# Patient Record
Sex: FEMALE | Race: WHITE | NOT HISPANIC OR LATINO | Employment: OTHER | URBAN - METROPOLITAN AREA
[De-identification: names, ages, dates, MRNs, and addresses within clinical notes are randomized per-mention and may not be internally consistent; named-entity substitution may affect disease eponyms.]

---

## 2017-01-17 ENCOUNTER — OFFICE VISIT (OUTPATIENT)
Dept: INTERNAL MEDICINE | Facility: CLINIC | Age: 72
End: 2017-01-17

## 2017-01-17 VITALS
BODY MASS INDEX: 37.38 KG/M2 | WEIGHT: 224.38 LBS | OXYGEN SATURATION: 98 % | HEIGHT: 65 IN | HEART RATE: 113 BPM | SYSTOLIC BLOOD PRESSURE: 126 MMHG | DIASTOLIC BLOOD PRESSURE: 72 MMHG

## 2017-01-17 DIAGNOSIS — R30.0 DYSURIA: Primary | ICD-10-CM

## 2017-01-17 LAB
BILIRUB BLD-MCNC: NEGATIVE MG/DL
CLARITY, POC: CLEAR
COLOR UR: YELLOW
GLUCOSE UR STRIP-MCNC: NEGATIVE MG/DL
KETONES UR QL: ABNORMAL
LEUKOCYTE EST, POC: ABNORMAL
NITRITE UR-MCNC: NEGATIVE MG/ML
PH UR: 5 [PH] (ref 5–8)
PROT UR STRIP-MCNC: NEGATIVE MG/DL
RBC # UR STRIP: NEGATIVE /UL
SP GR UR: 1.02 (ref 1–1.03)
UROBILINOGEN UR QL: NORMAL

## 2017-01-17 PROCEDURE — 81003 URINALYSIS AUTO W/O SCOPE: CPT | Performed by: NURSE PRACTITIONER

## 2017-01-17 PROCEDURE — 99213 OFFICE O/P EST LOW 20 MIN: CPT | Performed by: NURSE PRACTITIONER

## 2017-01-17 RX ORDER — NITROFURANTOIN 25; 75 MG/1; MG/1
100 CAPSULE ORAL 2 TIMES DAILY
Qty: 14 CAPSULE | Refills: 0 | Status: SHIPPED | OUTPATIENT
Start: 2017-01-17 | End: 2017-02-21

## 2017-01-17 NOTE — MR AVS SNAPSHOT
Marylu Waltonley   1/17/2017 3:30 PM   Office Visit    Dept Phone:  [phone]   Encounter #:  [id number]    Provider:  LAURA Mehta   Department:  [facility] INTERNAL MEDICINE                Your Full Care Plan              Today's Medication Changes          These changes are accurate as of: 1/17/17  4:10 PM.  If you have any questions, ask your nurse or doctor.               New Medication(s)Ordered:     nitrofurantoin (macrocrystal-monohydrate) 100 MG capsule   Commonly known as:  MACROBID   Take 1 capsule by mouth 2 (Two) Times a Day.   Started by:  LAURA Mehta         Stop taking medication(s)listed here:     meloxicam 15 MG tablet   Commonly known as:  MOBIC   Stopped by:  LAURA Mehta           nystatin [id number] UNIT/GM cream   Commonly known as:  MYCOSTATIN   Stopped by:  LAURA Mehta                Where to Get Your Medications      These medications were sent to [facility] [place] [address] - [address] [place] [address] [phone] [address] [address]    Hours:  24-hours Phone:  [phone]     nitrofurantoin (macrocrystal-monohydrate) 100 MG capsule                  Your Updated Medication List          This list is accurate as of: 1/17/17  4:10 PM.  Always use your most recent med list.                buPROPion  MG 24 hr tablet   Commonly known as:  WELLBUTRIN XL       CYANOCOBALAMIN PO       DULoxetine 60 MG capsule   Commonly known as:  CYMBALTA       estradiol 0.1 MG/GM vaginal cream   Commonly known as:  ESTRACE   Use a pea sized amt 2-3 times a week       felodipine 10 MG 24 hr tablet   Commonly known as:  PLENDIL   Take 1 tablet by mouth daily.       irbesartan 300 MG tablet   Commonly known as:  AVAPRO   Take 1 tablet by mouth daily.       levothyroxine 50 MCG tablet   Commonly known as:  SYNTHROID, LEVOTHROID       MULTI-VITAMIN PO       nitrofurantoin  (macrocrystal-monohydrate) 100 MG capsule   Commonly known as:  MACROBID   Take 1 capsule by mouth 2 (Two) Times a Day.       OMACOR PO       PROBIOTIC PO       PROVENTIL  (90 BASE) MCG/ACT inhaler   Generic drug:  albuterol       topiramate 100 MG tablet   Commonly known as:  TOPAMAX       triamterene-hydrochlorothiazide 37.5-25 MG per capsule   Commonly known as:  DYAZIDE   Take 1 capsule by mouth every morning.       VITAMIN C PO       VITAMIN D PO       * warfarin 3 MG tablet   Commonly known as:  COUMADIN   Pt is to take 3 tabs daily or as otherwise directed       * warfarin 5 MG tablet   Commonly known as:  COUMADIN   The patient will take coumadin as per  Orders,       * Notice:  This list has 2 medication(s) that are the same as other medications prescribed for you. Read the directions carefully, and ask your doctor or other care provider to review them with you.            You Were Diagnosed With        Codes Comments    Dysuria    -  Primary ICD-10-CM: R30.0  ICD-9-CM: 788.1       Instructions     None    Patient Instructions History      Upcoming Appointments     Visit Type Date Time Department    SAME DAY 1/17/2017  3:30 PM [facility]    COAG RN APPT 1/20/2017  3:45 PM [facility] OP INFU [facility]    ANTICOAGULATION 1/20/2017  3:30 PM [facility] ONC CBC LAB [facility]    [facility] 2/13/2017  9:20 AM [facility]    OFFICE VISIT 2/21/2017  3:45 PM [facility]    [facility]AG RN APPT 3/2/2017  3:45 PM [facility] OP INFU [facility]    ANTICOAGULATION 3/2/2017  3:30 PM [facility] ONC CBC LAB [facility]    [facility] RN APPT 4/13/2017  1:30 PM [facility] OP INFU CBC [facility]    ANTICOAGULATION 4/13/2017  1:00 PM [facility] ONC CBC LAB [facility]    FOLLOW UP 2 UNIT 6/8/2017  1:00 PM [facility] ONC [facility]    ANTICOAGULATION 6/8/2017 12:30 PM [facility] ONC CBC LAB [facility]      MyChart Signup     [facility] [employer] allows you to send messages to your doctor, view your test results, renew your prescriptions, schedule appointments, and more. To sign up, go to  [email or web address] and click on the Sign Up Now link in the New User? box. Enter your [employer] Activation Code exactly as it appears below along with the last four digits of your Social Security Number and your Date of Birth () to complete the sign-up process. If you do not sign up before the expiration date, you must request a new code.    [employer] Activation Code: [email or web address]  Expires: 2017  4:09 PM    If you have questions, you can email [email or web address] or call [phone] to talk to our [employer] staff. Remember, [employer] is NOT to be used for urgent needs. For medical emergencies, dial 911.               Other Info from Your Visit           Your Appointments     2017  3:30 PM EST   Anticoagulation with LAB CHAIR 6 CBC [facility] ONCOLOGY CBC LAB ([facility])    [address] [place], [place] [id number]   [phone]            2017  3:45 PM EST   COAGULATION with COAG RN CBC [facility] INFUSION CBC ([facility])    [address] [id number] [phone]            2017  9:20 AM EST   [facility] with [facility] [address] INTERNAL MEDICINE (--)    [address] [id number] [phone]            2017  3:45 PM EST   Office Visit with Arleen Dillon MD   [facility] INTERNAL MEDICINE (--)    [address] [address]   [phone]           Arrive 15 minutes prior to appointment.            Mar 02, 2017  3:45 PM EST   COAGULATION with MEGHAN GOODMAN [facility] INFUSION CBC ([facility])    [address] [id number]   [phone]              Allergies     Amlodipine Besylate-valsartan      Cefdinir      Other      Steroids       Reason for Visit     Urinary Tract Infection Pt complains of having urine frequency & dysuria. Pt states that she saw Joanna  "Quiñonez & there is talk of a diagnosis for Vulvodynia. Pt is awaiting pending testing.       Vital Signs     Blood Pressure Pulse Height Weight Last Menstrual Period Oxygen Saturation    126/72 (BP Location: Left arm, Patient Position: Sitting, Cuff Size: Large Adult) 113 64.96\" (165 cm) 224 lb 6 oz (102 kg) (LMP Unknown) 98%    Body Mass Index Smoking Status                37.38 kg/m2 Never Smoker          Problems and Diagnoses Noted     Difficult or painful urination    -  Primary        "

## 2017-01-17 NOTE — PROGRESS NOTES
Subjective   Marylu Foreman is a 71 y.o. female. Patient is here today for   Chief Complaint   Patient presents with   • Urinary Tract Infection     Pt complains of having urine frequency & dysuria. Pt states that she saw Joanna Quiñonez & there is talk of a diagnosis for Vulvodynia. Pt is awaiting pending testing.    .    History of Present Illness   C/o urinary frequency x 2 days associated with dysuria.  She had taken B12 10,000 mcg daily and had some diarrhea which has gotten better. States that she usually gets a urinary tract infection when she has diarrhea.   She is currently seeing Dr. Joanna Quiñonez for her frequent yeast infections and pelvic pain.    The following portions of the patient's history were reviewed and updated as appropriate: allergies, current medications, past family history, past medical history, past social history, past surgical history and problem list.    Review of Systems   Constitutional: Negative.    Respiratory: Negative.    Cardiovascular: Negative.    Genitourinary: Positive for dysuria and frequency.       Objective   Vitals:    01/17/17 1533   BP: 126/72   Pulse: 113   SpO2: 98%     Physical Exam   Constitutional: Vital signs are normal. She appears well-developed and well-nourished.   Cardiovascular: Normal rate, regular rhythm and normal heart sounds.    Pulmonary/Chest: Effort normal and breath sounds normal.   Abdominal: There is no CVA tenderness.   Skin: Skin is warm and dry.   Nursing note and vitals reviewed.    Results for orders placed or performed in visit on 01/17/17   POC Urinalysis Dipstick, Automated   Result Value Ref Range    Color Yellow Yellow, Straw, Dark Yellow, Ruby    Clarity, UA Clear Clear    Glucose, UA Negative Negative, 1000 mg/dL (3+) mg/dL    Bilirubin Negative Negative    Ketones, UA Trace (A) Negative    Specific Gravity  1.025 1.005 - 1.030    Blood, UA Negative Negative    pH, Urine 5.0 5.0 - 8.0    Protein, POC Negative Negative mg/dL     Urobilinogen, UA Normal Normal    Leukocytes Small (1+) (A) Negative    Nitrite, UA Negative Negative       Assessment/Plan   Marylu was seen today for urinary tract infection.    Diagnoses and all orders for this visit:    Dysuria  -     nitrofurantoin, macrocrystal-monohydrate, (MACROBID) 100 MG capsule; Take 1 capsule by mouth 2 (Two) Times a Day.  -     POC Urinalysis Dipstick, Automated  -     Urine Culture

## 2017-01-19 LAB
BACTERIA UR CULT: ABNORMAL
BACTERIA UR CULT: ABNORMAL
OTHER ANTIBIOTIC SUSC ISLT: ABNORMAL

## 2017-01-20 ENCOUNTER — CLINICAL SUPPORT (OUTPATIENT)
Dept: ONCOLOGY | Facility: HOSPITAL | Age: 72
End: 2017-01-20

## 2017-01-20 ENCOUNTER — ANTICOAGULATION VISIT (OUTPATIENT)
Dept: LAB | Facility: HOSPITAL | Age: 72
End: 2017-01-20

## 2017-01-20 ENCOUNTER — TELEPHONE (OUTPATIENT)
Dept: INTERNAL MEDICINE | Facility: CLINIC | Age: 72
End: 2017-01-20

## 2017-01-20 DIAGNOSIS — Z79.01 WARFARIN ANTICOAGULATION: Primary | ICD-10-CM

## 2017-01-20 DIAGNOSIS — I27.82 OTHER CHRONIC PULMONARY EMBOLISM: Primary | ICD-10-CM

## 2017-01-20 LAB
INR PPP: 1.6 (ref 0.9–1.1)
PROTHROMBIN TIME: 19.2 SECONDS (ref 11–13.5)

## 2017-01-20 PROCEDURE — 85610 PROTHROMBIN TIME: CPT | Performed by: INTERNAL MEDICINE

## 2017-01-20 NOTE — TELEPHONE ENCOUNTER
----- Message from LAURA Mehta sent at 1/20/2017  9:58 AM EST -----  Please let patient know that her urine culture was positive. The antibiotic that she's on should help. How are her symptoms?

## 2017-01-20 NOTE — PROGRESS NOTES
INR 1.6 TODAY. PER PT, SHE IS HAS NOT MISSED ANY DOSES. SHE IS ON MACROBID WHICH SHE JUST STARTED YESTERDAY FOR A UTI. SHE WILL BE ON THIS FOR A WEEK. D/W DR. ROBLEDO. PER DR. ROBLEDO, HAVE PT TAKE 5MG SUN, WED, SAT AND 7.5MG ALL OTHER DAYS. HAVE HER RETURN IN 1 WEEK FOR A RECHECK. INFORMED PT AND SHE V/U. COPY OF DOSING INSTRUCTIONS GIVEN TO PT. PT SENT TO SCHEDULING TO MAKE APT.

## 2017-01-20 NOTE — TELEPHONE ENCOUNTER
LM for patient on urine culture; directed to finish antibiotic & to call back to see how she was feeling.

## 2017-01-26 ENCOUNTER — CLINICAL SUPPORT (OUTPATIENT)
Dept: ONCOLOGY | Facility: HOSPITAL | Age: 72
End: 2017-01-26

## 2017-01-26 ENCOUNTER — LAB (OUTPATIENT)
Dept: LAB | Facility: HOSPITAL | Age: 72
End: 2017-01-26

## 2017-01-26 DIAGNOSIS — Z79.01 WARFARIN ANTICOAGULATION: ICD-10-CM

## 2017-01-26 LAB
INR PPP: 2 (ref 0.9–1.1)
PROTHROMBIN TIME: 24.1 SECONDS (ref 11–13.5)

## 2017-01-26 PROCEDURE — 85610 PROTHROMBIN TIME: CPT | Performed by: INTERNAL MEDICINE

## 2017-02-16 RX ORDER — WARFARIN SODIUM 5 MG/1
TABLET ORAL
Qty: 36 TABLET | Refills: 3 | Status: SHIPPED | OUTPATIENT
Start: 2017-02-16 | End: 2017-06-12 | Stop reason: SDUPTHER

## 2017-02-17 ENCOUNTER — APPOINTMENT (OUTPATIENT)
Dept: LAB | Facility: HOSPITAL | Age: 72
End: 2017-02-17

## 2017-02-17 ENCOUNTER — APPOINTMENT (OUTPATIENT)
Dept: ONCOLOGY | Facility: HOSPITAL | Age: 72
End: 2017-02-17

## 2017-02-21 ENCOUNTER — OFFICE VISIT (OUTPATIENT)
Dept: INTERNAL MEDICINE | Facility: CLINIC | Age: 72
End: 2017-02-21

## 2017-02-21 VITALS
OXYGEN SATURATION: 99 % | HEIGHT: 65 IN | HEART RATE: 92 BPM | SYSTOLIC BLOOD PRESSURE: 124 MMHG | WEIGHT: 226.4 LBS | DIASTOLIC BLOOD PRESSURE: 70 MMHG | BODY MASS INDEX: 37.72 KG/M2

## 2017-02-21 DIAGNOSIS — I27.82 OTHER CHRONIC PULMONARY EMBOLISM: Primary | ICD-10-CM

## 2017-02-21 DIAGNOSIS — F32.A DEPRESSION, UNSPECIFIED DEPRESSION TYPE: ICD-10-CM

## 2017-02-21 DIAGNOSIS — I10 ESSENTIAL HYPERTENSION: ICD-10-CM

## 2017-02-21 PROCEDURE — 99213 OFFICE O/P EST LOW 20 MIN: CPT | Performed by: INTERNAL MEDICINE

## 2017-02-21 PROCEDURE — 90670 PCV13 VACCINE IM: CPT | Performed by: INTERNAL MEDICINE

## 2017-02-21 PROCEDURE — G0009 ADMIN PNEUMOCOCCAL VACCINE: HCPCS | Performed by: INTERNAL MEDICINE

## 2017-02-21 NOTE — PROGRESS NOTES
Subjective   Marylu Foreman is a 71 y.o. female here today to f/u on HTN and hyperlipidemia.      History of Present Illness   Pt has been doing well with thyroid meds.  Taking as perscribed without any complications  Pt has been taking BP meds as prescribed without any problems.  No HA  No episodes of orthostasis  She has been on the welbutrin and the cymbalta for depression.  She says it is not really helping  SHe sees a psychiatrist  She sees dr Mahajan at [facility] and they follow her INR    The following portions of the patient's history were reviewed and updated as appropriate: allergies, current medications, past medical history, past social history and problem list.  She still is working full-time at [employer]    Review of Systems   All other systems reviewed and are negative.      Objective   Physical Exam   Constitutional: She is oriented to person, place, and time. She appears well-developed and well-nourished.   HENT:   Head: Normocephalic and atraumatic.   Right Ear: External ear normal.   Left Ear: External ear normal.   Mouth/Throat: Oropharynx is clear and moist.   Eyes: Conjunctivae and EOM are normal. Pupils are equal, round, and reactive to light.   Neck: Normal range of motion. No tracheal deviation present. No thyromegaly present.   Cardiovascular: Normal rate, regular rhythm, normal heart sounds and intact distal pulses.    Pulmonary/Chest: Effort normal and breath sounds normal.   Abdominal: Soft. Bowel sounds are normal. She exhibits no distension. There is no tenderness.   Musculoskeletal: Normal range of motion. She exhibits no edema or deformity.   Neurological: She is alert and oriented to person, place, and time.   Skin: Skin is warm and dry.   Psychiatric: She has a normal mood and affect. Her behavior is normal. Judgment and thought content normal.   Vitals reviewed.      Vitals:    02/21/17 1540   BP: 124/70   Pulse: 92   SpO2: 99%          Current Outpatient Prescriptions:   •  buPROPion  XL (WELLBUTRIN XL) 300 MG 24 hr tablet, Take 300 mg by mouth every morning., Disp: , Rfl:   •  CYANOCOBALAMIN PO, , Disp: , Rfl:   •  DULoxetine (CYMBALTA) 60 MG capsule, Take 1 capsule by mouth Daily., Disp: , Rfl: 0  •  estradiol (ESTRACE) 0.1 MG/GM vaginal cream, Use a pea sized amt 2-3 times a week, Disp: 42.5 g, Rfl: 2  •  felodipine (PLENDIL) 10 MG 24 hr tablet, Take 1 tablet by mouth daily., Disp: 90 tablet, Rfl: 3  •  irbesartan (AVAPRO) 300 MG tablet, Take 1 tablet by mouth daily., Disp: 90 tablet, Rfl: 3  •  levothyroxine (SYNTHROID, LEVOTHROID) 50 MCG tablet, , Disp: , Rfl:   •  Multiple Vitamin (MULTI-VITAMIN PO), Take 1 tablet by mouth daily., Disp: , Rfl:   •  Probiotic Product (PROBIOTIC PO), Take  by mouth daily. Lactobacillus rhamnosus GG, Disp: , Rfl:   •  triamterene-hydrochlorothiazide (DYAZIDE) 37.5-25 MG per capsule, Take 1 capsule by mouth every morning., Disp: 90 capsule, Rfl: 3  •  warfarin (COUMADIN) 5 MG tablet, Take 5mg sun, wed, thur, 7.5 all other days or as instructed, Disp: 36 tablet, Rfl: 3  •  PROVENTIL  (90 BASE) MCG/ACT inhaler, inhale 2 puffs by mouth every 6 hours if needed for wheezing, Disp: , Rfl: 0      Assessment/Plan   Diagnoses and all orders for this visit:    Other chronic pulmonary embolism    Essential hypertension    Depression, unspecified depression type      1.  HTN- she is doing well with current meds  2. PE- She is doing well with warfarin  Sees CBC  3.  HPL- she has never been on meds.  SHe is going to work on diet and exercise and see what she is in 6 months  4. Depression- She is still seeing Psych.  I have rec some reg exercise

## 2017-03-02 ENCOUNTER — CLINICAL SUPPORT (OUTPATIENT)
Dept: ONCOLOGY | Facility: HOSPITAL | Age: 72
End: 2017-03-02

## 2017-03-02 ENCOUNTER — ANTICOAGULATION VISIT (OUTPATIENT)
Dept: LAB | Facility: HOSPITAL | Age: 72
End: 2017-03-02

## 2017-03-02 DIAGNOSIS — I27.82 OTHER CHRONIC PULMONARY EMBOLISM: Primary | ICD-10-CM

## 2017-03-02 LAB
INR PPP: 2.6 (ref 0.9–1.1)
PROTHROMBIN TIME: 31.8 SECONDS (ref 11–13.5)

## 2017-03-02 PROCEDURE — 85610 PROTHROMBIN TIME: CPT | Performed by: INTERNAL MEDICINE

## 2017-03-06 RX ORDER — IRBESARTAN 300 MG/1
TABLET ORAL
Qty: 90 TABLET | Refills: 1 | Status: SHIPPED | OUTPATIENT
Start: 2017-03-06 | End: 2017-09-02 | Stop reason: SDUPTHER

## 2017-03-06 RX ORDER — TRIAMTERENE AND HYDROCHLOROTHIAZIDE 37.5; 25 MG/1; MG/1
CAPSULE ORAL
Qty: 90 CAPSULE | Refills: 1 | Status: SHIPPED | OUTPATIENT
Start: 2017-03-06 | End: 2017-09-02 | Stop reason: SDUPTHER

## 2017-03-06 RX ORDER — FELODIPINE 10 MG/1
TABLET, EXTENDED RELEASE ORAL
Qty: 90 TABLET | Refills: 1 | Status: SHIPPED | OUTPATIENT
Start: 2017-03-06 | End: 2017-09-02 | Stop reason: SDUPTHER

## 2017-04-13 ENCOUNTER — APPOINTMENT (OUTPATIENT)
Dept: ONCOLOGY | Facility: HOSPITAL | Age: 72
End: 2017-04-13

## 2017-04-13 ENCOUNTER — APPOINTMENT (OUTPATIENT)
Dept: LAB | Facility: HOSPITAL | Age: 72
End: 2017-04-13

## 2017-04-14 ENCOUNTER — CLINICAL SUPPORT (OUTPATIENT)
Dept: ONCOLOGY | Facility: HOSPITAL | Age: 72
End: 2017-04-14

## 2017-04-14 ENCOUNTER — ANTICOAGULATION VISIT (OUTPATIENT)
Dept: LAB | Facility: HOSPITAL | Age: 72
End: 2017-04-14

## 2017-04-14 DIAGNOSIS — Z79.01 WARFARIN ANTICOAGULATION: Primary | ICD-10-CM

## 2017-04-14 LAB
INR PPP: 4.4 (ref 0.9–1.1)
PROTHROMBIN TIME: 52.8 SECONDS (ref 11–13.5)

## 2017-04-14 PROCEDURE — 85610 PROTHROMBIN TIME: CPT | Performed by: INTERNAL MEDICINE

## 2017-04-14 NOTE — PROGRESS NOTES
INR 4.4.  Pt started on new med naltrexone from phychiatrist.  It's supposed to work in conjunction with wellbutrin to help control appetite.  No interaction with coumadin and her new medication.  No bleeding complaints.  I s/w LAURA Santana.  Hold coumadin today.  5mg Monday and Wednesday and 7.5mg all other days.  Recheck INR on Friday 4/21/17.  Pt has hard time getting to appts bc of her work schedule.  We put her on infusion schedule for Coag RN review next week.  Copy of SS given to pt and she v/u.  She will call with any concerns/complaints.  I did inform her that home monitoring of INR was a possibility and she will talk with Dr. Mahajan about this at her visit in June.

## 2017-04-17 DIAGNOSIS — Z79.01 WARFARIN ANTICOAGULATION: Primary | ICD-10-CM

## 2017-04-21 ENCOUNTER — TELEPHONE (OUTPATIENT)
Dept: ONCOLOGY | Facility: CLINIC | Age: 72
End: 2017-04-21

## 2017-04-21 ENCOUNTER — INFUSION (OUTPATIENT)
Dept: ONCOLOGY | Facility: HOSPITAL | Age: 72
End: 2017-04-21

## 2017-04-21 ENCOUNTER — ANTICOAGULATION VISIT (OUTPATIENT)
Dept: LAB | Facility: HOSPITAL | Age: 72
End: 2017-04-21

## 2017-04-21 DIAGNOSIS — Z79.01 WARFARIN ANTICOAGULATION: ICD-10-CM

## 2017-04-21 LAB
INR PPP: 3.2 (ref 0.9–1.1)
PROTHROMBIN TIME: 38.1 SECONDS (ref 11–13.5)

## 2017-04-21 PROCEDURE — 85610 PROTHROMBIN TIME: CPT | Performed by: INTERNAL MEDICINE

## 2017-04-26 DIAGNOSIS — Z79.01 WARFARIN ANTICOAGULATION: Primary | ICD-10-CM

## 2017-05-04 ENCOUNTER — CLINICAL SUPPORT (OUTPATIENT)
Dept: ONCOLOGY | Facility: HOSPITAL | Age: 72
End: 2017-05-04

## 2017-05-04 ENCOUNTER — ANTICOAGULATION VISIT (OUTPATIENT)
Dept: LAB | Facility: HOSPITAL | Age: 72
End: 2017-05-04
Attending: INTERNAL MEDICINE

## 2017-05-04 DIAGNOSIS — Z79.01 WARFARIN ANTICOAGULATION: ICD-10-CM

## 2017-05-04 LAB
INR PPP: 1.9 (ref 0.9–1.1)
PROTHROMBIN TIME: 22.5 SECONDS (ref 11–13.5)

## 2017-05-04 PROCEDURE — 85610 PROTHROMBIN TIME: CPT | Performed by: INTERNAL MEDICINE

## 2017-06-09 RX ORDER — ESTRADIOL 0.1 MG/G
CREAM VAGINAL
Qty: 42.5 G | Refills: 2 | Status: SHIPPED | OUTPATIENT
Start: 2017-06-09 | End: 2017-10-03 | Stop reason: ALTCHOICE

## 2017-06-12 RX ORDER — WARFARIN SODIUM 5 MG/1
TABLET ORAL
Qty: 36 TABLET | Refills: 3 | Status: SHIPPED | OUTPATIENT
Start: 2017-06-12 | End: 2017-10-24 | Stop reason: SDUPTHER

## 2017-06-13 DIAGNOSIS — C50.912 MALIGNANT NEOPLASM OF LEFT FEMALE BREAST, UNSPECIFIED SITE OF BREAST: Primary | ICD-10-CM

## 2017-06-15 ENCOUNTER — ANTICOAGULATION VISIT (OUTPATIENT)
Dept: OTHER | Facility: HOSPITAL | Age: 72
End: 2017-06-15

## 2017-06-15 ENCOUNTER — APPOINTMENT (OUTPATIENT)
Dept: ONCOLOGY | Facility: CLINIC | Age: 72
End: 2017-06-15

## 2017-06-15 DIAGNOSIS — Z79.01 WARFARIN ANTICOAGULATION: Primary | ICD-10-CM

## 2017-06-21 ENCOUNTER — TRANSCRIBE ORDERS (OUTPATIENT)
Dept: ADMINISTRATIVE | Facility: HOSPITAL | Age: 72
End: 2017-06-21

## 2017-06-21 DIAGNOSIS — Z12.31 VISIT FOR SCREENING MAMMOGRAM: Primary | ICD-10-CM

## 2017-06-21 DIAGNOSIS — Z79.01 WARFARIN ANTICOAGULATION: Primary | ICD-10-CM

## 2017-06-23 ENCOUNTER — OFFICE VISIT (OUTPATIENT)
Dept: ONCOLOGY | Facility: CLINIC | Age: 72
End: 2017-06-23

## 2017-06-23 ENCOUNTER — ANTICOAGULATION VISIT (OUTPATIENT)
Dept: OTHER | Facility: HOSPITAL | Age: 72
End: 2017-06-23

## 2017-06-23 VITALS
TEMPERATURE: 97.7 F | OXYGEN SATURATION: 97 % | DIASTOLIC BLOOD PRESSURE: 66 MMHG | HEART RATE: 87 BPM | SYSTOLIC BLOOD PRESSURE: 101 MMHG | RESPIRATION RATE: 16 BRPM | BODY MASS INDEX: 37.39 KG/M2 | HEIGHT: 65 IN | WEIGHT: 224.4 LBS

## 2017-06-23 DIAGNOSIS — C50.912 MALIGNANT NEOPLASM OF LEFT FEMALE BREAST, UNSPECIFIED SITE OF BREAST: Primary | ICD-10-CM

## 2017-06-23 DIAGNOSIS — I95.1 ORTHOSTATIC HYPOTENSION: ICD-10-CM

## 2017-06-23 DIAGNOSIS — Z79.01 WARFARIN ANTICOAGULATION: ICD-10-CM

## 2017-06-23 LAB
BASOPHILS # BLD AUTO: 0.14 10*3/MM3 (ref 0–0.2)
BASOPHILS NFR BLD AUTO: 1.6 % (ref 0–1.5)
DEPRECATED RDW RBC AUTO: 47.3 FL (ref 37–54)
EOSINOPHIL # BLD AUTO: 0.63 10*3/MM3 (ref 0–0.7)
EOSINOPHIL NFR BLD AUTO: 7.3 % (ref 0.3–6.2)
ERYTHROCYTE [DISTWIDTH] IN BLOOD BY AUTOMATED COUNT: 14.1 % (ref 11.7–13)
HCT VFR BLD AUTO: 35.2 % (ref 35.6–45.5)
HGB BLD-MCNC: 11.5 G/DL (ref 11.9–15.5)
IMM GRANULOCYTES # BLD: 0.03 10*3/MM3 (ref 0–0.03)
IMM GRANULOCYTES NFR BLD: 0.3 % (ref 0–0.5)
INR PPP: 2.6
LYMPHOCYTES # BLD AUTO: 2.5 10*3/MM3 (ref 0.9–4.8)
LYMPHOCYTES NFR BLD AUTO: 28.8 % (ref 19.6–45.3)
MCH RBC QN AUTO: 29.7 PG (ref 26.9–32)
MCHC RBC AUTO-ENTMCNC: 32.7 G/DL (ref 32.4–36.3)
MCV RBC AUTO: 91 FL (ref 80.5–98.2)
MONOCYTES # BLD AUTO: 0.77 10*3/MM3 (ref 0.2–1.2)
MONOCYTES NFR BLD AUTO: 8.9 % (ref 5–12)
NEUTROPHILS # BLD AUTO: 4.6 10*3/MM3 (ref 1.9–8.1)
NEUTROPHILS NFR BLD AUTO: 53.1 % (ref 42.7–76)
NRBC BLD MANUAL-RTO: 0.2 /100 WBC (ref 0–0)
PLATELET # BLD AUTO: 373 10*3/MM3 (ref 140–500)
PMV BLD AUTO: 11.5 FL (ref 6–12)
PROTHROMBIN TIME: 30.7 SECONDS (ref 11–15)
RBC # BLD AUTO: 3.87 10*6/MM3 (ref 3.9–5.2)
WBC NRBC COR # BLD: 8.67 10*3/MM3 (ref 4.5–10.7)

## 2017-06-23 PROCEDURE — 36415 COLL VENOUS BLD VENIPUNCTURE: CPT

## 2017-06-23 PROCEDURE — 99214 OFFICE O/P EST MOD 30 MIN: CPT | Performed by: INTERNAL MEDICINE

## 2017-06-23 PROCEDURE — 85610 PROTHROMBIN TIME: CPT

## 2017-06-23 PROCEDURE — 85025 COMPLETE CBC W/AUTO DIFF WBC: CPT | Performed by: INTERNAL MEDICINE
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
## 2017-07-24 DIAGNOSIS — I10 ESSENTIAL HYPERTENSION: ICD-10-CM

## 2017-07-24 DIAGNOSIS — I27.82 OTHER CHRONIC PULMONARY EMBOLISM: ICD-10-CM

## 2017-07-26 LAB
ALBUMIN SERPL-MCNC: 4.3 G/DL (ref 3.5–5.2)
ALBUMIN/GLOB SERPL: 1.5 G/DL
ALP SERPL-CCNC: 106 U/L (ref 39–117)
ALT SERPL-CCNC: 12 U/L (ref 1–33)
AST SERPL-CCNC: 11 U/L (ref 1–32)
BILIRUB SERPL-MCNC: 0.5 MG/DL (ref 0.1–1.2)
BUN SERPL-MCNC: 35 MG/DL (ref 8–23)
BUN/CREAT SERPL: 20.3 (ref 7–25)
CALCIUM SERPL-MCNC: 9.1 MG/DL (ref 8.6–10.5)
CHLORIDE SERPL-SCNC: 101 MMOL/L (ref 98–107)
CHOLEST SERPL-MCNC: 214 MG/DL (ref 0–200)
CO2 SERPL-SCNC: 22.2 MMOL/L (ref 22–29)
CREAT SERPL-MCNC: 1.72 MG/DL (ref 0.57–1)
GLOBULIN SER CALC-MCNC: 2.9 GM/DL
GLUCOSE SERPL-MCNC: 117 MG/DL (ref 65–99)
HCV AB S/CO SERPL IA: <0.1 S/CO RATIO (ref 0–0.9)
HDLC SERPL-MCNC: 55 MG/DL (ref 40–60)
LDLC SERPL CALC-MCNC: 132 MG/DL (ref 0–100)
LDLC/HDLC SERPL: 2.4 {RATIO}
POTASSIUM SERPL-SCNC: 4.3 MMOL/L (ref 3.5–5.2)
PROT SERPL-MCNC: 7.2 G/DL (ref 6–8.5)
SODIUM SERPL-SCNC: 140 MMOL/L (ref 136–145)
TRIGL SERPL-MCNC: 134 MG/DL (ref 0–150)
VLDLC SERPL CALC-MCNC: 26.8 MG/DL (ref 5–40)

## 2017-07-27 ENCOUNTER — OFFICE VISIT (OUTPATIENT)
Dept: INTERNAL MEDICINE | Facility: CLINIC | Age: 72
End: 2017-07-27

## 2017-07-27 VITALS
DIASTOLIC BLOOD PRESSURE: 62 MMHG | HEIGHT: 65 IN | BODY MASS INDEX: 37.73 KG/M2 | SYSTOLIC BLOOD PRESSURE: 104 MMHG | WEIGHT: 226.44 LBS | HEART RATE: 65 BPM | OXYGEN SATURATION: 98 %

## 2017-07-27 DIAGNOSIS — N76.1 CHRONIC VAGINITIS: ICD-10-CM

## 2017-07-27 DIAGNOSIS — I10 ESSENTIAL HYPERTENSION: ICD-10-CM

## 2017-07-27 DIAGNOSIS — E78.5 HYPERLIPIDEMIA, UNSPECIFIED HYPERLIPIDEMIA TYPE: ICD-10-CM

## 2017-07-27 DIAGNOSIS — N18.9 CRI (CHRONIC RENAL INSUFFICIENCY), UNSPECIFIED STAGE: ICD-10-CM

## 2017-07-27 DIAGNOSIS — R30.0 DYSURIA: Primary | ICD-10-CM

## 2017-07-27 LAB
BILIRUB BLD-MCNC: NEGATIVE MG/DL
CLARITY, POC: ABNORMAL
COLOR UR: ABNORMAL
GLUCOSE UR STRIP-MCNC: NEGATIVE MG/DL
KETONES UR QL: NEGATIVE
LEUKOCYTE EST, POC: NEGATIVE
NITRITE UR-MCNC: NEGATIVE MG/ML
PH UR: 0.2 [PH] (ref 5–8)
PROT UR STRIP-MCNC: NEGATIVE MG/DL
RBC # UR STRIP: NEGATIVE /UL
SP GR UR: 1.02 (ref 1–1.03)
UROBILINOGEN UR QL: NORMAL

## 2017-07-27 PROCEDURE — 81003 URINALYSIS AUTO W/O SCOPE: CPT | Performed by: INTERNAL MEDICINE

## 2017-07-27 PROCEDURE — 99214 OFFICE O/P EST MOD 30 MIN: CPT | Performed by: INTERNAL MEDICINE

## 2017-07-27 RX ORDER — NALTREXONE HYDROCHLORIDE 50 MG/1
25 TABLET, FILM COATED ORAL DAILY
COMMUNITY
End: 2017-08-28

## 2017-07-27 NOTE — PROGRESS NOTES
"Subjective   Marylu Foreman is a 72 y.o. female.     Pt here to follow up on HPL.   Pt did have labs done. Pt has been taking an OTC med called \"jiaogulan\" for high cholesterol.   Pt also complains of having dysuria & urine frequency. Pt states this is an on-going issue from a very bad vaginal infection that she has had over the last year.   The pt currently see's GYN Joanna Quiñonez & they have tried her on at least two medications.     History of Present Illness   She has been getting medication from GYN for her bad vag infection.  She denies any vag d/g but has pain in that area  She has used muliple meds as treatment and it has never gone away.  She says the pain has been getting worse for the last week and is similar to last summer    The following portions of the patient's history were reviewed and updated as appropriate: allergies, current medications, past medical history, past social history and problem list.  She denies any change in her diet  She uses ivory soap and non fragrance soap    Review of Systems   All other systems reviewed and are negative.      Objective     Vitals:    07/27/17 1515   BP: 104/62   Pulse: 65   SpO2: 98%       Physical Exam   Constitutional: She is oriented to person, place, and time. She appears well-developed and well-nourished.   HENT:   Head: Normocephalic and atraumatic.   Right Ear: External ear normal.   Left Ear: External ear normal.   Mouth/Throat: Oropharynx is clear and moist.   Eyes: Conjunctivae and EOM are normal. Pupils are equal, round, and reactive to light.   Neck: Normal range of motion. No tracheal deviation present. No thyromegaly present.   Cardiovascular: Normal rate, regular rhythm, normal heart sounds and intact distal pulses.    Pulmonary/Chest: Effort normal and breath sounds normal.   Abdominal: Soft. Bowel sounds are normal. She exhibits no distension. There is no tenderness.   Genitourinary: Vagina normal and uterus normal. No vaginal discharge " found.   Musculoskeletal: Normal range of motion. She exhibits no edema or deformity.   Neurological: She is alert and oriented to person, place, and time.   Skin: Skin is warm and dry.   Psychiatric: She has a normal mood and affect. Her behavior is normal. Judgment and thought content normal.   Vitals reviewed.      Assessment/Plan   Diagnoses and all orders for this visit:    Dysuria  -     POCT urinalysis dipstick, automated    Essential hypertension    Hyperlipidemia, unspecified hyperlipidemia type    CRI (chronic renal insufficiency), unspecified stage  -     Basic Metabolic Panel; Future    Chronic vaginitis    Other orders  -     tretinoin (RETIN-A) 0.025 % cream; Apply  topically Every Night.    1.  Vaginal pain: Patient has a history of atrophic vaginitis.  She also has a history of recurrent vaginal infections.  She does not have any significant findings on exam today.  She likely needs to go back and see GYN and I did do cultures today  2.  Renal insufficiency: This is acutely worsened but she has not been drinking very much water.  She needs strict more water and discontinue her diuretic  3.  Hypertension: She is running a little bit low.  We are going discontinue the Dyazide

## 2017-07-31 ENCOUNTER — HOSPITAL ENCOUNTER (OUTPATIENT)
Dept: MAMMOGRAPHY | Facility: HOSPITAL | Age: 72
Discharge: HOME | End: 2017-07-31
Admitting: INTERNAL MEDICINE

## 2017-07-31 DIAGNOSIS — Z12.31 VISIT FOR SCREENING MAMMOGRAM: ICD-10-CM

## 2017-07-31 PROCEDURE — G0202 SCR MAMMO BI INCL CAD: HCPCS

## 2017-08-01 LAB
A VAGINAE DNA VAG QL NAA+PROBE: ABNORMAL SCORE
BVAB2 DNA VAG QL NAA+PROBE: ABNORMAL SCORE
C ALBICANS DNA VAG QL NAA+PROBE: NEGATIVE
C GLABRATA DNA VAG QL NAA+PROBE: POSITIVE
MEGA1 DNA VAG QL NAA+PROBE: ABNORMAL SCORE
T VAGINALIS RRNA SPEC QL NAA+PROBE: NEGATIVE

## 2017-08-02 ENCOUNTER — TELEPHONE (OUTPATIENT)
Dept: OBSTETRICS AND GYNECOLOGY | Facility: CLINIC | Age: 72
End: 2017-08-02

## 2017-08-02 NOTE — TELEPHONE ENCOUNTER
Pt called states that she had a nuswab done at her PCP office and they didn't know how to treat her so they have asked that we view the results and send her in something to her [facility] pharmacy. Pt informed Dr Bateman out of office today.

## 2017-08-02 NOTE — TELEPHONE ENCOUNTER
Yes but we should probably try extended dosing of the fluconazole. I will have  her take 3 tablets over the course of 7 days.

## 2017-08-02 NOTE — TELEPHONE ENCOUNTER
Pt states she has tried boric acid before and the burning was intolerable for her. She would like ot just see if maybe fluconazole would help if you could send that in for her.

## 2017-08-02 NOTE — TELEPHONE ENCOUNTER
OK, Nevermind, I got a high alert drug warning with oral fluconazole at extended dose d/t her coumadin and the increased tendency to bleed. Please advise her to do OTC monistat for 14 days.

## 2017-08-03 DIAGNOSIS — C50.912 MALIGNANT NEOPLASM OF LEFT FEMALE BREAST, UNSPECIFIED SITE OF BREAST: Primary | ICD-10-CM

## 2017-08-03 DIAGNOSIS — Z79.01 WARFARIN ANTICOAGULATION: ICD-10-CM

## 2017-08-04 ENCOUNTER — LAB (OUTPATIENT)
Dept: OTHER | Facility: HOSPITAL | Age: 72
End: 2017-08-04

## 2017-08-04 ENCOUNTER — OFFICE VISIT (OUTPATIENT)
Dept: ONCOLOGY | Facility: CLINIC | Age: 72
End: 2017-08-04

## 2017-08-04 VITALS — SYSTOLIC BLOOD PRESSURE: 127 MMHG | HEART RATE: 92 BPM | TEMPERATURE: 98.3 F | DIASTOLIC BLOOD PRESSURE: 78 MMHG

## 2017-08-04 DIAGNOSIS — Z79.01 WARFARIN ANTICOAGULATION: Primary | ICD-10-CM

## 2017-08-04 DIAGNOSIS — Z79.01 WARFARIN ANTICOAGULATION: ICD-10-CM

## 2017-08-04 DIAGNOSIS — C50.912 MALIGNANT NEOPLASM OF LEFT FEMALE BREAST, UNSPECIFIED SITE OF BREAST: ICD-10-CM

## 2017-08-04 LAB
BASOPHILS # BLD AUTO: 0.14 10*3/MM3 (ref 0–0.2)
BASOPHILS NFR BLD AUTO: 1.5 % (ref 0–1.5)
DEPRECATED RDW RBC AUTO: 48.9 FL (ref 37–54)
EOSINOPHIL # BLD AUTO: 0.35 10*3/MM3 (ref 0–0.7)
EOSINOPHIL NFR BLD AUTO: 3.7 % (ref 0.3–6.2)
ERYTHROCYTE [DISTWIDTH] IN BLOOD BY AUTOMATED COUNT: 14.7 % (ref 11.7–13)
HCT VFR BLD AUTO: 32.3 % (ref 35.6–45.5)
HGB BLD-MCNC: 10.6 G/DL (ref 11.9–15.5)
IMM GRANULOCYTES # BLD: 0.04 10*3/MM3 (ref 0–0.03)
IMM GRANULOCYTES NFR BLD: 0.4 % (ref 0–0.5)
INR PPP: 3.2
LYMPHOCYTES # BLD AUTO: 2.94 10*3/MM3 (ref 0.9–4.8)
LYMPHOCYTES NFR BLD AUTO: 31 % (ref 19.6–45.3)
MCH RBC QN AUTO: 29.7 PG (ref 26.9–32)
MCHC RBC AUTO-ENTMCNC: 32.8 G/DL (ref 32.4–36.3)
MCV RBC AUTO: 90.5 FL (ref 80.5–98.2)
MONOCYTES # BLD AUTO: 0.82 10*3/MM3 (ref 0.2–1.2)
MONOCYTES NFR BLD AUTO: 8.6 % (ref 5–12)
NEUTROPHILS # BLD AUTO: 5.2 10*3/MM3 (ref 1.9–8.1)
NEUTROPHILS NFR BLD AUTO: 54.8 % (ref 42.7–76)
NRBC BLD MANUAL-RTO: 1.1 /100 WBC (ref 0–0)
PLATELET # BLD AUTO: 336 10*3/MM3 (ref 140–500)
PMV BLD AUTO: 11.1 FL (ref 6–12)
PROTHROMBIN TIME: 38.1 SECONDS (ref 11–15)
RBC # BLD AUTO: 3.57 10*6/MM3 (ref 3.9–5.2)
WBC NRBC COR # BLD: 9.49 10*3/MM3 (ref 4.5–10.7)

## 2017-08-04 PROCEDURE — 85025 COMPLETE CBC W/AUTO DIFF WBC: CPT | Performed by: INTERNAL MEDICINE

## 2017-08-04 PROCEDURE — 99212-NC PR NO CHARGE CBC OFFICE OUTPATIENT VISIT 10 MINUTES: Performed by: NURSE PRACTITIONER

## 2017-08-04 PROCEDURE — 85610 PROTHROMBIN TIME: CPT

## 2017-08-04 PROCEDURE — 36415 COLL VENOUS BLD VENIPUNCTURE: CPT

## 2017-08-04 NOTE — PROGRESS NOTES
The patient returns today for PT/INR with nurse review.  She is slightly supratherapeutic today at 3.2, dose adjustments made per standing stone.  She denies any signs or symptoms bleeding    She is noted to be slightly tachycardic upon review of vital signs, radial pulses palpated, decreased to 92, the patient admits she consumed a large amounts of caffeine earlier today.  She denies any chest pain or palpitations.    Lastly, she is undergoing treatment for a vaginal infection, seen by GYN.  Review of their notes, they have considered fluconazole in the past, though due to the interaction with Coumadin are hesitant.  I have reviewed with the patient, if fluconazole were necessary, this is acceptable from our perspective.  We would simply need to dose reduce the Coumadin and monitor her INR more frequently.  He was instructed to call if she is initiated on new medications.    She will return in 6 weeks for nurse PT/INR with nurse review    Lab Results   Component Value Date    INR 3.20 08/04/2017    INR 2.60 06/23/2017    INR 1.90 (H) 05/04/2017    PROTIME 38.1 (H) 08/04/2017    PROTIME 30.7 (H) 06/23/2017    PROTIME 22.5 (H) 05/04/2017     Esther Hu, LAURA  08/04/2017

## 2017-08-08 DIAGNOSIS — Z86.2 HISTORY OF IRON DEFICIENCY ANEMIA: Primary | ICD-10-CM

## 2017-08-08 DIAGNOSIS — Z98.84 HISTORY OF GASTRIC BYPASS: ICD-10-CM

## 2017-08-08 NOTE — PROGRESS NOTES
Patient's Hgb noted to be dropping, 10.6 on 8/4/17.  Called pt and VM left explaining, in light of her hx of iron def and gastric bypass, we will plan to recheck labs in 1 mo, including CBC, Ferritin, Iron panel, B12, and Folate. Msg sent to scheduling.

## 2017-08-13 ENCOUNTER — DOCUMENTATION (OUTPATIENT)
Dept: OBSTETRICS AND GYNECOLOGY | Facility: CLINIC | Age: 72
End: 2017-08-13

## 2017-08-15 ENCOUNTER — RESULTS ENCOUNTER (OUTPATIENT)
Dept: INTERNAL MEDICINE | Facility: CLINIC | Age: 72
End: 2017-08-15

## 2017-08-15 DIAGNOSIS — N18.9 CRI (CHRONIC RENAL INSUFFICIENCY), UNSPECIFIED STAGE: ICD-10-CM

## 2017-08-28 ENCOUNTER — OFFICE VISIT (OUTPATIENT)
Dept: INTERNAL MEDICINE | Facility: CLINIC | Age: 72
End: 2017-08-28

## 2017-08-28 VITALS
BODY MASS INDEX: 37.36 KG/M2 | SYSTOLIC BLOOD PRESSURE: 116 MMHG | OXYGEN SATURATION: 96 % | HEART RATE: 110 BPM | WEIGHT: 224.2 LBS | DIASTOLIC BLOOD PRESSURE: 74 MMHG | HEIGHT: 65 IN

## 2017-08-28 DIAGNOSIS — Z00.00 MEDICARE ANNUAL WELLNESS VISIT, INITIAL: ICD-10-CM

## 2017-08-28 DIAGNOSIS — Z86.2 HISTORY OF IRON DEFICIENCY ANEMIA: ICD-10-CM

## 2017-08-28 DIAGNOSIS — D50.9 IRON DEFICIENCY ANEMIA, UNSPECIFIED IRON DEFICIENCY ANEMIA TYPE: ICD-10-CM

## 2017-08-28 DIAGNOSIS — Z98.84 HISTORY OF GASTRIC BYPASS: ICD-10-CM

## 2017-08-28 DIAGNOSIS — D50.8 OTHER IRON DEFICIENCY ANEMIA: Primary | ICD-10-CM

## 2017-08-28 DIAGNOSIS — J30.9 ALLERGIC RHINITIS, UNSPECIFIED ALLERGIC RHINITIS TRIGGER, UNSPECIFIED RHINITIS SEASONALITY: ICD-10-CM

## 2017-08-28 DIAGNOSIS — I10 ESSENTIAL HYPERTENSION: Primary | ICD-10-CM

## 2017-08-28 DIAGNOSIS — Z79.01 WARFARIN ANTICOAGULATION: ICD-10-CM

## 2017-08-28 LAB
BASOPHILS # BLD AUTO: 0.13 10*3/MM3 (ref 0–0.2)
BASOPHILS NFR BLD AUTO: 1.3 % (ref 0–1.5)
EOSINOPHIL # BLD AUTO: 0.45 10*3/MM3 (ref 0–0.7)
EOSINOPHIL NFR BLD AUTO: 4.6 % (ref 0.3–6.2)
ERYTHROCYTE [DISTWIDTH] IN BLOOD BY AUTOMATED COUNT: 15 % (ref 11.7–13)
FERRITIN SERPL-MCNC: 46.86 NG/ML (ref 13–150)
FOLATE SERPL-MCNC: >20 NG/ML (ref 4.78–24.2)
HCT VFR BLD AUTO: 38.7 % (ref 35.6–45.5)
HGB BLD-MCNC: 12.6 G/DL (ref 11.9–15.5)
IMM GRANULOCYTES # BLD: 0.02 10*3/MM3 (ref 0–0.03)
IMM GRANULOCYTES NFR BLD: 0.2 % (ref 0–0.5)
INR PPP: 2.4 (ref 0.9–1.1)
IRON SATN MFR SERPL: 20 % (ref 20–50)
IRON SERPL-MCNC: 94 MCG/DL (ref 37–145)
LYMPHOCYTES # BLD AUTO: 2.59 10*3/MM3 (ref 0.9–4.8)
LYMPHOCYTES NFR BLD AUTO: 26.6 % (ref 19.6–45.3)
MCH RBC QN AUTO: 30.8 PG (ref 26.9–32)
MCHC RBC AUTO-ENTMCNC: 32.6 G/DL (ref 32.4–36.3)
MCV RBC AUTO: 94.6 FL (ref 80.5–98.2)
MONOCYTES # BLD AUTO: 0.89 10*3/MM3 (ref 0.2–1.2)
MONOCYTES NFR BLD AUTO: 9.1 % (ref 5–12)
NEUTROPHILS # BLD AUTO: 5.65 10*3/MM3 (ref 1.9–8.1)
NEUTROPHILS NFR BLD AUTO: 58.2 % (ref 42.7–76)
PLATELET # BLD AUTO: 358 10*3/MM3 (ref 140–500)
RBC # BLD AUTO: 4.09 10*6/MM3 (ref 3.9–5.2)
TIBC SERPL-MCNC: 476 MCG/DL
UIBC SERPL-MCNC: 382 MCG/DL
VIT B12 SERPL-MCNC: >2000 PG/ML (ref 211–946)
WBC # BLD AUTO: 9.73 10*3/MM3 (ref 4.5–10.7)

## 2017-08-28 PROCEDURE — 36416 COLLJ CAPILLARY BLOOD SPEC: CPT | Performed by: INTERNAL MEDICINE

## 2017-08-28 PROCEDURE — 85610 PROTHROMBIN TIME: CPT | Performed by: INTERNAL MEDICINE

## 2017-08-28 PROCEDURE — 99213 OFFICE O/P EST LOW 20 MIN: CPT | Performed by: INTERNAL MEDICINE

## 2017-08-28 PROCEDURE — G0438 PPPS, INITIAL VISIT: HCPCS | Performed by: INTERNAL MEDICINE

## 2017-08-28 NOTE — PROGRESS NOTES
Subjective   Marylu Foreman is a 72 y.o. female here today to f/u on HTN.  Pt states she tried weaning off the dyazide but had feet swelling so she started it back.      History of Present Illness   SHe attempted to stop the diazide but then had swelling so resumed.  SHe has been drinking much less tea and more water  SHe has been stable on the estrace cream from vaginitis.  The oncologisy has approved this  Stable on thr warfarin but was concerned a supplement might bother kidney func  sahe avoids NSAIDs  Patient denies any blood in her stool.  No significant fatigue.  She has required iron transfusions in the past for anemia.    The following portions of the patient's history were reviewed and updated as appropriate: allergies, current medications, past medical history, past social history and problem list.  She does eat a healthy most of the time  She eats enough protein    Review of Systems   All other systems reviewed and are negative.      Objective   Physical Exam   Constitutional: She is oriented to person, place, and time. She appears well-developed and well-nourished.   HENT:   Head: Normocephalic and atraumatic.   Right Ear: External ear normal.   Left Ear: External ear normal.   Mouth/Throat: Oropharynx is clear and moist.   Eyes: Conjunctivae and EOM are normal. Pupils are equal, round, and reactive to light.   Neck: Normal range of motion. No tracheal deviation present. No thyromegaly present.   Cardiovascular: Normal rate, regular rhythm, normal heart sounds and intact distal pulses.    Pulmonary/Chest: Effort normal and breath sounds normal.   Abdominal: Soft. Bowel sounds are normal. She exhibits no distension. There is no tenderness.   Musculoskeletal: Normal range of motion. She exhibits no edema or deformity.   Neurological: She is alert and oriented to person, place, and time.   Skin: Skin is warm and dry.   Psychiatric: She has a normal mood and affect. Her behavior is normal. Judgment and  thought content normal.   Vitals reviewed.      Vitals:    08/28/17 0832   BP: 116/74   Pulse: 110   SpO2: 96%        Current Outpatient Prescriptions:   •  buPROPion XL (WELLBUTRIN XL) 300 MG 24 hr tablet, Take 300 mg by mouth every morning., Disp: , Rfl:   •  CYANOCOBALAMIN PO, , Disp: , Rfl:   •  DULoxetine (CYMBALTA) 60 MG capsule, Take 1 capsule by mouth Daily., Disp: , Rfl: 0  •  ESTRACE VAGINAL 0.1 MG/GM vaginal cream, USE A PEA SIZED AMOUNT 2 TO 3 TIMES A WEEK, Disp: 42.5 g, Rfl: 2  •  felodipine (PLENDIL) 10 MG 24 hr tablet, TAKE 1 TABLET DAILY, Disp: 90 tablet, Rfl: 1  •  irbesartan (AVAPRO) 300 MG tablet, TAKE 1 TABLET DAILY, Disp: 90 tablet, Rfl: 1  •  levothyroxine (SYNTHROID, LEVOTHROID) 50 MCG tablet, , Disp: , Rfl:   •  Multiple Vitamin (MULTI-VITAMIN PO), Take 1 tablet by mouth daily., Disp: , Rfl:   •  Probiotic Product (PROBIOTIC PO), Take  by mouth daily. Lactobacillus rhamnosus GG, Disp: , Rfl:   •  PROVENTIL  (90 BASE) MCG/ACT inhaler, inhale 2 puffs by mouth every 6 hours if needed for wheezing, Disp: , Rfl: 0  •  tretinoin (RETIN-A) 0.025 % cream, Apply  topically Every Night., Disp: 20 g, Rfl: 2  •  triamterene-hydrochlorothiazide (DYAZIDE) 37.5-25 MG per capsule, TAKE 1 CAPSULE EVERY MORNING, Disp: 90 capsule, Rfl: 1  •  warfarin (COUMADIN) 5 MG tablet, take 1 tablet by mouth ON SUNDAY, WEDNESDAY,THURSDAY AND 1 AND 1/2 TABLETS ALL OTHER DAYS AS DIRECTED, Disp: 36 tablet, Rfl: 3      Assessment/Plan   Diagnoses and all orders for this visit:    Essential hypertension    History of iron deficiency anemia  -     Cancel: Vitamin B12  -     Cancel: Folate  -     Cancel: Ferritin  -     Cancel: Iron Profile  -     Cancel: CBC & Differential    History of gastric bypass  -     Cancel: Vitamin B12  -     Cancel: Folate  -     Cancel: Ferritin  -     Cancel: Iron Profile  -     Cancel: CBC & Differential    Warfarin anticoagulation    Medicare annual wellness visit, initial    Allergic  rhinitis, unspecified allergic rhinitis trigger, unspecified rhinitis seasonality        1.  Hypertension: Patient is doing well back on the Dyazide but I'm concerned about renal function.  We will recheck this today.  Patient has been drinking a lot more water  2.  Renal insufficiency: This is been gradually increasing.  I tried to discontinue the Dyazide the patient did not tolerate this.  She may need to be referred to a nephrologist  3 allergic rhinitis: Patient is going to try Rhinocort  4.  Anemia: Patient is seeing the heme doc  We will check a FIT test and get labs today  given her gastric bypass she has required transfusions in the past

## 2017-08-28 NOTE — PATIENT INSTRUCTIONS
Fall Prevention in the Home   Falls can cause injuries. They can happen to people of all ages. There are many things you can do to make your home safe and to help prevent falls.   WHAT CAN I DO ON THE OUTSIDE OF MY HOME?  · Regularly fix the edges of walkways and driveways and fix any cracks.  · Remove anything that might make you trip as you walk through a door, such as a raised step or threshold.  · Trim any bushes or trees on the path to your home.  · Use bright outdoor lighting.  · Clear any walking paths of anything that might make someone trip, such as rocks or tools.  · Regularly check to see if handrails are loose or broken. Make sure that both sides of any steps have handrails.  · Any raised decks and porches should have guardrails on the edges.  · Have any leaves, snow, or ice cleared regularly.  · Use sand or salt on walking paths during winter.  · Clean up any spills in your garage right away. This includes oil or grease spills.  WHAT CAN I DO IN THE BATHROOM?   · Use night lights.  · Install grab bars by the toilet and in the tub and shower. Do not use towel bars as grab bars.  · Use non-skid mats or decals in the tub or shower.  · If you need to sit down in the shower, use a plastic, non-slip stool.  · Keep the floor dry. Clean up any water that spills on the floor as soon as it happens.  · Remove soap buildup in the tub or shower regularly.  · Attach bath mats securely with double-sided non-slip rug tape.  · Do not have throw rugs and other things on the floor that can make you trip.  WHAT CAN I DO IN THE BEDROOM?  · Use night lights.  · Make sure that you have a light by your bed that is easy to reach.  · Do not use any sheets or blankets that are too big for your bed. They should not hang down onto the floor.  · Have a firm chair that has side arms. You can use this for support while you get dressed.  · Do not have throw rugs and other things on the floor that can make you trip.  WHAT CAN I DO IN  THE KITCHEN?  · Clean up any spills right away.  · Avoid walking on wet floors.  · Keep items that you use a lot in easy-to-reach places.  · If you need to reach something above you, use a strong step stool that has a grab bar.  · Keep electrical cords out of the way.  · Do not use floor polish or wax that makes floors slippery. If you must use wax, use non-skid floor wax.  · Do not have throw rugs and other things on the floor that can make you trip.  WHAT CAN I DO WITH MY STAIRS?  · Do not leave any items on the stairs.  · Make sure that there are handrails on both sides of the stairs and use them. Fix handrails that are broken or loose. Make sure that handrails are as long as the stairways.  · Check any carpeting to make sure that it is firmly attached to the stairs. Fix any carpet that is loose or worn.  · Avoid having throw rugs at the top or bottom of the stairs. If you do have throw rugs, attach them to the floor with carpet tape.  · Make sure that you have a light switch at the top of the stairs and the bottom of the stairs. If you do not have them, ask someone to add them for you.  WHAT ELSE CAN I DO TO HELP PREVENT FALLS?  · Wear shoes that:    Do not have high heels.    Have rubber bottoms.    Are comfortable and fit you well.    Are closed at the toe. Do not wear sandals.  · If you use a stepladder:    Make sure that it is fully opened. Do not climb a closed stepladder.    Make sure that both sides of the stepladder are locked into place.    Ask someone to hold it for you, if possible.  · Clearly ghulam and make sure that you can see:    Any grab bars or handrails.    First and last steps.    Where the edge of each step is.  · Use tools that help you move around (mobility aids) if they are needed. These include:    Canes.    Walkers.    Scooters.    Crutches.  · Turn on the lights when you go into a dark area. Replace any light bulbs as soon as they burn out.  · Set up your furniture so you have a clear  path. Avoid moving your furniture around.  · If any of your floors are uneven, fix them.  · If there are any pets around you, be aware of where they are.  · Review your medicines with your doctor. Some medicines can make you feel dizzy. This can increase your chance of falling.  Ask your doctor what other things that you can do to help prevent falls.     This information is not intended to replace advice given to you by your health care provider. Make sure you discuss any questions you have with your health care provider.     Document Released: 10/14/2010 Document Revised: 2016 Document Reviewed: 2016  [employer] Interactive Patient Education © [employer]    Medicare Wellness  Personal Prevention Plan of Service     Date of Office Visit:  2017  Encounter Provider:  Arleen Dillon MD  Place of Service:  [facility] INTERNAL MEDICINE  Patient Name: Marylu Foreman  :  1945    As part of the Medicare Wellness portion of your visit today, we are providing you with this personalized preventive plan of services (PPPS). This plan is based upon recommendations of the [place] Preventive Services Task Force (USPSTF) and the Advisory Committee on Immunization Practices (ACIP).    This lists the preventive care services that should be considered, and provides dates of when you are due. Items listed as completed are up-to-date and do not require any further intervention.    Health Maintenance   Topic Date Due   • INFLUENZA VACCINE  2017   • PNEUMOCOCCAL VACCINES (65+ LOW/MEDIUM RISK) (2 of 2 - PPSV23) 2018   • LIPID PANEL  2018   • COLONOSCOPY  2018   • MEDICARE ANNUAL WELLNESS  2018   • MAMMOGRAM  2019   • TDAP/TD VACCINES (2 - Td) 2025   • HEPATITIS C SCREENING  Completed   • ZOSTER VACCINE  Completed       No orders of the defined types were placed in this encounter.      No Follow-up on file.

## 2017-08-28 NOTE — PROGRESS NOTES
QUICK REFERENCE INFORMATION:  The ABCs of the Annual Wellness Visit    Initial Medicare Wellness Visit    HEALTH RISK ASSESSMENT    1945    Recent Hospitalizations:  No hospitalization(s) within the last year..        Current Medical Providers:  Patient Care Team:  Arleen Dillon MD as PCP - General (Internal Medicine)  Jean Mahajan MD as Consulting Physician (Hematology and Oncology)  Joanna Quiñonez MD as Consulting Physician (Obstetrics and Gynecology)        Smoking Status:  History   Smoking Status   • Never Smoker   Smokeless Tobacco   • Never Used       Alcohol Consumption:  History   Alcohol Use No       Depression Screen:   PHQ-9 Depression Screening 8/28/2017   Little interest or pleasure in doing things 1   Feeling down, depressed, or hopeless 1   Trouble falling or staying asleep, or sleeping too much 1   Feeling tired or having little energy 3   Poor appetite or overeating 0   Feeling bad about yourself - or that you are a failure or have let yourself or your family down 0   Trouble concentrating on things, such as reading the newspaper or watching television 0   Moving or speaking so slowly that other people could have noticed. Or the opposite - being so fidgety or restless that you have been moving around a lot more than usual 0   Thoughts that you would be better off dead, or of hurting yourself in some way 0   PHQ-9 Total Score 6       Health Habits and Functional and Cognitive Screening:  Functional & Cognitive Status 8/28/2017   Do you have difficulty preparing food and eating? No   Do you have difficulty bathing yourself? No   Do you have difficulty getting dressed? No   Do you have difficulty using the toilet? No   Do you have difficulty moving around from place to place? No   In the past year have you fallen or experienced a near fall? Yes   Do you need help using the phone?  No   Are you deaf or do you have serious difficulty hearing?  Yes   Do you need help with transportation?  No   Do you need help shopping? No   Do you need help preparing meals?  No   Do you need help with housework?  No   Do you need help with laundry? No   Do you need help taking your medications? No   Do you need help managing money? No   Do you have difficulty concentrating, remembering or making decisions? No                  Does the patient have evidence of cognitive impairment? No    Asiprin use counseling: Does not need ASA (and currently is not on it)      Recent Lab Results:    Visual Acuity:  No exam data present    Age-appropriate Screening Schedule:  Refer to the list below for future screening recommendations based on patient's age, sex and/or medical conditions. Orders for these recommended tests are listed in the plan section. The patient has been provided with a written plan.    Health Maintenance   Topic Date Due   • TDAP/TD VACCINES (1 - Tdap) 01/07/2015   • COLONOSCOPY  03/03/2016   • INFLUENZA VACCINE  09/01/2017   • PNEUMOCOCCAL VACCINES (65+ LOW/MEDIUM RISK) (2 of 2 - PPSV23) 02/21/2018   • LIPID PANEL  07/25/2018   • MAMMOGRAM  07/31/2019   • ZOSTER VACCINE  Completed        Subjective   History of Present Illness    Marylu Foreman is a 72 y.o. female who presents for an Annual Wellness Visit.    The following portions of the patient's history were reviewed and updated as appropriate: allergies, current medications, past family history, past medical history, past social history, past surgical history and problem list.    Outpatient Medications Prior to Visit   Medication Sig Dispense Refill   • buPROPion XL (WELLBUTRIN XL) 300 MG 24 hr tablet Take 300 mg by mouth every morning.     • CYANOCOBALAMIN PO      • DULoxetine (CYMBALTA) 60 MG capsule Take 1 capsule by mouth Daily.  0   • ESTRACE VAGINAL 0.1 MG/GM vaginal cream USE A PEA SIZED AMOUNT 2 TO 3 TIMES A WEEK 42.5 g 2   • felodipine (PLENDIL) 10 MG 24 hr tablet TAKE 1 TABLET DAILY 90 tablet 1   • irbesartan (AVAPRO) 300 MG tablet TAKE 1 TABLET  "DAILY 90 tablet 1   • levothyroxine (SYNTHROID, LEVOTHROID) 50 MCG tablet      • Multiple Vitamin (MULTI-VITAMIN PO) Take 1 tablet by mouth daily.     • Probiotic Product (PROBIOTIC PO) Take  by mouth daily. Lactobacillus rhamnosus GG     • PROVENTIL  (90 BASE) MCG/ACT inhaler inhale 2 puffs by mouth every 6 hours if needed for wheezing  0   • tretinoin (RETIN-A) 0.025 % cream Apply  topically Every Night. 20 g 2   • triamterene-hydrochlorothiazide (DYAZIDE) 37.5-25 MG per capsule TAKE 1 CAPSULE EVERY MORNING 90 capsule 1   • warfarin (COUMADIN) 5 MG tablet take 1 tablet by mouth ON SUNDAY, WEDNESDAY,THURSDAY AND 1 AND 1/2 TABLETS ALL OTHER DAYS AS DIRECTED 36 tablet 3   • naltrexone (DEPADE) 25 MG half tablet Take 25 mg by mouth Daily.       No facility-administered medications prior to visit.        Patient Active Problem List   Diagnosis   • Anxiety   • Essential hypertension   • Chronic pulmonary embolism   • Tension headache   • Depression   • Recurrent UTI   • Malignant neoplasm of left female breast   • Warfarin anticoagulation   • Hx of total knee arthroplasty   • Orthostatic hypotension       Advance Care Planning:  has an advance directive - a copy has been provided and is in file    Identification of Risk Factors:  Risk factors include: weight , cardiovascular risk, inactivity, increased fall risk and hearing limitations.  Considering HA but they are very expensive  Review of Systems    Compared to one year ago, the patient feels her physical health is better.  Compared to one year ago, the patient feels her mental health is the same.    Objective     Physical Exam    Vitals:    08/28/17 0832   BP: 116/74   BP Location: Right arm   Patient Position: Sitting   Pulse: 110   SpO2: 96%   Weight: 224 lb 3.2 oz (102 kg)   Height: 65\" (165.1 cm)   PainSc:   3       Body mass index is 37.31 kg/(m^2).  Discussed the patient's BMI with her. The BMI is above average; BMI management plan is " completed.    Assessment/Plan   Patient Self-Management and Personalized Health Advice  The patient has been provided with information about: diet, exercise, prevention of cardiac or vascular disease and fall prevention and preventive services including:   · Exercise counseling provided, Fall Risk plan of care done, Nutrition counseling provided.    Visit Diagnoses:    ICD-10-CM ICD-9-CM   1. Essential hypertension I10 401.9   2. History of iron deficiency anemia Z86.2 V12.3   3. History of gastric bypass Z98.890 V45.86   4. Warfarin anticoagulation Z79.01 V58.61   5. Medicare annual wellness visit, initial Z00.00 V70.0       No orders of the defined types were placed in this encounter.      Outpatient Encounter Prescriptions as of 8/28/2017   Medication Sig Dispense Refill   • buPROPion XL (WELLBUTRIN XL) 300 MG 24 hr tablet Take 300 mg by mouth every morning.     • CYANOCOBALAMIN PO      • DULoxetine (CYMBALTA) 60 MG capsule Take 1 capsule by mouth Daily.  0   • ESTRACE VAGINAL 0.1 MG/GM vaginal cream USE A PEA SIZED AMOUNT 2 TO 3 TIMES A WEEK 42.5 g 2   • felodipine (PLENDIL) 10 MG 24 hr tablet TAKE 1 TABLET DAILY 90 tablet 1   • irbesartan (AVAPRO) 300 MG tablet TAKE 1 TABLET DAILY 90 tablet 1   • levothyroxine (SYNTHROID, LEVOTHROID) 50 MCG tablet      • Multiple Vitamin (MULTI-VITAMIN PO) Take 1 tablet by mouth daily.     • Probiotic Product (PROBIOTIC PO) Take  by mouth daily. Lactobacillus rhamnosus GG     • PROVENTIL  (90 BASE) MCG/ACT inhaler inhale 2 puffs by mouth every 6 hours if needed for wheezing  0   • tretinoin (RETIN-A) 0.025 % cream Apply  topically Every Night. 20 g 2   • triamterene-hydrochlorothiazide (DYAZIDE) 37.5-25 MG per capsule TAKE 1 CAPSULE EVERY MORNING 90 capsule 1   • warfarin (COUMADIN) 5 MG tablet take 1 tablet by mouth ON SUNDAY, WEDNESDAY,THURSDAY AND 1 AND 1/2 TABLETS ALL OTHER DAYS AS DIRECTED 36 tablet 3   • [DISCONTINUED] naltrexone (DEPADE) 25 MG half tablet Take 25  mg by mouth Daily.       No facility-administered encounter medications on file as of 8/28/2017.        Reviewed use of high risk medication in the elderly: yes  Reviewed for potential of harmful drug interactions in the elderly: yes    Follow Up:  No Follow-up on file.     An After Visit Summary and PPPS with all of these plans were given to the patient.   She has had limited exercise and will consider silver sneakers

## 2017-08-30 LAB
Lab: NORMAL
SPECIMEN STATUS: NORMAL

## 2017-08-31 LAB
BUN SERPL-MCNC: 31 MG/DL (ref 8–23)
BUN/CREAT SERPL: 21.8 (ref 7–25)
CALCIUM SERPL-MCNC: 10.3 MG/DL (ref 8.6–10.5)
CHLORIDE SERPL-SCNC: 101 MMOL/L (ref 98–107)
CO2 SERPL-SCNC: 20.9 MMOL/L (ref 22–29)
CREAT SERPL-MCNC: 1.42 MG/DL (ref 0.57–1)
GLUCOSE SERPL-MCNC: 103 MG/DL (ref 65–99)
POTASSIUM SERPL-SCNC: 4.8 MMOL/L (ref 3.5–5.2)
SODIUM SERPL-SCNC: 142 MMOL/L (ref 136–145)

## 2017-09-05 RX ORDER — TRIAMTERENE AND HYDROCHLOROTHIAZIDE 37.5; 25 MG/1; MG/1
CAPSULE ORAL
Qty: 90 CAPSULE | Refills: 1 | Status: SHIPPED | OUTPATIENT
Start: 2017-09-05 | End: 2018-03-04 | Stop reason: SDUPTHER

## 2017-09-05 RX ORDER — FELODIPINE 10 MG/1
TABLET, EXTENDED RELEASE ORAL
Qty: 90 TABLET | Refills: 1 | Status: SHIPPED | OUTPATIENT
Start: 2017-09-05 | End: 2017-11-02

## 2017-09-05 RX ORDER — IRBESARTAN 300 MG/1
TABLET ORAL
Qty: 90 TABLET | Refills: 1 | Status: SHIPPED | OUTPATIENT
Start: 2017-09-05 | End: 2017-10-04 | Stop reason: SDUPTHER

## 2017-09-11 ENCOUNTER — ANTICOAGULATION VISIT (OUTPATIENT)
Dept: LAB | Facility: HOSPITAL | Age: 72
End: 2017-09-11

## 2017-09-11 ENCOUNTER — CLINICAL SUPPORT (OUTPATIENT)
Dept: ONCOLOGY | Facility: HOSPITAL | Age: 72
End: 2017-09-11

## 2017-09-11 DIAGNOSIS — C50.912 MALIGNANT NEOPLASM OF LEFT FEMALE BREAST, UNSPECIFIED SITE OF BREAST: Primary | ICD-10-CM

## 2017-09-11 DIAGNOSIS — Z79.01 WARFARIN ANTICOAGULATION: ICD-10-CM

## 2017-09-11 LAB
BASOPHILS # BLD AUTO: 0.13 10*3/MM3 (ref 0–0.1)
BASOPHILS NFR BLD AUTO: 1.3 % (ref 0–1.1)
DEPRECATED RDW RBC AUTO: 48.6 FL (ref 37–49)
EOSINOPHIL # BLD AUTO: 0.43 10*3/MM3 (ref 0–0.36)
EOSINOPHIL NFR BLD AUTO: 4.3 % (ref 1–5)
ERYTHROCYTE [DISTWIDTH] IN BLOOD BY AUTOMATED COUNT: 14.7 % (ref 11.7–14.5)
HCT VFR BLD AUTO: 34.7 % (ref 34–45)
HGB BLD-MCNC: 11.5 G/DL (ref 11.5–14.9)
IMM GRANULOCYTES # BLD: 0.03 10*3/MM3 (ref 0–0.03)
IMM GRANULOCYTES NFR BLD: 0.3 % (ref 0–0.5)
INR PPP: 2.7 (ref 0.9–1.1)
LYMPHOCYTES # BLD AUTO: 2.44 10*3/MM3 (ref 1–3.5)
LYMPHOCYTES NFR BLD AUTO: 24.3 % (ref 20–49)
MCH RBC QN AUTO: 30.1 PG (ref 27–33)
MCHC RBC AUTO-ENTMCNC: 33.1 G/DL (ref 32–35)
MCV RBC AUTO: 90.8 FL (ref 83–97)
MONOCYTES # BLD AUTO: 0.75 10*3/MM3 (ref 0.25–0.8)
MONOCYTES NFR BLD AUTO: 7.5 % (ref 4–12)
NEUTROPHILS # BLD AUTO: 6.25 10*3/MM3 (ref 1.5–7)
NEUTROPHILS NFR BLD AUTO: 62.3 % (ref 39–75)
NRBC BLD MANUAL-RTO: 0.2 /100 WBC (ref 0–0)
PLATELET # BLD AUTO: 322 10*3/MM3 (ref 150–375)
PMV BLD AUTO: 10.8 FL (ref 8.9–12.1)
PROTHROMBIN TIME: 32.2 SECONDS (ref 11–13.5)
RBC # BLD AUTO: 3.82 10*6/MM3 (ref 3.9–5)
WBC NRBC COR # BLD: 10.03 10*3/MM3 (ref 4–10)

## 2017-09-11 PROCEDURE — 36415 COLL VENOUS BLD VENIPUNCTURE: CPT | Performed by: INTERNAL MEDICINE

## 2017-09-11 PROCEDURE — 85025 COMPLETE CBC W/AUTO DIFF WBC: CPT | Performed by: INTERNAL MEDICINE

## 2017-09-11 PROCEDURE — 85610 PROTHROMBIN TIME: CPT | Performed by: INTERNAL MEDICINE

## 2017-09-13 DIAGNOSIS — D64.9 ANEMIA, UNSPECIFIED TYPE: Primary | ICD-10-CM

## 2017-09-14 LAB — HEMOCCULT STL QL IA: NEGATIVE

## 2017-09-25 ENCOUNTER — OFFICE VISIT (OUTPATIENT)
Dept: INTERNAL MEDICINE | Facility: CLINIC | Age: 72
End: 2017-09-25

## 2017-09-25 ENCOUNTER — APPOINTMENT (OUTPATIENT)
Dept: GENERAL RADIOLOGY | Facility: HOSPITAL | Age: 72
End: 2017-09-25

## 2017-09-25 ENCOUNTER — HOSPITAL ENCOUNTER (EMERGENCY)
Facility: HOSPITAL | Age: 72
Discharge: HOME | End: 2017-09-25
Attending: EMERGENCY MEDICINE | Admitting: EMERGENCY MEDICINE

## 2017-09-25 ENCOUNTER — HOSPITAL ENCOUNTER (OUTPATIENT)
Dept: CARDIOLOGY | Facility: HOSPITAL | Age: 72
Discharge: HOME | End: 2017-09-25

## 2017-09-25 VITALS
HEIGHT: 66 IN | TEMPERATURE: 98.1 F | BODY MASS INDEX: 35.36 KG/M2 | HEART RATE: 117 BPM | WEIGHT: 220 LBS | SYSTOLIC BLOOD PRESSURE: 115 MMHG | OXYGEN SATURATION: 91 % | RESPIRATION RATE: 16 BRPM | DIASTOLIC BLOOD PRESSURE: 65 MMHG

## 2017-09-25 VITALS
OXYGEN SATURATION: 98 % | BODY MASS INDEX: 37.56 KG/M2 | SYSTOLIC BLOOD PRESSURE: 132 MMHG | HEART RATE: 153 BPM | DIASTOLIC BLOOD PRESSURE: 76 MMHG | WEIGHT: 225.44 LBS | HEIGHT: 65 IN

## 2017-09-25 DIAGNOSIS — I48.0 PAROXYSMAL ATRIAL FIBRILLATION (HCC): Primary | ICD-10-CM

## 2017-09-25 DIAGNOSIS — R00.2 HEART PALPITATIONS: Primary | ICD-10-CM

## 2017-09-25 LAB
ALBUMIN SERPL-MCNC: 3.7 G/DL (ref 3.5–5.2)
ALBUMIN/GLOB SERPL: 1 G/DL
ALP SERPL-CCNC: 101 U/L (ref 39–117)
ALT SERPL W P-5'-P-CCNC: 12 U/L (ref 1–33)
ANION GAP SERPL CALCULATED.3IONS-SCNC: 16.6 MMOL/L
AST SERPL-CCNC: 14 U/L (ref 1–32)
BASOPHILS # BLD AUTO: 0.13 10*3/MM3 (ref 0–0.2)
BASOPHILS NFR BLD AUTO: 1.4 % (ref 0–1.5)
BILIRUB SERPL-MCNC: 0.6 MG/DL (ref 0.1–1.2)
BUN BLD-MCNC: 25 MG/DL (ref 8–23)
BUN/CREAT SERPL: 19.5 (ref 7–25)
CALCIUM SPEC-SCNC: 9.3 MG/DL (ref 8.6–10.5)
CHLORIDE SERPL-SCNC: 103 MMOL/L (ref 98–107)
CO2 SERPL-SCNC: 17.4 MMOL/L (ref 22–29)
CREAT BLD-MCNC: 1.28 MG/DL (ref 0.57–1)
DEPRECATED RDW RBC AUTO: 51.2 FL (ref 37–54)
EOSINOPHIL # BLD AUTO: 0.35 10*3/MM3 (ref 0–0.7)
EOSINOPHIL NFR BLD AUTO: 3.7 % (ref 0.3–6.2)
ERYTHROCYTE [DISTWIDTH] IN BLOOD BY AUTOMATED COUNT: 14.9 % (ref 11.7–13)
GFR SERPL CREATININE-BSD FRML MDRD: 41 ML/MIN/1.73
GLOBULIN UR ELPH-MCNC: 3.7 GM/DL
GLUCOSE BLD-MCNC: 111 MG/DL (ref 65–99)
HCT VFR BLD AUTO: 37 % (ref 35.6–45.5)
HGB BLD-MCNC: 11.7 G/DL (ref 11.9–15.5)
IMM GRANULOCYTES # BLD: 0.02 10*3/MM3 (ref 0–0.03)
IMM GRANULOCYTES NFR BLD: 0.2 % (ref 0–0.5)
INR PPP: 2.89 (ref 0.9–1.1)
LYMPHOCYTES # BLD AUTO: 2.86 10*3/MM3 (ref 0.9–4.8)
LYMPHOCYTES NFR BLD AUTO: 30.6 % (ref 19.6–45.3)
MAGNESIUM SERPL-MCNC: 1.9 MG/DL (ref 1.6–2.4)
MCH RBC QN AUTO: 29.9 PG (ref 26.9–32)
MCHC RBC AUTO-ENTMCNC: 31.6 G/DL (ref 32.4–36.3)
MCV RBC AUTO: 94.6 FL (ref 80.5–98.2)
MONOCYTES # BLD AUTO: 0.88 10*3/MM3 (ref 0.2–1.2)
MONOCYTES NFR BLD AUTO: 9.4 % (ref 5–12)
NEUTROPHILS # BLD AUTO: 5.1 10*3/MM3 (ref 1.9–8.1)
NEUTROPHILS NFR BLD AUTO: 54.7 % (ref 42.7–76)
NRBC BLD MANUAL-RTO: 0 /100 WBC (ref 0–0)
PLATELET # BLD AUTO: 350 10*3/MM3 (ref 140–500)
PMV BLD AUTO: 11.1 FL (ref 6–12)
POTASSIUM BLD-SCNC: 3.9 MMOL/L (ref 3.5–5.2)
PROT SERPL-MCNC: 7.4 G/DL (ref 6–8.5)
PROTHROMBIN TIME: 29.4 SECONDS (ref 11.7–14.2)
RBC # BLD AUTO: 3.91 10*6/MM3 (ref 3.9–5.2)
SODIUM BLD-SCNC: 137 MMOL/L (ref 136–145)
TROPONIN T SERPL-MCNC: <0.01 NG/ML (ref 0–0.03)
WBC NRBC COR # BLD: 9.34 10*3/MM3 (ref 4.5–10.7)

## 2017-09-25 PROCEDURE — 85610 PROTHROMBIN TIME: CPT | Performed by: EMERGENCY MEDICINE

## 2017-09-25 PROCEDURE — 71020 HC CHEST PA AND LATERAL: CPT

## 2017-09-25 PROCEDURE — 93005 ELECTROCARDIOGRAM TRACING: CPT

## 2017-09-25 PROCEDURE — 84484 ASSAY OF TROPONIN QUANT: CPT | Performed by: EMERGENCY MEDICINE

## 2017-09-25 PROCEDURE — 93010 ELECTROCARDIOGRAM REPORT: CPT | Performed by: INTERNAL MEDICINE

## 2017-09-25 PROCEDURE — 83735 ASSAY OF MAGNESIUM: CPT | Performed by: EMERGENCY MEDICINE

## 2017-09-25 PROCEDURE — 85025 COMPLETE CBC W/AUTO DIFF WBC: CPT | Performed by: EMERGENCY MEDICINE

## 2017-09-25 PROCEDURE — 96374 THER/PROPH/DIAG INJ IV PUSH: CPT

## 2017-09-25 PROCEDURE — 93000 ELECTROCARDIOGRAM COMPLETE: CPT | Performed by: NURSE PRACTITIONER

## 2017-09-25 PROCEDURE — 99284 EMERGENCY DEPT VISIT MOD MDM: CPT

## 2017-09-25 PROCEDURE — 99214 OFFICE O/P EST MOD 30 MIN: CPT | Performed by: NURSE PRACTITIONER

## 2017-09-25 PROCEDURE — 80053 COMPREHEN METABOLIC PANEL: CPT | Performed by: EMERGENCY MEDICINE

## 2017-09-25 RX ORDER — SODIUM CHLORIDE 0.9 % (FLUSH) 0.9 %
10 SYRINGE (ML) INJECTION AS NEEDED
Status: DISCONTINUED | OUTPATIENT
Start: 2017-09-25 | End: 2017-09-25 | Stop reason: HOSPADM

## 2017-09-25 RX ORDER — METOPROLOL TARTRATE 5 MG/5ML
5 INJECTION INTRAVENOUS ONCE
Status: COMPLETED | OUTPATIENT
Start: 2017-09-25 | End: 2017-09-25

## 2017-09-25 RX ORDER — BUSPIRONE HYDROCHLORIDE 30 MG/1
1 TABLET ORAL 2 TIMES DAILY
Refills: 0 | COMMUNITY
Start: 2017-09-15 | End: 2017-10-03 | Stop reason: ALTCHOICE

## 2017-09-25 RX ADMIN — METOPROLOL TARTRATE 5 MG: 1 INJECTION, SOLUTION INTRAVENOUS at 19:29

## 2017-09-25 RX ADMIN — METOPROLOL TARTRATE 25 MG: 25 TABLET ORAL at 21:30

## 2017-09-25 NOTE — PROGRESS NOTES
Subjective   Marylu Foreman is a 72 y.o. female. Patient is here today for   Chief Complaint   Patient presents with   • Rapid Heart Rate     Pt complains of having rapid heart rate x1 month.    • Rash     Pt complains of having a rash on bilateral ankles x1 week.    • Anxiety     Pt currently see's a therapist for depression & states that this has gotten worse x1 month. Pt's therapist has her on Wellbutrin 150mg once daily, Cymbalta 100mg once daily & Buspar 30mg BID.      .    History of Present Illness   C/o rapid heart rate x 1 month. She has had shortness of breath for years, but not anything new in the past month. She does have a history of a heart murmur due to having rheumatic fever as a child. She is on Coumadin due to a history of bilateral pulmonary embolism.  She has also had an increase in anxiety for one month. She is currently on Wellbutrin, buspar, and cymbalta.     She is taking the following supplements: Probiotics, Jiaogulan root extract, Renagen DTX (support for healthy detox).    Patient has had a rash on her ankles for about a week, but it is improving with hydrocortisone cream.     The following portions of the patient's history were reviewed and updated as appropriate: allergies, current medications, past family history, past medical history, past social history, past surgical history and problem list.    Review of Systems   Constitutional: Negative.    Respiratory: Negative.    Cardiovascular: Positive for palpitations. Negative for chest pain.   Skin: Positive for rash.   Psychiatric/Behavioral: The patient is nervous/anxious.        Objective   Vitals:    09/25/17 1419   BP: 132/76   Pulse: (!) 153   SpO2: 98%       ECG 12 Lead  Date/Time: 9/25/2017 2:20 PM  Performed by: SHELL MAR  Authorized by: SHELL MAR   Comparison: not compared with previous ECG   Previous ECG: no previous ECG available  Rhythm: atrial fibrillation  Rate: tachycardic  BPM: 143  Clinical impression: abnormal  ECG            Physical Exam   Constitutional: Vital signs are normal. She appears well-developed and well-nourished.   Cardiovascular: An irregular rhythm present. Tachycardia present.    Murmur heard.  Pulses:       Radial pulses are 2+ on the right side, and 2+ on the left side.   Pulmonary/Chest: Effort normal and breath sounds normal.   Skin: Skin is warm, dry and intact.   Psychiatric: She has a normal mood and affect. Her speech is normal and behavior is normal. Thought content normal.   Nursing note and vitals reviewed.      Assessment/Plan   Marylu was seen today for rapid heart rate, rash and anxiety.    Diagnoses and all orders for this visit:    Heart palpitations  -     ECG 12 Lead      Called chest pain center at [facility] to give report on patient.     4:00 Patient's neighbor arrived to take her to the ER. Since the chest pain center closes at 4, patient will go to the ER.     Patient will follow up with Dr. Dillon to discuss options related to anxiety if her anxiety symptoms continue after her heart rate is under control.

## 2017-09-29 ENCOUNTER — TELEPHONE (OUTPATIENT)
Dept: SOCIAL WORK | Facility: HOSPITAL | Age: 72
End: 2017-09-29

## 2017-09-29 ENCOUNTER — TELEPHONE (OUTPATIENT)
Dept: INTERNAL MEDICINE | Facility: CLINIC | Age: 72
End: 2017-09-29

## 2017-09-29 DIAGNOSIS — R06.02 SOB (SHORTNESS OF BREATH): Primary | ICD-10-CM

## 2017-09-29 NOTE — TELEPHONE ENCOUNTER
----- Message from Arleen Dillon MD sent at 9/29/2017  7:17 AM EDT -----  Regarding: FW: FW: Test Results Question  Contact: [phone]  Go ahead and schedule echo  Dx SOB  ----- Message -----     From: Nydia Watson MA     Sent: 9/28/2017   3:16 PM       To: Arleen Dillon MD  Subject: FW: FW: Test Results Question                        ----- Message -----     From: Marylu Foreman     Sent: 9/28/2017   2:45 PM       To: [email or web address] Clinical Pool  Subject: RE: FW: Test Results Question                    Nydia,  Yes, I have had some shortness of breath for several years.  I went to a pulmonary specialist two years ago.  He sent me for a sleep study (Negative) and had breathing tests done (I passed them all.)  I would like to get an echo done.  My last one was about 3 years ago.  I am off on Mondays, and would like to get it ASAP.  I have an appointment with Dr. Dillon on Monday, October 2nd at 10:00.    Thanks so muchMarylu  ----- Message -----  From: ARASH PETIT  Sent: 9/28/2017  1:17 PM EDT  To: Marylu Foreman  Subject: RE: FW: Test Results Question    Dr Dillon wanted me to let you know that this dx is not made via CXR.  We can get an echo and that is a better test, let me know if you have any symptoms of shortness of breath. Then we can ask Dr Dillon exactly what she wants to order. Thanks Nydia PETIT    ----- Message -----     From: Marylu Foreman     Sent: 9/27/2017  10:42 AM       To: [email or web address]  Subject: Test Results Question                            Dr. Dillon,    When I was in the ER on Monday, September 25th, a chest x-ray was done.  The report said that the finding may represent pulmonary arterial hypertension.  When I looked up information on that condition, it sound like a very serious condition to have with a  very poor outlook.  I have an appointment with you on Monday, October 2nd, but right now I am really stressed out and worried about this report.  I have copied the  report below for you to read. I would like your opinion on the results listed in the report.    Thanks so much,  Marylu Foreman        Impression  No acute appearing abnormality is identified. The pulmonary  arterial contours appear prominent centrally, which may represent  changes secondary to pulmonary arterial hypertension.    This report was finalized on 9/25/2017 9:00 PM by Dr. West Wells MD.    Narrative  PA AND LATERAL CHEST X-RAY    HISTORY: Shortness of breath with weakness and tachycardia for months.  Previous left mastectomy for cancer. Pneumonia and bronchitis.    COMPARISON: PA and lateral chest x-ray is correlated with chest x-ray  06/15/2012.    FINDINGS: There is a prominent contour along the left mediastinum  suggesting an enlarged main pulmonary artery. Hilar vascular markings  are enlarged as well; these are findings that might be present with  pulmonary arterial hypertension. The mediastinal contour is unchanged in  comparison to the remote exam, however. The lungs are clear and the  costophrenic sulci are dry. Surgical clips are observed over the upper  abdomen.

## 2017-10-02 ENCOUNTER — OFFICE VISIT (OUTPATIENT)
Dept: INTERNAL MEDICINE | Facility: CLINIC | Age: 72
End: 2017-10-02

## 2017-10-02 ENCOUNTER — TELEPHONE (OUTPATIENT)
Dept: CARDIOLOGY | Facility: HOSPITAL | Age: 72
End: 2017-10-02

## 2017-10-02 ENCOUNTER — TELEPHONE (OUTPATIENT)
Dept: INTERNAL MEDICINE | Facility: CLINIC | Age: 72
End: 2017-10-02

## 2017-10-02 VITALS
HEART RATE: 87 BPM | SYSTOLIC BLOOD PRESSURE: 122 MMHG | BODY MASS INDEX: 36.37 KG/M2 | HEIGHT: 66 IN | OXYGEN SATURATION: 97 % | WEIGHT: 226.3 LBS | DIASTOLIC BLOOD PRESSURE: 70 MMHG

## 2017-10-02 DIAGNOSIS — Z23 NEED FOR INFLUENZA VACCINATION: ICD-10-CM

## 2017-10-02 DIAGNOSIS — I10 ESSENTIAL HYPERTENSION: ICD-10-CM

## 2017-10-02 DIAGNOSIS — F32.A DEPRESSION, UNSPECIFIED DEPRESSION TYPE: Primary | ICD-10-CM

## 2017-10-02 DIAGNOSIS — I48.19 PERSISTENT ATRIAL FIBRILLATION (HCC): ICD-10-CM

## 2017-10-02 DIAGNOSIS — R21 RASH: ICD-10-CM

## 2017-10-02 PROCEDURE — 90662 IIV NO PRSV INCREASED AG IM: CPT | Performed by: INTERNAL MEDICINE

## 2017-10-02 PROCEDURE — 99214 OFFICE O/P EST MOD 30 MIN: CPT | Performed by: INTERNAL MEDICINE

## 2017-10-02 PROCEDURE — G0008 ADMIN INFLUENZA VIRUS VAC: HCPCS | Performed by: INTERNAL MEDICINE

## 2017-10-02 RX ORDER — CLOBETASOL PROPIONATE 0.5 MG/G
CREAM TOPICAL 2 TIMES DAILY
Qty: 30 G | Refills: 0 | Status: SHIPPED | OUTPATIENT
Start: 2017-10-02 | End: 2017-10-27 | Stop reason: SDUPTHER

## 2017-10-02 NOTE — TELEPHONE ENCOUNTER
I CALLED PT AND SHE WANTED TO KNOW HER ECHO RESULTS, PT HAS AN APPT TODAY WITH DR. RUBIN TODAY AT 10:00, WILL DISCUSS THEN.

## 2017-10-02 NOTE — PROGRESS NOTES
Subjective   Marylu Foreman is a 72 y.o. female who is here to follow up from recent ER visit and abnormal EKG. They started her on Lopressor. Pt. Would like an Echo ordered. Pt. Also states that she has been tired and feels like her Depression is worsening.     History of Present Illness   She was seen in the ER with rapid HR.  She also has longstanding SOB but this was worse  In the ER she was dx with AF. She is scheduled to see cards tomorrow.  She was placed on metoprolol and feels some better  She is feeling a lot more depressed.  She was recently changed from prozac to wellbutrin and cymbalta.  She is seeing a psychiatrist.  She has placed a call to psych and not been returned    The following portions of the patient's history were reviewed and updated as appropriate: allergies, current medications, past medical history, past social history and problem list.  She denies any change in diet     Review of Systems   All other systems reviewed and are negative.      Objective   Physical Exam   Constitutional: She is oriented to person, place, and time. She appears well-developed and well-nourished.   HENT:   Head: Normocephalic and atraumatic.   Right Ear: External ear normal.   Left Ear: External ear normal.   Mouth/Throat: Oropharynx is clear and moist.   Eyes: Conjunctivae and EOM are normal. Pupils are equal, round, and reactive to light.   Neck: Normal range of motion. No tracheal deviation present. No thyromegaly present.   Cardiovascular: Normal heart sounds and intact distal pulses.    irreg HR   Pulmonary/Chest: Effort normal and breath sounds normal.   Musculoskeletal: Normal range of motion. She exhibits no edema or deformity.   Neurological: She is alert and oriented to person, place, and time.   Skin: Skin is warm and dry. Rash (eczematous rash bilat ankle) noted.   Psychiatric: She has a normal mood and affect. Her behavior is normal. Judgment and thought content normal.   Vitals reviewed.    Vitals:     10/02/17 0959   BP: 122/70   Pulse: 87   SpO2: 97%     Admission on 09/25/2017, Discharged on 09/25/2017   Component Date Value Ref Range Status   • Glucose 09/25/2017 111* 65 - 99 mg/dL Final   • BUN 09/25/2017 25* 8 - 23 mg/dL Final   • Creatinine 09/25/2017 1.28* 0.57 - 1.00 mg/dL Final   • Sodium 09/25/2017 137  136 - 145 mmol/L Final   • Potassium 09/25/2017 3.9  3.5 - 5.2 mmol/L Final   • Chloride 09/25/2017 103  98 - 107 mmol/L Final   • CO2 09/25/2017 17.4* 22.0 - 29.0 mmol/L Final   • Calcium 09/25/2017 9.3  8.6 - 10.5 mg/dL Final   • Total Protein 09/25/2017 7.4  6.0 - 8.5 g/dL Final   • Albumin 09/25/2017 3.70  3.50 - 5.20 g/dL Final   • ALT (SGPT) 09/25/2017 12  1 - 33 U/L Final   • AST (SGOT) 09/25/2017 14  1 - 32 U/L Final   • Alkaline Phosphatase 09/25/2017 101  39 - 117 U/L Final   • Total Bilirubin 09/25/2017 0.6  0.1 - 1.2 mg/dL Final   • eGFR Non African Amer 09/25/2017 41* >60 mL/min/1.73 Final   • Globulin 09/25/2017 3.7  gm/dL Final   • A/G Ratio 09/25/2017 1.0  g/dL Final   • BUN/Creatinine Ratio 09/25/2017 19.5  7.0 - 25.0 Final   • Anion Gap 09/25/2017 16.6  mmol/L Final   • Troponin T 09/25/2017 <0.010  0.000 - 0.030 ng/mL Final   • Magnesium 09/25/2017 1.9  1.6 - 2.4 mg/dL Final   • Protime 09/25/2017 29.4* 11.7 - 14.2 Seconds Final   • INR 09/25/2017 2.89* 0.90 - 1.10 Final   • WBC 09/25/2017 9.34  4.50 - 10.70 10*3/mm3 Final   • RBC 09/25/2017 3.91  3.90 - 5.20 10*6/mm3 Final   • Hemoglobin 09/25/2017 11.7* 11.9 - 15.5 g/dL Final   • Hematocrit 09/25/2017 37.0  35.6 - 45.5 % Final   • MCV 09/25/2017 94.6  80.5 - 98.2 fL Final   • MCH 09/25/2017 29.9  26.9 - 32.0 pg Final   • MCHC 09/25/2017 31.6* 32.4 - 36.3 g/dL Final   • RDW 09/25/2017 14.9* 11.7 - 13.0 % Final   • RDW-SD 09/25/2017 51.2  37.0 - 54.0 fl Final   • MPV 09/25/2017 11.1  6.0 - 12.0 fL Final   • Platelets 09/25/2017 350  140 - 500 10*3/mm3 Final   • Neutrophil % 09/25/2017 54.7  42.7 - 76.0 % Final   • Lymphocyte %  09/25/2017 30.6  19.6 - 45.3 % Final   • Monocyte % 09/25/2017 9.4  5.0 - 12.0 % Final   • Eosinophil % 09/25/2017 3.7  0.3 - 6.2 % Final   • Basophil % 09/25/2017 1.4  0.0 - 1.5 % Final   • Immature Grans % 09/25/2017 0.2  0.0 - 0.5 % Final   • Neutrophils, Absolute 09/25/2017 5.10  1.90 - 8.10 10*3/mm3 Final   • Lymphocytes, Absolute 09/25/2017 2.86  0.90 - 4.80 10*3/mm3 Final   • Monocytes, Absolute 09/25/2017 0.88  0.20 - 1.20 10*3/mm3 Final   • Eosinophils, Absolute 09/25/2017 0.35  0.00 - 0.70 10*3/mm3 Final   • Basophils, Absolute 09/25/2017 0.13  0.00 - 0.20 10*3/mm3 Final   • Immature Grans, Absolute 09/25/2017 0.02  0.00 - 0.03 10*3/mm3 Final   • nRBC 09/25/2017 0.0  0.0 - 0.0 /100 WBC Final     Current Outpatient Prescriptions:   •  buPROPion XL (WELLBUTRIN XL) 300 MG 24 hr tablet, Take 300 mg by mouth every morning., Disp: , Rfl:   •  busPIRone (BUSPAR) 30 MG tablet, Take 1 tablet by mouth 2 (Two) Times a Day., Disp: , Rfl: 0  •  CYANOCOBALAMIN PO, , Disp: , Rfl:   •  DULoxetine (CYMBALTA) 60 MG capsule, Take 1 capsule by mouth Daily., Disp: , Rfl: 0  •  ESTRACE VAGINAL 0.1 MG/GM vaginal cream, USE A PEA SIZED AMOUNT 2 TO 3 TIMES A WEEK, Disp: 42.5 g, Rfl: 2  •  felodipine (PLENDIL) 10 MG 24 hr tablet, TAKE 1 TABLET DAILY, Disp: 90 tablet, Rfl: 1  •  irbesartan (AVAPRO) 300 MG tablet, TAKE 1 TABLET DAILY, Disp: 90 tablet, Rfl: 1  •  levothyroxine (SYNTHROID, LEVOTHROID) 50 MCG tablet, , Disp: , Rfl:   •  metoprolol tartrate (LOPRESSOR) 25 MG tablet, Take 1 tablet by mouth 2 (Two) Times a Day., Disp: 60 tablet, Rfl: 0  •  Multiple Vitamin (MULTI-VITAMIN PO), Take 1 tablet by mouth daily., Disp: , Rfl:   •  Probiotic Product (PROBIOTIC PO), Take  by mouth daily. Lactobacillus rhamnosus GG, Disp: , Rfl:   •  tretinoin (RETIN-A) 0.025 % cream, Apply  topically Every Night., Disp: 20 g, Rfl: 2  •  triamterene-hydrochlorothiazide (DYAZIDE) 37.5-25 MG per capsule, TAKE 1 CAPSULE EVERY MORNING, Disp: 90 capsule,  Rfl: 1  •  warfarin (COUMADIN) 5 MG tablet, take 1 tablet by mouth ON SUNDAY, WEDNESDAY,THURSDAY AND 1 AND 1/2 TABLETS ALL OTHER DAYS AS DIRECTED, Disp: 36 tablet, Rfl: 3  •  clobetasol (TEMOVATE) 0.05 % cream, Apply  topically 2 (Two) Times a Day., Disp: 30 g, Rfl: 0     Assessment/Plan   Marylu was seen today for hypertension.    Diagnoses and all orders for this visit:    Depression, unspecified depression type    Essential hypertension    Persistent atrial fibrillation    Other orders  -     clobetasol (TEMOVATE) 0.05 % cream; Apply  topically 2 (Two) Times a Day.    1. AF-  Newly dx.  Better rate cont today but still irreg.  We will FU on this with cards  She needs an echo which has been ordered but cards will likely do this tomorrow at appt  2. HTN-doing well with current meds  3. Rash on legs  Pruritic-we will try clobetasol cream  4. Depression- she is feeling a little worse  She has called psych and she is awaiting their return call

## 2017-10-03 ENCOUNTER — OFFICE VISIT (OUTPATIENT)
Dept: CARDIOLOGY | Facility: CLINIC | Age: 72
End: 2017-10-03

## 2017-10-03 ENCOUNTER — LAB (OUTPATIENT)
Dept: LAB | Facility: HOSPITAL | Age: 72
End: 2017-10-03

## 2017-10-03 VITALS
HEART RATE: 106 BPM | SYSTOLIC BLOOD PRESSURE: 120 MMHG | BODY MASS INDEX: 36.48 KG/M2 | WEIGHT: 227 LBS | HEIGHT: 66 IN | DIASTOLIC BLOOD PRESSURE: 80 MMHG

## 2017-10-03 DIAGNOSIS — I48.19 PERSISTENT ATRIAL FIBRILLATION (HCC): ICD-10-CM

## 2017-10-03 DIAGNOSIS — Z79.01 WARFARIN ANTICOAGULATION: ICD-10-CM

## 2017-10-03 DIAGNOSIS — I48.0 PAROXYSMAL ATRIAL FIBRILLATION (HCC): ICD-10-CM

## 2017-10-03 DIAGNOSIS — I10 ESSENTIAL HYPERTENSION: ICD-10-CM

## 2017-10-03 DIAGNOSIS — I48.19 PERSISTENT ATRIAL FIBRILLATION (HCC): Primary | ICD-10-CM

## 2017-10-03 DIAGNOSIS — I27.82 OTHER CHRONIC PULMONARY EMBOLISM WITHOUT ACUTE COR PULMONALE (HCC): ICD-10-CM

## 2017-10-03 LAB
T4 FREE SERPL-MCNC: 1.21 NG/DL (ref 0.93–1.7)
TSH SERPL DL<=0.05 MIU/L-ACNC: 4.2 MIU/ML (ref 0.27–4.2)

## 2017-10-03 PROCEDURE — 84439 ASSAY OF FREE THYROXINE: CPT

## 2017-10-03 PROCEDURE — 36415 COLL VENOUS BLD VENIPUNCTURE: CPT

## 2017-10-03 PROCEDURE — 99214 OFFICE O/P EST MOD 30 MIN: CPT | Performed by: NURSE PRACTITIONER

## 2017-10-03 PROCEDURE — 93000 ELECTROCARDIOGRAM COMPLETE: CPT | Performed by: NURSE PRACTITIONER

## 2017-10-03 PROCEDURE — 84443 ASSAY THYROID STIM HORMONE: CPT

## 2017-10-03 RX ORDER — METOPROLOL TARTRATE 50 MG/1
50 TABLET, FILM COATED ORAL 2 TIMES DAILY
Qty: 60 TABLET | Refills: 3 | Status: SHIPPED | OUTPATIENT
Start: 2017-10-03 | End: 2017-10-23
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
## 2017-10-04 PROBLEM — I48.0 PAROXYSMAL ATRIAL FIBRILLATION: Status: ACTIVE | Noted: 2017-10-02

## 2017-10-04 RX ORDER — IRBESARTAN 300 MG/1
150 TABLET ORAL DAILY
Qty: 90 TABLET | Refills: 1
Start: 2017-10-04 | End: 2018-03-04 | Stop reason: SDUPTHER

## 2017-10-23 ENCOUNTER — APPOINTMENT (OUTPATIENT)
Dept: LAB | Facility: HOSPITAL | Age: 72
End: 2017-10-23

## 2017-10-23 ENCOUNTER — LAB (OUTPATIENT)
Dept: LAB | Facility: HOSPITAL | Age: 72
End: 2017-10-23

## 2017-10-23 ENCOUNTER — APPOINTMENT (OUTPATIENT)
Dept: ONCOLOGY | Facility: HOSPITAL | Age: 72
End: 2017-10-23

## 2017-10-23 ENCOUNTER — HOSPITAL ENCOUNTER (OUTPATIENT)
Dept: CARDIOLOGY | Facility: HOSPITAL | Age: 72
Discharge: HOME | End: 2017-10-23
Admitting: INTERNAL MEDICINE

## 2017-10-23 ENCOUNTER — OFFICE VISIT (OUTPATIENT)
Dept: CARDIOLOGY | Facility: CLINIC | Age: 72
End: 2017-10-23

## 2017-10-23 ENCOUNTER — CLINICAL SUPPORT (OUTPATIENT)
Dept: ONCOLOGY | Facility: HOSPITAL | Age: 72
End: 2017-10-23

## 2017-10-23 VITALS
BODY MASS INDEX: 36 KG/M2 | WEIGHT: 224 LBS | HEART RATE: 81 BPM | DIASTOLIC BLOOD PRESSURE: 60 MMHG | SYSTOLIC BLOOD PRESSURE: 116 MMHG | HEIGHT: 66 IN

## 2017-10-23 VITALS
DIASTOLIC BLOOD PRESSURE: 90 MMHG | WEIGHT: 227 LBS | BODY MASS INDEX: 36.48 KG/M2 | SYSTOLIC BLOOD PRESSURE: 138 MMHG | HEIGHT: 66 IN | HEART RATE: 85 BPM

## 2017-10-23 DIAGNOSIS — I48.19 PERSISTENT ATRIAL FIBRILLATION (HCC): Primary | ICD-10-CM

## 2017-10-23 DIAGNOSIS — Z79.01 WARFARIN ANTICOAGULATION: Primary | ICD-10-CM

## 2017-10-23 DIAGNOSIS — I27.82 OTHER CHRONIC PULMONARY EMBOLISM WITHOUT ACUTE COR PULMONALE (HCC): ICD-10-CM

## 2017-10-23 DIAGNOSIS — I10 ESSENTIAL HYPERTENSION: ICD-10-CM

## 2017-10-23 DIAGNOSIS — Z79.01 WARFARIN ANTICOAGULATION: ICD-10-CM

## 2017-10-23 PROBLEM — E66.01 MORBID OBESITY: Chronic | Status: ACTIVE | Noted: 2017-10-23

## 2017-10-23 LAB
ASCENDING AORTA: 3 CM
BH CV ECHO MEAS - ACS: 1.6 CM
BH CV ECHO MEAS - AO MAX PG (FULL): 10.1 MMHG
BH CV ECHO MEAS - AO MAX PG: 13.2 MMHG
BH CV ECHO MEAS - AO MEAN PG (FULL): 6.8 MMHG
BH CV ECHO MEAS - AO MEAN PG: 8.5 MMHG
BH CV ECHO MEAS - AO ROOT AREA (BSA CORRECTED): 1.4
BH CV ECHO MEAS - AO ROOT AREA: 6.9 CM^2
BH CV ECHO MEAS - AO ROOT DIAM: 3 CM
BH CV ECHO MEAS - AO V2 MAX: 181.3 CM/SEC
BH CV ECHO MEAS - AO V2 MEAN: 139.3 CM/SEC
BH CV ECHO MEAS - AO V2 VTI: 41.2 CM
BH CV ECHO MEAS - AVA(I,A): 1.2 CM^2
BH CV ECHO MEAS - AVA(I,D): 1.2 CM^2
BH CV ECHO MEAS - AVA(V,A): 1.1 CM^2
BH CV ECHO MEAS - AVA(V,D): 1.1 CM^2
BH CV ECHO MEAS - BSA(HAYCOCK): 2.2 M^2
BH CV ECHO MEAS - BSA: 2.1 M^2
BH CV ECHO MEAS - BZI_BMI: 36.6 KILOGRAMS/M^2
BH CV ECHO MEAS - BZI_METRIC_HEIGHT: 167.6 CM
BH CV ECHO MEAS - BZI_METRIC_WEIGHT: 103 KG
BH CV ECHO MEAS - CONTRAST EF (2CH): 57.3 ML/M^2
BH CV ECHO MEAS - CONTRAST EF 4CH: 55.1 ML/M^2
BH CV ECHO MEAS - EDV(MOD-SP2): 82 ML
BH CV ECHO MEAS - EDV(MOD-SP4): 78 ML
BH CV ECHO MEAS - EDV(TEICH): 115.5 ML
BH CV ECHO MEAS - EF(CUBED): 69.1 %
BH CV ECHO MEAS - EF(MOD-SP2): 57.3 %
BH CV ECHO MEAS - EF(MOD-SP4): 55.1 %
BH CV ECHO MEAS - EF(TEICH): 60.5 %
BH CV ECHO MEAS - ESV(MOD-SP2): 35 ML
BH CV ECHO MEAS - ESV(MOD-SP4): 35 ML
BH CV ECHO MEAS - ESV(TEICH): 45.7 ML
BH CV ECHO MEAS - FS: 32.4 %
BH CV ECHO MEAS - IVS/LVPW: 0.75
BH CV ECHO MEAS - IVSD: 0.68 CM
BH CV ECHO MEAS - LAT PEAK E' VEL: 11 CM/SEC
BH CV ECHO MEAS - LV DIASTOLIC VOL/BSA (35-75): 37 ML/M^2
BH CV ECHO MEAS - LV MASS(C)D: 130.8 GRAMS
BH CV ECHO MEAS - LV MASS(C)DI: 62 GRAMS/M^2
BH CV ECHO MEAS - LV MAX PG: 3.1 MMHG
BH CV ECHO MEAS - LV MEAN PG: 1.7 MMHG
BH CV ECHO MEAS - LV SYSTOLIC VOL/BSA (12-30): 16.6 ML/M^2
BH CV ECHO MEAS - LV V1 MAX: 87.8 CM/SEC
BH CV ECHO MEAS - LV V1 MEAN: 62.9 CM/SEC
BH CV ECHO MEAS - LV V1 VTI: 20.2 CM
BH CV ECHO MEAS - LVIDD: 5 CM
BH CV ECHO MEAS - LVIDS: 3.3 CM
BH CV ECHO MEAS - LVLD AP2: 7.1 CM
BH CV ECHO MEAS - LVLD AP4: 7.1 CM
BH CV ECHO MEAS - LVLS AP2: 6 CM
BH CV ECHO MEAS - LVLS AP4: 5.9 CM
BH CV ECHO MEAS - LVOT AREA (M): 2.3 CM^2
BH CV ECHO MEAS - LVOT AREA: 2.4 CM^2
BH CV ECHO MEAS - LVOT DIAM: 1.7 CM
BH CV ECHO MEAS - LVPWD: 0.9 CM
BH CV ECHO MEAS - MED PEAK E' VEL: 8 CM/SEC
BH CV ECHO MEAS - MR MAX PG: 61.9 MMHG
BH CV ECHO MEAS - MR MAX VEL: 393.3 CM/SEC
BH CV ECHO MEAS - MV DEC SLOPE: 699 CM/SEC^2
BH CV ECHO MEAS - MV DEC TIME: 0.18 SEC
BH CV ECHO MEAS - MV E MAX VEL: 130.5 CM/SEC
BH CV ECHO MEAS - MV MAX PG: 8.6 MMHG
BH CV ECHO MEAS - MV MEAN PG: 2.2 MMHG
BH CV ECHO MEAS - MV P1/2T MAX VEL: 131.4 CM/SEC
BH CV ECHO MEAS - MV P1/2T: 55.1 MSEC
BH CV ECHO MEAS - MV V2 MAX: 146.6 CM/SEC
BH CV ECHO MEAS - MV V2 MEAN: 59.2 CM/SEC
BH CV ECHO MEAS - MV V2 VTI: 31.7 CM
BH CV ECHO MEAS - MVA P1/2T LCG: 1.7 CM^2
BH CV ECHO MEAS - MVA(P1/2T): 4 CM^2
BH CV ECHO MEAS - MVA(VTI): 1.5 CM^2
BH CV ECHO MEAS - PA ACC TIME: 0.16 SEC
BH CV ECHO MEAS - PA MAX PG (FULL): 2.4 MMHG
BH CV ECHO MEAS - PA MAX PG: 3.9 MMHG
BH CV ECHO MEAS - PA PR(ACCEL): 7.7 MMHG
BH CV ECHO MEAS - PA V2 MAX: 98.5 CM/SEC
BH CV ECHO MEAS - PVA(V,A): 2.2 CM^2
BH CV ECHO MEAS - PVA(V,D): 2.2 CM^2
BH CV ECHO MEAS - QP/QS: 1.1
BH CV ECHO MEAS - RAP SYSTOLE: 8 MMHG
BH CV ECHO MEAS - RV MAX PG: 1.5 MMHG
BH CV ECHO MEAS - RV MEAN PG: 0.83 MMHG
BH CV ECHO MEAS - RV V1 MAX: 61.1 CM/SEC
BH CV ECHO MEAS - RV V1 MEAN: 43 CM/SEC
BH CV ECHO MEAS - RV V1 VTI: 14.5 CM
BH CV ECHO MEAS - RVOT AREA: 3.5 CM^2
BH CV ECHO MEAS - RVOT DIAM: 2.1 CM
BH CV ECHO MEAS - RVSP: 47 MMHG
BH CV ECHO MEAS - SI(AO): 134.1 ML/M^2
BH CV ECHO MEAS - SI(CUBED): 39.7 ML/M^2
BH CV ECHO MEAS - SI(LVOT): 22.5 ML/M^2
BH CV ECHO MEAS - SI(MOD-SP2): 22.3 ML/M^2
BH CV ECHO MEAS - SI(MOD-SP4): 20.4 ML/M^2
BH CV ECHO MEAS - SI(TEICH): 33.1 ML/M^2
BH CV ECHO MEAS - SUP REN AO DIAM: 1.8 CM
BH CV ECHO MEAS - SV(AO): 283 ML
BH CV ECHO MEAS - SV(CUBED): 83.8 ML
BH CV ECHO MEAS - SV(LVOT): 47.4 ML
BH CV ECHO MEAS - SV(MOD-SP2): 47 ML
BH CV ECHO MEAS - SV(MOD-SP4): 43 ML
BH CV ECHO MEAS - SV(RVOT): 51.4 ML
BH CV ECHO MEAS - SV(TEICH): 69.9 ML
BH CV ECHO MEAS - TAPSE (>1.6): 1.4 CM2
BH CV ECHO MEAS - TR MAX VEL: 312.2 CM/SEC
BH CV XLRA - RV BASE: 2.9 CM
BH CV XLRA - TDI S': 12 CM/SEC
E/E' RATIO: 13.5
INR PPP: 3 (ref 0.9–1.1)
LEFT ATRIUM VOLUME INDEX: 26 ML/M2
LV EF 2D ECHO EST: 55 %
PROTHROMBIN TIME: 35.6 SECONDS (ref 11–13.5)
SINUS: 3 CM
STJ: 2 CM

## 2017-10-23 PROCEDURE — 93306 TTE W/DOPPLER COMPLETE: CPT | Performed by: INTERNAL MEDICINE

## 2017-10-23 PROCEDURE — 99213 OFFICE O/P EST LOW 20 MIN: CPT | Performed by: INTERNAL MEDICINE

## 2017-10-23 PROCEDURE — 93306 TTE W/DOPPLER COMPLETE: CPT

## 2017-10-23 PROCEDURE — 85610 PROTHROMBIN TIME: CPT

## 2017-10-23 RX ORDER — METOPROLOL TARTRATE 50 MG/1
50 TABLET, FILM COATED ORAL NIGHTLY
COMMUNITY
End: 2017-10-23

## 2017-10-23 RX ORDER — FLUOXETINE HYDROCHLORIDE 40 MG/1
40 CAPSULE ORAL DAILY
COMMUNITY
End: 2017-12-04 | Stop reason: ALTCHOICE

## 2017-10-23 RX ORDER — DILTIAZEM HYDROCHLORIDE 180 MG/1
180 CAPSULE, EXTENDED RELEASE ORAL DAILY
Qty: 90 CAPSULE | Refills: 3 | Status: SHIPPED | OUTPATIENT
Start: 2017-10-23 | End: 2017-11-03

## 2017-10-23 NOTE — PROGRESS NOTES
Pt's INR 3.0 today. Pt states she most likely took 2 doses of her coumadin (5mg) on Saturday accidentally. Advised pt to take her previous dose of 7.5mg Sun, 5mg all other days and we will recheck her in 2 weeks. She v/u. Pt sent to scheduling to make apt.

## 2017-10-25 RX ORDER — WARFARIN SODIUM 5 MG/1
TABLET ORAL
Qty: 36 TABLET | Refills: 3 | Status: SHIPPED | OUTPATIENT
Start: 2017-10-25 | End: 2018-03-14 | Stop reason: SDUPTHER

## 2017-10-27 RX ORDER — CLOBETASOL PROPIONATE 0.5 MG/G
CREAM TOPICAL
Qty: 30 G | Refills: 5 | Status: SHIPPED | OUTPATIENT
Start: 2017-10-27 | End: 2018-11-27 | Stop reason: SDUPTHER

## 2017-11-01 ENCOUNTER — PATIENT MESSAGE (OUTPATIENT)
Dept: CARDIOLOGY | Facility: CLINIC | Age: 72
End: 2017-11-01

## 2017-11-03 ENCOUNTER — TELEPHONE (OUTPATIENT)
Dept: CARDIOLOGY | Facility: CLINIC | Age: 72
End: 2017-11-03

## 2017-11-03 DIAGNOSIS — I48.19 PERSISTENT ATRIAL FIBRILLATION (HCC): Primary | ICD-10-CM

## 2017-11-03 RX ORDER — DILTIAZEM HYDROCHLORIDE 120 MG/1
120 CAPSULE, EXTENDED RELEASE ORAL DAILY
Qty: 90 CAPSULE | Refills: 3 | Status: SHIPPED | OUTPATIENT
Start: 2017-11-03 | End: 2018-10-08

## 2017-11-03 NOTE — TELEPHONE ENCOUNTER
Pt is returning your phone call from yesterday. The best number to reach her is: [phone]    Thanks Kirsten

## 2017-11-03 NOTE — TELEPHONE ENCOUNTER
Regarding: RE: Prescription Question  ----- Message from Kirsten Menard MA sent at 11/3/2017 10:32 AM EDT -----       ----- Message sent from Cole Ramos III, MD to Marylu Foreman at 11/2/2017 12:27 PM -----   I tried to call you so we could discuss-sorry that I missed you. Please give me a call in the office ([phone]) so that we can discuss.    Stay well-  Cole Ramos MD [facility]    ----- Message -----     From: Marylu Foreman     Sent: 11/1/2017 12:17 PM EDT       To: Cole Ramos III, MD  Subject: Prescription Question    Dr. aRmos,  I saw you on 10-23-17 and you prescribed for me diltiazen  MG (24 hour capsule) to reduce my heart rate. I had a lot of swelling in my feet and legs and weight gain.  You have taken me off of Plendil (10 MG) last week and it did help a little and my blood pressure has remained in the normal range.  However, I still am having some swelling in my feet and legs and along with weight gain.  The Triamterence 27.5 HCTZ 25 MG I am taking seems to have little effect on these problems.  Is there a different diuretic I can take that would be helpful? I want to stay on the diltiazen because it doesn't make me tired like the first medication I took.    Thanks so much,  Marylu Foreman  [phone]

## 2017-11-06 ENCOUNTER — ANTICOAGULATION VISIT (OUTPATIENT)
Dept: LAB | Facility: HOSPITAL | Age: 72
End: 2017-11-06

## 2017-11-06 ENCOUNTER — CLINICAL SUPPORT (OUTPATIENT)
Dept: ONCOLOGY | Facility: HOSPITAL | Age: 72
End: 2017-11-06

## 2017-11-06 DIAGNOSIS — Z79.01 WARFARIN ANTICOAGULATION: ICD-10-CM

## 2017-11-06 LAB
INR PPP: 2.2 (ref 0.9–1.1)
PROTHROMBIN TIME: 25.9 SECONDS (ref 11–13.5)

## 2017-11-06 PROCEDURE — 85610 PROTHROMBIN TIME: CPT

## 2017-11-15 ENCOUNTER — TELEPHONE (OUTPATIENT)
Dept: CARDIOLOGY | Facility: CLINIC | Age: 72
End: 2017-11-15

## 2017-11-15 NOTE — TELEPHONE ENCOUNTER
Should be ok but check heart rate and BP after starting - about 3 x per week. If HR >100 or BP >140/90

## 2017-11-15 NOTE — TELEPHONE ENCOUNTER
REBECCA PT------- Dr. Sabas Monique called and is wanting to make sure if it is okay for him to start patient on Adderall. He said he would start her on 10 mg daily and see how that does. Please advise........Manda

## 2017-11-30 ENCOUNTER — TELEPHONE (OUTPATIENT)
Dept: INTERNAL MEDICINE | Facility: CLINIC | Age: 72
End: 2017-11-30

## 2017-11-30 RX ORDER — FLUCONAZOLE 150 MG/1
150 TABLET ORAL ONCE
Qty: 1 TABLET | Refills: 1 | Status: SHIPPED | OUTPATIENT
Start: 2017-11-30 | End: 2017-11-30

## 2017-11-30 NOTE — TELEPHONE ENCOUNTER
RX SENT TO THE PHARMACY      ---- Message from Arleen Dillon MD sent at 11/30/2017 11:04 AM EST -----  Regarding: RE: PATIENT CALL  Contact: [phone]  Ok 150x1 with a refill  ----- Message -----     From: Nydia Watson MA     Sent: 11/30/2017   9:48 AM       To: Arleen Dillon MD  Subject: FW: PATIENT CALL                                     ----- Message -----     From: Candie Cristina     Sent: 11/30/2017   9:30 AM       To: Nydia Watson MA  Subject: PATIENT CALL                                     Patient was seen at [facility] last week for URI and was placed on Macrobid.  She now has a yeast infection which has not improved with OTC measures.  She was asking if Dr. Dillon could send in some Diflucan.

## 2017-12-04 ENCOUNTER — CLINICAL SUPPORT (OUTPATIENT)
Dept: ONCOLOGY | Facility: HOSPITAL | Age: 72
End: 2017-12-04

## 2017-12-04 ENCOUNTER — LAB (OUTPATIENT)
Dept: LAB | Facility: HOSPITAL | Age: 72
End: 2017-12-04

## 2017-12-04 DIAGNOSIS — C50.912 MALIGNANT NEOPLASM OF LEFT FEMALE BREAST, UNSPECIFIED ESTROGEN RECEPTOR STATUS, UNSPECIFIED SITE OF BREAST (HCC): ICD-10-CM

## 2017-12-04 DIAGNOSIS — Z79.01 WARFARIN ANTICOAGULATION: Primary | Chronic | ICD-10-CM

## 2017-12-04 LAB
INR PPP: 5.5 (ref 0.9–1.1)
PROTHROMBIN TIME: 65.5 SECONDS (ref 11–13.5)

## 2017-12-04 PROCEDURE — 85610 PROTHROMBIN TIME: CPT

## 2017-12-04 NOTE — PROGRESS NOTES
Pt's INR 5.5 today. Pt states she has been on Macrobid for a UTI. Her last dose was Friday, 12/1. She has also started on Wellbutrin and Cymbalta, discontinuing her Prozac. She has been on these for about 1 week. She denies any bleeding. Reviewed with Esther Hu NP. Per Esther, have pt hold today's dose then take 5mg daily. Return in 1 week for recheck. Informed pt and she v/u. Dosing instructions given to pt and pt sent to apt desk to schedule.

## 2017-12-06 DIAGNOSIS — Z79.01 WARFARIN ANTICOAGULATION: Chronic | ICD-10-CM

## 2017-12-06 DIAGNOSIS — C50.912 MALIGNANT NEOPLASM OF LEFT FEMALE BREAST, UNSPECIFIED ESTROGEN RECEPTOR STATUS, UNSPECIFIED SITE OF BREAST (HCC): Primary | ICD-10-CM

## 2017-12-11 ENCOUNTER — INFUSION (OUTPATIENT)
Dept: ONCOLOGY | Facility: HOSPITAL | Age: 72
End: 2017-12-11

## 2017-12-11 ENCOUNTER — ANTICOAGULATION VISIT (OUTPATIENT)
Dept: LAB | Facility: HOSPITAL | Age: 72
End: 2017-12-11

## 2017-12-11 DIAGNOSIS — I48.0 PAROXYSMAL ATRIAL FIBRILLATION (HCC): ICD-10-CM

## 2017-12-11 DIAGNOSIS — I10 ESSENTIAL HYPERTENSION: Chronic | ICD-10-CM

## 2017-12-11 DIAGNOSIS — G44.209 TENSION HEADACHE: ICD-10-CM

## 2017-12-11 DIAGNOSIS — Z79.01 WARFARIN ANTICOAGULATION: Chronic | ICD-10-CM

## 2017-12-11 DIAGNOSIS — I27.82 CHRONIC SADDLE PULMONARY EMBOLISM WITH ACUTE COR PULMONALE (HCC): Chronic | ICD-10-CM

## 2017-12-11 DIAGNOSIS — Z96.653 HISTORY OF TOTAL BILATERAL KNEE REPLACEMENT: ICD-10-CM

## 2017-12-11 DIAGNOSIS — I26.92 CHRONIC SADDLE PULMONARY EMBOLISM WITHOUT ACUTE COR PULMONALE (HCC): Chronic | ICD-10-CM

## 2017-12-11 DIAGNOSIS — E03.9 PRIMARY HYPOTHYROIDISM: ICD-10-CM

## 2017-12-11 DIAGNOSIS — R53.1 WEAK: ICD-10-CM

## 2017-12-11 DIAGNOSIS — I26.02 CHRONIC SADDLE PULMONARY EMBOLISM WITH ACUTE COR PULMONALE (HCC): Chronic | ICD-10-CM

## 2017-12-11 DIAGNOSIS — E55.9 VITAMIN D DEFICIENCY DISEASE: ICD-10-CM

## 2017-12-11 DIAGNOSIS — F32.89 OTHER DEPRESSION: ICD-10-CM

## 2017-12-11 DIAGNOSIS — I27.82 CHRONIC SADDLE PULMONARY EMBOLISM WITHOUT ACUTE COR PULMONALE (HCC): Chronic | ICD-10-CM

## 2017-12-11 DIAGNOSIS — Z17.0 MALIGNANT NEOPLASM OF NIPPLE OF LEFT BREAST IN FEMALE, ESTROGEN RECEPTOR POSITIVE (HCC): ICD-10-CM

## 2017-12-11 DIAGNOSIS — C50.012 MALIGNANT NEOPLASM OF NIPPLE OF LEFT BREAST IN FEMALE, ESTROGEN RECEPTOR POSITIVE (HCC): ICD-10-CM

## 2017-12-11 DIAGNOSIS — N39.0 RECURRENT UTI: ICD-10-CM

## 2017-12-11 DIAGNOSIS — F33.1 MAJOR DEPRESSIVE DISORDER, RECURRENT EPISODE, MODERATE (HCC): Primary | ICD-10-CM

## 2017-12-11 DIAGNOSIS — I95.1 ORTHOSTATIC HYPOTENSION: ICD-10-CM

## 2017-12-11 LAB
25(OH)D3 SERPL-MCNC: 22.3 NG/ML (ref 30–100)
INR PPP: 4.1 (ref 0.9–1.1)
PROTHROMBIN TIME: 49 SECONDS (ref 11–13.5)
T3FREE SERPL-MCNC: 2.09 PG/ML (ref 2–4.4)
T4 FREE SERPL-MCNC: 1.11 NG/DL (ref 0.93–1.7)
TSH SERPL DL<=0.05 MIU/L-ACNC: 1.55 MIU/ML (ref 0.27–4.2)
VIT B12 BLD-MCNC: 1040 PG/ML (ref 211–946)

## 2017-12-11 PROCEDURE — 84443 ASSAY THYROID STIM HORMONE: CPT | Performed by: PSYCHIATRY & NEUROLOGY

## 2017-12-11 PROCEDURE — 36415 COLL VENOUS BLD VENIPUNCTURE: CPT | Performed by: NURSE PRACTITIONER

## 2017-12-11 PROCEDURE — 84439 ASSAY OF FREE THYROXINE: CPT | Performed by: PSYCHIATRY & NEUROLOGY

## 2017-12-11 PROCEDURE — 82306 VITAMIN D 25 HYDROXY: CPT | Performed by: PSYCHIATRY & NEUROLOGY

## 2017-12-11 PROCEDURE — 82607 VITAMIN B-12: CPT | Performed by: PSYCHIATRY & NEUROLOGY

## 2017-12-11 PROCEDURE — 84481 FREE ASSAY (FT-3): CPT | Performed by: PSYCHIATRY & NEUROLOGY

## 2017-12-11 PROCEDURE — 85610 PROTHROMBIN TIME: CPT | Performed by: NURSE PRACTITIONER

## 2017-12-11 NOTE — PROGRESS NOTES
PT/INR 4.1 noted and reviewed with Pt and Esther COVINGTON  Pt denies missing any doses last week except for holding her dose last Monday.  Pt denies any new meds this week.  Pt denies any bruising or bleeding  Order noted per NADYA Santana to continue taking 5mg daily and to recheck in 1 week.  results placed in SS Copy of labs and SS given to pt. Pt V/U  Message sent to sched for lab appt next week.

## 2017-12-13 DIAGNOSIS — C50.012 MALIGNANT NEOPLASM OF NIPPLE OF LEFT BREAST IN FEMALE, ESTROGEN RECEPTOR POSITIVE (HCC): Primary | ICD-10-CM

## 2017-12-13 DIAGNOSIS — Z17.0 MALIGNANT NEOPLASM OF NIPPLE OF LEFT BREAST IN FEMALE, ESTROGEN RECEPTOR POSITIVE (HCC): Primary | ICD-10-CM

## 2017-12-13 LAB
FOLATE BLD-MCNC: 591 NG/ML
FOLATE RBC-MCNC: NORMAL NG/ML
HCT VFR BLD AUTO: NORMAL %

## 2017-12-18 ENCOUNTER — ANTICOAGULATION VISIT (OUTPATIENT)
Dept: LAB | Facility: HOSPITAL | Age: 72
End: 2017-12-18

## 2017-12-18 ENCOUNTER — INFUSION (OUTPATIENT)
Dept: ONCOLOGY | Facility: HOSPITAL | Age: 72
End: 2017-12-18

## 2017-12-18 DIAGNOSIS — Z79.01 WARFARIN ANTICOAGULATION: Primary | ICD-10-CM

## 2017-12-18 DIAGNOSIS — C50.012 MALIGNANT NEOPLASM OF NIPPLE OF LEFT BREAST IN FEMALE, ESTROGEN RECEPTOR POSITIVE (HCC): ICD-10-CM

## 2017-12-18 DIAGNOSIS — Z79.01 WARFARIN ANTICOAGULATION: Chronic | ICD-10-CM

## 2017-12-18 DIAGNOSIS — I10 ESSENTIAL HYPERTENSION: Chronic | ICD-10-CM

## 2017-12-18 DIAGNOSIS — Z17.0 MALIGNANT NEOPLASM OF NIPPLE OF LEFT BREAST IN FEMALE, ESTROGEN RECEPTOR POSITIVE (HCC): ICD-10-CM

## 2017-12-18 DIAGNOSIS — I27.82 OTHER CHRONIC PULMONARY EMBOLISM WITHOUT ACUTE COR PULMONALE (HCC): Primary | Chronic | ICD-10-CM

## 2017-12-18 LAB
BASOPHILS # BLD AUTO: 0.13 10*3/MM3 (ref 0–0.1)
BASOPHILS NFR BLD AUTO: 1.5 % (ref 0–1.1)
DEPRECATED RDW RBC AUTO: 47.6 FL (ref 37–49)
EOSINOPHIL # BLD AUTO: 0.37 10*3/MM3 (ref 0–0.36)
EOSINOPHIL NFR BLD AUTO: 4.4 % (ref 1–5)
ERYTHROCYTE [DISTWIDTH] IN BLOOD BY AUTOMATED COUNT: 14.6 % (ref 11.7–14.5)
HCT VFR BLD AUTO: 37.2 % (ref 34–45)
HGB BLD-MCNC: 12.1 G/DL (ref 11.5–14.9)
IMM GRANULOCYTES # BLD: 0 10*3/MM3 (ref 0–0.03)
IMM GRANULOCYTES NFR BLD: 0 % (ref 0–0.5)
INR PPP: 3.4 (ref 0.9–1.1)
LYMPHOCYTES # BLD AUTO: 2.47 10*3/MM3 (ref 1–3.5)
LYMPHOCYTES NFR BLD AUTO: 29.2 % (ref 20–49)
MCH RBC QN AUTO: 29.2 PG (ref 27–33)
MCHC RBC AUTO-ENTMCNC: 32.5 G/DL (ref 32–35)
MCV RBC AUTO: 89.9 FL (ref 83–97)
MONOCYTES # BLD AUTO: 0.78 10*3/MM3 (ref 0.25–0.8)
MONOCYTES NFR BLD AUTO: 9.2 % (ref 4–12)
NEUTROPHILS # BLD AUTO: 4.71 10*3/MM3 (ref 1.5–7)
NEUTROPHILS NFR BLD AUTO: 55.7 % (ref 39–75)
NRBC BLD MANUAL-RTO: 0.5 /100 WBC (ref 0–0)
PLATELET # BLD AUTO: 359 10*3/MM3 (ref 150–375)
PMV BLD AUTO: 10.8 FL (ref 8.9–12.1)
PROTHROMBIN TIME: 40.5 SECONDS (ref 11–13.5)
RBC # BLD AUTO: 4.14 10*6/MM3 (ref 3.9–5)
WBC NRBC COR # BLD: 8.46 10*3/MM3 (ref 4–10)

## 2017-12-18 PROCEDURE — 85025 COMPLETE CBC W/AUTO DIFF WBC: CPT | Performed by: INTERNAL MEDICINE

## 2017-12-18 PROCEDURE — 85610 PROTHROMBIN TIME: CPT | Performed by: INTERNAL MEDICINE

## 2017-12-18 PROCEDURE — 36415 COLL VENOUS BLD VENIPUNCTURE: CPT | Performed by: INTERNAL MEDICINE

## 2017-12-18 NOTE — PROGRESS NOTES
Pt here today for CBC and PT/INR and RN review.  INR today 3.4.  Pt denies any missed doses, no changes to diet, no new medications.  Pt stated that her Cymbalta dose was increased.  Pt was taking 5mg QD.  Per standing stone she would be taking 2.5mg Sunday and 5mg all other days.    Reviewed with Callie SANCHEZ.  Okay to have pt do the standing stone recommendations and for pt to return in one week.  Reviewed with pt and pt v/u.  Printed copy given to pt.  Pt sent to scheduling.

## 2017-12-29 ENCOUNTER — APPOINTMENT (OUTPATIENT)
Dept: LAB | Facility: HOSPITAL | Age: 72
End: 2017-12-29

## 2018-01-02 ENCOUNTER — INFUSION (OUTPATIENT)
Dept: ONCOLOGY | Facility: HOSPITAL | Age: 73
End: 2018-01-02

## 2018-01-02 ENCOUNTER — LAB (OUTPATIENT)
Dept: LAB | Facility: HOSPITAL | Age: 73
End: 2018-01-02

## 2018-01-02 DIAGNOSIS — Z79.01 WARFARIN ANTICOAGULATION: Chronic | ICD-10-CM

## 2018-01-02 DIAGNOSIS — C50.912 MALIGNANT NEOPLASM OF LEFT FEMALE BREAST, UNSPECIFIED ESTROGEN RECEPTOR STATUS, UNSPECIFIED SITE OF BREAST (HCC): ICD-10-CM

## 2018-01-02 LAB
BASOPHILS # BLD AUTO: 0.12 10*3/MM3 (ref 0–0.1)
BASOPHILS NFR BLD AUTO: 1.1 % (ref 0–1.1)
DEPRECATED RDW RBC AUTO: 50.6 FL (ref 37–49)
EOSINOPHIL # BLD AUTO: 0.49 10*3/MM3 (ref 0–0.36)
EOSINOPHIL NFR BLD AUTO: 4.4 % (ref 1–5)
ERYTHROCYTE [DISTWIDTH] IN BLOOD BY AUTOMATED COUNT: 15.4 % (ref 11.7–14.5)
HCT VFR BLD AUTO: 36.3 % (ref 34–45)
HGB BLD-MCNC: 11.8 G/DL (ref 11.5–14.9)
IMM GRANULOCYTES # BLD: 0.06 10*3/MM3 (ref 0–0.03)
IMM GRANULOCYTES NFR BLD: 0.5 % (ref 0–0.5)
INR PPP: 2.7 (ref 0.9–1.1)
LYMPHOCYTES # BLD AUTO: 2.84 10*3/MM3 (ref 1–3.5)
LYMPHOCYTES NFR BLD AUTO: 25.4 % (ref 20–49)
MCH RBC QN AUTO: 29.5 PG (ref 27–33)
MCHC RBC AUTO-ENTMCNC: 32.5 G/DL (ref 32–35)
MCV RBC AUTO: 90.8 FL (ref 83–97)
MONOCYTES # BLD AUTO: 1.01 10*3/MM3 (ref 0.25–0.8)
MONOCYTES NFR BLD AUTO: 9.1 % (ref 4–12)
NEUTROPHILS # BLD AUTO: 6.64 10*3/MM3 (ref 1.5–7)
NEUTROPHILS NFR BLD AUTO: 59.5 % (ref 39–75)
NRBC BLD MANUAL-RTO: 0.5 /100 WBC (ref 0–0)
PLATELET # BLD AUTO: 401 10*3/MM3 (ref 150–375)
PMV BLD AUTO: 10.5 FL (ref 8.9–12.1)
PROTHROMBIN TIME: 32.7 SECONDS (ref 11–13.5)
RBC # BLD AUTO: 4 10*6/MM3 (ref 3.9–5)
WBC NRBC COR # BLD: 11.16 10*3/MM3 (ref 4–10)

## 2018-01-02 PROCEDURE — 85025 COMPLETE CBC W/AUTO DIFF WBC: CPT | Performed by: INTERNAL MEDICINE

## 2018-01-02 PROCEDURE — 85610 PROTHROMBIN TIME: CPT | Performed by: INTERNAL MEDICINE

## 2018-01-02 PROCEDURE — 36416 COLLJ CAPILLARY BLOOD SPEC: CPT | Performed by: INTERNAL MEDICINE

## 2018-01-08 ENCOUNTER — OFFICE VISIT (OUTPATIENT)
Dept: ONCOLOGY | Facility: CLINIC | Age: 73
End: 2018-01-08

## 2018-01-08 ENCOUNTER — ANTICOAGULATION VISIT (OUTPATIENT)
Dept: LAB | Facility: HOSPITAL | Age: 73
End: 2018-01-08

## 2018-01-08 VITALS
HEIGHT: 64 IN | BODY MASS INDEX: 38.58 KG/M2 | HEART RATE: 113 BPM | RESPIRATION RATE: 16 BRPM | OXYGEN SATURATION: 96 % | WEIGHT: 226 LBS | DIASTOLIC BLOOD PRESSURE: 76 MMHG | TEMPERATURE: 98.2 F | SYSTOLIC BLOOD PRESSURE: 128 MMHG

## 2018-01-08 DIAGNOSIS — Z79.01 WARFARIN ANTICOAGULATION: ICD-10-CM

## 2018-01-08 DIAGNOSIS — C50.012 MALIGNANT NEOPLASM OF NIPPLE OF LEFT BREAST IN FEMALE, ESTROGEN RECEPTOR POSITIVE (HCC): Primary | ICD-10-CM

## 2018-01-08 DIAGNOSIS — I27.82 OTHER CHRONIC PULMONARY EMBOLISM WITHOUT ACUTE COR PULMONALE (HCC): Chronic | ICD-10-CM

## 2018-01-08 DIAGNOSIS — F32.89 OTHER DEPRESSION: ICD-10-CM

## 2018-01-08 DIAGNOSIS — Z17.0 MALIGNANT NEOPLASM OF NIPPLE OF LEFT BREAST IN FEMALE, ESTROGEN RECEPTOR POSITIVE (HCC): Primary | ICD-10-CM

## 2018-01-08 LAB
INR PPP: 2.5 (ref 0.9–1.1)
PROTHROMBIN TIME: 29.8 SECONDS (ref 11–13.5)

## 2018-01-08 PROCEDURE — 99214 OFFICE O/P EST MOD 30 MIN: CPT | Performed by: INTERNAL MEDICINE

## 2018-01-08 PROCEDURE — 85610 PROTHROMBIN TIME: CPT

## 2018-01-08 RX ORDER — DULOXETIN HYDROCHLORIDE 60 MG/1
CAPSULE, DELAYED RELEASE ORAL
Refills: 0 | COMMUNITY
Start: 2017-12-11 | End: 2021-06-02
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
## 2018-01-15 DIAGNOSIS — I10 ESSENTIAL HYPERTENSION: Primary | ICD-10-CM

## 2018-01-15 DIAGNOSIS — E03.9 ACQUIRED HYPOTHYROIDISM: ICD-10-CM

## 2018-01-16 LAB
ALBUMIN SERPL-MCNC: 4.4 G/DL (ref 3.5–5.2)
ALBUMIN/GLOB SERPL: 1.3 G/DL
ALP SERPL-CCNC: 110 U/L (ref 39–117)
ALT SERPL-CCNC: 18 U/L (ref 1–33)
AST SERPL-CCNC: 20 U/L (ref 1–32)
BILIRUB SERPL-MCNC: 0.6 MG/DL (ref 0.1–1.2)
BUN SERPL-MCNC: 29 MG/DL (ref 8–23)
BUN/CREAT SERPL: 19.6 (ref 7–25)
CALCIUM SERPL-MCNC: 10 MG/DL (ref 8.6–10.5)
CHLORIDE SERPL-SCNC: 101 MMOL/L (ref 98–107)
CHOLEST SERPL-MCNC: 219 MG/DL (ref 0–200)
CO2 SERPL-SCNC: 24.4 MMOL/L (ref 22–29)
CREAT SERPL-MCNC: 1.48 MG/DL (ref 0.57–1)
GLOBULIN SER CALC-MCNC: 3.3 GM/DL
GLUCOSE SERPL-MCNC: 111 MG/DL (ref 65–99)
HDLC SERPL-MCNC: 67 MG/DL (ref 40–60)
LDLC SERPL CALC-MCNC: 132 MG/DL (ref 0–100)
LDLC/HDLC SERPL: 1.97 {RATIO}
POTASSIUM SERPL-SCNC: 4.9 MMOL/L (ref 3.5–5.2)
PROT SERPL-MCNC: 7.7 G/DL (ref 6–8.5)
SODIUM SERPL-SCNC: 142 MMOL/L (ref 136–145)
TRIGL SERPL-MCNC: 100 MG/DL (ref 0–150)
TSH SERPL DL<=0.005 MIU/L-ACNC: 2.38 MIU/ML (ref 0.27–4.2)
VLDLC SERPL CALC-MCNC: 20 MG/DL (ref 5–40)

## 2018-01-22 ENCOUNTER — OFFICE VISIT (OUTPATIENT)
Dept: INTERNAL MEDICINE | Facility: CLINIC | Age: 73
End: 2018-01-22

## 2018-01-22 VITALS
HEART RATE: 98 BPM | WEIGHT: 224 LBS | HEIGHT: 64 IN | OXYGEN SATURATION: 97 % | TEMPERATURE: 98.3 F | BODY MASS INDEX: 38.24 KG/M2 | DIASTOLIC BLOOD PRESSURE: 82 MMHG | SYSTOLIC BLOOD PRESSURE: 118 MMHG

## 2018-01-22 DIAGNOSIS — N18.2 CHRONIC RENAL IMPAIRMENT, STAGE 2 (MILD): Primary | ICD-10-CM

## 2018-01-22 DIAGNOSIS — E03.9 HYPOTHYROIDISM, UNSPECIFIED TYPE: ICD-10-CM

## 2018-01-22 DIAGNOSIS — I10 ESSENTIAL HYPERTENSION: Chronic | ICD-10-CM

## 2018-01-22 DIAGNOSIS — E78.5 HYPERLIPIDEMIA, UNSPECIFIED HYPERLIPIDEMIA TYPE: ICD-10-CM

## 2018-01-22 DIAGNOSIS — F32.89 OTHER DEPRESSION: ICD-10-CM

## 2018-01-22 PROCEDURE — 99213 OFFICE O/P EST LOW 20 MIN: CPT | Performed by: INTERNAL MEDICINE

## 2018-01-22 NOTE — PROGRESS NOTES
Subjective   Marylu Foreman is a 72 y.o. female. Patient is here to follow up on blood pressure, thyroid and lab results.    History of Present Illness   Pt has been taking BP meds as prescribed without any problems.  No HA  No episodes of orthostasis  She cont to have elevated creatinine  She does try to avoid NSAIDs as she did takes high doses for many years for TMJ  SHe says she is still struggling with depression  She is seeing the psychiatrist  Pt has been doing well with thyroid meds.  Taking as perscribed without any complications  Pt has been stable on current meds for atrial fibrillation.  No palp or SOB.  No CP or edema    The following portions of the patient's history were reviewed and updated as appropriate: allergies, current medications, past medical history, past social history and problem list.  She cont to teach technology      Review of Systems   All other systems reviewed and are negative.      Objective   Physical Exam   Constitutional: She is oriented to person, place, and time. She appears well-developed and well-nourished.   HENT:   Head: Normocephalic and atraumatic.   Right Ear: External ear normal.   Left Ear: External ear normal.   Mouth/Throat: Oropharynx is clear and moist.   Eyes: Conjunctivae and EOM are normal. Pupils are equal, round, and reactive to light.   Neck: Normal range of motion. No tracheal deviation present. No thyromegaly present.   Cardiovascular: Normal rate, regular rhythm, normal heart sounds and intact distal pulses.    Pulmonary/Chest: Effort normal and breath sounds normal.   Abdominal: Soft. Bowel sounds are normal. She exhibits no distension. There is no tenderness.   Musculoskeletal: Normal range of motion. She exhibits no edema or deformity.   Neurological: She is alert and oriented to person, place, and time.   Skin: Skin is warm and dry.   Psychiatric: She has a normal mood and affect. Her behavior is normal. Judgment and thought content normal.   Vitals  reviewed.      Assessment/Plan   Marylu was seen today for hypothyroidism, hypertension, depression and lab results.    Diagnoses and all orders for this visit:    Chronic renal impairment, stage 2 (mild)  -     Basic Metabolic Panel    Essential hypertension    Hyperlipidemia, unspecified hyperlipidemia type    1. CRI- she is stable with current meds  2. HTN- doing well with current meds  3. HPL-  She reallt does not want meds  I did discuss the risk and benefit to the medicine  4. Hypothyroidism-  She has been  Stable with current meds  5. AF- stable with seeing cards on warfarin actually originally for PE

## 2018-02-05 ENCOUNTER — PROCEDURE VISIT (OUTPATIENT)
Dept: OBSTETRICS AND GYNECOLOGY | Facility: CLINIC | Age: 73
End: 2018-02-05

## 2018-02-05 ENCOUNTER — OFFICE VISIT (OUTPATIENT)
Dept: OBSTETRICS AND GYNECOLOGY | Facility: CLINIC | Age: 73
End: 2018-02-05

## 2018-02-05 VITALS
HEIGHT: 66 IN | BODY MASS INDEX: 36.43 KG/M2 | DIASTOLIC BLOOD PRESSURE: 80 MMHG | WEIGHT: 226.7 LBS | SYSTOLIC BLOOD PRESSURE: 130 MMHG

## 2018-02-05 DIAGNOSIS — R10.2 PELVIC PAIN: Primary | ICD-10-CM

## 2018-02-05 DIAGNOSIS — N76.0 ACUTE VAGINITIS: ICD-10-CM

## 2018-02-05 DIAGNOSIS — R39.15 URINARY URGENCY: ICD-10-CM

## 2018-02-05 DIAGNOSIS — R10.2 PELVIC PRESSURE IN FEMALE: ICD-10-CM

## 2018-02-05 DIAGNOSIS — Z13.9 SCREENING FOR CONDITION: Primary | ICD-10-CM

## 2018-02-05 LAB
BILIRUB BLD-MCNC: NEGATIVE MG/DL
CLARITY, POC: CLEAR
COLOR UR: YELLOW
GLUCOSE UR STRIP-MCNC: NEGATIVE MG/DL
KETONES UR QL: NEGATIVE
LEUKOCYTE EST, POC: NEGATIVE
NITRITE UR-MCNC: NEGATIVE MG/ML
PH UR: 5 [PH] (ref 5–8)
PROT UR STRIP-MCNC: NEGATIVE MG/DL
RBC # UR STRIP: NEGATIVE /UL
SP GR UR: 1.01 (ref 1–1.03)
UROBILINOGEN UR QL: NORMAL

## 2018-02-05 PROCEDURE — 99213 OFFICE O/P EST LOW 20 MIN: CPT | Performed by: OBSTETRICS & GYNECOLOGY

## 2018-02-05 PROCEDURE — 81002 URINALYSIS NONAUTO W/O SCOPE: CPT | Performed by: OBSTETRICS & GYNECOLOGY

## 2018-02-05 PROCEDURE — 76830 TRANSVAGINAL US NON-OB: CPT | Performed by: OBSTETRICS & GYNECOLOGY

## 2018-02-05 RX ORDER — DEXTROAMPHETAMINE SACCHARATE, AMPHETAMINE ASPARTATE MONOHYDRATE, DEXTROAMPHETAMINE SULFATE AND AMPHETAMINE SULFATE 2.5; 2.5; 2.5; 2.5 MG/1; MG/1; MG/1; MG/1
CAPSULE, EXTENDED RELEASE ORAL
Refills: 0 | COMMUNITY
Start: 2017-11-13 | End: 2018-07-12

## 2018-02-05 RX ORDER — FLUCONAZOLE 150 MG/1
TABLET ORAL
Refills: 0 | COMMUNITY
Start: 2018-01-29 | End: 2018-02-05

## 2018-02-05 NOTE — PROGRESS NOTES
"      Marylu Foreman is a 72 y.o. patient who presents for follow up of   Chief Complaint   Patient presents with   • Follow-up     71 yo est pt here for recurrent vaginitis. She has not noticed discharge as much as pelvic pressure. She is also having urinary urgency. She could not use boric acid bc it caused burning. She uses some occasional vaginal E2 cream and has been cleared for use by her oncologist.         The following portions of the patient's history were reviewed and updated as appropriate: allergies, current medications and problem list.    Review of Systems   Genitourinary: Positive for frequency, urgency, vaginal discharge and vaginal pain (pressure). Negative for difficulty urinating and vaginal bleeding.   All other systems reviewed and are negative.      /80  Ht 167.6 cm (66\")  Wt 103 kg (226 lb 11.2 oz)  LMP  (LMP Unknown)  BMI 36.59 kg/m2    Physical Exam   Constitutional: She is oriented to person, place, and time. She appears well-developed and well-nourished.   HENT:   Head: Normocephalic and atraumatic.   Abdominal: Soft. Bowel sounds are normal. She exhibits no distension and no mass. There is no tenderness. There is no rebound and no guarding. No hernia.   Genitourinary: Uterus normal.       Pelvic exam was performed with patient supine. There is rash on the right labia. There is no tenderness, lesion or injury on the right labia. There is rash on the left labia. There is no tenderness, lesion or injury on the left labia. Cervix exhibits no motion tenderness, no discharge and no friability. Right adnexum displays no mass, no tenderness and no fullness. Left adnexum displays no mass, no tenderness and no fullness. No erythema, tenderness or bleeding in the vagina. No foreign body in the vagina. No signs of injury around the vagina. Vaginal discharge found.   Neurological: She is alert and oriented to person, place, and time.   Skin: Skin is warm and dry.   Psychiatric: She has a " normal mood and affect. Her behavior is normal. Judgment and thought content normal.   Nursing note and vitals reviewed.      A/P:  1. Vaginal discharge- check NuSwab Y/BV/M. Enc probiotics and treat according to results.  2. Vaginal pressure- check TVUS.  3. Urinary urgency- check UA and urine culture.   [facility]- RTO 11/2018 annual or prn.     Assessment/Plan   Marylu was seen today for follow-up.    Diagnoses and all orders for this visit:    Screening for condition  -     POC Urinalysis Dipstick  -     NuSwab BV & Candida - Swab, Vagina  -     Genital Mycoplasmas ROHINI, Swab - Swab, Vagina  -     Urine Culture - Urine, Urine, Clean Catch    Acute vaginitis   -     NuSwab BV & Candida - Swab, Vagina  -     Genital Mycoplasmas ROHINI, Swab - Swab, Vagina    Pelvic pressure in female    Urinary urgency                   No Follow-up on file.      Joanna Quiñonez MD    2/5/2018  12:59 PM

## 2018-02-06 ENCOUNTER — OFFICE VISIT (OUTPATIENT)
Dept: INTERNAL MEDICINE | Facility: CLINIC | Age: 73
End: 2018-02-06

## 2018-02-06 VITALS
WEIGHT: 227.13 LBS | OXYGEN SATURATION: 98 % | HEART RATE: 69 BPM | SYSTOLIC BLOOD PRESSURE: 112 MMHG | HEIGHT: 66 IN | DIASTOLIC BLOOD PRESSURE: 70 MMHG | BODY MASS INDEX: 36.5 KG/M2

## 2018-02-06 DIAGNOSIS — M54.50 ACUTE RIGHT-SIDED LOW BACK PAIN WITHOUT SCIATICA: Primary | ICD-10-CM

## 2018-02-06 PROCEDURE — 99213 OFFICE O/P EST LOW 20 MIN: CPT | Performed by: NURSE PRACTITIONER

## 2018-02-06 RX ORDER — CYCLOBENZAPRINE HCL 5 MG
5 TABLET ORAL 3 TIMES DAILY PRN
Qty: 30 TABLET | Refills: 0 | Status: SHIPPED | OUTPATIENT
Start: 2018-02-06 | End: 2018-07-12

## 2018-02-06 NOTE — PROGRESS NOTES
Subjective   Marylu Foreman is a 72 y.o. female. Patient is here today for   Chief Complaint   Patient presents with   • Back Pain     Pt complains of having mid lower back pain x1 week.    .    History of Present Illness   C/o right sided back pain x 1 week that has gotten progressively worse. She had been pulling a cart that had a case of water on it when she first noticed the pain. She did also have a pelvic ultrasound yesterday which seemed to aggravate it due to laying down for 15 minutes. She tried ice and heat and the heat seemed to make it worse. She also tried Tylenol. Denies pain down her legs.     The following portions of the patient's history were reviewed and updated as appropriate: allergies, current medications, past family history, past medical history, past social history, past surgical history and problem list.    Review of Systems   Constitutional: Negative.    Respiratory: Negative.    Cardiovascular: Negative.    Gastrointestinal: Negative.    Genitourinary: Negative.    Musculoskeletal: Positive for back pain.       Objective   Vitals:    02/06/18 0844   BP: 112/70   Pulse: 69   SpO2: 98%     Physical Exam   Constitutional: She is oriented to person, place, and time. Vital signs are normal. She appears well-developed and well-nourished.   Cardiovascular: Normal rate, regular rhythm and normal heart sounds.    Pulmonary/Chest: Effort normal and breath sounds normal.   Musculoskeletal:        Lumbar back: She exhibits decreased range of motion and tenderness. She exhibits no swelling and no edema.        Back:    Neurological: She is alert and oriented to person, place, and time. She has normal strength. No sensory deficit.   Skin: Skin is warm, dry and intact.   Psychiatric: She has a normal mood and affect. Her speech is normal and behavior is normal. Thought content normal.   Nursing note and vitals reviewed.      Assessment/Plan   Marylu was seen today for back pain.    Diagnoses and all  orders for this visit:    Acute right-sided low back pain without sciatica  -     cyclobenzaprine (FLEXERIL) 5 MG tablet; Take 1 tablet by mouth 3 (Three) Times a Day As Needed for Muscle Spasms.    Patient given a handout on stretches to try. If her symptoms do not improve over the next week, she will be referred to physical therapy. She may try a chiropractor

## 2018-02-07 LAB
A VAGINAE DNA VAG QL NAA+PROBE: NORMAL SCORE
BACTERIA UR CULT: NORMAL
BACTERIA UR CULT: NORMAL
BVAB2 DNA VAG QL NAA+PROBE: NORMAL SCORE
C ALBICANS DNA VAG QL NAA+PROBE: NEGATIVE
C GLABRATA DNA VAG QL NAA+PROBE: NEGATIVE
CONV COMMENT: NORMAL
M GENITALIUM DNA SPEC QL NAA+PROBE: NEGATIVE
M HOMINIS DNA SPEC QL NAA+PROBE: NEGATIVE
MEGA1 DNA VAG QL NAA+PROBE: NORMAL SCORE
UREAPLASMA DNA SPEC QL NAA+PROBE: NEGATIVE

## 2018-02-08 NOTE — PROGRESS NOTES
PIP= pt's vaginal swabs are all normal ! Her US also shows normal uterus and ovaries. Some of her symptoms of pressure may be due to mild pelvic prolapse.

## 2018-02-10 NOTE — PROGRESS NOTES
PIP= pelvic US is normal. (Uterus is 4.8 cm, El 0.3 cm and normal ovaries. Small fibroid 1 x 1.2 cm. No comparable data)

## 2018-02-19 ENCOUNTER — CLINICAL SUPPORT (OUTPATIENT)
Dept: ONCOLOGY | Facility: HOSPITAL | Age: 73
End: 2018-02-19

## 2018-02-19 ENCOUNTER — LAB (OUTPATIENT)
Dept: LAB | Facility: HOSPITAL | Age: 73
End: 2018-02-19

## 2018-02-19 DIAGNOSIS — E78.5 HYPERLIPIDEMIA, UNSPECIFIED HYPERLIPIDEMIA TYPE: Primary | ICD-10-CM

## 2018-02-19 DIAGNOSIS — Z79.01 WARFARIN ANTICOAGULATION: Chronic | ICD-10-CM

## 2018-02-19 LAB
INR PPP: 2.9 (ref 0.9–1.1)
PROTHROMBIN TIME: 34.8 SECONDS (ref 11–13.5)

## 2018-02-19 PROCEDURE — 85610 PROTHROMBIN TIME: CPT

## 2018-02-26 ENCOUNTER — OFFICE VISIT (OUTPATIENT)
Dept: OBSTETRICS AND GYNECOLOGY | Facility: CLINIC | Age: 73
End: 2018-02-26

## 2018-02-26 VITALS
DIASTOLIC BLOOD PRESSURE: 104 MMHG | HEIGHT: 66 IN | BODY MASS INDEX: 37.49 KG/M2 | SYSTOLIC BLOOD PRESSURE: 136 MMHG | WEIGHT: 233.3 LBS

## 2018-02-26 DIAGNOSIS — R10.2 PELVIC PRESSURE IN FEMALE: ICD-10-CM

## 2018-02-26 DIAGNOSIS — N81.2 INCOMPLETE UTEROVAGINAL PROLAPSE: ICD-10-CM

## 2018-02-26 DIAGNOSIS — Z13.9 SCREENING FOR CONDITION: Primary | ICD-10-CM

## 2018-02-26 LAB
BILIRUB BLD-MCNC: NEGATIVE MG/DL
CLARITY, POC: CLEAR
COLOR UR: YELLOW
GLUCOSE UR STRIP-MCNC: NEGATIVE MG/DL
KETONES UR QL: NEGATIVE
LEUKOCYTE EST, POC: NEGATIVE
NITRITE UR-MCNC: NEGATIVE MG/ML
PH UR: 5 [PH] (ref 5–8)
PROT UR STRIP-MCNC: NEGATIVE MG/DL
RBC # UR STRIP: NEGATIVE /UL
SP GR UR: 1 (ref 1–1.03)
UROBILINOGEN UR QL: NORMAL

## 2018-02-26 PROCEDURE — 99213 OFFICE O/P EST LOW 20 MIN: CPT | Performed by: OBSTETRICS & GYNECOLOGY

## 2018-02-26 PROCEDURE — 81002 URINALYSIS NONAUTO W/O SCOPE: CPT | Performed by: OBSTETRICS & GYNECOLOGY

## 2018-02-27 ENCOUNTER — APPOINTMENT (OUTPATIENT)
Dept: GENERAL RADIOLOGY | Facility: HOSPITAL | Age: 73
End: 2018-02-27

## 2018-02-27 PROCEDURE — 71101 X-RAY EXAM UNILAT RIBS/CHEST: CPT | Performed by: GENERAL PRACTICE

## 2018-02-28 LAB
BUN SERPL-MCNC: 24 MG/DL (ref 8–23)
BUN/CREAT SERPL: 21.1 (ref 7–25)
CALCIUM SERPL-MCNC: 8.9 MG/DL (ref 8.6–10.5)
CHLORIDE SERPL-SCNC: 95 MMOL/L (ref 98–107)
CO2 SERPL-SCNC: 24 MMOL/L (ref 22–29)
CREAT SERPL-MCNC: 1.14 MG/DL (ref 0.57–1)
GFR SERPLBLD CREATININE-BSD FMLA CKD-EPI: 47 ML/MIN/1.73
GFR SERPLBLD CREATININE-BSD FMLA CKD-EPI: 57 ML/MIN/1.73
GLUCOSE SERPL-MCNC: 94 MG/DL (ref 65–99)
POTASSIUM SERPL-SCNC: 4.7 MMOL/L (ref 3.5–5.2)
SODIUM SERPL-SCNC: 135 MMOL/L (ref 136–145)

## 2018-03-05 RX ORDER — TRIAMTERENE AND HYDROCHLOROTHIAZIDE 37.5; 25 MG/1; MG/1
CAPSULE ORAL
Qty: 90 CAPSULE | Refills: 1 | Status: SHIPPED | OUTPATIENT
Start: 2018-03-05 | End: 2018-09-01 | Stop reason: SDUPTHER

## 2018-03-05 RX ORDER — IRBESARTAN 300 MG/1
TABLET ORAL
Qty: 90 TABLET | Refills: 1 | Status: SHIPPED | OUTPATIENT
Start: 2018-03-05 | End: 2018-09-01 | Stop reason: SDUPTHER

## 2018-03-14 RX ORDER — WARFARIN SODIUM 5 MG/1
TABLET ORAL
Qty: 36 TABLET | Refills: 3 | Status: SHIPPED | OUTPATIENT
Start: 2018-03-14 | End: 2018-08-15 | Stop reason: SDUPTHER

## 2018-04-02 ENCOUNTER — CLINICAL SUPPORT (OUTPATIENT)
Dept: ONCOLOGY | Facility: HOSPITAL | Age: 73
End: 2018-04-02

## 2018-04-02 ENCOUNTER — LAB (OUTPATIENT)
Dept: LAB | Facility: HOSPITAL | Age: 73
End: 2018-04-02

## 2018-04-02 DIAGNOSIS — Z79.01 WARFARIN ANTICOAGULATION: Primary | Chronic | ICD-10-CM

## 2018-04-02 LAB
INR PPP: 2.1 (ref 0.9–1.1)
PROTHROMBIN TIME: 25.7 SECONDS (ref 11–13.5)

## 2018-04-02 PROCEDURE — 85610 PROTHROMBIN TIME: CPT

## 2018-04-02 NOTE — PROGRESS NOTES
INR 2.1. Per SS 2.5 mg on Sunday, 5 mg all other days. No missed doses, medication or diet changes. See coag assessment. Pt V/U.

## 2018-05-14 ENCOUNTER — CLINICAL SUPPORT (OUTPATIENT)
Dept: ONCOLOGY | Facility: HOSPITAL | Age: 73
End: 2018-05-14

## 2018-05-14 ENCOUNTER — LAB (OUTPATIENT)
Dept: LAB | Facility: HOSPITAL | Age: 73
End: 2018-05-14

## 2018-05-14 ENCOUNTER — TELEPHONE (OUTPATIENT)
Dept: GENERAL RADIOLOGY | Facility: HOSPITAL | Age: 73
End: 2018-05-14

## 2018-05-14 DIAGNOSIS — I10 ESSENTIAL HYPERTENSION: Primary | Chronic | ICD-10-CM

## 2018-05-14 DIAGNOSIS — Z79.01 WARFARIN ANTICOAGULATION: Chronic | ICD-10-CM

## 2018-05-14 LAB
INR PPP: 1.6 (ref 0.9–1.1)
PROTHROMBIN TIME: 19 SECONDS (ref 11–13.5)

## 2018-05-14 PROCEDURE — 85610 PROTHROMBIN TIME: CPT

## 2018-05-14 NOTE — PROGRESS NOTES
INR 1.6 today.  Pt states that she was on a new medication for two weeks and then stopped taking it yesterday.  She is unaware of what this medication is.  She has also been taking her Coumadin 2.5mg Fri and Sun and 5mg all other days.  Denies missing any doses.    Per SS, Pt should take coumadin 5mg daily and return in one week.    Pt sent to scheduling to make appt.

## 2018-05-14 NOTE — TELEPHONE ENCOUNTER
----- Message from Rosangela Deal RN sent at 5/14/2018  1:22 PM EDT -----  Regarding: pt to return in one week for repeat pt/inr and coag/lab rn   Please schedule patient to return in one week for repeat pt/inr and coag rn .    Thank you   Rosangela NICHOLAS RN

## 2018-05-16 DIAGNOSIS — Z79.01 WARFARIN ANTICOAGULATION: Primary | Chronic | ICD-10-CM

## 2018-05-21 ENCOUNTER — LAB (OUTPATIENT)
Dept: LAB | Facility: HOSPITAL | Age: 73
End: 2018-05-21

## 2018-05-21 ENCOUNTER — INFUSION (OUTPATIENT)
Dept: ONCOLOGY | Facility: HOSPITAL | Age: 73
End: 2018-05-21

## 2018-05-21 DIAGNOSIS — Z79.01 WARFARIN ANTICOAGULATION: Chronic | ICD-10-CM

## 2018-05-21 LAB
INR PPP: 1.8 (ref 0.9–1.1)
PROTHROMBIN TIME: 21.2 SECONDS (ref 11–13.5)

## 2018-05-21 PROCEDURE — 85610 PROTHROMBIN TIME: CPT

## 2018-06-04 ENCOUNTER — APPOINTMENT (OUTPATIENT)
Dept: ONCOLOGY | Facility: HOSPITAL | Age: 73
End: 2018-06-04

## 2018-06-07 ENCOUNTER — INFUSION (OUTPATIENT)
Dept: ONCOLOGY | Facility: HOSPITAL | Age: 73
End: 2018-06-07

## 2018-06-07 ENCOUNTER — LAB (OUTPATIENT)
Dept: LAB | Facility: HOSPITAL | Age: 73
End: 2018-06-07

## 2018-06-07 DIAGNOSIS — Z79.01 WARFARIN ANTICOAGULATION: Chronic | ICD-10-CM

## 2018-06-07 LAB
INR PPP: 3.2 (ref 0.9–1.1)
PROTHROMBIN TIME: 38.5 SECONDS (ref 11–13.5)

## 2018-06-07 PROCEDURE — 85610 PROTHROMBIN TIME: CPT

## 2018-07-03 ENCOUNTER — TRANSCRIBE ORDERS (OUTPATIENT)
Dept: ADMINISTRATIVE | Facility: HOSPITAL | Age: 73
End: 2018-07-03

## 2018-07-03 DIAGNOSIS — Z12.31 VISIT FOR SCREENING MAMMOGRAM: Primary | ICD-10-CM

## 2018-07-12 ENCOUNTER — OFFICE VISIT (OUTPATIENT)
Dept: INTERNAL MEDICINE | Facility: CLINIC | Age: 73
End: 2018-07-12

## 2018-07-12 VITALS
OXYGEN SATURATION: 96 % | WEIGHT: 232.19 LBS | TEMPERATURE: 98.1 F | DIASTOLIC BLOOD PRESSURE: 72 MMHG | HEART RATE: 71 BPM | SYSTOLIC BLOOD PRESSURE: 112 MMHG | HEIGHT: 66 IN | BODY MASS INDEX: 37.32 KG/M2

## 2018-07-12 DIAGNOSIS — N76.0 ACUTE VAGINITIS: ICD-10-CM

## 2018-07-12 DIAGNOSIS — R30.0 DYSURIA: Primary | ICD-10-CM

## 2018-07-12 LAB
BILIRUB BLD-MCNC: NEGATIVE MG/DL
CLARITY, POC: CLEAR
COLOR UR: YELLOW
GLUCOSE UR STRIP-MCNC: NEGATIVE MG/DL
KETONES UR QL: NEGATIVE
LEUKOCYTE EST, POC: NEGATIVE
NITRITE UR-MCNC: NEGATIVE MG/ML
PH UR: 5.5 [PH] (ref 5–8)
PROT UR STRIP-MCNC: NEGATIVE MG/DL
RBC # UR STRIP: NEGATIVE /UL
SP GR UR: 1.02 (ref 1–1.03)
UROBILINOGEN UR QL: NORMAL

## 2018-07-12 PROCEDURE — 99213 OFFICE O/P EST LOW 20 MIN: CPT | Performed by: NURSE PRACTITIONER

## 2018-07-12 PROCEDURE — 81003 URINALYSIS AUTO W/O SCOPE: CPT | Performed by: NURSE PRACTITIONER

## 2018-07-12 RX ORDER — ESTRADIOL 0.1 MG/G
CREAM VAGINAL
Qty: 42.5 G | Refills: 2 | Status: SHIPPED | OUTPATIENT
Start: 2018-07-12 | End: 2020-03-02

## 2018-07-12 RX ORDER — MODAFINIL 200 MG/1
200 TABLET ORAL EVERY MORNING
COMMUNITY
End: 2018-10-08

## 2018-07-12 RX ORDER — CIPROFLOXACIN 250 MG/1
250 TABLET, FILM COATED ORAL EVERY 12 HOURS SCHEDULED
Qty: 14 TABLET | Refills: 0 | Status: SHIPPED | OUTPATIENT
Start: 2018-07-12 | End: 2018-10-08

## 2018-07-12 RX ORDER — FLUCONAZOLE 150 MG/1
150 TABLET ORAL ONCE
Qty: 1 TABLET | Refills: 0 | Status: SHIPPED | OUTPATIENT
Start: 2018-07-12 | End: 2018-07-12

## 2018-07-12 NOTE — PROGRESS NOTES
Subjective   Marylu Foreman is a 73 y.o. female. Patient is here today for   Chief Complaint   Patient presents with   • Urinary Tract Infection     Pt complains of having lower pelvic pain & dysuria x1-2 weeks. pt went to [facility] on 7/1/2018 for UTI & was given Macrobid x5 days. Pt  states that once she completed this medication, the dysuria & pelvic pain started.     .    History of Present Illness   Patient is here to follow up on a UTI. She was diagnosed with a UTI at [facility] on 7/1/18 and prescribed macrobid for 5 days. She was doing better but now c/o sharp pelvic pain x 3-4 days and occasionally dull pain, some burning with urination, frequency of urination. Denies fever.  She does have a history of breast cancer and she states that her oncologist did say that she could use estrogen creams. Dr. Dillon had prescribed it for her a couple of years ago and she just now ran out of it. She would like a refill. States that it does help prevent UTIs.     The following portions of the patient's history were reviewed and updated as appropriate: allergies, current medications, past family history, past medical history, past social history, past surgical history and problem list.    Review of Systems   Constitutional: Negative.    Respiratory: Negative.    Cardiovascular: Negative.    Genitourinary: Positive for dysuria, frequency, pelvic pain and urgency. Negative for vaginal discharge and vaginal pain.       Objective   Vitals:    07/12/18 1017   BP: 112/72   Pulse: 71   Temp: 98.1 °F (36.7 °C)   SpO2: 96%     Results for orders placed or performed in visit on 07/12/18   POCT urinalysis dipstick, automated   Result Value Ref Range    Color Yellow Yellow, Straw, Dark Yellow, Ruby    Clarity, UA Clear Clear    Specific Gravity  1.025 1.005 - 1.030    pH, Urine 5.5 5.0 - 8.0    Leukocytes Negative Negative    Nitrite, UA Negative Negative    Protein, POC Negative Negative mg/dL     Glucose, UA Negative Negative, 1000 mg/dL (3+) mg/dL    Ketones, UA Negative Negative    Urobilinogen, UA Normal Normal    Bilirubin Negative Negative    Blood, UA Negative Negative     Reviewed urine culture dated 7/1/2018 done at [place]  Urine Culture7/1/2018  [facility]  Component Name Value Ref Range   Culture Escherichia coli >100,000 CFU/mL (A)     Specimen   Urine     Organism Antibiotic Method Susceptibility   Escherichia coli Ampicillin EFREN 4: Susceptible    Cefazolin EFREN <=2: Susceptible    Ciprofloxacin EFREN <=0.5: Susceptible    Gentamicin EFREN <=1: Susceptible    Nitrofurantoin EFREN <=32: Susceptible    Trimethoprim/Sulfamethoxazole EFREN <=0.5/9.5: Susceptible       Physical Exam   Constitutional: Vital signs are normal. She appears well-developed and well-nourished.   Cardiovascular: Normal rate, regular rhythm and normal heart sounds.    Pulmonary/Chest: Effort normal and breath sounds normal.   Abdominal: There is no CVA tenderness.   Skin: Skin is warm, dry and intact.   Psychiatric: She has a normal mood and affect. Her speech is normal and behavior is normal. Thought content normal.   Nursing note and vitals reviewed.      Assessment/Plan   Marylu was seen today for urinary tract infection.    Diagnoses and all orders for this visit:    Dysuria  -     POCT urinalysis dipstick, automated  -     Urine Culture - Urine, Urine, Clean Catch  -     estradiol (ESTRACE VAGINAL) 0.1 MG/GM vaginal cream; Use pea-sized amount in vagina twice weekly  -     ciprofloxacin (CIPRO) 250 MG tablet; Take 1 tablet by mouth Every 12 (Twelve) Hours.    Acute vaginitis  -     fluconazole (DIFLUCAN) 150 MG tablet; Take 1 tablet by mouth 1 (One) Time for 1 dose.      Patient requested a script for diflucan in case she needs it since she is on a second round of antibiotics.

## 2018-07-14 LAB
BACTERIA UR CULT: NORMAL
BACTERIA UR CULT: NORMAL

## 2018-07-22 ENCOUNTER — RESULTS ENCOUNTER (OUTPATIENT)
Dept: INTERNAL MEDICINE | Facility: CLINIC | Age: 73
End: 2018-07-22

## 2018-07-22 DIAGNOSIS — E03.9 HYPOTHYROIDISM, UNSPECIFIED TYPE: ICD-10-CM

## 2018-07-22 DIAGNOSIS — E78.5 HYPERLIPIDEMIA, UNSPECIFIED HYPERLIPIDEMIA TYPE: ICD-10-CM

## 2018-07-22 DIAGNOSIS — I10 ESSENTIAL HYPERTENSION: Chronic | ICD-10-CM

## 2018-07-23 ENCOUNTER — APPOINTMENT (OUTPATIENT)
Dept: OTHER | Facility: HOSPITAL | Age: 73
End: 2018-07-23

## 2018-07-23 ENCOUNTER — TELEPHONE (OUTPATIENT)
Dept: GENERAL RADIOLOGY | Facility: HOSPITAL | Age: 73
End: 2018-07-23

## 2018-07-23 ENCOUNTER — APPOINTMENT (OUTPATIENT)
Dept: ONCOLOGY | Facility: CLINIC | Age: 73
End: 2018-07-23

## 2018-07-23 NOTE — TELEPHONE ENCOUNTER
----- Message from Malorie Bergman RN sent at 7/23/2018  9:29 AM EDT -----  Regarding: FW: appointmnent  Contact: [phone]      ----- Message -----  From: Maryjane Walters  Sent: 7/23/2018   9:05 AM  To: [facility] Clinical [facility]  Subject: appointmnent                                     Pt [place] and says she needs to see MD ASAP.  She will be flying home tomorrow.

## 2018-07-25 ENCOUNTER — LAB (OUTPATIENT)
Dept: OTHER | Facility: HOSPITAL | Age: 73
End: 2018-07-25

## 2018-07-25 ENCOUNTER — OFFICE VISIT (OUTPATIENT)
Dept: ONCOLOGY | Facility: CLINIC | Age: 73
End: 2018-07-25

## 2018-07-25 VITALS
WEIGHT: 236.2 LBS | RESPIRATION RATE: 16 BRPM | TEMPERATURE: 98.6 F | BODY MASS INDEX: 37.96 KG/M2 | SYSTOLIC BLOOD PRESSURE: 131 MMHG | HEIGHT: 66 IN | OXYGEN SATURATION: 98 % | DIASTOLIC BLOOD PRESSURE: 78 MMHG | HEART RATE: 111 BPM

## 2018-07-25 DIAGNOSIS — C50.012 MALIGNANT NEOPLASM OF NIPPLE OF LEFT BREAST IN FEMALE, ESTROGEN RECEPTOR POSITIVE (HCC): ICD-10-CM

## 2018-07-25 DIAGNOSIS — E61.1 IRON DEFICIENCY: ICD-10-CM

## 2018-07-25 DIAGNOSIS — I48.0 PAROXYSMAL ATRIAL FIBRILLATION (HCC): ICD-10-CM

## 2018-07-25 DIAGNOSIS — Z79.01 WARFARIN ANTICOAGULATION: ICD-10-CM

## 2018-07-25 DIAGNOSIS — Z17.0 MALIGNANT NEOPLASM OF NIPPLE OF LEFT BREAST IN FEMALE, ESTROGEN RECEPTOR POSITIVE (HCC): ICD-10-CM

## 2018-07-25 DIAGNOSIS — C50.012 MALIGNANT NEOPLASM OF NIPPLE OF LEFT BREAST IN FEMALE, ESTROGEN RECEPTOR POSITIVE (HCC): Primary | ICD-10-CM

## 2018-07-25 DIAGNOSIS — Z17.0 MALIGNANT NEOPLASM OF NIPPLE OF LEFT BREAST IN FEMALE, ESTROGEN RECEPTOR POSITIVE (HCC): Primary | ICD-10-CM

## 2018-07-25 DIAGNOSIS — E61.1 IRON DEFICIENCY: Primary | ICD-10-CM

## 2018-07-25 LAB
BASOPHILS # BLD AUTO: 0.07 10*3/MM3 (ref 0–0.2)
BASOPHILS NFR BLD AUTO: 0.7 % (ref 0–1.5)
DEPRECATED RDW RBC AUTO: 50.1 FL (ref 37–54)
EOSINOPHIL # BLD AUTO: 0.15 10*3/MM3 (ref 0–0.7)
EOSINOPHIL NFR BLD AUTO: 1.5 % (ref 0.3–6.2)
ERYTHROCYTE [DISTWIDTH] IN BLOOD BY AUTOMATED COUNT: 15.3 % (ref 11.7–13)
FERRITIN SERPL-MCNC: 64.3 NG/ML (ref 13–150)
HCT VFR BLD AUTO: 28.2 % (ref 35.6–45.5)
HGB BLD-MCNC: 9.3 G/DL (ref 11.9–15.5)
IMM GRANULOCYTES # BLD: 0.02 10*3/MM3 (ref 0–0.03)
IMM GRANULOCYTES NFR BLD: 0.2 % (ref 0–0.5)
INR PPP: 1
IRON 24H UR-MRATE: 39 MCG/DL (ref 37–145)
IRON SATN MFR SERPL: 9 % (ref 20–50)
LYMPHOCYTES # BLD AUTO: 2 10*3/MM3 (ref 0.9–4.8)
LYMPHOCYTES NFR BLD AUTO: 19.8 % (ref 19.6–45.3)
MCH RBC QN AUTO: 30 PG (ref 26.9–32)
MCHC RBC AUTO-ENTMCNC: 33 G/DL (ref 32.4–36.3)
MCV RBC AUTO: 91 FL (ref 80.5–98.2)
MONOCYTES # BLD AUTO: 1.09 10*3/MM3 (ref 0.2–1.2)
MONOCYTES NFR BLD AUTO: 10.8 % (ref 5–12)
NEUTROPHILS # BLD AUTO: 6.77 10*3/MM3 (ref 1.9–8.1)
NEUTROPHILS NFR BLD AUTO: 67 % (ref 42.7–76)
NRBC BLD MANUAL-RTO: 0 /100 WBC (ref 0–0)
PLATELET # BLD AUTO: 324 10*3/MM3 (ref 140–500)
PMV BLD AUTO: 10.4 FL (ref 6–12)
PROTHROMBIN TIME: 12.6 SECONDS (ref 11–15)
RBC # BLD AUTO: 3.1 10*6/MM3 (ref 3.9–5.2)
TIBC SERPL-MCNC: 451 MCG/DL (ref 298–536)
TRANSFERRIN SERPL-MCNC: 303 MG/DL (ref 200–360)
WBC NRBC COR # BLD: 10.1 10*3/MM3 (ref 4.5–10.7)

## 2018-07-25 PROCEDURE — 36415 COLL VENOUS BLD VENIPUNCTURE: CPT

## 2018-07-25 PROCEDURE — 99214 OFFICE O/P EST MOD 30 MIN: CPT | Performed by: INTERNAL MEDICINE

## 2018-07-25 PROCEDURE — 84466 ASSAY OF TRANSFERRIN: CPT | Performed by: INTERNAL MEDICINE

## 2018-07-25 PROCEDURE — 85610 PROTHROMBIN TIME: CPT

## 2018-07-25 PROCEDURE — 83540 ASSAY OF IRON: CPT | Performed by: INTERNAL MEDICINE

## 2018-07-25 PROCEDURE — 85025 COMPLETE CBC W/AUTO DIFF WBC: CPT | Performed by: INTERNAL MEDICINE

## 2018-07-25 PROCEDURE — 82728 ASSAY OF FERRITIN: CPT | Performed by: INTERNAL MEDICINE

## 2018-07-25 RX ORDER — DILTIAZEM HYDROCHLORIDE 180 MG/1
180 CAPSULE, COATED, EXTENDED RELEASE ORAL DAILY
COMMUNITY
End: 2018-12-20 | Stop reason: SDUPTHER

## 2018-07-25 RX ORDER — BUPROPION HYDROCHLORIDE 300 MG/1
300 TABLET ORAL
COMMUNITY
Start: 2013-10-23 | End: 2018-11-27 | Stop reason: SDUPTHER

## 2018-07-25 RX ORDER — CALCIUM CARB/VITAMIN D3/VIT K1 500-500-40
2000 TABLET,CHEWABLE ORAL
COMMUNITY
End: 2018-10-08

## 2018-07-25 RX ORDER — OMEGA-3/DHA/EPA/FISH OIL 60 MG-90MG
CAPSULE ORAL
COMMUNITY
End: 2018-10-08
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
## 2018-07-31 ENCOUNTER — HOSPITAL ENCOUNTER (OUTPATIENT)
Dept: MAMMOGRAPHY | Facility: HOSPITAL | Age: 73
Discharge: HOME | End: 2018-07-31
Admitting: INTERNAL MEDICINE

## 2018-07-31 DIAGNOSIS — Z12.31 VISIT FOR SCREENING MAMMOGRAM: ICD-10-CM

## 2018-07-31 PROCEDURE — 77063 BREAST TOMOSYNTHESIS BI: CPT

## 2018-07-31 PROCEDURE — 77067 SCR MAMMO BI INCL CAD: CPT

## 2018-08-08 ENCOUNTER — APPOINTMENT (OUTPATIENT)
Dept: ONCOLOGY | Facility: CLINIC | Age: 73
End: 2018-08-08

## 2018-08-08 ENCOUNTER — LAB (OUTPATIENT)
Dept: OTHER | Facility: HOSPITAL | Age: 73
End: 2018-08-08

## 2018-08-08 ENCOUNTER — OFFICE VISIT (OUTPATIENT)
Dept: ONCOLOGY | Facility: CLINIC | Age: 73
End: 2018-08-08

## 2018-08-08 VITALS
BODY MASS INDEX: 37.14 KG/M2 | DIASTOLIC BLOOD PRESSURE: 80 MMHG | TEMPERATURE: 97.9 F | HEART RATE: 91 BPM | HEIGHT: 66 IN | SYSTOLIC BLOOD PRESSURE: 110 MMHG | OXYGEN SATURATION: 100 % | WEIGHT: 231.1 LBS | RESPIRATION RATE: 12 BRPM

## 2018-08-08 DIAGNOSIS — C50.012 MALIGNANT NEOPLASM OF NIPPLE OF LEFT BREAST IN FEMALE, ESTROGEN RECEPTOR POSITIVE (HCC): Primary | ICD-10-CM

## 2018-08-08 DIAGNOSIS — I27.82 OTHER CHRONIC PULMONARY EMBOLISM WITHOUT ACUTE COR PULMONALE (HCC): Primary | Chronic | ICD-10-CM

## 2018-08-08 DIAGNOSIS — Z79.01 WARFARIN ANTICOAGULATION: Chronic | ICD-10-CM

## 2018-08-08 DIAGNOSIS — Z17.0 MALIGNANT NEOPLASM OF NIPPLE OF LEFT BREAST IN FEMALE, ESTROGEN RECEPTOR POSITIVE (HCC): Primary | ICD-10-CM

## 2018-08-08 DIAGNOSIS — E61.1 IRON DEFICIENCY: ICD-10-CM

## 2018-08-08 LAB
BASOPHILS # BLD AUTO: 0.09 10*3/MM3 (ref 0–0.2)
BASOPHILS NFR BLD AUTO: 1 % (ref 0–1.5)
DEPRECATED RDW RBC AUTO: 47.4 FL (ref 37–54)
EOSINOPHIL # BLD AUTO: 0.23 10*3/MM3 (ref 0–0.7)
EOSINOPHIL NFR BLD AUTO: 2.5 % (ref 0.3–6.2)
ERYTHROCYTE [DISTWIDTH] IN BLOOD BY AUTOMATED COUNT: 14.6 % (ref 11.7–13)
HCT VFR BLD AUTO: 32.9 % (ref 35.6–45.5)
HGB BLD-MCNC: 10.5 G/DL (ref 11.9–15.5)
IMM GRANULOCYTES # BLD: 0.02 10*3/MM3 (ref 0–0.03)
IMM GRANULOCYTES NFR BLD: 0.2 % (ref 0–0.5)
INR PPP: 1.3
LYMPHOCYTES # BLD AUTO: 2.64 10*3/MM3 (ref 0.9–4.8)
LYMPHOCYTES NFR BLD AUTO: 28.1 % (ref 19.6–45.3)
MCH RBC QN AUTO: 28.5 PG (ref 26.9–32)
MCHC RBC AUTO-ENTMCNC: 31.9 G/DL (ref 32.4–36.3)
MCV RBC AUTO: 89.2 FL (ref 80.5–98.2)
MONOCYTES # BLD AUTO: 0.72 10*3/MM3 (ref 0.2–1.2)
MONOCYTES NFR BLD AUTO: 7.7 % (ref 5–12)
NEUTROPHILS # BLD AUTO: 5.68 10*3/MM3 (ref 1.9–8.1)
NEUTROPHILS NFR BLD AUTO: 60.5 % (ref 42.7–76)
NRBC BLD MANUAL-RTO: 0 /100 WBC (ref 0–0)
PLATELET # BLD AUTO: 523 10*3/MM3 (ref 140–500)
PMV BLD AUTO: 11.1 FL (ref 6–12)
PROTHROMBIN TIME: 16 SECONDS (ref 11–15)
RBC # BLD AUTO: 3.69 10*6/MM3 (ref 3.9–5.2)
WBC NRBC COR # BLD: 9.38 10*3/MM3 (ref 4.5–10.7)

## 2018-08-08 PROCEDURE — 36415 COLL VENOUS BLD VENIPUNCTURE: CPT

## 2018-08-08 PROCEDURE — 85025 COMPLETE CBC W/AUTO DIFF WBC: CPT | Performed by: INTERNAL MEDICINE

## 2018-08-08 PROCEDURE — 85610 PROTHROMBIN TIME: CPT

## 2018-08-08 PROCEDURE — 99213 OFFICE O/P EST LOW 20 MIN: CPT | Performed by: INTERNAL MEDICINE
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
## 2018-08-10 PROBLEM — D50.9 IRON DEFICIENCY ANEMIA: Status: ACTIVE | Noted: 2018-08-10

## 2018-08-10 PROBLEM — K91.2 POSTSURGICAL NONABSORPTION: Status: ACTIVE | Noted: 2018-08-10

## 2018-08-10 RX ORDER — DIPHENHYDRAMINE HCL 25 MG
25 CAPSULE ORAL ONCE
Status: CANCELLED | OUTPATIENT
Start: 2018-08-13

## 2018-08-10 RX ORDER — SODIUM CHLORIDE 9 MG/ML
250 INJECTION, SOLUTION INTRAVENOUS ONCE
Status: CANCELLED | OUTPATIENT
Start: 2018-08-13

## 2018-08-10 RX ORDER — SODIUM CHLORIDE 9 MG/ML
250 INJECTION, SOLUTION INTRAVENOUS ONCE
Status: CANCELLED | OUTPATIENT
Start: 2018-08-20

## 2018-08-10 RX ORDER — DIPHENHYDRAMINE HCL 25 MG
25 CAPSULE ORAL ONCE
Status: CANCELLED | OUTPATIENT
Start: 2018-08-20

## 2018-08-10 RX ORDER — FAMOTIDINE 10 MG/ML
20 INJECTION, SOLUTION INTRAVENOUS ONCE
Status: CANCELLED | OUTPATIENT
Start: 2018-08-13

## 2018-08-13 ENCOUNTER — INFUSION (OUTPATIENT)
Dept: ONCOLOGY | Facility: HOSPITAL | Age: 73
End: 2018-08-13

## 2018-08-13 VITALS
BODY MASS INDEX: 37.3 KG/M2 | TEMPERATURE: 98.2 F | WEIGHT: 231 LBS | HEART RATE: 103 BPM | DIASTOLIC BLOOD PRESSURE: 64 MMHG | SYSTOLIC BLOOD PRESSURE: 114 MMHG | OXYGEN SATURATION: 97 %

## 2018-08-13 DIAGNOSIS — D50.8 OTHER IRON DEFICIENCY ANEMIA: Primary | ICD-10-CM

## 2018-08-13 DIAGNOSIS — K91.2 POSTSURGICAL NONABSORPTION: ICD-10-CM

## 2018-08-13 PROCEDURE — 96374 THER/PROPH/DIAG INJ IV PUSH: CPT

## 2018-08-13 PROCEDURE — 25010000002 FERUMOXYTOL 510 MG/17ML SOLUTION 510 MG VIAL: Performed by: INTERNAL MEDICINE

## 2018-08-13 PROCEDURE — 96375 TX/PRO/DX INJ NEW DRUG ADDON: CPT

## 2018-08-13 PROCEDURE — 63710000001 DIPHENHYDRAMINE PER 50 MG: Performed by: INTERNAL MEDICINE

## 2018-08-13 RX ORDER — SODIUM CHLORIDE 9 MG/ML
250 INJECTION, SOLUTION INTRAVENOUS ONCE
Status: COMPLETED | OUTPATIENT
Start: 2018-08-13 | End: 2018-08-13

## 2018-08-13 RX ORDER — DIPHENHYDRAMINE HCL 25 MG
25 CAPSULE ORAL ONCE
Status: COMPLETED | OUTPATIENT
Start: 2018-08-13 | End: 2018-08-13

## 2018-08-13 RX ADMIN — FAMOTIDINE 20 MG: 10 INJECTION INTRAVENOUS at 10:40

## 2018-08-13 RX ADMIN — DIPHENHYDRAMINE HYDROCHLORIDE 25 MG: 25 CAPSULE ORAL at 10:40

## 2018-08-13 RX ADMIN — SODIUM CHLORIDE 250 ML: 900 INJECTION, SOLUTION INTRAVENOUS at 10:40

## 2018-08-13 RX ADMIN — FERUMOXYTOL 510 MG: 510 INJECTION INTRAVENOUS at 11:13

## 2018-08-16 RX ORDER — WARFARIN SODIUM 5 MG/1
TABLET ORAL
Qty: 36 TABLET | Refills: 3 | Status: SHIPPED | OUTPATIENT
Start: 2018-08-16 | End: 2019-01-07 | Stop reason: SDUPTHER

## 2018-08-20 ENCOUNTER — INFUSION (OUTPATIENT)
Dept: ONCOLOGY | Facility: HOSPITAL | Age: 73
End: 2018-08-20

## 2018-08-20 ENCOUNTER — LAB (OUTPATIENT)
Dept: OTHER | Facility: HOSPITAL | Age: 73
End: 2018-08-20

## 2018-08-20 ENCOUNTER — OFFICE VISIT (OUTPATIENT)
Dept: ONCOLOGY | Facility: CLINIC | Age: 73
End: 2018-08-20

## 2018-08-20 VITALS
BODY MASS INDEX: 36.96 KG/M2 | HEART RATE: 85 BPM | WEIGHT: 228.9 LBS | DIASTOLIC BLOOD PRESSURE: 67 MMHG | OXYGEN SATURATION: 96 % | TEMPERATURE: 98 F | SYSTOLIC BLOOD PRESSURE: 109 MMHG

## 2018-08-20 DIAGNOSIS — Z79.01 WARFARIN ANTICOAGULATION: Primary | Chronic | ICD-10-CM

## 2018-08-20 DIAGNOSIS — K91.2 POSTSURGICAL NONABSORPTION: ICD-10-CM

## 2018-08-20 DIAGNOSIS — I27.82 OTHER CHRONIC PULMONARY EMBOLISM WITHOUT ACUTE COR PULMONALE (HCC): Primary | ICD-10-CM

## 2018-08-20 DIAGNOSIS — D50.8 OTHER IRON DEFICIENCY ANEMIA: Primary | ICD-10-CM

## 2018-08-20 DIAGNOSIS — Z79.01 WARFARIN ANTICOAGULATION: Chronic | ICD-10-CM

## 2018-08-20 LAB
BASOPHILS # BLD AUTO: 0.09 10*3/MM3 (ref 0–0.2)
BASOPHILS NFR BLD AUTO: 0.8 % (ref 0–1.5)
DEPRECATED RDW RBC AUTO: 48 FL (ref 37–54)
EOSINOPHIL # BLD AUTO: 0.24 10*3/MM3 (ref 0–0.7)
EOSINOPHIL NFR BLD AUTO: 2.2 % (ref 0.3–6.2)
ERYTHROCYTE [DISTWIDTH] IN BLOOD BY AUTOMATED COUNT: 15.7 % (ref 11.7–13)
HCT VFR BLD AUTO: 34.7 % (ref 35.6–45.5)
HGB BLD-MCNC: 11.1 G/DL (ref 11.9–15.5)
IMM GRANULOCYTES # BLD: 0.04 10*3/MM3 (ref 0–0.03)
IMM GRANULOCYTES NFR BLD: 0.4 % (ref 0–0.5)
INR PPP: 2.6
LYMPHOCYTES # BLD AUTO: 2.29 10*3/MM3 (ref 0.9–4.8)
LYMPHOCYTES NFR BLD AUTO: 21 % (ref 19.6–45.3)
MCH RBC QN AUTO: 28.3 PG (ref 26.9–32)
MCHC RBC AUTO-ENTMCNC: 32 G/DL (ref 32.4–36.3)
MCV RBC AUTO: 88.5 FL (ref 80.5–98.2)
MONOCYTES # BLD AUTO: 1.06 10*3/MM3 (ref 0.2–1.2)
MONOCYTES NFR BLD AUTO: 9.7 % (ref 5–12)
NEUTROPHILS # BLD AUTO: 7.21 10*3/MM3 (ref 1.9–8.1)
NEUTROPHILS NFR BLD AUTO: 65.9 % (ref 42.7–76)
NRBC BLD MANUAL-RTO: 0 /100 WBC (ref 0–0)
PLATELET # BLD AUTO: 366 10*3/MM3 (ref 140–500)
PMV BLD AUTO: 10.8 FL (ref 6–12)
PROTHROMBIN TIME: 31.5 SECONDS (ref 11–15)
RBC # BLD AUTO: 3.92 10*6/MM3 (ref 3.9–5.2)
WBC NRBC COR # BLD: 10.93 10*3/MM3 (ref 4.5–10.7)

## 2018-08-20 PROCEDURE — 85610 PROTHROMBIN TIME: CPT

## 2018-08-20 PROCEDURE — 25010000002 FERUMOXYTOL 510 MG/17ML SOLUTION 510 MG VIAL: Performed by: INTERNAL MEDICINE

## 2018-08-20 PROCEDURE — 36415 COLL VENOUS BLD VENIPUNCTURE: CPT

## 2018-08-20 PROCEDURE — 96375 TX/PRO/DX INJ NEW DRUG ADDON: CPT | Performed by: INTERNAL MEDICINE

## 2018-08-20 PROCEDURE — 99212-NC PR NO CHARGE CBC OFFICE OUTPATIENT VISIT 10 MINUTES: Performed by: NURSE PRACTITIONER

## 2018-08-20 PROCEDURE — 85025 COMPLETE CBC W/AUTO DIFF WBC: CPT | Performed by: INTERNAL MEDICINE

## 2018-08-20 PROCEDURE — 96374 THER/PROPH/DIAG INJ IV PUSH: CPT | Performed by: INTERNAL MEDICINE

## 2018-08-20 RX ORDER — SODIUM CHLORIDE 9 MG/ML
250 INJECTION, SOLUTION INTRAVENOUS ONCE
Status: COMPLETED | OUTPATIENT
Start: 2018-08-20 | End: 2018-08-20

## 2018-08-20 RX ORDER — DIPHENHYDRAMINE HCL 25 MG
25 CAPSULE ORAL ONCE
Status: DISCONTINUED | OUTPATIENT
Start: 2018-08-20 | End: 2018-08-20 | Stop reason: HOSPADM

## 2018-08-20 RX ADMIN — SODIUM CHLORIDE 250 ML: 900 INJECTION, SOLUTION INTRAVENOUS at 11:00

## 2018-08-20 RX ADMIN — FAMOTIDINE 20 MG: 10 INJECTION INTRAVENOUS at 11:08

## 2018-08-20 RX ADMIN — FERUMOXYTOL 510 MG: 510 INJECTION INTRAVENOUS at 11:41

## 2018-08-20 NOTE — PROGRESS NOTES
Patient here today for INR review. She was recently evaluated by Dr. Mahajan with discovery of iron depletion and has received one dose of IV Feraheme and is due for her second today. 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 (she is a ).    Her INR today is therapeutic at 2.6. She is currently on 5 mg of coumadin daily. She denies any excess bleeding or bruising. No chest pain or associated shortness of breath.     She will see Dr. Mahajan in follow up in 3 weeks with repeat INR checks every 6 weeks. I have asked her to call with any concerns prior to this next office visit. She has v/u.    Lab Results   Component Value Date    INR 2.60 08/20/2018    INR 1.30 08/08/2018    INR 1.00 07/25/2018    PROTIME 31.5 (H) 08/20/2018    PROTIME 16.0 (H) 08/08/2018    PROTIME 12.6 07/25/2018     LAURA Serrato

## 2018-09-04 RX ORDER — TRIAMTERENE AND HYDROCHLOROTHIAZIDE 37.5; 25 MG/1; MG/1
CAPSULE ORAL
Qty: 90 CAPSULE | Refills: 1 | Status: SHIPPED | OUTPATIENT
Start: 2018-09-04 | End: 2019-03-03 | Stop reason: SDUPTHER

## 2018-09-04 RX ORDER — IRBESARTAN 300 MG/1
TABLET ORAL
Qty: 90 TABLET | Refills: 1 | Status: SHIPPED | OUTPATIENT
Start: 2018-09-04 | End: 2019-07-04 | Stop reason: SDUPTHER

## 2018-09-10 ENCOUNTER — LAB (OUTPATIENT)
Dept: OTHER | Facility: HOSPITAL | Age: 73
End: 2018-09-10

## 2018-09-10 ENCOUNTER — OFFICE VISIT (OUTPATIENT)
Dept: ONCOLOGY | Facility: CLINIC | Age: 73
End: 2018-09-10

## 2018-09-10 VITALS
SYSTOLIC BLOOD PRESSURE: 124 MMHG | RESPIRATION RATE: 16 BRPM | OXYGEN SATURATION: 94 % | HEART RATE: 105 BPM | HEIGHT: 66 IN | BODY MASS INDEX: 37.61 KG/M2 | DIASTOLIC BLOOD PRESSURE: 69 MMHG | WEIGHT: 234 LBS | TEMPERATURE: 97.9 F

## 2018-09-10 DIAGNOSIS — Z17.0 MALIGNANT NEOPLASM OF NIPPLE OF LEFT BREAST IN FEMALE, ESTROGEN RECEPTOR POSITIVE (HCC): Primary | ICD-10-CM

## 2018-09-10 DIAGNOSIS — Z79.01 WARFARIN ANTICOAGULATION: Chronic | ICD-10-CM

## 2018-09-10 DIAGNOSIS — C50.012 MALIGNANT NEOPLASM OF NIPPLE OF LEFT BREAST IN FEMALE, ESTROGEN RECEPTOR POSITIVE (HCC): Primary | ICD-10-CM

## 2018-09-10 DIAGNOSIS — E61.1 IRON DEFICIENCY: ICD-10-CM

## 2018-09-10 LAB
BASOPHILS # BLD AUTO: 0.13 10*3/MM3 (ref 0–0.2)
BASOPHILS NFR BLD AUTO: 1.7 % (ref 0–1.5)
DEPRECATED RDW RBC AUTO: 59.2 FL (ref 37–54)
EOSINOPHIL # BLD AUTO: 0.16 10*3/MM3 (ref 0–0.7)
EOSINOPHIL NFR BLD AUTO: 2.1 % (ref 0.3–6.2)
ERYTHROCYTE [DISTWIDTH] IN BLOOD BY AUTOMATED COUNT: 17.1 % (ref 11.7–13)
FERRITIN SERPL-MCNC: 216 NG/ML (ref 13–150)
HCT VFR BLD AUTO: 38.4 % (ref 35.6–45.5)
HGB BLD-MCNC: 12 G/DL (ref 11.9–15.5)
IMM GRANULOCYTES # BLD: 0.01 10*3/MM3 (ref 0–0.03)
IMM GRANULOCYTES NFR BLD: 0.1 % (ref 0–0.5)
INR PPP: 3.5
IRON 24H UR-MRATE: 96 MCG/DL (ref 37–145)
IRON SATN MFR SERPL: 25 % (ref 20–50)
LYMPHOCYTES # BLD AUTO: 1.87 10*3/MM3 (ref 0.9–4.8)
LYMPHOCYTES NFR BLD AUTO: 24.6 % (ref 19.6–45.3)
MCH RBC QN AUTO: 29.3 PG (ref 26.9–32)
MCHC RBC AUTO-ENTMCNC: 31.3 G/DL (ref 32.4–36.3)
MCV RBC AUTO: 93.7 FL (ref 80.5–98.2)
MONOCYTES # BLD AUTO: 0.52 10*3/MM3 (ref 0.2–1.2)
MONOCYTES NFR BLD AUTO: 6.9 % (ref 5–12)
NEUTROPHILS # BLD AUTO: 4.9 10*3/MM3 (ref 1.9–8.1)
NEUTROPHILS NFR BLD AUTO: 64.6 % (ref 42.7–76)
NRBC BLD MANUAL-RTO: 0 /100 WBC (ref 0–0)
PLATELET # BLD AUTO: 398 10*3/MM3 (ref 140–500)
PMV BLD AUTO: 10.4 FL (ref 6–12)
PROTHROMBIN TIME: 42.3 SECONDS (ref 11–15)
RBC # BLD AUTO: 4.1 10*6/MM3 (ref 3.9–5.2)
TIBC SERPL-MCNC: 389 MCG/DL (ref 298–536)
TRANSFERRIN SERPL-MCNC: 261 MG/DL (ref 200–360)
WBC NRBC COR # BLD: 7.59 10*3/MM3 (ref 4.5–10.7)

## 2018-09-10 PROCEDURE — 84466 ASSAY OF TRANSFERRIN: CPT | Performed by: INTERNAL MEDICINE

## 2018-09-10 PROCEDURE — 36415 COLL VENOUS BLD VENIPUNCTURE: CPT

## 2018-09-10 PROCEDURE — 99213 OFFICE O/P EST LOW 20 MIN: CPT | Performed by: INTERNAL MEDICINE

## 2018-09-10 PROCEDURE — 85025 COMPLETE CBC W/AUTO DIFF WBC: CPT | Performed by: INTERNAL MEDICINE

## 2018-09-10 PROCEDURE — 82728 ASSAY OF FERRITIN: CPT | Performed by: INTERNAL MEDICINE

## 2018-09-10 PROCEDURE — 83540 ASSAY OF IRON: CPT | Performed by: INTERNAL MEDICINE

## 2018-09-10 PROCEDURE — 85610 PROTHROMBIN TIME: CPT
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
## 2018-10-02 LAB
ALBUMIN SERPL-MCNC: 4.4 G/DL (ref 3.5–5.2)
ALBUMIN/GLOB SERPL: 1.6 G/DL
ALP SERPL-CCNC: 98 U/L (ref 39–117)
ALT SERPL-CCNC: 20 U/L (ref 1–33)
AST SERPL-CCNC: 21 U/L (ref 1–32)
BASOPHILS # BLD AUTO: 0.06 10*3/MM3 (ref 0–0.2)
BASOPHILS NFR BLD AUTO: 0.7 % (ref 0–1.5)
BILIRUB SERPL-MCNC: 0.4 MG/DL (ref 0.1–1.2)
BUN SERPL-MCNC: 34 MG/DL (ref 8–23)
BUN/CREAT SERPL: 26 (ref 7–25)
CALCIUM SERPL-MCNC: 9.5 MG/DL (ref 8.6–10.5)
CHLORIDE SERPL-SCNC: 98 MMOL/L (ref 98–107)
CHOLEST SERPL-MCNC: 214 MG/DL (ref 0–200)
CO2 SERPL-SCNC: 21.7 MMOL/L (ref 22–29)
CREAT SERPL-MCNC: 1.31 MG/DL (ref 0.57–1)
EOSINOPHIL # BLD AUTO: 0.15 10*3/MM3 (ref 0–0.7)
EOSINOPHIL NFR BLD AUTO: 1.8 % (ref 0.3–6.2)
ERYTHROCYTE [DISTWIDTH] IN BLOOD BY AUTOMATED COUNT: 17 % (ref 11.7–13)
GLOBULIN SER CALC-MCNC: 2.8 GM/DL
GLUCOSE SERPL-MCNC: 121 MG/DL (ref 65–99)
HCT VFR BLD AUTO: 39 % (ref 35.6–45.5)
HDLC SERPL-MCNC: 67 MG/DL (ref 40–60)
HGB BLD-MCNC: 12.2 G/DL (ref 11.9–15.5)
IMM GRANULOCYTES # BLD: 0.01 10*3/MM3 (ref 0–0.03)
IMM GRANULOCYTES NFR BLD: 0.1 % (ref 0–0.5)
LDLC SERPL CALC-MCNC: 129 MG/DL (ref 0–100)
LDLC/HDLC SERPL: 1.92 {RATIO}
LYMPHOCYTES # BLD AUTO: 2.8 10*3/MM3 (ref 0.9–4.8)
LYMPHOCYTES NFR BLD AUTO: 33.1 % (ref 19.6–45.3)
MCH RBC QN AUTO: 29.1 PG (ref 26.9–32)
MCHC RBC AUTO-ENTMCNC: 31.3 G/DL (ref 32.4–36.3)
MCV RBC AUTO: 93.1 FL (ref 80.5–98.2)
MONOCYTES # BLD AUTO: 0.57 10*3/MM3 (ref 0.2–1.2)
MONOCYTES NFR BLD AUTO: 6.7 % (ref 5–12)
NEUTROPHILS # BLD AUTO: 4.87 10*3/MM3 (ref 1.9–8.1)
NEUTROPHILS NFR BLD AUTO: 57.7 % (ref 42.7–76)
PLATELET # BLD AUTO: 387 10*3/MM3 (ref 140–500)
POTASSIUM SERPL-SCNC: 4.8 MMOL/L (ref 3.5–5.2)
PROT SERPL-MCNC: 7.2 G/DL (ref 6–8.5)
RBC # BLD AUTO: 4.19 10*6/MM3 (ref 3.9–5.2)
SODIUM SERPL-SCNC: 137 MMOL/L (ref 136–145)
TRIGL SERPL-MCNC: 91 MG/DL (ref 0–150)
TSH SERPL DL<=0.005 MIU/L-ACNC: 2.28 MIU/ML (ref 0.27–4.2)
VLDLC SERPL CALC-MCNC: 18.2 MG/DL (ref 5–40)
WBC # BLD AUTO: 8.45 10*3/MM3 (ref 4.5–10.7)

## 2018-10-08 ENCOUNTER — TELEPHONE (OUTPATIENT)
Dept: INTERNAL MEDICINE | Facility: CLINIC | Age: 73
End: 2018-10-08

## 2018-10-08 ENCOUNTER — OFFICE VISIT (OUTPATIENT)
Dept: ONCOLOGY | Facility: CLINIC | Age: 73
End: 2018-10-08

## 2018-10-08 ENCOUNTER — OFFICE VISIT (OUTPATIENT)
Dept: INTERNAL MEDICINE | Facility: CLINIC | Age: 73
End: 2018-10-08

## 2018-10-08 ENCOUNTER — LAB (OUTPATIENT)
Dept: OTHER | Facility: HOSPITAL | Age: 73
End: 2018-10-08

## 2018-10-08 VITALS
BODY MASS INDEX: 36.77 KG/M2 | HEART RATE: 76 BPM | HEIGHT: 66 IN | TEMPERATURE: 98.1 F | SYSTOLIC BLOOD PRESSURE: 118 MMHG | OXYGEN SATURATION: 98 % | WEIGHT: 228.8 LBS | DIASTOLIC BLOOD PRESSURE: 68 MMHG

## 2018-10-08 DIAGNOSIS — Z79.01 WARFARIN ANTICOAGULATION: Primary | Chronic | ICD-10-CM

## 2018-10-08 DIAGNOSIS — R05.9 COUGH: ICD-10-CM

## 2018-10-08 DIAGNOSIS — K92.2 ACUTE GI BLEEDING: ICD-10-CM

## 2018-10-08 DIAGNOSIS — Z79.01 WARFARIN ANTICOAGULATION: Chronic | ICD-10-CM

## 2018-10-08 DIAGNOSIS — M54.41 ACUTE RIGHT-SIDED LOW BACK PAIN WITH RIGHT-SIDED SCIATICA: ICD-10-CM

## 2018-10-08 DIAGNOSIS — D50.8 OTHER IRON DEFICIENCY ANEMIA: ICD-10-CM

## 2018-10-08 PROBLEM — N17.9 AKI (ACUTE KIDNEY INJURY) (HCC): Status: ACTIVE | Noted: 2018-07-20

## 2018-10-08 PROBLEM — Z86.79 H/O: HYPERTENSION: Status: ACTIVE | Noted: 2018-07-20

## 2018-10-08 PROBLEM — R79.1 SUPRATHERAPEUTIC INR: Status: ACTIVE | Noted: 2018-07-20

## 2018-10-08 PROBLEM — D62 ACUTE BLOOD LOSS ANEMIA: Status: ACTIVE | Noted: 2018-07-20

## 2018-10-08 PROBLEM — R79.1 SUPRATHERAPEUTIC INR: Status: RESOLVED | Noted: 2018-07-20 | Resolved: 2018-10-08

## 2018-10-08 PROBLEM — N17.9 AKI (ACUTE KIDNEY INJURY): Status: RESOLVED | Noted: 2018-07-20 | Resolved: 2018-10-08

## 2018-10-08 PROBLEM — D62 ACUTE BLOOD LOSS ANEMIA: Status: RESOLVED | Noted: 2018-07-20 | Resolved: 2018-10-08

## 2018-10-08 PROBLEM — R58 INTERNAL BLEEDING: Status: ACTIVE | Noted: 2018-07-20

## 2018-10-08 LAB
BILIRUB BLD-MCNC: NEGATIVE MG/DL
CLARITY, POC: NORMAL
COLOR UR: NORMAL
GLUCOSE UR STRIP-MCNC: NEGATIVE MG/DL
INR PPP: 1.2
KETONES UR QL: NEGATIVE
LEUKOCYTE EST, POC: NEGATIVE
NITRITE UR-MCNC: NEGATIVE MG/ML
PH UR: 6 [PH] (ref 5–8)
PROT UR STRIP-MCNC: NEGATIVE MG/DL
PROTHROMBIN TIME: 14.8 SECONDS (ref 11–15)
RBC # UR STRIP: NEGATIVE /UL
SP GR UR: 1.01 (ref 1–1.03)
UROBILINOGEN UR QL: NORMAL

## 2018-10-08 PROCEDURE — 90662 IIV NO PRSV INCREASED AG IM: CPT | Performed by: INTERNAL MEDICINE

## 2018-10-08 PROCEDURE — 99214 OFFICE O/P EST MOD 30 MIN: CPT | Performed by: INTERNAL MEDICINE

## 2018-10-08 PROCEDURE — 99212-NC PR NO CHARGE CBC OFFICE OUTPATIENT VISIT 10 MINUTES: Performed by: NURSE PRACTITIONER

## 2018-10-08 PROCEDURE — 81003 URINALYSIS AUTO W/O SCOPE: CPT | Performed by: INTERNAL MEDICINE

## 2018-10-08 PROCEDURE — G0008 ADMIN INFLUENZA VIRUS VAC: HCPCS | Performed by: INTERNAL MEDICINE

## 2018-10-08 PROCEDURE — 85610 PROTHROMBIN TIME: CPT

## 2018-10-08 RX ORDER — OMEPRAZOLE 40 MG/1
40 CAPSULE, DELAYED RELEASE ORAL DAILY
Qty: 30 CAPSULE | Refills: 1 | Status: SHIPPED | OUTPATIENT
Start: 2018-10-08 | End: 2018-10-08

## 2018-10-08 RX ORDER — OMEPRAZOLE 40 MG/1
40 CAPSULE, DELAYED RELEASE ORAL DAILY
COMMUNITY
End: 2019-01-23 | Stop reason: SDUPTHER

## 2018-10-08 RX ORDER — DILTIAZEM HYDROCHLORIDE 180 MG/1
CAPSULE, EXTENDED RELEASE ORAL
Refills: 0 | COMMUNITY
Start: 2018-09-14 | End: 2018-11-27 | Stop reason: SDUPTHER

## 2018-10-08 NOTE — PROGRESS NOTES
Patient here for INR review. She has been on coumadin 2.5 mg 2 days per week and 5 mg all other days.  Patient reports she recently stopped taking several supplements.    She is asking today about starting on Chitosan, Tumeric, and glucosamine.  She really would like to start the Chitosan to see if it would help improve her kidney function. We discussed that there is an interaction with the Chitosan and glucosamine with her coumadin.  Both can increase her INR. I reviewed with Dr. Mahajan, he would prefer she not take them because of the interaction with coumadin.    Lab Results   Component Value Date    INR 1.20 10/08/2018    INR 3.50 09/10/2018    INR 2.60 08/20/2018    PROTIME 14.8 10/08/2018    PROTIME 42.3 (H) 09/10/2018    PROTIME 31.5 (H) 08/20/2018     Patient to increase her coumadin to 5 mg daily. Return in one to two weeks for repeat INR check.    LAURA Narayanan

## 2018-10-08 NOTE — TELEPHONE ENCOUNTER
Called [facility] strips to cancel omeprazole and Rx sent to [facility].     ----- Message from Candie Kaminski sent at 10/8/2018 11:05 AM EDT -----  Pt needs her omeprazole (PRILOSEC) 40 MG capsule sent to [facility] instead     [facility]: [phone] (Phone)

## 2018-10-08 NOTE — PROGRESS NOTES
Subjective   Marylu Foreman is a 73 y.o. female here to follow up with labs.  Pt complains of LBP radiates down to rt leg, dry cough X 5 days and dry cough 5 days.    History of Present Illness   Pt has an episode of hemorrhaging from her colon.  She was admitted to the hospital for 4 days in [place].  She has been following with Dr Mahajan in [facility] and she is getting iron infusion  She has been trying to stay better hydrated she has been taking occas aleve and has now stopped it and she is taking tylenol  She has been ahving pain in her pback since she has been moving boxes at work  Sx now for over a month  Radicular sx down RLE      The following portions of the patient's history were reviewed and updated as appropriate: allergies, current medications, past medical history, past social history and problem list.  She cont to work at [employer]    Review of Systems   All other systems reviewed and are negative.      Objective   Physical Exam   Constitutional: She is oriented to person, place, and time. She appears well-developed and well-nourished.   HENT:   Head: Normocephalic and atraumatic.   Right Ear: External ear normal.   Left Ear: External ear normal.   Mouth/Throat: Oropharynx is clear and moist.   Eyes: Pupils are equal, round, and reactive to light. Conjunctivae and EOM are normal.   Neck: Normal range of motion. No tracheal deviation present. No thyromegaly present.   Cardiovascular: Normal rate, regular rhythm, normal heart sounds and intact distal pulses.    Pulmonary/Chest: Effort normal and breath sounds normal.   Abdominal: Soft. Bowel sounds are normal. She exhibits no distension. There is no tenderness.   Musculoskeletal: Normal range of motion. She exhibits no edema or deformity.   Neurological: She is alert and oriented to person, place, and time.   Skin: Skin is warm and dry.   Psychiatric: She has a normal mood and affect. Her behavior is normal. Judgment and thought content normal.   Vitals  reviewed.      Vitals:    10/08/18 1015   BP: 118/68   Pulse: 76   Temp: 98.1 °F (36.7 °C)   SpO2: 98%     Current Outpatient Prescriptions:   •  buPROPion XL (WELLBUTRIN XL) 300 MG 24 hr tablet, Take 300 mg by mouth., Disp: , Rfl:   •  clobetasol (TEMOVATE) 0.05 % cream, apply to affected area twice a day, Disp: 30 g, Rfl: 5  •  diltiaZEM CD (DILTIAZEM CD) 180 MG 24 hr capsule, Take 180 mg by mouth Daily., Disp: , Rfl:   •  DULoxetine (CYMBALTA) 60 MG capsule, take 2 capsules by mouth every morning, Disp: , Rfl: 0  •  estradiol (ESTRACE VAGINAL) 0.1 MG/GM vaginal cream, Use pea-sized amount in vagina twice weekly, Disp: 42.5 g, Rfl: 2  •  irbesartan (AVAPRO) 300 MG tablet, TAKE 1 TABLET DAILY, Disp: 90 tablet, Rfl: 1  •  levothyroxine (SYNTHROID, LEVOTHROID) 50 MCG tablet, , Disp: , Rfl:   •  Multiple Vitamin (MULTI-VITAMIN PO), Take 1 tablet by mouth daily., Disp: , Rfl:   •  Probiotic Product (PROBIOTIC PO), Take  by mouth daily. Lactobacillus rhamnosus GG, Disp: , Rfl:   •  triamterene-hydrochlorothiazide (DYAZIDE) 37.5-25 MG per capsule, TAKE 1 CAPSULE EVERY MORNING, Disp: 90 capsule, Rfl: 1  •  warfarin (COUMADIN) 5 MG tablet, TAKE 1 AND 1/2 TABLET BY MOUTH ON SUNDAY, THEN TAKE 1 TABLET BY MOUTH DAILY MON-SAT., Disp: 36 tablet, Rfl: 3  •  DILT- MG 24 hr capsule, , Disp: , Rfl: 0           Assessment/Plan   Diagnoses and all orders for this visit:    Warfarin anticoagulation    Other iron deficiency anemia    Acute right-sided low back pain with right-sided sciatica    Other orders  -     Flu Vaccine High Dose PF 65YR+ ([id number])    1. Warfarin therapy-  She is stable  Seeing cbc  2.  LBP- with RLE radicular sx-  She has now had sx for over a month  She needs an MRI  3. Anemia- seeing cbc-  She has gotten iron transfusion  4. GIB-  From an artery in the colon  S/p embolization  Stable now  5. Cough-  She is going to try omeprazole and see if that helps  If not she will let me know  6. CRI- stable   Recheck 2 months

## 2018-10-15 ENCOUNTER — OFFICE VISIT (OUTPATIENT)
Dept: ONCOLOGY | Facility: CLINIC | Age: 73
End: 2018-10-15

## 2018-10-15 ENCOUNTER — LAB (OUTPATIENT)
Dept: OTHER | Facility: HOSPITAL | Age: 73
End: 2018-10-15

## 2018-10-15 DIAGNOSIS — C50.012 MALIGNANT NEOPLASM OF NIPPLE OF LEFT BREAST IN FEMALE, ESTROGEN RECEPTOR POSITIVE (HCC): ICD-10-CM

## 2018-10-15 DIAGNOSIS — Z79.01 WARFARIN ANTICOAGULATION: Chronic | ICD-10-CM

## 2018-10-15 DIAGNOSIS — Z51.81 ANTICOAGULATION MANAGEMENT ENCOUNTER: Primary | ICD-10-CM

## 2018-10-15 DIAGNOSIS — Z79.01 ANTICOAGULATION MANAGEMENT ENCOUNTER: Primary | ICD-10-CM

## 2018-10-15 DIAGNOSIS — Z17.0 MALIGNANT NEOPLASM OF NIPPLE OF LEFT BREAST IN FEMALE, ESTROGEN RECEPTOR POSITIVE (HCC): ICD-10-CM

## 2018-10-15 LAB
INR PPP: 2
PROTHROMBIN TIME: 23.6 SECONDS (ref 11–15)

## 2018-10-15 PROCEDURE — 85610 PROTHROMBIN TIME: CPT

## 2018-10-15 PROCEDURE — 99212-NC PR NO CHARGE CBC OFFICE OUTPATIENT VISIT 10 MINUTES: Performed by: NURSE PRACTITIONER

## 2018-10-15 NOTE — PROGRESS NOTES
Patient is here for lab and INR review.  Last week, she was subtherapeutic at 1.20.  This week, she has had the low end of therapeutic at 2.0.  She denies any excessive bleeding or bruising, chest pain, or associated shortness of breath.  No, new lower extremity edema reported.  She has taken herself off of several on natural supplements and feels that her INR change has been reflected in this.  We will increase her just slightly per standing stone recommendations.  We will  have her return in one week for repeat INR review.  I've asked her to call with any concerns prior to this next office visit.  She has verbalized understanding.      Lab Results   Component Value Date    INR 2.00 10/15/2018    INR 1.20 10/08/2018    INR 3.50 09/10/2018    PROTIME 23.6 (H) 10/15/2018    PROTIME 14.8 10/08/2018    PROTIME 42.3 (H) 09/10/2018     Deedee SANCHEZ

## 2018-10-20 ENCOUNTER — APPOINTMENT (OUTPATIENT)
Dept: MRI IMAGING | Facility: HOSPITAL | Age: 73
End: 2018-10-20

## 2018-10-23 ENCOUNTER — TELEPHONE (OUTPATIENT)
Dept: GENERAL RADIOLOGY | Facility: HOSPITAL | Age: 73
End: 2018-10-23

## 2018-10-23 ENCOUNTER — DOCUMENTATION (OUTPATIENT)
Dept: ONCOLOGY | Facility: HOSPITAL | Age: 73
End: 2018-10-23

## 2018-10-23 NOTE — TELEPHONE ENCOUNTER
----- Message from Marilynn Weeks RN sent at 10/23/2018  3:03 PM EDT -----  Regarding: APPT NEEDED  PT NEEDS INR AND COAG RN APPT ON 11/19 AM PLEASE!!!!

## 2018-10-23 NOTE — PROGRESS NOTES
REC'D SIGNED APPROVAL PER DR. ROBLEDO FOR PT TO HOLD COUMADIN AND START LOVENOX FOR HER UPCOMING SCOPES. FAXED SIGNED FORM TO [facility] ALSO MESSAGED DR. ROBLEDO TO SEE IF HE WAS GOING TO SEND IN RX FOR LOVENOX. MADE PT AWARE OF NEW ORDERS AND SHE V/U. PT STATES THAT SHE HAS BRIDGED BEFORE. WILL MAIL THE INSTRUCTIONS TO HER AS WELL. APPT DESK MESSAGED TO SET UP INR APPT ON 11/19. SEE SCANNED IN DOCUMENTS FOR BRIDGING INSTRUCTIONS.

## 2018-10-26 ENCOUNTER — TELEPHONE (OUTPATIENT)
Dept: ONCOLOGY | Facility: HOSPITAL | Age: 73
End: 2018-10-26

## 2018-10-26 NOTE — TELEPHONE ENCOUNTER
PER DR. ROBLEDO OK TO SEND IN RX FOR LOVENOX 150MG SYRINGES. DC'D 120MG SYRINGES. CALLED [facility] TO CANCEL 120MG SYRINGE RX THAT WAS SENT IN EARLIER. NEW RX SENT TO PT'S LOCAL PHARMACY FOR LOVENOX 150MG SYRINGES 1 Q DAY AS DIRECTED. CALLED PT BACK AND L/M MAKING HER AWARE OF THE CHANGES. ALSO NOTED THAT DOSING WAS Q DAY NOT Q 12 H. CONFIRMED RECEIPT TO [facility].

## 2018-10-26 NOTE — TELEPHONE ENCOUNTER
DR. ROBLEDO SENT RX FOR LOVENOX TO PT'S MAIL ORDER PHARMACY INSTEAD OF HER LOCAL ONE. L/M FOR PT TO LET US KNOW IF THAT WON'T WORK. SHE DOESN'T START LOVENOX INJ'S UNTIL 11/9 SO PROB ENOUGH TIME FOR HER TO REC THOSE INJ'S THRU THE MAIL.

## 2018-10-26 NOTE — TELEPHONE ENCOUNTER
----- Message from Jean Mahajan MD sent at 10/26/2018 10:02 AM EDT -----  Regarding: RE: LOVENOX RX  Yes 150 mg daily is ideal.      ----- Message -----  From: Marilynn Weeks RN  Sent: 10/26/2018   9:48 AM  To: Jean Mahajan MD, LAURA Serrato  Subject: RE: LOVENOX RX                                   IS IT OK IF I SEND IN 150MG SYRINGES INSTEAD OF 'S? ALSO IS IT Q DAY DOSING OR Q 12 H DOSING? THANKS!    ----- Message -----  From: Jean Mahajan MD  Sent: 10/26/2018   7:58 AM  To: Marilynn Weeks RN  Subject: RE: LOVENOX RX                                   Lovenox 1.5 mg/kg SQ daily until therapeutic again on warfarin.  Sent in(i think)    kvng  ----- Message -----  From: Marilynn Weeks RN  Sent: 10/23/2018   2:50 PM  To: Jean Mahajan MD  Subject: LOVENOX RX                                       ARE YOU GOING TO SEND IN RX FOR LOVENOX FOR THIS PT? I GOT YOUR SIGNED APPROVAL ON THE FORM AND SAW THAT YOU WANTED HER TO DO LOVENOX BRIDGING. THANKS!

## 2018-11-05 ENCOUNTER — OFFICE VISIT (OUTPATIENT)
Dept: ONCOLOGY | Facility: CLINIC | Age: 73
End: 2018-11-05

## 2018-11-05 ENCOUNTER — APPOINTMENT (OUTPATIENT)
Dept: OTHER | Facility: HOSPITAL | Age: 73
End: 2018-11-05

## 2018-11-05 ENCOUNTER — LAB (OUTPATIENT)
Dept: LAB | Facility: HOSPITAL | Age: 73
End: 2018-11-05

## 2018-11-05 DIAGNOSIS — Z79.01 WARFARIN ANTICOAGULATION: Primary | Chronic | ICD-10-CM

## 2018-11-05 DIAGNOSIS — Z51.81 ANTICOAGULATION MANAGEMENT ENCOUNTER: ICD-10-CM

## 2018-11-05 DIAGNOSIS — Z79.01 ANTICOAGULATION MANAGEMENT ENCOUNTER: ICD-10-CM

## 2018-11-05 PROCEDURE — 99212-NC PR NO CHARGE CBC OFFICE OUTPATIENT VISIT 10 MINUTES: Performed by: NURSE PRACTITIONER

## 2018-11-05 PROCEDURE — 85610 PROTHROMBIN TIME: CPT | Performed by: INTERNAL MEDICINE

## 2018-11-05 NOTE — PROGRESS NOTES
Patient here for INR review.  .  The patient is to have a colonoscopy next week.  She has her bridging instructions.  She had several questions about her instructions.  She states that she is hesitant to undergo her colonoscopy due to concerns over the bowel prep.  I encouraged her to call the gastroenterologist office to discuss this with them.      INR today 2.2    Patient will continue her current Coumadin dosing.  She has bridging instructions to follow for her upcoming colonoscopy.  She will return for follow-up visit in 2 weeks with repeat CBC with RN review.     LAURA Narayanan

## 2018-11-06 ENCOUNTER — TELEPHONE (OUTPATIENT)
Dept: ONCOLOGY | Facility: HOSPITAL | Age: 73
End: 2018-11-06

## 2018-11-06 LAB
INR PPP: 2.2 (ref 0.8–1.2)
PROTHROMBIN TIME: 25.2 SECONDS (ref 12.8–15.2)

## 2018-11-06 NOTE — TELEPHONE ENCOUNTER
L/M FOR PT TO CLARIFY AGAIN THAT LOVENOX DOSING WAS Q DAY NOT BID. ASKED PT TO RTN CALL SO THAT WE KNOW SHE UNDERSTANDS. RX DOSING STATED Q DAY AND BRIDGING INSTRUCTIONS HAD Q 12 H DOSING. PT IS TO DO LOVENOX Q DAY!

## 2018-11-12 ENCOUNTER — OFFICE (OUTPATIENT)
Dept: URBAN - METROPOLITAN AREA PATHOLOGY 4 | Facility: PATHOLOGY | Age: 73
End: 2018-11-12

## 2018-11-12 ENCOUNTER — AMBULATORY SURGICAL CENTER (OUTPATIENT)
Dept: URBAN - METROPOLITAN AREA SURGERY 20 | Facility: SURGERY | Age: 73
End: 2018-11-12

## 2018-11-12 DIAGNOSIS — K57.30 DIVERTICULOSIS OF LARGE INTESTINE WITHOUT PERFORATION OR ABS: ICD-10-CM

## 2018-11-12 DIAGNOSIS — K21.0 GASTRO-ESOPHAGEAL REFLUX DISEASE WITH ESOPHAGITIS: ICD-10-CM

## 2018-11-12 DIAGNOSIS — K29.70 GASTRITIS, UNSPECIFIED, WITHOUT BLEEDING: ICD-10-CM

## 2018-11-12 DIAGNOSIS — K44.9 DIAPHRAGMATIC HERNIA WITHOUT OBSTRUCTION OR GANGRENE: ICD-10-CM

## 2018-11-12 DIAGNOSIS — K62.5 HEMORRHAGE OF ANUS AND RECTUM: ICD-10-CM

## 2018-11-12 PROBLEM — K20.9 ESOPHAGITIS, UNSPECIFIED: Status: ACTIVE | Noted: 2018-11-12

## 2018-11-12 PROBLEM — K21.9 LARYNGEAL PHARYNGEAL REFLUX: Status: ACTIVE | Noted: 2018-11-12

## 2018-11-12 PROBLEM — Z48.815 ENCNTR FOR SURGICAL AFTCR FOLLOWING SURGERY ON THE DGSTV SYS: Status: ACTIVE | Noted: 2018-11-12

## 2018-11-12 LAB
GI HISTOLOGY: A. SELECT: (no result)
GI HISTOLOGY: B. SELECT: (no result)
GI HISTOLOGY: PDF REPORT: (no result)

## 2018-11-12 PROCEDURE — 45378 DIAGNOSTIC COLONOSCOPY: CPT | Performed by: INTERNAL MEDICINE

## 2018-11-12 PROCEDURE — 43239 EGD BIOPSY SINGLE/MULTIPLE: CPT | Performed by: INTERNAL MEDICINE

## 2018-11-12 PROCEDURE — 88305 TISSUE EXAM BY PATHOLOGIST: CPT | Performed by: INTERNAL MEDICINE

## 2018-11-19 ENCOUNTER — INFUSION (OUTPATIENT)
Dept: ONCOLOGY | Facility: HOSPITAL | Age: 73
End: 2018-11-19

## 2018-11-19 ENCOUNTER — LAB (OUTPATIENT)
Dept: LAB | Facility: HOSPITAL | Age: 73
End: 2018-11-19

## 2018-11-19 DIAGNOSIS — Z79.01 WARFARIN ANTICOAGULATION: Chronic | ICD-10-CM

## 2018-11-19 LAB
INR PPP: 2 (ref 0.9–1.1)
PROTHROMBIN TIME: 23.4 SECONDS (ref 11–13.5)

## 2018-11-19 PROCEDURE — 85610 PROTHROMBIN TIME: CPT

## 2018-11-19 NOTE — PROGRESS NOTES
No new medications or skipped doses. INR 2. Per bridging instructions for colonoscopy last week to stop lovonex when INR 2 or above. New instructions per standing stone. 7.5 mg of coumadin twice a week and 5 mg all the other days. Verbalized understanding. Written instructions given.

## 2018-11-27 ENCOUNTER — OFFICE VISIT (OUTPATIENT)
Dept: INTERNAL MEDICINE | Facility: CLINIC | Age: 73
End: 2018-11-27

## 2018-11-27 VITALS
DIASTOLIC BLOOD PRESSURE: 66 MMHG | BODY MASS INDEX: 36.79 KG/M2 | TEMPERATURE: 98.5 F | SYSTOLIC BLOOD PRESSURE: 118 MMHG | HEART RATE: 72 BPM | HEIGHT: 66 IN | WEIGHT: 228.9 LBS | OXYGEN SATURATION: 98 %

## 2018-11-27 DIAGNOSIS — J32.1 FRONTAL SINUSITIS, UNSPECIFIED CHRONICITY: ICD-10-CM

## 2018-11-27 DIAGNOSIS — J06.9 ACUTE URI: Primary | ICD-10-CM

## 2018-11-27 PROCEDURE — 99213 OFFICE O/P EST LOW 20 MIN: CPT | Performed by: INTERNAL MEDICINE

## 2018-11-27 RX ORDER — CLOBETASOL PROPIONATE 0.5 MG/G
CREAM TOPICAL 2 TIMES DAILY
Qty: 30 G | Refills: 1 | Status: SHIPPED | OUTPATIENT
Start: 2018-11-27 | End: 2020-03-02

## 2018-11-27 RX ORDER — AZITHROMYCIN 250 MG/1
TABLET, FILM COATED ORAL
Qty: 6 TABLET | Refills: 0 | Status: SHIPPED | OUTPATIENT
Start: 2018-11-27 | End: 2018-12-16

## 2018-11-27 NOTE — PROGRESS NOTES
Subjective   Marylu Foreman is a 73 y.o. female C/O DRY COUGH, NOSE BLEED, CHILLS, EAR PAIN, SINUS PRESSURE, SCRATCHY THROAT X Saturday.    History of Present Illness   pT HAS HAD A SORE THROAT WITH CONGESTION FOR SEVERAL DAYS  shE has jaw and tooth pain as well.  She has been taking a OTC allergy pill.    She denies any fevers or chills  Sinus pressure  The following portions of the patient's history were reviewed and updated as appropriate: allergies, current medications, past medical history, past social history and problem list.  No ill contacts  Review of Systems   Constitutional: Positive for fatigue.   HENT: Positive for postnasal drip, rhinorrhea, sinus pressure and sore throat.    All other systems reviewed and are negative.      Objective   Physical Exam   Constitutional: She is oriented to person, place, and time. She appears well-developed and well-nourished.   HENT:   Head: Normocephalic and atraumatic.   Right Ear: External ear normal.   Left Ear: External ear normal.   Mouth/Throat: Oropharynx is clear and moist.   Eyes: Conjunctivae and EOM are normal. Pupils are equal, round, and reactive to light.   Neck: Normal range of motion. No tracheal deviation present. No thyromegaly present.   Cardiovascular: Normal rate, regular rhythm, normal heart sounds and intact distal pulses.   Pulmonary/Chest: Effort normal and breath sounds normal.   Abdominal: Soft. Bowel sounds are normal. She exhibits no distension. There is no tenderness.   Musculoskeletal: Normal range of motion. She exhibits no edema or deformity.   Neurological: She is alert and oriented to person, place, and time.   Skin: Skin is warm and dry.   Psychiatric: She has a normal mood and affect. Her behavior is normal. Judgment and thought content normal.   Vitals reviewed.      Vitals:    11/27/18 1135   BP: 118/66   Pulse: 72   Temp: 98.5 °F (36.9 °C)   SpO2: 98%     Current Outpatient Medications:   •  Cholecalciferol (VITAMIN D PO), Take   by mouth., Disp: , Rfl:   •  clobetasol (TEMOVATE) 0.05 % cream, apply to affected area twice a day, Disp: 30 g, Rfl: 5  •  Cyanocobalamin (VITAMIN B 12 PO), Take  by mouth., Disp: , Rfl:   •  diltiaZEM CD (DILTIAZEM CD) 180 MG 24 hr capsule, Take 180 mg by mouth Daily., Disp: , Rfl:   •  DULoxetine (CYMBALTA) 60 MG capsule, take 2 capsules by mouth every morning, Disp: , Rfl: 0  •  estradiol (ESTRACE VAGINAL) 0.1 MG/GM vaginal cream, Use pea-sized amount in vagina twice weekly, Disp: 42.5 g, Rfl: 2  •  irbesartan (AVAPRO) 300 MG tablet, TAKE 1 TABLET DAILY, Disp: 90 tablet, Rfl: 1  •  levothyroxine (SYNTHROID, LEVOTHROID) 50 MCG tablet, , Disp: , Rfl:   •  Modafinil (PROVIGIL PO), Take  by mouth., Disp: , Rfl:   •  Multiple Vitamin (MULTI-VITAMIN PO), Take 1 tablet by mouth daily., Disp: , Rfl:   •  omeprazole (priLOSEC) 40 MG capsule, Take 40 mg by mouth Daily., Disp: , Rfl:   •  Probiotic Product (PROBIOTIC PO), Take  by mouth daily. Lactobacillus rhamnosus GG, Disp: , Rfl:   •  triamterene-hydrochlorothiazide (DYAZIDE) 37.5-25 MG per capsule, TAKE 1 CAPSULE EVERY MORNING, Disp: 90 capsule, Rfl: 1  •  warfarin (COUMADIN) 5 MG tablet, TAKE 1 AND 1/2 TABLET BY MOUTH ON SUNDAY, THEN TAKE 1 TABLET BY MOUTH DAILY MON-SAT., Disp: 36 tablet, Rfl: 3  •  enoxaparin (LOVENOX) 150 MG/ML injection, Inject 1 mL under the skin into the appropriate area as directed Daily., Disp: 20 mL, Rfl: 0         Assessment/Plan   Diagnoses and all orders for this visit:    Acute URI    Frontal sinusitis, unspecified chronicity    1. URI with sinusitis-  We will treat with a zpack and otc meds for this and fluids

## 2018-11-30 DIAGNOSIS — K92.2 ACUTE GI BLEEDING: ICD-10-CM

## 2018-12-03 ENCOUNTER — TELEPHONE (OUTPATIENT)
Dept: INTERNAL MEDICINE | Facility: CLINIC | Age: 73
End: 2018-12-03

## 2018-12-03 NOTE — TELEPHONE ENCOUNTER
PT IS NOT HAVING SOB OR FEVER. SHE WAS ADVISED TO TAKE DELSYM OTC FOR HER COUGH. SHE WILL CALL BACK IF THIS DOES NOT HELP WITH THE COUGH    ----- Message from Arleen Dillon MD sent at 12/3/2018  2:03 PM EST -----  Likely the same virus that has been going around  We can check a cxr if she is SOB or fever  ----- Message -----  From: Nydia Watson MA  Sent: 12/3/2018   1:53 PM  To: Arleen Dillon MD        ----- Message -----  From: Tesha Lopez  Sent: 12/3/2018   1:36 PM  To: Nydia Watson MA    Pt is here for labs. She recently say Dr Dillon and was given a ZPack. Not any bettter. Now it is in her chest. What to do?

## 2018-12-15 ENCOUNTER — APPOINTMENT (OUTPATIENT)
Dept: MRI IMAGING | Facility: HOSPITAL | Age: 73
End: 2018-12-15

## 2018-12-16 PROBLEM — I95.1 ORTHOSTATIC HYPOTENSION: Status: RESOLVED | Noted: 2017-06-23 | Resolved: 2018-12-16

## 2018-12-19 ENCOUNTER — APPOINTMENT (OUTPATIENT)
Dept: OTHER | Facility: HOSPITAL | Age: 73
End: 2018-12-19

## 2018-12-19 DIAGNOSIS — Z51.81 ANTICOAGULATION MANAGEMENT ENCOUNTER: Primary | ICD-10-CM

## 2018-12-19 DIAGNOSIS — Z79.01 ANTICOAGULATION MANAGEMENT ENCOUNTER: Primary | ICD-10-CM

## 2018-12-19 LAB
INR PPP: 2.7
PROTHROMBIN TIME: 32 SECONDS (ref 11–15)

## 2018-12-19 PROCEDURE — 85610 PROTHROMBIN TIME: CPT

## 2018-12-20 RX ORDER — DILTIAZEM HYDROCHLORIDE 180 MG/1
180 CAPSULE, COATED, EXTENDED RELEASE ORAL DAILY
Qty: 30 CAPSULE | Refills: 0 | Status: SHIPPED | OUTPATIENT
Start: 2018-12-20 | End: 2019-01-21 | Stop reason: SDUPTHER

## 2018-12-31 ENCOUNTER — OFFICE VISIT (OUTPATIENT)
Dept: ONCOLOGY | Facility: CLINIC | Age: 73
End: 2018-12-31

## 2018-12-31 ENCOUNTER — LAB (OUTPATIENT)
Dept: OTHER | Facility: HOSPITAL | Age: 73
End: 2018-12-31

## 2018-12-31 VITALS
TEMPERATURE: 98.6 F | OXYGEN SATURATION: 98 % | RESPIRATION RATE: 16 BRPM | SYSTOLIC BLOOD PRESSURE: 146 MMHG | HEIGHT: 66 IN | HEART RATE: 123 BPM | BODY MASS INDEX: 36.59 KG/M2 | DIASTOLIC BLOOD PRESSURE: 96 MMHG | WEIGHT: 227.7 LBS

## 2018-12-31 DIAGNOSIS — Z79.01 WARFARIN ANTICOAGULATION: Chronic | ICD-10-CM

## 2018-12-31 DIAGNOSIS — I27.82 OTHER CHRONIC PULMONARY EMBOLISM WITHOUT ACUTE COR PULMONALE (HCC): Primary | ICD-10-CM

## 2018-12-31 DIAGNOSIS — C50.012 MALIGNANT NEOPLASM OF NIPPLE OF LEFT BREAST IN FEMALE, ESTROGEN RECEPTOR POSITIVE (HCC): Primary | ICD-10-CM

## 2018-12-31 DIAGNOSIS — Z17.0 MALIGNANT NEOPLASM OF NIPPLE OF LEFT BREAST IN FEMALE, ESTROGEN RECEPTOR POSITIVE (HCC): Primary | ICD-10-CM

## 2018-12-31 LAB
BASOPHILS # BLD AUTO: 0.06 10*3/MM3 (ref 0–0.2)
BASOPHILS NFR BLD AUTO: 1 % (ref 0–1.5)
DEPRECATED RDW RBC AUTO: 52.8 FL (ref 37–54)
EOSINOPHIL # BLD AUTO: 0.28 10*3/MM3 (ref 0–0.7)
EOSINOPHIL NFR BLD AUTO: 4.6 % (ref 0.3–6.2)
ERYTHROCYTE [DISTWIDTH] IN BLOOD BY AUTOMATED COUNT: 15.2 % (ref 11.7–13)
HCT VFR BLD AUTO: 39.2 % (ref 35.6–45.5)
HGB BLD-MCNC: 12.8 G/DL (ref 11.9–15.5)
IMM GRANULOCYTES # BLD AUTO: 0.02 10*3/MM3 (ref 0–0.03)
IMM GRANULOCYTES NFR BLD AUTO: 0.3 % (ref 0–0.5)
INR PPP: 3.4
LYMPHOCYTES # BLD AUTO: 2.4 10*3/MM3 (ref 0.9–4.8)
LYMPHOCYTES NFR BLD AUTO: 39.1 % (ref 19.6–45.3)
MCH RBC QN AUTO: 30.8 PG (ref 26.9–32)
MCHC RBC AUTO-ENTMCNC: 32.7 G/DL (ref 32.4–36.3)
MCV RBC AUTO: 94.2 FL (ref 80.5–98.2)
MONOCYTES # BLD AUTO: 0.53 10*3/MM3 (ref 0.2–1.2)
MONOCYTES NFR BLD AUTO: 8.6 % (ref 5–12)
NEUTROPHILS # BLD AUTO: 2.85 10*3/MM3 (ref 1.9–8.1)
NEUTROPHILS NFR BLD AUTO: 46.4 % (ref 42.7–76)
NRBC BLD AUTO-RTO: 0 /100 WBC (ref 0–0)
PLATELET # BLD AUTO: 363 10*3/MM3 (ref 140–500)
PMV BLD AUTO: 10.3 FL (ref 6–12)
PROTHROMBIN TIME: 40.3 SECONDS (ref 11–15)
RBC # BLD AUTO: 4.16 10*6/MM3 (ref 3.9–5.2)
WBC NRBC COR # BLD: 6.14 10*3/MM3 (ref 4.5–10.7)

## 2018-12-31 PROCEDURE — 85610 PROTHROMBIN TIME: CPT

## 2018-12-31 PROCEDURE — 85025 COMPLETE CBC W/AUTO DIFF WBC: CPT | Performed by: INTERNAL MEDICINE

## 2018-12-31 PROCEDURE — 99213 OFFICE O/P EST LOW 20 MIN: CPT | Performed by: INTERNAL MEDICINE

## 2018-12-31 PROCEDURE — 36415 COLL VENOUS BLD VENIPUNCTURE: CPT

## 2018-12-31 RX ORDER — BENZONATATE 100 MG/1
100 CAPSULE ORAL 3 TIMES DAILY PRN
COMMUNITY
End: 2019-01-21

## 2018-12-31 RX ORDER — AZITHROMYCIN 1 G
1 PACKET (EA) ORAL ONCE
COMMUNITY
End: 2019-01-21

## 2019-01-08 RX ORDER — WARFARIN SODIUM 5 MG/1
TABLET ORAL
Qty: 45 TABLET | Refills: 0 | Status: SHIPPED | OUTPATIENT
Start: 2019-01-08 | End: 2019-01-14 | Stop reason: SDUPTHER
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
## 2019-01-11 ENCOUNTER — TELEPHONE (OUTPATIENT)
Dept: INTERNAL MEDICINE | Facility: CLINIC | Age: 74
End: 2019-01-11

## 2019-01-11 DIAGNOSIS — Z00.00 HEALTHCARE MAINTENANCE: Primary | ICD-10-CM

## 2019-01-11 NOTE — TELEPHONE ENCOUNTER
LABS ORDERED.    ----- Message from Arleen Dillon MD sent at 1/11/2019  1:08 PM EST -----  cmp and cbc  ----- Message -----  From: Nydia Watson MA  Sent: 1/11/2019  12:16 PM  To: Arleen Dillon MD    PT SCHEDULED FOR LABS PLEASE Advise

## 2019-01-13 RX ORDER — WARFARIN SODIUM 5 MG/1
TABLET ORAL
Qty: 45 TABLET | Refills: 0 | Status: CANCELLED | OUTPATIENT
Start: 2019-01-13

## 2019-01-14 ENCOUNTER — TELEPHONE (OUTPATIENT)
Dept: ONCOLOGY | Facility: HOSPITAL | Age: 74
End: 2019-01-14

## 2019-01-14 RX ORDER — WARFARIN SODIUM 5 MG/1
TABLET ORAL
Qty: 45 TABLET | Refills: 0 | Status: SHIPPED | OUTPATIENT
Start: 2019-01-14 | End: 2019-02-25 | Stop reason: SDUPTHER
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
## 2019-01-15 LAB
ALBUMIN SERPL-MCNC: 4.1 G/DL (ref 3.5–5.2)
ALBUMIN/GLOB SERPL: 1.5 G/DL
ALP SERPL-CCNC: 104 U/L (ref 39–117)
ALT SERPL-CCNC: 15 U/L (ref 1–33)
AST SERPL-CCNC: 17 U/L (ref 1–32)
BILIRUB SERPL-MCNC: 0.4 MG/DL (ref 0.1–1.2)
BUN SERPL-MCNC: 32 MG/DL (ref 8–23)
BUN/CREAT SERPL: 24.4 (ref 7–25)
CALCIUM SERPL-MCNC: 9 MG/DL (ref 8.6–10.5)
CHLORIDE SERPL-SCNC: 101 MMOL/L (ref 98–107)
CO2 SERPL-SCNC: 23 MMOL/L (ref 22–29)
CREAT SERPL-MCNC: 1.31 MG/DL (ref 0.57–1)
ERYTHROCYTE [DISTWIDTH] IN BLOOD BY AUTOMATED COUNT: 14.9 % (ref 11.7–13)
GLOBULIN SER CALC-MCNC: 2.7 GM/DL
GLUCOSE SERPL-MCNC: 111 MG/DL (ref 65–99)
HCT VFR BLD AUTO: 38.8 % (ref 35.6–45.5)
HGB BLD-MCNC: 11.9 G/DL (ref 11.9–15.5)
MCH RBC QN AUTO: 30.7 PG (ref 26.9–32)
MCHC RBC AUTO-ENTMCNC: 30.7 G/DL (ref 32.4–36.3)
MCV RBC AUTO: 100 FL (ref 80.5–98.2)
PLATELET # BLD AUTO: 444 10*3/MM3 (ref 140–500)
POTASSIUM SERPL-SCNC: 4.2 MMOL/L (ref 3.5–5.2)
PROT SERPL-MCNC: 6.8 G/DL (ref 6–8.5)
RBC # BLD AUTO: 3.88 10*6/MM3 (ref 3.9–5.2)
SODIUM SERPL-SCNC: 140 MMOL/L (ref 136–145)
WBC # BLD AUTO: 11.29 10*3/MM3 (ref 4.5–10.7)

## 2019-01-21 ENCOUNTER — OFFICE VISIT (OUTPATIENT)
Dept: CARDIOLOGY | Facility: CLINIC | Age: 74
End: 2019-01-21

## 2019-01-21 ENCOUNTER — OFFICE VISIT (OUTPATIENT)
Dept: INTERNAL MEDICINE | Facility: CLINIC | Age: 74
End: 2019-01-21

## 2019-01-21 VITALS
BODY MASS INDEX: 37.73 KG/M2 | HEART RATE: 90 BPM | SYSTOLIC BLOOD PRESSURE: 130 MMHG | WEIGHT: 234.8 LBS | HEIGHT: 66 IN | DIASTOLIC BLOOD PRESSURE: 70 MMHG

## 2019-01-21 VITALS
BODY MASS INDEX: 37.67 KG/M2 | WEIGHT: 234.4 LBS | DIASTOLIC BLOOD PRESSURE: 64 MMHG | OXYGEN SATURATION: 95 % | HEIGHT: 66 IN | TEMPERATURE: 98 F | SYSTOLIC BLOOD PRESSURE: 126 MMHG | HEART RATE: 85 BPM

## 2019-01-21 DIAGNOSIS — I48.0 PAROXYSMAL ATRIAL FIBRILLATION (HCC): Primary | ICD-10-CM

## 2019-01-21 DIAGNOSIS — F32.89 OTHER DEPRESSION: ICD-10-CM

## 2019-01-21 DIAGNOSIS — I48.19 PERSISTENT ATRIAL FIBRILLATION (HCC): Primary | ICD-10-CM

## 2019-01-21 DIAGNOSIS — E78.5 HYPERLIPIDEMIA, UNSPECIFIED HYPERLIPIDEMIA TYPE: ICD-10-CM

## 2019-01-21 DIAGNOSIS — I10 ESSENTIAL HYPERTENSION: Chronic | ICD-10-CM

## 2019-01-21 DIAGNOSIS — I27.20 PULMONARY HYPERTENSION (HCC): ICD-10-CM

## 2019-01-21 DIAGNOSIS — Z79.01 WARFARIN ANTICOAGULATION: Chronic | ICD-10-CM

## 2019-01-21 DIAGNOSIS — E66.01 MORBID OBESITY (HCC): Chronic | ICD-10-CM

## 2019-01-21 DIAGNOSIS — I27.82 OTHER CHRONIC PULMONARY EMBOLISM WITHOUT ACUTE COR PULMONALE (HCC): Chronic | ICD-10-CM

## 2019-01-21 PROBLEM — E61.1 IRON DEFICIENCY: Status: RESOLVED | Noted: 2018-07-25 | Resolved: 2019-01-21

## 2019-01-21 PROBLEM — K92.2 ACUTE GI BLEEDING: Status: RESOLVED | Noted: 2018-07-20 | Resolved: 2019-01-21

## 2019-01-21 PROBLEM — Z86.79 H/O: HYPERTENSION: Status: RESOLVED | Noted: 2018-07-20 | Resolved: 2019-01-21

## 2019-01-21 PROCEDURE — 99214 OFFICE O/P EST MOD 30 MIN: CPT | Performed by: INTERNAL MEDICINE

## 2019-01-21 PROCEDURE — 99214 OFFICE O/P EST MOD 30 MIN: CPT | Performed by: NURSE PRACTITIONER

## 2019-01-21 PROCEDURE — 93000 ELECTROCARDIOGRAM COMPLETE: CPT | Performed by: NURSE PRACTITIONER

## 2019-01-21 PROCEDURE — G0439 PPPS, SUBSEQ VISIT: HCPCS | Performed by: INTERNAL MEDICINE

## 2019-01-21 RX ORDER — DILTIAZEM HYDROCHLORIDE 180 MG/1
180 CAPSULE, COATED, EXTENDED RELEASE ORAL DAILY
Qty: 90 CAPSULE | Refills: 3 | Status: SHIPPED | OUTPATIENT
Start: 2019-01-21 | End: 2019-12-30

## 2019-01-21 RX ORDER — BUSPIRONE HYDROCHLORIDE 5 MG/1
2 TABLET ORAL 2 TIMES DAILY
Refills: 0 | COMMUNITY
Start: 2019-01-07 | End: 2020-03-16 | Stop reason: SDUPTHER

## 2019-01-21 NOTE — PROGRESS NOTES
Subjective   Marylu Foreman is a 73 y.o. female here today to f/u on HTN, hyperlipidemia, and hypothyroidism.  Pt c/o depression and constant sleeping.        History of Present Illness   Pt has been taking BP meds as prescribed without any problems.  No HA  No episodes of orthostasis  Pt has been doing well with thyroid meds.  Taking as perscribed without any complications  She is struggling with depression  Nothing seems to be helping    Pt has been compliant with meds for GERD.  No sx as long as pt takes medicine as prescribed.  No epigastric pain or reflux sx  She does have int afib and sees cards    The following portions of the patient's history were reviewed and updated as appropriate: allergies, current medications, past medical history, past social history and problem list.  She is eating more  No exercise    Review of Systems   All other systems reviewed and are negative.      Objective   Physical Exam   Constitutional: She is oriented to person, place, and time. She appears well-developed and well-nourished.   HENT:   Head: Normocephalic and atraumatic.   Right Ear: External ear normal.   Left Ear: External ear normal.   Mouth/Throat: Oropharynx is clear and moist.   Eyes: Conjunctivae and EOM are normal. Pupils are equal, round, and reactive to light.   Neck: Normal range of motion. No tracheal deviation present. No thyromegaly present.   Cardiovascular: Normal rate, regular rhythm, normal heart sounds and intact distal pulses.   Pulmonary/Chest: Effort normal and breath sounds normal.   Abdominal: Soft. Bowel sounds are normal. She exhibits no distension. There is no tenderness.   Musculoskeletal: Normal range of motion. She exhibits no edema or deformity.   Neurological: She is alert and oriented to person, place, and time.   Skin: Skin is warm and dry.   Psychiatric: She has a normal mood and affect. Her behavior is normal. Judgment and thought content normal.   Vitals reviewed.      Vitals:     01/21/19 1022   BP: 126/64   Pulse: 85   Temp: 98 °F (36.7 °C)   SpO2: 95%        Telephone on 01/11/2019   Component Date Value Ref Range Status   • Glucose 01/14/2019 111* 65 - 99 mg/dL Final   • BUN 01/14/2019 32* 8 - 23 mg/dL Final   • Creatinine 01/14/2019 1.31* 0.57 - 1.00 mg/dL Final   • eGFR Non African Am 01/14/2019 40* >60 mL/min/1.73 Final    Comment: The MDRD GFR formula is only valid for adults with stable  renal function between ages 18 and 70.     • eGFR  Am 01/14/2019 49* >60 mL/min/1.73 Final   • BUN/Creatinine Ratio 01/14/2019 24.4  7.0 - 25.0 Final   • Sodium 01/14/2019 140  136 - 145 mmol/L Final   • Potassium 01/14/2019 4.2  3.5 - 5.2 mmol/L Final   • Chloride 01/14/2019 101  98 - 107 mmol/L Final   • Total CO2 01/14/2019 23.0  22.0 - 29.0 mmol/L Final   • Calcium 01/14/2019 9.0  8.6 - 10.5 mg/dL Final   • Total Protein 01/14/2019 6.8  6.0 - 8.5 g/dL Final   • Albumin 01/14/2019 4.10  3.50 - 5.20 g/dL Final   • Globulin 01/14/2019 2.7  gm/dL Final   • A/G Ratio 01/14/2019 1.5  g/dL Final   • Total Bilirubin 01/14/2019 0.4  0.1 - 1.2 mg/dL Final   • Alkaline Phosphatase 01/14/2019 104  39 - 117 U/L Final   • AST (SGOT) 01/14/2019 17  1 - 32 U/L Final   • ALT (SGPT) 01/14/2019 15  1 - 33 U/L Final   • WBC 01/14/2019 11.29* 4.50 - 10.70 10*3/mm3 Final   • RBC 01/14/2019 3.88* 3.90 - 5.20 10*6/mm3 Final   • Hemoglobin 01/14/2019 11.9  11.9 - 15.5 g/dL Final   • Hematocrit 01/14/2019 38.8  35.6 - 45.5 % Final   • MCV 01/14/2019 100.0* 80.5 - 98.2 fL Final   • MCH 01/14/2019 30.7  26.9 - 32.0 pg Final   • MCHC 01/14/2019 30.7* 32.4 - 36.3 g/dL Final   • RDW 01/14/2019 14.9* 11.7 - 13.0 % Final   • Platelets 01/14/2019 444  140 - 500 10*3/mm3 Final       Current Outpatient Medications:   •  busPIRone (BUSPAR) 5 MG tablet, Take 2 tablets by mouth 2 (Two) Times a Day., Disp: , Rfl: 0  •  Cholecalciferol (VITAMIN D PO), Take  by mouth., Disp: , Rfl:   •  clobetasol (TEMOVATE) 0.05 % cream, Apply   topically to the appropriate area as directed 2 (Two) Times a Day., Disp: 30 g, Rfl: 1  •  diltiaZEM CD (DILTIAZEM CD) 180 MG 24 hr capsule, Take 1 capsule by mouth Daily., Disp: 30 capsule, Rfl: 0  •  DULoxetine (CYMBALTA) 60 MG capsule, take 2 capsules by mouth every morning, Disp: , Rfl: 0  •  estradiol (ESTRACE VAGINAL) 0.1 MG/GM vaginal cream, Use pea-sized amount in vagina twice weekly, Disp: 42.5 g, Rfl: 2  •  irbesartan (AVAPRO) 300 MG tablet, TAKE 1 TABLET DAILY, Disp: 90 tablet, Rfl: 1  •  levothyroxine (SYNTHROID, LEVOTHROID) 50 MCG tablet, , Disp: , Rfl:   •  Multiple Vitamin (MULTI-VITAMIN PO), Take 1 tablet by mouth daily., Disp: , Rfl:   •  omeprazole (priLOSEC) 40 MG capsule, Take 40 mg by mouth Daily., Disp: , Rfl:   •  Probiotic Product (PROBIOTIC PO), Take  by mouth daily. Lactobacillus rhamnosus GG, Disp: , Rfl:   •  triamterene-hydrochlorothiazide (DYAZIDE) 37.5-25 MG per capsule, TAKE 1 CAPSULE EVERY MORNING, Disp: 90 capsule, Rfl: 1  •  warfarin (COUMADIN) 5 MG tablet, Take 5mg daily or as directed by MD, Disp: 45 tablet, Rfl: 0  •  Cyanocobalamin (VITAMIN B 12 PO), Take  by mouth., Disp: , Rfl:   •  Modafinil (PROVIGIL PO), Take  by mouth., Disp: , Rfl:       Assessment/Plan   Diagnoses and all orders for this visit:    Paroxysmal atrial fibrillation (CMS/HCC)    Hyperlipidemia, unspecified hyperlipidemia type    Essential hypertension    Other depression    1. AF0  She is stable  Sees cards today  2.  HTN- ok with current meds  3. HPL- not on meds  Cont to wokr mon diet  4. Depression she really needs to exercise reg and fu with psych

## 2019-01-21 NOTE — PATIENT INSTRUCTIONS
Fall Prevention in the Home  Falls can cause injuries. They can happen to people of all ages. There are many things you can do to make your home safe and to help prevent falls.  What can I do on the outside of my home?  · Regularly fix the edges of walkways and driveways and fix any cracks.  · Remove anything that might make you trip as you walk through a door, such as a raised step or threshold.  · Trim any bushes or trees on the path to your home.  · Use bright outdoor lighting.  · Clear any walking paths of anything that might make someone trip, such as rocks or tools.  · Regularly check to see if handrails are loose or broken. Make sure that both sides of any steps have handrails.  · Any raised decks and porches should have guardrails on the edges.  · Have any leaves, snow, or ice cleared regularly.  · Use sand or salt on walking paths during winter.  · Clean up any spills in your garage right away. This includes oil or grease spills.  What can I do in the bathroom?  · Use night lights.  · Install grab bars by the toilet and in the tub and shower. Do not use towel bars as grab bars.  · Use non-skid mats or decals in the tub or shower.  · If you need to sit down in the shower, use a plastic, non-slip stool.  · Keep the floor dry. Clean up any water that spills on the floor as soon as it happens.  · Remove soap buildup in the tub or shower regularly.  · Attach bath mats securely with double-sided non-slip rug tape.  · Do not have throw rugs and other things on the floor that can make you trip.  What can I do in the bedroom?  · Use night lights.  · Make sure that you have a light by your bed that is easy to reach.  · Do not use any sheets or blankets that are too big for your bed. They should not hang down onto the floor.  · Have a firm chair that has side arms. You can use this for support while you get dressed.  · Do not have throw rugs and other things on the floor that can make you trip.  What can I do in the  kitchen?  · Clean up any spills right away.  · Avoid walking on wet floors.  · Keep items that you use a lot in easy-to-reach places.  · If you need to reach something above you, use a strong step stool that has a grab bar.  · Keep electrical cords out of the way.  · Do not use floor polish or wax that makes floors slippery. If you must use wax, use non-skid floor wax.  · Do not have throw rugs and other things on the floor that can make you trip.  What can I do with my stairs?  · Do not leave any items on the stairs.  · Make sure that there are handrails on both sides of the stairs and use them. Fix handrails that are broken or loose. Make sure that handrails are as long as the stairways.  · Check any carpeting to make sure that it is firmly attached to the stairs. Fix any carpet that is loose or worn.  · Avoid having throw rugs at the top or bottom of the stairs. If you do have throw rugs, attach them to the floor with carpet tape.  · Make sure that you have a light switch at the top of the stairs and the bottom of the stairs. If you do not have them, ask someone to add them for you.  What else can I do to help prevent falls?  · Wear shoes that:  ? Do not have high heels.  ? Have rubber bottoms.  ? Are comfortable and fit you well.  ? Are closed at the toe. Do not wear sandals.  · If you use a stepladder:  ? Make sure that it is fully opened. Do not climb a closed stepladder.  ? Make sure that both sides of the stepladder are locked into place.  ? Ask someone to hold it for you, if possible.  · Clearly ghulam and make sure that you can see:  ? Any grab bars or handrails.  ? First and last steps.  ? Where the edge of each step is.  · Use tools that help you move around (mobility aids) if they are needed. These include:  ? Canes.  ? Walkers.  ? Scooters.  ? Crutches.  · Turn on the lights when you go into a dark area. Replace any light bulbs as soon as they burn out.  · Set up your furniture so you have a clear path.  Avoid moving your furniture around.  · If any of your floors are uneven, fix them.  · If there are any pets around you, be aware of where they are.  · Review your medicines with your doctor. Some medicines can make you feel dizzy. This can increase your chance of falling.  Ask your doctor what other things that you can do to help prevent falls.  This information is not intended to replace advice given to you by your health care provider. Make sure you discuss any questions you have with your health care provider.  Document Released: 10/14/2010 Document Revised: 05/25/2017 Document Reviewed: 01/22/2016  [employer] Interactive Patient Education © 2018 [employer]  Exercising to Lose Weight  Exercising can help you to lose weight. In order to lose weight through exercise, you need to do vigorous-intensity exercise. You can tell that you are exercising with vigorous intensity if you are breathing very hard and fast and cannot hold a conversation while exercising.  Moderate-intensity exercise helps to maintain your current weight. You can tell that you are exercising at a moderate level if you have a higher heart rate and faster breathing, but you are still able to hold a conversation.  How often should I exercise?  Choose an activity that you enjoy and set realistic goals. Your health care provider can help you to make an activity plan that works for you. Exercise regularly as directed by your health care provider. This may include:  · Doing resistance training twice each week, such as:  ? Push-ups.  ? Sit-ups.  ? Lifting weights.  ? Using resistance bands.  · Doing a given intensity of exercise for a given amount of time. Choose from these options:  ? 150 minutes of moderate-intensity exercise every week.  ? 75 minutes of vigorous-intensity exercise every week.  ? A mix of moderate-intensity and vigorous-intensity exercise every week.    Children, pregnant women, people who are out of shape, people who are overweight,  and older adults may need to consult a health care provider for individual recommendations. If you have any sort of medical condition, be sure to consult your health care provider before starting a new exercise program.  What are some activities that can help me to lose weight?  · Walking at a rate of at least 4.5 miles an hour.  · Jogging or running at a rate of 5 miles per hour.  · Biking at a rate of at least 10 miles per hour.  · Lap swimming.  · Roller-skating or in-line skating.  · Cross-country skiing.  · Vigorous competitive sports, such as football, basketball, and soccer.  · Jumping rope.  · Aerobic dancing.  How can I be more active in my day-to-day activities?  · Use the stairs instead of the elevator.  · Take a walk during your lunch break.  · If you drive, park your car farther away from work or school.  · If you take public transportation, get off one stop early and walk the rest of the way.  · Make all of your phone calls while standing up and walking around.  · Get up, stretch, and walk around every 30 minutes throughout the day.  What guidelines should I follow while exercising?  · Do not exercise so much that you hurt yourself, feel dizzy, or get very short of breath.  · Consult your health care provider prior to starting a new exercise program.  · Wear comfortable clothes and shoes with good support.  · Drink plenty of water while you exercise to prevent dehydration or heat stroke. Body water is lost during exercise and must be replaced.  · Work out until you breathe faster and your heart beats faster.  This information is not intended to replace advice given to you by your health care provider. Make sure you discuss any questions you have with your health care provider.  Document Released: 01/20/2012 Document Revised: 05/25/2017 Document Reviewed: 05/21/2015  [employer] Interactive Patient Education © 2018 [employer]    Medicare Wellness  Personal Prevention Plan of Service     Date of Office  Visit:  2019  Encounter Provider:  Arleen Dillon MD  Place of Service:  [facility] INTERNAL MEDICINE  Patient Name: Marylu Foreman  :  1945    As part of the Medicare Wellness portion of your visit today, we are providing you with this personalized preventive plan of services (PPPS). This plan is based upon recommendations of the [place] Preventive Services Task Force (USPSTF) and the Advisory Committee on Immunization Practices (ACIP).    This lists the preventive care services that should be considered, and provides dates of when you are due. Items listed as completed are up-to-date and do not require any further intervention.    Health Maintenance   Topic Date Due   • ZOSTER VACCINE (2 of 3) 2012   • DXA SCAN  2018   • COLONOSCOPY  2018   • MEDICARE ANNUAL WELLNESS  2018   • LIPID PANEL  10/01/2019   • MAMMOGRAM  2020   • TDAP/TD VACCINES (2 - Td) 2025   • HEPATITIS C SCREENING  Completed   • INFLUENZA VACCINE  Completed   • PNEUMOCOCCAL VACCINES (65+ LOW/MEDIUM RISK)  Completed       No orders of the defined types were placed in this encounter.      No Follow-up on file.
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
## 2019-01-21 NOTE — PROGRESS NOTES
QUICK REFERENCE INFORMATION:  The ABCs of the Annual Wellness Visit    Subsequent Medicare Wellness Visit    HEALTH RISK ASSESSMENT    1945    Recent Hospitalizations:  No hospitalization(s) within the last year..        Current Medical Providers:  Patient Care Team:  Arleen Dillon MD as PCP - General (Internal Medicine)  Jean Mahajan MD as Consulting Physician (Hematology and Oncology)  Joanna Quiñonez MD as Consulting Physician (Obstetrics and Gynecology)        Smoking Status:  Social History     Tobacco Use   Smoking Status Never Smoker   Smokeless Tobacco Never Used       Alcohol Consumption:  Social History     Substance and Sexual Activity   Alcohol Use No       Depression Screen:   PHQ-2/PHQ-9 Depression Screening 8/28/2017   Little interest or pleasure in doing things 1   Feeling down, depressed, or hopeless 1   Trouble falling or staying asleep, or sleeping too much 1   Feeling tired or having little energy 3   Poor appetite or overeating 0   Feeling bad about yourself - or that you are a failure or have let yourself or your family down 0   Trouble concentrating on things, such as reading the newspaper or watching television 0   Moving or speaking so slowly that other people could have noticed. Or the opposite - being so fidgety or restless that you have been moving around a lot more than usual 0   Thoughts that you would be better off dead, or of hurting yourself in some way 0   Total Score 6       Health Habits and Functional and Cognitive Screening:  Functional & Cognitive Status 8/28/2017   Do you have difficulty preparing food and eating? No   Do you have difficulty bathing yourself, getting dressed or grooming yourself? No   Do you have difficulty using the toilet? No   Do you have difficulty moving around from place to place? No   In the past year have you fallen or experienced a near fall? Yes   Do you need help using the phone?  No   Are you deaf or do you have serious  difficulty hearing?  Yes   Do you need help with transportation? No   Do you need help shopping? No   Do you need help preparing meals?  No   Do you need help with housework?  No   Do you need help with laundry? No   Do you need help taking your medications? No   Do you need help managing money? No   Do you have difficulty concentrating, remembering or making decisions? No           Does the patient have evidence of cognitive impairment? No    Aspirin use counseling: Does not need ASA (and currently is not on it)      Recent Lab Results:  CMP:  Lab Results   Component Value Date     (H) 01/14/2019    BUN 32 (H) 01/14/2019    CREATININE 1.31 (H) 01/14/2019    EGFRIFNONA 40 (L) 01/14/2019    EGFRIFAFRI 49 (L) 01/14/2019    BCR 24.4 01/14/2019     01/14/2019    K 4.2 01/14/2019    CO2 23.0 01/14/2019    CALCIUM 9.0 01/14/2019    PROTENTOTREF 6.8 01/14/2019    ALBUMIN 4.10 01/14/2019    LABGLOBREF 2.7 01/14/2019    LABIL2 1.5 01/14/2019    BILITOT 0.4 01/14/2019    ALKPHOS 104 01/14/2019    AST 17 01/14/2019    ALT 15 01/14/2019     Lipid Panel:  Lab Results   Component Value Date    TRIG 91 10/01/2018    HDL 67 (H) 10/01/2018    VLDL 18.2 10/01/2018    LDLHDL 1.92 10/01/2018     HbA1c:       Visual Acuity:  No exam data present    Age-appropriate Screening Schedule:  Refer to the list below for future screening recommendations based on patient's age, sex and/or medical conditions. Orders for these recommended tests are listed in the plan section. The patient has been provided with a written plan.    Health Maintenance   Topic Date Due   • ZOSTER VACCINE (2 of 3) 11/17/2012   • DXA SCAN  07/12/2018   • COLONOSCOPY  08/06/2018   • LIPID PANEL  10/01/2019   • MAMMOGRAM  07/31/2020   • TDAP/TD VACCINES (2 - Td) 07/09/2025   • INFLUENZA VACCINE  Completed   • PNEUMOCOCCAL VACCINES (65+ LOW/MEDIUM RISK)  Completed        Subjective   History of Present Illness    Marylu Foreman is a 73 y.o. female who presents  for an Subsequent Wellness Visit.    The following portions of the patient's history were reviewed and updated as appropriate: allergies, current medications, past family history, past medical history, past social history, past surgical history and problem list.    Outpatient Medications Prior to Visit   Medication Sig Dispense Refill   • busPIRone (BUSPAR) 5 MG tablet Take 2 tablets by mouth 2 (Two) Times a Day.  0   • Cholecalciferol (VITAMIN D PO) Take  by mouth.     • clobetasol (TEMOVATE) 0.05 % cream Apply  topically to the appropriate area as directed 2 (Two) Times a Day. 30 g 1   • diltiaZEM CD (DILTIAZEM CD) 180 MG 24 hr capsule Take 1 capsule by mouth Daily. 30 capsule 0   • DULoxetine (CYMBALTA) 60 MG capsule take 2 capsules by mouth every morning  0   • estradiol (ESTRACE VAGINAL) 0.1 MG/GM vaginal cream Use pea-sized amount in vagina twice weekly 42.5 g 2   • irbesartan (AVAPRO) 300 MG tablet TAKE 1 TABLET DAILY 90 tablet 1   • levothyroxine (SYNTHROID, LEVOTHROID) 50 MCG tablet      • Multiple Vitamin (MULTI-VITAMIN PO) Take 1 tablet by mouth daily.     • omeprazole (priLOSEC) 40 MG capsule Take 40 mg by mouth Daily.     • Probiotic Product (PROBIOTIC PO) Take  by mouth daily. Lactobacillus rhamnosus GG     • triamterene-hydrochlorothiazide (DYAZIDE) 37.5-25 MG per capsule TAKE 1 CAPSULE EVERY MORNING 90 capsule 1   • warfarin (COUMADIN) 5 MG tablet Take 5mg daily or as directed by MD 45 tablet 0   • Cyanocobalamin (VITAMIN B 12 PO) Take  by mouth.     • Modafinil (PROVIGIL PO) Take  by mouth.     • Aromatic Inhalants (INHALER DECONGESTANT IN) Inhale.     • azithromycin (ZITHROMAX) 1 g powder Take 1 packet by mouth 1 (One) Time.     • benzonatate (TESSALON) 100 MG capsule Take 100 mg by mouth 3 (Three) Times a Day As Needed for Cough.       No facility-administered medications prior to visit.        Patient Active Problem List   Diagnosis   • Anxiety   • Essential hypertension   • Chronic pulmonary  "embolism (CMS/HCC)   • Tension headache   • Depression   • Recurrent UTI   • Malignant neoplasm of left female breast (CMS/HCC)   • Warfarin anticoagulation   • Hx of total knee arthroplasty   • Paroxysmal atrial fibrillation (CMS/HCC)   • Morbid obesity (CMS/HCC)   • Hyperlipidemia   • Chronic renal impairment, stage 2 (mild)   • Hypothyroidism   • Pelvic pressure in female   • Incomplete uterovaginal prolapse   • Iron deficiency   • Iron deficiency anemia   • Postsurgical nonabsorption   • Acute GI bleeding   • H/O: hypertension   • Internal bleeding   • Anticoagulation management encounter       Advance Care Planning:  has an advance directive - a copy HAS NOT been provided. Have asked the patient to send this to us to add to record.    Identification of Risk Factors:  Risk factors include: weight , cardiovascular risk, inactivity and increased fall risk.    Review of Systems    Compared to one year ago, the patient feels her physical health is the same.  Compared to one year ago, the patient feels her mental health is worse.seeing psych    Objective     Physical Exam    Vitals:    01/21/19 1022   BP: 126/64   BP Location: Right arm   Patient Position: Sitting   Pulse: 85   Temp: 98 °F (36.7 °C)   TempSrc: Oral   SpO2: 95%   Weight: 106 kg (234 lb 6.4 oz)   Height: 167.6 cm (65.98\")       Patient's Body mass index is 37.86 kg/m². BMI is above normal parameters. Recommendations include: exercise counseling and nutrition counseling.      Assessment/Plan   Patient Self-Management and Personalized Health Advice  The patient has been provided with information about: diet, exercise, the relationship between weight and GERD and fall prevention and preventive services including:   · Exercise counseling provided, Nutrition counseling provided.    Visit Diagnoses:    ICD-10-CM ICD-9-CM   1. Paroxysmal atrial fibrillation (CMS/HCC) I48.0 427.31   2. Hyperlipidemia, unspecified hyperlipidemia type E78.5 272.4   3. Essential " hypertension I10 401.9   4. Other depression F32.89 311       No orders of the defined types were placed in this encounter.      Outpatient Encounter Medications as of 1/21/2019   Medication Sig Dispense Refill   • busPIRone (BUSPAR) 5 MG tablet Take 2 tablets by mouth 2 (Two) Times a Day.  0   • Cholecalciferol (VITAMIN D PO) Take  by mouth.     • clobetasol (TEMOVATE) 0.05 % cream Apply  topically to the appropriate area as directed 2 (Two) Times a Day. 30 g 1   • diltiaZEM CD (DILTIAZEM CD) 180 MG 24 hr capsule Take 1 capsule by mouth Daily. 30 capsule 0   • DULoxetine (CYMBALTA) 60 MG capsule take 2 capsules by mouth every morning  0   • estradiol (ESTRACE VAGINAL) 0.1 MG/GM vaginal cream Use pea-sized amount in vagina twice weekly 42.5 g 2   • irbesartan (AVAPRO) 300 MG tablet TAKE 1 TABLET DAILY 90 tablet 1   • levothyroxine (SYNTHROID, LEVOTHROID) 50 MCG tablet      • Multiple Vitamin (MULTI-VITAMIN PO) Take 1 tablet by mouth daily.     • omeprazole (priLOSEC) 40 MG capsule Take 40 mg by mouth Daily.     • Probiotic Product (PROBIOTIC PO) Take  by mouth daily. Lactobacillus rhamnosus GG     • triamterene-hydrochlorothiazide (DYAZIDE) 37.5-25 MG per capsule TAKE 1 CAPSULE EVERY MORNING 90 capsule 1   • warfarin (COUMADIN) 5 MG tablet Take 5mg daily or as directed by MD 45 tablet 0   • Cyanocobalamin (VITAMIN B 12 PO) Take  by mouth.     • Modafinil (PROVIGIL PO) Take  by mouth.     • [DISCONTINUED] Aromatic Inhalants (INHALER DECONGESTANT IN) Inhale.     • [DISCONTINUED] azithromycin (ZITHROMAX) 1 g powder Take 1 packet by mouth 1 (One) Time.     • [DISCONTINUED] benzonatate (TESSALON) 100 MG capsule Take 100 mg by mouth 3 (Three) Times a Day As Needed for Cough.       No facility-administered encounter medications on file as of 1/21/2019.        Reviewed use of high risk medication in the elderly: yes  Reviewed for potential of harmful drug interactions in the elderly: yes    Follow Up:  No Follow-up on file.      An After Visit Summary and PPPS with all of these plans were given to the patient.    She is struggling with depression  I have rec silver sneakers  She needs to exercise reg  I have rec she limit carbs and sugars

## 2019-01-23 RX ORDER — OMEPRAZOLE 40 MG/1
40 CAPSULE, DELAYED RELEASE ORAL DAILY
Qty: 90 CAPSULE | Refills: 1 | Status: SHIPPED | OUTPATIENT
Start: 2019-01-23 | End: 2020-03-02

## 2019-01-28 ENCOUNTER — LAB (OUTPATIENT)
Dept: OTHER | Facility: HOSPITAL | Age: 74
End: 2019-01-28

## 2019-01-28 ENCOUNTER — OFFICE VISIT (OUTPATIENT)
Dept: ONCOLOGY | Facility: CLINIC | Age: 74
End: 2019-01-28

## 2019-01-28 DIAGNOSIS — Z79.01 ANTICOAGULATION MANAGEMENT ENCOUNTER: Primary | ICD-10-CM

## 2019-01-28 DIAGNOSIS — Z51.81 ANTICOAGULATION MANAGEMENT ENCOUNTER: Primary | ICD-10-CM

## 2019-01-28 DIAGNOSIS — Z79.01 WARFARIN ANTICOAGULATION: Chronic | ICD-10-CM

## 2019-01-28 LAB
INR PPP: 1.5
PROTHROMBIN TIME: 17.5 SECONDS (ref 11–15)

## 2019-01-28 PROCEDURE — 85610 PROTHROMBIN TIME: CPT

## 2019-01-28 PROCEDURE — 99212-NC PR NO CHARGE CBC OFFICE OUTPATIENT VISIT 10 MINUTES: Performed by: NURSE PRACTITIONER

## 2019-01-28 NOTE — PROGRESS NOTES
Patient here for PT/ INR with review.  She takes coumadin 5 mg daily  She does not think she missed any doses. She did have an increase of her Buspar about two weeks ago.    Lab Results   Component Value Date    INR 1.50 01/28/2019    INR 3.40 12/31/2018    INR 2.70 12/19/2018    PROTIME 17.5 (H) 01/28/2019    PROTIME 40.3 (H) 12/31/2018    PROTIME 32.0 (H) 12/19/2018     Instructed her to increase her Coumadin to 7.5 mg on Monday and Thursday, and 5 mg all other days.  She will return in one week for repeat PT/INR with RN review.    LAURA Narayanan

## 2019-02-04 ENCOUNTER — LAB (OUTPATIENT)
Dept: OTHER | Facility: HOSPITAL | Age: 74
End: 2019-02-04

## 2019-02-04 ENCOUNTER — OFFICE VISIT (OUTPATIENT)
Dept: ONCOLOGY | Facility: CLINIC | Age: 74
End: 2019-02-04

## 2019-02-04 DIAGNOSIS — Z51.81 ANTICOAGULATION MANAGEMENT ENCOUNTER: ICD-10-CM

## 2019-02-04 DIAGNOSIS — I27.82 OTHER CHRONIC PULMONARY EMBOLISM WITHOUT ACUTE COR PULMONALE (HCC): Primary | Chronic | ICD-10-CM

## 2019-02-04 DIAGNOSIS — Z79.01 WARFARIN ANTICOAGULATION: Chronic | ICD-10-CM

## 2019-02-04 DIAGNOSIS — Z79.01 ANTICOAGULATION MANAGEMENT ENCOUNTER: ICD-10-CM

## 2019-02-04 LAB
INR PPP: 2.6
PROTHROMBIN TIME: 30.6 SECONDS (ref 11–15)

## 2019-02-04 PROCEDURE — 99212-NC PR NO CHARGE CBC OFFICE OUTPATIENT VISIT 10 MINUTES: Performed by: NURSE PRACTITIONER

## 2019-02-04 PROCEDURE — 85610 PROTHROMBIN TIME: CPT

## 2019-02-04 NOTE — PROGRESS NOTES
Patient here for INR review. Doing well with no new concerns.            Results from last 7 days   Lab Units 02/04/19  1350   INR  2.60     Coumadin dosing will remain the same. She will return in 3 weeks as already scheduled.

## 2019-02-25 ENCOUNTER — LAB (OUTPATIENT)
Dept: OTHER | Facility: HOSPITAL | Age: 74
End: 2019-02-25

## 2019-02-25 ENCOUNTER — OFFICE VISIT (OUTPATIENT)
Dept: ONCOLOGY | Facility: CLINIC | Age: 74
End: 2019-02-25

## 2019-02-25 DIAGNOSIS — Z51.81 ANTICOAGULATION MANAGEMENT ENCOUNTER: ICD-10-CM

## 2019-02-25 DIAGNOSIS — I27.82 OTHER CHRONIC PULMONARY EMBOLISM WITHOUT ACUTE COR PULMONALE (HCC): Primary | Chronic | ICD-10-CM

## 2019-02-25 DIAGNOSIS — Z79.01 WARFARIN ANTICOAGULATION: Chronic | ICD-10-CM

## 2019-02-25 DIAGNOSIS — Z79.01 ANTICOAGULATION MANAGEMENT ENCOUNTER: ICD-10-CM

## 2019-02-25 LAB
INR PPP: 2.2
PROTHROMBIN TIME: 26.7 SECONDS (ref 11–15)

## 2019-02-25 PROCEDURE — 99212-NC PR NO CHARGE CBC OFFICE OUTPATIENT VISIT 10 MINUTES: Performed by: NURSE PRACTITIONER

## 2019-02-25 PROCEDURE — 85610 PROTHROMBIN TIME: CPT

## 2019-02-25 RX ORDER — WARFARIN SODIUM 5 MG/1
TABLET ORAL
Qty: 45 TABLET | Refills: 3 | Status: SHIPPED | OUTPATIENT
Start: 2019-02-25 | End: 2019-08-12 | Stop reason: SDUPTHER

## 2019-02-25 NOTE — PROGRESS NOTES
Patient here for INR review. Doing well with no new concerns.            Results from last 7 days   Lab Units 02/25/19  1050   INR  2.20     Coumadin dosing will be adjusted to 7.5 mg Sun, Tue, Thur, 5 mg all other days. She will return in 4 weeks as already scheduled.

## 2019-03-04 RX ORDER — TRIAMTERENE AND HYDROCHLOROTHIAZIDE 37.5; 25 MG/1; MG/1
CAPSULE ORAL
Qty: 90 CAPSULE | Refills: 1 | Status: SHIPPED | OUTPATIENT
Start: 2019-03-04 | End: 2020-03-17 | Stop reason: SDUPTHER

## 2019-03-25 ENCOUNTER — CLINICAL SUPPORT (OUTPATIENT)
Dept: ONCOLOGY | Facility: HOSPITAL | Age: 74
End: 2019-03-25

## 2019-03-25 ENCOUNTER — LAB (OUTPATIENT)
Dept: OTHER | Facility: HOSPITAL | Age: 74
End: 2019-03-25

## 2019-03-25 DIAGNOSIS — Z51.81 ANTICOAGULATION MANAGEMENT ENCOUNTER: Primary | ICD-10-CM

## 2019-03-25 DIAGNOSIS — Z17.0 MALIGNANT NEOPLASM OF NIPPLE OF LEFT BREAST IN FEMALE, ESTROGEN RECEPTOR POSITIVE (HCC): ICD-10-CM

## 2019-03-25 DIAGNOSIS — C50.012 MALIGNANT NEOPLASM OF NIPPLE OF LEFT BREAST IN FEMALE, ESTROGEN RECEPTOR POSITIVE (HCC): ICD-10-CM

## 2019-03-25 DIAGNOSIS — Z79.01 ANTICOAGULATION MANAGEMENT ENCOUNTER: Primary | ICD-10-CM

## 2019-03-25 LAB
BASOPHILS # BLD AUTO: 0.12 10*3/MM3 (ref 0–0.2)
BASOPHILS NFR BLD AUTO: 1.3 % (ref 0–1.5)
DEPRECATED RDW RBC AUTO: 50.4 FL (ref 37–54)
EOSINOPHIL # BLD AUTO: 0.2 10*3/MM3 (ref 0–0.4)
EOSINOPHIL NFR BLD AUTO: 2.2 % (ref 0.3–6.2)
ERYTHROCYTE [DISTWIDTH] IN BLOOD BY AUTOMATED COUNT: 14.2 % (ref 12.3–15.4)
FERRITIN SERPL-MCNC: 54.7 NG/ML (ref 13–150)
HCT VFR BLD AUTO: 39.7 % (ref 34–46.6)
HGB BLD-MCNC: 12.5 G/DL (ref 12–15.9)
IMM GRANULOCYTES # BLD AUTO: 0.02 10*3/MM3 (ref 0–0.05)
IMM GRANULOCYTES NFR BLD AUTO: 0.2 % (ref 0–0.5)
INR PPP: 2.9
IRON 24H UR-MRATE: 88 MCG/DL (ref 37–145)
IRON SATN MFR SERPL: 21 % (ref 20–50)
LYMPHOCYTES # BLD AUTO: 2.07 10*3/MM3 (ref 0.7–3.1)
LYMPHOCYTES NFR BLD AUTO: 22.5 % (ref 19.6–45.3)
MCH RBC QN AUTO: 30.4 PG (ref 26.6–33)
MCHC RBC AUTO-ENTMCNC: 31.5 G/DL (ref 31.5–35.7)
MCV RBC AUTO: 96.6 FL (ref 79–97)
MONOCYTES # BLD AUTO: 0.65 10*3/MM3 (ref 0.1–0.9)
MONOCYTES NFR BLD AUTO: 7.1 % (ref 5–12)
NEUTROPHILS # BLD AUTO: 6.14 10*3/MM3 (ref 1.4–7)
NEUTROPHILS NFR BLD AUTO: 66.7 % (ref 42.7–76)
NRBC BLD AUTO-RTO: 0 /100 WBC (ref 0–0)
PLATELET # BLD AUTO: 382 10*3/MM3 (ref 140–450)
PMV BLD AUTO: 10.8 FL (ref 6–12)
PROTHROMBIN TIME: 34.6 SECONDS (ref 11–15)
RBC # BLD AUTO: 4.11 10*6/MM3 (ref 3.77–5.28)
TIBC SERPL-MCNC: 413 MCG/DL (ref 298–536)
TRANSFERRIN SERPL-MCNC: 277 MG/DL (ref 200–360)
WBC NRBC COR # BLD: 9.2 10*3/MM3 (ref 3.4–10.8)

## 2019-03-25 PROCEDURE — 85610 PROTHROMBIN TIME: CPT

## 2019-03-25 PROCEDURE — 36415 COLL VENOUS BLD VENIPUNCTURE: CPT

## 2019-03-25 PROCEDURE — 84466 ASSAY OF TRANSFERRIN: CPT | Performed by: INTERNAL MEDICINE

## 2019-03-25 PROCEDURE — 83540 ASSAY OF IRON: CPT | Performed by: INTERNAL MEDICINE

## 2019-03-25 PROCEDURE — 82728 ASSAY OF FERRITIN: CPT | Performed by: INTERNAL MEDICINE

## 2019-03-25 PROCEDURE — 85025 COMPLETE CBC W/AUTO DIFF WBC: CPT | Performed by: INTERNAL MEDICINE

## 2019-03-25 NOTE — PROGRESS NOTES
Pt here for cbc and INR review. CBC is WNL and pt denies any c/o.  Copy of labs given to patient.  INR  2.9.  Patient confirms taking coumadin 7.5mg Sun/Tues/Thurs and 5mg all other days.  She denies missing any doses, no change in medications, no change in diet.   Per SS, patient will now take coumadin 7.5mg Sun/Thurs and 5mg all other days. Pt will return in April to see Dr. Mahajan.  She knows to call with any issues, questions, or concerns.      Lab Results   Component Value Date    WBC 9.20 03/25/2019    HGB 12.5 03/25/2019    HCT 39.7 03/25/2019    MCV 96.6 03/25/2019     03/25/2019     Lab Results   Component Value Date    INR 2.90 03/25/2019    INR 2.20 02/25/2019    INR 2.60 02/04/2019    PROTIME 34.6 (H) 03/25/2019    PROTIME 26.7 (H) 02/25/2019    PROTIME 30.6 (H) 02/04/2019

## 2019-04-22 ENCOUNTER — LAB (OUTPATIENT)
Dept: OTHER | Facility: HOSPITAL | Age: 74
End: 2019-04-22

## 2019-04-22 ENCOUNTER — OFFICE VISIT (OUTPATIENT)
Dept: ONCOLOGY | Facility: CLINIC | Age: 74
End: 2019-04-22

## 2019-04-22 VITALS
WEIGHT: 228.8 LBS | DIASTOLIC BLOOD PRESSURE: 90 MMHG | SYSTOLIC BLOOD PRESSURE: 152 MMHG | HEART RATE: 95 BPM | TEMPERATURE: 98.1 F | RESPIRATION RATE: 16 BRPM | OXYGEN SATURATION: 100 % | HEIGHT: 66 IN | BODY MASS INDEX: 36.77 KG/M2

## 2019-04-22 DIAGNOSIS — C50.012 MALIGNANT NEOPLASM OF NIPPLE OF LEFT BREAST IN FEMALE, ESTROGEN RECEPTOR POSITIVE (HCC): Primary | ICD-10-CM

## 2019-04-22 DIAGNOSIS — Z79.01 WARFARIN ANTICOAGULATION: Primary | ICD-10-CM

## 2019-04-22 DIAGNOSIS — R58 INTERNAL BLEEDING: ICD-10-CM

## 2019-04-22 DIAGNOSIS — Z51.81 ANTICOAGULATION MANAGEMENT ENCOUNTER: ICD-10-CM

## 2019-04-22 DIAGNOSIS — Z79.01 ANTICOAGULATION MANAGEMENT ENCOUNTER: ICD-10-CM

## 2019-04-22 DIAGNOSIS — Z17.0 MALIGNANT NEOPLASM OF NIPPLE OF LEFT BREAST IN FEMALE, ESTROGEN RECEPTOR POSITIVE (HCC): Primary | ICD-10-CM

## 2019-04-22 DIAGNOSIS — E61.1 IRON DEFICIENCY: ICD-10-CM

## 2019-04-22 LAB
BASOPHILS # BLD AUTO: 0.09 10*3/MM3 (ref 0–0.2)
BASOPHILS NFR BLD AUTO: 1.1 % (ref 0–1.5)
DEPRECATED RDW RBC AUTO: 48.2 FL (ref 37–54)
EOSINOPHIL # BLD AUTO: 0.28 10*3/MM3 (ref 0–0.4)
EOSINOPHIL NFR BLD AUTO: 3.3 % (ref 0.3–6.2)
ERYTHROCYTE [DISTWIDTH] IN BLOOD BY AUTOMATED COUNT: 14 % (ref 12.3–15.4)
HCT VFR BLD AUTO: 39.7 % (ref 34–46.6)
HGB BLD-MCNC: 12.9 G/DL (ref 12–15.9)
IMM GRANULOCYTES # BLD AUTO: 0.01 10*3/MM3 (ref 0–0.05)
IMM GRANULOCYTES NFR BLD AUTO: 0.1 % (ref 0–0.5)
INR PPP: 2.4
LYMPHOCYTES # BLD AUTO: 2.35 10*3/MM3 (ref 0.7–3.1)
LYMPHOCYTES NFR BLD AUTO: 27.6 % (ref 19.6–45.3)
MCH RBC QN AUTO: 30.5 PG (ref 26.6–33)
MCHC RBC AUTO-ENTMCNC: 32.5 G/DL (ref 31.5–35.7)
MCV RBC AUTO: 93.9 FL (ref 79–97)
MONOCYTES # BLD AUTO: 0.78 10*3/MM3 (ref 0.1–0.9)
MONOCYTES NFR BLD AUTO: 9.2 % (ref 5–12)
NEUTROPHILS # BLD AUTO: 4.99 10*3/MM3 (ref 1.7–7)
NEUTROPHILS NFR BLD AUTO: 58.7 % (ref 42.7–76)
NRBC BLD AUTO-RTO: 0 /100 WBC (ref 0–0.2)
PLATELET # BLD AUTO: 388 10*3/MM3 (ref 140–450)
PMV BLD AUTO: 10.8 FL (ref 6–12)
PROTHROMBIN TIME: 28.3 SECONDS (ref 11–15)
RBC # BLD AUTO: 4.23 10*6/MM3 (ref 3.77–5.28)
WBC NRBC COR # BLD: 8.5 10*3/MM3 (ref 3.4–10.8)

## 2019-04-22 PROCEDURE — 85610 PROTHROMBIN TIME: CPT

## 2019-04-22 PROCEDURE — 36415 COLL VENOUS BLD VENIPUNCTURE: CPT

## 2019-04-22 PROCEDURE — 99213 OFFICE O/P EST LOW 20 MIN: CPT | Performed by: INTERNAL MEDICINE

## 2019-04-22 PROCEDURE — 85025 COMPLETE CBC W/AUTO DIFF WBC: CPT | Performed by: INTERNAL MEDICINE

## 2019-04-22 RX ORDER — BUSPIRONE HYDROCHLORIDE 10 MG/1
20 TABLET ORAL 2 TIMES DAILY
Refills: 0 | COMMUNITY
Start: 2019-02-08 | End: 2020-11-03
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
## 2019-06-03 ENCOUNTER — CLINICAL SUPPORT (OUTPATIENT)
Dept: ONCOLOGY | Facility: HOSPITAL | Age: 74
End: 2019-06-03

## 2019-06-03 ENCOUNTER — APPOINTMENT (OUTPATIENT)
Dept: OTHER | Facility: HOSPITAL | Age: 74
End: 2019-06-03

## 2019-06-03 DIAGNOSIS — Z79.01 WARFARIN ANTICOAGULATION: Primary | ICD-10-CM

## 2019-06-03 LAB
INR PPP: 1.8
PROTHROMBIN TIME: 21.8 SECONDS (ref 11–15)

## 2019-06-03 PROCEDURE — 85610 PROTHROMBIN TIME: CPT

## 2019-06-03 NOTE — PROGRESS NOTES
Patient arrived and the lab processed her PT/INR. The patient left to go to work and requested that the RN contact her at a specific number after 3:00 pm. ([phone]) The patient was instructed to take her medication as per [facility]. The patient had no complaints of n/v/d or bleeding or excessive bruising. No complaints of shortness of air.  The patient will be going on a vacation and is moving her next appointment from 7/15/2019 to an earlier appointment on 7/1/2019 to make sure her Coumadin levels are therapeutic pre-trip. Patient will contact physician should there be any concerns that arise before her next appointment.

## 2019-07-01 ENCOUNTER — CLINICAL SUPPORT (OUTPATIENT)
Dept: ONCOLOGY | Facility: HOSPITAL | Age: 74
End: 2019-07-01

## 2019-07-01 ENCOUNTER — APPOINTMENT (OUTPATIENT)
Dept: OTHER | Facility: HOSPITAL | Age: 74
End: 2019-07-01

## 2019-07-01 VITALS
TEMPERATURE: 97.7 F | HEART RATE: 89 BPM | BODY MASS INDEX: 36.53 KG/M2 | DIASTOLIC BLOOD PRESSURE: 73 MMHG | OXYGEN SATURATION: 96 % | SYSTOLIC BLOOD PRESSURE: 126 MMHG | WEIGHT: 226.2 LBS | RESPIRATION RATE: 18 BRPM

## 2019-07-01 DIAGNOSIS — Z79.01 ANTICOAGULATION MANAGEMENT ENCOUNTER: ICD-10-CM

## 2019-07-01 DIAGNOSIS — Z79.01 WARFARIN ANTICOAGULATION: Primary | ICD-10-CM

## 2019-07-01 DIAGNOSIS — Z51.81 ANTICOAGULATION MANAGEMENT ENCOUNTER: ICD-10-CM

## 2019-07-01 LAB
INR PPP: 3.3
PROTHROMBIN TIME: 39 SECONDS (ref 11–15)

## 2019-07-01 PROCEDURE — 85610 PROTHROMBIN TIME: CPT

## 2019-07-01 NOTE — PROGRESS NOTES
Pt here for INR review, denies any signs of active bleeding.  [facility] instructions give to pt with adjusted dose. Pt to take 7.5 mg on sun and thur and 5 mg the rest of the days. Pt instructed to call for any concerns or new symptoms and f/u appt reviewed. Pt vu    .  Lab Results   Component Value Date    INR 3.30 07/01/2019    INR 1.80 06/03/2019    INR 2.40 04/22/2019    PROTIME 39.0 (H) 07/01/2019    PROTIME 21.8 (H) 06/03/2019    PROTIME 28.3 (H) 04/22/2019

## 2019-07-05 RX ORDER — IRBESARTAN 300 MG/1
300 TABLET ORAL DAILY
Qty: 90 TABLET | Refills: 1 | Status: SHIPPED | OUTPATIENT
Start: 2019-07-05 | End: 2020-01-03

## 2019-08-12 RX ORDER — WARFARIN SODIUM 5 MG/1
TABLET ORAL
Qty: 45 TABLET | Refills: 1 | Status: SHIPPED | OUTPATIENT
Start: 2019-08-12 | End: 2019-11-04 | Stop reason: SDUPTHER

## 2019-08-26 ENCOUNTER — CLINICAL SUPPORT (OUTPATIENT)
Dept: ONCOLOGY | Facility: HOSPITAL | Age: 74
End: 2019-08-26

## 2019-08-26 ENCOUNTER — APPOINTMENT (OUTPATIENT)
Dept: OTHER | Facility: HOSPITAL | Age: 74
End: 2019-08-26

## 2019-08-26 DIAGNOSIS — Z51.81 ANTICOAGULATION MANAGEMENT ENCOUNTER: ICD-10-CM

## 2019-08-26 DIAGNOSIS — Z79.01 ANTICOAGULATION MANAGEMENT ENCOUNTER: ICD-10-CM

## 2019-08-26 DIAGNOSIS — Z79.01 WARFARIN ANTICOAGULATION: Primary | ICD-10-CM

## 2019-08-26 LAB
INR PPP: 3.7
PROTHROMBIN TIME: 44.9 SECONDS (ref 11–15)

## 2019-08-26 PROCEDURE — 85610 PROTHROMBIN TIME: CPT

## 2019-08-26 NOTE — PROGRESS NOTES
Pt here for INR review did not want to stay for results and asked to call her on her cell at [phone]. 5068 labs and dosing reviewed with ALBER SANCHEZ and states ok to use recommended dosing per [facility]. Spoke with pt on phone, denies any excessive bleeding or bruising, denies changes in diet or starting new medications. Instructed pt on new dosing to take 5 mg daily per [facility]. Pt vu and repeated back instructions.  f/u appt reviewed. Pt is instructed to call the office with any concerns or new symptoms prior to next visit. Pt vu    Lab Results   Component Value Date    INR 3.70 08/26/2019    INR 3.30 07/01/2019    INR 1.80 06/03/2019    PROTIME 44.9 (H) 08/26/2019    PROTIME 39.0 (H) 07/01/2019    PROTIME 21.8 (H) 06/03/2019

## 2019-09-03 RX ORDER — TRIAMTERENE AND HYDROCHLOROTHIAZIDE 37.5; 25 MG/1; MG/1
CAPSULE ORAL
Qty: 90 CAPSULE | Refills: 4 | OUTPATIENT
Start: 2019-09-03

## 2019-09-13 ENCOUNTER — TRANSCRIBE ORDERS (OUTPATIENT)
Dept: ADMINISTRATIVE | Facility: HOSPITAL | Age: 74
End: 2019-09-13

## 2019-09-13 DIAGNOSIS — Z12.31 VISIT FOR SCREENING MAMMOGRAM: Primary | ICD-10-CM

## 2019-09-30 ENCOUNTER — HOSPITAL ENCOUNTER (OUTPATIENT)
Dept: MAMMOGRAPHY | Facility: HOSPITAL | Age: 74
Discharge: HOME | End: 2019-09-30
Admitting: INTERNAL MEDICINE

## 2019-09-30 DIAGNOSIS — Z12.31 VISIT FOR SCREENING MAMMOGRAM: ICD-10-CM

## 2019-09-30 PROCEDURE — 77067 SCR MAMMO BI INCL CAD: CPT

## 2019-09-30 PROCEDURE — 77063 BREAST TOMOSYNTHESIS BI: CPT

## 2019-10-07 ENCOUNTER — LAB (OUTPATIENT)
Dept: OTHER | Facility: HOSPITAL | Age: 74
End: 2019-10-07

## 2019-10-07 ENCOUNTER — OFFICE VISIT (OUTPATIENT)
Dept: ONCOLOGY | Facility: CLINIC | Age: 74
End: 2019-10-07

## 2019-10-07 VITALS
SYSTOLIC BLOOD PRESSURE: 123 MMHG | OXYGEN SATURATION: 97 % | WEIGHT: 229.9 LBS | RESPIRATION RATE: 16 BRPM | TEMPERATURE: 98 F | DIASTOLIC BLOOD PRESSURE: 83 MMHG | BODY MASS INDEX: 36.95 KG/M2 | HEART RATE: 89 BPM | HEIGHT: 66 IN

## 2019-10-07 DIAGNOSIS — E61.1 IRON DEFICIENCY: ICD-10-CM

## 2019-10-07 DIAGNOSIS — Z51.81 ANTICOAGULATION MANAGEMENT ENCOUNTER: ICD-10-CM

## 2019-10-07 DIAGNOSIS — Z79.01 ANTICOAGULATION MANAGEMENT ENCOUNTER: ICD-10-CM

## 2019-10-07 DIAGNOSIS — C50.012 MALIGNANT NEOPLASM OF NIPPLE OF LEFT BREAST IN FEMALE, ESTROGEN RECEPTOR POSITIVE (HCC): Primary | ICD-10-CM

## 2019-10-07 DIAGNOSIS — Z17.0 MALIGNANT NEOPLASM OF NIPPLE OF LEFT BREAST IN FEMALE, ESTROGEN RECEPTOR POSITIVE (HCC): ICD-10-CM

## 2019-10-07 DIAGNOSIS — Z79.01 WARFARIN ANTICOAGULATION: Primary | ICD-10-CM

## 2019-10-07 DIAGNOSIS — C50.012 MALIGNANT NEOPLASM OF NIPPLE OF LEFT BREAST IN FEMALE, ESTROGEN RECEPTOR POSITIVE (HCC): ICD-10-CM

## 2019-10-07 DIAGNOSIS — Z17.0 MALIGNANT NEOPLASM OF NIPPLE OF LEFT BREAST IN FEMALE, ESTROGEN RECEPTOR POSITIVE (HCC): Primary | ICD-10-CM

## 2019-10-07 LAB
BASOPHILS # BLD AUTO: 0.13 10*3/MM3 (ref 0–0.2)
BASOPHILS NFR BLD AUTO: 1.1 % (ref 0–1.5)
DEPRECATED RDW RBC AUTO: 48.8 FL (ref 37–54)
EOSINOPHIL # BLD AUTO: 0.41 10*3/MM3 (ref 0–0.4)
EOSINOPHIL NFR BLD AUTO: 3.6 % (ref 0.3–6.2)
ERYTHROCYTE [DISTWIDTH] IN BLOOD BY AUTOMATED COUNT: 14.7 % (ref 12.3–15.4)
FERRITIN SERPL-MCNC: 41.5 NG/ML (ref 13–150)
HCT VFR BLD AUTO: 39 % (ref 34–46.6)
HGB BLD-MCNC: 12.8 G/DL (ref 12–15.9)
IMM GRANULOCYTES # BLD AUTO: 0.05 10*3/MM3 (ref 0–0.05)
IMM GRANULOCYTES NFR BLD AUTO: 0.4 % (ref 0–0.5)
INR PPP: 3.3
IRON 24H UR-MRATE: 95 MCG/DL (ref 37–145)
IRON SATN MFR SERPL: 19 % (ref 20–50)
LYMPHOCYTES # BLD AUTO: 2.59 10*3/MM3 (ref 0.7–3.1)
LYMPHOCYTES NFR BLD AUTO: 22.7 % (ref 19.6–45.3)
MCH RBC QN AUTO: 29.8 PG (ref 26.6–33)
MCHC RBC AUTO-ENTMCNC: 32.8 G/DL (ref 31.5–35.7)
MCV RBC AUTO: 90.9 FL (ref 79–97)
MONOCYTES # BLD AUTO: 0.98 10*3/MM3 (ref 0.1–0.9)
MONOCYTES NFR BLD AUTO: 8.6 % (ref 5–12)
NEUTROPHILS # BLD AUTO: 7.23 10*3/MM3 (ref 1.7–7)
NEUTROPHILS NFR BLD AUTO: 63.6 % (ref 42.7–76)
NRBC BLD AUTO-RTO: 0.4 /100 WBC (ref 0–0.2)
PLATELET # BLD AUTO: 346 10*3/MM3 (ref 140–450)
PMV BLD AUTO: 11.2 FL (ref 6–12)
PROTHROMBIN TIME: 39.2 SECONDS (ref 11–15)
RBC # BLD AUTO: 4.29 10*6/MM3 (ref 3.77–5.28)
TIBC SERPL-MCNC: 487 MCG/DL (ref 298–536)
TRANSFERRIN SERPL-MCNC: 327 MG/DL (ref 200–360)
WBC NRBC COR # BLD: 11.39 10*3/MM3 (ref 3.4–10.8)

## 2019-10-07 PROCEDURE — 36415 COLL VENOUS BLD VENIPUNCTURE: CPT

## 2019-10-07 PROCEDURE — 99214 OFFICE O/P EST MOD 30 MIN: CPT | Performed by: INTERNAL MEDICINE

## 2019-10-07 PROCEDURE — 85025 COMPLETE CBC W/AUTO DIFF WBC: CPT | Performed by: INTERNAL MEDICINE

## 2019-10-07 PROCEDURE — G0463 HOSPITAL OUTPT CLINIC VISIT: HCPCS | Performed by: INTERNAL MEDICINE

## 2019-10-07 PROCEDURE — 83540 ASSAY OF IRON: CPT | Performed by: INTERNAL MEDICINE

## 2019-10-07 PROCEDURE — 85610 PROTHROMBIN TIME: CPT

## 2019-10-07 PROCEDURE — 84466 ASSAY OF TRANSFERRIN: CPT | Performed by: INTERNAL MEDICINE

## 2019-10-07 PROCEDURE — 82728 ASSAY OF FERRITIN: CPT | Performed by: INTERNAL MEDICINE
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
## 2019-11-04 ENCOUNTER — LAB (OUTPATIENT)
Dept: OTHER | Facility: HOSPITAL | Age: 74
End: 2019-11-04

## 2019-11-04 ENCOUNTER — CLINICAL SUPPORT (OUTPATIENT)
Dept: ONCOLOGY | Facility: HOSPITAL | Age: 74
End: 2019-11-04

## 2019-11-04 VITALS
TEMPERATURE: 98 F | RESPIRATION RATE: 16 BRPM | HEART RATE: 88 BPM | WEIGHT: 232.6 LBS | SYSTOLIC BLOOD PRESSURE: 137 MMHG | OXYGEN SATURATION: 98 % | BODY MASS INDEX: 37.56 KG/M2 | DIASTOLIC BLOOD PRESSURE: 44 MMHG

## 2019-11-04 DIAGNOSIS — E61.1 IRON DEFICIENCY: ICD-10-CM

## 2019-11-04 DIAGNOSIS — Z51.81 ANTICOAGULATION MANAGEMENT ENCOUNTER: ICD-10-CM

## 2019-11-04 DIAGNOSIS — Z17.0 MALIGNANT NEOPLASM OF NIPPLE OF LEFT BREAST IN FEMALE, ESTROGEN RECEPTOR POSITIVE (HCC): ICD-10-CM

## 2019-11-04 DIAGNOSIS — C50.012 MALIGNANT NEOPLASM OF NIPPLE OF LEFT BREAST IN FEMALE, ESTROGEN RECEPTOR POSITIVE (HCC): ICD-10-CM

## 2019-11-04 DIAGNOSIS — Z79.01 ANTICOAGULATION MANAGEMENT ENCOUNTER: ICD-10-CM

## 2019-11-04 LAB
INR PPP: 1.8
PROTHROMBIN TIME: 21.7 SECONDS (ref 11–15)

## 2019-11-04 PROCEDURE — 85610 PROTHROMBIN TIME: CPT

## 2019-11-04 RX ORDER — WARFARIN SODIUM 5 MG/1
TABLET ORAL
Qty: 45 TABLET | Refills: 0 | Status: SHIPPED | OUTPATIENT
Start: 2019-11-04 | End: 2019-12-23

## 2019-11-04 NOTE — NURSING NOTE
INR/PT with nurse review. Was taking 2.5 mgm every Sunday and 5 mgm all other days. Taking subtherapeutic with INR 1.8 Per ACELIS dose increased to 7.5 mgm on Sunday and 5 mgm all other days. Knows when to call; next appointment is scheduled. Left area stable and with no questions.

## 2019-12-02 ENCOUNTER — CLINICAL SUPPORT (OUTPATIENT)
Dept: ONCOLOGY | Facility: HOSPITAL | Age: 74
End: 2019-12-02

## 2019-12-02 ENCOUNTER — LAB (OUTPATIENT)
Dept: OTHER | Facility: HOSPITAL | Age: 74
End: 2019-12-02

## 2019-12-02 DIAGNOSIS — Z17.0 MALIGNANT NEOPLASM OF NIPPLE OF LEFT BREAST IN FEMALE, ESTROGEN RECEPTOR POSITIVE (HCC): ICD-10-CM

## 2019-12-02 DIAGNOSIS — E61.1 IRON DEFICIENCY: ICD-10-CM

## 2019-12-02 DIAGNOSIS — C50.012 MALIGNANT NEOPLASM OF NIPPLE OF LEFT BREAST IN FEMALE, ESTROGEN RECEPTOR POSITIVE (HCC): ICD-10-CM

## 2019-12-02 DIAGNOSIS — Z51.81 ANTICOAGULATION MANAGEMENT ENCOUNTER: ICD-10-CM

## 2019-12-02 DIAGNOSIS — Z79.01 ANTICOAGULATION MANAGEMENT ENCOUNTER: ICD-10-CM

## 2019-12-02 LAB
INR PPP: 2.6
PROTHROMBIN TIME: 31.1 SECONDS (ref 11–15)

## 2019-12-02 PROCEDURE — 85610 PROTHROMBIN TIME: CPT

## 2019-12-02 NOTE — NURSING NOTE
Pt is here for lab with RN review.  INR therapeutic at 2.6.  Pt did not want to stay for results and left a call back number [phone] to call for dosing schedule.   1220 spoke with pt on phone Prior dose confirmed. Pt has no complaints, denies any active bleeding or excessive bruising, changes in diet or new medicaion.  Per standing stone, pt is to take 7.5 mg of Coumadin on sun  and 5.0 mg all other days. pt vu and F/u appt reviewed. pt instructed to call the office with any concerns or new symptoms prior to next visit. Pt vu and discharged ambulatory    Lab Results   Component Value Date    INR 2.60 12/02/2019    INR 1.80 11/04/2019    INR 3.30 10/07/2019    PROTIME 31.1 (H) 12/02/2019    PROTIME 21.7 (H) 11/04/2019    PROTIME 39.2 (H) 10/07/2019

## 2019-12-23 RX ORDER — WARFARIN SODIUM 5 MG/1
TABLET ORAL
Qty: 45 TABLET | Refills: 0 | Status: SHIPPED | OUTPATIENT
Start: 2019-12-23 | End: 2020-02-03 | Stop reason: SDUPTHER

## 2019-12-30 ENCOUNTER — CLINICAL SUPPORT (OUTPATIENT)
Dept: ONCOLOGY | Facility: HOSPITAL | Age: 74
End: 2019-12-30

## 2019-12-30 ENCOUNTER — LAB (OUTPATIENT)
Dept: OTHER | Facility: HOSPITAL | Age: 74
End: 2019-12-30

## 2019-12-30 VITALS
HEART RATE: 91 BPM | TEMPERATURE: 97.6 F | OXYGEN SATURATION: 97 % | SYSTOLIC BLOOD PRESSURE: 136 MMHG | DIASTOLIC BLOOD PRESSURE: 83 MMHG | BODY MASS INDEX: 37.85 KG/M2 | WEIGHT: 234.4 LBS

## 2019-12-30 DIAGNOSIS — C50.012 MALIGNANT NEOPLASM OF NIPPLE OF LEFT BREAST IN FEMALE, ESTROGEN RECEPTOR POSITIVE (HCC): ICD-10-CM

## 2019-12-30 DIAGNOSIS — Z79.01 ANTICOAGULATION MANAGEMENT ENCOUNTER: ICD-10-CM

## 2019-12-30 DIAGNOSIS — Z51.81 ANTICOAGULATION MANAGEMENT ENCOUNTER: ICD-10-CM

## 2019-12-30 DIAGNOSIS — E61.1 IRON DEFICIENCY: ICD-10-CM

## 2019-12-30 DIAGNOSIS — Z17.0 MALIGNANT NEOPLASM OF NIPPLE OF LEFT BREAST IN FEMALE, ESTROGEN RECEPTOR POSITIVE (HCC): ICD-10-CM

## 2019-12-30 LAB
BASOPHILS # BLD AUTO: 0.11 10*3/MM3 (ref 0–0.2)
BASOPHILS NFR BLD AUTO: 1.2 % (ref 0–1.5)
DEPRECATED RDW RBC AUTO: 47 FL (ref 37–54)
EOSINOPHIL # BLD AUTO: 0.29 10*3/MM3 (ref 0–0.4)
EOSINOPHIL NFR BLD AUTO: 3.2 % (ref 0.3–6.2)
ERYTHROCYTE [DISTWIDTH] IN BLOOD BY AUTOMATED COUNT: 14.1 % (ref 12.3–15.4)
HCT VFR BLD AUTO: 38.8 % (ref 34–46.6)
HGB BLD-MCNC: 12.8 G/DL (ref 12–15.9)
IMM GRANULOCYTES # BLD AUTO: 0.03 10*3/MM3 (ref 0–0.05)
IMM GRANULOCYTES NFR BLD AUTO: 0.3 % (ref 0–0.5)
INR PPP: 4
LYMPHOCYTES # BLD AUTO: 3.19 10*3/MM3 (ref 0.7–3.1)
LYMPHOCYTES NFR BLD AUTO: 35 % (ref 19.6–45.3)
MCH RBC QN AUTO: 30.2 PG (ref 26.6–33)
MCHC RBC AUTO-ENTMCNC: 33 G/DL (ref 31.5–35.7)
MCV RBC AUTO: 91.5 FL (ref 79–97)
MONOCYTES # BLD AUTO: 0.82 10*3/MM3 (ref 0.1–0.9)
MONOCYTES NFR BLD AUTO: 9 % (ref 5–12)
NEUTROPHILS # BLD AUTO: 4.68 10*3/MM3 (ref 1.7–7)
NEUTROPHILS NFR BLD AUTO: 51.3 % (ref 42.7–76)
NRBC BLD AUTO-RTO: 0 /100 WBC (ref 0–0.2)
PLATELET # BLD AUTO: 384 10*3/MM3 (ref 140–450)
PMV BLD AUTO: 11.1 FL (ref 6–12)
PROTHROMBIN TIME: 48 SECONDS (ref 11–15)
RBC # BLD AUTO: 4.24 10*6/MM3 (ref 3.77–5.28)
WBC NRBC COR # BLD: 9.12 10*3/MM3 (ref 3.4–10.8)

## 2019-12-30 PROCEDURE — 85610 PROTHROMBIN TIME: CPT

## 2019-12-30 PROCEDURE — 85025 COMPLETE CBC W/AUTO DIFF WBC: CPT | Performed by: INTERNAL MEDICINE

## 2019-12-30 PROCEDURE — 36415 COLL VENOUS BLD VENIPUNCTURE: CPT

## 2019-12-30 RX ORDER — DILTIAZEM HYDROCHLORIDE 180 MG/1
CAPSULE, COATED, EXTENDED RELEASE ORAL
Qty: 90 CAPSULE | Refills: 0 | Status: SHIPPED | OUTPATIENT
Start: 2019-12-30 | End: 2020-03-18 | Stop reason: SDUPTHER

## 2019-12-30 NOTE — NURSING NOTE
Patient left before her CBC returned due to another commitment. Contacted patient and LM VM per her request to [phone]. No lab concerns. If patient has any concerns she may feel free to contact the physician.

## 2019-12-30 NOTE — NURSING NOTE
Patient to return on 12/30/2019 for another pt/inr  And cbc. She is changing her appointment to 9:00 am due to conflict with dental appointment.

## 2020-01-03 ENCOUNTER — APPOINTMENT (OUTPATIENT)
Dept: OTHER | Facility: HOSPITAL | Age: 75
End: 2020-01-03

## 2020-01-03 RX ORDER — IRBESARTAN 300 MG/1
TABLET ORAL
Qty: 90 TABLET | Refills: 0 | Status: SHIPPED | OUTPATIENT
Start: 2020-01-03 | End: 2020-03-17 | Stop reason: SDUPTHER

## 2020-01-09 ENCOUNTER — CLINICAL SUPPORT (OUTPATIENT)
Dept: ONCOLOGY | Facility: HOSPITAL | Age: 75
End: 2020-01-09

## 2020-01-09 ENCOUNTER — LAB (OUTPATIENT)
Dept: OTHER | Facility: HOSPITAL | Age: 75
End: 2020-01-09

## 2020-01-09 VITALS
WEIGHT: 230.2 LBS | BODY MASS INDEX: 37.17 KG/M2 | OXYGEN SATURATION: 100 % | DIASTOLIC BLOOD PRESSURE: 80 MMHG | SYSTOLIC BLOOD PRESSURE: 125 MMHG | TEMPERATURE: 97.8 F | HEART RATE: 100 BPM

## 2020-01-09 DIAGNOSIS — Z79.01 ANTICOAGULATION MANAGEMENT ENCOUNTER: ICD-10-CM

## 2020-01-09 DIAGNOSIS — E61.1 IRON DEFICIENCY: ICD-10-CM

## 2020-01-09 DIAGNOSIS — C50.012 MALIGNANT NEOPLASM OF NIPPLE OF LEFT BREAST IN FEMALE, ESTROGEN RECEPTOR POSITIVE (HCC): ICD-10-CM

## 2020-01-09 DIAGNOSIS — Z17.0 MALIGNANT NEOPLASM OF NIPPLE OF LEFT BREAST IN FEMALE, ESTROGEN RECEPTOR POSITIVE (HCC): ICD-10-CM

## 2020-01-09 DIAGNOSIS — Z51.81 ANTICOAGULATION MANAGEMENT ENCOUNTER: ICD-10-CM

## 2020-01-09 LAB
INR PPP: 2.12
PROTHROMBIN TIME: 25.7 SECONDS (ref 11–15)

## 2020-01-09 PROCEDURE — 85610 PROTHROMBIN TIME: CPT

## 2020-02-03 ENCOUNTER — OFFICE VISIT (OUTPATIENT)
Dept: ONCOLOGY | Facility: CLINIC | Age: 75
End: 2020-02-03

## 2020-02-03 ENCOUNTER — LAB (OUTPATIENT)
Dept: OTHER | Facility: HOSPITAL | Age: 75
End: 2020-02-03

## 2020-02-03 ENCOUNTER — APPOINTMENT (OUTPATIENT)
Dept: ONCOLOGY | Facility: HOSPITAL | Age: 75
End: 2020-02-03

## 2020-02-03 DIAGNOSIS — Z79.01 WARFARIN ANTICOAGULATION: Primary | Chronic | ICD-10-CM

## 2020-02-03 DIAGNOSIS — E61.1 IRON DEFICIENCY: ICD-10-CM

## 2020-02-03 DIAGNOSIS — Z17.0 MALIGNANT NEOPLASM OF NIPPLE OF LEFT BREAST IN FEMALE, ESTROGEN RECEPTOR POSITIVE (HCC): ICD-10-CM

## 2020-02-03 DIAGNOSIS — Z51.81 ANTICOAGULATION MANAGEMENT ENCOUNTER: ICD-10-CM

## 2020-02-03 DIAGNOSIS — Z79.01 ANTICOAGULATION MANAGEMENT ENCOUNTER: ICD-10-CM

## 2020-02-03 DIAGNOSIS — C50.012 MALIGNANT NEOPLASM OF NIPPLE OF LEFT BREAST IN FEMALE, ESTROGEN RECEPTOR POSITIVE (HCC): ICD-10-CM

## 2020-02-03 LAB
INR PPP: 1.3
PROTHROMBIN TIME: 15 SECONDS (ref 11–15)

## 2020-02-03 PROCEDURE — 99212-NC PR NO CHARGE CBC OFFICE OUTPATIENT VISIT 10 MINUTES: Performed by: NURSE PRACTITIONER

## 2020-02-03 PROCEDURE — 85610 PROTHROMBIN TIME: CPT

## 2020-02-03 RX ORDER — WARFARIN SODIUM 5 MG/1
TABLET ORAL
Qty: 45 TABLET | Refills: 3 | Status: SHIPPED | OUTPATIENT
Start: 2020-02-03 | End: 2020-05-18

## 2020-02-03 NOTE — PROGRESS NOTES
Patient here for monthly PT/INR check.            Results from last 7 days   Lab Units 02/03/20  0955   INR  1.30     We reviewed her INR is quite low at 1.3.  She denies any missed doses.  Per standing stone, total weekly dose of Coumadin is going from 5 mg daily or 35 mg up to 7.5 mg on Sunday, Tuesday, Thursday and 5 mg all other days for a total of 42.5 mg.  This is a little bit higher than she typically takes.  For this reason I will ask her to come back in 1 week for repeat PT/INR with RN review.  She is in agreement.    She is requesting a refill of Coumadin this has been sent to her pharmacy.

## 2020-02-10 ENCOUNTER — APPOINTMENT (OUTPATIENT)
Dept: OTHER | Facility: HOSPITAL | Age: 75
End: 2020-02-10

## 2020-02-10 ENCOUNTER — APPOINTMENT (OUTPATIENT)
Dept: ONCOLOGY | Facility: HOSPITAL | Age: 75
End: 2020-02-10

## 2020-02-11 ENCOUNTER — CLINICAL SUPPORT (OUTPATIENT)
Dept: ONCOLOGY | Facility: HOSPITAL | Age: 75
End: 2020-02-11

## 2020-02-11 ENCOUNTER — LAB (OUTPATIENT)
Dept: OTHER | Facility: HOSPITAL | Age: 75
End: 2020-02-11

## 2020-02-11 VITALS
WEIGHT: 231 LBS | DIASTOLIC BLOOD PRESSURE: 86 MMHG | BODY MASS INDEX: 37.3 KG/M2 | OXYGEN SATURATION: 95 % | TEMPERATURE: 98.1 F | SYSTOLIC BLOOD PRESSURE: 143 MMHG | HEART RATE: 108 BPM

## 2020-02-11 DIAGNOSIS — Z79.01 WARFARIN ANTICOAGULATION: Chronic | ICD-10-CM

## 2020-02-11 LAB
INR PPP: 2.1
PROTHROMBIN TIME: 25.8 SECONDS (ref 11–15)

## 2020-02-11 PROCEDURE — 85610 PROTHROMBIN TIME: CPT

## 2020-02-11 NOTE — NURSING NOTE
Lab Results   Component Value Date    INR 2.10 02/11/2020    INR 1.30 02/03/2020    INR 2.12 01/09/2020    PROTIME 25.8 (H) 02/11/2020    PROTIME 15.0 02/03/2020    PROTIME 25.7 (H) 01/09/2020   Dosing of coumadin per standing stone dose increased from 42.5 to 45 mg

## 2020-03-02 ENCOUNTER — DOCUMENTATION (OUTPATIENT)
Dept: ONCOLOGY | Facility: CLINIC | Age: 75
End: 2020-03-02

## 2020-03-02 ENCOUNTER — LAB (OUTPATIENT)
Dept: OTHER | Facility: HOSPITAL | Age: 75
End: 2020-03-02

## 2020-03-02 ENCOUNTER — OFFICE VISIT (OUTPATIENT)
Dept: ONCOLOGY | Facility: CLINIC | Age: 75
End: 2020-03-02

## 2020-03-02 VITALS
RESPIRATION RATE: 18 BRPM | WEIGHT: 227.8 LBS | TEMPERATURE: 97.5 F | HEART RATE: 102 BPM | DIASTOLIC BLOOD PRESSURE: 76 MMHG | HEIGHT: 66 IN | OXYGEN SATURATION: 97 % | BODY MASS INDEX: 36.61 KG/M2 | SYSTOLIC BLOOD PRESSURE: 147 MMHG

## 2020-03-02 DIAGNOSIS — C50.012 MALIGNANT NEOPLASM OF NIPPLE OF LEFT BREAST IN FEMALE, ESTROGEN RECEPTOR POSITIVE (HCC): Primary | ICD-10-CM

## 2020-03-02 DIAGNOSIS — Z17.0 MALIGNANT NEOPLASM OF NIPPLE OF LEFT BREAST IN FEMALE, ESTROGEN RECEPTOR POSITIVE (HCC): Primary | ICD-10-CM

## 2020-03-02 DIAGNOSIS — Z51.81 ANTICOAGULATION MANAGEMENT ENCOUNTER: ICD-10-CM

## 2020-03-02 DIAGNOSIS — E61.1 IRON DEFICIENCY: ICD-10-CM

## 2020-03-02 DIAGNOSIS — Z17.0 MALIGNANT NEOPLASM OF NIPPLE OF LEFT BREAST IN FEMALE, ESTROGEN RECEPTOR POSITIVE (HCC): ICD-10-CM

## 2020-03-02 DIAGNOSIS — C50.012 MALIGNANT NEOPLASM OF NIPPLE OF LEFT BREAST IN FEMALE, ESTROGEN RECEPTOR POSITIVE (HCC): ICD-10-CM

## 2020-03-02 DIAGNOSIS — I48.0 PAROXYSMAL ATRIAL FIBRILLATION (HCC): ICD-10-CM

## 2020-03-02 DIAGNOSIS — Z79.01 ANTICOAGULATION MANAGEMENT ENCOUNTER: ICD-10-CM

## 2020-03-02 LAB
BASOPHILS # BLD AUTO: 0.12 10*3/MM3 (ref 0–0.2)
BASOPHILS NFR BLD AUTO: 1.4 % (ref 0–1.5)
DEPRECATED RDW RBC AUTO: 49.4 FL (ref 37–54)
EOSINOPHIL # BLD AUTO: 0.35 10*3/MM3 (ref 0–0.4)
EOSINOPHIL NFR BLD AUTO: 4.1 % (ref 0.3–6.2)
ERYTHROCYTE [DISTWIDTH] IN BLOOD BY AUTOMATED COUNT: 14.3 % (ref 12.3–15.4)
FERRITIN SERPL-MCNC: 32.5 NG/ML (ref 13–150)
HCT VFR BLD AUTO: 41.4 % (ref 34–46.6)
HGB BLD-MCNC: 13.1 G/DL (ref 12–15.9)
IMM GRANULOCYTES # BLD AUTO: 0.02 10*3/MM3 (ref 0–0.05)
IMM GRANULOCYTES NFR BLD AUTO: 0.2 % (ref 0–0.5)
INR PPP: 3.7
IRON 24H UR-MRATE: 59 MCG/DL (ref 37–145)
IRON SATN MFR SERPL: 13 % (ref 20–50)
LYMPHOCYTES # BLD AUTO: 2.3 10*3/MM3 (ref 0.7–3.1)
LYMPHOCYTES NFR BLD AUTO: 27.2 % (ref 19.6–45.3)
MCH RBC QN AUTO: 30.1 PG (ref 26.6–33)
MCHC RBC AUTO-ENTMCNC: 31.6 G/DL (ref 31.5–35.7)
MCV RBC AUTO: 95.2 FL (ref 79–97)
MONOCYTES # BLD AUTO: 0.77 10*3/MM3 (ref 0.1–0.9)
MONOCYTES NFR BLD AUTO: 9.1 % (ref 5–12)
NEUTROPHILS # BLD AUTO: 4.89 10*3/MM3 (ref 1.7–7)
NEUTROPHILS NFR BLD AUTO: 58 % (ref 42.7–76)
NRBC BLD AUTO-RTO: 0 /100 WBC (ref 0–0.2)
PLATELET # BLD AUTO: 366 10*3/MM3 (ref 140–450)
PMV BLD AUTO: 11 FL (ref 6–12)
PROTHROMBIN TIME: 44.5 SECONDS (ref 11–15)
RBC # BLD AUTO: 4.35 10*6/MM3 (ref 3.77–5.28)
TIBC SERPL-MCNC: 463 MCG/DL (ref 298–536)
TRANSFERRIN SERPL-MCNC: 311 MG/DL (ref 200–360)
WBC NRBC COR # BLD: 8.45 10*3/MM3 (ref 3.4–10.8)

## 2020-03-02 PROCEDURE — 85025 COMPLETE CBC W/AUTO DIFF WBC: CPT | Performed by: INTERNAL MEDICINE

## 2020-03-02 PROCEDURE — 84466 ASSAY OF TRANSFERRIN: CPT | Performed by: INTERNAL MEDICINE

## 2020-03-02 PROCEDURE — 85610 PROTHROMBIN TIME: CPT

## 2020-03-02 PROCEDURE — 83540 ASSAY OF IRON: CPT | Performed by: INTERNAL MEDICINE

## 2020-03-02 PROCEDURE — 36415 COLL VENOUS BLD VENIPUNCTURE: CPT

## 2020-03-02 PROCEDURE — 99215 OFFICE O/P EST HI 40 MIN: CPT | Performed by: INTERNAL MEDICINE

## 2020-03-02 PROCEDURE — 82728 ASSAY OF FERRITIN: CPT | Performed by: INTERNAL MEDICINE

## 2020-03-02 NOTE — PROGRESS NOTES
"Clinical Case Management/[facility]:    OSW was requested by Dr. White to meet with the pt who is struggling today with mental health concerns.  The pt scored 7/10 on the Distress Questionnaire (administered by this OSW) and a 10 on the PHQ-9 administered by the MA.  A psychosocial assessment was completed.    The pt is a retired  73 yo breast cancer survivor who lives independently in [place].  The pt has treated breast cancer ([id number]). She denies that her cancer history is connected to her current concerns.  The pt has regular appointments with CBC to monitor her INR. Pt had gastric bypass surgery in 2003.  The pt has pain in both knees; she had knee replacements in both knees.    The pt presented as tearful and was trembling intermittently throughout the consultation.  The pt was open and easily engaged and was able to provide a sequential history. The pt denied SI and HI.    The pt reports that she is struggling with depressive symptoms of \"no motivation\" and stated \"I want to sleep all the time\".  The pt has a reported 20-year history of depression and has been followed by three (3) psychiatric providers during this time period.  The pt is current psychiatrist is Sabas Monique MD; she has a follow-up appointment on 3/26 and is on a cancellation list.    The pt is recently retired from a [employer] and previously from [employer].   The pt cited worsening of her depressive symptoms following assisted and stated she misses the work and contact with work friends.    Additional stressors:  --18 year old Himalyan cat at the end of her life  --grief issues related to loved ones (twin sister, in particular, passed away in 1991) are frequently occupying her thoughts  --the pt has adopted 10 ferral cats who she feeds twice daily and spends $300 monthly to feed  --pt has very limited emotional support; pt reports \"I have some friends\"  --pt has very limited family support; her son/family reside in " [place]    PLAN:    The pt agreed with OSW's recommendation that she engage in ongoing therapeutic support. The pt stated Dr. Monique has used EMDR with her in the past and this is a good intervention for her.  OSW was able to  an appointment with Dionna Glez LCSW, on Thursday, March 5th at 9:00 a.m., who is able to use EMDR with the pt, according to the  in her office.  The pt was provided with the date, time and location of the appointment and the contact information for this OSW.    OSW remains available as needs arise.    Rula Charlton LCSW  Oncology Social Worker
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
## 2020-03-03 ENCOUNTER — TELEPHONE (OUTPATIENT)
Dept: ONCOLOGY | Facility: CLINIC | Age: 75
End: 2020-03-03

## 2020-03-03 ENCOUNTER — TELEPHONE (OUTPATIENT)
Dept: ONCOLOGY | Facility: HOSPITAL | Age: 75
End: 2020-03-03

## 2020-03-03 NOTE — TELEPHONE ENCOUNTER
----- Message from Marilynn Weeks RN sent at 3/3/2020  8:10 AM EST -----  Regarding: APPT NEEDED FOR INJECTAFER   PER DR. VALLE...  PT NEEDS INJECTAFER X 2 DOSES  1ST DOSE NEXT WK  2ND DOSE AT HER APPT 3/16 W/ NP  THANKS!

## 2020-03-03 NOTE — PROGRESS NOTES
Supportive plan entered for Injectafer 750 mg IV x 2 doses with Compazine 10 mg PO to be given prior to each dose per protocol per in-basket message Dr. White

## 2020-03-03 NOTE — TELEPHONE ENCOUNTER
PT CALLING BACK ABOUT PREVIOUS MISSED CALL FROM TRIAGE. EXPLAINED TO HER THAT IV IRON WAS NEEDED AND THAT THE APPT DESK WOULD CALL HER TO SET UP. PT V/U.

## 2020-03-03 NOTE — TELEPHONE ENCOUNTER
Pt is returning a call to Renée pt was not sure what the call was in regards to     Pt contact # [phone]

## 2020-03-03 NOTE — TELEPHONE ENCOUNTER
ATTEMPTED TO CALL PT AS REQUESTED PER DR. VALLE. L/M STATING THAT IV INJECTAFER WAS NEEDED AGAIN. APPT DESK MESSAGED TO SET UP 1ST DOSE FOR NEXT WK AND 2ND DOSE 3/16 WHEN PT SEES NP. ONC RN'S HAD ALREADY BEEN NOTIFIED TO PLACE PLAN.

## 2020-03-03 NOTE — TELEPHONE ENCOUNTER
----- Message from West White Jr., MD sent at 3/2/2020 10:44 PM EST -----  Patient's labs from today showed further decline in ferritin and iron saturation indicating worsening iron deficiency.  Patient will require additional IV iron in the form of Injectafer 750 mg IV x2 doses 1 week apart.  Anticipate first dose next week and second dose in 2 weeks when patient returns for nurse practitioner visit.  Please notify patient and schedule.  Etelvina, patient will need plan placed for Injectafer.  Thanks!

## 2020-03-04 ENCOUNTER — DOCUMENTATION (OUTPATIENT)
Dept: ONCOLOGY | Facility: CLINIC | Age: 75
End: 2020-03-04

## 2020-03-04 NOTE — PROGRESS NOTES
CSW completed an application for assistance with Eliquis through the [facility] patient assistance foundation; CSW contacted the patient to let her know she will need to sign her portion of the application.  Patient requested to sign the form at EP since her next appointment is there on Monday.  CSW emailed the form to Rula Grace and asked for her assistance in giving the patient the form to sign at the [facility] location.  Rula stated she will ask Etelvina Puckett to give patient the form to sign and send back to the social work team at [facility].  Application will be faxed to [facility] upon receipt of patient's portion.

## 2020-03-09 ENCOUNTER — INFUSION (OUTPATIENT)
Dept: ONCOLOGY | Facility: HOSPITAL | Age: 75
End: 2020-03-09

## 2020-03-09 VITALS
SYSTOLIC BLOOD PRESSURE: 145 MMHG | DIASTOLIC BLOOD PRESSURE: 87 MMHG | HEART RATE: 106 BPM | WEIGHT: 228.6 LBS | TEMPERATURE: 97.5 F | BODY MASS INDEX: 36.91 KG/M2 | OXYGEN SATURATION: 98 %

## 2020-03-09 DIAGNOSIS — D50.8 OTHER IRON DEFICIENCY ANEMIA: Primary | ICD-10-CM

## 2020-03-09 DIAGNOSIS — K91.2 POSTSURGICAL NONABSORPTION: ICD-10-CM

## 2020-03-09 PROCEDURE — 25010000002 FERRIC CARBOXYMALTOSE 750 MG/15ML SOLUTION 15 ML VIAL: Performed by: INTERNAL MEDICINE

## 2020-03-09 PROCEDURE — 63710000001 PROCHLORPERAZINE MALEATE PER 5 MG: Performed by: INTERNAL MEDICINE

## 2020-03-09 PROCEDURE — 96374 THER/PROPH/DIAG INJ IV PUSH: CPT

## 2020-03-09 RX ORDER — SODIUM CHLORIDE 9 MG/ML
250 INJECTION, SOLUTION INTRAVENOUS ONCE
Status: COMPLETED | OUTPATIENT
Start: 2020-03-09 | End: 2020-03-09

## 2020-03-09 RX ORDER — PROCHLORPERAZINE MALEATE 5 MG/1
10 TABLET ORAL ONCE
Status: COMPLETED | OUTPATIENT
Start: 2020-03-09 | End: 2020-03-09

## 2020-03-09 RX ADMIN — PROCHLORPERAZINE MALEATE 10 MG: 5 TABLET, FILM COATED ORAL at 14:33

## 2020-03-09 RX ADMIN — FERRIC CARBOXYMALTOSE INJECTION 750 MG: 50 INJECTION, SOLUTION INTRAVENOUS at 14:49

## 2020-03-09 RX ADMIN — SODIUM CHLORIDE 250 ML: 900 INJECTION, SOLUTION INTRAVENOUS at 14:49

## 2020-03-10 ENCOUNTER — DOCUMENTATION (OUTPATIENT)
Dept: ONCOLOGY | Facility: CLINIC | Age: 75
End: 2020-03-10

## 2020-03-10 NOTE — PROGRESS NOTES
Clinical Case Management/[facility]:    OSW met with the pt yesterday for follow-up to further assess her needs and compatibility/comfort with the therapist to whom she was referred.  The pt had an iron infusion yesterday.     The pt reported that she met with Dr. Monique (psychiatrist) yesterday morning. He recommend that she restart BuSpar twice daily; the pt reports success with this medication addressing her anxiety.  The pt voiced agreement with this plan.    The pt met with Dionna Glez LCSW, on Thursday, March 5th at 9:00 a.m. The pt voiced comfort and connection to this therapist and was easily able to travel to and locate her office.   The pt has an appointment for next week. 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  The pt reported that the abrupt nature of her care home triggered her adjustment challenges and she is feeling better and more hopeful about the future since receiving support.  The pt's affect was brighter and although she still was trembling, she clearly appeared calmer, less anxious and more rested.    The pt signed and dated the application for assistance with Eliquis through the [employer] patient assistance foundation.  OSW faxed the form to Shelley Ying LCSW per maxi; fax confirmation was received.     OSW remains available as needs arise.     Rula Charlton LCSW  Oncology Social Worker

## 2020-03-10 NOTE — PROGRESS NOTES
The signed patient portion of the [employer] application for Eliquis assistance was received from Rula Grace at [place]. It was faxed to the social workers at the [facility] office to complete and process it.    The application was faxed to -S today.

## 2020-03-16 ENCOUNTER — OFFICE VISIT (OUTPATIENT)
Dept: ONCOLOGY | Facility: CLINIC | Age: 75
End: 2020-03-16

## 2020-03-16 ENCOUNTER — INFUSION (OUTPATIENT)
Dept: ONCOLOGY | Facility: HOSPITAL | Age: 75
End: 2020-03-16

## 2020-03-16 ENCOUNTER — LAB (OUTPATIENT)
Dept: OTHER | Facility: HOSPITAL | Age: 75
End: 2020-03-16

## 2020-03-16 VITALS
BODY MASS INDEX: 36.79 KG/M2 | RESPIRATION RATE: 18 BRPM | DIASTOLIC BLOOD PRESSURE: 69 MMHG | HEIGHT: 66 IN | HEART RATE: 111 BPM | OXYGEN SATURATION: 95 % | TEMPERATURE: 97.9 F | SYSTOLIC BLOOD PRESSURE: 133 MMHG | WEIGHT: 228.9 LBS

## 2020-03-16 VITALS — SYSTOLIC BLOOD PRESSURE: 113 MMHG | DIASTOLIC BLOOD PRESSURE: 69 MMHG | HEART RATE: 99 BPM

## 2020-03-16 DIAGNOSIS — Z79.01 WARFARIN ANTICOAGULATION: Chronic | ICD-10-CM

## 2020-03-16 DIAGNOSIS — Z79.01 ANTICOAGULATION MANAGEMENT ENCOUNTER: ICD-10-CM

## 2020-03-16 DIAGNOSIS — D50.8 OTHER IRON DEFICIENCY ANEMIA: Primary | ICD-10-CM

## 2020-03-16 DIAGNOSIS — E61.1 IRON DEFICIENCY: ICD-10-CM

## 2020-03-16 DIAGNOSIS — Z51.81 ANTICOAGULATION MANAGEMENT ENCOUNTER: ICD-10-CM

## 2020-03-16 DIAGNOSIS — C50.012 MALIGNANT NEOPLASM OF NIPPLE OF LEFT BREAST IN FEMALE, ESTROGEN RECEPTOR POSITIVE (HCC): ICD-10-CM

## 2020-03-16 DIAGNOSIS — I48.0 PAROXYSMAL ATRIAL FIBRILLATION (HCC): ICD-10-CM

## 2020-03-16 DIAGNOSIS — Z17.0 MALIGNANT NEOPLASM OF NIPPLE OF LEFT BREAST IN FEMALE, ESTROGEN RECEPTOR POSITIVE (HCC): ICD-10-CM

## 2020-03-16 DIAGNOSIS — K91.2 POSTSURGICAL NONABSORPTION: ICD-10-CM

## 2020-03-16 LAB
BASOPHILS # BLD AUTO: 0.11 10*3/MM3 (ref 0–0.2)
BASOPHILS NFR BLD AUTO: 1.1 % (ref 0–1.5)
DEPRECATED RDW RBC AUTO: 48.3 FL (ref 37–54)
EOSINOPHIL # BLD AUTO: 0.24 10*3/MM3 (ref 0–0.4)
EOSINOPHIL NFR BLD AUTO: 2.4 % (ref 0.3–6.2)
ERYTHROCYTE [DISTWIDTH] IN BLOOD BY AUTOMATED COUNT: 14.4 % (ref 12.3–15.4)
HCT VFR BLD AUTO: 39.4 % (ref 34–46.6)
HGB BLD-MCNC: 13.1 G/DL (ref 12–15.9)
IMM GRANULOCYTES # BLD AUTO: 0.04 10*3/MM3 (ref 0–0.05)
IMM GRANULOCYTES NFR BLD AUTO: 0.4 % (ref 0–0.5)
INR PPP: 3.3
LYMPHOCYTES # BLD AUTO: 3.11 10*3/MM3 (ref 0.7–3.1)
LYMPHOCYTES NFR BLD AUTO: 30.5 % (ref 19.6–45.3)
MCH RBC QN AUTO: 30.4 PG (ref 26.6–33)
MCHC RBC AUTO-ENTMCNC: 33.2 G/DL (ref 31.5–35.7)
MCV RBC AUTO: 91.4 FL (ref 79–97)
MONOCYTES # BLD AUTO: 0.89 10*3/MM3 (ref 0.1–0.9)
MONOCYTES NFR BLD AUTO: 8.7 % (ref 5–12)
NEUTROPHILS # BLD AUTO: 5.8 10*3/MM3 (ref 1.7–7)
NEUTROPHILS NFR BLD AUTO: 56.9 % (ref 42.7–76)
NRBC BLD AUTO-RTO: 0 /100 WBC (ref 0–0.2)
PLATELET # BLD AUTO: 370 10*3/MM3 (ref 140–450)
PMV BLD AUTO: 11.4 FL (ref 6–12)
PROTHROMBIN TIME: 40.2 SECONDS (ref 11–15)
RBC # BLD AUTO: 4.31 10*6/MM3 (ref 3.77–5.28)
WBC NRBC COR # BLD: 10.19 10*3/MM3 (ref 3.4–10.8)

## 2020-03-16 PROCEDURE — 99213 OFFICE O/P EST LOW 20 MIN: CPT | Performed by: NURSE PRACTITIONER

## 2020-03-16 PROCEDURE — 85025 COMPLETE CBC W/AUTO DIFF WBC: CPT | Performed by: INTERNAL MEDICINE

## 2020-03-16 PROCEDURE — 63710000001 PROCHLORPERAZINE MALEATE PER 5 MG: Performed by: NURSE PRACTITIONER

## 2020-03-16 PROCEDURE — 25010000002 FERRIC CARBOXYMALTOSE 750 MG/15ML SOLUTION 15 ML VIAL: Performed by: NURSE PRACTITIONER

## 2020-03-16 PROCEDURE — 85610 PROTHROMBIN TIME: CPT

## 2020-03-16 PROCEDURE — 96374 THER/PROPH/DIAG INJ IV PUSH: CPT

## 2020-03-16 PROCEDURE — 36415 COLL VENOUS BLD VENIPUNCTURE: CPT

## 2020-03-16 RX ORDER — PROCHLORPERAZINE MALEATE 10 MG
10 TABLET ORAL ONCE
Status: CANCELLED
Start: 2020-03-16

## 2020-03-16 RX ORDER — SODIUM CHLORIDE 9 MG/ML
250 INJECTION, SOLUTION INTRAVENOUS ONCE
Status: CANCELLED | OUTPATIENT
Start: 2020-03-16

## 2020-03-16 RX ORDER — PROCHLORPERAZINE MALEATE 5 MG/1
10 TABLET ORAL ONCE
Status: COMPLETED | OUTPATIENT
Start: 2020-03-16 | End: 2020-03-16

## 2020-03-16 RX ORDER — SODIUM CHLORIDE 9 MG/ML
250 INJECTION, SOLUTION INTRAVENOUS ONCE
Status: COMPLETED | OUTPATIENT
Start: 2020-03-16 | End: 2020-03-16

## 2020-03-16 RX ADMIN — SODIUM CHLORIDE 250 ML: 900 INJECTION, SOLUTION INTRAVENOUS at 14:45

## 2020-03-16 RX ADMIN — FERRIC CARBOXYMALTOSE INJECTION 750 MG: 50 INJECTION, SOLUTION INTRAVENOUS at 14:48

## 2020-03-16 RX ADMIN — PROCHLORPERAZINE MALEATE 10 MG: 5 TABLET ORAL at 14:45
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
## 2020-03-18 RX ORDER — TRIAMTERENE AND HYDROCHLOROTHIAZIDE 37.5; 25 MG/1; MG/1
1 CAPSULE ORAL EVERY MORNING
Qty: 90 CAPSULE | Refills: 1 | Status: SHIPPED | OUTPATIENT
Start: 2020-03-18 | End: 2020-09-14

## 2020-03-18 RX ORDER — DILTIAZEM HYDROCHLORIDE 180 MG/1
180 CAPSULE, COATED, EXTENDED RELEASE ORAL DAILY
Qty: 90 CAPSULE | Refills: 1 | Status: SHIPPED | OUTPATIENT
Start: 2020-03-18 | End: 2020-09-14

## 2020-03-18 RX ORDER — IRBESARTAN 300 MG/1
300 TABLET ORAL DAILY
Qty: 90 TABLET | Refills: 1 | Status: SHIPPED | OUTPATIENT
Start: 2020-03-18 | End: 2020-09-14

## 2020-03-18 RX ORDER — DILTIAZEM HYDROCHLORIDE 180 MG/1
180 CAPSULE, COATED, EXTENDED RELEASE ORAL DAILY
Qty: 90 CAPSULE | Refills: 1 | Status: SHIPPED | OUTPATIENT
Start: 2020-03-18 | End: 2020-03-18 | Stop reason: SDUPTHER

## 2020-03-19 ENCOUNTER — TELEPHONE (OUTPATIENT)
Dept: CARDIOLOGY | Facility: CLINIC | Age: 75
End: 2020-03-19

## 2020-03-19 NOTE — TELEPHONE ENCOUNTER
I spoke to the pt regarding her appointment. She was not having any symptoms or concern at this time for . She agree to postpone her appointment for 3-6 month. She is aware that we will place her on a recall list and call her to schedule her an appointment in the future.    Thanks Kirsten

## 2020-03-30 ENCOUNTER — LAB (OUTPATIENT)
Dept: OTHER | Facility: HOSPITAL | Age: 75
End: 2020-03-30

## 2020-03-30 ENCOUNTER — OFFICE VISIT (OUTPATIENT)
Dept: ONCOLOGY | Facility: CLINIC | Age: 75
End: 2020-03-30

## 2020-03-30 VITALS — BODY MASS INDEX: 36.77 KG/M2 | WEIGHT: 227.7 LBS

## 2020-03-30 DIAGNOSIS — E61.1 IRON DEFICIENCY: ICD-10-CM

## 2020-03-30 DIAGNOSIS — Z79.01 ANTICOAGULATION MANAGEMENT ENCOUNTER: ICD-10-CM

## 2020-03-30 DIAGNOSIS — I48.0 PAROXYSMAL ATRIAL FIBRILLATION (HCC): ICD-10-CM

## 2020-03-30 DIAGNOSIS — Z79.01 WARFARIN ANTICOAGULATION: Primary | Chronic | ICD-10-CM

## 2020-03-30 DIAGNOSIS — Z51.81 ANTICOAGULATION MANAGEMENT ENCOUNTER: ICD-10-CM

## 2020-03-30 DIAGNOSIS — Z17.0 MALIGNANT NEOPLASM OF NIPPLE OF LEFT BREAST IN FEMALE, ESTROGEN RECEPTOR POSITIVE (HCC): ICD-10-CM

## 2020-03-30 DIAGNOSIS — C50.012 MALIGNANT NEOPLASM OF NIPPLE OF LEFT BREAST IN FEMALE, ESTROGEN RECEPTOR POSITIVE (HCC): ICD-10-CM

## 2020-03-30 LAB
INR PPP: 2
PROTHROMBIN TIME: 23.8 SECONDS (ref 11–15)

## 2020-03-30 PROCEDURE — 99212-NC PR NO CHARGE CBC OFFICE OUTPATIENT VISIT 10 MINUTES: Performed by: NURSE PRACTITIONER

## 2020-03-30 PROCEDURE — 85610 PROTHROMBIN TIME: CPT

## 2020-03-30 NOTE — PROGRESS NOTES
Patient is here for a lab and INR review.  Her INR is at the low end of therapeutic at 2.0.  She denies any significant shortness of breath, chest pain, or new lower extremity swelling.  She had been taking 7.5 mg of Coumadin x1 day and 5 mg all other days.  We will increase her to 2 tablets of 7.5 mg weekly and 5 mg all other days.  This will result in 40 mg weekly.  We will have her come back in 2 weeks for INR recheck as the patient has been fairly inconsistent with her INR levels.  We are in the process of obtaining approval for Eliquis but have not heard anything as of yet.    I have asked the patient to call our office with any new issues or concerns prior to her next visit.  She has verbalized understanding.    Lab Results   Component Value Date    INR 2.00 03/30/2020    INR 3.30 03/16/2020    INR 3.70 03/02/2020    PROTIME 23.8 (H) 03/30/2020    PROTIME 40.2 (H) 03/16/2020    PROTIME 44.5 (H) 03/02/2020     LAURA Serrato

## 2020-04-13 ENCOUNTER — LAB (OUTPATIENT)
Dept: OTHER | Facility: HOSPITAL | Age: 75
End: 2020-04-13

## 2020-04-13 DIAGNOSIS — I48.0 PAROXYSMAL ATRIAL FIBRILLATION (HCC): ICD-10-CM

## 2020-04-13 DIAGNOSIS — Z17.0 MALIGNANT NEOPLASM OF NIPPLE OF LEFT BREAST IN FEMALE, ESTROGEN RECEPTOR POSITIVE (HCC): ICD-10-CM

## 2020-04-13 DIAGNOSIS — C50.012 MALIGNANT NEOPLASM OF NIPPLE OF LEFT BREAST IN FEMALE, ESTROGEN RECEPTOR POSITIVE (HCC): ICD-10-CM

## 2020-04-13 DIAGNOSIS — Z79.01 ANTICOAGULATION MANAGEMENT ENCOUNTER: ICD-10-CM

## 2020-04-13 DIAGNOSIS — Z51.81 ANTICOAGULATION MANAGEMENT ENCOUNTER: ICD-10-CM

## 2020-04-13 DIAGNOSIS — E61.1 IRON DEFICIENCY: ICD-10-CM

## 2020-04-13 LAB
INR PPP: 2
PROTHROMBIN TIME: 23.7 SECONDS (ref 11–15)

## 2020-04-13 PROCEDURE — G0463 HOSPITAL OUTPT CLINIC VISIT: HCPCS

## 2020-04-13 PROCEDURE — 85610 PROTHROMBIN TIME: CPT

## 2020-04-27 ENCOUNTER — LAB (OUTPATIENT)
Dept: OTHER | Facility: HOSPITAL | Age: 75
End: 2020-04-27

## 2020-04-27 ENCOUNTER — CLINICAL SUPPORT (OUTPATIENT)
Dept: ONCOLOGY | Facility: HOSPITAL | Age: 75
End: 2020-04-27

## 2020-04-27 VITALS
OXYGEN SATURATION: 98 % | TEMPERATURE: 97.8 F | HEART RATE: 107 BPM | BODY MASS INDEX: 37.14 KG/M2 | WEIGHT: 230 LBS | DIASTOLIC BLOOD PRESSURE: 89 MMHG | SYSTOLIC BLOOD PRESSURE: 131 MMHG

## 2020-04-27 DIAGNOSIS — Z79.01 WARFARIN ANTICOAGULATION: Primary | ICD-10-CM

## 2020-04-27 DIAGNOSIS — Z79.01 WARFARIN ANTICOAGULATION: ICD-10-CM

## 2020-04-27 LAB
INR PPP: 3.4
PROTHROMBIN TIME: 40.7 SECONDS (ref 11–15)

## 2020-04-27 PROCEDURE — 36416 COLLJ CAPILLARY BLOOD SPEC: CPT

## 2020-04-27 PROCEDURE — 85610 PROTHROMBIN TIME: CPT

## 2020-05-04 ENCOUNTER — CLINICAL SUPPORT (OUTPATIENT)
Dept: ONCOLOGY | Facility: HOSPITAL | Age: 75
End: 2020-05-04

## 2020-05-04 ENCOUNTER — LAB (OUTPATIENT)
Dept: OTHER | Facility: HOSPITAL | Age: 75
End: 2020-05-04

## 2020-05-04 DIAGNOSIS — Z79.01 ANTICOAGULATION MANAGEMENT ENCOUNTER: ICD-10-CM

## 2020-05-04 DIAGNOSIS — Z51.81 ANTICOAGULATION MANAGEMENT ENCOUNTER: Primary | ICD-10-CM

## 2020-05-04 DIAGNOSIS — Z51.81 ANTICOAGULATION MANAGEMENT ENCOUNTER: ICD-10-CM

## 2020-05-04 DIAGNOSIS — Z79.01 ANTICOAGULATION MANAGEMENT ENCOUNTER: Primary | ICD-10-CM

## 2020-05-04 LAB
INR PPP: 2.7
PROTHROMBIN TIME: 32.3 SECONDS (ref 11–15)

## 2020-05-04 PROCEDURE — 85610 PROTHROMBIN TIME: CPT

## 2020-05-04 PROCEDURE — 36416 COLLJ CAPILLARY BLOOD SPEC: CPT

## 2020-05-18 ENCOUNTER — LAB (OUTPATIENT)
Dept: OTHER | Facility: HOSPITAL | Age: 75
End: 2020-05-18

## 2020-05-18 ENCOUNTER — OFFICE VISIT (OUTPATIENT)
Dept: ONCOLOGY | Facility: CLINIC | Age: 75
End: 2020-05-18

## 2020-05-18 VITALS
BODY MASS INDEX: 36.98 KG/M2 | HEART RATE: 83 BPM | WEIGHT: 230.1 LBS | DIASTOLIC BLOOD PRESSURE: 77 MMHG | RESPIRATION RATE: 18 BRPM | HEIGHT: 66 IN | SYSTOLIC BLOOD PRESSURE: 115 MMHG | OXYGEN SATURATION: 98 % | TEMPERATURE: 98.2 F

## 2020-05-18 DIAGNOSIS — C50.012 MALIGNANT NEOPLASM OF NIPPLE OF LEFT BREAST IN FEMALE, ESTROGEN RECEPTOR POSITIVE (HCC): ICD-10-CM

## 2020-05-18 DIAGNOSIS — Z51.81 ANTICOAGULATION MANAGEMENT ENCOUNTER: ICD-10-CM

## 2020-05-18 DIAGNOSIS — D50.8 OTHER IRON DEFICIENCY ANEMIA: Primary | ICD-10-CM

## 2020-05-18 DIAGNOSIS — Z17.0 MALIGNANT NEOPLASM OF NIPPLE OF LEFT BREAST IN FEMALE, ESTROGEN RECEPTOR POSITIVE (HCC): ICD-10-CM

## 2020-05-18 DIAGNOSIS — Z12.31 ENCOUNTER FOR SCREENING MAMMOGRAM FOR MALIGNANT NEOPLASM OF BREAST: ICD-10-CM

## 2020-05-18 DIAGNOSIS — Z79.01 ANTICOAGULATION MANAGEMENT ENCOUNTER: ICD-10-CM

## 2020-05-18 DIAGNOSIS — E61.1 IRON DEFICIENCY: ICD-10-CM

## 2020-05-18 DIAGNOSIS — I48.0 PAROXYSMAL ATRIAL FIBRILLATION (HCC): ICD-10-CM

## 2020-05-18 LAB
ALBUMIN SERPL-MCNC: 4.4 G/DL (ref 3.5–5.2)
ALBUMIN/GLOB SERPL: 1.3 G/DL
ALP SERPL-CCNC: 144 U/L (ref 39–117)
ALT SERPL W P-5'-P-CCNC: 23 U/L (ref 1–33)
ANION GAP SERPL CALCULATED.3IONS-SCNC: 10.8 MMOL/L (ref 5–15)
AST SERPL-CCNC: 22 U/L (ref 1–32)
BASOPHILS # BLD AUTO: 0.13 10*3/MM3 (ref 0–0.2)
BASOPHILS NFR BLD AUTO: 1.4 % (ref 0–1.5)
BILIRUB SERPL-MCNC: 0.5 MG/DL (ref 0.1–1.2)
BUN BLD-MCNC: 41 MG/DL (ref 8–23)
BUN/CREAT SERPL: 27.9 (ref 7–25)
CALCIUM SPEC-SCNC: 9.2 MG/DL (ref 8.6–10.5)
CHLORIDE SERPL-SCNC: 103 MMOL/L (ref 98–107)
CO2 SERPL-SCNC: 24.2 MMOL/L (ref 22–29)
CREAT BLD-MCNC: 1.47 MG/DL (ref 0.57–1)
DEPRECATED RDW RBC AUTO: 50.6 FL (ref 37–54)
EOSINOPHIL # BLD AUTO: 0.26 10*3/MM3 (ref 0–0.4)
EOSINOPHIL NFR BLD AUTO: 2.9 % (ref 0.3–6.2)
ERYTHROCYTE [DISTWIDTH] IN BLOOD BY AUTOMATED COUNT: 14.4 % (ref 12.3–15.4)
FERRITIN SERPL-MCNC: 502 NG/ML (ref 13–150)
GFR SERPL CREATININE-BSD FRML MDRD: 35 ML/MIN/1.73
GLOBULIN UR ELPH-MCNC: 3.3 GM/DL
GLUCOSE BLD-MCNC: 159 MG/DL (ref 65–99)
HCT VFR BLD AUTO: 42.2 % (ref 34–46.6)
HGB BLD-MCNC: 13.8 G/DL (ref 12–15.9)
IMM GRANULOCYTES # BLD AUTO: 0.02 10*3/MM3 (ref 0–0.05)
IMM GRANULOCYTES NFR BLD AUTO: 0.2 % (ref 0–0.5)
INR PPP: 2.2
IRON 24H UR-MRATE: 113 MCG/DL (ref 37–145)
IRON SATN MFR SERPL: 29 % (ref 20–50)
LYMPHOCYTES # BLD AUTO: 2.98 10*3/MM3 (ref 0.7–3.1)
LYMPHOCYTES NFR BLD AUTO: 32.7 % (ref 19.6–45.3)
MCH RBC QN AUTO: 31.3 PG (ref 26.6–33)
MCHC RBC AUTO-ENTMCNC: 32.7 G/DL (ref 31.5–35.7)
MCV RBC AUTO: 95.7 FL (ref 79–97)
MONOCYTES # BLD AUTO: 0.81 10*3/MM3 (ref 0.1–0.9)
MONOCYTES NFR BLD AUTO: 8.9 % (ref 5–12)
NEUTROPHILS # BLD AUTO: 4.9 10*3/MM3 (ref 1.7–7)
NEUTROPHILS NFR BLD AUTO: 53.9 % (ref 42.7–76)
NRBC BLD AUTO-RTO: 0 /100 WBC (ref 0–0.2)
PLATELET # BLD AUTO: 359 10*3/MM3 (ref 140–450)
PMV BLD AUTO: 11.2 FL (ref 6–12)
POTASSIUM BLD-SCNC: 4.7 MMOL/L (ref 3.5–5.2)
PROT SERPL-MCNC: 7.7 G/DL (ref 6–8.5)
PROTHROMBIN TIME: 26 SECONDS (ref 11–15)
RBC # BLD AUTO: 4.41 10*6/MM3 (ref 3.77–5.28)
SODIUM BLD-SCNC: 138 MMOL/L (ref 136–145)
TIBC SERPL-MCNC: 393 MCG/DL (ref 298–536)
TRANSFERRIN SERPL-MCNC: 264 MG/DL (ref 200–360)
WBC NRBC COR # BLD: 9.1 10*3/MM3 (ref 3.4–10.8)

## 2020-05-18 PROCEDURE — 85025 COMPLETE CBC W/AUTO DIFF WBC: CPT | Performed by: INTERNAL MEDICINE

## 2020-05-18 PROCEDURE — 36415 COLL VENOUS BLD VENIPUNCTURE: CPT

## 2020-05-18 PROCEDURE — 99214 OFFICE O/P EST MOD 30 MIN: CPT | Performed by: INTERNAL MEDICINE

## 2020-05-18 PROCEDURE — 83540 ASSAY OF IRON: CPT | Performed by: INTERNAL MEDICINE

## 2020-05-18 PROCEDURE — 85610 PROTHROMBIN TIME: CPT

## 2020-05-18 PROCEDURE — 82728 ASSAY OF FERRITIN: CPT | Performed by: INTERNAL MEDICINE

## 2020-05-18 PROCEDURE — 80053 COMPREHEN METABOLIC PANEL: CPT | Performed by: INTERNAL MEDICINE

## 2020-05-18 PROCEDURE — 84466 ASSAY OF TRANSFERRIN: CPT | Performed by: INTERNAL MEDICINE
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
## 2020-05-19 ENCOUNTER — TRANSCRIBE ORDERS (OUTPATIENT)
Dept: ADMINISTRATIVE | Facility: HOSPITAL | Age: 75
End: 2020-05-19

## 2020-05-19 DIAGNOSIS — Z12.31 SCREENING MAMMOGRAM, ENCOUNTER FOR: Primary | ICD-10-CM

## 2020-05-22 ENCOUNTER — TELEPHONE (OUTPATIENT)
Dept: INTERNAL MEDICINE | Facility: CLINIC | Age: 75
End: 2020-05-22

## 2020-05-22 DIAGNOSIS — I10 ESSENTIAL HYPERTENSION: ICD-10-CM

## 2020-05-22 DIAGNOSIS — E78.5 HYPERLIPIDEMIA, UNSPECIFIED HYPERLIPIDEMIA TYPE: Primary | ICD-10-CM

## 2020-05-22 NOTE — TELEPHONE ENCOUNTER
LABS ORDERED    ----- Message from Arleen Dillon MD sent at 5/22/2020 12:50 PM EDT -----  Bmp and flp    ----- Message -----  From: Nydia Watson MA  Sent: 5/22/2020  11:50 AM EDT  To: Arleen Dillon MD    PT SCHEDULED FOR LABS PLEASE Advise

## 2020-05-27 ENCOUNTER — OFFICE VISIT (OUTPATIENT)
Dept: INTERNAL MEDICINE | Facility: CLINIC | Age: 75
End: 2020-05-27

## 2020-05-27 VITALS
SYSTOLIC BLOOD PRESSURE: 116 MMHG | HEART RATE: 93 BPM | BODY MASS INDEX: 36.83 KG/M2 | HEIGHT: 66 IN | DIASTOLIC BLOOD PRESSURE: 74 MMHG | OXYGEN SATURATION: 98 % | WEIGHT: 229.2 LBS

## 2020-05-27 DIAGNOSIS — K21.9 GASTROESOPHAGEAL REFLUX DISEASE, ESOPHAGITIS PRESENCE NOT SPECIFIED: ICD-10-CM

## 2020-05-27 DIAGNOSIS — I27.20 PULMONARY HYPERTENSION (HCC): Primary | ICD-10-CM

## 2020-05-27 DIAGNOSIS — I27.82 OTHER CHRONIC PULMONARY EMBOLISM WITHOUT ACUTE COR PULMONALE (HCC): Chronic | ICD-10-CM

## 2020-05-27 DIAGNOSIS — N18.9 CHRONIC RENAL IMPAIRMENT, UNSPECIFIED CKD STAGE: ICD-10-CM

## 2020-05-27 DIAGNOSIS — E03.9 HYPOTHYROIDISM, UNSPECIFIED TYPE: ICD-10-CM

## 2020-05-27 DIAGNOSIS — I10 ESSENTIAL HYPERTENSION: Chronic | ICD-10-CM

## 2020-05-27 PROBLEM — R10.2 PELVIC PRESSURE IN FEMALE: Status: RESOLVED | Noted: 2018-02-26 | Resolved: 2020-05-27

## 2020-05-27 LAB
BUN SERPL-MCNC: 35 MG/DL (ref 8–23)
BUN/CREAT SERPL: 21.3 (ref 7–25)
CALCIUM SERPL-MCNC: 9.1 MG/DL (ref 8.6–10.5)
CHLORIDE SERPL-SCNC: 95 MMOL/L (ref 98–107)
CHOLEST SERPL-MCNC: 211 MG/DL (ref 0–200)
CO2 SERPL-SCNC: 22.2 MMOL/L (ref 22–29)
CREAT SERPL-MCNC: 1.64 MG/DL (ref 0.57–1)
GLUCOSE SERPL-MCNC: 124 MG/DL (ref 65–99)
HDLC SERPL-MCNC: 59 MG/DL (ref 40–60)
LDLC SERPL CALC-MCNC: 132 MG/DL (ref 0–100)
LDLC/HDLC SERPL: 2.24 {RATIO}
POTASSIUM SERPL-SCNC: 4.2 MMOL/L (ref 3.5–5.2)
SODIUM SERPL-SCNC: 132 MMOL/L (ref 136–145)
TRIGL SERPL-MCNC: 99 MG/DL (ref 0–150)
VLDLC SERPL CALC-MCNC: 19.8 MG/DL

## 2020-05-27 PROCEDURE — 99214 OFFICE O/P EST MOD 30 MIN: CPT | Performed by: INTERNAL MEDICINE

## 2020-05-27 RX ORDER — FAMOTIDINE 20 MG/1
20 TABLET, FILM COATED ORAL 2 TIMES DAILY
Qty: 60 TABLET | Refills: 2 | Status: SHIPPED | OUTPATIENT
Start: 2020-05-27 | End: 2020-05-28 | Stop reason: SDUPTHER

## 2020-05-27 RX ORDER — BIOTIN 1 MG
TABLET ORAL
COMMUNITY
End: 2020-06-15

## 2020-05-27 RX ORDER — BUPROPION HYDROCHLORIDE 150 MG/1
150 TABLET ORAL DAILY
COMMUNITY
End: 2021-06-02

## 2020-05-27 NOTE — PROGRESS NOTES
Subjective   Marylu Foreman is a 74 y.o. female here today for SOB x couple years, tired, depressed, ears stopped up and heart burn.  Pt does see cardiology and psychiatrist.    History of Present Illness   She has been having increasing SOB and read her record and it says she has pulm htn.  She would like that further evaluated  Depressions getting worse  Seeing psych  Next week  SHe has been having increased trouble with acid reflux  She has been taking pepcid  She had taken aleve reg for tmj for years  None recently  Only tylenol  Pt has been taking BP meds as prescribed without any problems.  No HA  No episodes of orthostasis  She has not taking thyroid hormone for 6 weeks    She is not sure things are any different      The following portions of the patient's history were reviewed and updated as appropriate: allergies, current medications, past medical history, past social history and problem list.  She has been eating little meat    Review of Systems   Constitutional: Positive for fatigue and fever.   Respiratory: Positive for shortness of breath. Negative for chest tightness.    Musculoskeletal: Negative.    All other systems reviewed and are negative.      Objective   Physical Exam   Constitutional: She is oriented to person, place, and time. She appears well-developed and well-nourished.   HENT:   Head: Normocephalic and atraumatic.   Right Ear: External ear normal.   Left Ear: External ear normal.   Mouth/Throat: Oropharynx is clear and moist.   Eyes: Pupils are equal, round, and reactive to light. Conjunctivae and EOM are normal.   Neck: Normal range of motion. No tracheal deviation present. No thyromegaly present.   Cardiovascular: Normal rate, regular rhythm, normal heart sounds and intact distal pulses.   Pulmonary/Chest: Effort normal and breath sounds normal.   Abdominal: Soft. Bowel sounds are normal. She exhibits no distension. There is no tenderness.   Musculoskeletal: Normal range of motion. She  exhibits no edema or deformity.   Neurological: She is alert and oriented to person, place, and time.   Skin: Skin is warm and dry.   Psychiatric: She has a normal mood and affect. Her behavior is normal. Judgment and thought content normal.   Vitals reviewed.      Vitals:    05/27/20 0838   BP: 116/74   Pulse: 93   SpO2: 98%     Body mass index is 37.02 kg/m².    Telephone on 05/22/2020   Component Date Value Ref Range Status   • Glucose 05/26/2020 124* 65 - 99 mg/dL Final   • BUN 05/26/2020 35* 8 - 23 mg/dL Final   • Creatinine 05/26/2020 1.64* 0.57 - 1.00 mg/dL Final   • eGFR Non African Am 05/26/2020 31* >60 mL/min/1.73 Final    Comment: The MDRD GFR formula is only valid for adults with stable  renal function between ages 18 and 70.     • eGFR  Am 05/26/2020 37* >60 mL/min/1.73 Final   • BUN/Creatinine Ratio 05/26/2020 21.3  7.0 - 25.0 Final   • Sodium 05/26/2020 132* 136 - 145 mmol/L Final   • Potassium 05/26/2020 4.2  3.5 - 5.2 mmol/L Final   • Chloride 05/26/2020 95* 98 - 107 mmol/L Final   • Total CO2 05/26/2020 22.2  22.0 - 29.0 mmol/L Final   • Calcium 05/26/2020 9.1  8.6 - 10.5 mg/dL Final   • Total Cholesterol 05/26/2020 211* 0 - 200 mg/dL Final   • Triglycerides 05/26/2020 99  0 - 150 mg/dL Final   • HDL Cholesterol 05/26/2020 59  40 - 60 mg/dL Final   • VLDL Cholesterol 05/26/2020 19.8  mg/dL Final   • LDL Cholesterol  05/26/2020 132* 0 - 100 mg/dL Final   • LDL/HDL Ratio 05/26/2020 2.24   Final         Current Outpatient Medications:   •  apixaban (ELIQUIS) 5 MG tablet tablet, Take 1 tablet by mouth Every 12 (Twelve) Hours., Disp: 60 tablet, Rfl: 6  •  buPROPion XL (WELLBUTRIN XL) 300 MG 24 hr tablet, Take 300 mg by mouth Daily., Disp: , Rfl:   •  busPIRone (BUSPAR) 10 MG tablet, Take 10 mg by mouth 3 (Three) Times a Day., Disp: , Rfl: 0  •  Cholecalciferol (VITAMIN D PO), Take  by mouth., Disp: , Rfl:   •  Chromium Picolinate 400 MCG tablet, Take  by mouth., Disp: , Rfl:   •  CITRUS BERGAMOT  PO, Take  by mouth., Disp: , Rfl:   •  Cyanocobalamin (VITAMIN B 12 PO), Take  by mouth., Disp: , Rfl:   •  dilTIAZem CD (CARDIZEM CD) 180 MG 24 hr capsule, Take 1 capsule by mouth Daily., Disp: 90 capsule, Rfl: 1  •  DULoxetine (CYMBALTA) 60 MG capsule, take 2 capsules by mouth every morning, Disp: , Rfl: 0  •  irbesartan (AVAPRO) 300 MG tablet, Take 1 tablet by mouth Daily., Disp: 90 tablet, Rfl: 1  •  levothyroxine (SYNTHROID, LEVOTHROID) 50 MCG tablet, , Disp: , Rfl:   •  Magnesium Chloride (MAGNESIUM DR PO), Take  by mouth., Disp: , Rfl:   •  Multiple Vitamin (MULTI-VITAMIN PO), Take 1 tablet by mouth daily., Disp: , Rfl:   •  Probiotic Product (PROBIOTIC PO), Take  by mouth daily. Lactobacillus rhamnosus GG, Disp: , Rfl:   •  triamterene-hydrochlorothiazide (DYAZIDE) 37.5-25 MG per capsule, Take 1 capsule by mouth Every Morning., Disp: 90 capsule, Rfl: 1  •  famotidine (Pepcid) 20 MG tablet, Take 1 tablet by mouth 2 (Two) Times a Day., Disp: 60 tablet, Rfl: 2       Assessment/Plan   Diagnoses and all orders for this visit:    Pulmonary hypertension (CMS/HCC)    Other chronic pulmonary embolism without acute cor pulmonale (CMS/HCC)    Hypothyroidism, unspecified type    Essential hypertension    Gastroesophageal reflux disease, esophagitis presence not specified    Other orders  -     famotidine (Pepcid) 20 MG tablet; Take 1 tablet by mouth 2 (Two) Times a Day.      1.  ?pulm htn-  .  She is red this in some of her reports that she has at home.  I do see it listed in her past medical history but no evidence of it in any notes that I could find.  She is going to discuss this with cardiology as she is continuing to have more shortness of breath  2.  Depression getting worse   She has stopped the prozac as she does not think it is working  She is seeing pulm next week  3.  PE-  On eloquis  Just started this  4.  Hypothyroidims-  She has been off meds for 6 weeks  She had been on thyroid hormone for 15 years  5.   GERD-  Try pepcid bid for 1 month  6.  CRI-   Elevated avoid nsaids and try to d/c the dyazide

## 2020-05-28 ENCOUNTER — TELEPHONE (OUTPATIENT)
Dept: INTERNAL MEDICINE | Facility: CLINIC | Age: 75
End: 2020-05-28

## 2020-05-28 RX ORDER — FAMOTIDINE 20 MG/1
20 TABLET, FILM COATED ORAL 2 TIMES DAILY
Qty: 180 TABLET | Refills: 1 | Status: SHIPPED | OUTPATIENT
Start: 2020-05-28 | End: 2020-11-13

## 2020-05-28 NOTE — TELEPHONE ENCOUNTER
RX SENT TO PHARMACY    ----- Message from Marylu Foreman sent at 5/28/2020 12:22 AM EDT -----  Regarding: Prescription Question  Contact: [phone]  Dr. Dillon,    Will you please send a prescription for Pepcid 20 mg to [facility].  I went to pick it up at [facility] and it was going to cost me $80. I can get it from [facility] for $10.    Thanks so much,  Marylu Foreman

## 2020-06-15 ENCOUNTER — OFFICE VISIT (OUTPATIENT)
Dept: CARDIOLOGY | Facility: CLINIC | Age: 75
End: 2020-06-15

## 2020-06-15 VITALS
BODY MASS INDEX: 36.64 KG/M2 | HEIGHT: 66 IN | WEIGHT: 228 LBS | HEART RATE: 90 BPM | DIASTOLIC BLOOD PRESSURE: 76 MMHG | SYSTOLIC BLOOD PRESSURE: 118 MMHG

## 2020-06-15 DIAGNOSIS — I27.20 PULMONARY HYPERTENSION (HCC): ICD-10-CM

## 2020-06-15 DIAGNOSIS — I48.19 PERSISTENT ATRIAL FIBRILLATION (HCC): Primary | ICD-10-CM

## 2020-06-15 DIAGNOSIS — E66.01 MORBID OBESITY (HCC): ICD-10-CM

## 2020-06-15 DIAGNOSIS — I10 ESSENTIAL HYPERTENSION: ICD-10-CM

## 2020-06-15 DIAGNOSIS — Z79.01 WARFARIN ANTICOAGULATION: ICD-10-CM

## 2020-06-15 DIAGNOSIS — I27.82 OTHER CHRONIC PULMONARY EMBOLISM WITHOUT ACUTE COR PULMONALE (HCC): ICD-10-CM

## 2020-06-15 PROCEDURE — 93000 ELECTROCARDIOGRAM COMPLETE: CPT | Performed by: INTERNAL MEDICINE

## 2020-06-15 PROCEDURE — 99214 OFFICE O/P EST MOD 30 MIN: CPT | Performed by: INTERNAL MEDICINE

## 2020-06-15 RX ORDER — VITAMIN B COMPLEX
1 CAPSULE ORAL DAILY
COMMUNITY
End: 2021-06-09
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
## 2020-06-16 ENCOUNTER — OFFICE VISIT (OUTPATIENT)
Dept: ONCOLOGY | Facility: CLINIC | Age: 75
End: 2020-06-16

## 2020-06-16 ENCOUNTER — LAB (OUTPATIENT)
Dept: OTHER | Facility: HOSPITAL | Age: 75
End: 2020-06-16

## 2020-06-16 VITALS
TEMPERATURE: 96.6 F | OXYGEN SATURATION: 97 % | BODY MASS INDEX: 36.9 KG/M2 | HEIGHT: 66 IN | WEIGHT: 229.6 LBS | DIASTOLIC BLOOD PRESSURE: 77 MMHG | RESPIRATION RATE: 16 BRPM | HEART RATE: 97 BPM | SYSTOLIC BLOOD PRESSURE: 114 MMHG

## 2020-06-16 DIAGNOSIS — D50.8 OTHER IRON DEFICIENCY ANEMIA: ICD-10-CM

## 2020-06-16 DIAGNOSIS — C50.012 MALIGNANT NEOPLASM OF NIPPLE OF LEFT BREAST IN FEMALE, ESTROGEN RECEPTOR POSITIVE (HCC): Primary | ICD-10-CM

## 2020-06-16 DIAGNOSIS — Z17.0 MALIGNANT NEOPLASM OF NIPPLE OF LEFT BREAST IN FEMALE, ESTROGEN RECEPTOR POSITIVE (HCC): Primary | ICD-10-CM

## 2020-06-16 DIAGNOSIS — I27.82 OTHER CHRONIC PULMONARY EMBOLISM WITHOUT ACUTE COR PULMONALE (HCC): Chronic | ICD-10-CM

## 2020-06-16 LAB
ALBUMIN SERPL-MCNC: 3.8 G/DL (ref 3.5–5.2)
ALBUMIN/GLOB SERPL: 1.2 G/DL
ALP SERPL-CCNC: 101 U/L (ref 39–117)
ALT SERPL W P-5'-P-CCNC: 17 U/L (ref 1–33)
ANION GAP SERPL CALCULATED.3IONS-SCNC: 15.8 MMOL/L (ref 5–15)
AST SERPL-CCNC: 19 U/L (ref 1–32)
BASOPHILS # BLD AUTO: 0.13 10*3/MM3 (ref 0–0.2)
BASOPHILS NFR BLD AUTO: 1.5 % (ref 0–1.5)
BILIRUB SERPL-MCNC: 0.5 MG/DL (ref 0.1–1.2)
BUN BLD-MCNC: 45 MG/DL (ref 8–23)
BUN/CREAT SERPL: 28.5 (ref 7–25)
CALCIUM SPEC-SCNC: 9.4 MG/DL (ref 8.6–10.5)
CHLORIDE SERPL-SCNC: 101 MMOL/L (ref 98–107)
CO2 SERPL-SCNC: 16.2 MMOL/L (ref 22–29)
CREAT BLD-MCNC: 1.58 MG/DL (ref 0.57–1)
DEPRECATED RDW RBC AUTO: 49.8 FL (ref 37–54)
EOSINOPHIL # BLD AUTO: 0.24 10*3/MM3 (ref 0–0.4)
EOSINOPHIL NFR BLD AUTO: 2.8 % (ref 0.3–6.2)
ERYTHROCYTE [DISTWIDTH] IN BLOOD BY AUTOMATED COUNT: 13.7 % (ref 12.3–15.4)
GFR SERPL CREATININE-BSD FRML MDRD: 32 ML/MIN/1.73
GLOBULIN UR ELPH-MCNC: 3.2 GM/DL
GLUCOSE BLD-MCNC: 149 MG/DL (ref 65–99)
HCT VFR BLD AUTO: 42.6 % (ref 34–46.6)
HGB BLD-MCNC: 13.9 G/DL (ref 12–15.9)
IMM GRANULOCYTES # BLD AUTO: 0.02 10*3/MM3 (ref 0–0.05)
IMM GRANULOCYTES NFR BLD AUTO: 0.2 % (ref 0–0.5)
LYMPHOCYTES # BLD AUTO: 2.64 10*3/MM3 (ref 0.7–3.1)
LYMPHOCYTES NFR BLD AUTO: 30.8 % (ref 19.6–45.3)
MCH RBC QN AUTO: 31.7 PG (ref 26.6–33)
MCHC RBC AUTO-ENTMCNC: 32.6 G/DL (ref 31.5–35.7)
MCV RBC AUTO: 97.3 FL (ref 79–97)
MONOCYTES # BLD AUTO: 0.82 10*3/MM3 (ref 0.1–0.9)
MONOCYTES NFR BLD AUTO: 9.6 % (ref 5–12)
NEUTROPHILS # BLD AUTO: 4.73 10*3/MM3 (ref 1.7–7)
NEUTROPHILS NFR BLD AUTO: 55.1 % (ref 42.7–76)
NRBC BLD AUTO-RTO: 0 /100 WBC (ref 0–0.2)
PLATELET # BLD AUTO: 357 10*3/MM3 (ref 140–450)
PMV BLD AUTO: 11.5 FL (ref 6–12)
POTASSIUM BLD-SCNC: 3.6 MMOL/L (ref 3.5–5.2)
PROT SERPL-MCNC: 7 G/DL (ref 6–8.5)
RBC # BLD AUTO: 4.38 10*6/MM3 (ref 3.77–5.28)
SODIUM BLD-SCNC: 133 MMOL/L (ref 136–145)
WBC NRBC COR # BLD: 8.58 10*3/MM3 (ref 3.4–10.8)

## 2020-06-16 PROCEDURE — 80053 COMPREHEN METABOLIC PANEL: CPT | Performed by: INTERNAL MEDICINE

## 2020-06-16 PROCEDURE — 36415 COLL VENOUS BLD VENIPUNCTURE: CPT

## 2020-06-16 PROCEDURE — 85025 COMPLETE CBC W/AUTO DIFF WBC: CPT | Performed by: INTERNAL MEDICINE

## 2020-06-16 PROCEDURE — 99214 OFFICE O/P EST MOD 30 MIN: CPT | Performed by: NURSE PRACTITIONER
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
## 2020-06-22 ENCOUNTER — OFFICE VISIT (OUTPATIENT)
Dept: INTERNAL MEDICINE | Facility: CLINIC | Age: 75
End: 2020-06-22

## 2020-06-22 VITALS
HEIGHT: 66 IN | TEMPERATURE: 98.4 F | OXYGEN SATURATION: 98 % | WEIGHT: 228 LBS | BODY MASS INDEX: 36.64 KG/M2 | SYSTOLIC BLOOD PRESSURE: 112 MMHG | DIASTOLIC BLOOD PRESSURE: 68 MMHG | HEART RATE: 82 BPM

## 2020-06-22 DIAGNOSIS — Z00.00 MEDICARE ANNUAL WELLNESS VISIT, SUBSEQUENT: Primary | ICD-10-CM

## 2020-06-22 DIAGNOSIS — G47.33 OSA (OBSTRUCTIVE SLEEP APNEA): ICD-10-CM

## 2020-06-22 DIAGNOSIS — I10 ESSENTIAL HYPERTENSION: Chronic | ICD-10-CM

## 2020-06-22 DIAGNOSIS — E03.9 HYPOTHYROIDISM, UNSPECIFIED TYPE: ICD-10-CM

## 2020-06-22 DIAGNOSIS — N18.9 CHRONIC RENAL IMPAIRMENT, UNSPECIFIED CKD STAGE: ICD-10-CM

## 2020-06-22 PROCEDURE — G0439 PPPS, SUBSEQ VISIT: HCPCS | Performed by: INTERNAL MEDICINE

## 2020-06-22 PROCEDURE — 99214 OFFICE O/P EST MOD 30 MIN: CPT | Performed by: INTERNAL MEDICINE

## 2020-06-22 NOTE — PROGRESS NOTES
Subjective   Marylu Foreman is a 75 y.o. female.     History of Present Illness   She is seeing cards and they are working on AF and thinks that is why she is so tired  She was trying qod diuretic and she was noticing swelling with this so she resumes daily  Pt has been taking BP meds as prescribed without any problems.  No HA  No episodes of orthostasis  She has been having bilat ear pain and some hearing loss  She doies fall asleep very easily and she is just tired all the time. She does not snore that she knows of but she sleeps alone      The following portions of the patient's history were reviewed and updated as appropriate: allergies, current medications, past medical history, past social history and problem list.  She does try to limit sodum    Review of Systems   Constitutional: Negative for fever.   HENT: Positive for ear pain. Negative for rhinorrhea and sore throat.    Respiratory: Positive for shortness of breath. Negative for wheezing.    Cardiovascular: Negative for chest pain and leg swelling.   Gastrointestinal: Negative for abdominal pain and vomiting.   Musculoskeletal: Negative for neck pain.   Skin: Negative for rash.   Neurological: Positive for headaches.       Objective   Physical Exam   Constitutional: She is oriented to person, place, and time. She appears well-developed and well-nourished.   HENT:   Head: Normocephalic and atraumatic.   Right Ear: External ear normal.   Left Ear: External ear normal.   Mouth/Throat: Oropharynx is clear and moist.   Eyes: Pupils are equal, round, and reactive to light. Conjunctivae and EOM are normal.   Neck: Normal range of motion. No tracheal deviation present. No thyromegaly present.   Cardiovascular: Normal rate, regular rhythm, normal heart sounds and intact distal pulses.   Pulmonary/Chest: Effort normal and breath sounds normal.   Abdominal: Soft. Bowel sounds are normal. She exhibits no distension. There is no tenderness.   Musculoskeletal: Normal  range of motion. She exhibits no edema or deformity.   Neurological: She is alert and oriented to person, place, and time.   Skin: Skin is warm and dry.   Psychiatric: She has a normal mood and affect. Her behavior is normal. Judgment and thought content normal.   Vitals reviewed.      Vitals:    06/22/20 0856   BP: 112/68   Pulse: 82   Temp: 98.4 °F (36.9 °C)   SpO2: 98%     Body mass index is 36.82 kg/m².         Assessment/Plan   Diagnoses and all orders for this visit:    Essential hypertension    Hypothyroidism, unspecified type    Chronic renal impairment, unspecified CKD stage  -     Ambulatory Referral to Nephrology    GURDEEP (obstructive sleep apnea)  -     Ambulatory Referral to Sleep Medicine      1.  HTN-  She is ok on BP check  2. CRI- slightly worse  Refer renal  She may need some adjustment in BP meds  I have already advised trying to limit diuretic but she says she has hand swelling without  Cont to avoid nsaids and increase fluids  3.  GURDEEP- never been on CPAP but the fatigue is worse  Her last sleep study was 5 years again  She needs a new manuel of this  4.  Hypothyroidism- back on meds for this  5. Depression- seeing psych this week  They are making adjustments    6. GERD- off ppi due to cri on pepcid and ok with that

## 2020-06-22 NOTE — PROGRESS NOTES
The ABCs of the Annual Wellness Visit  Subsequent Medicare Wellness Visit    No chief complaint on file.      Subjective   History of Present Illness:  Marylu Foreman is a 75 y.o. female who presents for a Subsequent Medicare Wellness Visit.    HEALTH RISK ASSESSMENT    Recent Hospitalizations:  No hospitalization(s) within the last year.    Current Medical Providers:  Patient Care Team:  Arleen Dillon MD as PCP - General (Internal Medicine)  Jean Mahajan MD as Consulting Physician (Hematology and Oncology)  Joanna Quiñonez MD as Consulting Physician (Obstetrics and Gynecology)  West White Jr., MD as Consulting Physician (Hematology and Oncology)  Librado Monique MD (Psychiatry)    Smoking Status:  Social History     Tobacco Use   Smoking Status Never Smoker   Smokeless Tobacco Never Used       Alcohol Consumption:  Social History     Substance and Sexual Activity   Alcohol Use No    Comment: Caffeine use       Depression Screen:   PHQ-2/PHQ-9 Depression Screening 6/22/2020   Little interest or pleasure in doing things 3   Feeling down, depressed, or hopeless 3   Trouble falling or staying asleep, or sleeping too much 3   Feeling tired or having little energy 3   Poor appetite or overeating 1   Feeling bad about yourself - or that you are a failure or have let yourself or your family down 0   Trouble concentrating on things, such as reading the newspaper or watching television 0   Moving or speaking so slowly that other people could have noticed. Or the opposite - being so fidgety or restless that you have been moving around a lot more than usual 0   Thoughts that you would be better off dead, or of hurting yourself in some way 0   Total Score 13   If you checked off any problems, how difficult have these problems made it for you to do your work, take care of things at home, or get along with other people? Not difficult at all       Fall Risk Screen:  CONNIE Fall Risk Assessment was  completed, and patient is at MODERATE risk for falls. Assessment completed on:6/22/2020    Health Habits and Functional and Cognitive Screening:  Functional & Cognitive Status 6/22/2020   Do you have difficulty preparing food and eating? No   Do you have difficulty bathing yourself, getting dressed or grooming yourself? No   Do you have difficulty using the toilet? No   Do you have difficulty moving around from place to place? No   Do you have trouble with steps or getting out of a bed or a chair? No   Current Diet Unhealthy Diet   Dental Exam Up to date   Eye Exam Up to date   Exercise (times per week) 0 times per week   Current Exercise Activities Include None   Do you need help using the phone?  No   Are you deaf or do you have serious difficulty hearing?  Yes   Do you need help with transportation? No   Do you need help shopping? No   Do you need help preparing meals?  No   Do you need help with housework?  No   Do you need help with laundry? No   Do you need help taking your medications? No   Do you need help managing money? No   Do you ever drive or ride in a car without wearing a seat belt? No   Have you felt unusual stress, anger or loneliness in the last month? Yes   Who do you live with? Alone   If you need help, do you have trouble finding someone available to you? No   Have you been bothered in the last four weeks by sexual problems? No   Do you have difficulty concentrating, remembering or making decisions? No         Does the patient have evidence of cognitive impairment? No    Asprin use counseling:Does not need ASA (and currently is not on it)    Age-appropriate Screening Schedule:  Refer to the list below for future screening recommendations based on patient's age, sex and/or medical conditions. Orders for these recommended tests are listed in the plan section. The patient has been provided with a written plan.    Health Maintenance   Topic Date Due   • ZOSTER VACCINE (2 of 3) 11/17/2012   • DXA  SCAN  07/12/2018   • INFLUENZA VACCINE  08/01/2020   • LIPID PANEL  05/26/2021   • MAMMOGRAM  09/30/2021   • TDAP/TD VACCINES (2 - Td) 07/09/2025   • COLONOSCOPY  11/12/2028          The following portions of the patient's history were reviewed and updated as appropriate: allergies, current medications, past medical history, past social history and problem list.    Outpatient Medications Prior to Visit   Medication Sig Dispense Refill   • apixaban (ELIQUIS) 5 MG tablet tablet Take 1 tablet by mouth Every 12 (Twelve) Hours. 60 tablet 6   • B Complex Vitamins (VITAMIN B COMPLEX) capsule capsule Take 1 capsule by mouth Daily.     • buPROPion XL (WELLBUTRIN XL) 150 MG 24 hr tablet Take 150 mg by mouth Daily.     • busPIRone (BUSPAR) 10 MG tablet Take 10 mg by mouth 3 (Three) Times a Day.  0   • Cholecalciferol (VITAMIN D PO) Take 1 tablet by mouth Daily.     • CITRUS BERGAMOT PO Take 1 tablet by mouth Daily.     • Coenzyme Q10 (CO Q 10 PO) Take 200 mg by mouth Daily.     • Cyanocobalamin (VITAMIN B 12 PO) Take 1 tablet by mouth Daily.     • dilTIAZem CD (CARDIZEM CD) 180 MG 24 hr capsule Take 1 capsule by mouth Daily. 90 capsule 1   • DULoxetine (CYMBALTA) 60 MG capsule take 2 capsules by mouth every morning  0   • famotidine (Pepcid) 20 MG tablet Take 1 tablet by mouth 2 (Two) Times a Day. 180 tablet 1   • irbesartan (AVAPRO) 300 MG tablet Take 1 tablet by mouth Daily. 90 tablet 1   • levothyroxine (SYNTHROID, LEVOTHROID) 50 MCG tablet      • Multiple Vitamin (MULTI-VITAMIN PO) Take 1 tablet by mouth daily.     • Probiotic Product (PROBIOTIC PO) Take  by mouth daily. Lactobacillus rhamnosus GG     • triamterene-hydrochlorothiazide (DYAZIDE) 37.5-25 MG per capsule Take 1 capsule by mouth Every Morning. (Patient taking differently: Take 1 capsule by mouth Every Other Day.) 90 capsule 1     No facility-administered medications prior to visit.        Patient Active Problem List   Diagnosis   • Anxiety   • Essential  "hypertension   • Chronic pulmonary embolism (CMS/HCC)   • Tension headache   • Depression   • Recurrent UTI   • Malignant neoplasm of left female breast (CMS/HCC)   • Warfarin anticoagulation   • Morbid obesity (CMS/HCC)   • Hyperlipidemia   • Chronic renal impairment, stage 2 (mild)   • Hypothyroidism   • Incomplete uterovaginal prolapse   • Iron deficiency anemia   • Postsurgical nonabsorption   • Internal bleeding   • Anticoagulation management encounter   • Pulmonary hypertension (CMS/HCC)       Advanced Care Planning:  ACP discussion was held with the patient during this visit. Patient has an advance directive (not in EMR), copy requested.    Review of Systems   Constitutional: Negative for fever.   HENT: Positive for ear pain. Negative for rhinorrhea and sore throat.    Respiratory: Positive for shortness of breath. Negative for wheezing.    Cardiovascular: Negative for chest pain and leg swelling.   Gastrointestinal: Negative for abdominal pain and vomiting.   Musculoskeletal: Negative for neck pain.   Skin: Negative for rash.   Neurological: Positive for headaches.       Compared to one year ago, the patient feels her physical health is worse.due to long standing fatigue  Compared to one year ago, the patient feels her mental health is worse.without the wekbutrin    Reviewed chart for potential of high risk medication in the elderly: yes  Reviewed chart for potential of harmful drug interactions in the elderly:yes    Objective         Vitals:    06/22/20 0856   BP: 112/68   Pulse: 82   Temp: 98.4 °F (36.9 °C)   SpO2: 98%   Weight: 103 kg (228 lb)   Height: 167.6 cm (65.98\")   PainSc:   4   PainLoc: Ankle       Body mass index is 36.82 kg/m².  Discussed the patient's BMI with her. The BMI is above average; BMI management plan is completed.    Physical Exam    Lab Results   Component Value Date     (H) 05/26/2020    CHLPL 211 (H) 05/26/2020    TRIG 99 05/26/2020    HDL 59 05/26/2020     (H) " 05/26/2020    VLDL 19.8 05/26/2020        Assessment/Plan   Medicare Risks and Personalized Health Plan  CMS Preventative Services Quick Reference  Fall Risk    The above risks/problems have been discussed with the patient.  Pertinent information has been shared with the patient in the After Visit Summary.  Follow up plans and orders are seen below in the Assessment/Plan Section.    Diagnoses and all orders for this visit:    1. Essential hypertension (Primary)    2. Hypothyroidism, unspecified type    3. Chronic renal impairment, unspecified CKD stage  -     Ambulatory Referral to Nephrology    4. GURDEEP (obstructive sleep apnea)  -     Ambulatory Referral to Sleep Medicine      Follow Up:  No follow-ups on file.     An After Visit Summary and PPPS were given to the patient.     She is very depressed  She is seeing psych and that does help    I rec shingles vaccine

## 2020-06-24 ENCOUNTER — HOSPITAL ENCOUNTER (OUTPATIENT)
Dept: CARDIOLOGY | Facility: HOSPITAL | Age: 75
Discharge: HOME | End: 2020-06-24
Admitting: INTERNAL MEDICINE

## 2020-06-24 VITALS
DIASTOLIC BLOOD PRESSURE: 106 MMHG | HEART RATE: 110 BPM | SYSTOLIC BLOOD PRESSURE: 157 MMHG | WEIGHT: 228 LBS | BODY MASS INDEX: 36.64 KG/M2 | HEIGHT: 66 IN

## 2020-06-24 DIAGNOSIS — E66.01 MORBID OBESITY (HCC): ICD-10-CM

## 2020-06-24 DIAGNOSIS — I10 ESSENTIAL HYPERTENSION: ICD-10-CM

## 2020-06-24 DIAGNOSIS — I27.82 OTHER CHRONIC PULMONARY EMBOLISM WITHOUT ACUTE COR PULMONALE (HCC): ICD-10-CM

## 2020-06-24 DIAGNOSIS — I48.19 PERSISTENT ATRIAL FIBRILLATION (HCC): ICD-10-CM

## 2020-06-24 DIAGNOSIS — I27.20 PULMONARY HYPERTENSION (HCC): ICD-10-CM

## 2020-06-24 DIAGNOSIS — Z79.01 WARFARIN ANTICOAGULATION: ICD-10-CM

## 2020-06-24 LAB
ASCENDING AORTA: 2.8 CM
BH CV ECHO MEAS - ACS: 1.8 CM
BH CV ECHO MEAS - AO MAX PG (FULL): 7.6 MMHG
BH CV ECHO MEAS - AO MAX PG: 11.8 MMHG
BH CV ECHO MEAS - AO MEAN PG (FULL): 5 MMHG
BH CV ECHO MEAS - AO MEAN PG: 7 MMHG
BH CV ECHO MEAS - AO ROOT AREA (BSA CORRECTED): 1.3
BH CV ECHO MEAS - AO ROOT AREA: 6.2 CM^2
BH CV ECHO MEAS - AO ROOT DIAM: 2.8 CM
BH CV ECHO MEAS - AO V2 MAX: 172 CM/SEC
BH CV ECHO MEAS - AO V2 MEAN: 124 CM/SEC
BH CV ECHO MEAS - AO V2 VTI: 32.8 CM
BH CV ECHO MEAS - AVA(I,A): 1.8 CM^2
BH CV ECHO MEAS - AVA(I,D): 1.8 CM^2
BH CV ECHO MEAS - AVA(V,A): 1.9 CM^2
BH CV ECHO MEAS - AVA(V,D): 1.9 CM^2
BH CV ECHO MEAS - BSA(HAYCOCK): 2.2 M^2
BH CV ECHO MEAS - BSA: 2.1 M^2
BH CV ECHO MEAS - BZI_BMI: 36.8 KILOGRAMS/M^2
BH CV ECHO MEAS - BZI_METRIC_HEIGHT: 167.6 CM
BH CV ECHO MEAS - BZI_METRIC_WEIGHT: 103.4 KG
BH CV ECHO MEAS - EDV(MOD-SP2): 48 ML
BH CV ECHO MEAS - EDV(MOD-SP4): 41 ML
BH CV ECHO MEAS - EDV(TEICH): 50.9 ML
BH CV ECHO MEAS - EF(CUBED): 48.8 %
BH CV ECHO MEAS - EF(MOD-BP): 61 %
BH CV ECHO MEAS - EF(MOD-SP2): 62.5 %
BH CV ECHO MEAS - EF(MOD-SP4): 56.1 %
BH CV ECHO MEAS - EF(TEICH): 41.9 %
BH CV ECHO MEAS - ESV(MOD-SP2): 18 ML
BH CV ECHO MEAS - ESV(MOD-SP4): 18 ML
BH CV ECHO MEAS - ESV(TEICH): 29.6 ML
BH CV ECHO MEAS - FS: 20 %
BH CV ECHO MEAS - IVS/LVPW: 1.1
BH CV ECHO MEAS - IVSD: 0.9 CM
BH CV ECHO MEAS - LAT PEAK E' VEL: 11.5 CM/SEC
BH CV ECHO MEAS - LV DIASTOLIC VOL/BSA (35-75): 19.4 ML/M^2
BH CV ECHO MEAS - LV MASS(C)D: 81.9 GRAMS
BH CV ECHO MEAS - LV MASS(C)DI: 38.7 GRAMS/M^2
BH CV ECHO MEAS - LV MAX PG: 4.2 MMHG
BH CV ECHO MEAS - LV MEAN PG: 2 MMHG
BH CV ECHO MEAS - LV SYSTOLIC VOL/BSA (12-30): 8.5 ML/M^2
BH CV ECHO MEAS - LV V1 MAX: 103 CM/SEC
BH CV ECHO MEAS - LV V1 MEAN: 73.2 CM/SEC
BH CV ECHO MEAS - LV V1 VTI: 18.8 CM
BH CV ECHO MEAS - LVIDD: 3.5 CM
BH CV ECHO MEAS - LVIDS: 2.8 CM
BH CV ECHO MEAS - LVLD AP2: 6.3 CM
BH CV ECHO MEAS - LVLD AP4: 5.9 CM
BH CV ECHO MEAS - LVLS AP2: 4.9 CM
BH CV ECHO MEAS - LVLS AP4: 4.6 CM
BH CV ECHO MEAS - LVOT AREA (M): 3.1 CM^2
BH CV ECHO MEAS - LVOT AREA: 3.1 CM^2
BH CV ECHO MEAS - LVOT DIAM: 2 CM
BH CV ECHO MEAS - LVPWD: 0.8 CM
BH CV ECHO MEAS - MED PEAK E' VEL: 9.3 CM/SEC
BH CV ECHO MEAS - MR MAX PG: 54.2 MMHG
BH CV ECHO MEAS - MR MAX VEL: 368 CM/SEC
BH CV ECHO MEAS - MV DEC SLOPE: 1374 CM/SEC^2
BH CV ECHO MEAS - MV DEC TIME: 0.17 SEC
BH CV ECHO MEAS - MV E MAX VEL: 105 CM/SEC
BH CV ECHO MEAS - MV MAX PG: 5.9 MMHG
BH CV ECHO MEAS - MV MEAN PG: 3 MMHG
BH CV ECHO MEAS - MV P1/2T MAX VEL: 121.5 CM/SEC
BH CV ECHO MEAS - MV P1/2T: 25.9 MSEC
BH CV ECHO MEAS - MV V2 MAX: 121 CM/SEC
BH CV ECHO MEAS - MV V2 MEAN: 76.8 CM/SEC
BH CV ECHO MEAS - MV V2 VTI: 29.3 CM
BH CV ECHO MEAS - MVA P1/2T LCG: 1.8 CM^2
BH CV ECHO MEAS - MVA(P1/2T): 8.5 CM^2
BH CV ECHO MEAS - MVA(VTI): 2 CM^2
BH CV ECHO MEAS - PA ACC TIME: 0.11 SEC
BH CV ECHO MEAS - PA MAX PG (FULL): 3.6 MMHG
BH CV ECHO MEAS - PA MAX PG: 5.6 MMHG
BH CV ECHO MEAS - PA PR(ACCEL): 30 MMHG
BH CV ECHO MEAS - PA V2 MAX: 118 CM/SEC
BH CV ECHO MEAS - PULM DIAS VEL: 64.7 CM/SEC
BH CV ECHO MEAS - PULM S/D: 0.56
BH CV ECHO MEAS - PULM SYS VEL: 36 CM/SEC
BH CV ECHO MEAS - PVA(V,A): 1.7 CM^2
BH CV ECHO MEAS - PVA(V,D): 1.7 CM^2
BH CV ECHO MEAS - QP/QS: 0.64
BH CV ECHO MEAS - RAP SYSTOLE: 3 MMHG
BH CV ECHO MEAS - RV MAX PG: 1.9 MMHG
BH CV ECHO MEAS - RV MEAN PG: 1 MMHG
BH CV ECHO MEAS - RV V1 MAX: 69.8 CM/SEC
BH CV ECHO MEAS - RV V1 MEAN: 48.8 CM/SEC
BH CV ECHO MEAS - RV V1 VTI: 13.3 CM
BH CV ECHO MEAS - RVOT AREA: 2.8 CM^2
BH CV ECHO MEAS - RVOT DIAM: 1.9 CM
BH CV ECHO MEAS - RVSP: 31.5 MMHG
BH CV ECHO MEAS - SI(AO): 95.5 ML/M^2
BH CV ECHO MEAS - SI(CUBED): 9.9 ML/M^2
BH CV ECHO MEAS - SI(LVOT): 27.9 ML/M^2
BH CV ECHO MEAS - SI(MOD-SP2): 14.2 ML/M^2
BH CV ECHO MEAS - SI(MOD-SP4): 10.9 ML/M^2
BH CV ECHO MEAS - SI(TEICH): 10.1 ML/M^2
BH CV ECHO MEAS - SUP REN AO DIAM: 1.9 CM
BH CV ECHO MEAS - SV(AO): 202 ML
BH CV ECHO MEAS - SV(CUBED): 20.9 ML
BH CV ECHO MEAS - SV(LVOT): 59.1 ML
BH CV ECHO MEAS - SV(MOD-SP2): 30 ML
BH CV ECHO MEAS - SV(MOD-SP4): 23 ML
BH CV ECHO MEAS - SV(RVOT): 37.7 ML
BH CV ECHO MEAS - SV(TEICH): 21.3 ML
BH CV ECHO MEAS - TAPSE (>1.6): 1.7 CM2
BH CV ECHO MEAS - TR MAX VEL: 267 CM/SEC
BH CV ECHO MEASUREMENTS AVERAGE E/E' RATIO: 10.1
BH CV XLRA - RV BASE: 2.9 CM
BH CV XLRA - RV LENGTH: 5.3 CM
BH CV XLRA - RV MID: 2.4 CM
BH CV XLRA - TDI S': 11 CM/SEC
LEFT ATRIUM VOLUME INDEX: 22 ML/M2
LV EF 2D ECHO EST: 61 %
SINUS: 2.8 CM
STJ: 2.5 CM

## 2020-06-24 PROCEDURE — 93306 TTE W/DOPPLER COMPLETE: CPT | Performed by: INTERNAL MEDICINE

## 2020-06-24 PROCEDURE — 93306 TTE W/DOPPLER COMPLETE: CPT

## 2020-06-26 ENCOUNTER — TELEPHONE (OUTPATIENT)
Dept: CARDIOLOGY | Facility: CLINIC | Age: 75
End: 2020-06-26

## 2020-06-26 NOTE — TELEPHONE ENCOUNTER
----- Message from Cole Ramos III, MD sent at 6/26/2020  4:21 PM EDT -----  holter confirms increased heart rate. Have her increase her diltiazem- take two tabs daily (same time) and let's set up a telehealth visit for next week Weds-Fri

## 2020-06-26 NOTE — TELEPHONE ENCOUNTER
LM on pt VM to increased her diltiazem- take two tabs daily (same time). Call our office back and set up a telehealth visit for next week Weds-Fri.    Thanks Kirsten

## 2020-06-29 NOTE — TELEPHONE ENCOUNTER
Can you call and scheduled pt for tele health with Dr Ramos. she can be reach at [phone]      S/w pt. Pt states that she received Kirsten's messages last friday.     Thanks  Zohra MARTINO

## 2020-07-02 ENCOUNTER — OFFICE VISIT (OUTPATIENT)
Dept: CARDIOLOGY | Facility: CLINIC | Age: 75
End: 2020-07-02

## 2020-07-02 DIAGNOSIS — I48.19 ATRIAL FIBRILLATION, PERSISTENT (HCC): Primary | Chronic | ICD-10-CM

## 2020-07-02 DIAGNOSIS — I27.82 OTHER CHRONIC PULMONARY EMBOLISM WITHOUT ACUTE COR PULMONALE (HCC): Chronic | ICD-10-CM

## 2020-07-02 DIAGNOSIS — Z79.01 WARFARIN ANTICOAGULATION: Chronic | ICD-10-CM

## 2020-07-02 DIAGNOSIS — E66.01 MORBID OBESITY (HCC): Chronic | ICD-10-CM

## 2020-07-02 DIAGNOSIS — I10 ESSENTIAL HYPERTENSION: Chronic | ICD-10-CM

## 2020-07-02 PROCEDURE — 99441 PR PHYS/QHP TELEPHONE EVALUATION 5-10 MIN: CPT | Performed by: INTERNAL MEDICINE
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
## 2020-07-21 ENCOUNTER — OFFICE VISIT (OUTPATIENT)
Dept: SLEEP MEDICINE | Facility: HOSPITAL | Age: 75
End: 2020-07-21

## 2020-07-21 VITALS
HEIGHT: 66 IN | DIASTOLIC BLOOD PRESSURE: 62 MMHG | BODY MASS INDEX: 37.12 KG/M2 | WEIGHT: 231 LBS | OXYGEN SATURATION: 97 % | SYSTOLIC BLOOD PRESSURE: 126 MMHG | HEART RATE: 117 BPM

## 2020-07-21 DIAGNOSIS — G47.33 OSA (OBSTRUCTIVE SLEEP APNEA): Primary | ICD-10-CM

## 2020-07-21 DIAGNOSIS — G47.10 HYPERSOMNIA: ICD-10-CM

## 2020-07-21 DIAGNOSIS — E66.01 MORBID (SEVERE) OBESITY DUE TO EXCESS CALORIES (HCC): ICD-10-CM

## 2020-07-21 DIAGNOSIS — R53.82 CHRONIC FATIGUE: ICD-10-CM

## 2020-07-21 PROCEDURE — G0463 HOSPITAL OUTPT CLINIC VISIT: HCPCS

## 2020-07-21 NOTE — PROGRESS NOTES
Sleep Medicine initial Consultation    Marylu Foreman  : 1945  75 y.o. female   Date of Service: 2020  Referring provider: Arleen Dillon MD    History Of Present Illness:   Ms. Marylu Foreman  is a 75 y.o. right handed Caucasianfemale is seen in the sleep clinic for Snoring, Excessive Daytime Sleepiness, Chronic Fatigue and Disturbed Sleep.     The patient has a H/O hypertension, hyperlipidemia and atrial fibrillation, the patient has history of carcinoma of the breast on the left side and had mastectomy, arthritis, bilateral pulmonary emboli in the past and is on anticoagulation with Eliquis, chronic anxiety and depression and pulmonary hypertension.    The patient c/o excessive daytime sleepiness, , chronic fatigue and There is no H/O sleep paralysis, hypnagogic hallucinations or cataplexy.      The patient complains of snoring loud in all sleeping positions and has gained 15 lbs of weight the the last 5 years.    The patient complains of bruxism during sleep and awakened with gastric discomfort or burning sensation in the chest or sour taste.     The patient complains of Does not feel rested even after a long sleep and Still feels sleepy even when increasing sleep time.    There is no h/O sleepwalking or bedwetting or nightmares or sleep eating or acting out dreams.    The patient is Retired now.    Sleep schedule: Bed time: 12 midnight and wake up time: 7 am: sleep Latency : 15 minutes and Total Sleep Time: 6 hours, 30 minutes.  She reads in the bed before going to bed.    Naps: Yes intermittently all day to a total of 4 hours.    Caffeine intake: 1 cup of coffee in the morning and a 3 cups of iced tea during the day      Hart Sleepiness Scale: .    Review of Systems   HENT: Positive for congestion and ear pain.    Respiratory: Positive for shortness of breath.    Cardiovascular: Positive for palpitations (Irregular heartbeats).   Psychiatric/Behavioral: Positive for dysphoric mood. The  "patient is nervous/anxious.      Patient examination:  Vitals:    07/21/20 0906   BP: 126/62   Pulse: 117   SpO2: 97%   Weight: 105 kg (231 lb)   Height: 167.6 cm (66\")    Body mass index is 37.28 kg/m².  Neck Circumference: 14.5 inches   HEENT: Normal and Mallampati Class III: Soft palate, base of uvula visible   Neck: Supple no carotid bruits.  Lungs: Clear to auscultation.  Cardiac exam: Normal and regular rate and rhythm. No murmurs.  Extremities: No edema clubbing or cyanosis.    ASSESSMENT AND PLAN:The patient has symptoms of obstructive sleep apnea syndrome with hypersomnia. We will proceed with polysomnography and treat with CPAP therapy if needed. Sleep hygiene techniques discussed.  Exercise diet and weight loss discussed.  The patient prefers a home sleep study.    Encounter Diagnoses   Name Primary?   • GURDEEP (obstructive sleep apnea) Yes   • Morbid (severe) obesity due to excess calories (CMS/HCC)    • Hypersomnia    • Chronic fatigue      Orders Placed This Encounter   Procedures   • Home Sleep Study     Return in about 3 months (around 10/21/2020).    Yovany Recinos MD  7/21/2020  09:51  "

## 2020-08-10 ENCOUNTER — HOSPITAL ENCOUNTER (OUTPATIENT)
Dept: SLEEP MEDICINE | Facility: HOSPITAL | Age: 75
Discharge: HOME | End: 2020-08-10

## 2020-08-10 DIAGNOSIS — E66.01 MORBID (SEVERE) OBESITY DUE TO EXCESS CALORIES (HCC): ICD-10-CM

## 2020-08-10 DIAGNOSIS — G47.10 HYPERSOMNIA: ICD-10-CM

## 2020-08-10 DIAGNOSIS — G47.33 OSA (OBSTRUCTIVE SLEEP APNEA): ICD-10-CM

## 2020-08-10 DIAGNOSIS — R53.82 CHRONIC FATIGUE: ICD-10-CM

## 2020-08-27 ENCOUNTER — HOSPITAL ENCOUNTER (OUTPATIENT)
Dept: SLEEP MEDICINE | Facility: HOSPITAL | Age: 75
Discharge: HOME | End: 2020-08-27
Admitting: PSYCHIATRY & NEUROLOGY

## 2020-08-27 PROCEDURE — 95806 SLEEP STUDY UNATT&RESP EFFT: CPT

## 2020-09-08 ENCOUNTER — TELEPHONE (OUTPATIENT)
Dept: SLEEP MEDICINE | Facility: HOSPITAL | Age: 75
End: 2020-09-08

## 2020-09-08 NOTE — TELEPHONE ENCOUNTER
Spoke with patient about sleep study results , faxing orders to [facility]. Follow up scheduled 10/20/20

## 2020-09-14 RX ORDER — IRBESARTAN 300 MG/1
TABLET ORAL
Qty: 90 TABLET | Refills: 3 | Status: SHIPPED | OUTPATIENT
Start: 2020-09-14 | End: 2021-09-09

## 2020-09-14 RX ORDER — DILTIAZEM HYDROCHLORIDE 180 MG/1
CAPSULE, COATED, EXTENDED RELEASE ORAL
Qty: 90 CAPSULE | Refills: 3 | Status: SHIPPED | OUTPATIENT
Start: 2020-09-14 | End: 2021-06-02

## 2020-09-14 RX ORDER — TRIAMTERENE AND HYDROCHLOROTHIAZIDE 37.5; 25 MG/1; MG/1
CAPSULE ORAL
Qty: 90 CAPSULE | Refills: 3 | Status: SHIPPED | OUTPATIENT
Start: 2020-09-14 | End: 2021-09-09

## 2020-10-01 ENCOUNTER — HOSPITAL ENCOUNTER (OUTPATIENT)
Dept: MAMMOGRAPHY | Facility: HOSPITAL | Age: 75
Discharge: HOME | End: 2020-10-01
Admitting: INTERNAL MEDICINE

## 2020-10-01 DIAGNOSIS — Z12.31 SCREENING MAMMOGRAM, ENCOUNTER FOR: ICD-10-CM

## 2020-10-01 PROCEDURE — 77063 BREAST TOMOSYNTHESIS BI: CPT

## 2020-10-01 PROCEDURE — 77067 SCR MAMMO BI INCL CAD: CPT

## 2020-10-06 ENCOUNTER — TELEPHONE (OUTPATIENT)
Dept: ONCOLOGY | Facility: CLINIC | Age: 75
End: 2020-10-06

## 2020-10-06 NOTE — TELEPHONE ENCOUNTER
Patient called please reschedule appt on 10/12 to 10/26 around the same time  Best call back number [phone]

## 2020-10-12 ENCOUNTER — APPOINTMENT (OUTPATIENT)
Dept: OTHER | Facility: HOSPITAL | Age: 75
End: 2020-10-12
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
## 2020-10-20 ENCOUNTER — OFFICE VISIT (OUTPATIENT)
Dept: SLEEP MEDICINE | Facility: HOSPITAL | Age: 75
End: 2020-10-20

## 2020-10-20 VITALS
HEART RATE: 101 BPM | SYSTOLIC BLOOD PRESSURE: 133 MMHG | BODY MASS INDEX: 36.16 KG/M2 | DIASTOLIC BLOOD PRESSURE: 83 MMHG | WEIGHT: 225 LBS | HEIGHT: 66 IN | OXYGEN SATURATION: 98 %

## 2020-10-20 DIAGNOSIS — E66.01 MORBID (SEVERE) OBESITY DUE TO EXCESS CALORIES (HCC): ICD-10-CM

## 2020-10-20 DIAGNOSIS — R53.82 CHRONIC FATIGUE: ICD-10-CM

## 2020-10-20 DIAGNOSIS — G47.33 OSA (OBSTRUCTIVE SLEEP APNEA): Primary | ICD-10-CM

## 2020-10-20 DIAGNOSIS — G47.10 HYPERSOMNIA: ICD-10-CM

## 2020-10-20 PROCEDURE — G0463 HOSPITAL OUTPT CLINIC VISIT: HCPCS

## 2020-10-20 RX ORDER — MODAFINIL 200 MG/1
200 TABLET ORAL DAILY
Qty: 30 TABLET | Refills: 5 | Status: SHIPPED | OUTPATIENT
Start: 2020-10-20 | End: 2021-04-20 | Stop reason: SDUPTHER

## 2020-10-20 NOTE — PROGRESS NOTES
Sleep Medicine Follow-up visit Note    Name: Marylu Foreman  Age: 75 y.o.  Sex: female  :  1945  MRN: [id number]  Date of Service: 10/20/2020    History of Present Illness:   Ms. Marylu Foreman  is a 75 y.o. right handed Caucasianfemale is seen in the sleep clinic for Snoring, Excessive Daytime Sleepiness, Chronic Fatigue and Disturbed Sleep.      The patient has a H/O hypertension, hyperlipidemia and atrial fibrillation, the patient has history of carcinoma of the breast on the left side and had mastectomy, arthritis, bilateral pulmonary emboli in the past and is on anticoagulation with Eliquis, chronic anxiety and depression and pulmonary hypertension. The patient reports that she is under the care of a psychiatrist for depression.    The patient has Mild obstructive sleep apnea. Patients Polysomnography has revealed GURDEEP with an AHI of 7.9 per hour of sleep. minimum SPO2  was 86%. [place] Sleepiness Scale score prior to CPAP therapy was .    The patient is on auto CPAP therapy at a mean pressure of 8.0 cms of water. Residual AHI 2.4/ sleep hour. Compliance data to indicate 97% usage for more than 4 hours with an average usage of 5 hours and 31 minutes.  [place] sleepiness scale score with CPAP therapy is  with CPAP therapy.     Mask Type:   DME Company:    Sleep schedule: Bed time: 12 midnight and wake up time: 7 am: sleep Latency : 15 minutes and Total Sleep Time: 6 hours, 30 minutes.  She reads in the bed before going to bed.     Naps: Yes intermittently all day to a total of 4 hours.     Caffeine intake: 1 cup of coffee in the morning and a 3 cups of iced tea during the day       [place] Sleepiness Scale:  before CPAP therapy and with CPAP therapy her sleepiness have gotten worse with .  No improvement in hypersomnia.    Review of Systems - Negative except continued fatigue and sleepiness, chronic anxiety, depression and heartburn and shortness of breath.      PMH, Surgical Hx,  "current Medications, Allergies, Family Hx, Social Hx and problem list were reviewed and updated in the chart.    Objective:  Vitals:    10/20/20 1128   BP: 133/83   Pulse: 101   SpO2: 98%   Weight: 102 kg (225 lb)   Height: 167.6 cm (66\")    Body mass index is 36.32 kg/m².       GEN: No significant distress, the patient is well developed, well nourished.  HEENT: pupils equal, round and reactive to light, extraocular movements intact, conjunctiva not injected bilaterally, nasopharyngeal mucosa moist, no lesions appreciated, Mallampati score II (hard and soft palate, upper portion of tonsils anduvula visible)  NECK: Supple, no jugular venous distention, no thyromegaly, no bruits appreciated  LUNGS: Clear to auscultation bilaterally.    CV: irregular rate and rhythm,  EXT: no cyanosis, clubbing, or edema   NEURO: alert and oriented x 3, motor functions grossly intact, CN II-XII grossly intact  PSYCH:Normal mood and affect  Home sleep study/polysomnography:    Assessment and PLAN:   1. GURDEEP (obstructive sleep apnea)    2. Morbid (severe) obesity due to excess calories (CMS/HCC)    3. Hypersomnia    4. Chronic fatigue      The patient has mild obstructive sleep apnea syndrome with severe hypersomnia and is on CPAP. The patient is compliant and is benefiting from it, however she continues to have excessive daytime sleepiness and fatigue and will add Provigil 200 mg once a day.      I will continue present CPAP therapy and will see the patient for follow-up in one year.    I have discussed the findings of my evaluation with the patient at length, instructed the patient on basic sleep hygiene, stressing the importance of regular sleep schedule without naps and with 8 hours of sleep per night, avoiding alcohol and sedative medications, limiting caffeine consumption, and implementing a healthy diet and exercise program and not to drive if patient is sleepy.       Yovany Recinos MD  10/20/2020  12:01 " EDT

## 2020-10-26 ENCOUNTER — LAB (OUTPATIENT)
Dept: OTHER | Facility: HOSPITAL | Age: 75
End: 2020-10-26

## 2020-10-26 ENCOUNTER — OFFICE VISIT (OUTPATIENT)
Dept: ONCOLOGY | Facility: CLINIC | Age: 75
End: 2020-10-26

## 2020-10-26 VITALS
HEART RATE: 88 BPM | RESPIRATION RATE: 16 BRPM | WEIGHT: 220.6 LBS | HEIGHT: 65 IN | SYSTOLIC BLOOD PRESSURE: 108 MMHG | TEMPERATURE: 97.1 F | DIASTOLIC BLOOD PRESSURE: 72 MMHG | BODY MASS INDEX: 36.75 KG/M2 | OXYGEN SATURATION: 96 %

## 2020-10-26 DIAGNOSIS — D50.8 OTHER IRON DEFICIENCY ANEMIA: Primary | ICD-10-CM

## 2020-10-26 DIAGNOSIS — D50.8 OTHER IRON DEFICIENCY ANEMIA: ICD-10-CM

## 2020-10-26 LAB
ALBUMIN SERPL-MCNC: 4.4 G/DL (ref 3.5–5.2)
ALBUMIN/GLOB SERPL: 1.5 G/DL
ALP SERPL-CCNC: 102 U/L (ref 39–117)
ALT SERPL W P-5'-P-CCNC: 17 U/L (ref 1–33)
ANION GAP SERPL CALCULATED.3IONS-SCNC: 13.2 MMOL/L (ref 5–15)
AST SERPL-CCNC: 21 U/L (ref 1–32)
BASOPHILS # BLD AUTO: 0.15 10*3/MM3 (ref 0–0.2)
BASOPHILS NFR BLD AUTO: 1.7 % (ref 0–1.5)
BILIRUB SERPL-MCNC: 0.6 MG/DL (ref 0–1.2)
BUN SERPL-MCNC: 32 MG/DL (ref 8–23)
BUN/CREAT SERPL: 20.8 (ref 7–25)
CALCIUM SPEC-SCNC: 9.5 MG/DL (ref 8.6–10.5)
CHLORIDE SERPL-SCNC: 101 MMOL/L (ref 98–107)
CO2 SERPL-SCNC: 22.8 MMOL/L (ref 22–29)
CREAT SERPL-MCNC: 1.54 MG/DL (ref 0.57–1)
DEPRECATED RDW RBC AUTO: 47.5 FL (ref 37–54)
EOSINOPHIL # BLD AUTO: 0.31 10*3/MM3 (ref 0–0.4)
EOSINOPHIL NFR BLD AUTO: 3.5 % (ref 0.3–6.2)
ERYTHROCYTE [DISTWIDTH] IN BLOOD BY AUTOMATED COUNT: 13.7 % (ref 12.3–15.4)
FERRITIN SERPL-MCNC: 363.9 NG/ML (ref 13–150)
GFR SERPL CREATININE-BSD FRML MDRD: 33 ML/MIN/1.73
GLOBULIN UR ELPH-MCNC: 3 GM/DL
GLUCOSE SERPL-MCNC: 169 MG/DL (ref 65–99)
HCT VFR BLD AUTO: 42.4 % (ref 34–46.6)
HGB BLD-MCNC: 14 G/DL (ref 12–15.9)
IMM GRANULOCYTES # BLD AUTO: 0.03 10*3/MM3 (ref 0–0.05)
IMM GRANULOCYTES NFR BLD AUTO: 0.3 % (ref 0–0.5)
IRON 24H UR-MRATE: 118 MCG/DL (ref 37–145)
IRON SATN MFR SERPL: 31 % (ref 20–50)
LYMPHOCYTES # BLD AUTO: 2.78 10*3/MM3 (ref 0.7–3.1)
LYMPHOCYTES NFR BLD AUTO: 31 % (ref 19.6–45.3)
MCH RBC QN AUTO: 31.5 PG (ref 26.6–33)
MCHC RBC AUTO-ENTMCNC: 33 G/DL (ref 31.5–35.7)
MCV RBC AUTO: 95.3 FL (ref 79–97)
MONOCYTES # BLD AUTO: 0.7 10*3/MM3 (ref 0.1–0.9)
MONOCYTES NFR BLD AUTO: 7.8 % (ref 5–12)
NEUTROPHILS NFR BLD AUTO: 5.01 10*3/MM3 (ref 1.7–7)
NEUTROPHILS NFR BLD AUTO: 55.7 % (ref 42.7–76)
NRBC BLD AUTO-RTO: 0 /100 WBC (ref 0–0.2)
PLATELET # BLD AUTO: 354 10*3/MM3 (ref 140–450)
PMV BLD AUTO: 11.5 FL (ref 6–12)
POTASSIUM SERPL-SCNC: 4.2 MMOL/L (ref 3.5–5.2)
PROT SERPL-MCNC: 7.4 G/DL (ref 6–8.5)
RBC # BLD AUTO: 4.45 10*6/MM3 (ref 3.77–5.28)
SODIUM SERPL-SCNC: 137 MMOL/L (ref 136–145)
TIBC SERPL-MCNC: 381 MCG/DL (ref 298–536)
TRANSFERRIN SERPL-MCNC: 256 MG/DL (ref 200–360)
WBC # BLD AUTO: 8.98 10*3/MM3 (ref 3.4–10.8)

## 2020-10-26 PROCEDURE — 82728 ASSAY OF FERRITIN: CPT | Performed by: INTERNAL MEDICINE

## 2020-10-26 PROCEDURE — 85025 COMPLETE CBC W/AUTO DIFF WBC: CPT | Performed by: INTERNAL MEDICINE

## 2020-10-26 PROCEDURE — 84466 ASSAY OF TRANSFERRIN: CPT | Performed by: INTERNAL MEDICINE

## 2020-10-26 PROCEDURE — 36415 COLL VENOUS BLD VENIPUNCTURE: CPT

## 2020-10-26 PROCEDURE — 99214 OFFICE O/P EST MOD 30 MIN: CPT | Performed by: INTERNAL MEDICINE

## 2020-10-26 PROCEDURE — 83540 ASSAY OF IRON: CPT | Performed by: INTERNAL MEDICINE

## 2020-10-26 PROCEDURE — 80053 COMPREHEN METABOLIC PANEL: CPT | Performed by: INTERNAL MEDICINE

## 2020-10-28 ENCOUNTER — TELEPHONE (OUTPATIENT)
Dept: INTERNAL MEDICINE | Facility: CLINIC | Age: 75
End: 2020-10-28

## 2020-10-28 DIAGNOSIS — N18.9 CHRONIC RENAL IMPAIRMENT, UNSPECIFIED CKD STAGE: ICD-10-CM

## 2020-10-28 DIAGNOSIS — E78.5 HYPERLIPIDEMIA, UNSPECIFIED HYPERLIPIDEMIA TYPE: ICD-10-CM

## 2020-10-28 DIAGNOSIS — E03.9 HYPOTHYROIDISM, UNSPECIFIED TYPE: ICD-10-CM

## 2020-10-28 DIAGNOSIS — I10 ESSENTIAL HYPERTENSION: Primary | ICD-10-CM

## 2020-10-28 NOTE — TELEPHONE ENCOUNTER
LABS ORDERED    ----- Message from Arleen Dillon MD sent at 10/28/2020  2:03 PM EDT -----  Cmp flp tsh    ----- Message -----  From: Nydia Watson MA  Sent: 10/28/2020   2:02 PM EDT  To: Arleen Dillon MD    PT SCHEDULED FOR LABS PLEASE Advise

## 2020-10-31 LAB
ALBUMIN SERPL-MCNC: 4.7 G/DL (ref 3.5–5.2)
ALBUMIN/GLOB SERPL: 2 G/DL
ALP SERPL-CCNC: 111 U/L (ref 39–117)
ALT SERPL-CCNC: 17 U/L (ref 1–33)
AST SERPL-CCNC: 22 U/L (ref 1–32)
BILIRUB SERPL-MCNC: 0.7 MG/DL (ref 0–1.2)
BUN SERPL-MCNC: 39 MG/DL (ref 8–23)
BUN/CREAT SERPL: 22.2 (ref 7–25)
CALCIUM SERPL-MCNC: 9.9 MG/DL (ref 8.6–10.5)
CHLORIDE SERPL-SCNC: 98 MMOL/L (ref 98–107)
CHOLEST SERPL-MCNC: 197 MG/DL (ref 0–200)
CO2 SERPL-SCNC: 25.3 MMOL/L (ref 22–29)
CREAT SERPL-MCNC: 1.76 MG/DL (ref 0.57–1)
GLOBULIN SER CALC-MCNC: 2.4 GM/DL
GLUCOSE SERPL-MCNC: 118 MG/DL (ref 65–99)
HDLC SERPL-MCNC: 64 MG/DL (ref 40–60)
LDLC SERPL CALC-MCNC: 114 MG/DL (ref 0–100)
LDLC/HDLC SERPL: 1.75 {RATIO}
POTASSIUM SERPL-SCNC: 4.4 MMOL/L (ref 3.5–5.2)
PROT SERPL-MCNC: 7.1 G/DL (ref 6–8.5)
SODIUM SERPL-SCNC: 137 MMOL/L (ref 136–145)
TRIGL SERPL-MCNC: 105 MG/DL (ref 0–150)
TSH SERPL DL<=0.005 MIU/L-ACNC: 1.1 UIU/ML (ref 0.27–4.2)
VLDLC SERPL CALC-MCNC: 19 MG/DL (ref 5–40)

## 2020-11-03 ENCOUNTER — OFFICE VISIT (OUTPATIENT)
Dept: INTERNAL MEDICINE | Facility: CLINIC | Age: 75
End: 2020-11-03

## 2020-11-03 VITALS
HEART RATE: 79 BPM | HEIGHT: 65 IN | WEIGHT: 221 LBS | OXYGEN SATURATION: 97 % | SYSTOLIC BLOOD PRESSURE: 118 MMHG | DIASTOLIC BLOOD PRESSURE: 78 MMHG | BODY MASS INDEX: 36.82 KG/M2

## 2020-11-03 DIAGNOSIS — E78.5 HYPERLIPIDEMIA, UNSPECIFIED HYPERLIPIDEMIA TYPE: ICD-10-CM

## 2020-11-03 DIAGNOSIS — I10 ESSENTIAL HYPERTENSION: Primary | Chronic | ICD-10-CM

## 2020-11-03 DIAGNOSIS — R73.09 ELEVATED GLUCOSE: ICD-10-CM

## 2020-11-03 DIAGNOSIS — E03.9 HYPOTHYROIDISM, UNSPECIFIED TYPE: ICD-10-CM

## 2020-11-03 DIAGNOSIS — I48.19 ATRIAL FIBRILLATION, PERSISTENT (HCC): Chronic | ICD-10-CM

## 2020-11-03 DIAGNOSIS — M89.9 DISORDER OF BONE, UNSPECIFIED: ICD-10-CM

## 2020-11-03 PROCEDURE — 99214 OFFICE O/P EST MOD 30 MIN: CPT | Performed by: INTERNAL MEDICINE

## 2020-11-03 RX ORDER — BUSPIRONE HYDROCHLORIDE 15 MG/1
1 TABLET ORAL 2 TIMES DAILY
COMMUNITY
Start: 2020-10-05 | End: 2021-06-02

## 2020-11-03 NOTE — PROGRESS NOTES
Subjective   Marylu Foreman is a 75 y.o. female here today to f/u on hyperlipidemia, CRI, depression, HTN and GERD.        History of Present Illness   She has been seeing kidney doctor  She does not drink as much water right now  She has been doing better with provigel  She has been feeling better with her depression she thinks the provigel is helping with this  She does see psych for ths  She last saw him yesterday  She does have some occas SOB   She has seen cards and [facility]  She has just started to increase PA since starting the provigel  Pt has been compliant with meds for GERD.  No sx as long as pt takes medicine as prescribed.  No epigastric pain or reflux sx    The following portions of the patient's history were reviewed and updated as appropriate: allergies, current medications, past medical history, past social history and problem list.SHe does feel her diet is better      Review of Systems   All other systems reviewed and are negative.      Objective   Physical Exam  Vitals signs reviewed.   Constitutional:       Appearance: She is well-developed.   HENT:      Head: Normocephalic and atraumatic.      Right Ear: External ear normal.      Left Ear: External ear normal.   Eyes:      Conjunctiva/sclera: Conjunctivae normal.      Pupils: Pupils are equal, round, and reactive to light.   Neck:      Musculoskeletal: Normal range of motion.      Thyroid: No thyromegaly.      Trachea: No tracheal deviation.   Cardiovascular:      Rate and Rhythm: Normal rate and regular rhythm.      Heart sounds: Normal heart sounds.   Pulmonary:      Effort: Pulmonary effort is normal.      Breath sounds: Normal breath sounds.   Abdominal:      General: Bowel sounds are normal. There is no distension.      Palpations: Abdomen is soft.      Tenderness: There is no abdominal tenderness.   Musculoskeletal: Normal range of motion.         General: No deformity.   Skin:     General: Skin is warm and dry.   Neurological:      Mental  Status: She is alert and oriented to person, place, and time.   Psychiatric:         Behavior: Behavior normal.         Thought Content: Thought content normal.         Judgment: Judgment normal.         Vitals:    11/03/20 0826   BP: 118/78   Pulse: 79   SpO2: 97%     Body mass index is 37.13 kg/m².       Telephone on 10/28/2020   Component Date Value Ref Range Status   • Glucose 10/30/2020 118* 65 - 99 mg/dL Final   • BUN 10/30/2020 39* 8 - 23 mg/dL Final   • Creatinine 10/30/2020 1.76* 0.57 - 1.00 mg/dL Final   • eGFR Non African Am 10/30/2020 28* >60 mL/min/1.73 Final    Comment: The MDRD GFR formula is only valid for adults with stable  renal function between ages 18 and 70.     • eGFR  Am 10/30/2020 34* >60 mL/min/1.73 Final   • BUN/Creatinine Ratio 10/30/2020 22.2  7.0 - 25.0 Final   • Sodium 10/30/2020 137  136 - 145 mmol/L Final   • Potassium 10/30/2020 4.4  3.5 - 5.2 mmol/L Final   • Chloride 10/30/2020 98  98 - 107 mmol/L Final   • Total CO2 10/30/2020 25.3  22.0 - 29.0 mmol/L Final   • Calcium 10/30/2020 9.9  8.6 - 10.5 mg/dL Final   • Total Protein 10/30/2020 7.1  6.0 - 8.5 g/dL Final   • Albumin 10/30/2020 4.70  3.50 - 5.20 g/dL Final   • Globulin 10/30/2020 2.4  gm/dL Final   • A/G Ratio 10/30/2020 2.0  g/dL Final   • Total Bilirubin 10/30/2020 0.7  0.0 - 1.2 mg/dL Final   • Alkaline Phosphatase 10/30/2020 111  39 - 117 U/L Final   • AST (SGOT) 10/30/2020 22  1 - 32 U/L Final   • ALT (SGPT) 10/30/2020 17  1 - 33 U/L Final   • Total Cholesterol 10/30/2020 197  0 - 200 mg/dL Final   • Triglycerides 10/30/2020 105  0 - 150 mg/dL Final   • HDL Cholesterol 10/30/2020 64* 40 - 60 mg/dL Final   • VLDL Cholesterol Mt 10/30/2020 19  5 - 40 mg/dL Final   • LDL Chol Calc ([facility]) 10/30/2020 114* 0 - 100 mg/dL Final   • LDL/HDL RATIO 10/30/2020 1.75   Final   • TSH 10/30/2020 1.100  0.270 - 4.200 uIU/mL Final       Current Outpatient Medications:   •  apixaban (ELIQUIS) 5 MG tablet tablet, Take 1 tablet by  mouth Every 12 (Twelve) Hours., Disp: 60 tablet, Rfl: 6  •  B Complex Vitamins (VITAMIN B COMPLEX) capsule capsule, Take 1 capsule by mouth Daily., Disp: , Rfl:   •  buPROPion XL (WELLBUTRIN XL) 150 MG 24 hr tablet, Take 150 mg by mouth Daily., Disp: , Rfl:   •  Cholecalciferol (VITAMIN D PO), Take 1 tablet by mouth Daily., Disp: , Rfl:   •  CITRUS BERGAMOT PO, Take 1 tablet by mouth Daily., Disp: , Rfl:   •  Coenzyme Q10 (CO Q 10 PO), Take 200 mg by mouth Daily., Disp: , Rfl:   •  Cyanocobalamin (VITAMIN B 12 PO), Take 1 tablet by mouth Daily., Disp: , Rfl:   •  dilTIAZem CD (CARDIZEM CD) 180 MG 24 hr capsule, TAKE 1 CAPSULE DAILY, Disp: 90 capsule, Rfl: 3  •  DULoxetine (CYMBALTA) 60 MG capsule, take 2 capsules by mouth every morning, Disp: , Rfl: 0  •  famotidine (Pepcid) 20 MG tablet, Take 1 tablet by mouth 2 (Two) Times a Day., Disp: 180 tablet, Rfl: 1  •  irbesartan (AVAPRO) 300 MG tablet, TAKE 1 TABLET DAILY, Disp: 90 tablet, Rfl: 3  •  levothyroxine (SYNTHROID, LEVOTHROID) 50 MCG tablet, , Disp: , Rfl:   •  modafinil (PROVIGIL) 200 MG tablet, Take 1 tablet by mouth Daily., Disp: 30 tablet, Rfl: 5  •  Multiple Vitamin (MULTI-VITAMIN PO), Take 1 tablet by mouth daily., Disp: , Rfl:   •  Probiotic Product (PROBIOTIC PO), Take  by mouth daily. Lactobacillus rhamnosus GG, Disp: , Rfl:   •  triamterene-hydrochlorothiazide (DYAZIDE) 37.5-25 MG per capsule, TAKE 1 CAPSULE EVERY MORNING, Disp: 90 capsule, Rfl: 3  •  busPIRone (BUSPAR) 15 MG tablet, Take 1 tablet by mouth 2 (two) times a day., Disp: , Rfl:       Assessment/Plan   Diagnoses and all orders for this visit:    1. Essential hypertension (Primary)    2. Hypothyroidism, unspecified type    3. Hyperlipidemia, unspecified hyperlipidemia type    4. Atrial fibrillation, persistent (CMS/HCC)      1.  HTN- ok with current meds  2.  HPL- ok with no meds  3. AF_ stable  Sees cards  4. SOB-  Probably deconditioning  Recent cardiac eval ok  iof cont see pulm  Gradual  increase  5. GERD- ok with pepcid

## 2020-11-03 NOTE — PATIENT INSTRUCTIONS
Sit-to-Stand Exercise    The sit-to-stand exercise (also known as the chair stand or chair rise exercise) strengthens your lower body and helps you maintain or improve your mobility and independence. The goal is to do the sit-to-stand exercise without using your hands. This will be easier as you become stronger. You should always talk with your health care provider before starting any exercise program, especially if you have had recent surgery.  Do the exercise exactly as told by your health care provider and adjust it as directed. It is normal to feel mild stretching, pulling, tightness, or discomfort as you do this exercise, but you should stop right away if you feel sudden pain or your pain gets worse. Do not begin doing this exercise until told by your health care provider.  What the sit-to-stand exercise does  The sit-to-stand exercise helps to strengthen the muscles in your thighs and the muscles in the center of your body that give you stability (core muscles). This exercise is especially helpful if:  · You have had knee or hip surgery.  · You have trouble getting up from a chair, out of a car, or off the toilet.  How to do the sit-to-stand exercise  1. Sit toward the front edge of a sturdy chair without armrests. Your knees should be bent and your feet should be flat on the floor and shoulder-width apart.  2. Place your hands lightly on each side of the seat. Keep your back and neck as straight as possible, with your chest slightly forward.  3. Breathe in slowly. Lean forward and slightly shift your weight to the front of your feet.  4. Breathe out as you slowly stand up. Use your hands as little as possible.  5. Stand and pause for a full breath in and out.  6. Breathe in as you sit down slowly. Tighten your core and abdominal muscles to control your lowering as much as possible.  7. Breathe out slowly.  8. Do this exercise 10-15 times. If needed, do it fewer times until you build up strength.  9. Rest for  1 minute, then do another set of 10-15 repetitions.  To change the difficulty of the sit-to-stand exercise  · If the exercise is too difficult, use a chair with sturdy armrests, and push off the armrests to help you come to the standing position. You can also use the armrests to help slowly lower yourself back to sitting. As this gets easier, try to use your arms less. You can also place a firm cushion or pillow on the chair to make the surface higher.  · If this exercise is too easy, do not use your arms to help raise or lower yourself. You can also wear a weighted vest, use hand weights, increase your repetitions, or try a lower chair.  General tips  · You may feel tired when starting an exercise routine. This is normal.  · You may have muscle soreness that lasts a few days. This is normal. As you get stronger, you may not feel muscle soreness.  · Use smooth, steady movements.  · Do not  hold your breath during strength exercises. This can cause unsafe changes in your blood pressure.  · Breathe in slowly through your nose, and breathe out slowly through your mouth.  Summary  · Strengthening your lower body is an important step to help you move safely and independently.  · The sit-to-stand exercise helps strengthen the muscles in your thighs and core.  · You should always talk with your health care provider before starting any exercise program, especially if you have had recent surgery.  This information is not intended to replace advice given to you by your health care provider. Make sure you discuss any questions you have with your health care provider.  Document Released: 02/08/2018 Document Revised: 10/16/2019 Document Reviewed: 02/08/2018  Elsevier Patient Education © 2020 [employer]  Exercises To Do While Sitting    Exercises that you do while sitting (chair exercises) can give you many of the same benefits as full exercise. Benefits include strengthening your heart, burning calories, and keeping muscles  "and joints healthy. Exercise can also improve your mood and help with depression and anxiety.  You may benefit from chair exercises if you are unable to do standing exercises because of:  · Diabetic foot pain.  · Obesity.  · Illness.  · Arthritis.  · Recovery from surgery or injury.  · Breathing problems.  · Balance problems.  · Another type of disability.  Before starting chair exercises, check with your health care provider or a physical therapist to find out how much exercise you can tolerate and which exercises are safe for you. If your health care provider approves:  · Start out slowly and build up over time. Aim to work up to about 10-20 minutes for each exercise session.  · Make exercise part of your daily routine.  · Drink water when you exercise. Do not wait until you are thirsty. Drink every 10-15 minutes.  · Stop exercising right away if you have pain, nausea, shortness of breath, or dizziness.  · If you are exercising in a wheelchair, make sure to lock the wheels.  · Ask your health care provider whether you can do shayna chi or yoga. Many positions in these mind-body exercises can be modified to do while seated.  Warm-up  Before starting other exercises:  1. Sit up as straight as you can. Have your knees bent at 90 degrees, which is the shape of the capital letter \"L.\" Keep your feet flat on the floor.  2. Sit at the front edge of your chair, if you can.  3. Pull in (tighten) the muscles in your abdomen and stretch your spine and neck as straight as you can. Hold this position for a few minutes.  4. Breathe in and out evenly. Try to concentrate on your breathing, and relax your mind.  Stretching  Exercise A: Arm stretch  1. Hold your arms out straight in front of your body.  2. Bend your hands at the wrist with your fingers pointing up, as if signaling someone to stop. Notice the slight tension in your forearms as you hold the position.  3. Keeping your arms out and your hands bent, rotate your hands " "outward as far as you can and hold this stretch. Aim to have your thumbs pointing up and your pinkie fingers pointing down.  Slowly repeat arm stretches for one minute as tolerated.  Exercise B: Leg stretch  1. If you can move your legs, try to \"draw\" letters on the floor with the toes of your foot. Write your name with one foot.  2. Write your name with the toes of your other foot.  Slowly repeat the movements for one minute as tolerated.  Exercise C: Reach for the stephanie  1. Reach your hands as far over your head as you can to stretch your spine.  2. Move your hands and arms as if you are climbing a rope.  Slowly repeat the movements for one minute as tolerated.  Range of motion exercises  Exercise A: Shoulder roll  1. Let your arms hang loosely at your sides.  2. Lift just your shoulders up toward your ears, then let them relax back down.  3. When your shoulders feel loose, rotate your shoulders in backward and forward circles.  Do shoulder rolls slowly for one minute as tolerated.  Exercise B: March in place  1. As if you are marching, pump your arms and lift your legs up and down. Lift your knees as high as you can.  ? If you are unable to lift your knees, just pump your arms and move your ankles and feet up and down.  March in place for one minute as tolerated.  Exercise C: Seated jumping jacks  1. Let your arms hang down straight.  2. Keeping your arms straight, lift them up over your head. Aim to point your fingers to the ceiling.  3. While you lift your arms, straighten your legs and slide your heels along the floor to your sides, as wide as you can.  4. As you bring your arms back down to your sides, slide your legs back together.  ? If you are unable to use your legs, just move your arms.  Slowly repeat seated jumping jacks for one minute as tolerated.  Strengthening exercises  Exercise A: Shoulder squeeze  1. Hold your arms straight out from your body to your sides, with your elbows bent and your fists " pointed at the ceiling.  2. Keeping your arms in the bent position, move them forward so your elbows and forearms meet in front of your face.  3. Open your arms back out as wide as you can with your elbows still bent, until you feel your shoulder blades squeezing together. Hold for 5 seconds.  Slowly repeat the movements forward and backward for one minute as tolerated.  Contact a health care provider if you:  · Had to stop exercising due to any of the following:  ? Pain.  ? Nausea.  ? Shortness of breath.  ? Dizziness.  ? Fatigue.  · Have significant pain or soreness after exercising.  Get help right away if you have:  · Chest pain.  · Difficulty breathing.  These symptoms may represent a serious problem that is an emergency. Do not wait to see if the symptoms will go away. Get medical help right away. Call your local emergency services (911 in the [place] Do not drive yourself to the hospital.  This information is not intended to replace advice given to you by your health care provider. Make sure you discuss any questions you have with your health care provider.  Document Released: 10/31/2018 Document Revised: 04/09/2020 Document Reviewed: 10/31/2018  Elsevier Patient Education © 2020 [employer] Inc.

## 2020-11-13 RX ORDER — FAMOTIDINE 20 MG/1
TABLET, FILM COATED ORAL
Qty: 180 TABLET | Refills: 3 | Status: SHIPPED | OUTPATIENT
Start: 2020-11-13 | End: 2021-11-10

## 2020-11-23 ENCOUNTER — TELEPHONE (OUTPATIENT)
Dept: CARDIOLOGY | Facility: CLINIC | Age: 75
End: 2020-11-23

## 2020-11-23 RX ORDER — DILTIAZEM HYDROCHLORIDE 180 MG/1
180 CAPSULE, COATED, EXTENDED RELEASE ORAL 2 TIMES DAILY
Qty: 180 CAPSULE | Refills: 3 | Status: SHIPPED | OUTPATIENT
Start: 2020-11-23 | End: 2020-11-23 | Stop reason: SDUPTHER

## 2020-11-23 RX ORDER — DILTIAZEM HYDROCHLORIDE 180 MG/1
180 CAPSULE, COATED, EXTENDED RELEASE ORAL 2 TIMES DAILY
COMMUNITY
End: 2020-11-23 | Stop reason: SDUPTHER

## 2020-11-23 RX ORDER — DILTIAZEM HYDROCHLORIDE 180 MG/1
180 CAPSULE, COATED, EXTENDED RELEASE ORAL 2 TIMES DAILY
Qty: 20 CAPSULE | Refills: 0 | Status: SHIPPED | OUTPATIENT
Start: 2020-11-23 | End: 2021-07-08 | Stop reason: SDUPTHER

## 2020-11-23 NOTE — TELEPHONE ENCOUNTER
Pt is needing a new RX for the [facility]. Her RX was increased on 6/26/20.  She is also wants a RX sent to [facility] for a 10 day supply. I will send you a separate RX to sign.

## 2020-12-14 RX ORDER — APIXABAN 5 MG/1
TABLET, FILM COATED ORAL
Qty: 180 TABLET | Refills: 1 | Status: SHIPPED | OUTPATIENT
Start: 2020-12-14 | End: 2021-05-25

## 2021-01-25 ENCOUNTER — APPOINTMENT (OUTPATIENT)
Dept: VACCINE CLINIC | Facility: HOSPITAL | Age: 76
End: 2021-01-25

## 2021-02-21 ENCOUNTER — TELEMEDICINE (OUTPATIENT)
Dept: FAMILY MEDICINE CLINIC | Facility: TELEHEALTH | Age: 76
End: 2021-02-21

## 2021-02-21 VITALS
HEART RATE: 86 BPM | HEIGHT: 66 IN | OXYGEN SATURATION: 98 % | WEIGHT: 210 LBS | BODY MASS INDEX: 33.75 KG/M2 | TEMPERATURE: 99 F

## 2021-02-21 DIAGNOSIS — N39.0 URINARY TRACT INFECTION WITHOUT HEMATURIA, SITE UNSPECIFIED: Primary | ICD-10-CM

## 2021-02-21 PROCEDURE — 99213 OFFICE O/P EST LOW 20 MIN: CPT | Performed by: NURSE PRACTITIONER

## 2021-02-21 RX ORDER — NITROFURANTOIN 25; 75 MG/1; MG/1
100 CAPSULE ORAL 2 TIMES DAILY
Qty: 14 CAPSULE | Refills: 0 | Status: SHIPPED | OUTPATIENT
Start: 2021-02-21 | End: 2021-02-28
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
## 2021-02-21 NOTE — PATIENT INSTRUCTIONS
Urinary Tract Infection, Adult    A urinary tract infection (UTI) is an infection of any part of the urinary tract. The urinary tract includes the kidneys, ureters, bladder, and urethra. These organs make, store, and get rid of urine in the body.  Your health care provider may use other names to describe the infection. An upper UTI affects the ureters and kidneys (pyelonephritis). A lower UTI affects the bladder (cystitis) and urethra (urethritis).  What are the causes?  Most urinary tract infections are caused by bacteria in your genital area, around the entrance to your urinary tract (urethra). These bacteria grow and cause inflammation of your urinary tract.  What increases the risk?  You are more likely to develop this condition if:  · You have a urinary catheter that stays in place (indwelling).  · You are not able to control when you urinate or have a bowel movement (you have incontinence).  · You are female and you:  ? Use a spermicide or diaphragm for birth control.  ? Have low estrogen levels.  ? Are pregnant.  · You have certain genes that increase your risk (genetics).  · You are sexually active.  · You take antibiotic medicines.  · You have a condition that causes your flow of urine to slow down, such as:  ? An enlarged prostate, if you are male.  ? Blockage in your urethra (stricture).  ? A kidney stone.  ? A nerve condition that affects your bladder control (neurogenic bladder).  ? Not getting enough to drink, or not urinating often.  · You have certain medical conditions, such as:  ? Diabetes.  ? A weak disease-fighting system (immunesystem).  ? Sickle cell disease.  ? Gout.  ? Spinal cord injury.  What are the signs or symptoms?  Symptoms of this condition include:  · Needing to urinate right away (urgently).  · Frequent urination or passing small amounts of urine frequently.  · Pain or burning with urination.  · Blood in the urine.  · Urine that smells bad or unusual.  · Trouble urinating.  · Cloudy  urine.  · Vaginal discharge, if you are female.  · Pain in the abdomen or the lower back.  You may also have:  · Vomiting or a decreased appetite.  · Confusion.  · Irritability or tiredness.  · A fever.  · Diarrhea.  The first symptom in older adults may be confusion. In some cases, they may not have any symptoms until the infection has worsened.  How is this diagnosed?  This condition is diagnosed based on your medical history and a physical exam. You may also have other tests, including:  · Urine tests.  · Blood tests.  · Tests for sexually transmitted infections (STIs).  If you have had more than one UTI, a cystoscopy or imaging studies may be done to determine the cause of the infections.  How is this treated?  Treatment for this condition includes:  · Antibiotic medicine.  · Over-the-counter medicines to treat discomfort.  · Drinking enough water to stay hydrated.  If you have frequent infections or have other conditions such as a kidney stone, you may need to see a health care provider who specializes in the urinary tract (urologist).  In rare cases, urinary tract infections can cause sepsis. Sepsis is a life-threatening condition that occurs when the body responds to an infection. Sepsis is treated in the hospital with IV antibiotics, fluids, and other medicines.  Follow these instructions at home:    Medicines  · Take over-the-counter and prescription medicines only as told by your health care provider.  · If you were prescribed an antibiotic medicine, take it as told by your health care provider. Do not stop using the antibiotic even if you start to feel better.  General instructions  · Make sure you:  ? Empty your bladder often and completely. Do not hold urine for long periods of time.  ? Empty your bladder after sex.  ? Wipe from front to back after a bowel movement if you are female. Use each tissue one time when you wipe.  · Drink enough fluid to keep your urine pale yellow.  · Keep all follow-up  visits as told by your health care provider. This is important.  Contact a health care provider if:  · Your symptoms do not get better after 1-2 days.  · Your symptoms go away and then return.  Get help right away if you have:  · Severe pain in your back or your lower abdomen.  · A fever.  · Nausea or vomiting.  Summary  · A urinary tract infection (UTI) is an infection of any part of the urinary tract, which includes the kidneys, ureters, bladder, and urethra.  · Most urinary tract infections are caused by bacteria in your genital area, around the entrance to your urinary tract (urethra).  · Treatment for this condition often includes antibiotic medicines.  · If you were prescribed an antibiotic medicine, take it as told by your health care provider. Do not stop using the antibiotic even if you start to feel better.  · Keep all follow-up visits as told by your health care provider. This is important.  This information is not intended to replace advice given to you by your health care provider. Make sure you discuss any questions you have with your health care provider.  Document Revised: 12/05/2019 Document Reviewed: 06/27/2019  [employer] Patient Education © 2020 [employer] Inc.

## 2021-03-02 DIAGNOSIS — Z23 IMMUNIZATION DUE: ICD-10-CM

## 2021-04-07 ENCOUNTER — TELEPHONE (OUTPATIENT)
Dept: ONCOLOGY | Facility: CLINIC | Age: 76
End: 2021-04-07

## 2021-04-07 NOTE — TELEPHONE ENCOUNTER
Caller: EDNA    Relationship to patient:     Best call back number:     Chief complaint: PT CALLING TO R/S SHE HAS NOT HEARD BACK FROM THE OFFICE ABOUT GETTING R/S    Type of visit: F/U AND LAB    Requested date: SOMETIME IN MAY     If rescheduling, when is the original appointment: 4/12    Additional notes:

## 2021-04-12 ENCOUNTER — APPOINTMENT (OUTPATIENT)
Dept: OTHER | Facility: HOSPITAL | Age: 76
End: 2021-04-12

## 2021-04-20 DIAGNOSIS — G47.33 OSA (OBSTRUCTIVE SLEEP APNEA): ICD-10-CM

## 2021-04-20 DIAGNOSIS — G47.10 HYPERSOMNIA: ICD-10-CM

## 2021-04-20 DIAGNOSIS — R53.82 CHRONIC FATIGUE: ICD-10-CM

## 2021-04-20 RX ORDER — MODAFINIL 200 MG/1
200 TABLET ORAL DAILY
Qty: 90 TABLET | Refills: 1 | Status: SHIPPED | OUTPATIENT
Start: 2021-04-20 | End: 2021-06-02

## 2021-04-21 ENCOUNTER — TELEPHONE (OUTPATIENT)
Dept: ONCOLOGY | Facility: CLINIC | Age: 76
End: 2021-04-21

## 2021-04-21 NOTE — TELEPHONE ENCOUNTER
Caller: PATIENT    Relationship to patient: SELF    Best call back number: [phone]    Chief complaint: PATIENT NEEDS TO CHANGE HER APPT ON 5-24-21, TO [facility], SHE IS SUBSTITUTING AND WILL NOT BE ABLE TO COME IN, PLEASE ADVISE?    Type of visit: LAB AND FOLLOWUP    Requested date: JUNE    If rescheduling, when is the original appointment: 5-24-21    Additional notesNA

## 2021-05-25 RX ORDER — APIXABAN 5 MG/1
TABLET, FILM COATED ORAL
Qty: 180 TABLET | Refills: 3 | Status: SHIPPED | OUTPATIENT
Start: 2021-05-25 | End: 2021-11-18 | Stop reason: HOSPADM

## 2021-06-01 ENCOUNTER — TELEPHONE (OUTPATIENT)
Dept: INTERNAL MEDICINE | Facility: CLINIC | Age: 76
End: 2021-06-01

## 2021-06-01 NOTE — TELEPHONE ENCOUNTER
Caller: Marylu Foreman    Relationship: Self    Best call back number:     What is the best time to reach you: ANYTIME    Who are you requesting to speak with (clinical staff, provider,  specific staff member):     Do you know the name of the person who called:     What was the call regarding: PATIENT WILL BE GOING TO [facility] TOMORROW AT 3PM TO HAVE LABS AND WANTS TO KNOW IF THERE IS ANYTHING SPECIFIC THAT DR RUBIN WANTS HER TESTED FOR.     Do you require a callback: YES

## 2021-06-02 ENCOUNTER — LAB (OUTPATIENT)
Dept: LAB | Facility: HOSPITAL | Age: 76
End: 2021-06-02

## 2021-06-02 ENCOUNTER — OFFICE VISIT (OUTPATIENT)
Dept: ONCOLOGY | Facility: CLINIC | Age: 76
End: 2021-06-02

## 2021-06-02 VITALS
OXYGEN SATURATION: 97 % | DIASTOLIC BLOOD PRESSURE: 89 MMHG | HEIGHT: 66 IN | TEMPERATURE: 97.3 F | WEIGHT: 219.9 LBS | HEART RATE: 112 BPM | RESPIRATION RATE: 16 BRPM | SYSTOLIC BLOOD PRESSURE: 128 MMHG | BODY MASS INDEX: 35.34 KG/M2

## 2021-06-02 DIAGNOSIS — D50.8 OTHER IRON DEFICIENCY ANEMIA: ICD-10-CM

## 2021-06-02 DIAGNOSIS — C50.812 MALIGNANT NEOPLASM OF OVERLAPPING SITES OF LEFT BREAST IN FEMALE, ESTROGEN RECEPTOR POSITIVE (HCC): Primary | ICD-10-CM

## 2021-06-02 DIAGNOSIS — Z12.31 ENCOUNTER FOR SCREENING MAMMOGRAM FOR MALIGNANT NEOPLASM OF BREAST: ICD-10-CM

## 2021-06-02 DIAGNOSIS — Z17.0 MALIGNANT NEOPLASM OF OVERLAPPING SITES OF LEFT BREAST IN FEMALE, ESTROGEN RECEPTOR POSITIVE (HCC): Primary | ICD-10-CM

## 2021-06-02 LAB
ALBUMIN SERPL-MCNC: 4.3 G/DL (ref 3.5–5.2)
ALBUMIN/GLOB SERPL: 1.4 G/DL (ref 1.1–2.4)
ALP SERPL-CCNC: 145 U/L (ref 38–116)
ALT SERPL W P-5'-P-CCNC: 22 U/L (ref 0–33)
ANION GAP SERPL CALCULATED.3IONS-SCNC: 13.3 MMOL/L (ref 5–15)
AST SERPL-CCNC: 27 U/L (ref 0–32)
BASOPHILS # BLD AUTO: 0.11 10*3/MM3 (ref 0–0.2)
BASOPHILS NFR BLD AUTO: 1.2 % (ref 0–1.5)
BILIRUB SERPL-MCNC: 0.5 MG/DL (ref 0.2–1.2)
BUN SERPL-MCNC: 41 MG/DL (ref 6–20)
BUN/CREAT SERPL: 26.8 (ref 7.3–30)
CALCIUM SPEC-SCNC: 9.5 MG/DL (ref 8.5–10.2)
CHLORIDE SERPL-SCNC: 99 MMOL/L (ref 98–107)
CO2 SERPL-SCNC: 26.7 MMOL/L (ref 22–29)
CREAT SERPL-MCNC: 1.53 MG/DL (ref 0.6–1.1)
DEPRECATED RDW RBC AUTO: 48.1 FL (ref 37–54)
EOSINOPHIL # BLD AUTO: 0.25 10*3/MM3 (ref 0–0.4)
EOSINOPHIL NFR BLD AUTO: 2.8 % (ref 0.3–6.2)
ERYTHROCYTE [DISTWIDTH] IN BLOOD BY AUTOMATED COUNT: 13.6 % (ref 12.3–15.4)
FERRITIN SERPL-MCNC: 205.5 NG/ML (ref 13–150)
GFR SERPL CREATININE-BSD FRML MDRD: 33 ML/MIN/1.73
GLOBULIN UR ELPH-MCNC: 3.1 GM/DL (ref 1.8–3.5)
GLUCOSE SERPL-MCNC: 122 MG/DL (ref 74–124)
HCT VFR BLD AUTO: 38.5 % (ref 34–46.6)
HGB BLD-MCNC: 12.4 G/DL (ref 12–15.9)
IMM GRANULOCYTES # BLD AUTO: 0.02 10*3/MM3 (ref 0–0.05)
IMM GRANULOCYTES NFR BLD AUTO: 0.2 % (ref 0–0.5)
IRON 24H UR-MRATE: 91 MCG/DL (ref 37–145)
IRON SATN MFR SERPL: 25 % (ref 14–48)
LYMPHOCYTES # BLD AUTO: 3.04 10*3/MM3 (ref 0.7–3.1)
LYMPHOCYTES NFR BLD AUTO: 34.2 % (ref 19.6–45.3)
MCH RBC QN AUTO: 31.1 PG (ref 26.6–33)
MCHC RBC AUTO-ENTMCNC: 32.2 G/DL (ref 31.5–35.7)
MCV RBC AUTO: 96.5 FL (ref 79–97)
MONOCYTES # BLD AUTO: 0.75 10*3/MM3 (ref 0.1–0.9)
MONOCYTES NFR BLD AUTO: 8.4 % (ref 5–12)
NEUTROPHILS NFR BLD AUTO: 4.72 10*3/MM3 (ref 1.7–7)
NEUTROPHILS NFR BLD AUTO: 53.2 % (ref 42.7–76)
NRBC BLD AUTO-RTO: 0 /100 WBC (ref 0–0.2)
PLATELET # BLD AUTO: 345 10*3/MM3 (ref 140–450)
PMV BLD AUTO: 11.1 FL (ref 6–12)
POTASSIUM SERPL-SCNC: 4.3 MMOL/L (ref 3.5–4.7)
PROT SERPL-MCNC: 7.4 G/DL (ref 6.3–8)
RBC # BLD AUTO: 3.99 10*6/MM3 (ref 3.77–5.28)
SODIUM SERPL-SCNC: 139 MMOL/L (ref 134–145)
TIBC SERPL-MCNC: 367 MCG/DL (ref 249–505)
TRANSFERRIN SERPL-MCNC: 262 MG/DL (ref 200–360)
WBC # BLD AUTO: 8.89 10*3/MM3 (ref 3.4–10.8)

## 2021-06-02 PROCEDURE — 80053 COMPREHEN METABOLIC PANEL: CPT

## 2021-06-02 PROCEDURE — 85025 COMPLETE CBC W/AUTO DIFF WBC: CPT

## 2021-06-02 PROCEDURE — 82728 ASSAY OF FERRITIN: CPT

## 2021-06-02 PROCEDURE — 99214 OFFICE O/P EST MOD 30 MIN: CPT | Performed by: INTERNAL MEDICINE

## 2021-06-02 PROCEDURE — 84466 ASSAY OF TRANSFERRIN: CPT

## 2021-06-02 PROCEDURE — 36415 COLL VENOUS BLD VENIPUNCTURE: CPT

## 2021-06-02 PROCEDURE — 83540 ASSAY OF IRON: CPT

## 2021-06-02 RX ORDER — VORTIOXETINE 10 MG/1
TABLET, FILM COATED ORAL
COMMUNITY
Start: 2021-05-26 | End: 2021-06-09

## 2021-06-02 RX ORDER — VITAMIN E 268 MG
400 CAPSULE ORAL DAILY
COMMUNITY

## 2021-06-02 NOTE — PROGRESS NOTES
"Chief Complaint  Stage I ([id number]) left breast cancer in 2007, pulmonary emboli in 2009, atrial fibrillation, history of GI bleed, history of iron deficiency status post prior gastric bypass in 2001    Subjective        History of Present Illness  Patient returns today for 6-month interval follow-up visit with laboratory studies to review.  In the interval she reports she has had tremendous difficulty with depression.  She has tried multiple antidepressants and recently was transitioned from Wellbutrin to Trintellix.  On the Trintellix she has developed nausea and weight gain and it has not been effective in controlling her depression.  She continues with mild chronic fatigue.  She is on CPAP and cannot tell any difference in her fatigue.  She notes that she underwent a genetic test provided by her insurance company that noted her to be factor V Leiden negative and prothrombin gene mutation negative (which were already known from previous hypercoagulable evaluation).  She was also tested and found to be high and B12 greater than 2000 on 5/7/2021 with folate greater than 20.  She stopped her B12 supplement which she had been taking due to her history of gastric bypass.  She does have underlying CKD3 and is quite worried about this.  She notes that it is related to previous use of nonsteroidal medications and she tries to stay away from these.  She tries to maintain adequate hydration.      Objective   Vital Signs:   /89   Pulse 112   Temp 97.3 °F (36.3 °C) (Skin)   Resp 16   Ht 167.6 cm (65.98\")   Wt 99.7 kg (219 lb 14.4 oz)   SpO2 97%   BMI 35.51 kg/m²     Physical Exam  Constitutional:       Appearance: She is well-developed.   Eyes:      Conjunctiva/sclera: Conjunctivae normal.   Neck:      Thyroid: No thyromegaly.   Cardiovascular:      Rate and Rhythm: Normal rate. Rhythm irregular.      Heart sounds: Murmur heard.   No friction rub. No gallop.    Pulmonary:      Effort: No respiratory distress.     "  Breath sounds: Normal breath sounds.      Comments: Breast exam was not performed today  Abdominal:      General: Bowel sounds are normal. There is no distension.      Palpations: Abdomen is soft.      Tenderness: There is no abdominal tenderness.   Lymphadenopathy:      Head:      Right side of head: No submandibular adenopathy.      Cervical: No cervical adenopathy.      Upper Body:      Right upper body: No supraclavicular adenopathy.      Left upper body: No supraclavicular adenopathy.   Skin:     General: Skin is warm and dry.      Findings: No rash.   Neurological:      Mental Status: She is alert and oriented to person, place, and time.      Cranial Nerves: No cranial nerve deficit.      Motor: No abnormal muscle tone.      Deep Tendon Reflexes: Reflexes normal.   Psychiatric:         Behavior: Behavior normal.        Result Review : Reviewed CBC, CMP, iron panel, ferritin today.  Reviewed previous labs 5/7/2021 with B12 level and folate level.  Reviewed outside genetic studies.       Assessment and Plan     1. Stage I ([id number]) left breast cancer:  · Biopsy 5/8/2007 with in situ and invasive ductal carcinoma, grade 2,/OK positive, HER-2/jorge negative  · Left mastectomy 6/18/0742 foci carcinoma, 4 mm, grade 1 in situ and invasive ductal carcinoma and 2 mm grade 2 in situ and invasive ductal carcinoma, negative margins, negative sentinel lymph nodes x3 with 5 additional negative lymph nodes.  · Arimidex initiated 7/2/2007  · Arimidex interrupted patient developed bilateral pulmonary emboli in May 2009, resume shortly thereafter.  Completed 5 years treatment in 2012.  · Annual right-sided mammogram from 10/1/2020 was negative, BI-RADS 1.  Breast exam was last performed 10/26/2020 and was negative.  Patient has no evidence of recurrent disease.  We will go ahead and schedule annual mammogram in October 2021 and plan for repeat breast exam when patient returns in 6 months.  2. Pulmonary emboli 5/2/2009:  · Family  history of DVT in the patient's mother occurring at age 70 postoperatively  · Patient developed bilateral lower lobe pulmonary emboli 5/2/2009 following right VATS on 4/30/2009  · Felt to be a provoked thrombosis related to surgery  · Hypercoagulable evaluation with negative factor V Leiden, negative prothrombin gene mutation, normal homocystine, protein C antigen 92, free to protein S 75, anticardiolipin antibody panel negative, lupus anticoagulant negative, Antithrombin %  · Continuing on chronic anticoagulation primarily for atrial fibrillation at this point as prior thrombosis was provoked.  · Transition from Coumadin to Eliquis 5 mg twice daily in May 2020  3. Iron deficiency anemia:  · Prior gastric bypass in 2001  · Intolerant of oral iron.  · Patient has required IV iron on multiple occasions: Feraheme 8/13 and 8/20/2018; Injectafer 3/9 and 3/16/2020  · Labs today indicate patient remains iron replete with hemoglobin 12.4, iron 91, ferritin 205, iron saturation 25%, TIBC 367.  We will recheck iron studies at return visit in 6 months.  Note that patient had B12 level greater than 2000 on labs from 5/7/2021 and is now off of a B12 supplement.  Given her history of gastric bypass we will recheck B12 level in 6 months as well.  If labs remain stable we will consider transitioning to 1 year interval follow-up at that point.  7. GI bleed in July 2018:  · Acute lower GI bleed with supratherapeutic INR Coumadin.  · Angiogram performed with coil embolization of artery to sigmoid colon  8. Atrial fibrillation:  · Requires ongoing anticoagulation for this indication  · As above, transition from Coumadin to Eliquis 5 mg twice daily in May 2020  · Patient continues to tolerate Eliquis well with no bleeding complications.  9. Status post right VATS 4/30/2020 for pulmonary nodule with finding of necrotizing granulomatous inflammation  10. Severe depression/anxiety:  · Patient has had chronic issues with this, is  followed by psychiatry, Dr. Librado Monique.  She also underwent previous counseling which she found helpful.  · The patient has had ongoing difficulty with control of her depression.  She has been on multiple antidepressants, most recently was taken off Wellbutrin and started Trintellix.  Patient however feels that the medication is causing nausea and weight gain and will be discussing with her psychiatrist soon.     Plan:  1. Continue Eliquis 5 mg twice daily  2. Schedule annual right mammogram in October 2021  3. Patient is currently off of B12 supplement, had prior gastric bypass.  4. MD visit in 6 months with CBC, CMP, stat iron panel and ferritin and B12 level.  If labs remain stable at that time we may consider transition to annual follow-up.

## 2021-06-09 ENCOUNTER — OFFICE VISIT (OUTPATIENT)
Dept: INTERNAL MEDICINE | Facility: CLINIC | Age: 76
End: 2021-06-09

## 2021-06-09 ENCOUNTER — APPOINTMENT (OUTPATIENT)
Dept: LAB | Facility: HOSPITAL | Age: 76
End: 2021-06-09

## 2021-06-09 VITALS
HEIGHT: 66 IN | OXYGEN SATURATION: 96 % | HEART RATE: 92 BPM | BODY MASS INDEX: 34.23 KG/M2 | SYSTOLIC BLOOD PRESSURE: 118 MMHG | WEIGHT: 213 LBS | TEMPERATURE: 98.2 F | DIASTOLIC BLOOD PRESSURE: 68 MMHG

## 2021-06-09 DIAGNOSIS — F41.9 ANXIETY: ICD-10-CM

## 2021-06-09 DIAGNOSIS — I10 ESSENTIAL HYPERTENSION: Chronic | ICD-10-CM

## 2021-06-09 DIAGNOSIS — N18.9 CHRONIC RENAL IMPAIRMENT, UNSPECIFIED CKD STAGE: ICD-10-CM

## 2021-06-09 DIAGNOSIS — F32.89 OTHER DEPRESSION: Primary | ICD-10-CM

## 2021-06-09 DIAGNOSIS — I48.19 ATRIAL FIBRILLATION, PERSISTENT (HCC): Chronic | ICD-10-CM

## 2021-06-09 PROCEDURE — 99214 OFFICE O/P EST MOD 30 MIN: CPT | Performed by: INTERNAL MEDICINE

## 2021-06-09 RX ORDER — FLUOXETINE HYDROCHLORIDE 20 MG/1
20 CAPSULE ORAL DAILY
Qty: 30 CAPSULE | Refills: 1 | Status: SHIPPED | OUTPATIENT
Start: 2021-06-09 | End: 2021-07-08

## 2021-06-09 RX ORDER — HYDROXYZINE HYDROCHLORIDE 10 MG/1
10 TABLET, FILM COATED ORAL 3 TIMES DAILY PRN
Qty: 60 TABLET | Refills: 2 | Status: SHIPPED | OUTPATIENT
Start: 2021-06-09 | End: 2021-07-21

## 2021-06-09 NOTE — PROGRESS NOTES
The ABCs of the Annual Wellness Visit  Subsequent Medicare Wellness Visit    Chief Complaint   Patient presents with   • Annual Exam     Pt here for wellness, having very bad depression and looking for psych recommendations.        Subjective   History of Present Illness:  Marylu Foreman is a 76 y.o. female who presents for a Subsequent Medicare Wellness Visit.    HEALTH RISK ASSESSMENT    Recent Hospitalizations:  No hospitalization(s) within the last year.    Current Medical Providers:  Patient Care Team:  Arleen Dillon MD as PCP - General (Internal Medicine)  Joanna Quiñonez MD as Consulting Physician (Obstetrics and Gynecology)  West White Jr., MD as Consulting Physician (Hematology and Oncology)  Librado Monique MD (Psychiatry)  Arleen Dillon MD as Referring Physician (Internal Medicine)    Smoking Status:  Social History     Tobacco Use   Smoking Status Never Smoker   Smokeless Tobacco Never Used       Alcohol Consumption:  Social History     Substance and Sexual Activity   Alcohol Use No    Comment: Caffeine use       Depression Screen:   PHQ-2/PHQ-9 Depression Screening 6/9/2021   Little interest or pleasure in doing things 3   Feeling down, depressed, or hopeless 3   Trouble falling or staying asleep, or sleeping too much 3   Feeling tired or having little energy 3   Poor appetite or overeating 3   Feeling bad about yourself - or that you are a failure or have let yourself or your family down 3   Trouble concentrating on things, such as reading the newspaper or watching television 3   Moving or speaking so slowly that other people could have noticed. Or the opposite - being so fidgety or restless that you have been moving around a lot more than usual 2   Thoughts that you would be better off dead, or of hurting yourself in some way 3   Total Score 26   If you checked off any problems, how difficult have these problems made it for you to do your work, take care of things at home, or  get along with other people? Extremely dIfficult       Fall Risk Screen:  CONNIE Fall Risk Assessment has not been completed.    Health Habits and Functional and Cognitive Screening:  Functional & Cognitive Status 6/9/2021   Do you have difficulty preparing food and eating? No   Do you have difficulty bathing yourself, getting dressed or grooming yourself? No   Do you have difficulty using the toilet? No   Do you have difficulty moving around from place to place? No   Do you have trouble with steps or getting out of a bed or a chair? Yes   Current Diet Unhealthy Diet        Current Diet Comment Pt does not eat well because of depression   Dental Exam Up to date   Eye Exam Not up to date   Exercise (times per week) 0 times per week   Current Exercise Activities Include None   Do you need help using the phone?  No   Are you deaf or do you have serious difficulty hearing?  Yes   Do you need help with transportation? Yes   Do you need help shopping? No   Do you need help preparing meals?  No   Do you need help with housework?  No   Do you need help with laundry? No   Do you need help taking your medications? No   Do you need help managing money? Yes   Do you ever drive or ride in a car without wearing a seat belt? No   Have you felt unusual stress, anger or loneliness in the last month? Yes   Who do you live with? Alone   If you need help, do you have trouble finding someone available to you? No   Have you been bothered in the last four weeks by sexual problems? No   Do you have difficulty concentrating, remembering or making decisions? Yes         Does the patient have evidence of cognitive impairment? No    Asprin use counseling:Does not need ASA (and currently is not on it)    Age-appropriate Screening Schedule:  Refer to the list below for future screening recommendations based on patient's age, sex and/or medical conditions. Orders for these recommended tests are listed in the plan section. The patient has been  provided with a written plan.    Health Maintenance   Topic Date Due   • DXA SCAN  11/26/2016   • ZOSTER VACCINE (3 of 3) 12/25/2020   • INFLUENZA VACCINE  08/01/2021   • MAMMOGRAM  10/01/2021   • LIPID PANEL  06/02/2022   • COLONOSCOPY  11/12/2028   • TDAP/TD VACCINES (3 - Td or Tdap) 02/22/2031          The following portions of the patient's history were reviewed and updated as appropriate: allergies, current medications, past medical history, past social history and problem list.    Outpatient Medications Prior to Visit   Medication Sig Dispense Refill   • Cholecalciferol (VITAMIN D PO) Take 1 tablet by mouth Daily.     • Coenzyme Q10 (CO Q 10 PO) Take 200 mg by mouth Daily.     • dilTIAZem CD (CARDIZEM CD) 180 MG 24 hr capsule Take 1 capsule by mouth 2 (two) times a day. 20 capsule 0   • Eliquis 5 MG tablet tablet TAKE 1 TABLET EVERY 12 HOURS 180 tablet 3   • famotidine (PEPCID) 20 MG tablet TAKE 1 TABLET TWICE A  tablet 3   • irbesartan (AVAPRO) 300 MG tablet TAKE 1 TABLET DAILY 90 tablet 3   • levothyroxine (SYNTHROID, LEVOTHROID) 50 MCG tablet      • Multiple Vitamin (MULTI-VITAMIN PO) Take 1 tablet by mouth daily.     • Probiotic Product (PROBIOTIC PO) Take  by mouth daily. Lactobacillus rhamnosus GG     • SODIUM BICARBONATE, ANTACID, PO Take  by mouth.     • triamterene-hydrochlorothiazide (DYAZIDE) 37.5-25 MG per capsule TAKE 1 CAPSULE EVERY MORNING 90 capsule 3   • vitamin E 400 UNIT capsule Take 400 Units by mouth Daily.     • CITRUS BERGAMOT PO Take 1 tablet by mouth Daily.     • B Complex Vitamins (VITAMIN B COMPLEX) capsule capsule Take 1 capsule by mouth Daily.     • Cyanocobalamin (VITAMIN B 12 PO) Take 1 tablet by mouth Daily.     • Vortioxetine HBr (Trintellix) 10 MG tablet        No facility-administered medications prior to visit.       Patient Active Problem List   Diagnosis   • Anxiety   • Essential hypertension   • Chronic pulmonary embolism (CMS/HCC)   • Tension headache   •  "Depression   • Recurrent UTI   • Malignant neoplasm of overlapping sites of left breast in female, estrogen receptor positive (CMS/HCC)   • Warfarin anticoagulation   • Morbid (severe) obesity due to excess calories (CMS/HCC)   • Hyperlipidemia   • Chronic renal impairment, stage 2 (mild)   • Hypothyroidism   • Incomplete uterovaginal prolapse   • Iron deficiency anemia   • Postsurgical nonabsorption   • Internal bleeding   • Anticoagulation management encounter   • Pulmonary hypertension (CMS/HCC)   • Atrial fibrillation, persistent (CMS/HCC)   • GURDEEP (obstructive sleep apnea)   • Hypersomnia   • Chronic fatigue   • Iatrogenic hyperthyroidism       Advanced Care Planning:  ACP discussion was held with the patient during this visit. Patient has an advance directive in EMR which is still valid.     Review of Systems    Compared to one year ago, the patient feels her physical health is worse.due to depression  Compared to one year ago, the patient feels her mental health is worse.    Reviewed chart for potential of high risk medication in the elderly: yes  Reviewed chart for potential of harmful drug interactions in the elderly:yes    Objective         Vitals:    06/09/21 1350   BP: 118/68   Pulse: 92   Temp: 98.2 °F (36.8 °C)   SpO2: 96%   Weight: 96.6 kg (213 lb)   Height: 167.6 cm (65.98\")       Body mass index is 34.4 kg/m².  Discussed the patient's BMI with her. The BMI is above average; BMI management plan is completed.    Physical Exam    Lab Results   Component Value Date    CHLPL 211 (H) 06/02/2021    TRIG 101 06/02/2021    HDL 65 06/02/2021     (H) 06/02/2021    VLDL 18 06/02/2021    HGBA1C 6.2 (H) 06/02/2021        Assessment/Plan   Medicare Risks and Personalized Health Plan  CMS Preventative Services Quick Reference  Immunizations Discussed/Encouraged (specific immunizations; Shingrix )    The above risks/problems have been discussed with the patient.  Pertinent information has been shared with the " patient in the After Visit Summary.  Follow up plans and orders are seen below in the Assessment/Plan Section.    Diagnoses and all orders for this visit:    1. Other depression (Primary)    2. Anxiety      Follow Up:  No follow-ups on file.     An After Visit Summary and PPPS were given to the patient.       Depression is worse   Discussed with patient activities she can do to wokr on staying busy

## 2021-06-09 NOTE — PROGRESS NOTES
Subjective   Marylu Foreman is a 76 y.o. female.   Worsening depression    History of Present Illness   She is seeing psychiatry for depression   She had henry on prozac in the past nd it worked but over time it quit working  She had been doing well with Wellbutrin but it caused a tremor  So she stopped this and started trintellix which she had a lot of SE to so now she is on nothing  She would like to try prozac again  She also has worsenng tmj from the stress and tension  Pt has been taking BP meds as prescribed without any problems.  No HA  No episodes of orthostasis    The following portions of the patient's history were reviewed and updated as appropriate: allergies, current medications, past medical history, past social history and problem list.  She is struggling with retiring    Review of Systems    Objective   Physical Exam  Vitals reviewed.   Constitutional:       Appearance: She is well-developed.   HENT:      Head: Normocephalic and atraumatic.      Right Ear: External ear normal.      Left Ear: External ear normal.   Eyes:      Conjunctiva/sclera: Conjunctivae normal.      Pupils: Pupils are equal, round, and reactive to light.   Neck:      Thyroid: No thyromegaly.      Trachea: No tracheal deviation.   Cardiovascular:      Rate and Rhythm: Normal rate and regular rhythm.      Heart sounds: Normal heart sounds.   Pulmonary:      Effort: Pulmonary effort is normal.      Breath sounds: Normal breath sounds.   Abdominal:      General: Bowel sounds are normal. There is no distension.      Palpations: Abdomen is soft.      Tenderness: There is no abdominal tenderness.   Musculoskeletal:         General: No deformity. Normal range of motion.      Cervical back: Normal range of motion.   Skin:     General: Skin is warm and dry.   Neurological:      Mental Status: She is alert and oriented to person, place, and time.   Psychiatric:         Behavior: Behavior normal.         Thought Content: Thought content  normal.         Judgment: Judgment normal.         Vitals:    06/09/21 1350   BP: 118/68   Pulse: 92   Temp: 98.2 °F (36.8 °C)   SpO2: 96%     Body mass index is 34.4 kg/m².         Assessment/Plan   Diagnoses and all orders for this visit:    1. Other depression (Primary)    2. Anxiety    3. Essential hypertension    4. Atrial fibrillation, persistent (CMS/HCC)    5. Chronic renal impairment, unspecified CKD stage    Other orders  -     FLUoxetine (PROzac) 20 MG capsule; Take 1 capsule by mouth Daily.  Dispense: 30 capsule; Refill: 1  -     hydrOXYzine (ATARAX) 10 MG tablet; Take 1 tablet by mouth 3 (Three) Times a Day As Needed for Anxiety.  Dispense: 60 tablet; Refill: 2    1. Depression/anxiety-  We will try prozac and prn hydroxyzine  I am rec a new psychiatrist  2.  TMJ- getting worse  She has seen multiple specialists for this and roxy really has helped  3.  CRI-  Stable off all nsaids  4.  Afib-  Ok with current meds  5.  TMJ  Try topical nsaid

## 2021-07-08 ENCOUNTER — OFFICE VISIT (OUTPATIENT)
Dept: CARDIOLOGY | Facility: CLINIC | Age: 76
End: 2021-07-08

## 2021-07-08 VITALS
SYSTOLIC BLOOD PRESSURE: 118 MMHG | DIASTOLIC BLOOD PRESSURE: 72 MMHG | HEART RATE: 91 BPM | WEIGHT: 214.6 LBS | HEIGHT: 66 IN | BODY MASS INDEX: 34.49 KG/M2

## 2021-07-08 DIAGNOSIS — R01.1 HEART MURMUR: ICD-10-CM

## 2021-07-08 DIAGNOSIS — F33.2 SEVERE EPISODE OF RECURRENT MAJOR DEPRESSIVE DISORDER, WITHOUT PSYCHOTIC FEATURES (HCC): ICD-10-CM

## 2021-07-08 DIAGNOSIS — G47.33 OSA (OBSTRUCTIVE SLEEP APNEA): ICD-10-CM

## 2021-07-08 DIAGNOSIS — I27.20 PULMONARY HYPERTENSION (HCC): ICD-10-CM

## 2021-07-08 DIAGNOSIS — I27.82 OTHER CHRONIC PULMONARY EMBOLISM WITHOUT ACUTE COR PULMONALE (HCC): Chronic | ICD-10-CM

## 2021-07-08 DIAGNOSIS — N18.32 STAGE 3B CHRONIC KIDNEY DISEASE (HCC): ICD-10-CM

## 2021-07-08 DIAGNOSIS — I48.19 ATRIAL FIBRILLATION, PERSISTENT (HCC): Primary | Chronic | ICD-10-CM

## 2021-07-08 DIAGNOSIS — E66.09 CLASS 1 OBESITY DUE TO EXCESS CALORIES WITHOUT SERIOUS COMORBIDITY WITH BODY MASS INDEX (BMI) OF 34.0 TO 34.9 IN ADULT: ICD-10-CM

## 2021-07-08 DIAGNOSIS — I10 ESSENTIAL HYPERTENSION: Chronic | ICD-10-CM

## 2021-07-08 DIAGNOSIS — I70.0 AORTIC CALCIFICATION (HCC): ICD-10-CM

## 2021-07-08 DIAGNOSIS — N39.0 RECURRENT UTI: ICD-10-CM

## 2021-07-08 PROBLEM — R58 INTERNAL BLEEDING: Status: RESOLVED | Noted: 2018-07-20 | Resolved: 2021-07-08

## 2021-07-08 PROBLEM — E66.811 CLASS 1 OBESITY DUE TO EXCESS CALORIES WITHOUT SERIOUS COMORBIDITY WITH BODY MASS INDEX (BMI) OF 34.0 TO 34.9 IN ADULT: Status: ACTIVE | Noted: 2017-10-23

## 2021-07-08 PROBLEM — E66.01 MORBID (SEVERE) OBESITY DUE TO EXCESS CALORIES (HCC): Status: RESOLVED | Noted: 2017-10-23 | Resolved: 2021-07-08

## 2021-07-08 PROBLEM — N18.2 CHRONIC RENAL IMPAIRMENT, STAGE 2 (MILD): Status: RESOLVED | Noted: 2018-01-22 | Resolved: 2021-07-08

## 2021-07-08 PROBLEM — Z79.01 ANTICOAGULATION MANAGEMENT ENCOUNTER: Status: RESOLVED | Noted: 2018-10-15 | Resolved: 2021-07-08

## 2021-07-08 PROBLEM — Z51.81 ANTICOAGULATION MANAGEMENT ENCOUNTER: Status: RESOLVED | Noted: 2018-10-15 | Resolved: 2021-07-08

## 2021-07-08 PROCEDURE — 99214 OFFICE O/P EST MOD 30 MIN: CPT | Performed by: NURSE PRACTITIONER

## 2021-07-08 PROCEDURE — 93000 ELECTROCARDIOGRAM COMPLETE: CPT | Performed by: NURSE PRACTITIONER

## 2021-07-08 RX ORDER — DILTIAZEM HYDROCHLORIDE 180 MG/1
180 CAPSULE, COATED, EXTENDED RELEASE ORAL 2 TIMES DAILY
Qty: 180 CAPSULE | Refills: 3 | Status: ON HOLD | OUTPATIENT
Start: 2021-07-08 | End: 2021-11-16
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
## 2021-07-21 ENCOUNTER — OFFICE VISIT (OUTPATIENT)
Dept: INTERNAL MEDICINE | Facility: CLINIC | Age: 76
End: 2021-07-21

## 2021-07-21 VITALS
TEMPERATURE: 97.5 F | HEIGHT: 66 IN | SYSTOLIC BLOOD PRESSURE: 116 MMHG | WEIGHT: 210.3 LBS | BODY MASS INDEX: 33.8 KG/M2 | OXYGEN SATURATION: 98 % | HEART RATE: 83 BPM | DIASTOLIC BLOOD PRESSURE: 68 MMHG

## 2021-07-21 DIAGNOSIS — R35.0 URINARY FREQUENCY: ICD-10-CM

## 2021-07-21 DIAGNOSIS — F33.2 SEVERE EPISODE OF RECURRENT MAJOR DEPRESSIVE DISORDER, WITHOUT PSYCHOTIC FEATURES (HCC): ICD-10-CM

## 2021-07-21 DIAGNOSIS — N76.0 ACUTE VAGINITIS: Primary | ICD-10-CM

## 2021-07-21 LAB
BILIRUB BLD-MCNC: NEGATIVE MG/DL
CLARITY, POC: CLEAR
COLOR UR: YELLOW
GLUCOSE UR STRIP-MCNC: NEGATIVE MG/DL
KETONES UR QL: NEGATIVE
LEUKOCYTE EST, POC: NEGATIVE
NITRITE UR-MCNC: NEGATIVE MG/ML
PH UR: 5 [PH] (ref 5–8)
PROT UR STRIP-MCNC: NEGATIVE MG/DL
RBC # UR STRIP: NEGATIVE /UL
SP GR UR: 1.02 (ref 1–1.03)
UROBILINOGEN UR QL: NORMAL

## 2021-07-21 PROCEDURE — 81003 URINALYSIS AUTO W/O SCOPE: CPT | Performed by: INTERNAL MEDICINE

## 2021-07-21 PROCEDURE — 99213 OFFICE O/P EST LOW 20 MIN: CPT | Performed by: INTERNAL MEDICINE

## 2021-07-21 RX ORDER — DULOXETIN HYDROCHLORIDE 20 MG/1
20 CAPSULE, DELAYED RELEASE ORAL 2 TIMES DAILY
COMMUNITY
End: 2022-02-21

## 2021-07-21 RX ORDER — BUPROPION HYDROCHLORIDE 150 MG/1
150 TABLET ORAL DAILY
COMMUNITY
End: 2021-11-15

## 2021-07-21 RX ORDER — MODAFINIL 200 MG/1
200 TABLET ORAL DAILY
COMMUNITY
End: 2021-12-20

## 2021-07-21 RX ORDER — FLUCONAZOLE 150 MG/1
150 TABLET ORAL ONCE
Qty: 1 TABLET | Refills: 0 | Status: SHIPPED | OUTPATIENT
Start: 2021-07-21 | End: 2021-07-21

## 2021-07-21 NOTE — PROGRESS NOTES
Subjective   Marylu Foreman is a 76 y.o. female here today to f/u on depression.  Pt starts TMS tomorrow.  Pt c/o subpubic pressure, dysuria and urinary urgency.    History of Present Illness   She has had some burning on urination and some suprapubic pressure  She was seen at the gyn before for this issues and was treated with a special cream from the compound pharmacy  She has had yeast infections in the past as well  She is seeing psych for depression    She is feeling worse but is hopeful that the new therapy of TMS    The following portions of the patient's history were reviewed and updated as appropriate: allergies, current medications, past medical history, past social history and problem list.  She is trying to eat healthy  Working on reg exercise      Review of Systems    Objective   Physical Exam  Vitals reviewed.   Constitutional:       Appearance: She is well-developed.   HENT:      Head: Normocephalic and atraumatic.      Right Ear: External ear normal.      Left Ear: External ear normal.   Eyes:      Conjunctiva/sclera: Conjunctivae normal.      Pupils: Pupils are equal, round, and reactive to light.   Neck:      Thyroid: No thyromegaly.      Trachea: No tracheal deviation.   Cardiovascular:      Rate and Rhythm: Normal rate and regular rhythm.      Heart sounds: Normal heart sounds.   Pulmonary:      Effort: Pulmonary effort is normal.      Breath sounds: Normal breath sounds.   Abdominal:      General: Bowel sounds are normal. There is no distension.      Palpations: Abdomen is soft.      Tenderness: There is no abdominal tenderness.   Musculoskeletal:         General: No deformity. Normal range of motion.      Cervical back: Normal range of motion.   Skin:     General: Skin is warm and dry.   Neurological:      Mental Status: She is alert and oriented to person, place, and time.   Psychiatric:         Behavior: Behavior normal.         Thought Content: Thought content normal.         Judgment:  Judgment normal.         Vitals:    07/21/21 1124   BP: 116/68   Pulse: 83   Temp: 97.5 °F (36.4 °C)   SpO2: 98%     Body mass index is 33.94 kg/m².       Current Outpatient Medications:   •  buPROPion XL (WELLBUTRIN XL) 150 MG 24 hr tablet, Take 150 mg by mouth Daily., Disp: , Rfl:   •  Cholecalciferol (VITAMIN D PO), Take 1 tablet by mouth Daily., Disp: , Rfl:   •  CITRUS BERGAMOT PO, Take 1 tablet by mouth Daily., Disp: , Rfl:   •  Coenzyme Q10 (CO Q 10 PO), Take 200 mg by mouth Daily., Disp: , Rfl:   •  dilTIAZem CD (CARDIZEM CD) 180 MG 24 hr capsule, Take 1 capsule by mouth 2 (two) times a day., Disp: 180 capsule, Rfl: 3  •  DULoxetine (CYMBALTA) 20 MG capsule, Take 20 mg by mouth 2 (Two) Times a Day., Disp: , Rfl:   •  Eliquis 5 MG tablet tablet, TAKE 1 TABLET EVERY 12 HOURS, Disp: 180 tablet, Rfl: 3  •  famotidine (PEPCID) 20 MG tablet, TAKE 1 TABLET TWICE A DAY, Disp: 180 tablet, Rfl: 3  •  irbesartan (AVAPRO) 300 MG tablet, TAKE 1 TABLET DAILY, Disp: 90 tablet, Rfl: 3  •  levothyroxine (SYNTHROID, LEVOTHROID) 50 MCG tablet, , Disp: , Rfl:   •  modafinil (Provigil) 200 MG tablet, Take 200 mg by mouth Daily., Disp: , Rfl:   •  Multiple Vitamin (MULTI-VITAMIN PO), Take 1 tablet by mouth daily., Disp: , Rfl:   •  Probiotic Product (PROBIOTIC PO), Take  by mouth daily. Lactobacillus rhamnosus GG, Disp: , Rfl:   •  SODIUM BICARBONATE, ANTACID, PO, Take  by mouth., Disp: , Rfl:   •  triamterene-hydrochlorothiazide (DYAZIDE) 37.5-25 MG per capsule, TAKE 1 CAPSULE EVERY MORNING, Disp: 90 capsule, Rfl: 3  •  vitamin E 400 UNIT capsule, Take 400 Units by mouth Daily., Disp: , Rfl:   •  fluconazole (Diflucan) 150 MG tablet, Take 1 tablet by mouth 1 (One) Time for 1 dose., Disp: 1 tablet, Rfl: 0      Assessment/Plan   Diagnoses and all orders for this visit:    1. Acute vaginitis (Primary)  -     fluconazole (Diflucan) 150 MG tablet; Take 1 tablet by mouth 1 (One) Time for 1 dose.  Dispense: 1 tablet; Refill: 0    2.  Severe episode of recurrent major depressive disorder, without psychotic features (CMS/HCC)      1.  Vaginitis-  May need to see gyn as the last time she had this cx were neg and she needed to see gyn  We will try diflucan first bt then refer to gyn  2.  Depression-  She si seeing psych and doing a new therapy  TMS

## 2021-09-09 RX ORDER — IRBESARTAN 300 MG/1
TABLET ORAL
Qty: 90 TABLET | Refills: 3 | Status: SHIPPED | OUTPATIENT
Start: 2021-09-09 | End: 2021-11-18 | Stop reason: HOSPADM

## 2021-09-09 RX ORDER — TRIAMTERENE AND HYDROCHLOROTHIAZIDE 37.5; 25 MG/1; MG/1
CAPSULE ORAL
Qty: 90 CAPSULE | Refills: 3 | Status: SHIPPED | OUTPATIENT
Start: 2021-09-09 | End: 2021-11-18 | Stop reason: HOSPADM

## 2021-09-10 ENCOUNTER — APPOINTMENT (OUTPATIENT)
Dept: VACCINE CLINIC | Facility: HOSPITAL | Age: 76
End: 2021-09-10

## 2021-10-01 ENCOUNTER — HOSPITAL ENCOUNTER (OUTPATIENT)
Dept: MAMMOGRAPHY | Facility: HOSPITAL | Age: 76
Discharge: HOME | End: 2021-10-01
Admitting: INTERNAL MEDICINE

## 2021-10-01 DIAGNOSIS — D50.8 OTHER IRON DEFICIENCY ANEMIA: ICD-10-CM

## 2021-10-01 DIAGNOSIS — Z12.31 ENCOUNTER FOR SCREENING MAMMOGRAM FOR MALIGNANT NEOPLASM OF BREAST: ICD-10-CM

## 2021-10-01 DIAGNOSIS — C50.812 MALIGNANT NEOPLASM OF OVERLAPPING SITES OF LEFT BREAST IN FEMALE, ESTROGEN RECEPTOR POSITIVE (HCC): ICD-10-CM

## 2021-10-01 DIAGNOSIS — Z17.0 MALIGNANT NEOPLASM OF OVERLAPPING SITES OF LEFT BREAST IN FEMALE, ESTROGEN RECEPTOR POSITIVE (HCC): ICD-10-CM

## 2021-10-01 PROCEDURE — 77067 SCR MAMMO BI INCL CAD: CPT

## 2021-10-01 PROCEDURE — 77063 BREAST TOMOSYNTHESIS BI: CPT

## 2021-10-19 ENCOUNTER — OFFICE VISIT (OUTPATIENT)
Dept: SLEEP MEDICINE | Facility: HOSPITAL | Age: 76
End: 2021-10-19

## 2021-10-19 VITALS
WEIGHT: 207 LBS | HEIGHT: 66 IN | DIASTOLIC BLOOD PRESSURE: 73 MMHG | BODY MASS INDEX: 33.27 KG/M2 | OXYGEN SATURATION: 99 % | HEART RATE: 113 BPM | SYSTOLIC BLOOD PRESSURE: 153 MMHG

## 2021-10-19 DIAGNOSIS — E66.01 MORBID (SEVERE) OBESITY DUE TO EXCESS CALORIES (HCC): ICD-10-CM

## 2021-10-19 DIAGNOSIS — G47.33 OSA (OBSTRUCTIVE SLEEP APNEA): Primary | ICD-10-CM

## 2021-10-19 PROCEDURE — G0463 HOSPITAL OUTPT CLINIC VISIT: HCPCS

## 2021-10-19 NOTE — PROGRESS NOTES
Sleep Medicine Follow-up visit Note    Name: Marylu Foreman  Age: 76 y.o.  Sex: female  :  1945  MRN: [id number]  Date of Service: 10/19/2021    History of Present Illness:   Ms. Marylu Foreman  is a 76 y.o. right handed Caucasianfemale is seen in the sleep clinic for Snoring, Excessive Daytime Sleepiness, Chronic Fatigue and Disturbed Sleep.      The patient has a H/O hypertension, hyperlipidemia and atrial fibrillation, the patient has history of carcinoma of the breast on the left side and had mastectomy, arthritis, bilateral pulmonary emboli in the past and is on anticoagulation with Eliquis, chronic anxiety and depression and pulmonary hypertension. The patient reports that she is under the care of a psychiatrist for depression.    The patient has Mild obstructive sleep apnea. Patients Polysomnography has revealed GURDEEP with an AHI of 7.9 per hour of sleep. minimum SPO2  was 86%. [place] Sleepiness Scale score prior to CPAP therapy was 12/24.    The patient is on auto CPAP therapy at a mean pressure of 8.0 cms of water. Residual AHI 2.4/ sleep hour.     The patient's mask does not fit well and it is broken and she is not able to use her CPAP machine and then she has only for 11 out of 30 days and previously she was fully compliant and benefiting from it.    Compliance data to indicate 97% usage for more than 4 hours with an average usage of 5 hours and 31 minutes.  [place] sleepiness scale score with CPAP therapy is 19/24 with CPAP therapy.     Mask Type: She is on AirFit P 30i CPAP mask and she reported that it does not fit her machine.  [employer]: [employer]    Sleep schedule: Bed time: 12 midnight and wake up time: 7 am: sleep Latency : 15 minutes and Total Sleep Time: 6 hours, 30 minutes.  She reads in the bed before going to bed.     Naps: Yes intermittently all day to a total of 4 hours.     Caffeine intake: 1 cup of coffee in the morning and a 3 cups of iced tea during the day   "     [place] Sleepiness Scale: 12/24 before CPAP therapy and with CPAP therapy her sleepiness have gotten worse with 12/24.  No improvement in hypersomnia.    Review of Systems - Negative except continued fatigue and sleepiness, chronic anxiety, depression and heartburn and shortness of breath.      PMH, Surgical Hx, current Medications, Allergies, Family Hx, Social Hx and problem list were reviewed and updated in the chart.    Objective:  Vitals:    10/19/21 1143   BP: 153/73   Pulse: 113   SpO2: 99%   Weight: 93.9 kg (207 lb)   Height: 167.6 cm (66\")    Body mass index is 33.41 kg/m².       GEN: No significant distress, the patient is well developed, well nourished.  She has essential tremors with kinetic intention postural tremors.      Assessment and PLAN:   1. GURDEEP (obstructive sleep apnea)      The patient has mild obstructive sleep apnea syndrome with severe hypersomnia and is on CPAP.  Her CPAP machine is functioning but her mask does not fit well and does not fit her machine and we will request for new mask fit.  She continues to have excessive daytime sleepiness and fatigue and she is taking Provigil 200 mg once a day and is getting the prescription from her psychiatrist.      I will continue present CPAP therapy and will see the patient for follow-up in one year.    I have discussed the findings of my evaluation with the patient at length, instructed the patient on basic sleep hygiene, stressing the importance of regular sleep schedule without naps and with 8 hours of sleep per night, avoiding alcohol and sedative medications, limiting caffeine consumption, and implementing a healthy diet and exercise program and not to drive if patient is sleepy.       Yovany Recinos MD  10/19/2021  12:03 EDT                  "

## 2021-11-10 RX ORDER — FAMOTIDINE 20 MG/1
TABLET, FILM COATED ORAL
Qty: 180 TABLET | Refills: 3 | Status: SHIPPED | OUTPATIENT
Start: 2021-11-10 | End: 2021-12-20

## 2021-11-12 NOTE — PROGRESS NOTES
"Chief Complaint  Stage I ([id number]) left breast cancer in 2007, pulmonary emboli in 2009, atrial fibrillation, history of GI bleed, history of iron deficiency status post prior gastric bypass in 2001    Subjective        History of Present Illness  Patient returns today for 6-month interval follow-up visit with laboratory studies and mammogram to review.  The patient reports that she underwent treatment for her depression and headaches with transcranial magnetic stimulation (TMS) in June and July.  She had a tremendous response with improvement in her chronic depressive symptoms and resolution of her headaches.  Unfortunately, her symptoms gradually have returned and they are trying to obtain insurance approval for maintenance therapy.  She has not experienced any other significant difficulties until just recently when she began to develop symptoms of urinary tract infection with dysuria, urinary incontinence, chills, nausea.  She was seen in urgent care and had urine culture on 11/6/2021 that grew E. coli, ESBL.  She received treatment with nitrofurantoin x7 days as well as Pyridium.  She reports that the incontinence improved and dysuria did improve however after she stopped the antibiotic her dysuria has returned.  She does note some mild nausea recently with decrease in her degree of hydration.  She does continue on Dyazide and irbesartan for blood pressure as well as diltiazem.  She has not taken any other new medications.      Objective   Vital Signs:   /82 Comment: BP taken on right wrist  Pulse 88   Temp 97.1 °F (36.2 °C)   Resp 18   Ht 167 cm (65.75\")   Wt 94.8 kg (209 lb)   SpO2 100%   BMI 33.99 kg/m²     Physical Exam  Constitutional:       Appearance: She is well-developed.   Eyes:      Conjunctiva/sclera: Conjunctivae normal.   Neck:      Thyroid: No thyromegaly.   Cardiovascular:      Rate and Rhythm: Normal rate. Rhythm irregular.      Heart sounds: Murmur heard.   No friction rub. No " gallop.    Pulmonary:      Effort: No respiratory distress.      Breath sounds: Normal breath sounds.      Comments: Status post left mastectomy, chest wall without masses or abnormalities palpated.  Right breast without masses or abnormalities palpated.  Chest:   Breasts:      Right: No supraclavicular adenopathy.      Left: No supraclavicular adenopathy.       Abdominal:      General: Bowel sounds are normal. There is no distension.      Palpations: Abdomen is soft.      Tenderness: There is no abdominal tenderness.   Lymphadenopathy:      Head:      Right side of head: No submandibular adenopathy.      Cervical: No cervical adenopathy.      Upper Body:      Right upper body: No supraclavicular adenopathy.      Left upper body: No supraclavicular adenopathy.   Skin:     General: Skin is warm and dry.      Findings: No rash.   Neurological:      Mental Status: She is alert and oriented to person, place, and time.      Cranial Nerves: No cranial nerve deficit.      Motor: No abnormal muscle tone.      Deep Tendon Reflexes: Reflexes normal.   Psychiatric:         Behavior: Behavior normal.        Result Review : Reviewed CBC, CMP, iron panel, ferritin, B12 level from today.  Reviewed records from urgent care visit including urinalysis and urine culture.  Reviewed mammogram 10/1/2021.       Assessment and Plan     1. Stage I ([id number]) left breast cancer:  · Biopsy 5/8/2007 with in situ and invasive ductal carcinoma, grade 2,/MI positive, HER-2/jorge negative  · Left mastectomy 6/18/0742 foci carcinoma, 4 mm, grade 1 in situ and invasive ductal carcinoma and 2 mm grade 2 in situ and invasive ductal carcinoma, negative margins, negative sentinel lymph nodes x3 with 5 additional negative lymph nodes.  · Arimidex initiated 7/2/2007  · Arimidex interrupted patient developed bilateral pulmonary emboli in May 2009, resume shortly thereafter.  Completed 5 years treatment in 2012.  · Patient returns today in follow-up with no  clinical evidence of recurrent disease.  Her exam today was negative.  We reviewed her mammogram report on the right side from 10/1/2021 which was negative, BI-RADS 1.  2. Pulmonary emboli 5/2/2009:  · Family history of DVT in the patient's mother occurring at age 70 postoperatively  · Patient developed bilateral lower lobe pulmonary emboli 5/2/2009 following right VATS on 4/30/2009  · Felt to be a provoked thrombosis related to surgery  · Hypercoagulable evaluation with negative factor V Leiden, negative prothrombin gene mutation, normal homocystine, protein C antigen 92, free to protein S 75, anticardiolipin antibody panel negative, lupus anticoagulant negative, Antithrombin %  · Continuing on chronic anticoagulation primarily for atrial fibrillation at this point as prior thrombosis was provoked.  · Transitioned from Coumadin to Eliquis 5 mg twice daily in May 2020  3. Iron deficiency anemia:  · Prior gastric bypass in 2001  · Intolerant of oral iron.  · Patient has required IV iron on multiple occasions: Feraheme 8/13 and 8/20/2018; Injectafer 3/9 and 3/16/2020  · Labs today with decline in hemoglobin to 10.8 however this is in the setting of UTI and IJEOMA/CKD3.  Her iron panel was normal with iron 114, iron saturation 34%, TIBC 337 with ferritin elevated at 292.  B12 remains normal at 883.  We continue to follow these values given her history of prior gastric bypass and iron deficiency requiring intermittent IV iron (intolerant of oral iron).  We will recheck labs at her return visit in 6 months.  I will ask her to return in 2 months for repeat CBC in order to check her hemoglobin.  Hopefully this will have improved after her current acute issue.  7. GI bleed in July 2018:  · Acute lower GI bleed with supratherapeutic INR Coumadin.  · Angiogram performed with coil embolization of artery to sigmoid colon  8. Atrial fibrillation:  · Requires ongoing anticoagulation for this indication  · As above,  transitioned from Coumadin to Eliquis 5 mg twice daily in May 2020  · Patient continues to tolerate Eliquis well with no bleeding complications.  9. Status post right VATS 4/30/2020 for pulmonary nodule with finding of necrotizing granulomatous inflammation  10. Severe depression/anxiety:  · Patient has had chronic issues with this, is followed by psychiatry, Dr. Librado Monique.  She also underwent previous counseling which she found helpful.  · The patient had ongoing difficulty with control of her depression.  She has been on multiple antidepressants  · In June/July 2021 patient underwent transcranial magnetic stimulation (TMS) with significant improvement in depressive symptoms and resolution of headaches.  Symptoms subsequently gradually recurred, patient reports there has been difficulty with insurance regarding maintenance treatments.  11. IJEOMA/CKD3  · Patient with CKD3 with baseline creatinine in the 1.3-1.8 range  · Today, patient has creatinine of 3.19 and BUN of 59.  This is compared to most recent value 6/2/2021 with BUN 41, creatinine 1.53.  · Patient has undergone recent treatment for UTI with nitrofurantoin and Pyridium.  No other recent new medications.    · Patient does continue on chronic Dyazide and Avapro  · Etiology of her IJEOMA is unclear.  She likely needs IV hydration, nephrology consultation, abdominal imaging to rule out .  I recommended that we consider hospital admission and did contact [facility] today.  Dr. Glover was agreeable to direct admission.  12. ESBL E. coli UTI  · Urine culture positive on 11/6/2021 in urgent care, patient treated with 7 days nitrofurantoin (was sensitive on culture result)  · Patient notes return of dysuria since completion of antibiotics, unclear whether she requires further antibiotic therapy.  As above, patient is being admitted and defer to [facility] regarding need for additional antibiotic treatment for her UTI.      Plan:  1. Dr. Glover of [facility] has graciously agreed  to admit the patient to the hospital today in regards to IJEOMA/CKD3 and ESBL E. coli UTI.  2. In regards to breast cancer, patient has no clinical evidence of recurrent disease and we will continue on a course of observation/surveillance.  3. Patient continues on chronic anticoagulation with Eliquis 5 mg twice daily due to atrial fibrillation (PE was in 2009 and was provoked, does not require chronic anticoagulation for this indication).  4. CBC and RN review in 2 months  5. MD visit in 6 months with CBC, CMP, stat iron panel and ferritin, B12 level.     I did spend 40 minutes in total time caring for the patient today, time spent in review of records, face-to-face consultation, coordination of care, placement of orders, completion of documentation.

## 2021-11-15 ENCOUNTER — LAB (OUTPATIENT)
Dept: OTHER | Facility: HOSPITAL | Age: 76
End: 2021-11-15

## 2021-11-15 ENCOUNTER — OFFICE VISIT (OUTPATIENT)
Dept: ONCOLOGY | Facility: CLINIC | Age: 76
End: 2021-11-15

## 2021-11-15 ENCOUNTER — HOSPITAL ENCOUNTER (INPATIENT)
Facility: HOSPITAL | Age: 76
LOS: 3 days | Discharge: HOME | End: 2021-11-18
Attending: INTERNAL MEDICINE | Admitting: STUDENT IN AN ORGANIZED HEALTH CARE EDUCATION/TRAINING PROGRAM

## 2021-11-15 VITALS
WEIGHT: 209 LBS | DIASTOLIC BLOOD PRESSURE: 82 MMHG | BODY MASS INDEX: 33.59 KG/M2 | HEART RATE: 88 BPM | RESPIRATION RATE: 18 BRPM | TEMPERATURE: 97.1 F | HEIGHT: 66 IN | SYSTOLIC BLOOD PRESSURE: 135 MMHG | OXYGEN SATURATION: 100 %

## 2021-11-15 DIAGNOSIS — D50.8 OTHER IRON DEFICIENCY ANEMIA: ICD-10-CM

## 2021-11-15 DIAGNOSIS — C50.812 MALIGNANT NEOPLASM OF OVERLAPPING SITES OF LEFT BREAST IN FEMALE, ESTROGEN RECEPTOR POSITIVE (HCC): Primary | ICD-10-CM

## 2021-11-15 DIAGNOSIS — Z17.0 MALIGNANT NEOPLASM OF OVERLAPPING SITES OF LEFT BREAST IN FEMALE, ESTROGEN RECEPTOR POSITIVE (HCC): Primary | ICD-10-CM

## 2021-11-15 DIAGNOSIS — Z17.0 MALIGNANT NEOPLASM OF OVERLAPPING SITES OF LEFT BREAST IN FEMALE, ESTROGEN RECEPTOR POSITIVE (HCC): ICD-10-CM

## 2021-11-15 DIAGNOSIS — C50.812 MALIGNANT NEOPLASM OF OVERLAPPING SITES OF LEFT BREAST IN FEMALE, ESTROGEN RECEPTOR POSITIVE (HCC): ICD-10-CM

## 2021-11-15 PROBLEM — N17.9 ACUTE KIDNEY INJURY (HCC): Status: ACTIVE | Noted: 2021-11-15

## 2021-11-15 LAB
ALBUMIN SERPL-MCNC: 4 G/DL (ref 3.5–5.2)
ALBUMIN/GLOB SERPL: 1.3 G/DL
ALP SERPL-CCNC: 126 U/L (ref 39–117)
ALT SERPL W P-5'-P-CCNC: 11 U/L (ref 1–33)
ANION GAP SERPL CALCULATED.3IONS-SCNC: 16.5 MMOL/L (ref 5–15)
AST SERPL-CCNC: 15 U/L (ref 1–32)
BASOPHILS # BLD AUTO: 0.13 10*3/MM3 (ref 0–0.2)
BASOPHILS NFR BLD AUTO: 1.4 % (ref 0–1.5)
BILIRUB SERPL-MCNC: 0.4 MG/DL (ref 0–1.2)
BUN SERPL-MCNC: 59 MG/DL (ref 8–23)
BUN/CREAT SERPL: 18.5 (ref 7–25)
CALCIUM SPEC-SCNC: 9.1 MG/DL (ref 8.6–10.5)
CHLORIDE SERPL-SCNC: 107 MMOL/L (ref 98–107)
CO2 SERPL-SCNC: 19.5 MMOL/L (ref 22–29)
CREAT SERPL-MCNC: 3.19 MG/DL (ref 0.57–1)
DEPRECATED RDW RBC AUTO: 51.6 FL (ref 37–54)
EOSINOPHIL # BLD AUTO: 0.56 10*3/MM3 (ref 0–0.4)
EOSINOPHIL NFR BLD AUTO: 6 % (ref 0.3–6.2)
ERYTHROCYTE [DISTWIDTH] IN BLOOD BY AUTOMATED COUNT: 15 % (ref 12.3–15.4)
FERRITIN SERPL-MCNC: 292 NG/ML (ref 13–150)
GFR SERPL CREATININE-BSD FRML MDRD: 14 ML/MIN/1.73
GFR SERPL CREATININE-BSD FRML MDRD: ABNORMAL ML/MIN/{1.73_M2}
GLOBULIN UR ELPH-MCNC: 3.2 GM/DL
GLUCOSE SERPL-MCNC: 113 MG/DL (ref 65–99)
HCT VFR BLD AUTO: 33.3 % (ref 34–46.6)
HGB BLD-MCNC: 10.8 G/DL (ref 12–15.9)
IMM GRANULOCYTES # BLD AUTO: 0.02 10*3/MM3 (ref 0–0.05)
IMM GRANULOCYTES NFR BLD AUTO: 0.2 % (ref 0–0.5)
IRON 24H UR-MRATE: 114 MCG/DL (ref 37–145)
IRON SATN MFR SERPL: 34 % (ref 20–50)
LYMPHOCYTES # BLD AUTO: 2.48 10*3/MM3 (ref 0.7–3.1)
LYMPHOCYTES NFR BLD AUTO: 26.6 % (ref 19.6–45.3)
MCH RBC QN AUTO: 31 PG (ref 26.6–33)
MCHC RBC AUTO-ENTMCNC: 32.4 G/DL (ref 31.5–35.7)
MCV RBC AUTO: 95.7 FL (ref 79–97)
MONOCYTES # BLD AUTO: 0.8 10*3/MM3 (ref 0.1–0.9)
MONOCYTES NFR BLD AUTO: 8.6 % (ref 5–12)
NEUTROPHILS NFR BLD AUTO: 5.35 10*3/MM3 (ref 1.7–7)
NEUTROPHILS NFR BLD AUTO: 57.2 % (ref 42.7–76)
NRBC BLD AUTO-RTO: 0 /100 WBC (ref 0–0.2)
PLATELET # BLD AUTO: 338 10*3/MM3 (ref 140–450)
PMV BLD AUTO: 11.4 FL (ref 6–12)
POTASSIUM SERPL-SCNC: 4.2 MMOL/L (ref 3.5–5.2)
PROT SERPL-MCNC: 7.2 G/DL (ref 6–8.5)
RBC # BLD AUTO: 3.48 10*6/MM3 (ref 3.77–5.28)
SARS-COV-2 RNA PNL SPEC NAA+PROBE: NOT DETECTED
SODIUM SERPL-SCNC: 143 MMOL/L (ref 136–145)
TIBC SERPL-MCNC: 337 MCG/DL (ref 298–536)
TRANSFERRIN SERPL-MCNC: 226 MG/DL (ref 200–360)
VIT B12 BLD-MCNC: 883 PG/ML (ref 211–946)
WBC # BLD AUTO: 9.34 10*3/MM3 (ref 3.4–10.8)

## 2021-11-15 PROCEDURE — 85025 COMPLETE CBC W/AUTO DIFF WBC: CPT | Performed by: INTERNAL MEDICINE

## 2021-11-15 PROCEDURE — 99215 OFFICE O/P EST HI 40 MIN: CPT | Performed by: INTERNAL MEDICINE

## 2021-11-15 PROCEDURE — 83540 ASSAY OF IRON: CPT | Performed by: INTERNAL MEDICINE

## 2021-11-15 PROCEDURE — 82728 ASSAY OF FERRITIN: CPT | Performed by: INTERNAL MEDICINE

## 2021-11-15 PROCEDURE — 82607 VITAMIN B-12: CPT | Performed by: INTERNAL MEDICINE

## 2021-11-15 PROCEDURE — 36415 COLL VENOUS BLD VENIPUNCTURE: CPT

## 2021-11-15 PROCEDURE — 87635 SARS-COV-2 COVID-19 AMP PRB: CPT | Performed by: INTERNAL MEDICINE

## 2021-11-15 PROCEDURE — 80053 COMPREHEN METABOLIC PANEL: CPT | Performed by: INTERNAL MEDICINE

## 2021-11-15 PROCEDURE — 84466 ASSAY OF TRANSFERRIN: CPT | Performed by: INTERNAL MEDICINE

## 2021-11-15 RX ORDER — LEVOTHYROXINE SODIUM 0.05 MG/1
50 TABLET ORAL
Status: DISCONTINUED | OUTPATIENT
Start: 2021-11-16 | End: 2021-11-18 | Stop reason: HOSPADM

## 2021-11-15 RX ORDER — DIPHENOXYLATE HYDROCHLORIDE AND ATROPINE SULFATE 2.5; .025 MG/1; MG/1
1 TABLET ORAL DAILY
Status: DISCONTINUED | OUTPATIENT
Start: 2021-11-15 | End: 2021-11-16

## 2021-11-15 RX ORDER — ACETAMINOPHEN 160 MG/5ML
650 SOLUTION ORAL EVERY 4 HOURS PRN
Status: DISCONTINUED | OUTPATIENT
Start: 2021-11-15 | End: 2021-11-18 | Stop reason: HOSPADM

## 2021-11-15 RX ORDER — DULOXETIN HYDROCHLORIDE 20 MG/1
20 CAPSULE, DELAYED RELEASE ORAL 2 TIMES DAILY
Status: DISCONTINUED | OUTPATIENT
Start: 2021-11-15 | End: 2021-11-18 | Stop reason: HOSPADM

## 2021-11-15 RX ORDER — ONDANSETRON 2 MG/ML
4 INJECTION INTRAMUSCULAR; INTRAVENOUS EVERY 6 HOURS PRN
Status: DISCONTINUED | OUTPATIENT
Start: 2021-11-15 | End: 2021-11-18 | Stop reason: HOSPADM

## 2021-11-15 RX ORDER — ACETAMINOPHEN 650 MG/1
650 SUPPOSITORY RECTAL EVERY 4 HOURS PRN
Status: DISCONTINUED | OUTPATIENT
Start: 2021-11-15 | End: 2021-11-18 | Stop reason: HOSPADM

## 2021-11-15 RX ORDER — SODIUM CHLORIDE 0.9 % (FLUSH) 0.9 %
10 SYRINGE (ML) INJECTION AS NEEDED
Status: DISCONTINUED | OUTPATIENT
Start: 2021-11-15 | End: 2021-11-18 | Stop reason: HOSPADM

## 2021-11-15 RX ORDER — ERTAPENEM 1 G/1
500 INJECTION, POWDER, LYOPHILIZED, FOR SOLUTION INTRAMUSCULAR; INTRAVENOUS EVERY 24 HOURS
Status: DISCONTINUED | OUTPATIENT
Start: 2021-11-15 | End: 2021-11-15 | Stop reason: CLARIF

## 2021-11-15 RX ORDER — PHENAZOPYRIDINE HYDROCHLORIDE 100 MG/1
TABLET, FILM COATED ORAL
COMMUNITY
Start: 2021-11-06 | End: 2021-11-18 | Stop reason: HOSPADM

## 2021-11-15 RX ORDER — SODIUM BICARBONATE 650 MG/1
650 TABLET ORAL 2 TIMES DAILY
Status: DISCONTINUED | OUTPATIENT
Start: 2021-11-15 | End: 2021-11-16

## 2021-11-15 RX ORDER — DULOXETIN HYDROCHLORIDE 60 MG/1
60 CAPSULE, DELAYED RELEASE ORAL EVERY MORNING
COMMUNITY
Start: 2021-08-10 | End: 2021-11-15

## 2021-11-15 RX ORDER — MELATONIN
2000 DAILY
Status: DISCONTINUED | OUTPATIENT
Start: 2021-11-15 | End: 2021-11-18 | Stop reason: HOSPADM

## 2021-11-15 RX ORDER — L.ACID,PARA/B.BIFIDUM/S.THERM 8B CELL
1 CAPSULE ORAL DAILY
Status: DISCONTINUED | OUTPATIENT
Start: 2021-11-15 | End: 2021-11-18 | Stop reason: HOSPADM

## 2021-11-15 RX ORDER — SODIUM CHLORIDE 0.9 % (FLUSH) 0.9 %
10 SYRINGE (ML) INJECTION EVERY 12 HOURS SCHEDULED
Status: DISCONTINUED | OUTPATIENT
Start: 2021-11-15 | End: 2021-11-18 | Stop reason: HOSPADM

## 2021-11-15 RX ORDER — ACETAMINOPHEN 325 MG/1
650 TABLET ORAL EVERY 4 HOURS PRN
Status: DISCONTINUED | OUTPATIENT
Start: 2021-11-15 | End: 2021-11-18 | Stop reason: HOSPADM

## 2021-11-15 RX ORDER — FAMOTIDINE 20 MG/1
20 TABLET, FILM COATED ORAL 2 TIMES DAILY
Status: DISCONTINUED | OUTPATIENT
Start: 2021-11-15 | End: 2021-11-18 | Stop reason: HOSPADM

## 2021-11-15 RX ORDER — NITROFURANTOIN 25; 75 MG/1; MG/1
100 CAPSULE ORAL 2 TIMES DAILY
COMMUNITY
Start: 2021-11-06 | End: 2021-11-18 | Stop reason: HOSPADM

## 2021-11-15 RX ORDER — MODAFINIL 100 MG/1
200 TABLET ORAL DAILY
Status: DISCONTINUED | OUTPATIENT
Start: 2021-11-15 | End: 2021-11-18 | Stop reason: HOSPADM

## 2021-11-15 RX ORDER — SODIUM CHLORIDE 9 MG/ML
75 INJECTION, SOLUTION INTRAVENOUS CONTINUOUS
Status: DISCONTINUED | OUTPATIENT
Start: 2021-11-15 | End: 2021-11-18

## 2021-11-15 RX ORDER — DILTIAZEM HYDROCHLORIDE 180 MG/1
180 CAPSULE, COATED, EXTENDED RELEASE ORAL
Status: DISCONTINUED | OUTPATIENT
Start: 2021-11-15 | End: 2021-11-18 | Stop reason: HOSPADM

## 2021-11-15 RX ADMIN — DULOXETINE HYDROCHLORIDE 20 MG: 20 CAPSULE, DELAYED RELEASE ORAL at 21:06

## 2021-11-15 RX ADMIN — FAMOTIDINE 20 MG: 20 TABLET, FILM COATED ORAL at 21:09

## 2021-11-15 RX ADMIN — SODIUM CHLORIDE, PRESERVATIVE FREE 10 ML: 5 INJECTION INTRAVENOUS at 21:10

## 2021-11-15 RX ADMIN — APIXABAN 2.5 MG: 2.5 TABLET, FILM COATED ORAL at 21:07

## 2021-11-15 RX ADMIN — SODIUM CHLORIDE 75 ML/HR: 9 INJECTION, SOLUTION INTRAVENOUS at 21:05

## 2021-11-15 RX ADMIN — SODIUM BICARBONATE 650 MG: 650 TABLET ORAL at 21:07

## 2021-11-15 NOTE — PLAN OF CARE
Goal Outcome Evaluation:  Plan of Care Reviewed With: patient           Outcome Summary: Pt admitted with UTI, has been taking Macrobid.  Pt states she is most likely dehydrated.  No c/o pain, VSS, will continue to monitor.
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
## 2021-11-16 PROBLEM — N18.30 CKD (CHRONIC KIDNEY DISEASE) STAGE 3, GFR 30-59 ML/MIN: Chronic | Status: ACTIVE | Noted: 2021-11-16

## 2021-11-16 PROBLEM — D64.9 ANEMIA: Status: ACTIVE | Noted: 2021-11-16

## 2021-11-16 PROBLEM — E87.6 HYPOKALEMIA: Status: ACTIVE | Noted: 2021-11-16

## 2021-11-16 LAB
ANION GAP SERPL CALCULATED.3IONS-SCNC: 12.6 MMOL/L (ref 5–15)
BASOPHILS # BLD AUTO: 0.12 10*3/MM3 (ref 0–0.2)
BASOPHILS NFR BLD AUTO: 1.5 % (ref 0–1.5)
BUN SERPL-MCNC: 49 MG/DL (ref 8–23)
BUN/CREAT SERPL: 18.2 (ref 7–25)
CALCIUM SPEC-SCNC: 8.1 MG/DL (ref 8.6–10.5)
CHLORIDE SERPL-SCNC: 111 MMOL/L (ref 98–107)
CO2 SERPL-SCNC: 17.4 MMOL/L (ref 22–29)
CREAT SERPL-MCNC: 2.69 MG/DL (ref 0.57–1)
DEPRECATED RDW RBC AUTO: 43.5 FL (ref 37–54)
EOSINOPHIL # BLD AUTO: 0.49 10*3/MM3 (ref 0–0.4)
EOSINOPHIL NFR BLD AUTO: 6.1 % (ref 0.3–6.2)
ERYTHROCYTE [DISTWIDTH] IN BLOOD BY AUTOMATED COUNT: 12.9 % (ref 12.3–15.4)
GFR SERPL CREATININE-BSD FRML MDRD: 17 ML/MIN/1.73
GLUCOSE SERPL-MCNC: 148 MG/DL (ref 65–99)
HCT VFR BLD AUTO: 27.4 % (ref 34–46.6)
HGB BLD-MCNC: 9.3 G/DL (ref 12–15.9)
IMM GRANULOCYTES # BLD AUTO: 0.01 10*3/MM3 (ref 0–0.05)
IMM GRANULOCYTES NFR BLD AUTO: 0.1 % (ref 0–0.5)
LYMPHOCYTES # BLD AUTO: 1.74 10*3/MM3 (ref 0.7–3.1)
LYMPHOCYTES NFR BLD AUTO: 21.6 % (ref 19.6–45.3)
MCH RBC QN AUTO: 31.5 PG (ref 26.6–33)
MCHC RBC AUTO-ENTMCNC: 33.9 G/DL (ref 31.5–35.7)
MCV RBC AUTO: 92.9 FL (ref 79–97)
MONOCYTES # BLD AUTO: 0.84 10*3/MM3 (ref 0.1–0.9)
MONOCYTES NFR BLD AUTO: 10.4 % (ref 5–12)
NEUTROPHILS NFR BLD AUTO: 4.87 10*3/MM3 (ref 1.7–7)
NEUTROPHILS NFR BLD AUTO: 60.3 % (ref 42.7–76)
NRBC BLD AUTO-RTO: 0 /100 WBC (ref 0–0.2)
PLATELET # BLD AUTO: 307 10*3/MM3 (ref 140–450)
PMV BLD AUTO: 11.3 FL (ref 6–12)
POTASSIUM SERPL-SCNC: 3.3 MMOL/L (ref 3.5–5.2)
RBC # BLD AUTO: 2.95 10*6/MM3 (ref 3.77–5.28)
SODIUM SERPL-SCNC: 141 MMOL/L (ref 136–145)
WBC # BLD AUTO: 8.07 10*3/MM3 (ref 3.4–10.8)

## 2021-11-16 PROCEDURE — 80048 BASIC METABOLIC PNL TOTAL CA: CPT | Performed by: INTERNAL MEDICINE

## 2021-11-16 PROCEDURE — 25010000002 ERTAPENEM PER 500 MG: Performed by: INTERNAL MEDICINE

## 2021-11-16 PROCEDURE — 99223 1ST HOSP IP/OBS HIGH 75: CPT | Performed by: STUDENT IN AN ORGANIZED HEALTH CARE EDUCATION/TRAINING PROGRAM

## 2021-11-16 PROCEDURE — 99223 1ST HOSP IP/OBS HIGH 75: CPT | Performed by: INTERNAL MEDICINE

## 2021-11-16 PROCEDURE — 85025 COMPLETE CBC W/AUTO DIFF WBC: CPT | Performed by: INTERNAL MEDICINE

## 2021-11-16 RX ORDER — LEVOFLOXACIN 750 MG/1
750 TABLET ORAL EVERY OTHER DAY
Status: COMPLETED | OUTPATIENT
Start: 2021-11-16 | End: 2021-11-18

## 2021-11-16 RX ORDER — DIPHENOXYLATE HYDROCHLORIDE AND ATROPINE SULFATE 2.5; .025 MG/1; MG/1
1 TABLET ORAL NIGHTLY
Status: DISCONTINUED | OUTPATIENT
Start: 2021-11-17 | End: 2021-11-18 | Stop reason: HOSPADM

## 2021-11-16 RX ORDER — DILTIAZEM HYDROCHLORIDE 180 MG/1
180 CAPSULE, COATED, EXTENDED RELEASE ORAL DAILY
COMMUNITY
End: 2022-09-20

## 2021-11-16 RX ORDER — POTASSIUM CHLORIDE 750 MG/1
40 TABLET, FILM COATED, EXTENDED RELEASE ORAL ONCE
Status: COMPLETED | OUTPATIENT
Start: 2021-11-16 | End: 2021-11-16

## 2021-11-16 RX ADMIN — ACETAMINOPHEN 650 MG: 325 TABLET, FILM COATED ORAL at 08:10

## 2021-11-16 RX ADMIN — POTASSIUM CHLORIDE 40 MEQ: 750 TABLET, EXTENDED RELEASE ORAL at 13:24

## 2021-11-16 RX ADMIN — DILTIAZEM HYDROCHLORIDE 180 MG: 180 CAPSULE, COATED, EXTENDED RELEASE ORAL at 08:09

## 2021-11-16 RX ADMIN — MODAFINIL 100 MG: 100 TABLET ORAL at 08:17

## 2021-11-16 RX ADMIN — SODIUM BICARBONATE 650 MG: 650 TABLET ORAL at 08:08

## 2021-11-16 RX ADMIN — Medication 1 TABLET: at 08:08

## 2021-11-16 RX ADMIN — Medication 2000 UNITS: at 08:08

## 2021-11-16 RX ADMIN — DULOXETINE HYDROCHLORIDE 20 MG: 20 CAPSULE, DELAYED RELEASE ORAL at 20:38

## 2021-11-16 RX ADMIN — ERTAPENEM 500 MG: 1 INJECTION, POWDER, LYOPHILIZED, FOR SOLUTION INTRAMUSCULAR; INTRAVENOUS at 02:46

## 2021-11-16 RX ADMIN — LEVOFLOXACIN 750 MG: 750 TABLET, FILM COATED ORAL at 15:30

## 2021-11-16 RX ADMIN — ACETAMINOPHEN 650 MG: 325 TABLET, FILM COATED ORAL at 18:43

## 2021-11-16 RX ADMIN — APIXABAN 2.5 MG: 2.5 TABLET, FILM COATED ORAL at 08:08

## 2021-11-16 RX ADMIN — FAMOTIDINE 20 MG: 20 TABLET, FILM COATED ORAL at 20:40

## 2021-11-16 RX ADMIN — DULOXETINE HYDROCHLORIDE 20 MG: 20 CAPSULE, DELAYED RELEASE ORAL at 08:09

## 2021-11-16 RX ADMIN — Medication 1 CAPSULE: at 08:08

## 2021-11-16 RX ADMIN — LEVOTHYROXINE SODIUM 50 MCG: 0.05 TABLET ORAL at 06:02

## 2021-11-16 RX ADMIN — APIXABAN 2.5 MG: 2.5 TABLET, FILM COATED ORAL at 20:38

## 2021-11-16 RX ADMIN — SODIUM CHLORIDE, PRESERVATIVE FREE 10 ML: 5 INJECTION INTRAVENOUS at 20:38

## 2021-11-16 RX ADMIN — SODIUM CHLORIDE, PRESERVATIVE FREE 10 ML: 5 INJECTION INTRAVENOUS at 08:18

## 2021-11-16 RX ADMIN — FAMOTIDINE 20 MG: 20 TABLET, FILM COATED ORAL at 08:09

## 2021-11-16 NOTE — PROGRESS NOTES
Discharge Planning Assessment  [facility]     Patient Name: Marylu Foreman  MRN: [id number]  Today's Date: 11/16/2021    Admit Date: 11/15/2021     Discharge Needs Assessment     Row Name 11/16/21 1121       Living Environment    Lives With alone    Current Living Arrangements home/apartment/condo    Primary Care Provided by self    Provides Primary Care For no one    Quality of Family Relationships supportive    Able to Return to Prior Arrangements yes       Resource/Environmental Concerns    Resource/Environmental Concerns none       Transition Planning    Patient/Family Anticipates Transition to home       Discharge Needs Assessment    Equipment Currently Used at Home none    Concerns to be Addressed denies needs/concerns at this time; no discharge needs identified               Discharge Plan     Row Name 11/16/21 1122       Plan    Plan Home    Plan Comments Spoke with pt at bedside to screen for DCP/needs. Pt did confirm that she does reside at [facility] alone and plans to return home at [place].  Pt reports that she is totally independent and does not anticipate any needs at [place].  Pt did report that she has uesd [facility]  and SNF   in the past post TKA.  Pt stated that she plans to drive herself home at [place] since her car is parked out in the hospital parking lot.  Pt does plan to use [facility] pharmacy meds to bed at [place].              Continued Care and Services - Admitted Since 11/15/2021    Coordination has not been started for this encounter.          Demographic Summary     Row Name 11/16/21 1121       General Information    Admission Type inpatient    Arrived From home    Referral Source admission list    Reason for Consult discharge planning    Preferred Language English               Functional Status     Row Name 11/16/21 1121       Functional Status    Usual Activity Tolerance moderate    Current Activity Tolerance moderate       Functional Status, IADL    Medications independent    Meal  Preparation independent    Housekeeping independent    Laundry independent    Shopping independent               Psychosocial    No documentation.                Abuse/Neglect    No documentation.                Legal    No documentation.                Substance Abuse    No documentation.                Patient Forms    No documentation.                   Agustina Hill MSW
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
## 2021-11-16 NOTE — PROGRESS NOTES
Name: Marylu Foreman ADMIT: 11/15/2021   : 1945  PCP: Arleen Dillon MD    MRN: [id number] LOS: 1 days   AGE/SEX: 76 y.o. female  ROOM: [facility]     Subjective   Subjective   Patient seen this morning.  No acute events overnight.  Laying in bed, not in distress.  Denies urinary symptoms, denies dysuria, bloody urination, nausea vomiting.    Review of Systems      As above  Objective   Objective   Vital Signs  Temp:  [97.1 °F (36.2 °C)-98.4 °F (36.9 °C)] 98.4 °F (36.9 °C)  Heart Rate:  [81-92] 92  Resp:  [16] 16  BP: ()/(52-70) 109/59  SpO2:  [94 %-100 %] 96 %  on   ;   Device (Oxygen Therapy): room air  Body mass index is 33.57 kg/m².  Physical Exam     General: Alert and oriented x3, no acute distress  CV: Irregular rate and rhythm, + systolic murmur,  No rubs or gallops  Lungs: Clear to auscultation bilaterally, no crackles or wheezes  Abdomen: Soft, nontender, nondistended  Extremities: No lower extremity edema, nontender          Results Review     I reviewed the patient's new clinical results.  Results from last 7 days   Lab Units 21  0602 11/15/21  1306   WBC 10*3/mm3 8.07 9.34   HEMOGLOBIN g/dL 9.3* 10.8*   PLATELETS 10*3/mm3 307 338     Results from last 7 days   Lab Units 21  0602 11/15/21  1306   SODIUM mmol/L 141 143   POTASSIUM mmol/L 3.3* 4.2   CHLORIDE mmol/L 111* 107   CO2 mmol/L 17.4* 19.5*   BUN mg/dL 49* 59*   CREATININE mg/dL 2.69* 3.19*   GLUCOSE mg/dL 148* 113*   Estimated Creatinine Clearance: 21.2 mL/min (A) (by C-G formula based on SCr of 2.69 mg/dL (H)).  Results from last 7 days   Lab Units 11/15/21  1306   ALBUMIN g/dL 4.00   BILIRUBIN mg/dL 0.4   ALK PHOS U/L 126*   AST (SGOT) U/L 15   ALT (SGPT) U/L 11     Results from last 7 days   Lab Units 21  0602 11/15/21  1306   CALCIUM mg/dL 8.1* 9.1   ALBUMIN g/dL  --  4.00       COVID19   Date Value Ref Range Status   11/15/2021 Not Detected Not Detected - Ref. Range Final     No results found for: HGBA1C,  POCGLU    Mammo Screening Modified With Tomosynthesis Right With CAD  Narrative: Examination: Unilateral right digital mammography with R2 computer  detection and digital Tomosynthesis     HISTORY: 76 year-old asymptomatic female status post prior left  mastectomy for breast cancer     FINDINGS: Unilateral right digital CC and MLO mammographic and  tomosynthesis images were obtained. Comparison is made to prior  mammography dated 10/1/2020 and 9/30/2019. Scattered fibroglandular  densities are seen throughout the right breast in a pattern that is not  appreciably changed. I see no new or dominant masses or suspicious  calcifications. There is no evidence for architectural distortion or  deformity.     Impression: There are no findings suspicious for malignancy in the right  breast. Routine follow-up mammography is recommended.     BI-RADS Category 1: Negative.     This report was finalized on 10/1/2021 3:50 PM by Dr. Palomo Rhodes M.D.       Scheduled Medications  apixaban, 2.5 mg, Oral, BID  cholecalciferol, 2,000 Units, Oral, Daily  dilTIAZem CD, 180 mg, Oral, Q24H  DULoxetine, 20 mg, Oral, BID  famotidine, 20 mg, Oral, BID  lactobacillus acidophilus, 1 capsule, Oral, Daily  levoFLOXacin, 750 mg, Oral, Every Other Day  levothyroxine, 50 mcg, Oral, Q AM  modafinil, 200 mg, Oral, Daily  [START ON 11/17/2021] multivitamin, 1 tablet, Oral, Nightly  sodium chloride, 10 mL, Intravenous, Q12H    Infusions  sodium chloride, 75 mL/hr, Last Rate: 75 mL/hr (11/15/21 2105)    Diet  Diet Regular; Cardiac, Consistent Carbohydrate, Renal       Assessment/Plan     Active Hospital Problems    Diagnosis  POA   • **Acute kidney injury (HCC) [N17.9]  Yes   • Hypokalemia [E87.6]  Unknown   • Anemia [D64.9]  Unknown   • CKD (chronic kidney disease) stage 3, GFR 30-59 ml/min (HCC) [N18.30]  Unknown   • Atrial fibrillation, persistent (HCC) [I48.19]  Yes   • Hypothyroidism [E03.9]  Yes   • Urinary tract infection due to  extended-spectrum beta lactamase (ESBL) producing Escherichia coli [N39.0, B96.29, Z16.12]  Unknown   • Essential hypertension [I10]  Yes   • Chronic pulmonary embolism (HCC) [I27.82]  Yes      Resolved Hospital Problems   No resolved problems to display.      MS Marylu Foreman is a 76 y.o. female the patient was admitted  11/15 after she went to the hematology oncology office where she was found to have elevated creatinine, and was sent to the ED.      IJEOMA on CKD stage III  -Creatinine was 3.19 yesterday on admission,   -Secondary to poor oral intake and volume depletion  -Creatinine improving 2.69 today  -Holding ACE inhibitor  -Continue IV fluids  -Renal following      ESBL E. Coli  -ID was consulted,  -Plan to continue therapy with levofloxacin 250 mg every other day to complete 5-day course.  -Need to adjust to daily if creatinine improves includes greater than 50.  -Her blood cell count is normal, she is afebrile.    Atrial fibrillation  -Continue home diltiazem and apixaban      Normocytic anemia  Hemoglobin was 10.8 on admission, prior to that in June/21 was 12.4  -History of advanced anemia per chart review  -Hemoglobin this morning 9.3, dilutional, trended down secondary to IV fluids  -No signs of bleeding   will order anemia work-up  -    Hypokalemia  Replete             DVT prophylaxis.  A Home pixaban  Full code.  Discussed with patient and nursing staff  Anticipate discharge home in 1-2 days.      Shazia Padilla MD  [facility]  11/16/21  13:43 EST
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
## 2021-11-16 NOTE — PLAN OF CARE
Goal Outcome Evaluation:  Plan of Care Reviewed With: patient      Outcome Summary: Pt taking PO Levaquin for IJEOMA, NS at 75 mL/hr.  She is up ad benson, ambulated in room and to toilet today.  C/o headache this morning, treated effectively with Tylenol.  States urgency feels like it has lessened.  Pharmacy reviewed meds with pt today.  VSS, no c/o pain, will continue to monitor.

## 2021-11-16 NOTE — PLAN OF CARE
Goal Outcome Evaluation:           Progress: improving  Outcome Summary: Pt AOx4. Received Iv abx. NS infusing at 75. No c/o pain. Will continue to monitor.
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
## 2021-11-17 PROBLEM — R51.9 HEADACHE: Status: ACTIVE | Noted: 2021-11-17

## 2021-11-17 PROBLEM — E87.20 METABOLIC ACIDOSIS: Status: ACTIVE | Noted: 2021-11-17

## 2021-11-17 PROBLEM — E66.9 OBESITY (BMI 30-39.9): Status: ACTIVE | Noted: 2021-11-17

## 2021-11-17 LAB
ALBUMIN SERPL-MCNC: 3.5 G/DL (ref 3.5–5.2)
ALBUMIN/GLOB SERPL: 1.5 G/DL
ALP SERPL-CCNC: 102 U/L (ref 39–117)
ALT SERPL W P-5'-P-CCNC: 9 U/L (ref 1–33)
ANION GAP SERPL CALCULATED.3IONS-SCNC: 9.5 MMOL/L (ref 5–15)
AST SERPL-CCNC: 14 U/L (ref 1–32)
BASOPHILS # BLD AUTO: 0.08 10*3/MM3 (ref 0–0.2)
BASOPHILS NFR BLD AUTO: 1.3 % (ref 0–1.5)
BILIRUB SERPL-MCNC: 0.3 MG/DL (ref 0–1.2)
BUN SERPL-MCNC: 40 MG/DL (ref 8–23)
BUN/CREAT SERPL: 15.6 (ref 7–25)
CALCIUM SPEC-SCNC: 8.4 MG/DL (ref 8.6–10.5)
CHLORIDE SERPL-SCNC: 114 MMOL/L (ref 98–107)
CO2 SERPL-SCNC: 19.5 MMOL/L (ref 22–29)
CREAT SERPL-MCNC: 2.57 MG/DL (ref 0.57–1)
DEPRECATED RDW RBC AUTO: 46.6 FL (ref 37–54)
EOSINOPHIL # BLD AUTO: 0.36 10*3/MM3 (ref 0–0.4)
EOSINOPHIL NFR BLD AUTO: 5.8 % (ref 0.3–6.2)
ERYTHROCYTE [DISTWIDTH] IN BLOOD BY AUTOMATED COUNT: 13.1 % (ref 12.3–15.4)
GFR SERPL CREATININE-BSD FRML MDRD: 18 ML/MIN/1.73
GLOBULIN UR ELPH-MCNC: 2.4 GM/DL
GLUCOSE SERPL-MCNC: 118 MG/DL (ref 65–99)
HCT VFR BLD AUTO: 32.1 % (ref 34–46.6)
HGB BLD-MCNC: 10.1 G/DL (ref 12–15.9)
IMM GRANULOCYTES # BLD AUTO: 0.01 10*3/MM3 (ref 0–0.05)
IMM GRANULOCYTES NFR BLD AUTO: 0.2 % (ref 0–0.5)
LYMPHOCYTES # BLD AUTO: 1.58 10*3/MM3 (ref 0.7–3.1)
LYMPHOCYTES NFR BLD AUTO: 25.3 % (ref 19.6–45.3)
MAGNESIUM SERPL-MCNC: 1.5 MG/DL (ref 1.6–2.4)
MCH RBC QN AUTO: 30.5 PG (ref 26.6–33)
MCHC RBC AUTO-ENTMCNC: 31.5 G/DL (ref 31.5–35.7)
MCV RBC AUTO: 97 FL (ref 79–97)
MONOCYTES # BLD AUTO: 0.7 10*3/MM3 (ref 0.1–0.9)
MONOCYTES NFR BLD AUTO: 11.2 % (ref 5–12)
NEUTROPHILS NFR BLD AUTO: 3.52 10*3/MM3 (ref 1.7–7)
NEUTROPHILS NFR BLD AUTO: 56.2 % (ref 42.7–76)
NRBC BLD AUTO-RTO: 0 /100 WBC (ref 0–0.2)
PHOSPHATE SERPL-MCNC: 4.9 MG/DL (ref 2.5–4.5)
PLATELET # BLD AUTO: 318 10*3/MM3 (ref 140–450)
PMV BLD AUTO: 12 FL (ref 6–12)
POTASSIUM SERPL-SCNC: 3.7 MMOL/L (ref 3.5–5.2)
PROT SERPL-MCNC: 5.9 G/DL (ref 6–8.5)
RBC # BLD AUTO: 3.31 10*6/MM3 (ref 3.77–5.28)
SODIUM SERPL-SCNC: 143 MMOL/L (ref 136–145)
URATE SERPL-MCNC: 7.1 MG/DL (ref 2.4–5.7)
WBC # BLD AUTO: 6.25 10*3/MM3 (ref 3.4–10.8)

## 2021-11-17 PROCEDURE — 84550 ASSAY OF BLOOD/URIC ACID: CPT | Performed by: INTERNAL MEDICINE

## 2021-11-17 PROCEDURE — 80053 COMPREHEN METABOLIC PANEL: CPT | Performed by: INTERNAL MEDICINE

## 2021-11-17 PROCEDURE — 85025 COMPLETE CBC W/AUTO DIFF WBC: CPT | Performed by: INTERNAL MEDICINE

## 2021-11-17 PROCEDURE — 83735 ASSAY OF MAGNESIUM: CPT | Performed by: INTERNAL MEDICINE

## 2021-11-17 PROCEDURE — 25010000002 MAGNESIUM SULFATE 2 GM/50ML SOLUTION: Performed by: STUDENT IN AN ORGANIZED HEALTH CARE EDUCATION/TRAINING PROGRAM

## 2021-11-17 PROCEDURE — 84100 ASSAY OF PHOSPHORUS: CPT | Performed by: INTERNAL MEDICINE

## 2021-11-17 RX ORDER — MAGNESIUM SULFATE HEPTAHYDRATE 40 MG/ML
2 INJECTION, SOLUTION INTRAVENOUS AS NEEDED
Status: DISCONTINUED | OUTPATIENT
Start: 2021-11-17 | End: 2021-11-17

## 2021-11-17 RX ORDER — FLUCONAZOLE 150 MG/1
150 TABLET ORAL ONCE
Status: COMPLETED | OUTPATIENT
Start: 2021-11-17 | End: 2021-11-17

## 2021-11-17 RX ORDER — MAGNESIUM SULFATE HEPTAHYDRATE 40 MG/ML
4 INJECTION, SOLUTION INTRAVENOUS AS NEEDED
Status: DISCONTINUED | OUTPATIENT
Start: 2021-11-17 | End: 2021-11-17

## 2021-11-17 RX ORDER — POTASSIUM CHLORIDE 750 MG/1
40 TABLET, FILM COATED, EXTENDED RELEASE ORAL AS NEEDED
Status: DISCONTINUED | OUTPATIENT
Start: 2021-11-17 | End: 2021-11-18 | Stop reason: HOSPADM

## 2021-11-17 RX ORDER — AMOXICILLIN 250 MG
2 CAPSULE ORAL DAILY
Status: DISCONTINUED | OUTPATIENT
Start: 2021-11-17 | End: 2021-11-18 | Stop reason: HOSPADM

## 2021-11-17 RX ORDER — BISACODYL 10 MG
10 SUPPOSITORY, RECTAL RECTAL DAILY PRN
Status: DISCONTINUED | OUTPATIENT
Start: 2021-11-17 | End: 2021-11-18 | Stop reason: HOSPADM

## 2021-11-17 RX ORDER — BUTALBITAL, ACETAMINOPHEN AND CAFFEINE 50; 325; 40 MG/1; MG/1; MG/1
1 TABLET ORAL EVERY 8 HOURS PRN
Status: DISCONTINUED | OUTPATIENT
Start: 2021-11-17 | End: 2021-11-18 | Stop reason: HOSPADM

## 2021-11-17 RX ORDER — POTASSIUM CHLORIDE 1.5 G/1.77G
40 POWDER, FOR SOLUTION ORAL AS NEEDED
Status: DISCONTINUED | OUTPATIENT
Start: 2021-11-17 | End: 2021-11-18 | Stop reason: HOSPADM

## 2021-11-17 RX ORDER — POLYETHYLENE GLYCOL 3350 17 G/17G
17 POWDER, FOR SOLUTION ORAL DAILY PRN
Status: DISCONTINUED | OUTPATIENT
Start: 2021-11-17 | End: 2021-11-18 | Stop reason: HOSPADM

## 2021-11-17 RX ADMIN — BUTALBITAL, ACETAMINOPHEN, AND CAFFEINE 1 TABLET: 50; 325; 40 TABLET ORAL at 18:43

## 2021-11-17 RX ADMIN — DULOXETINE HYDROCHLORIDE 20 MG: 20 CAPSULE, DELAYED RELEASE ORAL at 08:07

## 2021-11-17 RX ADMIN — SODIUM CHLORIDE, PRESERVATIVE FREE 10 ML: 5 INJECTION INTRAVENOUS at 08:12

## 2021-11-17 RX ADMIN — Medication 1 TABLET: at 20:36

## 2021-11-17 RX ADMIN — DULOXETINE HYDROCHLORIDE 20 MG: 20 CAPSULE, DELAYED RELEASE ORAL at 20:36

## 2021-11-17 RX ADMIN — MAGNESIUM SULFATE HEPTAHYDRATE 2 G: 40 INJECTION, SOLUTION INTRAVENOUS at 13:21

## 2021-11-17 RX ADMIN — APIXABAN 2.5 MG: 2.5 TABLET, FILM COATED ORAL at 08:07

## 2021-11-17 RX ADMIN — APIXABAN 2.5 MG: 2.5 TABLET, FILM COATED ORAL at 20:36

## 2021-11-17 RX ADMIN — FAMOTIDINE 20 MG: 20 TABLET, FILM COATED ORAL at 08:07

## 2021-11-17 RX ADMIN — FLUCONAZOLE 150 MG: 150 TABLET ORAL at 18:43

## 2021-11-17 RX ADMIN — MAGNESIUM SULFATE HEPTAHYDRATE 2 G: 40 INJECTION, SOLUTION INTRAVENOUS at 10:55

## 2021-11-17 RX ADMIN — LEVOTHYROXINE SODIUM 50 MCG: 0.05 TABLET ORAL at 08:07

## 2021-11-17 RX ADMIN — Medication 2000 UNITS: at 08:07

## 2021-11-17 RX ADMIN — MODAFINIL 200 MG: 100 TABLET ORAL at 10:52

## 2021-11-17 RX ADMIN — SODIUM CHLORIDE 75 ML/HR: 9 INJECTION, SOLUTION INTRAVENOUS at 19:28

## 2021-11-17 RX ADMIN — DILTIAZEM HYDROCHLORIDE 180 MG: 180 CAPSULE, COATED, EXTENDED RELEASE ORAL at 08:07

## 2021-11-17 RX ADMIN — Medication 1 CAPSULE: at 08:07

## 2021-11-17 RX ADMIN — FAMOTIDINE 20 MG: 20 TABLET, FILM COATED ORAL at 20:36

## 2021-11-17 NOTE — PLAN OF CARE
Goal Outcome Evaluation:  Plan of Care Reviewed With: patient        Progress: improving  Outcome Summary: Pt with headache for majority of the day. New med Fioricet given. NS @ 75 mL/hr. A&O, room air, up ad benson. VSS, will continue to monitor.

## 2021-11-17 NOTE — PROGRESS NOTES
Name: Marylu Foreman ADMIT: 11/15/2021   : 1945  PCP: Arleen Dillon MD    MRN: [id number] LOS: 2 days   AGE/SEX: 76 y.o. female  ROOM: [facility]/     Subjective   Subjective   Tearful this morning as she is having a headache.  says she was previously having some ?cranial electromagnetic therapy which was the only thing that helps.  Offered her Tylenol, cold compress, other nonpharmacologic interventions.  She says nothing helps and refuses all my offers.    Review of Systems  Denies chest pain, cough, fevers.  Positive for dysuria     Objective   Objective   Vital Signs  Temp:  [97.4 °F (36.3 °C)-97.6 °F (36.4 °C)] 97.4 °F (36.3 °C)  Heart Rate:  [87-91] 87  Resp:  [16] 16  BP: (113-126)/(59-70) 113/59  SpO2:  [95 %-97 %] 97 %  on   ;   Device (Oxygen Therapy): room air  Body mass index is 33.57 kg/m².  Physical Exam  Constitutional:       General: She is not in acute distress.     Appearance: She is not diaphoretic.   HENT:      Mouth/Throat:      Mouth: Mucous membranes are moist.   Eyes:      General: No scleral icterus.  Cardiovascular:      Rate and Rhythm: Normal rate and regular rhythm.   Pulmonary:      Effort: Pulmonary effort is normal. No respiratory distress.      Breath sounds: No wheezing or rhonchi.   Abdominal:      Palpations: Abdomen is soft.      Tenderness: There is no abdominal tenderness. There is no guarding.   Musculoskeletal:      Right lower leg: No edema.      Left lower leg: No edema.   Skin:     General: Skin is warm and dry.   Neurological:      Mental Status: She is alert.         Results Review     I reviewed the patient's new clinical results.  Results from last 7 days   Lab Units 21  0737 21  0602 11/15/21  1306   WBC 10*3/mm3 6.25 8.07 9.34   HEMOGLOBIN g/dL 10.1* 9.3* 10.8*   PLATELETS 10*3/mm3 318 307 338     Results from last 7 days   Lab Units 21  0737 21  0602 11/15/21  1306   SODIUM mmol/L 143 141 143   POTASSIUM mmol/L 3.7 3.3* 4.2   CHLORIDE  mmol/L 114* 111* 107   CO2 mmol/L 19.5* 17.4* 19.5*   BUN mg/dL 40* 49* 59*   CREATININE mg/dL 2.57* 2.69* 3.19*   GLUCOSE mg/dL 118* 148* 113*   Estimated Creatinine Clearance: 22.2 mL/min (A) (by C-G formula based on SCr of 2.57 mg/dL (H)).  Results from last 7 days   Lab Units 11/17/21  0737 11/15/21  1306   ALBUMIN g/dL 3.50 4.00   BILIRUBIN mg/dL 0.3 0.4   ALK PHOS U/L 102 126*   AST (SGOT) U/L 14 15   ALT (SGPT) U/L 9 11     Results from last 7 days   Lab Units 11/17/21  0737 11/16/21  0602 11/15/21  1306   CALCIUM mg/dL 8.4* 8.1* 9.1   ALBUMIN g/dL 3.50  --  4.00   MAGNESIUM mg/dL 1.5*  --   --    PHOSPHORUS mg/dL 4.9*  --   --        COVID19   Date Value Ref Range Status   11/15/2021 Not Detected Not Detected - Ref. Range Final     No results found for: HGBA1C, POCGLU    Mammo Screening Modified With Tomosynthesis Right With CAD  Narrative: Examination: Unilateral right digital mammography with R2 computer  detection and digital Tomosynthesis     HISTORY: 76 year-old asymptomatic female status post prior left  mastectomy for breast cancer     FINDINGS: Unilateral right digital CC and MLO mammographic and  tomosynthesis images were obtained. Comparison is made to prior  mammography dated 10/1/2020 and 9/30/2019. Scattered fibroglandular  densities are seen throughout the right breast in a pattern that is not  appreciably changed. I see no new or dominant masses or suspicious  calcifications. There is no evidence for architectural distortion or  deformity.     Impression: There are no findings suspicious for malignancy in the right  breast. Routine follow-up mammography is recommended.     BI-RADS Category 1: Negative.     This report was finalized on 10/1/2021 3:50 PM by Dr. Palomo Rhodes M.D.       I reviewed the patient's daily medications.  Scheduled Medications  apixaban, 2.5 mg, Oral, BID  cholecalciferol, 2,000 Units, Oral, Daily  dilTIAZem CD, 180 mg, Oral, Q24H  DULoxetine, 20 mg, Oral,  BID  famotidine, 20 mg, Oral, BID  lactobacillus acidophilus, 1 capsule, Oral, Daily  levoFLOXacin, 750 mg, Oral, Every Other Day  levothyroxine, 50 mcg, Oral, Q AM  modafinil, 200 mg, Oral, Daily  multivitamin, 1 tablet, Oral, Nightly  senna-docusate sodium, 2 tablet, Oral, Daily  sodium chloride, 10 mL, Intravenous, Q12H    Infusions  sodium chloride, 75 mL/hr, Last Rate: 75 mL/hr (11/15/21 2105)    Diet  Diet Regular; Consistent Carbohydrate       Assessment/Plan     Active Hospital Problems    Diagnosis  POA   • **Acute kidney injury (HCC) [N17.9]  Yes   • Obesity (BMI 30-39.9) [E66.9]  Yes   • Hypokalemia [E87.6]  Yes   • Anemia [D64.9]  Yes   • CKD (chronic kidney disease) stage 3, GFR 30-59 ml/min (HCC) [N18.30]  Yes   • Atrial fibrillation, persistent (HCC) [I48.19]  Yes   • Hypothyroidism [E03.9]  Yes   • Urinary tract infection due to extended-spectrum beta lactamase (ESBL) producing Escherichia coli [N39.0, B96.29, Z16.12]  Yes   • Essential hypertension [I10]  Yes   • Chronic pulmonary embolism (HCC) [I27.82]  Yes      Resolved Hospital Problems   No resolved problems to display.        Marylu Foreman is a 76 y.o. female who was sent in from her oncology clinic when labs showed abnormal creatinine.  She was admitted for IJEOMA due to UTI.     IJEOMA on CKD stage III  -Creatinine was 3.19 on admission, downtrending today.  Likely due to to poor oral intake and volume depletion  -Holding ACE inhibitor  -Continue IV fluids  -Renal following     ESBL E. Coli UTI  -Outside culture.  ID following  -Plan to continue therapy with levofloxacin 750 mg every other day to complete 5-day course.     Atrial fibrillation  -Continue home diltiazem and apixaban     Normocytic anemia  Hemoglobin was 10.8 on admission and stable, prior to that in June/21 was 12.4  -No signs of bleeding, outpatient follow-up    Headache  -Fioricet as needed      Eliquis (home med) for DVT prophylaxis.  Full code.  Discussed with  patient.  Anticipate discharge home in 1-2 days. Possible tomorrow      Juju Mendiola DO  [facility]  11/17/21  15:56 EST    Patient was placed in face mask on first look.  I wore protective equipment throughout this patient encounter including a face mask, gloves and protective eyewear.  Hand hygiene was performed before donning protective equipment and after removal when leaving the room.

## 2021-11-17 NOTE — PROGRESS NOTES
[facility] Progress Note      Patient Name: Marylu Foreman  : 1945  MRN: [id number]  Primary Care Physician:  Arleen Dillon MD  Date of admission: 11/15/2021    Subjective     Interval History:   Follow up IJEOMA on CKD3.  Very tangential.  Throat sore. Burning with urination. Not eating. No appetite after macrobid. Offered supplement, but she says she has only drunk weight loss supplements.     Review of Systems:   As noted above    Objective     Vitals:   Temp:  [97.4 °F (36.3 °C)-98.1 °F (36.7 °C)] 97.4 °F (36.3 °C)  Heart Rate:  [84-91] 87  Resp:  [16-18] 16  BP: (106-126)/(59-70) 113/59    Intake/Output Summary (Last 24 hours) at 2021 1515  Last data filed at 2021 0700  Gross per 24 hour   Intake 120 ml   Output 1000 ml   Net -880 ml       Physical Exam:    General Appearance: alert, oriented x 3, no acute distress . Very tangential   Skin: warm and dry  HEENT: oral mucosa normal, nonicteric sclera  Neck: supple, no JVD  Lungs: Clear to auscultation  Heart: RRR, early systolic m 2/6.   Abdomen: soft, nontender, nondistended, + bs  Extremities: no edema  Neuro: normal speech and mental status     Scheduled Meds:     apixaban, 2.5 mg, Oral, BID  cholecalciferol, 2,000 Units, Oral, Daily  dilTIAZem CD, 180 mg, Oral, Q24H  DULoxetine, 20 mg, Oral, BID  famotidine, 20 mg, Oral, BID  lactobacillus acidophilus, 1 capsule, Oral, Daily  levoFLOXacin, 750 mg, Oral, Every Other Day  levothyroxine, 50 mcg, Oral, Q AM  modafinil, 200 mg, Oral, Daily  multivitamin, 1 tablet, Oral, Nightly  senna-docusate sodium, 2 tablet, Oral, Daily  sodium chloride, 10 mL, Intravenous, Q12H      IV Meds:   sodium chloride, 75 mL/hr, Last Rate: 75 mL/hr (11/15/21 2105)        Results Reviewed:   I have personally reviewed the results from the time of this admission to 2021 15:15 EST     Results from last 7 days   Lab Units 21  0737 21  0602 11/15/21  1306   SODIUM mmol/L 143 141  143   POTASSIUM mmol/L 3.7 3.3* 4.2   CHLORIDE mmol/L 114* 111* 107   CO2 mmol/L 19.5* 17.4* 19.5*   BUN mg/dL 40* 49* 59*   CREATININE mg/dL 2.57* 2.69* 3.19*   CALCIUM mg/dL 8.4* 8.1* 9.1   BILIRUBIN mg/dL 0.3  --  0.4   ALK PHOS U/L 102  --  126*   ALT (SGPT) U/L 9  --  11   AST (SGOT) U/L 14  --  15   GLUCOSE mg/dL 118* 148* 113*       Estimated Creatinine Clearance: 22.2 mL/min (A) (by C-G formula based on SCr of 2.57 mg/dL (H)).    Results from last 7 days   Lab Units 11/17/21  0737   MAGNESIUM mg/dL 1.5*   PHOSPHORUS mg/dL 4.9*       Results from last 7 days   Lab Units 11/17/21  0737   URIC ACID mg/dL 7.1*       Results from last 7 days   Lab Units 11/17/21  0737 11/16/21  0602 11/15/21  1306   WBC 10*3/mm3 6.25 8.07 9.34   HEMOGLOBIN g/dL 10.1* 9.3* 10.8*   PLATELETS 10*3/mm3 318 307 338             Assessment / Plan     ASSESSMENT:  1. Jes on CKD 3, baseline 1.6.  Likely due to volume depletion, ARB, Slowly improving.   2. Breast cancer history with no clinical evidence of current disease.   3. Recent UTI treated with macrobid. Changed to levaquin for 5 days.   4. Afib. Rate controlled.   5. Hypothyroid on replacement.    PLAN:  1. Continue IVF.   2. Replace Mg,  Protocol this am.  Will replace intermittently going forward.   3 Encourage po fluids.   Nahomy Dwyer MD  11/17/21  15:15 [facility]    [facility]  [phone]

## 2021-11-18 ENCOUNTER — READMISSION MANAGEMENT (OUTPATIENT)
Dept: CALL CENTER | Facility: HOSPITAL | Age: 76
End: 2021-11-18

## 2021-11-18 VITALS
OXYGEN SATURATION: 96 % | RESPIRATION RATE: 16 BRPM | TEMPERATURE: 97.1 F | SYSTOLIC BLOOD PRESSURE: 118 MMHG | DIASTOLIC BLOOD PRESSURE: 64 MMHG | WEIGHT: 210.1 LBS | BODY MASS INDEX: 33.77 KG/M2 | HEIGHT: 66 IN | HEART RATE: 85 BPM

## 2021-11-18 LAB
ALBUMIN SERPL-MCNC: 3.3 G/DL (ref 3.5–5.2)
ANION GAP SERPL CALCULATED.3IONS-SCNC: 11.2 MMOL/L (ref 5–15)
BUN SERPL-MCNC: 30 MG/DL (ref 8–23)
BUN/CREAT SERPL: 12.4 (ref 7–25)
CALCIUM SPEC-SCNC: 8.8 MG/DL (ref 8.6–10.5)
CHLORIDE SERPL-SCNC: 114 MMOL/L (ref 98–107)
CO2 SERPL-SCNC: 19.8 MMOL/L (ref 22–29)
CREAT SERPL-MCNC: 2.42 MG/DL (ref 0.57–1)
GFR SERPL CREATININE-BSD FRML MDRD: 20 ML/MIN/1.73
GLUCOSE SERPL-MCNC: 111 MG/DL (ref 65–99)
PHOSPHATE SERPL-MCNC: 4.4 MG/DL (ref 2.5–4.5)
POTASSIUM SERPL-SCNC: 3.7 MMOL/L (ref 3.5–5.2)
SODIUM SERPL-SCNC: 145 MMOL/L (ref 136–145)

## 2021-11-18 PROCEDURE — 80069 RENAL FUNCTION PANEL: CPT | Performed by: INTERNAL MEDICINE

## 2021-11-18 RX ORDER — MAGNESIUM SULFATE HEPTAHYDRATE 40 MG/ML
2 INJECTION, SOLUTION INTRAVENOUS AS NEEDED
Status: DISCONTINUED | OUTPATIENT
Start: 2021-11-18 | End: 2021-11-18 | Stop reason: HOSPADM

## 2021-11-18 RX ORDER — MAGNESIUM SULFATE HEPTAHYDRATE 40 MG/ML
4 INJECTION, SOLUTION INTRAVENOUS AS NEEDED
Status: DISCONTINUED | OUTPATIENT
Start: 2021-11-18 | End: 2021-11-18 | Stop reason: HOSPADM

## 2021-11-18 RX ADMIN — FAMOTIDINE 20 MG: 20 TABLET, FILM COATED ORAL at 08:29

## 2021-11-18 RX ADMIN — SODIUM CHLORIDE 75 ML/HR: 9 INJECTION, SOLUTION INTRAVENOUS at 06:20

## 2021-11-18 RX ADMIN — SODIUM CHLORIDE, PRESERVATIVE FREE 10 ML: 5 INJECTION INTRAVENOUS at 08:30

## 2021-11-18 RX ADMIN — LEVOTHYROXINE SODIUM 50 MCG: 0.05 TABLET ORAL at 06:20

## 2021-11-18 RX ADMIN — APIXABAN 2.5 MG: 2.5 TABLET, FILM COATED ORAL at 08:30

## 2021-11-18 RX ADMIN — Medication 1 CAPSULE: at 08:30

## 2021-11-18 RX ADMIN — Medication 2000 UNITS: at 08:29

## 2021-11-18 RX ADMIN — MODAFINIL 200 MG: 100 TABLET ORAL at 08:29

## 2021-11-18 RX ADMIN — DULOXETINE HYDROCHLORIDE 20 MG: 20 CAPSULE, DELAYED RELEASE ORAL at 08:30

## 2021-11-18 RX ADMIN — DILTIAZEM HYDROCHLORIDE 180 MG: 180 CAPSULE, COATED, EXTENDED RELEASE ORAL at 08:29

## 2021-11-18 RX ADMIN — LEVOFLOXACIN 750 MG: 750 TABLET, FILM COATED ORAL at 08:30

## 2021-11-18 RX ADMIN — BUTALBITAL, ACETAMINOPHEN, AND CAFFEINE 1 TABLET: 50; 325; 40 TABLET ORAL at 08:29

## 2021-11-18 NOTE — CASE MANAGEMENT/SOCIAL WORK
Case Management Discharge Note      Final Note: Home---no needs         Selected Continued Care - Discharged on 11/18/2021 Admission date: 11/15/2021 - Discharge disposition: Home or Self Care    Destination    No services have been selected for the patient.              Durable Medical Equipment    No services have been selected for the patient.              Dialysis/Infusion    No services have been selected for the patient.              Home Medical Care    No services have been selected for the patient.              Therapy    No services have been selected for the patient.              Community Resources    No services have been selected for the patient.              Community & DME    No services have been selected for the patient.                       Final Discharge Disposition Code: 01 - home or self-care

## 2021-11-18 NOTE — DISCHARGE SUMMARY
Patient Name: Marylu Foreman  : 1945  MRN: [id number]    Date of Admission: 11/15/2021  Date of Discharge:  2021  Primary Care Physician: Arleen Dillon MD      Chief Complaint:   No chief complaint on file.      Discharge Diagnoses     Active Hospital Problems    Diagnosis  POA   • **Acute kidney injury (HCC) [N17.9]  Yes   • Obesity (BMI 30-39.9) [E66.9]  Yes   • Metabolic acidosis [E87.2]  Yes   • Headache [R51.9]  No   • Hypokalemia [E87.6]  Yes   • Anemia [D64.9]  Yes   • CKD (chronic kidney disease) stage 3, GFR 30-59 ml/min (HCC) [N18.30]  Yes   • Atrial fibrillation, persistent (HCC) [I48.19]  Yes   • Hypothyroidism [E03.9]  Yes   • Urinary tract infection due to extended-spectrum beta lactamase (ESBL) producing Escherichia coli [N39.0, B96.29, Z16.12]  Yes   • Essential hypertension [I10]  Yes   • Chronic pulmonary embolism (HCC) [I27.82]  Yes      Resolved Hospital Problems   No resolved problems to display.        Hospital Course     Ms. Foreman is a 76 y.o. female who was sent in from her oncology clinic when labs showed abnormal creatinine.  She was admitted for IJEOMA and ESBL E. coli UTI.  Prior to admission, she complained of urinary frequency and urgency.    IJEOMA on CKD 3: Creatinine was 3.1 on admission; baseline around 1.6.  This is likely due to poor oral intake and volume depletion in the setting of UTI.  Nephrology was consulted.  Creatinine improved to 2.4 on day of discharge with fluids and antibiotics.  Home irbesartan and triamterene-HCTZ were stopped.  She has follow-up with nephrology in 12 days to recheck kidney function.     ESBL E. coli UTI: Culture was obtained at outside clinic.  Infectious disease was consulted.  They recommended Levaquin every other day due to decreased renal function.  She completed a 5-day course of antibiotics.    Day of Discharge     Subjective:  Resting comfortably in bed.  Understands plan for follow-up with nephrology.  No new complaints,  comfortable going home today.    Review of Systems   Constitutional: Negative for chills and fever.   Respiratory: Negative for cough and shortness of breath.    Cardiovascular: Negative for chest pain, palpitations and leg swelling.   Gastrointestinal: Negative for abdominal pain, blood in stool, nausea and vomiting.   Genitourinary: Negative for dysuria and hematuria.       Physical Exam:  Temp:  [97 °F (36.1 °C)-97.1 °F (36.2 °C)] 97.1 °F (36.2 °C)  Heart Rate:  [71-85] 85  Resp:  [16-18] 16  BP: (118-130)/(64-72) 118/64  Body mass index is 33.91 kg/m².  Physical Exam  Constitutional:       General: She is not in acute distress.     Appearance: She is not diaphoretic.   HENT:      Mouth/Throat:      Mouth: Mucous membranes are moist.   Eyes:      General: No scleral icterus.  Cardiovascular:      Rate and Rhythm: Normal rate and regular rhythm.   Pulmonary:      Effort: Pulmonary effort is normal. No respiratory distress.      Breath sounds: No wheezing or rhonchi.   Abdominal:      Palpations: Abdomen is soft.      Tenderness: There is no abdominal tenderness. There is no guarding.   Musculoskeletal:      Right lower leg: No edema.      Left lower leg: No edema.   Skin:     General: Skin is warm and dry.   Neurological:      Mental Status: She is alert.         Consultants     Consult Orders (all) (From admission, onward)     Start     Ordered    11/15/21 1802  Inpatient Hematology & Oncology Consult  Once        Specialty:  Hematology and Oncology  Provider:  West White Jr., MD    11/15/21 1802    11/15/21 1801  Inpatient Infectious Diseases Consult  Once        Specialty:  Infectious Diseases  Provider:  Haley Menard MD    11/15/21 1800    11/15/21 1800  Inpatient Nephrology Consult  Once        Specialty:  Nephrology  Provider:  Renée Cisneros MD    11/15/21 1759              Procedures     Imaging Results (All)     None          Pertinent Labs     Results from last 7 days   Lab Units  11/17/21 0737 11/16/21  0602 11/15/21  1306   WBC 10*3/mm3 6.25 8.07 9.34   HEMOGLOBIN g/dL 10.1* 9.3* 10.8*   PLATELETS 10*3/mm3 318 307 338     Results from last 7 days   Lab Units 11/18/21  0751 11/17/21  0737 11/16/21  0602 11/15/21  1306   SODIUM mmol/L 145 143 141 143   POTASSIUM mmol/L 3.7 3.7 3.3* 4.2   CHLORIDE mmol/L 114* 114* 111* 107   CO2 mmol/L 19.8* 19.5* 17.4* 19.5*   BUN mg/dL 30* 40* 49* 59*   CREATININE mg/dL 2.42* 2.57* 2.69* 3.19*   GLUCOSE mg/dL 111* 118* 148* 113*   Estimated Creatinine Clearance: 23.7 mL/min (A) (by C-G formula based on SCr of 2.42 mg/dL (H)).  Results from last 7 days   Lab Units 11/18/21  0751 11/17/21  0737 11/15/21  1306   ALBUMIN g/dL 3.30* 3.50 4.00   BILIRUBIN mg/dL  --  0.3 0.4   ALK PHOS U/L  --  102 126*   AST (SGOT) U/L  --  14 15   ALT (SGPT) U/L  --  9 11     Results from last 7 days   Lab Units 11/18/21  0751 11/17/21  0737 11/16/21  0602 11/15/21  1306   CALCIUM mg/dL 8.8 8.4* 8.1* 9.1   ALBUMIN g/dL 3.30* 3.50  --  4.00   MAGNESIUM mg/dL  --  1.5*  --   --    PHOSPHORUS mg/dL 4.4 4.9*  --   --          Results from last 7 days   Lab Units 11/17/21 0737   URIC ACID mg/dL 7.1*         Invalid input(s): LDLCALC        Test Results Pending at Discharge       Discharge Details        Discharge Medications      Changes to Medications      Instructions Start Date   Eliquis 2.5 MG tablet tablet  Generic drug: apixaban  What changed:   medication strength  how much to take  when to take this   2.5 mg, Oral, 2 Times Daily         Continue These Medications      Instructions Start Date   CO Q 10 PO   200 mg, Oral, Daily      dilTIAZem  MG 24 hr capsule  Commonly known as: CARDIZEM CD   180 mg, Oral, Daily      DULoxetine 20 MG capsule  Commonly known as: CYMBALTA   20 mg, Oral, 2 Times Daily      famotidine 20 MG tablet  Commonly known as: PEPCID   TAKE 1 TABLET TWICE A DAY      levothyroxine 50 MCG tablet  Commonly known as: SYNTHROID, LEVOTHROID   No dose,  route, or frequency recorded.      multivitamin tablet tablet  Commonly known as: THERAGRAN   1 tablet, Oral, Daily      PROBIOTIC PO   Oral, Daily, Lactobacillus rhamnosus GG        Provigil 200 MG tablet  Generic drug: modafinil   200 mg, Oral, Daily      SODIUM BICARBONATE (ANTACID) PO   Oral      VITAMIN D PO   1 tablet, Oral, Daily      vitamin E 400 UNIT capsule   400 Units, Oral, Daily         Stop These Medications    irbesartan 300 MG tablet  Commonly known as: AVAPRO     nitrofurantoin (macrocrystal-monohydrate) 100 MG capsule  Commonly known as: MACROBID     phenazopyridine 100 MG tablet  Commonly known as: PYRIDIUM     triamterene-hydrochlorothiazide 37.5-25 MG per capsule  Commonly known as: DYAZIDE            Allergies   Allergen Reactions   • Amlodipine Besylate-Valsartan Unknown - Low Severity   • Cefdinir Unknown - Low Severity   • Corticosteroids Unknown - Low Severity     Severe depression   • Lisinopril Unknown - Low Severity   • Nsaids Unknown - Low Severity   • Other Unknown - Low Severity     Steroids sever depression         Discharge Disposition:  Home or Self Care    Discharge Diet:  No active diet order      Discharge Activity:   As tolerated    CODE STATUS:    Code Status and Medical Interventions:   Ordered at: 11/15/21 1720     Code Status (Patient has no pulse and is not breathing):    CPR (Attempt to Resuscitate)     Medical Interventions (Patient has pulse or is breathing):    Full Support       Future Appointments   Date Time Provider Department Center   11/24/2021  8:15 AM Arleen Dillon MD [facility]   11/24/2021  9:10 AM LAB CHAIR [facility]   11/24/2021  9:40 AM Lamar Lubin APRN [facility]   12/20/2021  8:30 AM LAB CHAIR [facility] [place]   12/20/2021  9:00 AM West White Jr., MD [facility] CBC [facility] [place]   1/17/2022  9[facility]   1/21/2022 10:15 AM Arleen Dillon MD [facility]    7/15/2022  9:00 AM Cole Ramos III, MD [facility]   10/18/2022 11:00 AM [facility] Recinos [facility]      Follow-up Information     Arleen Dillon MD. Schedule an appointment as soon as possible for a visit in 1 week(s).    Specialty: Internal Medicine  Contact information:  [address]  [address]  [facility] [facility] [id number] [phone]             Renée Cisneros MD. Go in 12 day(s).    Specialty: Nephrology  Why: Has follow up with Dr. Cisneros 's APRN Anita Goff Tues Nov 30 at 1pm.   Contact information:  [address]  [address]  [place] [id number] [phone]                         Time Spent on Discharge:  Greater than 30 minutes      Juju Mendiola DO  [facility]  11/18/21  11:44 EST

## 2021-11-18 NOTE — PLAN OF CARE
Goal Outcome Evaluation:  Plan of Care Reviewed With: patient        Progress: no change  Outcome Summary: Fioricet really helped per pt, IVF, AD LUCILLE, A&O, no c/o pain

## 2021-11-19 ENCOUNTER — TRANSITIONAL CARE MANAGEMENT TELEPHONE ENCOUNTER (OUTPATIENT)
Dept: CALL CENTER | Facility: HOSPITAL | Age: 76
End: 2021-11-19

## 2021-11-19 NOTE — OUTREACH NOTE
Call Center TCM Note      Responses   [facility] patient discharged from? [facility]   Does the patient have one of the following disease processes/diagnoses(primary or secondary)? Other   TCM attempt successful? Yes   Call start time 1456   Call end time 1457   Discharge diagnosis IJEOMA on CKD stage III,   ESBL E. Coli UTI,   Atrial fibrillation   Meds reviewed with patient/caregiver? Yes   Is the patient having any side effects they believe may be caused by any medication additions or changes? No   Does the patient have all medications ordered at discharge? Yes   Is the patient taking all medications as directed (includes completed medication regime)? Yes   Does the patient have a primary care provider?  Yes   Does the patient have an appointment with their PCP within 7 days of discharge? Yes   Comments regarding PCP hospital f/u with PCP on 11/24   Has the patient kept scheduled appointments due by today? N/A   Has home health visited the patient within 72 hours of discharge? N/A   Psychosocial issues? No   Did the patient receive a copy of their discharge instructions? Yes   Nursing interventions Reviewed instructions with patient   What is the patient's perception of their health status since discharge? Improving   Is the patient/caregiver able to teach back the hierarchy of who to call/visit for symptoms/problems? PCP, Specialist, Home health nurse, Urgent Care, ED, 911 Yes   TCM call completed? Yes   Wrap up additional comments Says she is doing well, no questions or concerns at this time, confirmed f/u appt with PCP for 11/24.          Triny Guidry RN    11/19/2021, 14:57 EST

## 2021-11-19 NOTE — PROGRESS NOTES
Enter Query Response Below      Query Response:       Unable to determine        If applicable, please update the problem list.        Patient: Marylu Foreman        : 1945  Account: [id number]           Admit Date:          Options to Respond to Query:    1. Access the Encounter     a. From the To-Do Side bar, click Respond With Note.     b. Click New Note     c. Answer query within the yellow box.                d. Update the Problem List if applicable.     Dr. Padilla,    Patient is noted to have a history of CHF.  Echo 2020 showed EF 61%.  Treatment includes Cardizem and monitoring labs and vital signs.      Can you clarify the patient's diagnosis as:    Chronic diastolic CHF  Chronic CHF (type unknown)  Other (please specify): ______________  Unable to determine      By submitting this query, we are merely seeking further clarification of documentation to accurately reflect all conditions that you are monitoring, evaluating, treating or that extend the hospitalization or utilize additional resources of care. Please utilize your independent clinical judgment when addressing the question(s) above.     This query and your response, once completed, will be entered into the legal medical record.    Sincerely,  Renée Schafer  Clinical Documentation Integrity Program   [email or web address]

## 2021-11-22 ENCOUNTER — TELEPHONE (OUTPATIENT)
Dept: INTERNAL MEDICINE | Facility: CLINIC | Age: 76
End: 2021-11-22

## 2021-11-22 NOTE — TELEPHONE ENCOUNTER
Caller: Marylu Foreman    Relationship: Self    Best call back number: [phone]    What is the best time to reach you: ANY     Who are you requesting to speak with (clinical staff, provider,  specific staff member):CLINICAL    Do you know the name of the person who called: PATIENT    What was the call regarding: LEFT SIDE HURTING AND PATIENT STATED THAT SHE HAS BEEN DIAGNOSED WITH KIDNEY DISEASE.  PATIENT STATED SHE HAS PUT HEAT ON IT AND MADE IT FEEL BETTER BUT IS CONCERNED AND REQUESTING DR RUBIN TO RETURN HER CALL.    THIS IS THE ONLY SYMPTOM SHE IS EXPERIENCING.    Do you require a callback: YES

## 2021-11-23 ENCOUNTER — APPOINTMENT (OUTPATIENT)
Dept: ONCOLOGY | Facility: HOSPITAL | Age: 76
End: 2021-11-23

## 2021-11-23 ENCOUNTER — APPOINTMENT (OUTPATIENT)
Dept: OTHER | Facility: HOSPITAL | Age: 76
End: 2021-11-23

## 2021-11-24 ENCOUNTER — OFFICE VISIT (OUTPATIENT)
Dept: INTERNAL MEDICINE | Facility: CLINIC | Age: 76
End: 2021-11-24

## 2021-11-24 ENCOUNTER — LAB (OUTPATIENT)
Dept: OTHER | Facility: HOSPITAL | Age: 76
End: 2021-11-24

## 2021-11-24 ENCOUNTER — TELEPHONE (OUTPATIENT)
Dept: ONCOLOGY | Facility: CLINIC | Age: 76
End: 2021-11-24

## 2021-11-24 ENCOUNTER — OFFICE VISIT (OUTPATIENT)
Dept: ONCOLOGY | Facility: CLINIC | Age: 76
End: 2021-11-24

## 2021-11-24 VITALS
OXYGEN SATURATION: 99 % | BODY MASS INDEX: 33.4 KG/M2 | HEART RATE: 90 BPM | HEIGHT: 66 IN | TEMPERATURE: 97.5 F | WEIGHT: 207.8 LBS

## 2021-11-24 VITALS
BODY MASS INDEX: 33.37 KG/M2 | TEMPERATURE: 97.7 F | RESPIRATION RATE: 18 BRPM | HEART RATE: 96 BPM | SYSTOLIC BLOOD PRESSURE: 160 MMHG | OXYGEN SATURATION: 97 % | DIASTOLIC BLOOD PRESSURE: 90 MMHG | HEIGHT: 66 IN | WEIGHT: 207.6 LBS

## 2021-11-24 DIAGNOSIS — N17.9 AKI (ACUTE KIDNEY INJURY) (HCC): ICD-10-CM

## 2021-11-24 DIAGNOSIS — C50.812 MALIGNANT NEOPLASM OF OVERLAPPING SITES OF LEFT BREAST IN FEMALE, ESTROGEN RECEPTOR POSITIVE (HCC): Primary | ICD-10-CM

## 2021-11-24 DIAGNOSIS — K59.00 CONSTIPATION, UNSPECIFIED CONSTIPATION TYPE: ICD-10-CM

## 2021-11-24 DIAGNOSIS — Z17.0 MALIGNANT NEOPLASM OF OVERLAPPING SITES OF LEFT BREAST IN FEMALE, ESTROGEN RECEPTOR POSITIVE (HCC): Primary | ICD-10-CM

## 2021-11-24 DIAGNOSIS — D50.8 OTHER IRON DEFICIENCY ANEMIA: Primary | ICD-10-CM

## 2021-11-24 DIAGNOSIS — N30.00 ACUTE CYSTITIS WITHOUT HEMATURIA: ICD-10-CM

## 2021-11-24 DIAGNOSIS — I10 ESSENTIAL HYPERTENSION: Primary | Chronic | ICD-10-CM

## 2021-11-24 LAB
ANION GAP SERPL CALCULATED.3IONS-SCNC: 15.2 MMOL/L (ref 5–15)
BASOPHILS # BLD AUTO: 0.13 10*3/MM3 (ref 0–0.2)
BASOPHILS NFR BLD AUTO: 1.2 % (ref 0–1.5)
BILIRUB BLD-MCNC: ABNORMAL MG/DL
BUN SERPL-MCNC: 41 MG/DL (ref 8–23)
BUN/CREAT SERPL: 16.9 (ref 7–25)
CALCIUM SPEC-SCNC: 9.1 MG/DL (ref 8.6–10.5)
CHLORIDE SERPL-SCNC: 105 MMOL/L (ref 98–107)
CLARITY, POC: CLEAR
CO2 SERPL-SCNC: 22.8 MMOL/L (ref 22–29)
COLOR UR: YELLOW
CREAT SERPL-MCNC: 2.43 MG/DL (ref 0.57–1)
DEPRECATED RDW RBC AUTO: 47.8 FL (ref 37–54)
EOSINOPHIL # BLD AUTO: 0.3 10*3/MM3 (ref 0–0.4)
EOSINOPHIL NFR BLD AUTO: 2.8 % (ref 0.3–6.2)
ERYTHROCYTE [DISTWIDTH] IN BLOOD BY AUTOMATED COUNT: 14 % (ref 12.3–15.4)
EXPIRATION DATE: ABNORMAL
GFR SERPL CREATININE-BSD FRML MDRD: 19 ML/MIN/1.73
GLUCOSE SERPL-MCNC: 115 MG/DL (ref 65–99)
GLUCOSE UR STRIP-MCNC: NEGATIVE MG/DL
HCT VFR BLD AUTO: 32.3 % (ref 34–46.6)
HGB BLD-MCNC: 10.7 G/DL (ref 12–15.9)
IMM GRANULOCYTES # BLD AUTO: 0.03 10*3/MM3 (ref 0–0.05)
IMM GRANULOCYTES NFR BLD AUTO: 0.3 % (ref 0–0.5)
KETONES UR QL: NEGATIVE
LEUKOCYTE EST, POC: ABNORMAL
LYMPHOCYTES # BLD AUTO: 2.75 10*3/MM3 (ref 0.7–3.1)
LYMPHOCYTES NFR BLD AUTO: 25.5 % (ref 19.6–45.3)
Lab: ABNORMAL
MCH RBC QN AUTO: 30.7 PG (ref 26.6–33)
MCHC RBC AUTO-ENTMCNC: 33.1 G/DL (ref 31.5–35.7)
MCV RBC AUTO: 92.6 FL (ref 79–97)
MONOCYTES # BLD AUTO: 0.97 10*3/MM3 (ref 0.1–0.9)
MONOCYTES NFR BLD AUTO: 9 % (ref 5–12)
NEUTROPHILS NFR BLD AUTO: 6.62 10*3/MM3 (ref 1.7–7)
NEUTROPHILS NFR BLD AUTO: 61.2 % (ref 42.7–76)
NITRITE UR-MCNC: NEGATIVE MG/ML
NRBC BLD AUTO-RTO: 0 /100 WBC (ref 0–0.2)
PH UR: 5 [PH] (ref 5–8)
PLATELET # BLD AUTO: 340 10*3/MM3 (ref 140–450)
PMV BLD AUTO: 11.6 FL (ref 6–12)
POTASSIUM SERPL-SCNC: 3.5 MMOL/L (ref 3.5–5.2)
PROT UR STRIP-MCNC: NEGATIVE MG/DL
RBC # BLD AUTO: 3.49 10*6/MM3 (ref 3.77–5.28)
RBC # UR STRIP: NEGATIVE /UL
SODIUM SERPL-SCNC: 143 MMOL/L (ref 136–145)
SP GR UR: 1.02 (ref 1–1.03)
UROBILINOGEN UR QL: NORMAL
WBC NRBC COR # BLD: 10.8 10*3/MM3 (ref 3.4–10.8)

## 2021-11-24 PROCEDURE — 99495 TRANSJ CARE MGMT MOD F2F 14D: CPT | Performed by: INTERNAL MEDICINE

## 2021-11-24 PROCEDURE — 85025 COMPLETE CBC W/AUTO DIFF WBC: CPT | Performed by: INTERNAL MEDICINE

## 2021-11-24 PROCEDURE — 1111F DSCHRG MED/CURRENT MED MERGE: CPT | Performed by: INTERNAL MEDICINE

## 2021-11-24 PROCEDURE — 36415 COLL VENOUS BLD VENIPUNCTURE: CPT

## 2021-11-24 PROCEDURE — 80048 BASIC METABOLIC PNL TOTAL CA: CPT | Performed by: NURSE PRACTITIONER

## 2021-11-24 PROCEDURE — 81003 URINALYSIS AUTO W/O SCOPE: CPT | Performed by: INTERNAL MEDICINE

## 2021-11-24 PROCEDURE — 99215 OFFICE O/P EST HI 40 MIN: CPT | Performed by: NURSE PRACTITIONER

## 2021-11-24 RX ORDER — DOCUSATE SODIUM 250 MG
250 CAPSULE ORAL DAILY
Qty: 14 CAPSULE | Refills: 0 | Status: SHIPPED | OUTPATIENT
Start: 2021-11-24 | End: 2022-02-14

## 2021-11-24 RX ORDER — CIPROFLOXACIN 250 MG/1
250 TABLET, FILM COATED ORAL DAILY
Qty: 7 TABLET | Refills: 0 | Status: SHIPPED | OUTPATIENT
Start: 2021-11-24 | End: 2021-12-20

## 2021-11-24 NOTE — PROGRESS NOTES
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  Patient was seen in the office by Dr. White on 11/15/2021 reporting having been previously treated for urinary tract infection but continue with symptoms.  Creatinine was elevated to 3.1 and the patient was admitted.  Infectious disease was consulted and she was given Levaquin every other day due to decreased renal function.  She completed 5-day course of antibiotics.  Patient was volume depleted and given IV fluids.  At discharge her creatinine had improved to 2.4.  Her irbesartan and triamterene-HCTZ were discontinued.  Patient does have outpatient follow-up with nephrology.    Patient was noted to be more anemic than typical for her at the visit on 11/15/2021.  B12 and iron were sufficient.  She comes in today for recheck of her hemoglobin to make sure things are improving with resolution of her acute infectious symptoms..  She is having some urinary symptoms once again with some low back pain as well.  She did see Dr. Dillon earlier this morning with repeat urine studies.  She is scheduled to see nephrology on Monday.  She is requesting a creatinine be repeated today which we will do.  She is had no fevers since being home.  Blood pressure is elevated today however her triamterene/HCTZ and irbesartan are on hold currently.    Objective   Vital Signs:   /90   Pulse 96   Temp 97.7 °F (36.5 °C) (Temporal)   Resp 18   Ht 167 cm (65.75\")   Wt 94.2 kg (207 lb 9.6 oz)   SpO2 97%   BMI 33.76 kg/m²     Physical Exam  Constitutional:       Appearance: She is well-developed.   Eyes:      Conjunctiva/sclera: " Conjunctivae normal.   Cardiovascular:      Rate and Rhythm: Normal rate. Rhythm irregular.      Heart sounds: Murmur heard.   No friction rub. No gallop.    Pulmonary:      Effort: No respiratory distress.      Breath sounds: Normal breath sounds.      Comments: Status post left mastectomy.  Abdominal:      General: Bowel sounds are normal. There is no distension.      Palpations: Abdomen is soft.      Tenderness: There is no abdominal tenderness.   Skin:     General: Skin is warm and dry.      Findings: No rash.   Neurological:      Mental Status: She is alert and oriented to person, place, and time.   Psychiatric:         Behavior: Behavior normal.        Result Review : Reviewed CBC and BMP from today.  Urinalysis from Dr. Dillon's office.  Records from hospitalization [facility] 11/15/2021 to 11/17/2021.  Note from Dr. Dillon from today.       Assessment and Plan     1. Stage I ([id number]) left breast cancer:  · Biopsy 5/8/2007 with in situ and invasive ductal carcinoma, grade 2,/WA positive, HER-2/jorge negative  · Left mastectomy 6/18/0742 foci carcinoma, 4 mm, grade 1 in situ and invasive ductal carcinoma and 2 mm grade 2 in situ and invasive ductal carcinoma, negative margins, negative sentinel lymph nodes x3 with 5 additional negative lymph nodes.  · Arimidex initiated 7/2/2007  · Arimidex interrupted patient developed bilateral pulmonary emboli in May 2009, resume shortly thereafter.  Completed 5 years treatment in 2012.  · 11/15/2021 seen by Dr. White in follow-up with no clinical evidence of recurrent disease.  Her exam today was negative.  We reviewed her mammogram report on the right side from 10/1/2021 which was negative, BI-RADS 1.  2. Pulmonary emboli 5/2/2009:  · Family history of DVT in the patient's mother occurring at age 70 postoperatively  · Patient developed bilateral lower lobe pulmonary emboli 5/2/2009 following right VATS on 4/30/2009  · Felt to be a provoked thrombosis related  to surgery  · Hypercoagulable evaluation with negative factor V Leiden, negative prothrombin gene mutation, normal homocystine, protein C antigen 92, free to protein S 75, anticardiolipin antibody panel negative, lupus anticoagulant negative, Antithrombin %  · Continuing on chronic anticoagulation primarily for atrial fibrillation at this point as prior thrombosis was provoked.  · Transitioned from Coumadin to Eliquis 5 mg twice daily in May 2020  3. Iron deficiency anemia:  · Prior gastric bypass in 2001  · Intolerant of oral iron.  · Patient has required IV iron on multiple occasions: Feraheme 8/13 and 8/20/2018; Injectafer 3/9 and 3/16/2020  · 11/15/2021 decline in hemoglobin to 10.8 however this is in the setting of UTI and IJEOMA/CKD3.  Her iron panel was normal with iron 114, iron saturation 34%, TIBC 337 with ferritin elevated at 292.  B12 remains normal at 883.    · 11/24/2021.  Hemoglobin did decline during hospitalization to 9.3 on 11/16/2021.  At discharge the following day this was up to 10.1.  Patient returns today with hemoglobin at 10.7.  We will recheck in 2 weeks with CBC and RN review.  7. GI bleed in July 2018:  · Acute lower GI bleed with supratherapeutic INR Coumadin.  · Angiogram performed with coil embolization of artery to sigmoid colon  8. Atrial fibrillation:  · Requires ongoing anticoagulation for this indication  · As above, transitioned from Coumadin to Eliquis 5 mg twice daily in May 2020  · Patient continues to tolerate Eliquis well with no bleeding complications.  9. Status post right VATS 4/30/2020 for pulmonary nodule with finding of necrotizing granulomatous inflammation  10. Severe depression/anxiety:  · Patient has had chronic issues with this, is followed by psychiatry, Dr. Librado Monique.  She also underwent previous counseling which she found helpful.  · The patient had ongoing difficulty with control of her depression.  She has been on multiple antidepressants  · In  June/July 2021 patient underwent transcranial magnetic stimulation (TMS) with significant improvement in depressive symptoms and resolution of headaches.  Symptoms subsequently gradually recurred, patient reports there has been difficulty with insurance regarding maintenance treatments.  11. IJEOMA/CKD3  · Patient with CKD3 with baseline creatinine in the 1.3-1.8 range  · Today, patient has creatinine of 3.19 and BUN of 59.  This is compared to most recent value 6/2/2021 with BUN 41, creatinine 1.53.  · Patient has undergone recent treatment for UTI with nitrofurantoin and Pyridium.  No other recent new medications.    · Patient does continue on chronic Dyazide and Avapro  · Etiology of her IJEOMA is unclear.  She likely needs IV hydration, nephrology consultation, abdominal imaging to rule out .  Patient was thereafter admitted to [facility].  · Nephrology was consulted and her irbesartan and triamterene-HCTZ were stopped.  Creatinine improved to 2.4 on day of discharge with IV fluids and antibiotics.  Her blood pressure is noted to be elevated today, she is encouraged to monitor this at home.  Patient does have outpatient follow-up with nephrology on Monday.  Creatinine resulted when she left the office today at 2.43 with BUN 41.  Labs to be faxed to nephrology.  Patient asked to increase fluids. Case discussed with Dr. Tsai today, no need for IV fluids, will evaluate when patient in the office on Monday with Dr. Cisneros.  12. ESBL E. coli UTI  · Urine culture positive on 11/6/2021 in urgent care, patient treated with 7 days nitrofurantoin (was sensitive on culture result)  · Patient notes return of dysuria since completion of antibiotics, unclear whether she requires further antibiotic therapy.  As above, patient is being admitted and defer to A regarding need for additional antibiotic treatment for her UTI.  · ID consulted during recent admission and patient placed on Levaquin every other day with  the 5-day course completed.  Patient symptoms improved.  · Patient seen by Dr. Dillon this morning with mildly recurrent symptoms of urinary frequency and some low back discomfort.  Urinalysis with trace leukocytes and small bilirubin.  Urine culture pending.      Plan:  1. CBC and nurse review in 2 weeks to monitor hemoglobin.  2. Creatinine stable from discharge at 2.53 with BUN elevated at 41.  3. Patient has an appointment with nephrology on Monday.  4. Urine culture is pending per Dr. Dillon.  5. Return in 3 weeks for review by Dr. White.  6. In regards to breast cancer, patient has no clinical evidence of recurrent disease and we will continue on a course of observation/surveillance.  7. Patient continues on chronic anticoagulation with Eliquis 5 mg twice daily due to atrial fibrillation (PE was in 2009 and was provoked, does not require chronic anticoagulation for this indication).  8. CBC and RN review in 2 months  9. MD visit in 6 months with CBC, CMP, stat iron panel and ferritin, B12 level.     Case discussed Dr. White today. Case discussed with clinic nurse. Case discussed with DR VALENZUELA. Records reviewed from recent hospitalization and primary care provider visit from today. I spent 55 minutes caring for Marylu on this date of service. This time includes time spent by me in the following activities: preparing for the visit, reviewing tests, obtaining and/or reviewing a separately obtained history, performing a medically appropriate examination and/or evaluation, counseling and educating the patient/family/caregiver, ordering medications, tests, or procedures, referring and communicating with other health care professionals, documenting information in the medical record and independently interpreting results and communicating that information with the patient/family/caregiver. \

## 2021-11-24 NOTE — TELEPHONE ENCOUNTER
Phoned patient to let her know Maral SANCHEZ spoke with Dr. Tsai from nephrology regarding patient's elevated renal labs. Advised patient to increase her oral fluid intake per Dr. Tsai, and that her labs will be addressed at her office visit with him on Monday. Patient v/u.

## 2021-11-24 NOTE — PROGRESS NOTES
Subjective   Marylu Foreman is a 76 y.o. female here today to f/u from the hospital for UTI and CKD.  Pt was admitted on 11/15/21 discharged on 11/18/2021.  Pt c/o lower back pain.  TCM note 1119      History of Present Illness   She was admitted for acute rf s/p UTI and treatment with cipro  She is having some left flank pain  No dysuria  No freq no odor  She does have constipation  She got TMS therapy for depression and felt much better   Now she needs maintainace and insurance does not cover  She has been constipated.  No blood in her stool.  She has been drinking plenty of fluids but has not tried any stool softeners    The following portions of the patient's history were reviewed and updated as appropriate: allergies, current medications, past medical history, past social history and problem list.  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       Objective   Physical Exam  Vitals reviewed.   Constitutional:       Appearance: She is well-developed.   HENT:      Head: Normocephalic and atraumatic.      Right Ear: External ear normal.      Left Ear: External ear normal.   Eyes:      Conjunctiva/sclera: Conjunctivae normal.      Pupils: Pupils are equal, round, and reactive to light.   Neck:      Thyroid: No thyromegaly.      Trachea: No tracheal deviation.   Cardiovascular:      Rate and Rhythm: Normal rate and regular rhythm.      Heart sounds: Normal heart sounds.   Pulmonary:      Effort: Pulmonary effort is normal.      Breath sounds: Normal breath sounds.   Abdominal:      General: Bowel sounds are normal. There is no distension.      Palpations: Abdomen is soft.      Tenderness: There is no abdominal tenderness.   Musculoskeletal:         General: No deformity. Normal range of motion.      Cervical back: Normal range of motion.   Skin:     General: Skin is warm and dry.   Neurological:      Mental Status:  She is alert and oriented to person, place, and time.   Psychiatric:         Behavior: Behavior normal.         Thought Content: Thought content normal.         Judgment: Judgment normal.         Vitals:    11/24/21 0820   Pulse: 90   Temp: 97.5 °F (36.4 °C)   SpO2: 99%     Body mass index is 33.54 kg/m².       Current Outpatient Medications:   •  apixaban (ELIQUIS) 2.5 MG tablet tablet, Take 1 tablet by mouth 2 (Two) Times a Day. Indications: Atrial Fibrillation, Disp: 60 tablet, Rfl: 0  •  Cholecalciferol (VITAMIN D PO), Take 1 tablet by mouth Daily., Disp: , Rfl:   •  Coenzyme Q10 (CO Q 10 PO), Take 200 mg by mouth Daily., Disp: , Rfl:   •  dilTIAZem CD (CARDIZEM CD) 180 MG 24 hr capsule, Take 180 mg by mouth Daily., Disp: , Rfl:   •  DULoxetine (CYMBALTA) 20 MG capsule, Take 20 mg by mouth 2 (Two) Times a Day., Disp: , Rfl:   •  levothyroxine (SYNTHROID, LEVOTHROID) 50 MCG tablet, , Disp: , Rfl:   •  modafinil (Provigil) 200 MG tablet, Take 200 mg by mouth Daily., Disp: , Rfl:   •  Multiple Vitamin (MULTI-VITAMIN PO), Take 1 tablet by mouth daily., Disp: , Rfl:   •  Probiotic Product (PROBIOTIC PO), Take  by mouth daily. Lactobacillus rhamnosus GG, Disp: , Rfl:   •  vitamin E 400 UNIT capsule, Take 400 Units by mouth Daily., Disp: , Rfl:   •  ciprofloxacin (Cipro) 250 MG tablet, Take 1 tablet by mouth Daily., Disp: 7 tablet, Rfl: 0  •  docusate sodium (COLACE) 250 MG capsule, Take 1 capsule by mouth Daily., Disp: 14 capsule, Rfl: 0  •  famotidine (PEPCID) 20 MG tablet, TAKE 1 TABLET TWICE A DAY, Disp: 180 tablet, Rfl: 3  •  SODIUM BICARBONATE, ANTACID, PO, Take  by mouth., Disp: , Rfl:       Assessment/Plan   Diagnoses and all orders for this visit:    1. Essential hypertension (Primary)    2. Acute cystitis without hematuria    3. Constipation, unspecified constipation type    Other orders  -     ciprofloxacin (Cipro) 250 MG tablet; Take 1 tablet by mouth Daily.  Dispense: 7 tablet; Refill: 0  -     docusate  sodium (COLACE) 250 MG capsule; Take 1 capsule by mouth Daily.  Dispense: 14 capsule; Refill: 0    1.  Acute on chronic rf-  She is better seeing nephro.  This was related to urinary tract infection.  She is having a little bit of flank pain but no dysuria or hematuria.  I have given her Cipro to hold onto as we are going into a long weekend given her renal function she is to take 1 a day but she is going to hold off on taking this as long as her symptoms do not worsen.  We will get a culture  2.  HTN-  Ok with current meds  3.  COnstipation-  Treat with dos and try miralax

## 2021-11-24 NOTE — TELEPHONE ENCOUNTER
----- Message from LAURA Harden sent at 11/24/2021  3:50 PM EST -----  Regarding: please call pt  Please let patient know I spoke with DR VALENZUELA from nephrology who saw her in the hospital and he just encourages her to increase her oral fluid intake and they will monitor when they see her on Monday in the office.    Thank you

## 2021-11-27 LAB
BACTERIA UR CULT: ABNORMAL
BACTERIA UR CULT: ABNORMAL
OTHER ANTIBIOTIC SUSC ISLT: ABNORMAL

## 2021-11-29 ENCOUNTER — READMISSION MANAGEMENT (OUTPATIENT)
Dept: CALL CENTER | Facility: HOSPITAL | Age: 76
End: 2021-11-29

## 2021-11-30 NOTE — OUTREACH NOTE
Medical Week 2 Survey      Responses   [facility] patient discharged from? [place]   Does the patient have one of the following disease processes/diagnoses(primary or secondary)? Other   Week 2 attempt successful? No   Unsuccessful attempts Attempt 1          Nahomy Kirkpatrick LPN

## 2021-12-02 ENCOUNTER — READMISSION MANAGEMENT (OUTPATIENT)
Dept: CALL CENTER | Facility: HOSPITAL | Age: 76
End: 2021-12-02

## 2021-12-02 ENCOUNTER — TELEPHONE (OUTPATIENT)
Dept: INTERNAL MEDICINE | Facility: CLINIC | Age: 76
End: 2021-12-02

## 2021-12-02 NOTE — OUTREACH NOTE
Medical Week 2 Survey      Responses   [facility] patient discharged from? [place]   Does the patient have one of the following disease processes/diagnoses(primary or secondary)? Other   Week 2 attempt successful? Yes   Call start time 1142   Call end time 1145   Meds reviewed with patient/caregiver? Yes   Is the patient taking all medications as directed (includes completed medication regime)? Yes   Has the patient kept scheduled appointments due by today? Yes   Comments will be seeing provider today this she has another UTI   Did the patient receive a copy of their discharge instructions? Yes   What is the patient's perception of their health status since discharge? Same  [thinks may  UTI]   Week 2 Call Completed? Yes   Wrap up additional comments has an appointment with provider, to get a urinalysis, drinking fluids. renal functions have improved          Shelley Espinoza RN

## 2021-12-02 NOTE — TELEPHONE ENCOUNTER
----- Message from Nydia Watson MA sent at 12/1/2021  2:33 PM EST -----  Regarding: FW: Urine Culture    ----- Message -----  From: Arleen Dillon MD  Sent: 12/1/2021  12:56 PM EST  To: Nydia Watson MA  Subject: FW: Urine Culture                                  ----- Message -----  From: Nydia Watson MA  Sent: 12/1/2021  12:52 PM EST  To: Arleen Dillon MD  Subject: FW: Urine Culture                                  ----- Message -----  From: Arleen Dillon MD  Sent: 12/1/2021  12:49 PM EST  To: Nydia Watson MA  Subject: FW: Urine Culture                                A new medication?  Was there a rec for an abx in the d/c ?    ----- Message -----  From: Nydia Watson MA  Sent: 12/1/2021   9:28 AM EST  To: Arleen Dillon MD  Subject: FW: Urine Culture                                The medication that was started was levaquin 750 QOD.   ----- Message -----  From: Marylu Foreman  Sent: 12/1/2021   9:07 AM EST  To: [address]  Subject: Urine Culture                                    I saw Dr. Cisneros yesterday and she is going to refer me to an infection disease doctor for my kidney disease. (Stage 3B)  I woke up this morning with a little burning  when I urinated (this is new).  Dr. Cisneros said I was seen by an infection disease doctor in the hospital and it was recommended I be sent home with a new medication.  However, that was not included in the discharge orders and I received no new medication.  I am worried the  kidney problem is going to get bad again by time I can see a new doctor.  Can I go back on Macabid until I get the new appointment or go on the medication listed I was to receive while in the hospital?   Dr. Cisneros did have blood tests done yesterday.   I am scared I'm going to end up back in the hospital again.  Thank you so much.  Marylu Foreman

## 2021-12-02 NOTE — TELEPHONE ENCOUNTER
Patient is having minimal symptoms at present.  I discussed this case with infectious disease.  The noted that the previous infection is completely cleared and coag negative staph is usually a skin contaminant.  I am going to encourage patient push fluids and have her come back in to try to get a very clean specimen and recheck    ----- Message from Nydia Watson MA sent at 12/1/2021  2:33 PM EST -----  Regarding: FW: Urine Culture    ----- Message -----  From: Arleen Dillon MD  Sent: 12/1/2021  12:56 PM EST  To: Nydia Watson MA  Subject: FW: Urine Culture                                  ----- Message -----  From: Nydia Watson MA  Sent: 12/1/2021  12:52 PM EST  To: Arleen Dillon MD  Subject: FW: Urine Culture                                  ----- Message -----  From: Arleen Dillon MD  Sent: 12/1/2021  12:49 PM EST  To: Nydia Watson MA  Subject: FW: Urine Culture                                A new medication?  Was there a rec for an abx in the d/c ?    ----- Message -----  From: Nydia Watson MA  Sent: 12/1/2021   9:28 AM EST  To: Arleen Dillon MD  Subject: FW: Urine Culture                                The medication that was started was levaquin 750 QOD.   ----- Message -----  From: Maryul Foreman  Sent: 12/1/2021   9:07 AM EST  To: [address]  Subject: Urine Culture                                    I saw Dr. Cisneros yesterday and she is going to refer me to an infection disease doctor for my kidney disease. (Stage 3B)  I woke up this morning with a little burning  when I urinated (this is new).  Dr. Cisneros said I was seen by an infection disease doctor in the hospital and it was recommended I be sent home with a new medication.  However, that was not included in the discharge orders and I received no new medication.  I am worried the  kidney problem is going to get bad again by time I can see a new doctor.  Can I go back on [facility] until I get the new appointment or go on  the medication listed I was to receive while in the hospital?   Dr. Cisneros did have blood tests done yesterday.   I am scared I'm going to end up back in the hospital again.  Thank you so much.  Marylu Foreman

## 2021-12-08 ENCOUNTER — LAB (OUTPATIENT)
Dept: OTHER | Facility: HOSPITAL | Age: 76
End: 2021-12-08

## 2021-12-08 ENCOUNTER — CLINICAL SUPPORT (OUTPATIENT)
Dept: ONCOLOGY | Facility: HOSPITAL | Age: 76
End: 2021-12-08

## 2021-12-08 VITALS
HEIGHT: 65 IN | SYSTOLIC BLOOD PRESSURE: 157 MMHG | BODY MASS INDEX: 34.99 KG/M2 | TEMPERATURE: 96.9 F | HEART RATE: 85 BPM | RESPIRATION RATE: 18 BRPM | OXYGEN SATURATION: 97 % | DIASTOLIC BLOOD PRESSURE: 84 MMHG | WEIGHT: 210 LBS

## 2021-12-08 DIAGNOSIS — D50.8 OTHER IRON DEFICIENCY ANEMIA: ICD-10-CM

## 2021-12-08 DIAGNOSIS — C50.812 MALIGNANT NEOPLASM OF OVERLAPPING SITES OF LEFT BREAST IN FEMALE, ESTROGEN RECEPTOR POSITIVE (HCC): ICD-10-CM

## 2021-12-08 DIAGNOSIS — Z17.0 MALIGNANT NEOPLASM OF OVERLAPPING SITES OF LEFT BREAST IN FEMALE, ESTROGEN RECEPTOR POSITIVE (HCC): ICD-10-CM

## 2021-12-08 LAB
BASOPHILS # BLD AUTO: 0.08 10*3/MM3 (ref 0–0.2)
BASOPHILS NFR BLD AUTO: 0.7 % (ref 0–1.5)
DEPRECATED RDW RBC AUTO: 47.9 FL (ref 37–54)
EOSINOPHIL # BLD AUTO: 0.43 10*3/MM3 (ref 0–0.4)
EOSINOPHIL NFR BLD AUTO: 3.9 % (ref 0.3–6.2)
ERYTHROCYTE [DISTWIDTH] IN BLOOD BY AUTOMATED COUNT: 14.2 % (ref 12.3–15.4)
HCT VFR BLD AUTO: 29.1 % (ref 34–46.6)
HGB BLD-MCNC: 9.5 G/DL (ref 12–15.9)
IMM GRANULOCYTES # BLD AUTO: 0.03 10*3/MM3 (ref 0–0.05)
IMM GRANULOCYTES NFR BLD AUTO: 0.3 % (ref 0–0.5)
LYMPHOCYTES # BLD AUTO: 1.95 10*3/MM3 (ref 0.7–3.1)
LYMPHOCYTES NFR BLD AUTO: 17.5 % (ref 19.6–45.3)
MCH RBC QN AUTO: 30.6 PG (ref 26.6–33)
MCHC RBC AUTO-ENTMCNC: 32.6 G/DL (ref 31.5–35.7)
MCV RBC AUTO: 93.9 FL (ref 79–97)
MONOCYTES # BLD AUTO: 1.09 10*3/MM3 (ref 0.1–0.9)
MONOCYTES NFR BLD AUTO: 9.8 % (ref 5–12)
NEUTROPHILS NFR BLD AUTO: 67.8 % (ref 42.7–76)
NEUTROPHILS NFR BLD AUTO: 7.58 10*3/MM3 (ref 1.7–7)
NRBC BLD AUTO-RTO: 0 /100 WBC (ref 0–0.2)
PLATELET # BLD AUTO: 357 10*3/MM3 (ref 140–450)
PMV BLD AUTO: 12.4 FL (ref 6–12)
RBC # BLD AUTO: 3.1 10*6/MM3 (ref 3.77–5.28)
WBC NRBC COR # BLD: 11.16 10*3/MM3 (ref 3.4–10.8)

## 2021-12-08 PROCEDURE — 85025 COMPLETE CBC W/AUTO DIFF WBC: CPT | Performed by: INTERNAL MEDICINE

## 2021-12-08 PROCEDURE — G0463 HOSPITAL OUTPT CLINIC VISIT: HCPCS

## 2021-12-08 PROCEDURE — 36415 COLL VENOUS BLD VENIPUNCTURE: CPT

## 2021-12-08 NOTE — NURSING NOTE
Pt is here for lab with RN review. Pt has no complaints. CBC reviewed with LAURA Lala specifically Hgb of 9.5. Per Lamar, no new orders at this time.  Have pt monitor for SOA, increased fatigue, bleeding, etc and call should she have concerns. Relayed this to pt who vu. Pt stated she had not been monitoring blood pressure at home as she could not find her BP cuff. Pt had seen nephrology on 11/29 with no other instruction regarding her BP per pt. Pt is going to reach out to 's office as they prescribe her blood pressure medication. Her irbesartan and triamteren-HCTZ had been discontinued during her hospital stay. She remains on cardizem. Copy of labs given to pt and f/u appt reviewed. Pt is instructed to call the office with any concerns or new symptoms prior to next visit. Pt vu and discharged ambulatory.    Lab Results   Component Value Date    WBC 11.16 (H) 12/08/2021    HGB 9.5 (L) 12/08/2021    HCT 29.1 (L) 12/08/2021    MCV 93.9 12/08/2021     12/08/2021

## 2021-12-13 ENCOUNTER — READMISSION MANAGEMENT (OUTPATIENT)
Dept: CALL CENTER | Facility: HOSPITAL | Age: 76
End: 2021-12-13

## 2021-12-13 NOTE — OUTREACH NOTE
Medical Week 3 Survey      Responses   [facility] patient discharged from? [place]   Does the patient have one of the following disease processes/diagnoses(primary or secondary)? Other   Week 3 attempt successful? Yes   Call start time 1010   Rescheduled Rescheduled-pt requested  [She is taking the cat to the vet. ]   Call end time 1012   Discharge diagnosis IJEOMA on CKD stage III,   ESBL E. Coli UTI,   Atrial fibrillation          Joanna Nova RN

## 2021-12-17 ENCOUNTER — READMISSION MANAGEMENT (OUTPATIENT)
Dept: CALL CENTER | Facility: HOSPITAL | Age: 76
End: 2021-12-17

## 2021-12-17 NOTE — PROGRESS NOTES
"Chief Complaint  Stage I ([id number]) left breast cancer in 2007, pulmonary emboli in 2009, atrial fibrillation, history of GI bleed, history of iron deficiency status post prior gastric bypass in 2001    Subjective        History of Present Illness  Patient returns today for a short-term follow-up visit.  When she was seen for her routine 6-month interval follow-up visit she was found to have IJEOMA/CKD3 with creatinine up to 3.19, BUN 59 in the setting of ESBL E. coli UTI.  She was hospitalized from 11/15-11/18/2021.  Triamterene/hydrochlorothiazide and irbesartan were discontinued.  Eliquis dose was decreased to 2.5 mg twice daily.  She was treated for the urinary tract infection with Levaquin.    Following her hospital discharge she was seen back in follow-up on 11/24/2021.  She was still having some urinary symptoms.  She had repeat urine culture 11/24/2021 that grew greater than 100,000 colonies of staph, follow-up culture on 12/2/2021 that was negative (mixed culture result).  She did see nephrology in follow-up as well as her primary care physician.    Today, the patient reports that her dysuria and urinary frequency had resolved.  She has had a mild increase in dyspnea on exertion and notes that her lower extremity edema over the past week or 2 has increased since she has been off of triamterene/hydrochlorothiazide.  She notes that she feels cold much of the time.      Objective   Vital Signs:   /81   Pulse 91   Temp 97.5 °F (36.4 °C) (Temporal)   Resp 18   Ht 165.1 cm (65\")   Wt 96.3 kg (212 lb 4.8 oz)   SpO2 96%   BMI 35.33 kg/m²     Physical Exam  Constitutional:       Appearance: She is well-developed.   Eyes:      Conjunctiva/sclera: Conjunctivae normal.   Neck:      Thyroid: No thyromegaly.   Cardiovascular:      Rate and Rhythm: Normal rate. Rhythm irregular.      Heart sounds: Murmur heard.   No friction rub. No gallop.    Pulmonary:      Effort: No respiratory distress.      Breath sounds: " Normal breath sounds.      Comments: Breast exam was not performed today  Chest:   Breasts:      Right: No supraclavicular adenopathy.      Left: No supraclavicular adenopathy.       Abdominal:      General: Bowel sounds are normal. There is no distension.      Palpations: Abdomen is soft.      Tenderness: There is no abdominal tenderness.   Lymphadenopathy:      Head:      Right side of head: No submandibular adenopathy.      Cervical: No cervical adenopathy.      Upper Body:      Right upper body: No supraclavicular adenopathy.      Left upper body: No supraclavicular adenopathy.   Skin:     General: Skin is warm and dry.      Findings: No rash.   Neurological:      Mental Status: She is alert and oriented to person, place, and time.      Cranial Nerves: No cranial nerve deficit.      Motor: No abnormal muscle tone.      Deep Tendon Reflexes: Reflexes normal.   Psychiatric:         Behavior: Behavior normal.        Result Review : Reviewed CBC, CMP, iron panel, ferritin, B12 level from today.  Reviewed urinalysis and culture results from 11/24 and 12/2/2021.  Reviewed hospital records including progress notes, discharge summary, laboratory studies.       Assessment and Plan     1. Stage I ([id number]) left breast cancer:  · Biopsy 5/8/2007 with in situ and invasive ductal carcinoma, grade 2,/PA positive, HER-2/jorge negative  · Left mastectomy 6/18/0742 foci carcinoma, 4 mm, grade 1 in situ and invasive ductal carcinoma and 2 mm grade 2 in situ and invasive ductal carcinoma, negative margins, negative sentinel lymph nodes x3 with 5 additional negative lymph nodes.  · Arimidex initiated 7/2/2007  · Arimidex interrupted patient developed bilateral pulmonary emboli in May 2009, resume shortly thereafter.  Completed 5 years treatment in 2012.  · Patient has no clinical evidence of recurrent disease.  Exam on 11/15/2021 was negative.  Right-sided mammogram 10/1/2021 was negative, BI-RADS 1.  2. Pulmonary emboli  5/2/2009:  · Family history of DVT in the patient's mother occurring at age 70 postoperatively  · Patient developed bilateral lower lobe pulmonary emboli 5/2/2009 following right VATS on 4/30/2009  · Felt to be a provoked thrombosis related to surgery  · Hypercoagulable evaluation with negative factor V Leiden, negative prothrombin gene mutation, normal homocystine, protein C antigen 92, free to protein S 75, anticardiolipin antibody panel negative, lupus anticoagulant negative, Antithrombin %  · Continuing on chronic anticoagulation primarily for atrial fibrillation at this point as prior thrombosis was provoked.  · Transitioned from Coumadin to Eliquis 5 mg twice daily in May 2020  · At time of hospitalization for IJEOMA/CKD 11/15-11/18/2021, Eliquis dose was decreased to 2.5 mg twice daily.  · Creatinine improved at 1.7, consider in the future increasing Eliquis back to full dose.  3. Iron deficiency anemia:  · Prior gastric bypass in 2001  · Intolerant of oral iron.  · Patient has required IV iron on multiple occasions: Feraheme 8/13 and 8/20/2018; Injectafer 3/9 and 3/16/2020  · Labs on 11/15/2021 showed decline in hemoglobin to 10.8 however this was in the setting of UTI and IJEOMA/CKD3.  Her iron panel was normal with iron 114, iron saturation 34%, TIBC 337 with ferritin elevated at 292.  B12 was normal at 883.  We continue to follow these values given her history of prior gastric bypass and iron deficiency requiring intermittent IV iron (intolerant of oral iron).   · Patient returns today with hemoglobin that has further declined to 9.5.  Repeat labs with iron 47, ferritin 262, iron saturation 14%, TIBC 328, B12 level 1162.  I suspect that her anemia is likely related to CKD.  In light of her recent renal dysfunction and anemia however I did suggest that we check additional labs today with serum protein electrophoresis, immunoelectrophoresis, quantitative immunoglobulins, free serum light chains.  We will  also check for hemolysis with haptoglobin and LDH.  We will check an erythropoietin level.  We will also check CRP.  We did discuss possible use of Procrit if it appears her anemia is purely related to CKD3.  It should be noted however that she has a history of thrombosis.  I suggested that we recheck a hemoglobin in 3 weeks and again when she returns in 6 weeks.  7. GI bleed in July 2018:  · Acute lower GI bleed with supratherapeutic INR Coumadin.  · Angiogram performed with coil embolization of artery to sigmoid colon  8. Atrial fibrillation:  · Requires ongoing anticoagulation for this indication  · As above, transitioned from Coumadin to Eliquis 5 mg twice daily in May 2020  · As above, during hospitalization 11/15-11/18/2021 for IJEOMA/CKD3, Eliquis dose was decreased to 2.5 mg twice daily  · If patient's renal function continues to improve consider transition back to full dose Eliquis.  9. Status post right VATS 4/30/2020 for pulmonary nodule with finding of necrotizing granulomatous inflammation  10. Severe depression/anxiety:  · Patient has had chronic issues with this, is followed by psychiatry, Dr. Librado Monique.  She also underwent previous counseling which she found helpful.  · The patient had ongoing difficulty with control of her depression.  She has been on multiple antidepressants  · In June/July 2021 patient underwent transcranial magnetic stimulation (TMS) with significant improvement in depressive symptoms and resolution of headaches.  Symptoms subsequently gradually recurred, patient reports there has been difficulty with insurance regarding maintenance treatments.  11. IJEOMA/CKD3  · Patient with CKD3 with baseline creatinine in the 1.3-1.8 range  · At time of routine follow-up visit 11/15/2021, creatinine was 3.19 and BUN of 59.  This was compared to prior value 6/2/2021 with BUN 41, creatinine 1.53.  · Patient was hospitalized 11/15-11/18/2021  · Patient is continuing follow-up with Dr. Cisneros  in nephrology  · Creatinine on 11/24/2021 remain elevated at 2.43 however today has declined to 1.7, near her prior baseline.  · Patient does have increasing lower extremity edema while off triamterene/hydrochlorothiazide and she will discussed this with nephrology.  · As noted above we are checking additional labs today with serum protein electrophoresis, immunoelectrophoresis, quantitative immunoglobulins, free serum light chains.  12. ESBL E. coli UTI  · Urine culture positive on 11/6/2021 in urgent care, patient treated with 7 days nitrofurantoin (was sensitive on culture result)  · Patient with recurrent symptoms, was treated during recent hospitalization 11/15-11/18/2021 with Levaquin  · Most recent culture 12/2/2021 was negative, mixed nahid.  Patient without any symptoms of dysuria or urinary frequency.  13. Hypothyroidism   · Patient continues on levothyroxine 50 mcg daily  · Patient reports recent cold sensitivity  · We will check TSH and free T4 today      Plan:  1. Additional labs today with haptoglobin, LDH, erythropoietin level, CRP, serum protein electrophoresis, immunoelectrophoresis, quantitative immunoglobulins, free serum light chains, TSH, free T4  2. Patient is continuing on Eliquis at reduced dose 2.5 mg twice daily due to recent IJEOMA/CKD3  3. Patient will be following up with nephrology on a routine basis  4. In 3 weeks CBC and RN review  5. MD visit in 6 weeks with CBC, CMP     I did spend 40 minutes in total time caring for the patient today, time spent in review of records, face-to-face consultation, coordination of care, placement of orders, completion of documentation.

## 2021-12-17 NOTE — OUTREACH NOTE
Medical Week 3 Survey      Responses   [facility] patient discharged from? [place]   Does the patient have one of the following disease processes/diagnoses(primary or secondary)? Other   Week 3 attempt successful? No   Unsuccessful attempts Attempt 1   Rescheduled Revoked   Revoke Decline to participate   Revoked No further contact(revokes)-requires comment   Graduated/Revoked comments UTI after call rescheduled.          Martha Chaparro RN

## 2021-12-20 ENCOUNTER — LAB (OUTPATIENT)
Dept: OTHER | Facility: HOSPITAL | Age: 76
End: 2021-12-20

## 2021-12-20 ENCOUNTER — OFFICE VISIT (OUTPATIENT)
Dept: ONCOLOGY | Facility: CLINIC | Age: 76
End: 2021-12-20

## 2021-12-20 VITALS
DIASTOLIC BLOOD PRESSURE: 81 MMHG | HEART RATE: 91 BPM | TEMPERATURE: 97.5 F | HEIGHT: 65 IN | OXYGEN SATURATION: 96 % | RESPIRATION RATE: 18 BRPM | BODY MASS INDEX: 35.37 KG/M2 | WEIGHT: 212.3 LBS | SYSTOLIC BLOOD PRESSURE: 163 MMHG

## 2021-12-20 DIAGNOSIS — E03.9 ACQUIRED HYPOTHYROIDISM: Primary | ICD-10-CM

## 2021-12-20 DIAGNOSIS — D50.8 OTHER IRON DEFICIENCY ANEMIA: ICD-10-CM

## 2021-12-20 DIAGNOSIS — Z17.0 MALIGNANT NEOPLASM OF OVERLAPPING SITES OF LEFT BREAST IN FEMALE, ESTROGEN RECEPTOR POSITIVE (HCC): ICD-10-CM

## 2021-12-20 DIAGNOSIS — C50.812 MALIGNANT NEOPLASM OF OVERLAPPING SITES OF LEFT BREAST IN FEMALE, ESTROGEN RECEPTOR POSITIVE (HCC): ICD-10-CM

## 2021-12-20 DIAGNOSIS — D64.9 NORMOCYTIC ANEMIA: ICD-10-CM

## 2021-12-20 LAB
ALBUMIN SERPL-MCNC: 3.9 G/DL (ref 3.5–5.2)
ALBUMIN/GLOB SERPL: 1.3 G/DL
ALP SERPL-CCNC: 132 U/L (ref 39–117)
ALT SERPL W P-5'-P-CCNC: 12 U/L (ref 1–33)
ANION GAP SERPL CALCULATED.3IONS-SCNC: 13 MMOL/L (ref 5–15)
AST SERPL-CCNC: 18 U/L (ref 1–32)
BASOPHILS # BLD AUTO: 0.1 10*3/MM3 (ref 0–0.2)
BASOPHILS NFR BLD AUTO: 1.1 % (ref 0–1.5)
BILIRUB SERPL-MCNC: 0.3 MG/DL (ref 0–1.2)
BUN SERPL-MCNC: 23 MG/DL (ref 8–23)
BUN/CREAT SERPL: 13.5 (ref 7–25)
CALCIUM SPEC-SCNC: 8.6 MG/DL (ref 8.6–10.5)
CHLORIDE SERPL-SCNC: 106 MMOL/L (ref 98–107)
CO2 SERPL-SCNC: 23 MMOL/L (ref 22–29)
CREAT SERPL-MCNC: 1.7 MG/DL (ref 0.57–1)
CRP SERPL-MCNC: 0.62 MG/DL (ref 0–0.5)
DEPRECATED RDW RBC AUTO: 51 FL (ref 37–54)
EOSINOPHIL # BLD AUTO: 0.3 10*3/MM3 (ref 0–0.4)
EOSINOPHIL NFR BLD AUTO: 3.3 % (ref 0.3–6.2)
ERYTHROCYTE [DISTWIDTH] IN BLOOD BY AUTOMATED COUNT: 14.2 % (ref 12.3–15.4)
FERRITIN SERPL-MCNC: 262.2 NG/ML (ref 13–150)
GFR SERPL CREATININE-BSD FRML MDRD: 29 ML/MIN/1.73
GLOBULIN UR ELPH-MCNC: 3.1 GM/DL
GLUCOSE SERPL-MCNC: 113 MG/DL (ref 65–99)
HAPTOGLOB SERPL-MCNC: 186 MG/DL (ref 30–200)
HCT VFR BLD AUTO: 30.6 % (ref 34–46.6)
HGB BLD-MCNC: 9.5 G/DL (ref 12–15.9)
IMM GRANULOCYTES # BLD AUTO: 0.03 10*3/MM3 (ref 0–0.05)
IMM GRANULOCYTES NFR BLD AUTO: 0.3 % (ref 0–0.5)
IRON 24H UR-MRATE: 47 MCG/DL (ref 37–145)
IRON SATN MFR SERPL: 14 % (ref 20–50)
LDH SERPL-CCNC: 171 U/L (ref 135–214)
LYMPHOCYTES # BLD AUTO: 2.37 10*3/MM3 (ref 0.7–3.1)
LYMPHOCYTES NFR BLD AUTO: 26.4 % (ref 19.6–45.3)
MCH RBC QN AUTO: 30.5 PG (ref 26.6–33)
MCHC RBC AUTO-ENTMCNC: 31 G/DL (ref 31.5–35.7)
MCV RBC AUTO: 98.4 FL (ref 79–97)
MONOCYTES # BLD AUTO: 0.92 10*3/MM3 (ref 0.1–0.9)
MONOCYTES NFR BLD AUTO: 10.2 % (ref 5–12)
NEUTROPHILS NFR BLD AUTO: 5.27 10*3/MM3 (ref 1.7–7)
NEUTROPHILS NFR BLD AUTO: 58.7 % (ref 42.7–76)
NRBC BLD AUTO-RTO: 0 /100 WBC (ref 0–0.2)
PLATELET # BLD AUTO: 379 10*3/MM3 (ref 140–450)
PMV BLD AUTO: 11.1 FL (ref 6–12)
POTASSIUM SERPL-SCNC: 3.7 MMOL/L (ref 3.5–5.2)
PROT SERPL-MCNC: 7 G/DL (ref 6–8.5)
RBC # BLD AUTO: 3.11 10*6/MM3 (ref 3.77–5.28)
SODIUM SERPL-SCNC: 142 MMOL/L (ref 136–145)
T4 FREE SERPL-MCNC: 1.07 NG/DL (ref 0.93–1.7)
TIBC SERPL-MCNC: 328 MCG/DL (ref 298–536)
TRANSFERRIN SERPL-MCNC: 220 MG/DL (ref 200–360)
TSH SERPL DL<=0.05 MIU/L-ACNC: 2.03 UIU/ML (ref 0.27–4.2)
VIT B12 BLD-MCNC: 1162 PG/ML (ref 211–946)
WBC NRBC COR # BLD: 8.99 10*3/MM3 (ref 3.4–10.8)

## 2021-12-20 PROCEDURE — 86334 IMMUNOFIX E-PHORESIS SERUM: CPT | Performed by: INTERNAL MEDICINE

## 2021-12-20 PROCEDURE — 80053 COMPREHEN METABOLIC PANEL: CPT | Performed by: INTERNAL MEDICINE

## 2021-12-20 PROCEDURE — 84466 ASSAY OF TRANSFERRIN: CPT | Performed by: INTERNAL MEDICINE

## 2021-12-20 PROCEDURE — 82607 VITAMIN B-12: CPT | Performed by: INTERNAL MEDICINE

## 2021-12-20 PROCEDURE — 83010 ASSAY OF HAPTOGLOBIN QUANT: CPT | Performed by: INTERNAL MEDICINE

## 2021-12-20 PROCEDURE — 83883 ASSAY NEPHELOMETRY NOT SPEC: CPT | Performed by: INTERNAL MEDICINE

## 2021-12-20 PROCEDURE — 86140 C-REACTIVE PROTEIN: CPT | Performed by: INTERNAL MEDICINE

## 2021-12-20 PROCEDURE — 84439 ASSAY OF FREE THYROXINE: CPT | Performed by: INTERNAL MEDICINE

## 2021-12-20 PROCEDURE — 84165 PROTEIN E-PHORESIS SERUM: CPT | Performed by: INTERNAL MEDICINE

## 2021-12-20 PROCEDURE — 82668 ASSAY OF ERYTHROPOIETIN: CPT | Performed by: INTERNAL MEDICINE

## 2021-12-20 PROCEDURE — 99215 OFFICE O/P EST HI 40 MIN: CPT | Performed by: INTERNAL MEDICINE

## 2021-12-20 PROCEDURE — 85025 COMPLETE CBC W/AUTO DIFF WBC: CPT | Performed by: INTERNAL MEDICINE

## 2021-12-20 PROCEDURE — 84443 ASSAY THYROID STIM HORMONE: CPT | Performed by: INTERNAL MEDICINE

## 2021-12-20 PROCEDURE — 83615 LACTATE (LD) (LDH) ENZYME: CPT | Performed by: INTERNAL MEDICINE

## 2021-12-20 PROCEDURE — 82728 ASSAY OF FERRITIN: CPT | Performed by: INTERNAL MEDICINE

## 2021-12-20 PROCEDURE — 36415 COLL VENOUS BLD VENIPUNCTURE: CPT

## 2021-12-20 PROCEDURE — 82784 ASSAY IGA/IGD/IGG/IGM EACH: CPT | Performed by: INTERNAL MEDICINE

## 2021-12-20 PROCEDURE — 83540 ASSAY OF IRON: CPT | Performed by: INTERNAL MEDICINE

## 2021-12-21 LAB
ALBUMIN SERPL ELPH-MCNC: 3.3 G/DL (ref 2.9–4.4)
ALBUMIN/GLOB SERPL: 1.1 {RATIO} (ref 0.7–1.7)
ALPHA1 GLOB SERPL ELPH-MCNC: 0.3 G/DL (ref 0–0.4)
ALPHA2 GLOB SERPL ELPH-MCNC: 0.9 G/DL (ref 0.4–1)
B-GLOBULIN SERPL ELPH-MCNC: 1.1 G/DL (ref 0.7–1.3)
EPO SERPL-ACNC: 20.1 MIU/ML (ref 2.6–18.5)
GAMMA GLOB SERPL ELPH-MCNC: 1 G/DL (ref 0.4–1.8)
GLOBULIN SER-MCNC: 3.3 G/DL (ref 2.2–3.9)
IGA SERPL-MCNC: 189 MG/DL (ref 64–422)
IGG SERPL-MCNC: 938 MG/DL (ref 586–1602)
IGM SERPL-MCNC: 85 MG/DL (ref 26–217)
INTERPRETATION SERPL IEP-IMP: ABNORMAL
KAPPA LC FREE SER-MCNC: 73.6 MG/L (ref 3.3–19.4)
KAPPA LC FREE/LAMBDA FREE SER: 2.04 {RATIO} (ref 0.26–1.65)
LABORATORY COMMENT REPORT: ABNORMAL
LAMBDA LC FREE SERPL-MCNC: 36 MG/L (ref 5.7–26.3)
M PROTEIN SERPL ELPH-MCNC: ABNORMAL G/DL
PROT SERPL-MCNC: 6.6 G/DL (ref 6–8.5)

## 2022-01-10 ENCOUNTER — CLINICAL SUPPORT (OUTPATIENT)
Dept: ONCOLOGY | Facility: HOSPITAL | Age: 77
End: 2022-01-10

## 2022-01-10 ENCOUNTER — LAB (OUTPATIENT)
Dept: OTHER | Facility: HOSPITAL | Age: 77
End: 2022-01-10

## 2022-01-10 VITALS — BODY MASS INDEX: 34.16 KG/M2 | TEMPERATURE: 98.1 F | RESPIRATION RATE: 18 BRPM | HEIGHT: 65 IN | WEIGHT: 205 LBS

## 2022-01-10 DIAGNOSIS — D64.9 NORMOCYTIC ANEMIA: ICD-10-CM

## 2022-01-10 LAB
BASOPHILS # BLD AUTO: 0.14 10*3/MM3 (ref 0–0.2)
BASOPHILS NFR BLD AUTO: 1.3 % (ref 0–1.5)
DEPRECATED RDW RBC AUTO: 45.6 FL (ref 37–54)
EOSINOPHIL # BLD AUTO: 0.44 10*3/MM3 (ref 0–0.4)
EOSINOPHIL NFR BLD AUTO: 4.2 % (ref 0.3–6.2)
ERYTHROCYTE [DISTWIDTH] IN BLOOD BY AUTOMATED COUNT: 13.3 % (ref 12.3–15.4)
HCT VFR BLD AUTO: 33.5 % (ref 34–46.6)
HGB BLD-MCNC: 10.8 G/DL (ref 12–15.9)
IMM GRANULOCYTES # BLD AUTO: 0.04 10*3/MM3 (ref 0–0.05)
IMM GRANULOCYTES NFR BLD AUTO: 0.4 % (ref 0–0.5)
LYMPHOCYTES # BLD AUTO: 2.8 10*3/MM3 (ref 0.7–3.1)
LYMPHOCYTES NFR BLD AUTO: 26.8 % (ref 19.6–45.3)
MCH RBC QN AUTO: 30.1 PG (ref 26.6–33)
MCHC RBC AUTO-ENTMCNC: 32.2 G/DL (ref 31.5–35.7)
MCV RBC AUTO: 93.3 FL (ref 79–97)
MONOCYTES # BLD AUTO: 0.98 10*3/MM3 (ref 0.1–0.9)
MONOCYTES NFR BLD AUTO: 9.4 % (ref 5–12)
NEUTROPHILS NFR BLD AUTO: 57.9 % (ref 42.7–76)
NEUTROPHILS NFR BLD AUTO: 6.05 10*3/MM3 (ref 1.7–7)
NRBC BLD AUTO-RTO: 0 /100 WBC (ref 0–0.2)
PLATELET # BLD AUTO: 327 10*3/MM3 (ref 140–450)
PMV BLD AUTO: 11.9 FL (ref 6–12)
RBC # BLD AUTO: 3.59 10*6/MM3 (ref 3.77–5.28)
WBC NRBC COR # BLD: 10.45 10*3/MM3 (ref 3.4–10.8)

## 2022-01-10 PROCEDURE — 85025 COMPLETE CBC W/AUTO DIFF WBC: CPT | Performed by: INTERNAL MEDICINE

## 2022-01-10 PROCEDURE — 36415 COLL VENOUS BLD VENIPUNCTURE: CPT

## 2022-01-10 PROCEDURE — G0463 HOSPITAL OUTPT CLINIC VISIT: HCPCS

## 2022-01-10 NOTE — NURSING NOTE
CBC reviewed with the pt. Results are stable with hgb of 10.8.The pt was instructed to follow-up as scheduled with Dr. White later this month. The pt v/u.    The pt had no new complaints except for persistent headaches and depression for which she is seeing her psychiatrist. The pt explained that she is  attempting to receive additional stimulation treatments for the headaches; she is currently having trouble getting insurance authorization for it but is hopeful that it can be obtained.

## 2022-01-14 DIAGNOSIS — R73.09 ELEVATED GLUCOSE: ICD-10-CM

## 2022-01-14 DIAGNOSIS — E78.5 HYPERLIPIDEMIA, UNSPECIFIED HYPERLIPIDEMIA TYPE: Primary | ICD-10-CM

## 2022-01-14 DIAGNOSIS — I10 ESSENTIAL HYPERTENSION: ICD-10-CM

## 2022-01-18 ENCOUNTER — TELEPHONE (OUTPATIENT)
Dept: INTERNAL MEDICINE | Facility: CLINIC | Age: 77
End: 2022-01-18

## 2022-01-18 NOTE — TELEPHONE ENCOUNTER
Caller: Marylu Foreman    Relationship: Self    Best call back number: [phone]    What was the call regarding: PATIENT IS GETTING LABS DONE WITH DR VALLE ON 1/31, PLEASE ADVISE IF PATIENT CAN USE THESE LAB RESULTS FOR DR RUBIN INSTEAD OF GETTING LABS AGAIN.     Do you require a callback: YES

## 2022-01-26 ENCOUNTER — OFFICE VISIT (OUTPATIENT)
Dept: INFECTIOUS DISEASES | Facility: CLINIC | Age: 77
End: 2022-01-26

## 2022-01-26 VITALS
HEIGHT: 64 IN | BODY MASS INDEX: 34.59 KG/M2 | DIASTOLIC BLOOD PRESSURE: 110 MMHG | SYSTOLIC BLOOD PRESSURE: 172 MMHG | TEMPERATURE: 98.1 F | WEIGHT: 202.6 LBS | HEART RATE: 130 BPM

## 2022-01-26 DIAGNOSIS — Z16.12 URINARY TRACT INFECTION DUE TO EXTENDED-SPECTRUM BETA LACTAMASE (ESBL) PRODUCING ESCHERICHIA COLI: ICD-10-CM

## 2022-01-26 DIAGNOSIS — B96.29 URINARY TRACT INFECTION DUE TO EXTENDED-SPECTRUM BETA LACTAMASE (ESBL) PRODUCING ESCHERICHIA COLI: ICD-10-CM

## 2022-01-26 DIAGNOSIS — N39.0 RECURRENT UTI: Primary | ICD-10-CM

## 2022-01-26 DIAGNOSIS — N39.0 URINARY TRACT INFECTION DUE TO EXTENDED-SPECTRUM BETA LACTAMASE (ESBL) PRODUCING ESCHERICHIA COLI: ICD-10-CM

## 2022-01-26 PROCEDURE — 99214 OFFICE O/P EST MOD 30 MIN: CPT | Performed by: STUDENT IN AN ORGANIZED HEALTH CARE EDUCATION/TRAINING PROGRAM

## 2022-01-26 NOTE — PROGRESS NOTES
"Chief Complaint  Follow-up    Subjective          Marylu Foreman presents to [facility] INFECTIOUS DISEASES  History of Present Illness    76-year-old female with history of breast cancer, rheumatic fever, CKD 3 and recurrent UTIs who was seen in the hospital on 11/16 in the setting of urinary tract infection and now presents for follow-up.    At that time patient was diagnosed with ESBL E. coli UTI and transition to levofloxacin on discharge.  She now reports she is doing much better and has had no urinary symptoms at that time.  She recently started on over-the-counter supplement called [employer] that she gets at [employer].  States that since taking this she is also had improvement in her recurrent urinary infections.  She denies any fevers or chills recently.  Denies any nausea, vomiting, diarrhea.  Denies any dysuria or frequency.  She states she has not been on any recent antibiotics or been diagnosed with an infection recently.  Denies any abdominal pain or back pain.    Objective   Vital Signs:   BP (!) 172/110   Pulse (!) 130   Temp 98.1 °F (36.7 °C)   Ht 163.8 cm (64.49\")   Wt 91.9 kg (202 lb 9.6 oz)   BMI 34.25 kg/m²     Physical Exam  Constitutional:       General: She is not in acute distress.     Appearance: Normal appearance. She is normal weight. She is not ill-appearing.   HENT:      Head: Normocephalic and atraumatic.      Nose: Nose normal. No rhinorrhea.      Mouth/Throat:      Mouth: Mucous membranes are moist.      Pharynx: No oropharyngeal exudate.   Eyes:      General: No scleral icterus.     Extraocular Movements: Extraocular movements intact.      Pupils: Pupils are equal, round, and reactive to light.   Cardiovascular:      Rate and Rhythm: Normal rate and regular rhythm.      Pulses: Normal pulses.      Heart sounds: Normal heart sounds. No murmur heard.      Pulmonary:      Effort: Pulmonary effort is normal.      Breath sounds: Normal breath sounds. No wheezing, rhonchi " or rales.   Abdominal:      General: Abdomen is flat. Bowel sounds are normal.      Palpations: Abdomen is soft.      Tenderness: There is no abdominal tenderness. There is no guarding or rebound.   Musculoskeletal:         General: No swelling or tenderness. Normal range of motion.      Cervical back: Normal range of motion and neck supple. No tenderness.      Right lower leg: No edema.      Left lower leg: No edema.   Skin:     General: Skin is warm and dry.      Findings: No rash.   Neurological:      General: No focal deficit present.      Mental Status: She is alert and oriented to person, place, and time.   Psychiatric:         Mood and Affect: Mood normal.         Behavior: Behavior normal.        Result Review :   The following data was reviewed by: Ágnel Drew DO on 01/26/2022:  Common labs    Common Labsle 12/8/21 12/20/21 12/20/21 12/20/21 1/10/22     1543 1543 1646    Glucose   113 (A)     BUN   23     Creatinine   1.70 (A)     eGFR Non African Am   29 (A)     Sodium   142     Potassium   3.7     Chloride   106     Calcium   8.6     Total Protein    6.6    Albumin   3.90 3.3    Total Bilirubin   0.3     Alkaline Phosphatase   132 (A)     AST (SGOT)   18     ALT (SGPT)   12     WBC 11.16 (A) 8.99   10.45   Hemoglobin 9.5 (A) 9.5 (A)   10.8 (A)   Hematocrit 29.1 (A) 30.6 (A)   33.5 (A)   Platelets 357 379   327   (A) Abnormal value            Data reviewed: Consultant notes Nephrology referral note and Recent hospitalization notes From November admission       11/24 urine culture with coag negative staph  12/2 urine culture with mixed urogenital nahid     Assessment and Plan    Diagnoses and all orders for this visit:    1. Recurrent UTI (Primary)    2. Urinary tract infection due to extended-spectrum beta lactamase (ESBL) producing Escherichia coli    Patient has no residual symptoms related to urinary tract infection recently treated that grew ESBL E. coli.  Completed her course and is feeling  well.  She denies any recurrent symptoms at this time we will not plan to obtain a urine culture.  She was counseled on the appropriate symptoms to associated with urinary tract infection and that unnecessary antibiotics will likely lead to further resistance.  She was educated on distinguishing asymptomatic bacteriuria versus true UTI.  Going forward she will need to have close scrutiny of symptoms to help delineate asymptomatic bacteriuria and limit her antibiotic exposure.  At this time would not recommend prophylactic antibiotics.    I spent 37 minutes caring for Marylu on this date of service. This time includes time spent by me in the following activities:preparing for the visit, reviewing tests, obtaining and/or reviewing a separately obtained history, performing a medically appropriate examination and/or evaluation , counseling and educating the patient/family/caregiver, ordering medications, tests, or procedures, documenting information in the medical record, independently interpreting results and communicating that information with the patient/family/caregiver and care coordination  Follow Up   No follow-ups on file.  Patient was given instructions and counseling regarding her condition or for health maintenance advice. Please see specific information pulled into the AVS if appropriate.

## 2022-01-31 ENCOUNTER — APPOINTMENT (OUTPATIENT)
Dept: OTHER | Facility: HOSPITAL | Age: 77
End: 2022-01-31

## 2022-02-11 NOTE — PROGRESS NOTES
"Chief Complaint  Stage I ([id number]) left breast cancer in 2007, pulmonary emboli in 2009, atrial fibrillation, history of GI bleed, history of iron deficiency status post prior gastric bypass in 2001    Subjective        History of Present Illness  Patient reports that she has been doing much better recently overall.  She has had return of her previous headaches which were associated with her severe depression and anxiety.  All of the symptoms improved with transcranial magnetic stimulation (TMS) performed in June/July 2021.  The headaches have now recurred and she is awaiting insurance approval to undergo another course of treatment.  She has no other complaints today.  She has not noted any blood in her stool, does continue on Eliquis 2.5 mg twice daily for atrial fibrillation, dose reduced due to her renal dysfunction.      Objective   Vital Signs:   /87   Pulse 89   Temp 97.3 °F (36.3 °C) (Temporal)   Resp 18   Ht 163.8 cm (64.49\")   Wt 92.4 kg (203 lb 9.6 oz)   SpO2 95%   BMI 34.42 kg/m²     Physical Exam  Constitutional:       Appearance: She is well-developed.   Eyes:      Conjunctiva/sclera: Conjunctivae normal.   Neck:      Thyroid: No thyromegaly.   Cardiovascular:      Rate and Rhythm: Normal rate. Rhythm irregular.      Heart sounds: Murmur heard.   No friction rub. No gallop.    Pulmonary:      Effort: No respiratory distress.      Breath sounds: Wheezing present.      Comments: Breast exam was not performed today.  A few scattered wheezes bilaterally.  Chest:   Breasts:      Right: No supraclavicular adenopathy.      Left: No supraclavicular adenopathy.       Abdominal:      General: Bowel sounds are normal. There is no distension.      Palpations: Abdomen is soft.      Tenderness: There is no abdominal tenderness.   Lymphadenopathy:      Head:      Right side of head: No submandibular adenopathy.      Cervical: No cervical adenopathy.      Upper Body:      Right upper body: No " supraclavicular adenopathy.      Left upper body: No supraclavicular adenopathy.   Skin:     General: Skin is warm and dry.      Findings: No rash.   Neurological:      Mental Status: She is alert and oriented to person, place, and time.      Cranial Nerves: No cranial nerve deficit.      Motor: No abnormal muscle tone.      Deep Tendon Reflexes: Reflexes normal.   Psychiatric:         Behavior: Behavior normal.            Result Review : Reviewed CBC, CMP from today.  Reviewed additional labs from 12/20/2021 including serum protein electrophoresis, immunoelectrophoresis, quantitative immunoglobulins, free serum light chains, haptoglobin, LDH, CRP, erythropoietin level.  Reviewed records from [facility].       Assessment and Plan     1. Stage I ([id number]) left breast cancer:  · Biopsy 5/8/2007 with in situ and invasive ductal carcinoma, grade 2,/HI positive, HER-2/jorge negative  · Left mastectomy 6/18/0742 foci carcinoma, 4 mm, grade 1 in situ and invasive ductal carcinoma and 2 mm grade 2 in situ and invasive ductal carcinoma, negative margins, negative sentinel lymph nodes x3 with 5 additional negative lymph nodes.  · Arimidex initiated 7/2/2007  · Arimidex interrupted patient developed bilateral pulmonary emboli in May 2009, resume shortly thereafter.  Completed 5 years treatment in 2012.  · Patient continues with no clinical evidence of recurrent disease.  Exam on 11/15/2021 was negative, was not performed today.  Right-sided mammogram 10/1/2021 was negative, BI-RADS 1.  2. Pulmonary emboli 5/2/2009:  · Family history of DVT in the patient's mother occurring at age 70 postoperatively  · Patient developed bilateral lower lobe pulmonary emboli 5/2/2009 following right VATS on 4/30/2009  · Felt to be a provoked thrombosis related to surgery  · Hypercoagulable evaluation with negative factor V Leiden, negative prothrombin gene mutation, normal homocystine, protein C antigen 92, free to protein S 75, anticardiolipin antibody  panel negative, lupus anticoagulant negative, Antithrombin %  · Continuing on chronic anticoagulation primarily for atrial fibrillation at this point as prior thrombosis was provoked.  · Transitioned from Coumadin to Eliquis 5 mg twice daily in May 2020  · At time of hospitalization for IJEOMA/CKD 11/15-11/18/2021, Eliquis dose was decreased to 2.5 mg twice daily.  · Creatinine improved at 1.47, if this improved renal function persists, consider in the future increasing Eliquis back to full dose.  3. Iron deficiency anemia:  · Prior gastric bypass in 2001  · Intolerant of oral iron.  · Patient has required IV iron on multiple occasions: Feraheme 8/13 and 8/20/2018; Injectafer 3/9 and 3/16/2020  · Labs on 11/15/2021 showed decline in hemoglobin to 10.8 however this was in the setting of UTI and IJEOMA/CKD3.  Her iron panel was normal with iron 114, iron saturation 34%, TIBC 337 with ferritin elevated at 292.  B12 was normal at 883.  We continue to follow these values given her history of prior gastric bypass and iron deficiency requiring intermittent IV iron (intolerant of oral iron).   · On 12/20/2021, hemoglobin further declined to 9.5.  Repeat labs with iron 47, ferritin 262, iron saturation 14%, TIBC 328, B12 level 1162.  Additional labs performed with negative serum protein electrophoresis and immunoelectrophoresis with normal quantitative immunoglobulins.  Free light chains elevated with kappa 73, lambda 36 with ratio 2.04, consistent with patient's degree of renal dysfunction.  Haptoglobin normal 186, LDH normal 171, CRP borderline elevated 0.62, erythropoietin level 20.1.  Anemia felt to be related to CKD3.  Patient felt to be a potential candidate for Procrit if hemoglobin remains below 10.  · Patient returns today in follow-up with improvement in hemoglobin up to 11.2.  This corresponds with gradual improvement in her renal function with creatinine today down to 1.47.  We will recheck CBC with RN review in  2 months and I will see her back in 4 months at which time we will recheck iron panel and ferritin as well.  She has had no clinical evidence of GI blood loss recently.  7. GI bleed in July 2018:  · Acute lower GI bleed with supratherapeutic INR Coumadin.  · Angiogram performed with coil embolization of artery to sigmoid colon  8. Atrial fibrillation:  · Requires ongoing anticoagulation for this indication  · As above, transitioned from Coumadin to Eliquis 5 mg twice daily in May 2020  · As above, during hospitalization 11/15-11/18/2021 for IJEOMA/CKD3, Eliquis dose was decreased to 2.5 mg twice daily  · If patient is maintaining improved renal function would consider increasing back to full dose Eliquis.  9. Status post right VATS 4/30/2020 for pulmonary nodule with finding of necrotizing granulomatous inflammation  10. Severe depression/anxiety:  · Patient has had chronic issues with this, is followed by psychiatry, Dr. Librado Monique.  She also underwent previous counseling which she found helpful.  · The patient had ongoing difficulty with control of her depression.  She has been on multiple antidepressants  · In June/July 2021 patient underwent transcranial magnetic stimulation (TMS) with significant improvement in depressive symptoms and resolution of headaches.  Symptoms subsequently gradually recurred, patient reports there has been difficulty with insurance regarding maintenance treatments.  · Patient is currently experiencing ongoing headaches as before, is awaiting insurance approval for repeat course of TMS.  11. IJEOMA/CKD3  · Patient with CKD3 with baseline creatinine in the 1.3-1.8 range  · At time of routine follow-up visit 11/15/2021, creatinine was 3.19 and BUN of 59.  This was compared to prior value 6/2/2021 with BUN 41, creatinine 1.53.  · Patient was hospitalized 11/15-11/18/2021  · Patient is continuing follow-up with Dr. Cisneros in nephrology  · Creatinine on 11/24/2021 remained elevated at  2.43 however on 12/20/2021 declined to 1.7, near her prior baseline.  · Additional labs on 12/20/2021 with negative serum protein electrophoresis and immunoelectrophoresis with normal quantitative immunoglobulins.  Free light chains elevated with kappa 73, lambda 36 with ratio 2.04, consistent with patient's degree of renal dysfunction.  · Creatinine today has continued to improve down to 1.47.  12. ESBL E. coli UTI  · Urine culture positive on 11/6/2021 in urgent care, patient treated with 7 days nitrofurantoin (was sensitive on culture result)  · Patient with recurrent symptoms, was treated during recent hospitalization 11/15-11/18/2021 with Levaquin  · Most recent culture 12/2/2021 was negative, mixed nahid.  Patient without any symptoms of dysuria or urinary frequency.  Patient did follow-up with infectious disease recently.  13. Hypothyroidism   · Patient continues on levothyroxine 50 mcg daily  · Patient developed cold sensitivity, labs on 12/20/2021 with TSH 2.03 and free T4 1.07.      Plan:  1. No need to consider Procrit with hemoglobin currently up to 11.2  2. Patient will be following up with nephrology on a routine basis  3. In 2 months CBC and RN review  4. MD visit in 4 months with CBC, CMP, stat iron panel/ferritin.

## 2022-02-14 ENCOUNTER — LAB (OUTPATIENT)
Dept: OTHER | Facility: HOSPITAL | Age: 77
End: 2022-02-14

## 2022-02-14 ENCOUNTER — OFFICE VISIT (OUTPATIENT)
Dept: ONCOLOGY | Facility: CLINIC | Age: 77
End: 2022-02-14

## 2022-02-14 VITALS
WEIGHT: 203.6 LBS | OXYGEN SATURATION: 95 % | TEMPERATURE: 97.3 F | BODY MASS INDEX: 34.76 KG/M2 | HEIGHT: 64 IN | RESPIRATION RATE: 18 BRPM | DIASTOLIC BLOOD PRESSURE: 87 MMHG | HEART RATE: 89 BPM | SYSTOLIC BLOOD PRESSURE: 172 MMHG

## 2022-02-14 DIAGNOSIS — Z17.0 MALIGNANT NEOPLASM OF OVERLAPPING SITES OF LEFT BREAST IN FEMALE, ESTROGEN RECEPTOR POSITIVE: Primary | ICD-10-CM

## 2022-02-14 DIAGNOSIS — D63.1 ANEMIA DUE TO STAGE 3 CHRONIC KIDNEY DISEASE, UNSPECIFIED WHETHER STAGE 3A OR 3B CKD: ICD-10-CM

## 2022-02-14 DIAGNOSIS — D64.9 NORMOCYTIC ANEMIA: ICD-10-CM

## 2022-02-14 DIAGNOSIS — C50.812 MALIGNANT NEOPLASM OF OVERLAPPING SITES OF LEFT BREAST IN FEMALE, ESTROGEN RECEPTOR POSITIVE: Primary | ICD-10-CM

## 2022-02-14 DIAGNOSIS — N18.30 ANEMIA DUE TO STAGE 3 CHRONIC KIDNEY DISEASE, UNSPECIFIED WHETHER STAGE 3A OR 3B CKD: ICD-10-CM

## 2022-02-14 LAB
ALBUMIN SERPL-MCNC: 4.3 G/DL (ref 3.5–5.2)
ALBUMIN/GLOB SERPL: 1.3 G/DL
ALP SERPL-CCNC: 144 U/L (ref 39–117)
ALT SERPL W P-5'-P-CCNC: 16 U/L (ref 1–33)
ANION GAP SERPL CALCULATED.3IONS-SCNC: 15.4 MMOL/L (ref 5–15)
AST SERPL-CCNC: 21 U/L (ref 1–32)
BASOPHILS # BLD AUTO: 0.13 10*3/MM3 (ref 0–0.2)
BASOPHILS NFR BLD AUTO: 1.4 % (ref 0–1.5)
BILIRUB SERPL-MCNC: 0.5 MG/DL (ref 0–1.2)
BUN SERPL-MCNC: 27 MG/DL (ref 8–23)
BUN/CREAT SERPL: 18.4 (ref 7–25)
CALCIUM SPEC-SCNC: 9.9 MG/DL (ref 8.6–10.5)
CHLORIDE SERPL-SCNC: 101 MMOL/L (ref 98–107)
CO2 SERPL-SCNC: 21.6 MMOL/L (ref 22–29)
CREAT SERPL-MCNC: 1.47 MG/DL (ref 0.57–1)
DEPRECATED RDW RBC AUTO: 47.9 FL (ref 37–54)
EOSINOPHIL # BLD AUTO: 0.34 10*3/MM3 (ref 0–0.4)
EOSINOPHIL NFR BLD AUTO: 3.6 % (ref 0.3–6.2)
ERYTHROCYTE [DISTWIDTH] IN BLOOD BY AUTOMATED COUNT: 13.5 % (ref 12.3–15.4)
GFR SERPL CREATININE-BSD FRML MDRD: 35 ML/MIN/1.73
GLOBULIN UR ELPH-MCNC: 3.2 GM/DL
GLUCOSE SERPL-MCNC: 125 MG/DL (ref 65–99)
HCT VFR BLD AUTO: 36.3 % (ref 34–46.6)
HGB BLD-MCNC: 11.2 G/DL (ref 12–15.9)
IMM GRANULOCYTES # BLD AUTO: 0.02 10*3/MM3 (ref 0–0.05)
IMM GRANULOCYTES NFR BLD AUTO: 0.2 % (ref 0–0.5)
LYMPHOCYTES # BLD AUTO: 2.81 10*3/MM3 (ref 0.7–3.1)
LYMPHOCYTES NFR BLD AUTO: 30 % (ref 19.6–45.3)
MCH RBC QN AUTO: 29.7 PG (ref 26.6–33)
MCHC RBC AUTO-ENTMCNC: 30.9 G/DL (ref 31.5–35.7)
MCV RBC AUTO: 96.3 FL (ref 79–97)
MONOCYTES # BLD AUTO: 0.74 10*3/MM3 (ref 0.1–0.9)
MONOCYTES NFR BLD AUTO: 7.9 % (ref 5–12)
NEUTROPHILS NFR BLD AUTO: 5.32 10*3/MM3 (ref 1.7–7)
NEUTROPHILS NFR BLD AUTO: 56.9 % (ref 42.7–76)
NRBC BLD AUTO-RTO: 0 /100 WBC (ref 0–0.2)
PLATELET # BLD AUTO: 372 10*3/MM3 (ref 140–450)
PMV BLD AUTO: 11.8 FL (ref 6–12)
POTASSIUM SERPL-SCNC: 3.9 MMOL/L (ref 3.5–5.2)
PROT SERPL-MCNC: 7.5 G/DL (ref 6–8.5)
RBC # BLD AUTO: 3.77 10*6/MM3 (ref 3.77–5.28)
SODIUM SERPL-SCNC: 138 MMOL/L (ref 136–145)
WBC NRBC COR # BLD: 9.36 10*3/MM3 (ref 3.4–10.8)

## 2022-02-14 PROCEDURE — 99214 OFFICE O/P EST MOD 30 MIN: CPT | Performed by: INTERNAL MEDICINE

## 2022-02-14 PROCEDURE — 85025 COMPLETE CBC W/AUTO DIFF WBC: CPT | Performed by: INTERNAL MEDICINE

## 2022-02-14 PROCEDURE — 36415 COLL VENOUS BLD VENIPUNCTURE: CPT

## 2022-02-14 PROCEDURE — 80053 COMPREHEN METABOLIC PANEL: CPT | Performed by: INTERNAL MEDICINE

## 2022-02-16 ENCOUNTER — TELEPHONE (OUTPATIENT)
Dept: ONCOLOGY | Facility: CLINIC | Age: 77
End: 2022-02-16

## 2022-02-16 NOTE — TELEPHONE ENCOUNTER
----- Message from Candie Devries Rep sent at 2/15/2022  7:40 AM EST -----  Rosenda,    If checking EP orders from yesterday, this pt said they would call today to schedule as she had to get home right away after ian't yesterday.    Thanks - Prudence

## 2022-02-21 ENCOUNTER — OFFICE VISIT (OUTPATIENT)
Dept: INTERNAL MEDICINE | Facility: CLINIC | Age: 77
End: 2022-02-21

## 2022-02-21 VITALS
DIASTOLIC BLOOD PRESSURE: 88 MMHG | HEIGHT: 64 IN | WEIGHT: 198.8 LBS | BODY MASS INDEX: 33.94 KG/M2 | SYSTOLIC BLOOD PRESSURE: 152 MMHG | HEART RATE: 108 BPM | OXYGEN SATURATION: 97 % | TEMPERATURE: 98 F

## 2022-02-21 DIAGNOSIS — E03.9 ACQUIRED HYPOTHYROIDISM: ICD-10-CM

## 2022-02-21 DIAGNOSIS — N18.32 STAGE 3B CHRONIC KIDNEY DISEASE: ICD-10-CM

## 2022-02-21 DIAGNOSIS — I48.19 ATRIAL FIBRILLATION, PERSISTENT: Chronic | ICD-10-CM

## 2022-02-21 DIAGNOSIS — I10 ESSENTIAL HYPERTENSION: Primary | Chronic | ICD-10-CM

## 2022-02-21 DIAGNOSIS — R73.09 ELEVATED GLUCOSE: ICD-10-CM

## 2022-02-21 PROBLEM — N17.9 ACUTE KIDNEY INJURY: Status: RESOLVED | Noted: 2021-11-15 | Resolved: 2022-02-21

## 2022-02-21 PROCEDURE — 99214 OFFICE O/P EST MOD 30 MIN: CPT | Performed by: INTERNAL MEDICINE

## 2022-02-21 RX ORDER — DULOXETIN HYDROCHLORIDE 60 MG/1
60 CAPSULE, DELAYED RELEASE ORAL 2 TIMES DAILY
COMMUNITY

## 2022-02-21 RX ORDER — IRBESARTAN 150 MG/1
150 TABLET ORAL NIGHTLY
Qty: 30 TABLET | Refills: 2 | Status: SHIPPED | OUTPATIENT
Start: 2022-02-21 | End: 2022-03-30 | Stop reason: SDUPTHER

## 2022-02-21 NOTE — PROGRESS NOTES
Subjective   Marylu Foreman is a 76 y.o. female here today to f/u on HTN, hyperlipidemia and hypothyroidism.      History of Present Illness   She had her avapro and diazide stopped in November    She has not been on BP meds since then  Renal function is now better but BP is elevated    Pt has been doing well with thyroid meds.  Taking as perscribed without any complications  Pt has been stable on current meds for atrial fibrillation.  No palp or SOB.  No CP or edema    The following portions of the patient's history were reviewed and updated as appropriate: allergies, current medications, past medical history, past social history and problem list.    Review of Systems    Objective   Physical Exam  Vitals reviewed.   Constitutional:       Appearance: She is well-developed.   HENT:      Head: Normocephalic and atraumatic.      Right Ear: External ear normal.      Left Ear: External ear normal.   Eyes:      Conjunctiva/sclera: Conjunctivae normal.      Pupils: Pupils are equal, round, and reactive to light.   Neck:      Thyroid: No thyromegaly.      Trachea: No tracheal deviation.   Cardiovascular:      Rate and Rhythm: Normal rate and regular rhythm.      Heart sounds: Normal heart sounds.   Pulmonary:      Effort: Pulmonary effort is normal.      Breath sounds: Normal breath sounds.   Abdominal:      General: Bowel sounds are normal. There is no distension.      Palpations: Abdomen is soft.      Tenderness: There is no abdominal tenderness.   Musculoskeletal:         General: No deformity. Normal range of motion.      Cervical back: Normal range of motion.   Skin:     General: Skin is warm and dry.   Neurological:      Mental Status: She is alert and oriented to person, place, and time.   Psychiatric:         Behavior: Behavior normal.         Thought Content: Thought content normal.         Judgment: Judgment normal.         Vitals:    02/21/22 1136   BP: 152/88   Pulse: 108   Temp: 98 °F (36.7 °C)   SpO2: 97%      Body mass index is 33.61 kg/m².       Current Outpatient Medications   Medication Instructions   • Cholecalciferol (VITAMIN D PO) 1 tablet, Oral, Daily   • Coenzyme Q10 (CO Q 10 PO) 200 mg, Oral, Daily   • dilTIAZem CD (CARDIZEM CD) 180 mg, Oral, Daily   • DULoxetine (CYMBALTA) 60 mg, Oral, 2 Times Daily   • Eliquis 2.5 mg, Oral, 2 Times Daily   • irbesartan (AVAPRO) 150 mg, Oral, Nightly   • levothyroxine (SYNTHROID, LEVOTHROID) 50 MCG tablet No dose, route, or frequency recorded.   • Multiple Vitamin (MULTI-VITAMIN PO) 1 tablet, Oral, Daily   • Probiotic Product (PROBIOTIC PO) Oral, Daily, Lactobacillus rhamnosus GG     • vitamin E 400 Units, Oral, Daily         Assessment/Plan   Diagnoses and all orders for this visit:    1. Essential hypertension (Primary)    2. Atrial fibrillation, persistent (HCC)    3. Acquired hypothyroidism  -     Comprehensive Metabolic Panel  -     LP+LDL / HDL Ratio ([facility])  -     Hemoglobin A1c    4. Stage 3b chronic kidney disease (HCC)  -     Comprehensive Metabolic Panel  -     LP+LDL / HDL Ratio ([facility])  -     Hemoglobin A1c    5. Elevated glucose  -     Hemoglobin A1c    Other orders  -     irbesartan (Avapro) 150 MG tablet; Take 1 tablet by mouth Every Night.  Dispense: 30 tablet; Refill: 2      1.  HTN- add back avapro at lower dose  Limit salt intake  2.  CRI-  Fu with renal next week  3. Afib-  Cont diltiazem bid HR is still high

## 2022-03-16 LAB
ALBUMIN SERPL-MCNC: 4.5 G/DL (ref 3.7–4.7)
ALBUMIN/GLOB SERPL: 1.6 {RATIO} (ref 1.2–2.2)
ALP SERPL-CCNC: 148 IU/L (ref 44–121)
ALT SERPL-CCNC: 16 IU/L (ref 0–32)
AST SERPL-CCNC: 21 IU/L (ref 0–40)
BILIRUB SERPL-MCNC: 0.5 MG/DL (ref 0–1.2)
BUN SERPL-MCNC: 26 MG/DL (ref 8–27)
BUN/CREAT SERPL: 17 (ref 12–28)
CALCIUM SERPL-MCNC: 10 MG/DL (ref 8.7–10.3)
CHLORIDE SERPL-SCNC: 104 MMOL/L (ref 96–106)
CHOLEST SERPL-MCNC: 217 MG/DL (ref 100–199)
CO2 SERPL-SCNC: 21 MMOL/L (ref 20–29)
CREAT SERPL-MCNC: 1.55 MG/DL (ref 0.57–1)
EGFRCR SERPLBLD CKD-EPI 2021: 35 ML/MIN/1.73
GLOBULIN SER CALC-MCNC: 2.9 G/DL (ref 1.5–4.5)
GLUCOSE SERPL-MCNC: 109 MG/DL (ref 65–99)
HBA1C MFR BLD: 6.5 % (ref 4.8–5.6)
HDLC SERPL-MCNC: 66 MG/DL
LDLC SERPL CALC-MCNC: 134 MG/DL (ref 0–99)
LDLC/HDLC SERPL: 2 RATIO (ref 0–3.2)
POTASSIUM SERPL-SCNC: 4.6 MMOL/L (ref 3.5–5.2)
PROT SERPL-MCNC: 7.4 G/DL (ref 6–8.5)
SODIUM SERPL-SCNC: 142 MMOL/L (ref 134–144)
TRIGL SERPL-MCNC: 96 MG/DL (ref 0–149)
VLDLC SERPL CALC-MCNC: 17 MG/DL (ref 5–40)

## 2022-03-30 ENCOUNTER — OFFICE VISIT (OUTPATIENT)
Dept: INTERNAL MEDICINE | Facility: CLINIC | Age: 77
End: 2022-03-30

## 2022-03-30 VITALS
BODY MASS INDEX: 33.38 KG/M2 | OXYGEN SATURATION: 98 % | DIASTOLIC BLOOD PRESSURE: 86 MMHG | SYSTOLIC BLOOD PRESSURE: 148 MMHG | HEIGHT: 64 IN | TEMPERATURE: 97.5 F | WEIGHT: 195.5 LBS | HEART RATE: 110 BPM

## 2022-03-30 DIAGNOSIS — M54.42 ACUTE LEFT-SIDED LOW BACK PAIN WITH LEFT-SIDED SCIATICA: Primary | ICD-10-CM

## 2022-03-30 DIAGNOSIS — I10 ESSENTIAL HYPERTENSION: Chronic | ICD-10-CM

## 2022-03-30 PROCEDURE — 99214 OFFICE O/P EST MOD 30 MIN: CPT | Performed by: INTERNAL MEDICINE

## 2022-03-30 RX ORDER — BUPROPION HYDROCHLORIDE 150 MG/1
1 TABLET ORAL DAILY
COMMUNITY
End: 2022-07-13

## 2022-03-30 RX ORDER — IRBESARTAN 300 MG/1
300 TABLET ORAL NIGHTLY
Qty: 90 TABLET | Refills: 1 | Status: SHIPPED | OUTPATIENT
Start: 2022-03-30 | End: 2022-07-13 | Stop reason: SDUPTHER

## 2022-03-30 RX ORDER — CYCLOBENZAPRINE HCL 10 MG
10 TABLET ORAL 3 TIMES DAILY PRN
Qty: 20 TABLET | Refills: 0 | Status: SHIPPED | OUTPATIENT
Start: 2022-03-30 | End: 2022-04-11

## 2022-03-30 RX ORDER — TRAMADOL HYDROCHLORIDE 50 MG/1
50 TABLET ORAL EVERY 6 HOURS PRN
Qty: 30 TABLET | Refills: 0 | Status: SHIPPED | OUTPATIENT
Start: 2022-03-30 | End: 2022-04-23

## 2022-03-30 NOTE — PROGRESS NOTES
Subjective   Marylu Foreman is a 76 y.o. female here to follow up on HTN.  Pt c/o LBP that radiate down to left leg X 1 Friday.    History of Present Illness   She started having lbp radiating down the left leg    Started after she fell asleep in the lazyboy chair  She has never had pain like this before  She has been alternating ice and heat  She cannot have steroids or nsaids  Due to allergies    The following portions of the patient's history were reviewed and updated as appropriate: allergies, current medications, past medical history, past social history and problem list.  She has been losing weight and watching diet    Review of Systems    Objective   Physical Exam  Vitals reviewed.   Constitutional:       Appearance: She is well-developed.   HENT:      Head: Normocephalic and atraumatic.      Right Ear: External ear normal.      Left Ear: External ear normal.   Eyes:      Conjunctiva/sclera: Conjunctivae normal.      Pupils: Pupils are equal, round, and reactive to light.   Neck:      Thyroid: No thyromegaly.      Trachea: No tracheal deviation.   Cardiovascular:      Rate and Rhythm: Normal rate and regular rhythm.      Heart sounds: Normal heart sounds.   Pulmonary:      Effort: Pulmonary effort is normal.      Breath sounds: Normal breath sounds.   Abdominal:      General: Bowel sounds are normal. There is no distension.      Palpations: Abdomen is soft.      Tenderness: There is no abdominal tenderness.   Musculoskeletal:         General: No deformity. Normal range of motion.      Cervical back: Normal range of motion.   Skin:     General: Skin is warm and dry.   Neurological:      Mental Status: She is alert and oriented to person, place, and time.   Psychiatric:         Behavior: Behavior normal.         Thought Content: Thought content normal.         Judgment: Judgment normal.         Vitals:    03/30/22 1118   BP: 148/86   Pulse: 110   Temp: 97.5 °F (36.4 °C)   SpO2: 98%     Current Outpatient  Medications:   •  apixaban (ELIQUIS) 2.5 MG tablet tablet, Take 1 tablet by mouth 2 (Two) Times a Day. Indications: Atrial Fibrillation, Disp: 60 tablet, Rfl: 0  •  buPROPion XL (WELLBUTRIN XL) 150 MG 24 hr tablet, Take 1 tablet by mouth Daily., Disp: , Rfl:   •  Cholecalciferol (VITAMIN D PO), Take 1 tablet by mouth Daily., Disp: , Rfl:   •  Coenzyme Q10 (CO Q 10 PO), Take 200 mg by mouth Daily., Disp: , Rfl:   •  dilTIAZem CD (CARDIZEM CD) 180 MG 24 hr capsule, Take 180 mg by mouth Daily., Disp: , Rfl:   •  DULoxetine (CYMBALTA) 60 MG capsule, Take 60 mg by mouth 2 (Two) Times a Day., Disp: , Rfl:   •  irbesartan (Avapro) 300 MG tablet, Take 1 tablet by mouth Every Night., Disp: 90 tablet, Rfl: 1  •  levothyroxine (SYNTHROID, LEVOTHROID) 50 MCG tablet, , Disp: , Rfl:   •  Multiple Vitamin (MULTI-VITAMIN PO), Take 1 tablet by mouth daily., Disp: , Rfl:   •  Probiotic Product (PROBIOTIC PO), Take  by mouth Daily. Lactobacillus rhamnosus GG, Disp: , Rfl:   •  vitamin E 400 UNIT capsule, Take 400 Units by mouth Daily., Disp: , Rfl:   •  cyclobenzaprine (FLEXERIL) 10 MG tablet, Take 1 tablet by mouth 3 (Three) Times a Day As Needed for Muscle Spasms., Disp: 20 tablet, Rfl: 0  •  traMADol (ULTRAM) 50 MG tablet, Take 1 tablet by mouth Every 6 (Six) Hours As Needed for Moderate Pain ., Disp: 30 tablet, Rfl: 0    Body mass index is 33.05 kg/m².         Assessment/Plan   Diagnoses and all orders for this visit:    1. Acute left-sided low back pain with left-sided sciatica (Primary)  -     traMADol (ULTRAM) 50 MG tablet; Take 1 tablet by mouth Every 6 (Six) Hours As Needed for Moderate Pain .  Dispense: 30 tablet; Refill: 0    2. Essential hypertension  -     CBC & Differential; Future  -     Comprehensive Metabolic Panel; Future    Other orders  -     irbesartan (Avapro) 300 MG tablet; Take 1 tablet by mouth Every Night.  Dispense: 90 tablet; Refill: 1  -     cyclobenzaprine (FLEXERIL) 10 MG tablet; Take 1 tablet by mouth 3  (Three) Times a Day As Needed for Muscle Spasms.  Dispense: 20 tablet; Refill: 0    1.  HTN- increase the [id number]  Cont to wokr on weight and salt intake  2.  LBP- radiaiitng to the lle-  Cannot have nsaids or steroids use flexeril--at night-- with tramadol as needed  May need further testing if not getting better'

## 2022-04-11 RX ORDER — CYCLOBENZAPRINE HCL 10 MG
TABLET ORAL
Qty: 20 TABLET | Refills: 0 | Status: SHIPPED | OUTPATIENT
Start: 2022-04-11 | End: 2022-06-01

## 2022-04-11 NOTE — TELEPHONE ENCOUNTER
PATIENT CALLED ON cyclobenzaprine (FLEXERIL) 10 MG tablet  PATIENT IS OUT OF THE MEDICATION.  PHARMACY NEEDS APPROVAL TO REFILL MEDICATION, PRIOR AUTHORIZATION.  PLEASE ADVISE    PHARMACY: [id number] - [place], [place] - [facility] AT [facility] [address] &  - [phone] [facility] [phone]     PATIENT CALL BACK #: [phone]

## 2022-04-19 ENCOUNTER — APPOINTMENT (OUTPATIENT)
Dept: OTHER | Facility: HOSPITAL | Age: 77
End: 2022-04-19

## 2022-04-19 ENCOUNTER — APPOINTMENT (OUTPATIENT)
Dept: ONCOLOGY | Facility: HOSPITAL | Age: 77
End: 2022-04-19

## 2022-04-23 ENCOUNTER — HOSPITAL ENCOUNTER (EMERGENCY)
Facility: HOSPITAL | Age: 77
Discharge: HOME | End: 2022-04-23
Attending: EMERGENCY MEDICINE | Admitting: EMERGENCY MEDICINE

## 2022-04-23 ENCOUNTER — APPOINTMENT (OUTPATIENT)
Dept: CARDIOLOGY | Facility: HOSPITAL | Age: 77
End: 2022-04-23

## 2022-04-23 VITALS
BODY MASS INDEX: 31.34 KG/M2 | SYSTOLIC BLOOD PRESSURE: 125 MMHG | TEMPERATURE: 98.9 F | RESPIRATION RATE: 20 BRPM | WEIGHT: 195 LBS | DIASTOLIC BLOOD PRESSURE: 68 MMHG | HEART RATE: 99 BPM | OXYGEN SATURATION: 97 % | HEIGHT: 66 IN

## 2022-04-23 DIAGNOSIS — M54.16 LUMBAR RADICULOPATHY: Primary | ICD-10-CM

## 2022-04-23 LAB
BH CV LOWER VASCULAR LEFT COMMON FEMORAL AUGMENT: NORMAL
BH CV LOWER VASCULAR LEFT COMMON FEMORAL COMPETENT: NORMAL
BH CV LOWER VASCULAR LEFT COMMON FEMORAL COMPRESS: NORMAL
BH CV LOWER VASCULAR LEFT COMMON FEMORAL PHASIC: NORMAL
BH CV LOWER VASCULAR LEFT COMMON FEMORAL SPONT: NORMAL
BH CV LOWER VASCULAR LEFT DISTAL FEMORAL COMPRESS: NORMAL
BH CV LOWER VASCULAR LEFT GASTRONEMIUS COMPRESS: NORMAL
BH CV LOWER VASCULAR LEFT GREATER SAPH AK COMPRESS: NORMAL
BH CV LOWER VASCULAR LEFT GREATER SAPH BK COMPRESS: NORMAL
BH CV LOWER VASCULAR LEFT LESSER SAPH COMPRESS: NORMAL
BH CV LOWER VASCULAR LEFT MID FEMORAL AUGMENT: NORMAL
BH CV LOWER VASCULAR LEFT MID FEMORAL COMPETENT: NORMAL
BH CV LOWER VASCULAR LEFT MID FEMORAL COMPRESS: NORMAL
BH CV LOWER VASCULAR LEFT MID FEMORAL PHASIC: NORMAL
BH CV LOWER VASCULAR LEFT MID FEMORAL SPONT: NORMAL
BH CV LOWER VASCULAR LEFT PERONEAL COMPRESS: NORMAL
BH CV LOWER VASCULAR LEFT POPLITEAL AUGMENT: NORMAL
BH CV LOWER VASCULAR LEFT POPLITEAL COMPETENT: NORMAL
BH CV LOWER VASCULAR LEFT POPLITEAL COMPRESS: NORMAL
BH CV LOWER VASCULAR LEFT POPLITEAL PHASIC: NORMAL
BH CV LOWER VASCULAR LEFT POPLITEAL SPONT: NORMAL
BH CV LOWER VASCULAR LEFT POSTERIOR TIBIAL COMPRESS: NORMAL
BH CV LOWER VASCULAR LEFT PROFUNDA FEMORAL COMPRESS: NORMAL
BH CV LOWER VASCULAR LEFT PROXIMAL FEMORAL COMPRESS: NORMAL
BH CV LOWER VASCULAR LEFT SAPHENOFEMORAL JUNCTION COMPRESS: NORMAL
BH CV LOWER VASCULAR RIGHT COMMON FEMORAL AUGMENT: NORMAL
BH CV LOWER VASCULAR RIGHT COMMON FEMORAL COMPETENT: NORMAL
BH CV LOWER VASCULAR RIGHT COMMON FEMORAL COMPRESS: NORMAL
BH CV LOWER VASCULAR RIGHT COMMON FEMORAL PHASIC: NORMAL
BH CV LOWER VASCULAR RIGHT COMMON FEMORAL SPONT: NORMAL
MAXIMAL PREDICTED HEART RATE: 146 BPM
STRESS TARGET HR: 124 BPM
WHOLE BLOOD HOLD SPECIMEN: NORMAL

## 2022-04-23 PROCEDURE — 99283 EMERGENCY DEPT VISIT LOW MDM: CPT

## 2022-04-23 PROCEDURE — 93971 EXTREMITY STUDY: CPT

## 2022-04-23 RX ORDER — TRAMADOL HYDROCHLORIDE 50 MG/1
50 TABLET ORAL EVERY 8 HOURS PRN
Qty: 9 TABLET | Refills: 0 | Status: SHIPPED | OUTPATIENT
Start: 2022-04-23 | End: 2022-05-10 | Stop reason: SDUPTHER

## 2022-04-23 RX ORDER — OMEPRAZOLE 20 MG/1
20 CAPSULE, DELAYED RELEASE ORAL DAILY
Qty: 7 CAPSULE | Refills: 0 | Status: SHIPPED | OUTPATIENT
Start: 2022-04-23 | End: 2022-05-26

## 2022-04-23 RX ORDER — METHYLPREDNISOLONE 4 MG/1
TABLET ORAL
Qty: 1 EACH | Refills: 0 | Status: SHIPPED | OUTPATIENT
Start: 2022-04-23 | End: 2022-05-10
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
## 2022-04-23 NOTE — DISCHARGE INSTRUCTIONS
Although you are being discharged from the ED today, I encourage you to return for worsening symptoms. Things can, and do, change such that treatment at home with medication may not be adequate. Specifically I recommend returning for chest pain or discomfort, difficulty breathing, persistent vomiting or difficulty holding down liquids or medications, fever > 102.0 F, difficulty controlling your bowel or bladder, or any other worsening or alarming symptoms.     Rest. Drink plenty of fluids.  Follow up with Dr. Dillon and discuss having a lumbar (lower back MRI) ordered.  Follow up with primary care provider for further management and to have blood pressure rechecked.  Take your medications as prescribed.

## 2022-04-23 NOTE — ED PROVIDER NOTES
MD ATTESTATION NOTE    The RAJAT and I have discussed this patient's history, physical exam, and treatment plan.  I have reviewed the documentation and personally had a face to face interaction with the patient. I affirm the documentation and agree with the treatment and plan.  The attached note describes my personal findings.      I provided a substantive portion of the care of the patient.  I personally performed the physical exam in its entirety, and below are my findings.  For this patient encounter, the patient wore surgical mask, I wore full protective PPE including N95 and eye protection.      Brief HPI: Patient complains of left leg pain that started in her back and radiates down the left leg all the way to her foot.  She denies bowel or bladder incontinence.  She denies fever or chills.  She is on Eliquis due to history of atrial fibrillation.    PHYSICAL EXAM  ED Triage Vitals   Temp Heart Rate Resp BP SpO2   04/23/22 1057 04/23/22 1057 04/23/22 1057 04/23/22 1128 04/23/22 1057   98.9 °F (37.2 °C) (!) 141 18 136/83 93 %      Temp src Heart Rate Source Patient Position BP Location FiO2 (%)   04/23/22 1057 04/23/22 1057 04/23/22 1303 04/23/22 1303 --   Tympanic Monitor Lying Right arm          GENERAL: Awake and alert, no acute distress  HENT: nares patent  EYES: no scleral icterus  CV: regular rhythm, normal rate  RESPIRATORY: normal effort  ABDOMEN: soft, nondistended  MUSCULOSKELETAL: no deformity, positive straight leg raise on the left at 30 degrees  NEURO: 5/5 strength with hip flexion, knee flex/ext, plantarflexion, and dorsiflexion of the feet BLE. Normal sensation to light touch and pin prick throughout BLE.  Diminished DTR at the knee BLE however patient has had prior total knee arthroplasty bilaterally.  2+ DP/PT pulses BLE.    PSYCH:  calm, cooperative  SKIN: warm, dry    Vital signs and nursing notes reviewed.        Plan: Home with trial of oral steroid Dosepak.  Patient reports that she  previously experienced depression after taking a Dosepak but that this pain is more depressing to her then that would have been.  I advised her to take omeprazole daily while taking the steroids to reduce risk of gastritis or peptic ulcer disease.  Return precautions were discussed.     Irvin Nathan MD  04/23/22 9133

## 2022-04-23 NOTE — ED NOTES
Pt to ED with c/p pain to L anterior and posterior hip, pain started after sleep 2 weeks ago, states was given flexeril and was doing better, saw chiropractor and is getting worse again, denies falls or injuries, still able to walk at home, denies numbness/tingling. Pt is on eliquis. Out of flexeril now, states tylenol not helping pain     Pt wearing face mask during their stay in ER. This RN wore capr and gloves while providing patient care.

## 2022-04-23 NOTE — ED NOTES
Pt arrives via [facility] from [facility] with c/o back pain, L leg pain and swelling after seeing chiropractor earlier this week. [facility] concerned she might have a blood clot, hx of PE several hears ago. Pt a&ox4, abc's intact, NAD noted, ambulatory to triage.    Patient wearing mask during triage. RN wearing appropriate PPE during triage. Hand hygiene performed.

## 2022-04-25 ENCOUNTER — TELEPHONE (OUTPATIENT)
Dept: ONCOLOGY | Facility: CLINIC | Age: 77
End: 2022-04-25

## 2022-04-25 NOTE — TELEPHONE ENCOUNTER
Caller: Marylu Foreman    Relationship: Self    Best call back number: [phone]    What is the best time to reach you: ANYTIME    Who are you requesting to speak with (clinical staff, provider,  specific staff member): SCHEDULING    What was the call regarding: PT CALLED TO R/S HER 4/26 LAB AND RN REVIEW. SHE STATED SHE WAS IN THE ER AND IS ON PAIN MEDS FOR A PINCHED NERVE. WOULD LIKE TO R/S FOR ONE DAY NEXT WEEK WHEN SHE IS OFF PAIN MEDS AND OK TO DRIVE.     PLEASE CALL TO ADVISE.     Do you require a callback: YES

## 2022-04-26 ENCOUNTER — APPOINTMENT (OUTPATIENT)
Dept: OTHER | Facility: HOSPITAL | Age: 77
End: 2022-04-26

## 2022-04-26 ENCOUNTER — APPOINTMENT (OUTPATIENT)
Dept: ONCOLOGY | Facility: HOSPITAL | Age: 77
End: 2022-04-26

## 2022-04-27 ENCOUNTER — TELEPHONE (OUTPATIENT)
Dept: ONCOLOGY | Facility: CLINIC | Age: 77
End: 2022-04-27

## 2022-04-27 NOTE — TELEPHONE ENCOUNTER
Caller: Marylu Foreman    Relationship to patient: Self    Best call back number: [phone]    Chief complaint: PATIENT NEEDS TO RESCHEDULE. HUB UNABLE TO DUE TO PATIENT IN ACTIVE TREATMENT    Type of visit: LAB AND RN REVIEW    Requested date: 5-2-22 ANY TIME OR 5-5-22    If rescheduling, when is the original appointment: 5-3-22

## 2022-05-03 ENCOUNTER — APPOINTMENT (OUTPATIENT)
Dept: ONCOLOGY | Facility: HOSPITAL | Age: 77
End: 2022-05-03

## 2022-05-03 ENCOUNTER — APPOINTMENT (OUTPATIENT)
Dept: OTHER | Facility: HOSPITAL | Age: 77
End: 2022-05-03

## 2022-05-04 ENCOUNTER — CLINICAL SUPPORT (OUTPATIENT)
Dept: ONCOLOGY | Facility: HOSPITAL | Age: 77
End: 2022-05-04

## 2022-05-04 ENCOUNTER — LAB (OUTPATIENT)
Dept: OTHER | Facility: HOSPITAL | Age: 77
End: 2022-05-04

## 2022-05-04 VITALS
HEART RATE: 91 BPM | RESPIRATION RATE: 18 BRPM | BODY MASS INDEX: 32.99 KG/M2 | SYSTOLIC BLOOD PRESSURE: 116 MMHG | OXYGEN SATURATION: 100 % | HEIGHT: 65 IN | WEIGHT: 198 LBS | TEMPERATURE: 97.2 F | DIASTOLIC BLOOD PRESSURE: 82 MMHG

## 2022-05-04 DIAGNOSIS — I10 ESSENTIAL HYPERTENSION: ICD-10-CM

## 2022-05-04 DIAGNOSIS — C50.812 MALIGNANT NEOPLASM OF OVERLAPPING SITES OF LEFT BREAST IN FEMALE, ESTROGEN RECEPTOR POSITIVE: ICD-10-CM

## 2022-05-04 DIAGNOSIS — N18.30 ANEMIA DUE TO STAGE 3 CHRONIC KIDNEY DISEASE, UNSPECIFIED WHETHER STAGE 3A OR 3B CKD: ICD-10-CM

## 2022-05-04 DIAGNOSIS — Z17.0 MALIGNANT NEOPLASM OF OVERLAPPING SITES OF LEFT BREAST IN FEMALE, ESTROGEN RECEPTOR POSITIVE: ICD-10-CM

## 2022-05-04 DIAGNOSIS — D63.1 ANEMIA DUE TO STAGE 3 CHRONIC KIDNEY DISEASE, UNSPECIFIED WHETHER STAGE 3A OR 3B CKD: ICD-10-CM

## 2022-05-04 LAB
BASOPHILS # BLD AUTO: 0.11 10*3/MM3 (ref 0–0.2)
BASOPHILS NFR BLD AUTO: 1 % (ref 0–1.5)
DEPRECATED RDW RBC AUTO: 56.3 FL (ref 37–54)
EOSINOPHIL # BLD AUTO: 0.32 10*3/MM3 (ref 0–0.4)
EOSINOPHIL NFR BLD AUTO: 2.9 % (ref 0.3–6.2)
ERYTHROCYTE [DISTWIDTH] IN BLOOD BY AUTOMATED COUNT: 16 % (ref 12.3–15.4)
HCT VFR BLD AUTO: 37.7 % (ref 34–46.6)
HGB BLD-MCNC: 12 G/DL (ref 12–15.9)
IMM GRANULOCYTES # BLD AUTO: 0.04 10*3/MM3 (ref 0–0.05)
IMM GRANULOCYTES NFR BLD AUTO: 0.4 % (ref 0–0.5)
LYMPHOCYTES # BLD AUTO: 2.6 10*3/MM3 (ref 0.7–3.1)
LYMPHOCYTES NFR BLD AUTO: 23.3 % (ref 19.6–45.3)
MCH RBC QN AUTO: 30.3 PG (ref 26.6–33)
MCHC RBC AUTO-ENTMCNC: 31.8 G/DL (ref 31.5–35.7)
MCV RBC AUTO: 95.2 FL (ref 79–97)
MONOCYTES # BLD AUTO: 1.19 10*3/MM3 (ref 0.1–0.9)
MONOCYTES NFR BLD AUTO: 10.6 % (ref 5–12)
NEUTROPHILS NFR BLD AUTO: 6.92 10*3/MM3 (ref 1.7–7)
NEUTROPHILS NFR BLD AUTO: 61.8 % (ref 42.7–76)
NRBC BLD AUTO-RTO: 0 /100 WBC (ref 0–0.2)
PLATELET # BLD AUTO: 370 10*3/MM3 (ref 140–450)
PMV BLD AUTO: 11.3 FL (ref 6–12)
RBC # BLD AUTO: 3.96 10*6/MM3 (ref 3.77–5.28)
WBC NRBC COR # BLD: 11.18 10*3/MM3 (ref 3.4–10.8)

## 2022-05-04 PROCEDURE — G0463 HOSPITAL OUTPT CLINIC VISIT: HCPCS

## 2022-05-04 PROCEDURE — 85025 COMPLETE CBC W/AUTO DIFF WBC: CPT | Performed by: INTERNAL MEDICINE

## 2022-05-04 PROCEDURE — 36415 COLL VENOUS BLD VENIPUNCTURE: CPT

## 2022-05-04 NOTE — NURSING NOTE
Lab Results   Component Value Date    WBC 11.18 (H) 05/04/2022    HGB 12.0 05/04/2022    HCT 37.7 05/04/2022    MCV 95.2 05/04/2022     05/04/2022     Patient here for CBC with RN review.  Patient states that she went to ER the other night due to a back problem and was prescribed steroids.  Patient states that symptoms improving.  Patient having no other new complaints.  CBC stable for patient at this time.  Reviewed CBC with patient.  Advised patient to keep appts as scheduled and to contact office sooner with any questions/concerns.  Patient v/u and was discharged in stable condition.

## 2022-05-09 ENCOUNTER — TELEPHONE (OUTPATIENT)
Dept: INTERNAL MEDICINE | Facility: CLINIC | Age: 77
End: 2022-05-09

## 2022-05-09 NOTE — TELEPHONE ENCOUNTER
Caller: Marylu Foreman    Relationship: Self    Best call back number: [phone]     What medication are you requesting:     PATIENT WAS GIVEN A STEROID, PAIN PILLS AND A MUSCLE RELAXER' AT THE EMERGENCY ROOM TWO WEEKS AGO.  THEY SEEMED TO HELP DURING THE TIME FRAME IN WHICH SHE WAS TAKING THE MEDICATION.  HOWEVER OVER THE WEEKEND SHE HAD A REOCCURRENCE OF THE MAJOR BACK PAIN.  THEY SAID SHE WOULD MOST LIKELY NEED A MRI OF HER BACK FOR A PINCHED NERVE.    What are your current symptoms:     BURNING AND PAIN IN THE LEFT HIP JOINT GOING DOWN TO THE LEFT LEG    How long have you been experiencing symptoms: 2+ WEEKS    Have you had these symptoms before:    [x] Yes  [] No    Have you been treated for these symptoms before:   [x] Yes  [] No    If a prescription is needed, what is your preferred pharmacy and phone number: [id number] [place], [place] - [address]  AT [facility] [address] & [facility] [phone] [facility] [phone] FX     Additional notes:    PLEASE CALL PATIENT AND ADVISE.  SHE STATES THAT IT HURTS TO SIT AND EVEN WORSE TO LAY DOWN TO SLEEP.

## 2022-05-10 ENCOUNTER — HOSPITAL ENCOUNTER (OUTPATIENT)
Dept: GENERAL RADIOLOGY | Facility: HOSPITAL | Age: 77
Discharge: HOME | End: 2022-05-10
Admitting: INTERNAL MEDICINE

## 2022-05-10 ENCOUNTER — OFFICE VISIT (OUTPATIENT)
Dept: INTERNAL MEDICINE | Facility: CLINIC | Age: 77
End: 2022-05-10

## 2022-05-10 VITALS
DIASTOLIC BLOOD PRESSURE: 74 MMHG | BODY MASS INDEX: 33.42 KG/M2 | HEIGHT: 65 IN | HEART RATE: 76 BPM | TEMPERATURE: 98 F | WEIGHT: 200.6 LBS | OXYGEN SATURATION: 97 % | SYSTOLIC BLOOD PRESSURE: 160 MMHG

## 2022-05-10 DIAGNOSIS — I10 ESSENTIAL HYPERTENSION: Primary | Chronic | ICD-10-CM

## 2022-05-10 DIAGNOSIS — E78.5 HYPERLIPIDEMIA, UNSPECIFIED HYPERLIPIDEMIA TYPE: ICD-10-CM

## 2022-05-10 DIAGNOSIS — M54.41 ACUTE LEFT-SIDED LOW BACK PAIN WITH RIGHT-SIDED SCIATICA: ICD-10-CM

## 2022-05-10 DIAGNOSIS — M54.16 LUMBAR RADICULOPATHY: ICD-10-CM

## 2022-05-10 PROCEDURE — 72100 X-RAY EXAM L-S SPINE 2/3 VWS: CPT

## 2022-05-10 PROCEDURE — 99214 OFFICE O/P EST MOD 30 MIN: CPT | Performed by: INTERNAL MEDICINE

## 2022-05-10 RX ORDER — METHYLPREDNISOLONE 4 MG/1
TABLET ORAL
Qty: 1 EACH | Refills: 0 | Status: SHIPPED | OUTPATIENT
Start: 2022-05-10 | End: 2022-05-26

## 2022-05-10 RX ORDER — IRBESARTAN 150 MG/1
150 TABLET ORAL NIGHTLY
Qty: 30 TABLET | Refills: 2 | OUTPATIENT
Start: 2022-05-10

## 2022-05-10 RX ORDER — TRAMADOL HYDROCHLORIDE 50 MG/1
50 TABLET ORAL EVERY 8 HOURS PRN
Qty: 30 TABLET | Refills: 0 | Status: SHIPPED | OUTPATIENT
Start: 2022-05-10 | End: 2022-07-13

## 2022-05-10 NOTE — PROGRESS NOTES
Subjective   Marylu Foreman is a 76 y.o. female here today for left hip pain that radiates down the left leg.      History of Present Illness   Left leg pain and weakness.  She says sx started 6 weeks ago  Started after a day of standing all day  She says its a burning pain getting worse daily with more weakness  She is concerned she might fall as balance and strength is off  Sometimes she cannot sleep at night due to the pain.  She really said the steroids helped a lot but as soon as she was often pain got worse.  Tramadol did help with the pain.  The muscle relaxants did not help at all  The following portions of the patient's history were reviewed and updated as appropriate: allergies, current medications, past medical history, past surgical history and problem list.  No tob no etoh    Review of Systems no bladder or bowel incontinence    Objective   Physical Exam  Vitals reviewed.   Constitutional:       Appearance: She is well-developed.   HENT:      Head: Normocephalic and atraumatic.      Right Ear: External ear normal.      Left Ear: External ear normal.   Eyes:      Conjunctiva/sclera: Conjunctivae normal.      Pupils: Pupils are equal, round, and reactive to light.   Neck:      Thyroid: No thyromegaly.      Trachea: No tracheal deviation.   Cardiovascular:      Rate and Rhythm: Normal rate and regular rhythm.      Heart sounds: Normal heart sounds.   Pulmonary:      Effort: Pulmonary effort is normal.      Breath sounds: Normal breath sounds.   Abdominal:      General: Bowel sounds are normal. There is no distension.      Palpations: Abdomen is soft.      Tenderness: There is no abdominal tenderness.   Musculoskeletal:         General: No deformity. Normal range of motion.      Cervical back: Normal range of motion.      Comments: Straight leg raise positive on the left.  She has 4 out of 5 strength with hip flexion on that side   Skin:     General: Skin is warm and dry.   Neurological:      Mental Status:  She is alert and oriented to person, place, and time.   Psychiatric:         Behavior: Behavior normal.         Thought Content: Thought content normal.         Judgment: Judgment normal.         Vitals:    05/10/22 0908   BP: 160/74   Pulse: 76   Temp: 98 °F (36.7 °C)   SpO2: 97%     Body mass index is 33.38 kg/m².       Current Outpatient Medications   Medication Instructions   • buPROPion XL (WELLBUTRIN XL) 150 MG 24 hr tablet 1 tablet, Oral, Daily   • Cholecalciferol (VITAMIN D PO) 1 tablet, Oral, Daily   • Coenzyme Q10 (CO Q 10 PO) 200 mg, Oral, Daily   • cyclobenzaprine (FLEXERIL) 10 MG tablet TAKE 1 TABLET BY MOUTH THREE TIMES DAILY AS NEEDED FOR MUSCLE SPASMS   • dilTIAZem CD (CARDIZEM CD) 180 mg, Oral, Daily   • DULoxetine (CYMBALTA) 60 mg, Oral, 2 Times Daily   • Eliquis 2.5 mg, Oral, 2 Times Daily   • irbesartan (AVAPRO) 300 mg, Oral, Nightly   • levothyroxine (SYNTHROID, LEVOTHROID) 50 mcg, Oral, Daily   • methylPREDNISolone (MEDROL) 4 MG dose pack Take as directed on package instructions.   • Multiple Vitamin (MULTI-VITAMIN PO) 1 tablet, Oral, Daily   • omeprazole (PRILOSEC) 20 mg, Oral, Daily   • Probiotic Product (PROBIOTIC PO) Oral, Daily, Lactobacillus rhamnosus GG     • traMADol (ULTRAM) 50 mg, Oral, Every 8 Hours PRN   • vitamin E 400 Units, Oral, Daily         [unfilled]  Diagnoses and all orders for this visit:    1. Essential hypertension (Primary)    2. Hyperlipidemia, unspecified hyperlipidemia type    3. Acute left-sided low back pain with right-sided sciatica  -     Cancel: MRI Lumbar Spine Without Contrast; Future  -     Ambulatory Referral to Pain Management  -     MRI Lumbar Spine Without Contrast    4. Lumbar radiculopathy  -     traMADol (ULTRAM) 50 MG tablet; Take 1 tablet by mouth Every 8 (Eight) Hours As Needed for Moderate Pain .  Dispense: 30 tablet; Refill: 0  -     XR Spine Lumbar 2 or 3 View    Other orders  -     methylPREDNISolone (MEDROL) 4 MG dose pack; Take as  directed on package instructions.  Dispense: 1 each; Refill: 0      1.  Low back pain: She has now had this for almost 6 weeks.  She said it acutely happened after she was standing all day long.  She said initially the pain was more localized to her back now it is in her posterior hip and a burning pain going all the way down her leg.  I am concerned because she does have some weakness in that leg.  I will go ahead and do another round of steroid she is going to watch her sugars carefully and I have refilled the tramadol.  We will get an x-ray of the spine.  I have ordered an MRI and she likely needs pain management.  She does not have a mechanism of a compression fracture but we need to rule out.

## 2022-05-25 ENCOUNTER — TELEPHONE (OUTPATIENT)
Dept: INTERNAL MEDICINE | Facility: CLINIC | Age: 77
End: 2022-05-25

## 2022-05-25 DIAGNOSIS — N18.9 CHRONIC RENAL IMPAIRMENT, UNSPECIFIED CKD STAGE: ICD-10-CM

## 2022-05-25 DIAGNOSIS — I10 ESSENTIAL HYPERTENSION: Primary | ICD-10-CM

## 2022-05-25 NOTE — TELEPHONE ENCOUNTER
Labs ordered     ----- Message from Arleen Dillon MD sent at 5/25/2022  7:11 AM EDT -----  Cmp cbc  ----- Message -----  From: Nydai Watson MA  Sent: 5/24/2022   4:04 PM EDT  To: Arleen Dillon MD    PT SCHEDULED FOR LABS PLEASE Advise

## 2022-05-26 ENCOUNTER — HOSPITAL ENCOUNTER (OUTPATIENT)
Dept: PAIN MEDICINE | Facility: HOSPITAL | Age: 77
Discharge: HOME | End: 2022-05-26
Admitting: ANESTHESIOLOGY

## 2022-05-26 ENCOUNTER — ANESTHESIA (OUTPATIENT)
Dept: PAIN MEDICINE | Facility: HOSPITAL | Age: 77
End: 2022-05-26

## 2022-05-26 ENCOUNTER — ANESTHESIA EVENT (OUTPATIENT)
Dept: PAIN MEDICINE | Facility: HOSPITAL | Age: 77
End: 2022-05-26

## 2022-05-26 DIAGNOSIS — M54.16 LUMBAR RADICULOPATHY: Primary | ICD-10-CM

## 2022-05-26 PROCEDURE — G0463 HOSPITAL OUTPT CLINIC VISIT: HCPCS

## 2022-05-26 NOTE — ANESTHESIA PROCEDURE NOTES
Consult  Consult performed by: Michael Aguirre MD  Consult ordered by: Michael Aguirre MD  Assessment/Recommendations: Consult only, See H&P.
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
## 2022-05-27 LAB
ALBUMIN SERPL-MCNC: 4.3 G/DL (ref 3.7–4.7)
ALBUMIN/GLOB SERPL: 1.9 {RATIO} (ref 1.2–2.2)
ALP SERPL-CCNC: 124 IU/L (ref 44–121)
ALT SERPL-CCNC: 14 IU/L (ref 0–32)
AST SERPL-CCNC: 14 IU/L (ref 0–40)
BASOPHILS # BLD AUTO: 0.1 X10E3/UL (ref 0–0.2)
BASOPHILS NFR BLD AUTO: 1 %
BILIRUB SERPL-MCNC: 0.5 MG/DL (ref 0–1.2)
BUN SERPL-MCNC: 34 MG/DL (ref 8–27)
BUN/CREAT SERPL: 21 (ref 12–28)
CALCIUM SERPL-MCNC: 9.3 MG/DL (ref 8.7–10.3)
CHLORIDE SERPL-SCNC: 103 MMOL/L (ref 96–106)
CO2 SERPL-SCNC: 19 MMOL/L (ref 20–29)
CREAT SERPL-MCNC: 1.63 MG/DL (ref 0.57–1)
EGFRCR SERPLBLD CKD-EPI 2021: 33 ML/MIN/1.73
EOSINOPHIL # BLD AUTO: 0.3 X10E3/UL (ref 0–0.4)
EOSINOPHIL NFR BLD AUTO: 3 %
ERYTHROCYTE [DISTWIDTH] IN BLOOD BY AUTOMATED COUNT: 14.8 % (ref 11.7–15.4)
GLOBULIN SER CALC-MCNC: 2.3 G/DL (ref 1.5–4.5)
GLUCOSE SERPL-MCNC: 115 MG/DL (ref 65–99)
HCT VFR BLD AUTO: 36.4 % (ref 34–46.6)
HGB BLD-MCNC: 11.6 G/DL (ref 11.1–15.9)
IMM GRANULOCYTES # BLD AUTO: 0 X10E3/UL (ref 0–0.1)
IMM GRANULOCYTES NFR BLD AUTO: 0 %
LYMPHOCYTES # BLD AUTO: 2.8 X10E3/UL (ref 0.7–3.1)
LYMPHOCYTES NFR BLD AUTO: 28 %
MCH RBC QN AUTO: 30.9 PG (ref 26.6–33)
MCHC RBC AUTO-ENTMCNC: 31.9 G/DL (ref 31.5–35.7)
MCV RBC AUTO: 97 FL (ref 79–97)
MONOCYTES # BLD AUTO: 1.1 X10E3/UL (ref 0.1–0.9)
MONOCYTES NFR BLD AUTO: 11 %
NEUTROPHILS # BLD AUTO: 5.9 X10E3/UL (ref 1.4–7)
NEUTROPHILS NFR BLD AUTO: 57 %
PLATELET # BLD AUTO: 337 X10E3/UL (ref 150–450)
POTASSIUM SERPL-SCNC: 4.6 MMOL/L (ref 3.5–5.2)
PROT SERPL-MCNC: 6.6 G/DL (ref 6–8.5)
RBC # BLD AUTO: 3.76 X10E6/UL (ref 3.77–5.28)
SODIUM SERPL-SCNC: 138 MMOL/L (ref 134–144)
WBC # BLD AUTO: 10.2 X10E3/UL (ref 3.4–10.8)

## 2022-05-29 ENCOUNTER — HOSPITAL ENCOUNTER (EMERGENCY)
Facility: HOSPITAL | Age: 77
Discharge: HOME | End: 2022-05-29
Attending: EMERGENCY MEDICINE | Admitting: EMERGENCY MEDICINE

## 2022-05-29 VITALS
RESPIRATION RATE: 20 BRPM | TEMPERATURE: 98.8 F | SYSTOLIC BLOOD PRESSURE: 169 MMHG | HEIGHT: 66 IN | BODY MASS INDEX: 32.38 KG/M2 | HEART RATE: 87 BPM | OXYGEN SATURATION: 99 % | DIASTOLIC BLOOD PRESSURE: 101 MMHG

## 2022-05-29 DIAGNOSIS — M54.32 LEFT SIDED SCIATICA: ICD-10-CM

## 2022-05-29 DIAGNOSIS — M54.16 LUMBAR RADICULOPATHY: Primary | ICD-10-CM

## 2022-05-29 PROCEDURE — 99282 EMERGENCY DEPT VISIT SF MDM: CPT

## 2022-05-29 RX ORDER — CYCLOBENZAPRINE HCL 10 MG
10 TABLET ORAL 3 TIMES DAILY PRN
Qty: 15 TABLET | Refills: 0 | Status: SHIPPED | OUTPATIENT
Start: 2022-05-29 | End: 2022-06-03 | Stop reason: SDUPTHER

## 2022-05-29 RX ORDER — LIDOCAINE 50 MG/G
1 PATCH TOPICAL EVERY 24 HOURS
Qty: 10 EACH | Refills: 0 | Status: SHIPPED | OUTPATIENT
Start: 2022-05-29 | End: 2022-07-13

## 2022-06-01 ENCOUNTER — OFFICE VISIT (OUTPATIENT)
Dept: INTERNAL MEDICINE | Facility: CLINIC | Age: 77
End: 2022-06-01

## 2022-06-01 VITALS
WEIGHT: 202 LBS | HEART RATE: 62 BPM | HEIGHT: 66 IN | BODY MASS INDEX: 32.47 KG/M2 | OXYGEN SATURATION: 98 % | SYSTOLIC BLOOD PRESSURE: 136 MMHG | TEMPERATURE: 96.9 F | DIASTOLIC BLOOD PRESSURE: 84 MMHG

## 2022-06-01 DIAGNOSIS — M25.552 LEFT HIP PAIN: ICD-10-CM

## 2022-06-01 DIAGNOSIS — I10 ESSENTIAL HYPERTENSION: Primary | Chronic | ICD-10-CM

## 2022-06-01 PROCEDURE — 99214 OFFICE O/P EST MOD 30 MIN: CPT | Performed by: INTERNAL MEDICINE

## 2022-06-01 RX ORDER — GABAPENTIN 100 MG/1
CAPSULE ORAL
Qty: 90 CAPSULE | Refills: 1 | Status: SHIPPED | OUTPATIENT
Start: 2022-06-01 | End: 2022-08-12 | Stop reason: HOSPADM

## 2022-06-01 NOTE — PROGRESS NOTES
Subjective   Marylu Foreman is a 76 y.o. female here today to f/u on pain and HTN.      History of Present Illness   Pt with worsening left hip pain radiaitng down the left leg into the foot    Constant pain that is worse when she lays down.  Walking and standing does help  She has seen pain but they cannot do injection    The following portions of the patient's history were reviewed and updated as appropriate: allergies, current medications, past medical history, past social history and problem list.  No tob no etoh    Review of Systems    Objective   Physical Exam  Vitals reviewed.   Constitutional:       Appearance: She is well-developed.   HENT:      Head: Normocephalic and atraumatic.      Right Ear: External ear normal.      Left Ear: External ear normal.   Eyes:      Conjunctiva/sclera: Conjunctivae normal.      Pupils: Pupils are equal, round, and reactive to light.   Neck:      Thyroid: No thyromegaly.      Trachea: No tracheal deviation.   Cardiovascular:      Rate and Rhythm: Normal rate and regular rhythm.      Heart sounds: Normal heart sounds.   Pulmonary:      Effort: Pulmonary effort is normal.      Breath sounds: Normal breath sounds.   Abdominal:      General: Bowel sounds are normal. There is no distension.      Palpations: Abdomen is soft.      Tenderness: There is no abdominal tenderness.   Musculoskeletal:         General: No deformity. Normal range of motion.      Cervical back: Normal range of motion.   Skin:     General: Skin is warm and dry.   Neurological:      Mental Status: She is alert and oriented to person, place, and time.   Psychiatric:         Behavior: Behavior normal.         Thought Content: Thought content normal.         Judgment: Judgment normal.         Vitals:    06/01/22 1137   BP: 136/84   Pulse: 62   Temp: 96.9 °F (36.1 °C)   SpO2: 98%     Body mass index is 32.6 kg/m².       Current Outpatient Medications:   •  apixaban (ELIQUIS) 2.5 MG tablet tablet, Take 1 tablet by  mouth 2 (Two) Times a Day. Indications: Atrial Fibrillation, Disp: 60 tablet, Rfl: 0  •  buPROPion XL (WELLBUTRIN XL) 150 MG 24 hr tablet, Take 1 tablet by mouth Daily., Disp: , Rfl:   •  Cholecalciferol (VITAMIN D PO), Take 1 tablet by mouth Daily., Disp: , Rfl:   •  Coenzyme Q10 (CO Q 10 PO), Take 200 mg by mouth Daily., Disp: , Rfl:   •  Cyanocobalamin (VITAMIN B 12 PO), Take 1 tablet/day by mouth., Disp: , Rfl:   •  cyclobenzaprine (FLEXERIL) 10 MG tablet, Take 1 tablet by mouth 3 (Three) Times a Day As Needed for Muscle Spasms., Disp: 15 tablet, Rfl: 0  •  dilTIAZem CD (CARDIZEM CD) 180 MG 24 hr capsule, Take 180 mg by mouth Daily., Disp: , Rfl:   •  DULoxetine (CYMBALTA) 60 MG capsule, Take 60 mg by mouth 2 (Two) Times a Day., Disp: , Rfl:   •  irbesartan (Avapro) 300 MG tablet, Take 1 tablet by mouth Every Night., Disp: 90 tablet, Rfl: 1  •  levothyroxine (SYNTHROID, LEVOTHROID) 50 MCG tablet, Take 50 mcg by mouth Daily., Disp: , Rfl:   •  lidocaine (LIDODERM) 5 %, Place 1 patch on the skin as directed by provider Daily. Remove & Discard patch within 12 hours or as directed by MD, Disp: 10 each, Rfl: 0  •  Multiple Vitamin (MULTI-VITAMIN PO), Take 1 tablet by mouth daily., Disp: , Rfl:   •  Probiotic Product (PROBIOTIC PO), Take  by mouth Daily. Lactobacillus rhamnosus GG, Disp: , Rfl:   •  vitamin E 400 UNIT capsule, Take 400 Units by mouth Daily., Disp: , Rfl:   •  gabapentin (NEURONTIN) 100 MG capsule, Start with 1 at night may increase to 3 if needed, Disp: 90 capsule, Rfl: 1  •  traMADol (ULTRAM) 50 MG tablet, Take 1 tablet by mouth Every 8 (Eight) Hours As Needed for Moderate Pain ., Disp: 30 tablet, Rfl: 0      Assessment & Plan   Diagnoses and all orders for this visit:    1. Essential hypertension (Primary)    2. Left hip pain  -     Ambulatory Referral to Physical Therapy Evaluate and treat  -     gabapentin (NEURONTIN) 100 MG capsule; Start with 1 at night may increase to 3 if needed  Dispense: 90  capsule; Refill: 1      1.  Left hip pain  Coming from the back and radiates to the foot.   Pain makes her not able to sleep at night  Will start with gabapentin 100mg at night  increase as needed. She thinks the flexeril does help  She really wants to do PT  2. HTN-ok with current meds

## 2022-06-03 ENCOUNTER — TELEPHONE (OUTPATIENT)
Dept: INTERNAL MEDICINE | Facility: CLINIC | Age: 77
End: 2022-06-03

## 2022-06-03 RX ORDER — CYCLOBENZAPRINE HCL 10 MG
10 TABLET ORAL 3 TIMES DAILY PRN
Qty: 30 TABLET | Refills: 0 | Status: SHIPPED | OUTPATIENT
Start: 2022-06-03 | End: 2022-07-13

## 2022-06-03 NOTE — TELEPHONE ENCOUNTER
I sent flexeril    ----- Message from Rhonda Head sent at 6/3/2022  7:24 AM EDT -----  Regarding: FW: Medication to control pain  Is it ok to sent flexeril?    ----- Message -----  From: Marylu Foreman  Sent: 6/3/2022   4:32 AM EDT  To: [facility] [address]  Subject: Medication to control pain                       Dr. Dillon,  I have taken Gabapentin but I have no Flexcril left to take.  I will need a prescription sent to [place] [facility].  It is 4:00 am and I have not slept because the leg and foot pain is so bad   I have increased the Gabapentin to three tonight in hope it would ease the pain.   No relief.  I need sleep so bad.  Will you please call in some type of pain medication? I have left a message on Thursday to get into physical therapy. Thank you so much.  Marylu Foreman

## 2022-06-03 NOTE — TELEPHONE ENCOUNTER
PT AWARE WE SENT IN FLEXERIL AND GABAPENTIN. I SUGGEST HER TO TRY THOSE FIRST AND LET US KNOW. SHE IS GOING TO CHECK OTHER PLACED FOR PHYSICAL THERAPY IF SHE CAN GET IN SOONER THEN SHE WILL LET ME KNOW AFTER THAT I WILL FAX THE ORDER.

## 2022-06-03 NOTE — TELEPHONE ENCOUNTER
Caller: Marylu Foreman    Relationship: Self  Best call back number: [phone]   What is the best time to reach you: ANY TIME     Who are you requesting to speak with (clinical staff, provider,  specific staff member): CLINICAL/DR. RUBIN    What was the call regarding: PATIENT IS CALLING TO REQUEST PAIN MEDICATION REGARDING THE PAIN SHE IS HAVING IN HER LEFT LEG FROM THE PROTRUDING DISC IN HER BACK.    PATIENT WAS TOLD TO SCHEDULE [facility] AND [facility] 8TH.  PATIENT WOULD LIKE TO KNOW IF DR. RUBIN HAS ANOTHER PHYSICAL THERAPY FACILITY CLOSE TO HER THAT SHE CAN TRY GETTING A SOONER APPOINTMENT.   PLEASE CONTACT PATIENT TO ADVISE HER ABOUT THE PAIN MEDICATION AND ANOTHER P.T. FACILITY.     Do you require a callback: YES

## 2022-06-06 DIAGNOSIS — M21.612 BUNION OF LEFT FOOT: Primary | ICD-10-CM

## 2022-06-14 ENCOUNTER — TELEPHONE (OUTPATIENT)
Dept: INTERNAL MEDICINE | Facility: CLINIC | Age: 77
End: 2022-06-14

## 2022-06-14 DIAGNOSIS — M25.552 LEFT HIP PAIN: ICD-10-CM

## 2022-06-14 DIAGNOSIS — M54.42 ACUTE LEFT-SIDED LOW BACK PAIN WITH LEFT-SIDED SCIATICA: ICD-10-CM

## 2022-06-14 DIAGNOSIS — M21.372 FOOT DROP, LEFT: ICD-10-CM

## 2022-06-14 DIAGNOSIS — M21.372 LEFT FOOT DROP: Primary | ICD-10-CM

## 2022-06-14 DIAGNOSIS — M48.061 SPINAL STENOSIS OF LUMBAR REGION, UNSPECIFIED WHETHER NEUROGENIC CLAUDICATION PRESENT: Primary | ICD-10-CM

## 2022-06-14 NOTE — TELEPHONE ENCOUNTER
"Referral ordered for Ortho    ----- Message from Marylu Foreman sent at 6/13/2022 10:15 PM EDT -----  Regarding: Referral Request  I saw my chiropractor last Saturday, and he said I now have what is called \"drop foot.\"  I live by myself and have fallen several times.  He suggested that I could have probably gotten  earlier appointments if I hadn't called for an appointment as an individual.  He suggested that I ask Dr. Dillon's office to call and see if I could get in sooner:  These are my current appointments :               Dr. Reed - 6/30 @ 2:00               [facility] 7/9 @ 11:00    In the last week I have lost the ability to raise my left foot.  It has really affected how I walk.  I have electric shock-like pain in that lower leg and foot.  Any help you can do to get sooner appointments will be greatly appreciated.  Thanks so very much!!  Marylu Foreman    "

## 2022-06-14 NOTE — TELEPHONE ENCOUNTER
REFERRAL ORDERED FOR NEUROSURGERY    ----- Message from Arleen Dillon MD sent at 6/14/2022  1:02 PM EDT -----  Regarding: RE: REFERRAL  I discussed this with the patient.  Refer ns for sever spinal stenosis symptoms are getting worse now with foot drop on the left that is new in the past week   no bladder or bowel incontinence.  She can keep the appointment with the foot and ankle specialist though I do not believe this pain in her foot given the new neurologic symptoms is related to her bunion though they do sometimes have braces they can use to help with foot drop while we get this evaluated by neurosurgery.    ----- Message -----  From: Nydia Watson MA  Sent: 6/14/2022  12:43 PM EDT  To: Arleen Dillon MD  Subject: RE: REFERRAL                                     The pt scheduled with francesca. I was just trying to do a referral for her to be seen sooner.   ----- Message -----  From: Arleen Dillon MD  Sent: 6/14/2022  12:20 PM EDT  To: Candie Cristina, #  Subject: RE: REFERRAL                                     There are literally 8 calls with messages since her last visit.  Schedule an appointment   ----- Message -----  From: Claudia Middleton RegSched Rep  Sent: 6/14/2022  11:34 AM EDT  To: Arleen Dillon MD  Subject: REFERRAL                                         I was just questioning the Referral to Dr. Reed with diagnosis of foot drop.  Looks like she has a bad spine based on her MRI so I'm wondering if this caused by her back back.   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  Just checking if you wanted to switch this to Neuro or wanted to keep it with ?

## 2022-06-20 ENCOUNTER — LAB (OUTPATIENT)
Dept: OTHER | Facility: HOSPITAL | Age: 77
End: 2022-06-20

## 2022-06-20 ENCOUNTER — OFFICE VISIT (OUTPATIENT)
Dept: ONCOLOGY | Facility: CLINIC | Age: 77
End: 2022-06-20

## 2022-06-20 VITALS
OXYGEN SATURATION: 95 % | HEART RATE: 78 BPM | BODY MASS INDEX: 32.24 KG/M2 | WEIGHT: 200.6 LBS | RESPIRATION RATE: 16 BRPM | SYSTOLIC BLOOD PRESSURE: 142 MMHG | HEIGHT: 66 IN | DIASTOLIC BLOOD PRESSURE: 81 MMHG | TEMPERATURE: 97.1 F

## 2022-06-20 DIAGNOSIS — Z17.0 MALIGNANT NEOPLASM OF OVERLAPPING SITES OF LEFT BREAST IN FEMALE, ESTROGEN RECEPTOR POSITIVE: Primary | ICD-10-CM

## 2022-06-20 DIAGNOSIS — N18.30 ANEMIA DUE TO STAGE 3 CHRONIC KIDNEY DISEASE, UNSPECIFIED WHETHER STAGE 3A OR 3B CKD: ICD-10-CM

## 2022-06-20 DIAGNOSIS — Z17.0 MALIGNANT NEOPLASM OF OVERLAPPING SITES OF LEFT BREAST IN FEMALE, ESTROGEN RECEPTOR POSITIVE: ICD-10-CM

## 2022-06-20 DIAGNOSIS — Z12.31 ENCOUNTER FOR SCREENING MAMMOGRAM FOR MALIGNANT NEOPLASM OF BREAST: ICD-10-CM

## 2022-06-20 DIAGNOSIS — C50.812 MALIGNANT NEOPLASM OF OVERLAPPING SITES OF LEFT BREAST IN FEMALE, ESTROGEN RECEPTOR POSITIVE: ICD-10-CM

## 2022-06-20 DIAGNOSIS — D63.1 ANEMIA DUE TO STAGE 3 CHRONIC KIDNEY DISEASE, UNSPECIFIED WHETHER STAGE 3A OR 3B CKD: ICD-10-CM

## 2022-06-20 DIAGNOSIS — C50.812 MALIGNANT NEOPLASM OF OVERLAPPING SITES OF LEFT BREAST IN FEMALE, ESTROGEN RECEPTOR POSITIVE: Primary | ICD-10-CM

## 2022-06-20 LAB
ALBUMIN SERPL-MCNC: 4.4 G/DL (ref 3.5–5.2)
ALBUMIN/GLOB SERPL: 1.6 G/DL
ALP SERPL-CCNC: 132 U/L (ref 39–117)
ALT SERPL W P-5'-P-CCNC: 16 U/L (ref 1–33)
ANION GAP SERPL CALCULATED.3IONS-SCNC: 11.7 MMOL/L (ref 5–15)
AST SERPL-CCNC: 19 U/L (ref 1–32)
BASOPHILS # BLD AUTO: 0.11 10*3/MM3 (ref 0–0.2)
BASOPHILS NFR BLD AUTO: 1.1 % (ref 0–1.5)
BILIRUB SERPL-MCNC: 0.5 MG/DL (ref 0–1.2)
BUN SERPL-MCNC: 40 MG/DL (ref 8–23)
BUN/CREAT SERPL: 25 (ref 7–25)
CALCIUM SPEC-SCNC: 9.5 MG/DL (ref 8.6–10.5)
CHLORIDE SERPL-SCNC: 104 MMOL/L (ref 98–107)
CO2 SERPL-SCNC: 22.3 MMOL/L (ref 22–29)
CREAT SERPL-MCNC: 1.6 MG/DL (ref 0.57–1)
DEPRECATED RDW RBC AUTO: 55.1 FL (ref 37–54)
EGFRCR SERPLBLD CKD-EPI 2021: 33.5 ML/MIN/1.73
EOSINOPHIL # BLD AUTO: 0.23 10*3/MM3 (ref 0–0.4)
EOSINOPHIL NFR BLD AUTO: 2.3 % (ref 0.3–6.2)
ERYTHROCYTE [DISTWIDTH] IN BLOOD BY AUTOMATED COUNT: 15.3 % (ref 12.3–15.4)
FERRITIN SERPL-MCNC: 154 NG/ML (ref 13–150)
GLOBULIN UR ELPH-MCNC: 2.7 GM/DL
GLUCOSE SERPL-MCNC: 125 MG/DL (ref 65–99)
HCT VFR BLD AUTO: 35.1 % (ref 34–46.6)
HGB BLD-MCNC: 11.2 G/DL (ref 12–15.9)
IMM GRANULOCYTES # BLD AUTO: 0.02 10*3/MM3 (ref 0–0.05)
IMM GRANULOCYTES NFR BLD AUTO: 0.2 % (ref 0–0.5)
IRON 24H UR-MRATE: 108 MCG/DL (ref 37–145)
IRON SATN MFR SERPL: 25 % (ref 20–50)
LYMPHOCYTES # BLD AUTO: 2.97 10*3/MM3 (ref 0.7–3.1)
LYMPHOCYTES NFR BLD AUTO: 30.1 % (ref 19.6–45.3)
MCH RBC QN AUTO: 31.3 PG (ref 26.6–33)
MCHC RBC AUTO-ENTMCNC: 31.9 G/DL (ref 31.5–35.7)
MCV RBC AUTO: 98 FL (ref 79–97)
MONOCYTES # BLD AUTO: 0.74 10*3/MM3 (ref 0.1–0.9)
MONOCYTES NFR BLD AUTO: 7.5 % (ref 5–12)
NEUTROPHILS NFR BLD AUTO: 5.8 10*3/MM3 (ref 1.7–7)
NEUTROPHILS NFR BLD AUTO: 58.8 % (ref 42.7–76)
NRBC BLD AUTO-RTO: 0 /100 WBC (ref 0–0.2)
PLATELET # BLD AUTO: 431 10*3/MM3 (ref 140–450)
PMV BLD AUTO: 11.3 FL (ref 6–12)
POTASSIUM SERPL-SCNC: 4.7 MMOL/L (ref 3.5–5.2)
PROT SERPL-MCNC: 7.1 G/DL (ref 6–8.5)
RBC # BLD AUTO: 3.58 10*6/MM3 (ref 3.77–5.28)
SODIUM SERPL-SCNC: 138 MMOL/L (ref 136–145)
TIBC SERPL-MCNC: 426 MCG/DL (ref 298–536)
TRANSFERRIN SERPL-MCNC: 286 MG/DL (ref 200–360)
WBC NRBC COR # BLD: 9.87 10*3/MM3 (ref 3.4–10.8)

## 2022-06-20 PROCEDURE — 82728 ASSAY OF FERRITIN: CPT | Performed by: INTERNAL MEDICINE

## 2022-06-20 PROCEDURE — 80053 COMPREHEN METABOLIC PANEL: CPT | Performed by: INTERNAL MEDICINE

## 2022-06-20 PROCEDURE — 84466 ASSAY OF TRANSFERRIN: CPT | Performed by: INTERNAL MEDICINE

## 2022-06-20 PROCEDURE — 85025 COMPLETE CBC W/AUTO DIFF WBC: CPT | Performed by: INTERNAL MEDICINE

## 2022-06-20 PROCEDURE — 83540 ASSAY OF IRON: CPT | Performed by: INTERNAL MEDICINE

## 2022-06-20 PROCEDURE — 36415 COLL VENOUS BLD VENIPUNCTURE: CPT

## 2022-06-20 PROCEDURE — 99214 OFFICE O/P EST MOD 30 MIN: CPT | Performed by: INTERNAL MEDICINE

## 2022-06-20 NOTE — PROGRESS NOTES
"Chief Complaint  Stage I ([id number]) left breast cancer in 2007, pulmonary emboli in 2009, atrial fibrillation, history of GI bleed, history of iron deficiency status post prior gastric bypass in 2001    Subjective        History of Present Illness  Patient returns today in follow-up with laboratory studies to review.  In the interval she has had considerable difficulty with left-sided sciatica.  She is undergoing Doppler 4/23/2022 that was negative for DVT (continues on low-dose Eliquis 2.5 mg twice daily for atrial fibrillation), had plain film of the lumbar spine on 5/10/2022 that was negative.  She has been seen by her primary care physician and in the emergency department recently.  She underwent MRI lumbar spine at [facility] on 5/14/2022 that showed no evidence of metastatic disease however did show evidence of degenerative change with disc disease and neuroforaminal compromise.  She has been referred to neurosurgery and is scheduled to be seen in August.  She is continuing with physical therapy.  She developed a left foot drop just within the past week or so and is scheduled to see orthopedics Dr. Reed fairly soon.  Today she is in a wheelchair in the office and reports that she is ambulating at home with a cane.  She did have a fall in her driveway recently but no injury.  She was seen by pain management on 5/26/2022.  She is very frustrated regarding her situation.  She is on gabapentin prescribed by her primary care physician 100 mg nightly.      Objective   Vital Signs:   /81   Pulse 78   Temp 97.1 °F (36.2 °C)   Resp 16   Ht 167 cm (65.75\")   Wt 91 kg (200 lb 9.6 oz)   SpO2 95%   BMI 32.63 kg/m²     Physical Exam  Constitutional:       Appearance: She is well-developed.   Eyes:      Conjunctiva/sclera: Conjunctivae normal.   Neck:      Thyroid: No thyromegaly.   Cardiovascular:      Rate and Rhythm: Normal rate. Rhythm irregular.      Heart sounds: Murmur heard.     No friction " rub. No gallop.   Pulmonary:      Effort: No respiratory distress.      Breath sounds: Normal breath sounds. No wheezing.      Comments: Breast exam was not performed today  Chest:   Breasts:      Right: No supraclavicular adenopathy.      Left: No supraclavicular adenopathy.       Abdominal:      General: Bowel sounds are normal. There is no distension.      Palpations: Abdomen is soft.      Tenderness: There is no abdominal tenderness.   Lymphadenopathy:      Head:      Right side of head: No submandibular adenopathy.      Cervical: No cervical adenopathy.      Upper Body:      Right upper body: No supraclavicular adenopathy.      Left upper body: No supraclavicular adenopathy.   Skin:     General: Skin is warm and dry.      Findings: No rash.   Neurological:      Mental Status: She is alert and oriented to person, place, and time.      Cranial Nerves: No cranial nerve deficit.      Motor: No abnormal muscle tone.      Deep Tendon Reflexes: Reflexes normal.   Psychiatric:         Behavior: Behavior normal.            Result Review : Reviewed CBC, CMP, iron panel, ferritin from today.  Reviewed multiple records including emergency department records, records from PCP visits, plain film lumbar spine 5/10/2022, MRI lumbar spine 5/14/2022, lower extremity Doppler 4/23/2022.       Assessment and Plan     1. Stage I ([id number]) left breast cancer:  · Biopsy 5/8/2007 with in situ and invasive ductal carcinoma, grade 2,/UT positive, HER-2/jorge negative  · Left mastectomy 6/18/0742 foci carcinoma, 4 mm, grade 1 in situ and invasive ductal carcinoma and 2 mm grade 2 in situ and invasive ductal carcinoma, negative margins, negative sentinel lymph nodes x3 with 5 additional negative lymph nodes.  · Arimidex initiated 7/2/2007  · Arimidex interrupted patient developed bilateral pulmonary emboli in May 2009, resume shortly thereafter.  Completed 5 years treatment in 2012.  · Patient continues with no clinical evidence of recurrent  disease.  Exam on 11/15/2021 was negative, was not performed today.  Right-sided mammogram 10/1/2021 was negative, BI-RADS 1.  We will go ahead and schedule her next mammogram due in October 2022.  2. Pulmonary emboli 5/2/2009:  · Family history of DVT in the patient's mother occurring at age 70 postoperatively  · Patient developed bilateral lower lobe pulmonary emboli 5/2/2009 following right VATS on 4/30/2009  · Felt to be a provoked thrombosis related to surgery  · Hypercoagulable evaluation with negative factor V Leiden, negative prothrombin gene mutation, normal homocystine, protein C antigen 92, free to protein S 75, anticardiolipin antibody panel negative, lupus anticoagulant negative, Antithrombin %  · Continuing on chronic anticoagulation primarily for atrial fibrillation at this point as prior thrombosis was provoked.  · Transitioned from Coumadin to Eliquis 5 mg twice daily in May 2020  · At time of hospitalization for IJEOMA/CKD 11/15-11/18/2021, Eliquis dose was decreased to 2.5 mg twice daily.  · Creatinine improved into the 1.4-1.6 range, defer to cardiology in regards to increasing Eliquis back to full dose as she is receiving it primarily for this indication.  3. Iron deficiency anemia:  · Prior gastric bypass in 2001  · Intolerant of oral iron.  · Patient has required IV iron on multiple occasions: Feraheme 8/13 and 8/20/2018; Injectafer 3/9 and 3/16/2020  · Labs on 11/15/2021 showed decline in hemoglobin to 10.8 however this was in the setting of UTI and IJEOMA/CKD3.  Her iron panel was normal with iron 114, iron saturation 34%, TIBC 337 with ferritin elevated at 292.  B12 was normal at 883.  We continue to follow these values given her history of prior gastric bypass and iron deficiency requiring intermittent IV iron (intolerant of oral iron).   · On 12/20/2021, hemoglobin further declined to 9.5.  Repeat labs with iron 47, ferritin 262, iron saturation 14%, TIBC 328, B12 level 1162.  Additional  labs performed with negative serum protein electrophoresis and immunoelectrophoresis with normal quantitative immunoglobulins.  Free light chains elevated with kappa 73, lambda 36 with ratio 2.04, consistent with patient's degree of renal dysfunction.  Haptoglobin normal 186, LDH normal 171, CRP borderline elevated 0.62, erythropoietin level 20.1.  Anemia felt to be related to CKD3.  Patient felt to be a potential candidate for Procrit if hemoglobin remains below 10.  · Patient experienced improvement in hemoglobin into the 11-12 range  · Today, patient returns with stable hemoglobin at 11.2.  Iron studies indicate that she remains iron replete with iron 108, ferritin 154, iron saturation 25%, TIBC 426.  With hemoglobin stable in the current range we will plan for repeat CBC and RN review in 3 months and I will see her again in 6 months at which time we will repeat iron studies.  7. GI bleed in July 2018:  · Acute lower GI bleed with supratherapeutic INR Coumadin.  · Angiogram performed with coil embolization of artery to sigmoid colon  · No clinical evidence of further GI blood loss  8. Atrial fibrillation:  · Requires ongoing anticoagulation for this indication  · As above, transitioned from Coumadin to Eliquis 5 mg twice daily in May 2020  · As above, during hospitalization 11/15-11/18/2021 for IJEOMA/CKD3, Eliquis dose was decreased to 2.5 mg twice daily  · As above, defer to cardiology as to whether patient should increase to full dose Eliquis  9. Status post right VATS 4/30/2020 for pulmonary nodule with finding of necrotizing granulomatous inflammation  10. Severe depression/anxiety:  · Patient has had chronic issues with this, is followed by psychiatry, Dr. Librado Monique.  She also underwent previous counseling which she found helpful.  · The patient had ongoing difficulty with control of her depression.  She has been on multiple antidepressants  · In June/July 2021 patient underwent transcranial magnetic  stimulation (TMS) with significant improvement in depressive symptoms and resolution of headaches.  Symptoms subsequently gradually recurred, patient reports there has been difficulty with insurance regarding maintenance treatments.  · Patient reports symptoms have been somewhat improved recently  11. IJEOMA/CKD3  · Patient with CKD3 with baseline creatinine in the 1.3-1.8 range  · At time of routine follow-up visit 11/15/2021, creatinine was 3.19 and BUN of 59.  This was compared to prior value 6/2/2021 with BUN 41, creatinine 1.53.  · Patient was hospitalized 11/15-11/18/2021  · Patient is continuing follow-up with Dr. Cisneros in nephrology  · Creatinine on 11/24/2021 remained elevated at 2.43 however on 12/20/2021 declined to 1.7, near her prior baseline.  · Additional labs on 12/20/2021 with negative serum protein electrophoresis and immunoelectrophoresis with normal quantitative immunoglobulins.  Free light chains elevated with kappa 73, lambda 36 with ratio 2.04, consistent with patient's degree of renal dysfunction.  · Creatinine today stable at 1.6  12. ESBL E. coli UTI  · Urine culture positive on 11/6/2021 in urgent care, patient treated with 7 days nitrofurantoin (was sensitive on culture result)  · Patient with recurrent symptoms, was treated during recent hospitalization 11/15-11/18/2021 with Levaquin  13. Hypothyroidism   · Patient continues on levothyroxine 50 mcg daily  · Patient developed cold sensitivity, labs on 12/20/2021 with TSH 2.03 and free T4 1.07.  14. Severe left-sided sciatica  · Plain film of the lumbar spine on 5/10/2022 was negative.    · MRI lumbar spine at [facility] on 5/14/2022 showed no evidence of metastatic disease however did show evidence of degenerative change with disc disease and neuroforaminal compromise.    · She has been referred to neurosurgery and is scheduled to be seen in August.    · She is continuing with physical therapy.    · She developed a left foot drop  just within the past week or so and is scheduled to see orthopedics Dr. Reed fairly soon.    · Today she is in a wheelchair in the office and reports that she is ambulating at home with a cane.  She did have a fall in her driveway recently but no injury.  She was seen by pain management on 5/26/2022.  She is very frustrated regarding her situation.  She is on gabapentin prescribed by her primary care physician 100 mg nightly.  · There appears to be no evidence of metastatic malignancy in regards to her ongoing pain.  We will contact neurosurgery to see if her appointment could be moved forward given her severe recent difficulties in regards to her mobility.      Plan:  1. Patient continues with physical therapy  2. We will see if upcoming neurosurgery appointment could be moved forward due to development of foot drop and significant functional compromise from left-sided sciatica  3. Schedule annual mammogram on the right side and October 2022  4. In 3 months CBC and RN review  5. MD visit in 6 months with CBC, CMP, stat iron panel/ferritin.

## 2022-06-23 ENCOUNTER — TELEPHONE (OUTPATIENT)
Dept: ONCOLOGY | Facility: CLINIC | Age: 77
End: 2022-06-23

## 2022-06-23 NOTE — TELEPHONE ENCOUNTER
----- Message from Candie Alvaardo sent at 6/21/2022  4:57 PM EDT -----  Regarding: FW: You may already be working on it, but the mammo needs scheduling & neuro referral rescheduling, thanks    ----- Message -----  From: Miguelina Pascual RegSched Rep  Sent: 6/21/2022   7:53 AM EDT  To: Candie Alvarado  Subject: You may already be working on it, but the ma#

## 2022-06-30 ENCOUNTER — OFFICE VISIT (OUTPATIENT)
Dept: ORTHOPEDIC SURGERY | Facility: CLINIC | Age: 77
End: 2022-06-30

## 2022-06-30 VITALS — BODY MASS INDEX: 32.14 KG/M2 | HEIGHT: 66 IN | TEMPERATURE: 97.7 F | WEIGHT: 200 LBS

## 2022-06-30 DIAGNOSIS — M21.372 FOOT DROP, LEFT FOOT: ICD-10-CM

## 2022-06-30 DIAGNOSIS — M19.072 ARTHRITIS OF FIRST METATARSOPHALANGEAL (MTP) JOINT OF LEFT FOOT: Primary | ICD-10-CM

## 2022-06-30 DIAGNOSIS — R52 PAIN: ICD-10-CM

## 2022-06-30 DIAGNOSIS — M20.12 HALLUX VALGUS OF LEFT FOOT: ICD-10-CM

## 2022-06-30 DIAGNOSIS — M20.42 HAMMER TOE OF LEFT FOOT: ICD-10-CM

## 2022-06-30 DIAGNOSIS — M19.079 ARTHRITIS OF FOOT: ICD-10-CM

## 2022-06-30 PROCEDURE — 99204 OFFICE O/P NEW MOD 45 MIN: CPT | Performed by: ORTHOPAEDIC SURGERY

## 2022-06-30 PROCEDURE — 73630 X-RAY EXAM OF FOOT: CPT | Performed by: ORTHOPAEDIC SURGERY

## 2022-07-07 ENCOUNTER — TELEPHONE (OUTPATIENT)
Dept: INTERNAL MEDICINE | Facility: CLINIC | Age: 77
End: 2022-07-07

## 2022-07-07 NOTE — TELEPHONE ENCOUNTER
Spoke to Deedee at Dr. Sy office they are going to check with some of the other physicians in the office to see if they can get her in sooner, they will return my call as soon as they have a answer.

## 2022-07-08 ENCOUNTER — TELEPHONE (OUTPATIENT)
Dept: NEUROSURGERY | Facility: CLINIC | Age: 77
End: 2022-07-08

## 2022-07-13 ENCOUNTER — OFFICE VISIT (OUTPATIENT)
Dept: INTERNAL MEDICINE | Facility: CLINIC | Age: 77
End: 2022-07-13

## 2022-07-13 VITALS
HEIGHT: 66 IN | TEMPERATURE: 98.2 F | BODY MASS INDEX: 32.58 KG/M2 | WEIGHT: 202.7 LBS | SYSTOLIC BLOOD PRESSURE: 132 MMHG | HEART RATE: 77 BPM | OXYGEN SATURATION: 98 % | DIASTOLIC BLOOD PRESSURE: 78 MMHG

## 2022-07-13 DIAGNOSIS — I48.19 ATRIAL FIBRILLATION, PERSISTENT: Chronic | ICD-10-CM

## 2022-07-13 DIAGNOSIS — R73.09 ELEVATED GLUCOSE: ICD-10-CM

## 2022-07-13 DIAGNOSIS — M21.372 FOOT DROP, LEFT FOOT: ICD-10-CM

## 2022-07-13 DIAGNOSIS — M19.079 ARTHRITIS OF FOOT: ICD-10-CM

## 2022-07-13 DIAGNOSIS — I10 ESSENTIAL HYPERTENSION: Primary | Chronic | ICD-10-CM

## 2022-07-13 PROCEDURE — 99214 OFFICE O/P EST MOD 30 MIN: CPT | Performed by: INTERNAL MEDICINE

## 2022-07-13 RX ORDER — IRBESARTAN 300 MG/1
300 TABLET ORAL NIGHTLY
Qty: 90 TABLET | Refills: 1 | Status: SHIPPED | OUTPATIENT
Start: 2022-07-13 | End: 2022-12-19

## 2022-07-13 NOTE — PROGRESS NOTES
"Subjective   Patient ID: Marylu Foreman is a 77 y.o. female is being seen for consultation today at the request of Arleen Dillon MD for spinal stenosis lumbar region. Patient had  A MRI L-spine on 05/14/2022 at [facility].    Treatment: PT    Today patient states that she has L leg pain, along with foot drop in the L foot. Patient states that she has N/T in the L leg/foot. Patient denies B/B incontinence. Patient also states that she is having gait/balance issues.     Patient, Provider, and MA are all wearing a mask In our office today.     History of Present Illness     This patient has been having trouble with a foot drop on the left side for about a month.  She says the problem originally began without a particular incident.  She initially had some pain in her left lower back and her left hip which began to radiate down her left thigh.  All of that has gotten a lot better but now she has pain in the lower part of her leg and her left foot with a foot drop on the left which has been there now for a month.  The right side is okay.  She has no bowel or bladder issues but she does have difficulty walking due to the foot drop.    The following portions of the patient's history were reviewed and updated as appropriate: allergies, current medications, past family history, past medical history, past social history, past surgical history and problem list.    Review of Systems   Constitutional: Negative for chills and fever.   HENT: Negative for congestion.    Genitourinary: Negative for difficulty urinating and dysuria.   Musculoskeletal: Positive for gait problem and myalgias.        L foot/leg pain   Neurological: Positive for weakness and numbness.        L  foot       I have reviewed the review of systems as documented by my MA.      Objective     Vitals:    07/14/22 1313   BP: 133/82   Cuff Size: Adult   Pulse: 90   Temp: 98 °F (36.7 °C)   Weight: 91.9 kg (202 lb 11.2 oz)   Height: 167.6 cm (66\")     Body mass " index is 32.72 kg/m².      Physical Exam  Constitutional:       Appearance: She is well-developed.   HENT:      Head: Normocephalic and atraumatic.   Eyes:      Extraocular Movements: EOM normal.      Conjunctiva/sclera: Conjunctivae normal.      Pupils: Pupils are equal, round, and reactive to light.   Neck:      Vascular: No carotid bruit.   Neurological:      Mental Status: She is oriented to person, place, and time.      Coordination: Finger-Nose-Finger Test and Heel to Shin Test normal.      Gait: Gait is intact.      Deep Tendon Reflexes:      Reflex Scores:       Tricep reflexes are 2+ on the right side and 2+ on the left side.       Bicep reflexes are 2+ on the right side and 2+ on the left side.       Brachioradialis reflexes are 2+ on the right side and 2+ on the left side.       Patellar reflexes are 2+ on the right side and 2+ on the left side.       Achilles reflexes are 2+ on the right side and 2+ on the left side.  Psychiatric:         Speech: Speech normal.       Neurologic Exam     Mental Status   Oriented to person, place, and time.   Registration of memory: Good recent and remote memory.   Attention: normal. Concentration: normal.   Speech: speech is normal   Level of consciousness: alert  Knowledge: consistent with education.     Cranial Nerves     CN II   Visual fields full to confrontation.   Visual acuity: normal    CN III, IV, VI   Pupils are equal, round, and reactive to light.  Extraocular motions are normal.     CN V   Facial sensation intact.   Right corneal reflex: normal  Left corneal reflex: normal    CN VII   Facial expression full, symmetric.   Right facial weakness: none  Left facial weakness: none    CN VIII   Hearing: intact    CN IX, X   Palate: symmetric    CN XI   Right sternocleidomastoid strength: normal  Left sternocleidomastoid strength: normal    CN XII   Tongue: not atrophic  Tongue deviation: none    Motor Exam   Muscle bulk: normal  Right arm tone: normal  Left arm  tone: normal  Right leg tone: normal  Left leg tone: normal    Strength   Strength 5/5 except as noted.   Left anterior tibial: 2/5  Left posterior tibial: 2/5  Left peroneal: 2/5    Sensory Exam   Light touch normal.     Gait, Coordination, and Reflexes     Gait  Gait: normal    Coordination   Finger to nose coordination: normal  Heel to shin coordination: normal    Reflexes   Right brachioradialis: 2+  Left brachioradialis: 2+  Right biceps: 2+  Left biceps: 2+  Right triceps: 2+  Left triceps: 2+  Right patellar: 2+  Left patellar: 2+  Right achilles: 2+  Left achilles: 2+  Right : 2+  Left : 2+          Assessment & Plan   Independent Review of Radiographic Studies:      I personally reviewed the images from the following studies.    I reviewed an MRI of her lumbar spine done on 14 May of this year.  This shows a grade 1 spondylolisthesis of L5 on S1.  There is fairly severe left-sided lateral recess stenosis.  L4-5 shows lateral recess stenosis a bit worse on the right than the left.  L3-4 shows a calcified synovial cyst on the right.  L2-3 and L1 to both look generally okay.    Medical Decision Making:      I told the patient what with the scoliosis and the multilevel disease before we can talk about surgery we really need to do a myelogram.  I told the patient what a myelogram involves.  I explained that there is a 50% chance of developing a bad headache and nausea as a result of the test.  I explained that there is also a very small chance of infection, seizures, and bleeding.  I explained how we would treat a post myelogram headache including bedrest, caffeinated fluids, steroids, and blood patch.  The patient does ask to proceed.    Diagnoses and all orders for this visit:    1. Foot drop, left foot (Primary)  -     IR Myelogram Lumbar Spine; Future  -     CT Lumbar Spine With Intrathecal Contrast; Future  -     XR Spine Lumbar Complete With Flex & Ext; Future  -     dexamethasone (DECADRON) 4 MG  tablet; Take 2 tablets by mouth Take As Directed. Take both tablets by mouth 2 hours before myelogram  Dispense: 2 tablet; Refill: 0      Return for After radiology test.

## 2022-07-13 NOTE — H&P (VIEW-ONLY)
"Subjective   Patient ID: Marylu Foreman is a 77 y.o. female is being seen for consultation today at the request of Arleen Dillon MD for spinal stenosis lumbar region. Patient had  A MRI L-spine on 05/14/2022 at [facility].    Treatment: PT    Today patient states that she has L leg pain, along with foot drop in the L foot. Patient states that she has N/T in the L leg/foot. Patient denies B/B incontinence. Patient also states that she is having gait/balance issues.     Patient, Provider, and MA are all wearing a mask In our office today.     History of Present Illness     This patient has been having trouble with a foot drop on the left side for about a month.  She says the problem originally began without a particular incident.  She initially had some pain in her left lower back and her left hip which began to radiate down her left thigh.  All of that has gotten a lot better but now she has pain in the lower part of her leg and her left foot with a foot drop on the left which has been there now for a month.  The right side is okay.  She has no bowel or bladder issues but she does have difficulty walking due to the foot drop.    The following portions of the patient's history were reviewed and updated as appropriate: allergies, current medications, past family history, past medical history, past social history, past surgical history and problem list.    Review of Systems   Constitutional: Negative for chills and fever.   HENT: Negative for congestion.    Genitourinary: Negative for difficulty urinating and dysuria.   Musculoskeletal: Positive for gait problem and myalgias.        L foot/leg pain   Neurological: Positive for weakness and numbness.        L  foot       I have reviewed the review of systems as documented by my MA.      Objective     Vitals:    07/14/22 1313   BP: 133/82   Cuff Size: Adult   Pulse: 90   Temp: 98 °F (36.7 °C)   Weight: 91.9 kg (202 lb 11.2 oz)   Height: 167.6 cm (66\")     Body mass " index is 32.72 kg/m².      Physical Exam  Constitutional:       Appearance: She is well-developed.   HENT:      Head: Normocephalic and atraumatic.   Eyes:      Extraocular Movements: EOM normal.      Conjunctiva/sclera: Conjunctivae normal.      Pupils: Pupils are equal, round, and reactive to light.   Neck:      Vascular: No carotid bruit.   Neurological:      Mental Status: She is oriented to person, place, and time.      Coordination: Finger-Nose-Finger Test and Heel to Shin Test normal.      Gait: Gait is intact.      Deep Tendon Reflexes:      Reflex Scores:       Tricep reflexes are 2+ on the right side and 2+ on the left side.       Bicep reflexes are 2+ on the right side and 2+ on the left side.       Brachioradialis reflexes are 2+ on the right side and 2+ on the left side.       Patellar reflexes are 2+ on the right side and 2+ on the left side.       Achilles reflexes are 2+ on the right side and 2+ on the left side.  Psychiatric:         Speech: Speech normal.       Neurologic Exam     Mental Status   Oriented to person, place, and time.   Registration of memory: Good recent and remote memory.   Attention: normal. Concentration: normal.   Speech: speech is normal   Level of consciousness: alert  Knowledge: consistent with education.     Cranial Nerves     CN II   Visual fields full to confrontation.   Visual acuity: normal    CN III, IV, VI   Pupils are equal, round, and reactive to light.  Extraocular motions are normal.     CN V   Facial sensation intact.   Right corneal reflex: normal  Left corneal reflex: normal    CN VII   Facial expression full, symmetric.   Right facial weakness: none  Left facial weakness: none    CN VIII   Hearing: intact    CN IX, X   Palate: symmetric    CN XI   Right sternocleidomastoid strength: normal  Left sternocleidomastoid strength: normal    CN XII   Tongue: not atrophic  Tongue deviation: none    Motor Exam   Muscle bulk: normal  Right arm tone: normal  Left arm  tone: normal  Right leg tone: normal  Left leg tone: normal    Strength   Strength 5/5 except as noted.   Left anterior tibial: 2/5  Left posterior tibial: 2/5  Left peroneal: 2/5    Sensory Exam   Light touch normal.     Gait, Coordination, and Reflexes     Gait  Gait: normal    Coordination   Finger to nose coordination: normal  Heel to shin coordination: normal    Reflexes   Right brachioradialis: 2+  Left brachioradialis: 2+  Right biceps: 2+  Left biceps: 2+  Right triceps: 2+  Left triceps: 2+  Right patellar: 2+  Left patellar: 2+  Right achilles: 2+  Left achilles: 2+  Right : 2+  Left : 2+          Assessment & Plan   Independent Review of Radiographic Studies:      I personally reviewed the images from the following studies.    I reviewed an MRI of her lumbar spine done on 14 May of this year.  This shows a grade 1 spondylolisthesis of L5 on S1.  There is fairly severe left-sided lateral recess stenosis.  L4-5 shows lateral recess stenosis a bit worse on the right than the left.  L3-4 shows a calcified synovial cyst on the right.  L2-3 and L1 to both look generally okay.    Medical Decision Making:      I told the patient what with the scoliosis and the multilevel disease before we can talk about surgery we really need to do a myelogram.  I told the patient what a myelogram involves.  I explained that there is a 50% chance of developing a bad headache and nausea as a result of the test.  I explained that there is also a very small chance of infection, seizures, and bleeding.  I explained how we would treat a post myelogram headache including bedrest, caffeinated fluids, steroids, and blood patch.  The patient does ask to proceed.    Diagnoses and all orders for this visit:    1. Foot drop, left foot (Primary)  -     IR Myelogram Lumbar Spine; Future  -     CT Lumbar Spine With Intrathecal Contrast; Future  -     XR Spine Lumbar Complete With Flex & Ext; Future  -     dexamethasone (DECADRON) 4 MG  tablet; Take 2 tablets by mouth Take As Directed. Take both tablets by mouth 2 hours before myelogram  Dispense: 2 tablet; Refill: 0      Return for After radiology test.

## 2022-07-13 NOTE — PROGRESS NOTES
Subjective   Marylu Foreman is a 77 y.o. female here today to f/u on HTN and foot pain.      History of Present Illness   Pt has is cont to have pain in the foot with foot drop  She is seeing NS tomorrw and seeing ortho for the foot  Pt has been taking BP meds as prescribed without any problems.  No HA  No episodes of orthostasis  Pt has been stable on current meds for atrial fibrillation.  No palp or SOB.  No CP or edema    The following portions of the patient's history were reviewed and updated as appropriate: allergies, current medications, past medical history, past social history and problem list.  She sleep in the recliner at the foot pain is less    Review of Systems    Objective   Physical Exam  Vitals reviewed.   Constitutional:       Appearance: She is well-developed.   HENT:      Head: Normocephalic and atraumatic.      Right Ear: External ear normal.      Left Ear: External ear normal.   Eyes:      Conjunctiva/sclera: Conjunctivae normal.      Pupils: Pupils are equal, round, and reactive to light.   Neck:      Thyroid: No thyromegaly.      Trachea: No tracheal deviation.   Cardiovascular:      Rate and Rhythm: Normal rate and regular rhythm.      Heart sounds: Normal heart sounds.   Pulmonary:      Effort: Pulmonary effort is normal.      Breath sounds: Normal breath sounds.   Abdominal:      General: Bowel sounds are normal. There is no distension.      Palpations: Abdomen is soft.      Tenderness: There is no abdominal tenderness.   Musculoskeletal:         General: No deformity. Normal range of motion.      Cervical back: Normal range of motion.   Skin:     General: Skin is warm and dry.   Neurological:      Mental Status: She is alert and oriented to person, place, and time.   Psychiatric:         Behavior: Behavior normal.         Thought Content: Thought content normal.         Judgment: Judgment normal.         Vitals:    07/13/22 1129   BP: 132/78   Pulse: 77   Temp: 98.2 °F (36.8 °C)   SpO2:  98%     Body mass index is 32.72 kg/m².       Current Outpatient Medications   Medication Instructions   • Cholecalciferol (VITAMIN D PO) 1 tablet, Oral, Daily   • Coenzyme Q10 (CO Q 10 PO) 200 mg, Oral, Daily   • Cyanocobalamin (VITAMIN B 12 PO) 1 tablet/day, Oral   • dilTIAZem CD (CARDIZEM CD) 180 mg, Oral, Daily   • DULoxetine (CYMBALTA) 60 mg, Oral, 2 Times Daily   • Eliquis 2.5 mg, Oral, 2 Times Daily   • gabapentin (NEURONTIN) 100 MG capsule Start with 1 at night may increase to 3 if needed   • irbesartan (AVAPRO) 300 mg, Oral, Nightly   • levothyroxine (SYNTHROID, LEVOTHROID) 50 mcg, Oral, Daily   • Multiple Vitamin (MULTI-VITAMIN PO) 1 tablet, Oral, Daily   • Probiotic Product (PROBIOTIC PO) Oral, Daily, Lactobacillus rhamnosus GG     • vitamin E 400 Units, Oral, Daily         Assessment & Plan   Diagnoses and all orders for this visit:    1. Essential hypertension (Primary)  -     CBC & Differential; Future  -     Comprehensive Metabolic Panel; Future  -     LP+LDL / HDL Ratio ([facility]); Future  -     TSH Rfx On Abnormal To Free T4; Future  -     Hemoglobin A1c; Future    2. Arthritis of foot    3. Foot drop, left foot    4. Atrial fibrillation, persistent (HCC)  -     CBC & Differential; Future  -     Comprehensive Metabolic Panel; Future  -     LP+LDL / HDL Ratio ([facility]); Future  -     TSH Rfx On Abnormal To Free T4; Future  -     Hemoglobin A1c; Future    5. Elevated glucose  -     CBC & Differential; Future  -     Comprehensive Metabolic Panel; Future  -     LP+LDL / HDL Ratio ([facility]); Future  -     TSH Rfx On Abnormal To Free T4; Future  -     Hemoglobin A1c; Future    Other orders  -     irbesartan (Avapro) 300 MG tablet; Take 1 tablet by mouth Every Night.  Dispense: 90 tablet; Refill: 1        1. HTN- rechecl BP better  2.  Foot drop -  Sig stenosis in the spine  She is seeing NS tomorrow   3.  Foot OA  Cont seeing mcquillen  4.  AF- stable  Seeing cards deonte

## 2022-07-14 ENCOUNTER — TELEPHONE (OUTPATIENT)
Dept: ONCOLOGY | Facility: CLINIC | Age: 77
End: 2022-07-14

## 2022-07-14 ENCOUNTER — OFFICE VISIT (OUTPATIENT)
Dept: NEUROSURGERY | Facility: CLINIC | Age: 77
End: 2022-07-14

## 2022-07-14 VITALS
BODY MASS INDEX: 32.58 KG/M2 | SYSTOLIC BLOOD PRESSURE: 133 MMHG | HEIGHT: 66 IN | HEART RATE: 90 BPM | WEIGHT: 202.7 LBS | DIASTOLIC BLOOD PRESSURE: 82 MMHG | TEMPERATURE: 98 F

## 2022-07-14 DIAGNOSIS — M21.372 FOOT DROP, LEFT FOOT: Primary | ICD-10-CM

## 2022-07-14 PROCEDURE — 99204 OFFICE O/P NEW MOD 45 MIN: CPT | Performed by: NEUROLOGICAL SURGERY

## 2022-07-14 RX ORDER — DEXAMETHASONE 4 MG/1
8 TABLET ORAL TAKE AS DIRECTED
Qty: 2 TABLET | Refills: 0 | Status: SHIPPED | OUTPATIENT
Start: 2022-07-14 | End: 2022-07-21 | Stop reason: HOSPADM

## 2022-07-14 NOTE — TELEPHONE ENCOUNTER
Returned call to patient regarding her Eliquis. Advised patient that per Dr. White, she may hold her Eliquis for 48 hours prior to her myelogram, and there is no need for her to bridge with Lovenox. Patient v/u.

## 2022-07-14 NOTE — TELEPHONE ENCOUNTER
Caller: [facility]    Relationship to patient: SELF    Best call back number: [phone]    Patient is needing: TO HAVE CLEARANCE TO BE OFF ELIQUIS 48 HOURS BEFORE MYELOGRAM ON Thursday, 7-. DOES SHE NEED TO DO ANYTHING ELSE FOR IT--LIKE INJECTIONS IN HER STOMACH?

## 2022-07-15 ENCOUNTER — OFFICE VISIT (OUTPATIENT)
Dept: CARDIOLOGY | Facility: CLINIC | Age: 77
End: 2022-07-15

## 2022-07-15 VITALS
WEIGHT: 202 LBS | HEART RATE: 97 BPM | HEIGHT: 66 IN | DIASTOLIC BLOOD PRESSURE: 78 MMHG | BODY MASS INDEX: 32.47 KG/M2 | SYSTOLIC BLOOD PRESSURE: 132 MMHG

## 2022-07-15 DIAGNOSIS — I70.0 AORTIC CALCIFICATION: ICD-10-CM

## 2022-07-15 DIAGNOSIS — G47.33 OSA (OBSTRUCTIVE SLEEP APNEA): ICD-10-CM

## 2022-07-15 DIAGNOSIS — Z01.810 ENCOUNTER FOR PRE-OPERATIVE CARDIOVASCULAR CLEARANCE: ICD-10-CM

## 2022-07-15 DIAGNOSIS — I48.21 PERMANENT ATRIAL FIBRILLATION: Primary | ICD-10-CM

## 2022-07-15 DIAGNOSIS — I10 ESSENTIAL HYPERTENSION: Chronic | ICD-10-CM

## 2022-07-15 DIAGNOSIS — R01.1 HEART MURMUR: ICD-10-CM

## 2022-07-15 DIAGNOSIS — N18.32 STAGE 3B CHRONIC KIDNEY DISEASE: Chronic | ICD-10-CM

## 2022-07-15 PROBLEM — I27.20 PULMONARY HYPERTENSION (HCC): Status: RESOLVED | Noted: 2019-01-21 | Resolved: 2022-07-15

## 2022-07-15 PROBLEM — E87.20 METABOLIC ACIDOSIS: Status: RESOLVED | Noted: 2021-11-17 | Resolved: 2022-07-15

## 2022-07-15 PROBLEM — E87.6 HYPOKALEMIA: Status: RESOLVED | Noted: 2021-11-16 | Resolved: 2022-07-15

## 2022-07-15 PROCEDURE — 93000 ELECTROCARDIOGRAM COMPLETE: CPT | Performed by: NURSE PRACTITIONER

## 2022-07-15 PROCEDURE — 99214 OFFICE O/P EST MOD 30 MIN: CPT | Performed by: NURSE PRACTITIONER

## 2022-07-15 NOTE — PROGRESS NOTES
07/21/22 0002   Pre-Procedure Phone Call   Procedure Time Verified Yes   Arrival Time 0630   Procedure Location Verified Yes   Medical History Reviewed No   NPO Status Reinforced Yes   Ride and Caregiver Arranged Yes   Phone Number for Ride/Caregiver instructed pt to hold eliquis x 48 hrs prior to procedure. Last dose to be 7/18/22. Pt voiced understanding.   Patient Knows to Bring Current Medications No   Bring Outside Films Requested No
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
## 2022-07-21 ENCOUNTER — HOSPITAL ENCOUNTER (OUTPATIENT)
Dept: CT IMAGING | Facility: HOSPITAL | Age: 77
Discharge: HOME | End: 2022-07-21

## 2022-07-21 ENCOUNTER — HOSPITAL ENCOUNTER (OUTPATIENT)
Dept: GENERAL RADIOLOGY | Facility: HOSPITAL | Age: 77
Discharge: HOME | End: 2022-07-21

## 2022-07-21 VITALS
RESPIRATION RATE: 16 BRPM | HEART RATE: 84 BPM | WEIGHT: 195 LBS | DIASTOLIC BLOOD PRESSURE: 62 MMHG | TEMPERATURE: 98.2 F | HEIGHT: 64 IN | SYSTOLIC BLOOD PRESSURE: 102 MMHG | OXYGEN SATURATION: 98 % | BODY MASS INDEX: 33.29 KG/M2

## 2022-07-21 DIAGNOSIS — M21.372 FOOT DROP, LEFT FOOT: ICD-10-CM

## 2022-07-21 PROCEDURE — 0 IOPAMIDOL 41 % SOLUTION: Performed by: NEUROLOGICAL SURGERY

## 2022-07-21 PROCEDURE — 72132 CT LUMBAR SPINE W/DYE: CPT

## 2022-07-21 PROCEDURE — 62284 INJECTION FOR MYELOGRAM: CPT | Performed by: NEUROLOGICAL SURGERY

## 2022-07-21 PROCEDURE — 0 LIDOCAINE 1 % SOLUTION: Performed by: NEUROLOGICAL SURGERY

## 2022-07-21 PROCEDURE — 72240 MYELOGRAPHY NECK SPINE: CPT

## 2022-07-21 PROCEDURE — 72114 X-RAY EXAM L-S SPINE BENDING: CPT

## 2022-07-21 PROCEDURE — 62304 MYELOGRAPHY LUMBAR INJECTION: CPT

## 2022-07-21 RX ORDER — HYDROCODONE BITARTRATE AND ACETAMINOPHEN 5; 325 MG/1; MG/1
1 TABLET ORAL EVERY 4 HOURS PRN
Status: DISCONTINUED | OUTPATIENT
Start: 2022-07-21 | End: 2022-07-22 | Stop reason: HOSPADM

## 2022-07-21 RX ORDER — ACETAMINOPHEN 325 MG/1
650 TABLET ORAL EVERY 4 HOURS PRN
Status: DISCONTINUED | OUTPATIENT
Start: 2022-07-21 | End: 2022-07-22 | Stop reason: HOSPADM

## 2022-07-21 RX ORDER — LIDOCAINE HYDROCHLORIDE 10 MG/ML
10 INJECTION, SOLUTION INFILTRATION; PERINEURAL ONCE
Status: COMPLETED | OUTPATIENT
Start: 2022-07-21 | End: 2022-07-21

## 2022-07-21 RX ADMIN — IOPAMIDOL 15 ML: 408 INJECTION, SOLUTION INTRATHECAL at 07:07

## 2022-07-21 RX ADMIN — LIDOCAINE HYDROCHLORIDE 3 ML: 10 INJECTION, SOLUTION INFILTRATION; PERINEURAL at 07:06

## 2022-07-21 NOTE — DISCHARGE INSTRUCTIONS
EDUCATION /DISCHARGE INSTRUCTIONS:    A myelogram is a special radiology procedure of the spinal cord, spinal nerves and other related structures.  You will be awake during the examination.  An area of your lower back will be cleansed with an antiseptic solution.  The physician will inject a numbing medication in your lower back.  While your back is numb, a needle will be placed in the lower back area.  A small amount of spinal fluid may be withdrawn and sent to the lab if ordered by your physician. While the needle is in the back, an injection of a contrast material (xray dye) will be given through the needle.  The contrast material will allow the physician to see the spinal cord and spinal nerves.  Once injected, the needle will be removed and a band aid will be placed over the injection site.  The table will be tilted during the process to allow the contrast material to flow to particular areas in the spine.  Following the injection and xrays, you will be taken to the CT scanner where more pictures will be taken. After the procedure is finished, the contrast material will be absorbed by your body and eliminated through your kidneys.  The radiologist will study and interpret your myelogram and send the results to your physician.  Procedure risks of a myelogram include, but are not limited to:  *  Bleeding   *  Seizure  *  Infection   *  Headache, possibly severe requiring a blood patch  *  Contrast reaction  *  Nerve or cord injury  *  Paralysis and death  Benefits of the procedure:  *  Best examination for delineating pathology related to spinal cord compression from a disc and/or nerve root compression  Alternatives to the procedure:  MRI - a non invasive procedure requiring intravenous contrast injection.  Cannot be done on patients with certain pacemakers or metal in the body.  MRI risks include possible reaction to the contrast material, movement of metal located in the body.Benefit to MRI:  Non-invasive and  usually painless procedure.  THIS EDUCATION INFORMATION WAS REVIEWED PRIOR TO PROCEDURE AND CONSENT. Patient initials_____JBH___________Time_0630_______  24 hour rest period ends __07/21/22 1000___.  Important information following your myelogram:  * ACTIVITY:   *  You may sit up in the car to go home.  *  When you get home, remain on bed rest (flat on your back or on your side) for 24 hours. You may place a rolled up towel under your neck for support  * You may get up to the bathroom and sit up to eat and drink then lie back down  * Drink additional fluids for 24 hours after the myelogram.   * Continue to drink additional fluids for the next 2-3 days. Water and caffeinated beverages are encouraged.  * Remain less active for the next two to three days.  * Do not drive for 24 hours following a myelogram.  * You may remove the bandage and shower in the morning.  *Resume taking blood thinner Eliquis  or aspirin today  CALL YOUR PHYSICIAN FOR THE FOLLOWING:  * Pain at the injection site  * Redness, swelling, bruising or drainage at the injection site.  * A fever by mouth of 101.0 or any new symptoms  Headaches are a common side effect after a myelogram.  If you get a headache, you should stay flat in bed and drink plenty of fluids. If the headache persists and does not go away with rest/medication, CALL Dr. Sy at [phone]

## 2022-07-21 NOTE — NURSING NOTE
Patient arrived in Radiology triage for myelogram bay 5.  Protective goggles and mask in place with all patient interactions today

## 2022-07-21 NOTE — NURSING NOTE
Patient taken to patient discharge area via wheelchair. Friend Sarahi  driving patient home.  Protective goggles and mask in place with all patient interactions today .

## 2022-07-22 ENCOUNTER — TELEPHONE (OUTPATIENT)
Dept: INTERVENTIONAL RADIOLOGY/VASCULAR | Facility: HOSPITAL | Age: 77
End: 2022-07-22

## 2022-07-26 ENCOUNTER — HOSPITAL ENCOUNTER (OUTPATIENT)
Dept: CARDIOLOGY | Facility: HOSPITAL | Age: 77
Discharge: HOME | End: 2022-07-26
Admitting: NURSE PRACTITIONER

## 2022-07-26 VITALS
HEIGHT: 64 IN | SYSTOLIC BLOOD PRESSURE: 142 MMHG | HEART RATE: 96 BPM | DIASTOLIC BLOOD PRESSURE: 80 MMHG | WEIGHT: 195 LBS | BODY MASS INDEX: 33.29 KG/M2

## 2022-07-26 DIAGNOSIS — R01.1 HEART MURMUR: ICD-10-CM

## 2022-07-26 DIAGNOSIS — I70.0 AORTIC CALCIFICATION: ICD-10-CM

## 2022-07-26 LAB
AORTIC ARCH: 2 CM
AORTIC DIMENSIONLESS INDEX: 0.6 (DI)
ASCENDING AORTA: 3.3 CM
BH CV ECHO MEAS - ACS: 1.62 CM
BH CV ECHO MEAS - AO MAX PG: 23 MMHG
BH CV ECHO MEAS - AO MEAN PG: 10.5 MMHG
BH CV ECHO MEAS - AO ROOT DIAM: 3.3 CM
BH CV ECHO MEAS - AO V2 MAX: 239.8 CM/SEC
BH CV ECHO MEAS - AO V2 VTI: 42.5 CM
BH CV ECHO MEAS - AVA(I,D): 1.85 CM2
BH CV ECHO MEAS - EDV(CUBED): 69.6 ML
BH CV ECHO MEAS - EDV(MOD-SP2): 66 ML
BH CV ECHO MEAS - EDV(MOD-SP4): 87 ML
BH CV ECHO MEAS - EF(MOD-BP): 59.4 %
BH CV ECHO MEAS - EF(MOD-SP2): 59.1 %
BH CV ECHO MEAS - EF(MOD-SP4): 59.8 %
BH CV ECHO MEAS - ESV(CUBED): 24.1 ML
BH CV ECHO MEAS - ESV(MOD-SP2): 27 ML
BH CV ECHO MEAS - ESV(MOD-SP4): 35 ML
BH CV ECHO MEAS - FS: 29.7 %
BH CV ECHO MEAS - IVS/LVPW: 1 CM
BH CV ECHO MEAS - IVSD: 1.18 CM
BH CV ECHO MEAS - LAT PEAK E' VEL: 10.9 CM/SEC
BH CV ECHO MEAS - LV DIASTOLIC VOL/BSA (35-75): 45 CM2
BH CV ECHO MEAS - LV MASS(C)D: 169.3 GRAMS
BH CV ECHO MEAS - LV MAX PG: 6.2 MMHG
BH CV ECHO MEAS - LV MEAN PG: 3.7 MMHG
BH CV ECHO MEAS - LV SYSTOLIC VOL/BSA (12-30): 18.1 CM2
BH CV ECHO MEAS - LV V1 MAX: 124.3 CM/SEC
BH CV ECHO MEAS - LV V1 VTI: 25.5 CM
BH CV ECHO MEAS - LVIDD: 4.1 CM
BH CV ECHO MEAS - LVIDS: 2.9 CM
BH CV ECHO MEAS - LVOT AREA: 3.1 CM2
BH CV ECHO MEAS - LVOT DIAM: 1.98 CM
BH CV ECHO MEAS - LVPWD: 1.19 CM
BH CV ECHO MEAS - MED PEAK E' VEL: 8.6 CM/SEC
BH CV ECHO MEAS - MR MAX PG: 90.3 MMHG
BH CV ECHO MEAS - MR MAX VEL: 475.1 CM/SEC
BH CV ECHO MEAS - MV DEC SLOPE: 602.9 CM/SEC2
BH CV ECHO MEAS - MV DEC TIME: 0.19 MSEC
BH CV ECHO MEAS - MV E MAX VEL: 124.9 CM/SEC
BH CV ECHO MEAS - MV MAX PG: 7.8 MMHG
BH CV ECHO MEAS - MV MEAN PG: 2.16 MMHG
BH CV ECHO MEAS - MV P1/2T: 69.1 MSEC
BH CV ECHO MEAS - MV V2 VTI: 34.3 CM
BH CV ECHO MEAS - MVA(P1/2T): 3.2 CM2
BH CV ECHO MEAS - MVA(VTI): 2.29 CM2
BH CV ECHO MEAS - PA ACC TIME: 0.13 SEC
BH CV ECHO MEAS - PA PR(ACCEL): 19.6 MMHG
BH CV ECHO MEAS - PA V2 MAX: 111.2 CM/SEC
BH CV ECHO MEAS - PULM DIAS VEL: 75.4 CM/SEC
BH CV ECHO MEAS - PULM S/D: 0.59
BH CV ECHO MEAS - PULM SYS VEL: 44.6 CM/SEC
BH CV ECHO MEAS - QP/QS: 0.63
BH CV ECHO MEAS - RV MAX PG: 1.9 MMHG
BH CV ECHO MEAS - RV V1 MAX: 69 CM/SEC
BH CV ECHO MEAS - RV V1 VTI: 15.7 CM
BH CV ECHO MEAS - RVOT DIAM: 2.01 CM
BH CV ECHO MEAS - SI(MOD-SP2): 20.2 ML/M2
BH CV ECHO MEAS - SI(MOD-SP4): 26.9 ML/M2
BH CV ECHO MEAS - SUP REN AO DIAM: 1.8 CM
BH CV ECHO MEAS - SV(LVOT): 78.6 ML
BH CV ECHO MEAS - SV(MOD-SP2): 39 ML
BH CV ECHO MEAS - SV(MOD-SP4): 52 ML
BH CV ECHO MEAS - SV(RVOT): 49.8 ML
BH CV ECHO MEAS - TAPSE (>1.6): 1.69 CM
BH CV ECHO MEAS - TR MAX PG: 30.5 MMHG
BH CV ECHO MEAS - TR MAX VEL: 276.2 CM/SEC
BH CV ECHO MEASUREMENTS AVERAGE E/E' RATIO: 12.81
BH CV XLRA - RV BASE: 3.1 CM
BH CV XLRA - RV LENGTH: 5.6 CM
BH CV XLRA - RV MID: 2.6 CM
BH CV XLRA - TDI S': 9.4 CM/SEC
LEFT ATRIUM VOLUME INDEX: 27.9 ML/M2
LV EF 2D ECHO EST: 59 %
MAXIMAL PREDICTED HEART RATE: 145 BPM
SINUS: 3.1 CM
STJ: 2.44 CM
STRESS TARGET HR: 123 BPM

## 2022-07-26 PROCEDURE — 93306 TTE W/DOPPLER COMPLETE: CPT

## 2022-07-26 PROCEDURE — 93306 TTE W/DOPPLER COMPLETE: CPT | Performed by: INTERNAL MEDICINE

## 2022-07-27 ENCOUNTER — TELEPHONE (OUTPATIENT)
Dept: NEUROSURGERY | Facility: CLINIC | Age: 77
End: 2022-07-27

## 2022-07-27 ENCOUNTER — APPOINTMENT (OUTPATIENT)
Dept: MRI IMAGING | Facility: HOSPITAL | Age: 77
End: 2022-07-27

## 2022-07-27 NOTE — PROGRESS NOTES
Test was done for heart murmur.  She has some mild aortic calcification which is causing the heart murmur.  Mild MR and TR both stable.  Patient informed and verbalizes understanding.

## 2022-07-27 NOTE — PROGRESS NOTES
"Subjective   Patient ID: Marylu Foreman is a 77 y.o. female is here today for follow-up with a new Lumbar Myelogram that was done on 07/21/2022 for low back pain.    Today patient's symptoms are unchanged. Patient states that she has L foot drop, along with L hip pain.     Patient, Provider, and MA are all wearing a mask in our office today.     History of Present Illness    This patient continues with pain and a foot drop on the left side.  She said her pain is just in the lower part of her leg and her left foot when she developed a foot drop a month or 2 ago.    The following portions of the patient's history were reviewed and updated as appropriate: allergies, current medications, past family history, past medical history, past social history, past surgical history and problem list.    Review of Systems   Constitutional: Negative for chills and fever.   HENT: Negative for congestion.    Musculoskeletal: Positive for gait problem and myalgias.        L leg pain   Neurological: Positive for weakness and numbness.        L leg        I have reviewed the review of systems as documented by my MA.      Objective     Vitals:    07/28/22 1402   BP: 133/84   Cuff Size: Adult   Pulse: 90   Temp: 97.6 °F (36.4 °C)   SpO2: 97%   Weight: 88.5 kg (195 lb)   Height: 162.6 cm (64\")     Body mass index is 33.47 kg/m².      Physical Exam  Neurological:      Mental Status: She is alert and oriented to person, place, and time.       Neurologic Exam     Mental Status   Oriented to person, place, and time.           Assessment & Plan   Independent Review of Radiographic Studies:      I personally reviewed the images from the following studies.    I reviewed her plain films, myelogram, and CT scan done on 21 July myself.  The plain films show multilevel spondylitic disease.  It is pretty severe.  I do not see any overt instability however.  On the myelogram itself there is pretty good nerve root filling throughout the lumbar spine.  On " the prone images there is little lateral recess stenosis at L2-3 on the right.  There is some narrowing on the left at L4-5.  On the standing films this becomes more apparent.  He there does appear to be a grade 1 spondylolisthesis of L5 on S1 on the post myelographic CT scan the lower thoracic spine looks okay except for some minimally bulging disc with no neural compression.  T12-L1 and L1-2 are widely open.  L2-3 is open as well.  L3-4 shows a little lateral recess stenosis on both sides but not severe.  L4-5 shows some left-sided lateral recess stenosis.  L5-S1 shows a severe left lateral recess stenosis beginning just below the L5 pedicle and extending down to the S1 pedicle.    Medical Decision Making:      I had a long discussion with the patient about the results of the myelogram.  I told her that the defects on the myelogram certainly explain her neurologic deficit.  I think she is a candidate for surgery.  Conventional wisdom would dictate a bilateral decompression and fusion because of the spondylolisthesis but there is no movement on the spondylolisthesis on flexion and extension and so I think it might be wise to just try minimally invasive decompression on the left side.  I explained the pluses and minuses of each surgery to her and I told her that as long as she understands she might require a fusion later on I am fine with proceeding with a more minimally invasive surgery.    She would like to proceed and will need to be scheduled for a: Left lumbar 5 to sacral 1 laminectomy with metrx     Diagnoses and all orders for this visit:    1. Spondylosis with myelopathy, lumbar region (Primary)      Return for 2-3 week post op.

## 2022-07-28 ENCOUNTER — OFFICE VISIT (OUTPATIENT)
Dept: NEUROSURGERY | Facility: CLINIC | Age: 77
End: 2022-07-28

## 2022-07-28 ENCOUNTER — PREP FOR SURGERY (OUTPATIENT)
Dept: OTHER | Facility: HOSPITAL | Age: 77
End: 2022-07-28

## 2022-07-28 VITALS
HEIGHT: 64 IN | HEART RATE: 90 BPM | WEIGHT: 195 LBS | DIASTOLIC BLOOD PRESSURE: 84 MMHG | SYSTOLIC BLOOD PRESSURE: 133 MMHG | TEMPERATURE: 97.6 F | OXYGEN SATURATION: 97 % | BODY MASS INDEX: 33.29 KG/M2

## 2022-07-28 DIAGNOSIS — M47.16 SPONDYLOSIS WITH MYELOPATHY, LUMBAR REGION: Primary | ICD-10-CM

## 2022-07-28 PROBLEM — N81.2 INCOMPLETE UTEROVAGINAL PROLAPSE: Status: RESOLVED | Noted: 2018-02-26 | Resolved: 2022-07-28

## 2022-07-28 PROCEDURE — 99214 OFFICE O/P EST MOD 30 MIN: CPT | Performed by: NEUROLOGICAL SURGERY

## 2022-07-28 RX ORDER — CEFAZOLIN SODIUM 2 G/100ML
2 INJECTION, SOLUTION INTRAVENOUS ONCE
Status: CANCELLED | OUTPATIENT
Start: 2022-08-05 | End: 2022-07-28

## 2022-08-03 ENCOUNTER — PRE-ADMISSION TESTING (OUTPATIENT)
Dept: PREADMISSION TESTING | Facility: HOSPITAL | Age: 77
End: 2022-08-03

## 2022-08-03 VITALS
HEIGHT: 65 IN | OXYGEN SATURATION: 97 % | WEIGHT: 202 LBS | RESPIRATION RATE: 16 BRPM | DIASTOLIC BLOOD PRESSURE: 86 MMHG | SYSTOLIC BLOOD PRESSURE: 132 MMHG | HEART RATE: 94 BPM | BODY MASS INDEX: 33.66 KG/M2 | TEMPERATURE: 99.9 F

## 2022-08-03 DIAGNOSIS — M47.16 SPONDYLOSIS WITH MYELOPATHY, LUMBAR REGION: ICD-10-CM

## 2022-08-03 LAB
ANION GAP SERPL CALCULATED.3IONS-SCNC: 14 MMOL/L (ref 5–15)
BUN SERPL-MCNC: 31 MG/DL (ref 8–23)
BUN/CREAT SERPL: 22.3 (ref 7–25)
CALCIUM SPEC-SCNC: 8.7 MG/DL (ref 8.6–10.5)
CHLORIDE SERPL-SCNC: 105 MMOL/L (ref 98–107)
CO2 SERPL-SCNC: 22 MMOL/L (ref 22–29)
CREAT SERPL-MCNC: 1.39 MG/DL (ref 0.57–1)
DEPRECATED RDW RBC AUTO: 43.3 FL (ref 37–54)
EGFRCR SERPLBLD CKD-EPI 2021: 39.7 ML/MIN/1.73
ERYTHROCYTE [DISTWIDTH] IN BLOOD BY AUTOMATED COUNT: 11.9 % (ref 12.3–15.4)
GLUCOSE SERPL-MCNC: 141 MG/DL (ref 65–99)
HCT VFR BLD AUTO: 38 % (ref 34–46.6)
HGB BLD-MCNC: 11.9 G/DL (ref 12–15.9)
MCH RBC QN AUTO: 31.2 PG (ref 26.6–33)
MCHC RBC AUTO-ENTMCNC: 31.3 G/DL (ref 31.5–35.7)
MCV RBC AUTO: 99.7 FL (ref 79–97)
PLATELET # BLD AUTO: 364 10*3/MM3 (ref 140–450)
PMV BLD AUTO: 11.5 FL (ref 6–12)
POTASSIUM SERPL-SCNC: 4.1 MMOL/L (ref 3.5–5.2)
RBC # BLD AUTO: 3.81 10*6/MM3 (ref 3.77–5.28)
SARS-COV-2 ORF1AB RESP QL NAA+PROBE: NOT DETECTED
SODIUM SERPL-SCNC: 141 MMOL/L (ref 136–145)
WBC NRBC COR # BLD: 10.48 10*3/MM3 (ref 3.4–10.8)

## 2022-08-03 PROCEDURE — U0004 COV-19 TEST NON-CDC HGH THRU: HCPCS

## 2022-08-03 PROCEDURE — 80048 BASIC METABOLIC PNL TOTAL CA: CPT

## 2022-08-03 PROCEDURE — C9803 HOPD COVID-19 SPEC COLLECT: HCPCS

## 2022-08-03 PROCEDURE — 36415 COLL VENOUS BLD VENIPUNCTURE: CPT

## 2022-08-03 PROCEDURE — 85027 COMPLETE CBC AUTOMATED: CPT

## 2022-08-03 NOTE — DISCHARGE INSTRUCTIONS
Take the following medications the morning of surgery: CARDIZEM, CYMBALTA, GABAPENTIN, LEVOTHYROXINE     ARRIVAL TIME: 730AM    MD PAPER WORK STATES 530AM  ****CALL OFFICE TO VERIFY ARRIVAL TIME.       If you are on prescription narcotic pain medication to control your pain you may also take that medication the morning of surgery.    General Instructions:  Do not eat solid food after midnight the night before surgery.  You may drink clear liquids day of surgery but must stop at least one hour before your hospital arrival time.  It is beneficial for you to have a clear drink that contains carbohydrates the day of surgery.  We suggest a 12 to 20 ounce bottle of Gatorade or Powerade for non-diabetic patients or a 12 to 20 ounce bottle of G2 or Powerade Zero for diabetic patients. (Pediatric patients, are not advised to drink a 12 to 20 ounce carbohydrate drink)    Clear liquids are liquids you can see through.  Nothing red in color.     Plain water                               Sports drinks  Sodas                                   Gelatin (Jell-O)  Fruit juices without pulp such as white grape juice and apple juice  Popsicles that contain no fruit or yogurt  Tea or coffee (no cream or milk added)  Gatorade / Powerade  G2 / Powerade Zero      Patients who avoid smoking, chewing tobacco and alcohol for 4 weeks prior to surgery have a reduced risk of post-operative complications.  Quit smoking as many days before surgery as you can.  Do not smoke, use chewing tobacco or drink alcohol the day of surgery.   If applicable bring your C-PAP/ BI-PAP machine.  Bring any papers given to you in the doctor’s office.  Wear clean comfortable clothes.  Do not wear contact lenses, false eyelashes or make-up.  Bring a case for your glasses.   Bring crutches or walker if applicable.  Remove all piercings.  Leave jewelry and any other valuables at home.  Hair extensions with metal clips must be removed prior to surgery.  The  Pre-Admission Testing nurse will instruct you to bring medications if unable to obtain an accurate list in Pre-Admission Testing.        Preventing a Surgical Site Infection:  For 2 to 3 days before surgery, avoid shaving with a razor because the razor can irritate skin and make it easier to develop an infection.    Any areas of open skin can increase the risk of a post-operative wound infection by allowing bacteria to enter and travel throughout the body.  Notify your surgeon if you have any skin wounds / rashes even if it is not near the expected surgical site.  The area will need assessed to determine if surgery should be delayed until it is healed.  The night prior to surgery shower using a fresh bar of anti-bacterial soap (such as Dial) and clean washcloth.  Sleep in a clean bed with clean clothing.  Do not allow pets to sleep with you.  Shower on the morning of surgery using a fresh bar of anti-bacterial soap (such as Dial) and clean washcloth.  Dry with a clean towel and dress in clean clothing.  Ask your surgeon if you will be receiving antibiotics prior to surgery.  Make sure you, your family, and all healthcare providers clean their hands with soap and water or an alcohol based hand  before caring for you or your wound.    Day of surgery:  Your arrival time is approximately two hours before your scheduled surgery time.  Upon arrival, a Pre-op nurse and Anesthesiologist will review your health history, obtain vital signs, and answer questions you may have.  The only belongings needed at this time will be a list of your home medications and if applicable your C-PAP/BI-PAP machine.  A Pre-op nurse will start an IV and you may receive medication in preparation for surgery, including something to help you relax.     Please be aware that surgery does come with discomfort.  We want to make every effort to control your discomfort so please discuss any uncontrolled symptoms with your nurse.   Your doctor  will most likely have prescribed pain medications.      If you are going home after surgery you will receive individualized written care instructions before being discharged.  A responsible adult must drive you to and from the hospital on the day of your surgery and stay with you for 24 hours.  Discharge prescriptions can be filled by the hospital pharmacy during regular pharmacy hours.  If you are having surgery late in the day/evening your prescription may be e-prescribed to your pharmacy.  Please verify your pharmacy hours or chose a 24 hour pharmacy to avoid not having access to your prescription because your pharmacy has closed for the day.    If you are staying overnight following surgery, you will be transported to your hospital room following the recovery period.  [facility] has all private rooms.    If you have any questions please call Pre-Admission Testing at [phone].  Deductibles and co-payments are collected on the day of service. Please be prepared to pay the required co-pay, deductible or deposit on the day of service as defined by your plan.    Patient Education for Self-Quarantine Process    Following your COVID testing, we strongly recommend that you wear a mask when you are with other people and practice social distancing.   Limit your activities to only required outings.  Wash your hands with soap and water frequently for at least 20 seconds.   Avoid touching your eyes, nose and mouth with unwashed hands.  Do not share anything - utensils, drinking glasses, food from the same bowl.   Sanitize household surfaces daily. Include all high touch areas (door handles, light switches, phones, countertops, etc.)    Call your surgeon immediately if you experience any of the following symptoms:  Sore Throat  Shortness of Breath or difficulty breathing  Cough  Chills  Body soreness or muscle pain  Headache  Fever  New loss of taste or smell  Do not arrive for your surgery ill.  Your  procedure will need to be rescheduled to another time.  You will need to call your physician before the day of surgery to avoid any unnecessary exposure to hospital staff as well as other patients.         CHLORHEXIDINE CLOTH INSTRUCTIONS  The morning of surgery follow these instructions using the Chlorhexidine cloths you've been given.  These steps reduce bacteria on the body.  Do not use the cloths near your eyes, ears mouth, genitalia or on open wounds.  Throw the cloths away after use but do not try to flush them down a toilet.      Open and remove one cloth at a time from the package.    Leave the cloth unfolded and begin the bathing.  Massage the skin with the cloths using gentle pressure to remove bacteria.  Do not scrub harshly.   Follow the steps below with one 2% CHG cloth per area (6 total cloths).  One cloth for neck, shoulders and chest.  One cloth for both arms, hands, fingers and underarms (do underarms last).  One cloth for the abdomen followed by groin.  One cloth for right leg and foot including between the toes.  One cloth for left leg and foot including between the toes.  The last cloth is to be used for the back of the neck, back and buttocks.    Allow the CHG to air dry 3 minutes on the skin which will give it time to work and decrease the chance of irritation.  The skin may feel sticky until it is dry.  Do not rinse with water or any other liquid or you will lose the beneficial effects of the CHG.  If mild skin irritation occurs, do rinse the skin to remove the CHG.  Report this to the nurse at time of admission.  Do not apply lotions, creams, ointments, deodorants or perfumes after using the clothes. Dress in clean clothes before coming to the hospital.

## 2022-08-05 ENCOUNTER — ANESTHESIA (OUTPATIENT)
Dept: PERIOP | Facility: HOSPITAL | Age: 77
End: 2022-08-05

## 2022-08-05 ENCOUNTER — HOSPITAL ENCOUNTER (OUTPATIENT)
Facility: HOSPITAL | Age: 77
Discharge: HOME | End: 2022-08-09
Attending: NEUROLOGICAL SURGERY | Admitting: NEUROLOGICAL SURGERY

## 2022-08-05 ENCOUNTER — ANESTHESIA EVENT (OUTPATIENT)
Dept: PERIOP | Facility: HOSPITAL | Age: 77
End: 2022-08-05

## 2022-08-05 ENCOUNTER — APPOINTMENT (OUTPATIENT)
Dept: GENERAL RADIOLOGY | Facility: HOSPITAL | Age: 77
End: 2022-08-05

## 2022-08-05 DIAGNOSIS — M47.16 SPONDYLOSIS WITH MYELOPATHY, LUMBAR REGION: ICD-10-CM

## 2022-08-05 DIAGNOSIS — M21.372 LEFT FOOT DROP: Chronic | ICD-10-CM

## 2022-08-05 DIAGNOSIS — Z98.890 STATUS POST LUMBAR SURGERY: Primary | ICD-10-CM

## 2022-08-05 DIAGNOSIS — M20.12 HALLUX VALGUS OF LEFT FOOT: ICD-10-CM

## 2022-08-05 LAB
ANION GAP SERPL CALCULATED.3IONS-SCNC: 14.3 MMOL/L (ref 5–15)
BASOPHILS # BLD AUTO: 0.13 10*3/MM3 (ref 0–0.2)
BASOPHILS NFR BLD AUTO: 1 % (ref 0–1.5)
BUN SERPL-MCNC: 27 MG/DL (ref 8–23)
BUN/CREAT SERPL: 20 (ref 7–25)
CALCIUM SPEC-SCNC: 8.6 MG/DL (ref 8.6–10.5)
CHLORIDE SERPL-SCNC: 108 MMOL/L (ref 98–107)
CO2 SERPL-SCNC: 19.7 MMOL/L (ref 22–29)
CREAT SERPL-MCNC: 1.35 MG/DL (ref 0.57–1)
DEPRECATED RDW RBC AUTO: 38.6 FL (ref 37–54)
EGFRCR SERPLBLD CKD-EPI 2021: 41.1 ML/MIN/1.73
EOSINOPHIL # BLD AUTO: 0.06 10*3/MM3 (ref 0–0.4)
EOSINOPHIL NFR BLD AUTO: 0.5 % (ref 0.3–6.2)
ERYTHROCYTE [DISTWIDTH] IN BLOOD BY AUTOMATED COUNT: 11.6 % (ref 12.3–15.4)
GLUCOSE SERPL-MCNC: 128 MG/DL (ref 65–99)
HCT VFR BLD AUTO: 30.6 % (ref 34–46.6)
HGB BLD-MCNC: 10.1 G/DL (ref 12–15.9)
IMM GRANULOCYTES # BLD AUTO: 0.04 10*3/MM3 (ref 0–0.05)
IMM GRANULOCYTES NFR BLD AUTO: 0.3 % (ref 0–0.5)
LYMPHOCYTES # BLD AUTO: 1.59 10*3/MM3 (ref 0.7–3.1)
LYMPHOCYTES NFR BLD AUTO: 12.2 % (ref 19.6–45.3)
MCH RBC QN AUTO: 31.3 PG (ref 26.6–33)
MCHC RBC AUTO-ENTMCNC: 33 G/DL (ref 31.5–35.7)
MCV RBC AUTO: 94.7 FL (ref 79–97)
MONOCYTES # BLD AUTO: 0.84 10*3/MM3 (ref 0.1–0.9)
MONOCYTES NFR BLD AUTO: 6.4 % (ref 5–12)
NEUTROPHILS NFR BLD AUTO: 10.38 10*3/MM3 (ref 1.7–7)
NEUTROPHILS NFR BLD AUTO: 79.6 % (ref 42.7–76)
NRBC BLD AUTO-RTO: 0 /100 WBC (ref 0–0.2)
PLATELET # BLD AUTO: 279 10*3/MM3 (ref 140–450)
PMV BLD AUTO: 11 FL (ref 6–12)
POTASSIUM SERPL-SCNC: 4.9 MMOL/L (ref 3.5–5.2)
RBC # BLD AUTO: 3.23 10*6/MM3 (ref 3.77–5.28)
SODIUM SERPL-SCNC: 142 MMOL/L (ref 136–145)
WBC NRBC COR # BLD: 13.04 10*3/MM3 (ref 3.4–10.8)

## 2022-08-05 PROCEDURE — G0378 HOSPITAL OBSERVATION PER HR: HCPCS

## 2022-08-05 PROCEDURE — 63048 LAM FACETEC &FORAMOT EA ADDL: CPT | Performed by: NEUROLOGICAL SURGERY

## 2022-08-05 PROCEDURE — 25010000002 ONDANSETRON PER 1 MG: Performed by: NURSE ANESTHETIST, CERTIFIED REGISTERED

## 2022-08-05 PROCEDURE — P9041 ALBUMIN (HUMAN),5%, 50ML: HCPCS | Performed by: NURSE ANESTHETIST, CERTIFIED REGISTERED

## 2022-08-05 PROCEDURE — 63048 LAM FACETEC &FORAMOT EA ADDL: CPT | Performed by: SPECIALIST/TECHNOLOGIST, OTHER

## 2022-08-05 PROCEDURE — 25010000002 VANCOMYCIN 1 G RECONSTITUTED SOLUTION 1 EACH VIAL: Performed by: NEUROLOGICAL SURGERY

## 2022-08-05 PROCEDURE — 63047 LAM FACETEC & FORAMOT LUMBAR: CPT | Performed by: SPECIALIST/TECHNOLOGIST, OTHER

## 2022-08-05 PROCEDURE — 25010000002 FENTANYL CITRATE (PF) 50 MCG/ML SOLUTION: Performed by: NURSE ANESTHETIST, CERTIFIED REGISTERED

## 2022-08-05 PROCEDURE — 25010000002 ALBUMIN HUMAN 5% PER 50 ML: Performed by: NURSE ANESTHETIST, CERTIFIED REGISTERED

## 2022-08-05 PROCEDURE — 63047 LAM FACETEC & FORAMOT LUMBAR: CPT | Performed by: NEUROLOGICAL SURGERY

## 2022-08-05 PROCEDURE — 80048 BASIC METABOLIC PNL TOTAL CA: CPT | Performed by: NEUROLOGICAL SURGERY

## 2022-08-05 PROCEDURE — 76000 FLUOROSCOPY <1 HR PHYS/QHP: CPT | Performed by: NEUROLOGICAL SURGERY

## 2022-08-05 PROCEDURE — 25010000002 NEOSTIGMINE 5 MG/10ML SOLUTION: Performed by: NURSE ANESTHETIST, CERTIFIED REGISTERED

## 2022-08-05 PROCEDURE — 25010000002 PROPOFOL 10 MG/ML EMULSION: Performed by: NURSE ANESTHETIST, CERTIFIED REGISTERED

## 2022-08-05 PROCEDURE — 25010000002 MIDAZOLAM PER 1 MG: Performed by: STUDENT IN AN ORGANIZED HEALTH CARE EDUCATION/TRAINING PROGRAM

## 2022-08-05 PROCEDURE — 85025 COMPLETE CBC W/AUTO DIFF WBC: CPT | Performed by: NEUROLOGICAL SURGERY

## 2022-08-05 PROCEDURE — 25010000002 CEFAZOLIN IN DEXTROSE 2-4 GM/100ML-% SOLUTION: Performed by: NEUROLOGICAL SURGERY

## 2022-08-05 PROCEDURE — 25010000002 PHENYLEPHRINE 10 MG/ML SOLUTION: Performed by: NURSE ANESTHETIST, CERTIFIED REGISTERED

## 2022-08-05 PROCEDURE — 72100 X-RAY EXAM L-S SPINE 2/3 VWS: CPT | Performed by: NEUROLOGICAL SURGERY

## 2022-08-05 PROCEDURE — C1713 ANCHOR/SCREW BN/BN,TIS/BN: HCPCS | Performed by: NEUROLOGICAL SURGERY

## 2022-08-05 DEVICE — SSC BONE WAX
Type: IMPLANTABLE DEVICE | Site: SPINE LUMBAR | Status: FUNCTIONAL
Brand: SSC BONE WAX

## 2022-08-05 DEVICE — FLOSEAL HEMOSTATIC MATRIX, 5ML
Type: IMPLANTABLE DEVICE | Site: SPINE LUMBAR | Status: FUNCTIONAL
Brand: FLOSEAL HEMOSTATIC MATRIX

## 2022-08-05 RX ORDER — HYDRALAZINE HYDROCHLORIDE 20 MG/ML
5 INJECTION INTRAMUSCULAR; INTRAVENOUS
Status: DISCONTINUED | OUTPATIENT
Start: 2022-08-05 | End: 2022-08-05 | Stop reason: HOSPADM

## 2022-08-05 RX ORDER — ONDANSETRON 2 MG/ML
INJECTION INTRAMUSCULAR; INTRAVENOUS AS NEEDED
Status: DISCONTINUED | OUTPATIENT
Start: 2022-08-05 | End: 2022-08-05 | Stop reason: SURG

## 2022-08-05 RX ORDER — GLYCOPYRROLATE 0.2 MG/ML
INJECTION INTRAMUSCULAR; INTRAVENOUS AS NEEDED
Status: DISCONTINUED | OUTPATIENT
Start: 2022-08-05 | End: 2022-08-05 | Stop reason: SURG

## 2022-08-05 RX ORDER — HYDROMORPHONE HYDROCHLORIDE 1 MG/ML
0.5 INJECTION, SOLUTION INTRAMUSCULAR; INTRAVENOUS; SUBCUTANEOUS
Status: DISCONTINUED | OUTPATIENT
Start: 2022-08-05 | End: 2022-08-05 | Stop reason: HOSPADM

## 2022-08-05 RX ORDER — NALOXONE HCL 0.4 MG/ML
0.2 VIAL (ML) INJECTION AS NEEDED
Status: DISCONTINUED | OUTPATIENT
Start: 2022-08-05 | End: 2022-08-05 | Stop reason: HOSPADM

## 2022-08-05 RX ORDER — SODIUM CHLORIDE 0.9 % (FLUSH) 0.9 %
10 SYRINGE (ML) INJECTION AS NEEDED
Status: DISCONTINUED | OUTPATIENT
Start: 2022-08-05 | End: 2022-08-09 | Stop reason: HOSPADM

## 2022-08-05 RX ORDER — IBUPROFEN 600 MG/1
600 TABLET ORAL ONCE AS NEEDED
Status: CANCELLED | OUTPATIENT
Start: 2022-08-05

## 2022-08-05 RX ORDER — FENTANYL CITRATE 50 UG/ML
50 INJECTION, SOLUTION INTRAMUSCULAR; INTRAVENOUS
Status: DISCONTINUED | OUTPATIENT
Start: 2022-08-05 | End: 2022-08-05 | Stop reason: HOSPADM

## 2022-08-05 RX ORDER — DIPHENHYDRAMINE HYDROCHLORIDE 50 MG/ML
12.5 INJECTION INTRAMUSCULAR; INTRAVENOUS
Status: DISCONTINUED | OUTPATIENT
Start: 2022-08-05 | End: 2022-08-05 | Stop reason: HOSPADM

## 2022-08-05 RX ORDER — BISACODYL 5 MG/1
5 TABLET, DELAYED RELEASE ORAL DAILY PRN
Status: DISCONTINUED | OUTPATIENT
Start: 2022-08-05 | End: 2022-08-09 | Stop reason: HOSPADM

## 2022-08-05 RX ORDER — ROCURONIUM BROMIDE 10 MG/ML
INJECTION, SOLUTION INTRAVENOUS AS NEEDED
Status: DISCONTINUED | OUTPATIENT
Start: 2022-08-05 | End: 2022-08-05 | Stop reason: SURG

## 2022-08-05 RX ORDER — SODIUM CHLORIDE 0.9 % (FLUSH) 0.9 %
3-10 SYRINGE (ML) INJECTION AS NEEDED
Status: DISCONTINUED | OUTPATIENT
Start: 2022-08-05 | End: 2022-08-05 | Stop reason: HOSPADM

## 2022-08-05 RX ORDER — FLUMAZENIL 0.1 MG/ML
0.2 INJECTION INTRAVENOUS AS NEEDED
Status: CANCELLED | OUTPATIENT
Start: 2022-08-05

## 2022-08-05 RX ORDER — PROMETHAZINE HYDROCHLORIDE 25 MG/1
25 TABLET ORAL ONCE AS NEEDED
Status: DISCONTINUED | OUTPATIENT
Start: 2022-08-05 | End: 2022-08-05 | Stop reason: HOSPADM

## 2022-08-05 RX ORDER — HYDROMORPHONE HYDROCHLORIDE 1 MG/ML
0.5 INJECTION, SOLUTION INTRAMUSCULAR; INTRAVENOUS; SUBCUTANEOUS
Status: CANCELLED | OUTPATIENT
Start: 2022-08-05

## 2022-08-05 RX ORDER — HYDROCODONE BITARTRATE AND ACETAMINOPHEN 5; 325 MG/1; MG/1
1 TABLET ORAL EVERY 4 HOURS PRN
Status: DISCONTINUED | OUTPATIENT
Start: 2022-08-05 | End: 2022-08-08

## 2022-08-05 RX ORDER — ONDANSETRON 2 MG/ML
4 INJECTION INTRAMUSCULAR; INTRAVENOUS ONCE AS NEEDED
Status: DISCONTINUED | OUTPATIENT
Start: 2022-08-05 | End: 2022-08-05 | Stop reason: HOSPADM

## 2022-08-05 RX ORDER — FLUMAZENIL 0.1 MG/ML
0.2 INJECTION INTRAVENOUS AS NEEDED
Status: DISCONTINUED | OUTPATIENT
Start: 2022-08-05 | End: 2022-08-05 | Stop reason: HOSPADM

## 2022-08-05 RX ORDER — CEFAZOLIN SODIUM 2 G/100ML
2 INJECTION, SOLUTION INTRAVENOUS ONCE
Status: COMPLETED | OUTPATIENT
Start: 2022-08-05 | End: 2022-08-05

## 2022-08-05 RX ORDER — LIDOCAINE HYDROCHLORIDE 20 MG/ML
INJECTION, SOLUTION INFILTRATION; PERINEURAL AS NEEDED
Status: DISCONTINUED | OUTPATIENT
Start: 2022-08-05 | End: 2022-08-05 | Stop reason: SURG

## 2022-08-05 RX ORDER — SODIUM CHLORIDE, SODIUM LACTATE, POTASSIUM CHLORIDE, CALCIUM CHLORIDE 600; 310; 30; 20 MG/100ML; MG/100ML; MG/100ML; MG/100ML
9 INJECTION, SOLUTION INTRAVENOUS CONTINUOUS
Status: DISCONTINUED | OUTPATIENT
Start: 2022-08-05 | End: 2022-08-07

## 2022-08-05 RX ORDER — PROMETHAZINE HYDROCHLORIDE 25 MG/1
25 SUPPOSITORY RECTAL ONCE AS NEEDED
Status: DISCONTINUED | OUTPATIENT
Start: 2022-08-05 | End: 2022-08-05 | Stop reason: HOSPADM

## 2022-08-05 RX ORDER — EPHEDRINE SULFATE 50 MG/ML
5 INJECTION, SOLUTION INTRAVENOUS ONCE AS NEEDED
Status: CANCELLED | OUTPATIENT
Start: 2022-08-05

## 2022-08-05 RX ORDER — EPHEDRINE SULFATE 50 MG/ML
5 INJECTION, SOLUTION INTRAVENOUS ONCE AS NEEDED
Status: DISCONTINUED | OUTPATIENT
Start: 2022-08-05 | End: 2022-08-05 | Stop reason: HOSPADM

## 2022-08-05 RX ORDER — HYDRALAZINE HYDROCHLORIDE 20 MG/ML
5 INJECTION INTRAMUSCULAR; INTRAVENOUS
Status: CANCELLED | OUTPATIENT
Start: 2022-08-05

## 2022-08-05 RX ORDER — SODIUM CHLORIDE 0.9 % (FLUSH) 0.9 %
10 SYRINGE (ML) INJECTION EVERY 12 HOURS SCHEDULED
Status: DISCONTINUED | OUTPATIENT
Start: 2022-08-05 | End: 2022-08-09 | Stop reason: HOSPADM

## 2022-08-05 RX ORDER — CEPHALEXIN 500 MG/1
500 CAPSULE ORAL 4 TIMES DAILY
Qty: 20 CAPSULE | Refills: 0 | Status: SHIPPED | OUTPATIENT
Start: 2022-08-05 | End: 2022-08-14

## 2022-08-05 RX ORDER — LIDOCAINE HYDROCHLORIDE 10 MG/ML
0.5 INJECTION, SOLUTION EPIDURAL; INFILTRATION; INTRACAUDAL; PERINEURAL ONCE AS NEEDED
Status: DISCONTINUED | OUTPATIENT
Start: 2022-08-05 | End: 2022-08-05 | Stop reason: HOSPADM

## 2022-08-05 RX ORDER — ONDANSETRON 2 MG/ML
4 INJECTION INTRAMUSCULAR; INTRAVENOUS ONCE AS NEEDED
Status: CANCELLED | OUTPATIENT
Start: 2022-08-05

## 2022-08-05 RX ORDER — LABETALOL HYDROCHLORIDE 5 MG/ML
5 INJECTION, SOLUTION INTRAVENOUS
Status: DISCONTINUED | OUTPATIENT
Start: 2022-08-05 | End: 2022-08-05 | Stop reason: HOSPADM

## 2022-08-05 RX ORDER — DIPHENHYDRAMINE HYDROCHLORIDE 50 MG/ML
12.5 INJECTION INTRAMUSCULAR; INTRAVENOUS
Status: CANCELLED | OUTPATIENT
Start: 2022-08-05

## 2022-08-05 RX ORDER — LABETALOL HYDROCHLORIDE 5 MG/ML
5 INJECTION, SOLUTION INTRAVENOUS
Status: CANCELLED | OUTPATIENT
Start: 2022-08-05

## 2022-08-05 RX ORDER — PHENYLEPHRINE HYDROCHLORIDE 10 MG/ML
INJECTION INTRAVENOUS AS NEEDED
Status: DISCONTINUED | OUTPATIENT
Start: 2022-08-05 | End: 2022-08-05 | Stop reason: SURG

## 2022-08-05 RX ORDER — IBUPROFEN 600 MG/1
600 TABLET ORAL ONCE AS NEEDED
Status: DISCONTINUED | OUTPATIENT
Start: 2022-08-05 | End: 2022-08-05 | Stop reason: HOSPADM

## 2022-08-05 RX ORDER — VASOPRESSIN 20 U/ML
INJECTION PARENTERAL AS NEEDED
Status: DISCONTINUED | OUTPATIENT
Start: 2022-08-05 | End: 2022-08-05 | Stop reason: SURG

## 2022-08-05 RX ORDER — POLYETHYLENE GLYCOL 3350 17 G/17G
17 POWDER, FOR SOLUTION ORAL DAILY PRN
Status: DISCONTINUED | OUTPATIENT
Start: 2022-08-05 | End: 2022-08-09 | Stop reason: HOSPADM

## 2022-08-05 RX ORDER — FENTANYL CITRATE 50 UG/ML
50 INJECTION, SOLUTION INTRAMUSCULAR; INTRAVENOUS
Status: CANCELLED | OUTPATIENT
Start: 2022-08-05

## 2022-08-05 RX ORDER — DIPHENHYDRAMINE HCL 25 MG
25 CAPSULE ORAL
Status: DISCONTINUED | OUTPATIENT
Start: 2022-08-05 | End: 2022-08-05 | Stop reason: HOSPADM

## 2022-08-05 RX ORDER — OXYCODONE AND ACETAMINOPHEN 7.5; 325 MG/1; MG/1
1 TABLET ORAL EVERY 4 HOURS PRN
Status: CANCELLED | OUTPATIENT
Start: 2022-08-05 | End: 2022-08-12

## 2022-08-05 RX ORDER — PROMETHAZINE HYDROCHLORIDE 25 MG/1
25 TABLET ORAL ONCE AS NEEDED
Status: CANCELLED | OUTPATIENT
Start: 2022-08-05

## 2022-08-05 RX ORDER — ALBUMIN, HUMAN INJ 5% 5 %
SOLUTION INTRAVENOUS CONTINUOUS PRN
Status: DISCONTINUED | OUTPATIENT
Start: 2022-08-05 | End: 2022-08-05 | Stop reason: SURG

## 2022-08-05 RX ORDER — AMOXICILLIN 250 MG
2 CAPSULE ORAL 2 TIMES DAILY
Status: DISCONTINUED | OUTPATIENT
Start: 2022-08-05 | End: 2022-08-09 | Stop reason: HOSPADM

## 2022-08-05 RX ORDER — OXYCODONE AND ACETAMINOPHEN 7.5; 325 MG/1; MG/1
1 TABLET ORAL EVERY 4 HOURS PRN
Status: DISCONTINUED | OUTPATIENT
Start: 2022-08-05 | End: 2022-08-05 | Stop reason: HOSPADM

## 2022-08-05 RX ORDER — PROPOFOL 10 MG/ML
VIAL (ML) INTRAVENOUS AS NEEDED
Status: DISCONTINUED | OUTPATIENT
Start: 2022-08-05 | End: 2022-08-05 | Stop reason: SURG

## 2022-08-05 RX ORDER — MIDAZOLAM HYDROCHLORIDE 1 MG/ML
0.5 INJECTION INTRAMUSCULAR; INTRAVENOUS
Status: DISCONTINUED | OUTPATIENT
Start: 2022-08-05 | End: 2022-08-05 | Stop reason: HOSPADM

## 2022-08-05 RX ORDER — BISACODYL 10 MG
10 SUPPOSITORY, RECTAL RECTAL DAILY PRN
Status: DISCONTINUED | OUTPATIENT
Start: 2022-08-05 | End: 2022-08-09 | Stop reason: HOSPADM

## 2022-08-05 RX ORDER — LEVOTHYROXINE SODIUM 0.05 MG/1
50 TABLET ORAL DAILY
Status: DISCONTINUED | OUTPATIENT
Start: 2022-08-06 | End: 2022-08-09 | Stop reason: HOSPADM

## 2022-08-05 RX ORDER — DIPHENHYDRAMINE HCL 25 MG
25 CAPSULE ORAL
Status: CANCELLED | OUTPATIENT
Start: 2022-08-05

## 2022-08-05 RX ORDER — HYDROCODONE BITARTRATE AND ACETAMINOPHEN 7.5; 325 MG/1; MG/1
1 TABLET ORAL ONCE AS NEEDED
Status: DISCONTINUED | OUTPATIENT
Start: 2022-08-05 | End: 2022-08-05 | Stop reason: HOSPADM

## 2022-08-05 RX ORDER — NALOXONE HCL 0.4 MG/ML
0.2 VIAL (ML) INJECTION AS NEEDED
Status: CANCELLED | OUTPATIENT
Start: 2022-08-05

## 2022-08-05 RX ORDER — NEOSTIGMINE METHYLSULFATE 0.5 MG/ML
INJECTION, SOLUTION INTRAVENOUS AS NEEDED
Status: DISCONTINUED | OUTPATIENT
Start: 2022-08-05 | End: 2022-08-05 | Stop reason: SURG

## 2022-08-05 RX ORDER — FENTANYL CITRATE 50 UG/ML
INJECTION, SOLUTION INTRAMUSCULAR; INTRAVENOUS AS NEEDED
Status: DISCONTINUED | OUTPATIENT
Start: 2022-08-05 | End: 2022-08-05 | Stop reason: SURG

## 2022-08-05 RX ORDER — HYDROCODONE BITARTRATE AND ACETAMINOPHEN 7.5; 325 MG/1; MG/1
1 TABLET ORAL ONCE AS NEEDED
Status: CANCELLED | OUTPATIENT
Start: 2022-08-05

## 2022-08-05 RX ORDER — FAMOTIDINE 10 MG/ML
20 INJECTION, SOLUTION INTRAVENOUS ONCE
Status: COMPLETED | OUTPATIENT
Start: 2022-08-05 | End: 2022-08-05

## 2022-08-05 RX ORDER — HYDROCODONE BITARTRATE AND ACETAMINOPHEN 5; 325 MG/1; MG/1
1 TABLET ORAL EVERY 4 HOURS PRN
Qty: 35 TABLET | Refills: 0 | Status: ON HOLD | OUTPATIENT
Start: 2022-08-05 | End: 2022-08-12 | Stop reason: SDUPTHER

## 2022-08-05 RX ORDER — EPHEDRINE SULFATE 50 MG/ML
INJECTION, SOLUTION INTRAVENOUS AS NEEDED
Status: DISCONTINUED | OUTPATIENT
Start: 2022-08-05 | End: 2022-08-05 | Stop reason: SURG

## 2022-08-05 RX ORDER — PROMETHAZINE HYDROCHLORIDE 25 MG/1
25 SUPPOSITORY RECTAL ONCE AS NEEDED
Status: CANCELLED | OUTPATIENT
Start: 2022-08-05

## 2022-08-05 RX ORDER — SODIUM CHLORIDE 0.9 % (FLUSH) 0.9 %
3 SYRINGE (ML) INJECTION EVERY 12 HOURS SCHEDULED
Status: DISCONTINUED | OUTPATIENT
Start: 2022-08-05 | End: 2022-08-05 | Stop reason: HOSPADM

## 2022-08-05 RX ADMIN — LIDOCAINE HYDROCHLORIDE 100 MG: 20 INJECTION, SOLUTION INFILTRATION; PERINEURAL at 12:58

## 2022-08-05 RX ADMIN — CEFAZOLIN SODIUM 2 G: 2 INJECTION, SOLUTION INTRAVENOUS at 12:45

## 2022-08-05 RX ADMIN — EPHEDRINE SULFATE 10 MG: 50 INJECTION INTRAVENOUS at 13:47

## 2022-08-05 RX ADMIN — VASOPRESSIN 2 UNITS: 20 INJECTION INTRAVENOUS at 14:35

## 2022-08-05 RX ADMIN — PHENYLEPHRINE HYDROCHLORIDE 100 MCG: 10 INJECTION, SOLUTION INTRAVENOUS at 13:40

## 2022-08-05 RX ADMIN — DOCUSATE SODIUM 50MG AND SENNOSIDES 8.6MG 2 TABLET: 8.6; 5 TABLET, FILM COATED ORAL at 21:53

## 2022-08-05 RX ADMIN — ONDANSETRON 4 MG: 2 INJECTION INTRAMUSCULAR; INTRAVENOUS at 14:54

## 2022-08-05 RX ADMIN — PHENYLEPHRINE HYDROCHLORIDE 100 MCG: 10 INJECTION, SOLUTION INTRAVENOUS at 13:31

## 2022-08-05 RX ADMIN — PHENYLEPHRINE HYDROCHLORIDE 100 MCG: 10 INJECTION, SOLUTION INTRAVENOUS at 14:04

## 2022-08-05 RX ADMIN — PHENYLEPHRINE HYDROCHLORIDE 100 MCG: 10 INJECTION, SOLUTION INTRAVENOUS at 13:37

## 2022-08-05 RX ADMIN — SODIUM CHLORIDE, POTASSIUM CHLORIDE, SODIUM LACTATE AND CALCIUM CHLORIDE: 600; 310; 30; 20 INJECTION, SOLUTION INTRAVENOUS at 14:22

## 2022-08-05 RX ADMIN — PHENYLEPHRINE HYDROCHLORIDE 100 MCG: 10 INJECTION, SOLUTION INTRAVENOUS at 13:47

## 2022-08-05 RX ADMIN — FENTANYL CITRATE 100 MCG: 50 INJECTION INTRAMUSCULAR; INTRAVENOUS at 12:58

## 2022-08-05 RX ADMIN — EPHEDRINE SULFATE 10 MG: 50 INJECTION INTRAVENOUS at 14:04

## 2022-08-05 RX ADMIN — PHENYLEPHRINE HYDROCHLORIDE 100 MCG: 10 INJECTION, SOLUTION INTRAVENOUS at 13:53

## 2022-08-05 RX ADMIN — PHENYLEPHRINE HYDROCHLORIDE 100 MCG: 10 INJECTION, SOLUTION INTRAVENOUS at 13:22

## 2022-08-05 RX ADMIN — PHENYLEPHRINE HYDROCHLORIDE 100 MCG: 10 INJECTION, SOLUTION INTRAVENOUS at 13:44

## 2022-08-05 RX ADMIN — ROCURONIUM BROMIDE 50 MG: 50 INJECTION INTRAVENOUS at 12:58

## 2022-08-05 RX ADMIN — PROPOFOL 150 MG: 10 INJECTION, EMULSION INTRAVENOUS at 12:58

## 2022-08-05 RX ADMIN — PHENYLEPHRINE HYDROCHLORIDE 100 MCG: 10 INJECTION, SOLUTION INTRAVENOUS at 13:19

## 2022-08-05 RX ADMIN — ALBUMIN HUMAN: 0.05 INJECTION, SOLUTION INTRAVENOUS at 14:07

## 2022-08-05 RX ADMIN — EPHEDRINE SULFATE 10 MG: 50 INJECTION INTRAVENOUS at 14:07

## 2022-08-05 RX ADMIN — EPHEDRINE SULFATE 10 MG: 50 INJECTION INTRAVENOUS at 13:53

## 2022-08-05 RX ADMIN — NEOSTIGMINE METHYLSULFATE 3 MG: 0.5 INJECTION INTRAVENOUS at 14:56

## 2022-08-05 RX ADMIN — Medication 10 ML: at 21:54

## 2022-08-05 RX ADMIN — PHENYLEPHRINE HYDROCHLORIDE 100 MCG: 10 INJECTION, SOLUTION INTRAVENOUS at 14:07

## 2022-08-05 RX ADMIN — VASOPRESSIN 2 UNITS: 20 INJECTION INTRAVENOUS at 14:17

## 2022-08-05 RX ADMIN — GLYCOPYRROLATE 0.4 MG: 0.2 INJECTION INTRAMUSCULAR; INTRAVENOUS at 14:56

## 2022-08-05 RX ADMIN — FAMOTIDINE 20 MG: 10 INJECTION INTRAVENOUS at 08:41

## 2022-08-05 RX ADMIN — MIDAZOLAM 0.5 MG: 1 INJECTION INTRAMUSCULAR; INTRAVENOUS at 12:25

## 2022-08-05 RX ADMIN — SODIUM CHLORIDE, POTASSIUM CHLORIDE, SODIUM LACTATE AND CALCIUM CHLORIDE 9 ML/HR: 600; 310; 30; 20 INJECTION, SOLUTION INTRAVENOUS at 08:41

## 2022-08-05 NOTE — ANESTHESIA POSTPROCEDURE EVALUATION
"Patient: Marylu Foreman    Procedure Summary     Date: 08/05/22 Room / Location: [facility] OR [address] [place] [address] MAIN OR    Anesthesia Start: 1253 Anesthesia Stop: 1515    Procedure: Left lumbar 4 to Lumbar 5, Lumbar 5 to sacral 1 laminectomy with metrx (Left Spine Lumbar) Diagnosis:       Spondylosis with myelopathy, lumbar region      (Spondylosis with myelopathy, lumbar region [M47.16])    Surgeons: Jean Sy MD Provider: Librado Barahona MD    Anesthesia Type: general ASA Status: 4          Anesthesia Type: general    Vitals  Vitals Value Taken Time   /71 08/05/22 1631   Temp 37.2 °C (98.9 °F) 08/05/22 1512   Pulse 82 08/05/22 1642   Resp 20 08/05/22 1615   SpO2 94 % 08/05/22 1642   Vitals shown include unvalidated device data.        Post Anesthesia Care and Evaluation    Patient location during evaluation: PACU  Patient participation: complete - patient participated  Level of consciousness: awake and alert  Pain management: adequate    Airway patency: patent  Anesthetic complications: No anesthetic complications  PONV Status: controlled  Cardiovascular status: acceptable and hemodynamically stable  Respiratory status: acceptable  Hydration status: acceptable    Comments: /75   Pulse 78   Temp 37.2 °C (98.9 °F) (Oral)   Resp 20   Ht 162.6 cm (64\")   Wt 92.2 kg (203 lb 4.8 oz)   LMP  (LMP Unknown)   SpO2 96%   BMI 34.90 kg/m²       "

## 2022-08-05 NOTE — ANESTHESIA PREPROCEDURE EVALUATION
Anesthesia Evaluation     Patient summary reviewed and Nursing notes reviewed   no history of anesthetic complications:  NPO Solid Status: > 8 hours  NPO Liquid Status: > 2 hours           Airway   Mallampati: III  TM distance: >3 FB  Neck ROM: limited  Dental    (+) implants and lower dentures        Pulmonary    (+) pulmonary embolism, sleep apnea,   Cardiovascular   Exercise tolerance: unable to assess    ECG reviewed    (+) hypertension, valvular problems/murmurs (rheumatic fever as a child) murmur, dysrhythmias Atrial Fib, CHF Diastolic >=55%, DVT, hyperlipidemia,     ROS comment: TTE:  · Left ventricular wall thickness is consistent with mild concentric hypertrophy.  · Estimated left ventricular EF = 59% Left ventricular systolic function is normal.  · Left ventricular diastolic function was indeterminate.  · There is calcification of the aortic valve.  · Estimated right ventricular systolic pressure from tricuspid regurgitation is normal (<35 mmHg).        Neuro/Psych  (+) headaches, numbness, psychiatric history Anxiety and Depression,      ROS Comment: Left foot drop related to lumbar spinal stenosis  GI/Hepatic/Renal/Endo    (+) obesity,  GERD,  renal disease CRI, thyroid problem hypothyroidism    Musculoskeletal     Abdominal    Substance History      OB/GYN          Other   arthritis,                      Anesthesia Plan    ASA 4     general     (I have reviewed the patient's history with the patient and the chart, including all pertinent laboratory results and imaging. I have explained the risks of anesthesia including but not limited to dental damage, corneal abrasion, nerve injury, MI, stroke, and death. Questions asked and answered. Anesthetic plan discussed with patient and team as indicated. Patient expressed understanding of the above.  )  intravenous induction     Anesthetic plan, risks, benefits, and alternatives have been provided, discussed and informed consent has been obtained with:  patient.        CODE STATUS:

## 2022-08-05 NOTE — NURSING NOTE
Pt stated she was unaware she was to have someone stay with her for the next 24 hours at home, spoke to friend who is picking pt up from the hosptial today adam rg is unable to stay the night w pt. Updated dr yang pt does not have a  to accompany pt at home today, MD would like to admit pt advised to call office for admission orders from NP. NADYA camilo paged for orders pt/ pt friend updated

## 2022-08-05 NOTE — OP NOTE
Preop diagnosis: Severe lumbar radiculopathy secondary to lumbar stenosis with neurogenic claudication L5-S1    Postop diagnosis: same with the addition of stenosis at L4-5    Procedure performed: A left L4-5 and L5-S1 laminectomy, foraminotomy, medial facetectomy using microsurgical technique microsurgical instrumentation and minimal access spinal technologies    Surgeon: Jean Sy M.D.    Assistant: Renée Nation CFA who was instrumental in helping with visualization of neural structures, hemostasis, and retraction of neural structures.  Her skilled assistance was necessary for the success of this case    Indications for the procedure:  This is a patient with severe pain in the legs.  Previous imaging had shown neural compression at the L5-S1 levels.  As a result of this and the failure of conservative therapy the patient elected to proceed with surgery.    Operative summary:  After induction of general anesthesia the patient was intubated and placed on the operating table in the prone position on a Ernie table.  All pressure points were padded including peripheral points of entrapment.  The back was prepped with Chloraprep and then draped with Ioban, half sheets, and a split sheet.      The L5-S1 level was localized with intraoperative fluoroscopy an incision was made on the left just above the pedicle.  Successive dilating tubes up to 18 mm by 7 cm were placed over that area.  Soft tissue was then removed from the supralaminar space.  The inferior laminar arch of L5 as well as the superior laminar arch of S1 and the medial aspect of facet were removed with the Baby Blendy drill.  The remainder of the operation was done under high-power magnification of the operating microscope using microsurgical technique and microsurgical instrumentation.  The ligamentum flavum was opened and removed out to the level of the pedicle using the Kerrison rongeurs.  This exposed the lateral thecal sac and the nerve root of S1.   Once the lateral recess was opened the dissection was carried up to the L5 pedicle completely decompressing the superior nerve root.  However once I got to that nerve there still appeared to be significant compression of that nerve more superiorly as well as into the lateral recess of L4-5 and I elected to carry the dissection all the way up to the L4 pedicle.  This was done with great care and it was ultimately able to decompress the L4-L5 and S1 nerve roots without any evidence of CSF leak.  Decompressed nerves were inspected and found to be completely come    Once this was done the the decompressed nerves were completely examined with a Drew ball probe and found to be completely decompressed.  There was no evidence of disc herniation.  Following this the bleeding was controlled with bipolar cautery.    Once the entire decompression was completed we were able to explore under the nerve root and the thecal sac using the Drew ball probe to be sure there was no evidence of residual compression.  There being none bleeding was controlled again using the bipolar cautery, FloSeal, and thrombin and Gelfoam.  The area was then copiously irrigated with vancomycin solution and the tube was removed. The paraspinous musculature was injected with 100 cc 1/8% Marcaine with 1:200,000 epinephrine solution.    Another gram of Kefzol was given prior to closure.    The incision was then closed in layers and dressed and the patient was taken to the recovery room in stable condition there were no apparent complications.  Sponge, instrument, and needle counts were correct at the end of the procedure.

## 2022-08-05 NOTE — ANESTHESIA PROCEDURE NOTES
Airway  Urgency: elective    Date/Time: 8/5/2022 1:02 PM  Airway not difficult    General Information and Staff    Patient location during procedure: OR  Anesthesiologist: Librado Barahona MD  CRNA/CAA: Mitra Berkowitz CRNA    Indications and Patient Condition  Indications for airway management: airway protection    Preoxygenated: yes  Mask difficulty assessment: 1 - vent by mask    Final Airway Details  Final airway type: endotracheal airway      Successful airway: ETT and reinforced tube  Cuffed: yes   Successful intubation technique: direct laryngoscopy  Facilitating devices/methods: intubating stylet  Endotracheal tube insertion site: oral  Blade: Crowe  Blade size: 2  ETT size (mm): 7.0  Cormack-Lehane Classification: grade I - full view of glottis  Placement verified by: chest auscultation and capnometry   Measured from: lips  ETT/EBT  to lips (cm): 20  Number of attempts at approach: 1  Assessment: lips, teeth, and gum same as pre-op and atraumatic intubation    Additional Comments  Atraumatic, MOP to cuff, BSBE, no change to dentition, secured with tape

## 2022-08-05 NOTE — BRIEF OP NOTE
LUMBAR DISCECTOMY POSTERIOR WITH METRIX  Progress Note    Marylu Foreman  8/5/2022    Pre-op Diagnosis:   Spondylosis with myelopathy, lumbar region [M47.16]       Post-Op Diagnosis Codes:     * Spondylosis with myelopathy, lumbar region [M47.16]    Procedure/CPT® Codes:        Procedure(s):  Left lumbar 4 to Lumbar 5, Lumbar 5 to sacral 1 laminectomy with metrx    Surgeon(s):  Jean Sy MD    Anesthesia: General    Staff:   Circulator: Lubna Flores RN  Radiology Technologist: Josselyn Smith  Scrub Person: [place] Le  Assistant: Renée Nation CSA  [place]ee: Nury Castanon RN  Assistant: Renée Nation CSA      Estimated Blood Loss: 50 mL    Urine Voided: * No values recorded between 8/5/2022 12:52 PM and 8/5/2022  3:09 PM *    Specimens:                None          Drains: * No LDAs found *    Findings: Severe lateral recess and foraminal stenosis on the left at L4-5 and L5-S1        Complications: None      Jean Sy MD     Date: 8/5/2022  Time: 15:12 EDT

## 2022-08-05 NOTE — ADDENDUM NOTE
Addendum  created 08/05/22 1741 by Librado Barahona MD    Attestation recorded in Intraprocedure, Intraprocedure Attestations filed

## 2022-08-06 ENCOUNTER — APPOINTMENT (OUTPATIENT)
Dept: GENERAL RADIOLOGY | Facility: HOSPITAL | Age: 77
End: 2022-08-06

## 2022-08-06 LAB
ANION GAP SERPL CALCULATED.3IONS-SCNC: 16.5 MMOL/L (ref 5–15)
BASOPHILS # BLD AUTO: 0.12 10*3/MM3 (ref 0–0.2)
BASOPHILS NFR BLD AUTO: 0.9 % (ref 0–1.5)
BUN SERPL-MCNC: 25 MG/DL (ref 8–23)
BUN/CREAT SERPL: 20 (ref 7–25)
CALCIUM SPEC-SCNC: 8.6 MG/DL (ref 8.6–10.5)
CHLORIDE SERPL-SCNC: 106 MMOL/L (ref 98–107)
CO2 SERPL-SCNC: 22.5 MMOL/L (ref 22–29)
CREAT SERPL-MCNC: 1.25 MG/DL (ref 0.57–1)
DEPRECATED RDW RBC AUTO: 40.5 FL (ref 37–54)
EGFRCR SERPLBLD CKD-EPI 2021: 45 ML/MIN/1.73
EOSINOPHIL # BLD AUTO: 0.15 10*3/MM3 (ref 0–0.4)
EOSINOPHIL NFR BLD AUTO: 1.1 % (ref 0.3–6.2)
ERYTHROCYTE [DISTWIDTH] IN BLOOD BY AUTOMATED COUNT: 11.7 % (ref 12.3–15.4)
GLUCOSE SERPL-MCNC: 121 MG/DL (ref 65–99)
HCT VFR BLD AUTO: 30.2 % (ref 34–46.6)
HGB BLD-MCNC: 10 G/DL (ref 12–15.9)
IMM GRANULOCYTES # BLD AUTO: 0.04 10*3/MM3 (ref 0–0.05)
IMM GRANULOCYTES NFR BLD AUTO: 0.3 % (ref 0–0.5)
LYMPHOCYTES # BLD AUTO: 1.48 10*3/MM3 (ref 0.7–3.1)
LYMPHOCYTES NFR BLD AUTO: 10.7 % (ref 19.6–45.3)
MCH RBC QN AUTO: 32.1 PG (ref 26.6–33)
MCHC RBC AUTO-ENTMCNC: 33.1 G/DL (ref 31.5–35.7)
MCV RBC AUTO: 96.8 FL (ref 79–97)
MONOCYTES # BLD AUTO: 1.46 10*3/MM3 (ref 0.1–0.9)
MONOCYTES NFR BLD AUTO: 10.6 % (ref 5–12)
NEUTROPHILS NFR BLD AUTO: 10.53 10*3/MM3 (ref 1.7–7)
NEUTROPHILS NFR BLD AUTO: 76.4 % (ref 42.7–76)
NRBC BLD AUTO-RTO: 0 /100 WBC (ref 0–0.2)
PLATELET # BLD AUTO: 279 10*3/MM3 (ref 140–450)
PMV BLD AUTO: 11.7 FL (ref 6–12)
POTASSIUM SERPL-SCNC: 4.9 MMOL/L (ref 3.5–5.2)
RBC # BLD AUTO: 3.12 10*6/MM3 (ref 3.77–5.28)
SODIUM SERPL-SCNC: 145 MMOL/L (ref 136–145)
WBC NRBC COR # BLD: 13.78 10*3/MM3 (ref 3.4–10.8)

## 2022-08-06 PROCEDURE — 71045 X-RAY EXAM CHEST 1 VIEW: CPT

## 2022-08-06 PROCEDURE — 94799 UNLISTED PULMONARY SVC/PX: CPT

## 2022-08-06 PROCEDURE — 80048 BASIC METABOLIC PNL TOTAL CA: CPT | Performed by: NEUROLOGICAL SURGERY

## 2022-08-06 PROCEDURE — 99024 POSTOP FOLLOW-UP VISIT: CPT | Performed by: NURSE PRACTITIONER

## 2022-08-06 PROCEDURE — 97162 PT EVAL MOD COMPLEX 30 MIN: CPT

## 2022-08-06 PROCEDURE — 93010 ELECTROCARDIOGRAM REPORT: CPT | Performed by: INTERNAL MEDICINE

## 2022-08-06 PROCEDURE — 97530 THERAPEUTIC ACTIVITIES: CPT

## 2022-08-06 PROCEDURE — 25010000002 HYDROMORPHONE PER 4 MG: Performed by: NEUROLOGICAL SURGERY

## 2022-08-06 PROCEDURE — 85025 COMPLETE CBC W/AUTO DIFF WBC: CPT | Performed by: NEUROLOGICAL SURGERY

## 2022-08-06 PROCEDURE — 93005 ELECTROCARDIOGRAM TRACING: CPT | Performed by: NEUROLOGICAL SURGERY

## 2022-08-06 RX ORDER — HYDROMORPHONE HYDROCHLORIDE 1 MG/ML
0.5 INJECTION, SOLUTION INTRAMUSCULAR; INTRAVENOUS; SUBCUTANEOUS ONCE
Status: COMPLETED | OUTPATIENT
Start: 2022-08-06 | End: 2022-08-06

## 2022-08-06 RX ORDER — METHOCARBAMOL 750 MG/1
750 TABLET, FILM COATED ORAL 2 TIMES DAILY PRN
Status: DISCONTINUED | OUTPATIENT
Start: 2022-08-06 | End: 2022-08-08

## 2022-08-06 RX ORDER — DULOXETIN HYDROCHLORIDE 60 MG/1
60 CAPSULE, DELAYED RELEASE ORAL EVERY 12 HOURS SCHEDULED
Status: DISCONTINUED | OUTPATIENT
Start: 2022-08-06 | End: 2022-08-09 | Stop reason: HOSPADM

## 2022-08-06 RX ORDER — GABAPENTIN 100 MG/1
200 CAPSULE ORAL EVERY 8 HOURS SCHEDULED
Status: DISCONTINUED | OUTPATIENT
Start: 2022-08-06 | End: 2022-08-09 | Stop reason: HOSPADM

## 2022-08-06 RX ADMIN — DOCUSATE SODIUM 50MG AND SENNOSIDES 8.6MG 2 TABLET: 8.6; 5 TABLET, FILM COATED ORAL at 22:18

## 2022-08-06 RX ADMIN — DULOXETINE HYDROCHLORIDE 60 MG: 60 CAPSULE, DELAYED RELEASE ORAL at 22:19

## 2022-08-06 RX ADMIN — DOCUSATE SODIUM 50MG AND SENNOSIDES 8.6MG 2 TABLET: 8.6; 5 TABLET, FILM COATED ORAL at 08:58

## 2022-08-06 RX ADMIN — HYDROCODONE BITARTRATE AND ACETAMINOPHEN 1 TABLET: 5; 325 TABLET ORAL at 13:48

## 2022-08-06 RX ADMIN — Medication 10 ML: at 23:00

## 2022-08-06 RX ADMIN — LEVOTHYROXINE SODIUM 50 MCG: 0.05 TABLET ORAL at 08:58

## 2022-08-06 RX ADMIN — GABAPENTIN 200 MG: 100 CAPSULE ORAL at 22:18

## 2022-08-06 RX ADMIN — HYDROCODONE BITARTRATE AND ACETAMINOPHEN 1 TABLET: 5; 325 TABLET ORAL at 08:46

## 2022-08-06 RX ADMIN — HYDROCODONE BITARTRATE AND ACETAMINOPHEN 1 TABLET: 5; 325 TABLET ORAL at 04:37

## 2022-08-06 RX ADMIN — HYDROCODONE BITARTRATE AND ACETAMINOPHEN 1 TABLET: 5; 325 TABLET ORAL at 22:18

## 2022-08-06 RX ADMIN — HYDROMORPHONE HYDROCHLORIDE 0.5 MG: 1 INJECTION, SOLUTION INTRAMUSCULAR; INTRAVENOUS; SUBCUTANEOUS at 23:01

## 2022-08-06 RX ADMIN — HYDROCODONE BITARTRATE AND ACETAMINOPHEN 1 TABLET: 5; 325 TABLET ORAL at 17:57

## 2022-08-06 RX ADMIN — METHOCARBAMOL TABLETS 750 MG: 750 TABLET, COATED ORAL at 20:33

## 2022-08-06 RX ADMIN — HYDROCODONE BITARTRATE AND ACETAMINOPHEN 1 TABLET: 5; 325 TABLET ORAL at 00:42

## 2022-08-06 NOTE — PROGRESS NOTES
[facility] NEUROSURGERY PROGRESS NOTE      CC: POD #1 left L4-5 and L5-S1 laminectomy, foraminotomy, medial facetectomy using microsurgical technique microsurgical instrumentation and minimal access spinal technologies      Subjective     Interval History: No events reported overnight.  Pain has been bad this AM, better this afternoon. States that she is not moving enough r/t difficulty walking with left foot drop and feels she will need rehab.  Strength is equal in bilateral lower extremities, dorsiflexion left foot 2/5.  Edition, she states she is having some muscle spasms which is increasing her pain, and an electric shock feeling, intermittently, in her left leg.    ROS:  Constitutional: No fever, chills  GI: No nausea, vomiting  MS:  back pain  Neuro: No numbness, tingling, left foot weakness,  balance difficulties  : No difficulty voiding, no incontinence    Objective     Vital signs in last 24 hours:  Temp:  [97 °F (36.1 °C)-99 °F (37.2 °C)] 99 °F (37.2 °C)  Heart Rate:  [] 112  Resp:  [16-20] 16  BP: ()/(47-81) 116/73    Intake/Output this shift:  I/O this shift:  In: 360 [P.O.:360]  Out: -     LABS:  Results from last 7 days   Lab Units 08/06/22 0532 08/05/22 1917 08/03/22  0729   WBC 10*3/mm3 13.78* 13.04* 10.48   HEMOGLOBIN g/dL 10.0* 10.1* 11.9*   HEMATOCRIT % 30.2* 30.6* 38.0   PLATELETS 10*3/mm3 279 279 364     Results from last 7 days   Lab Units 08/06/22 0532 08/05/22 1917 08/03/22  0729   SODIUM mmol/L 145 142 141   POTASSIUM mmol/L 4.9 4.9 4.1   CHLORIDE mmol/L 106 108* 105   CO2 mmol/L 22.5 19.7* 22.0   BUN mg/dL 25* 27* 31*   CREATININE mg/dL 1.25* 1.35* 1.39*   CALCIUM mg/dL 8.6 8.6 8.7   GLUCOSE mg/dL 121* 128* 141*       IMAGING STUDIES:  No new imaging    Meds reviewed/changed: Yes    Current Facility-Administered Medications:   •  sennosides-docusate (PERICOLACE) 8.6-50 MG per tablet 2 tablet, 2 tablet, Oral, BID, 2 tablet at 08/06/22 0858 **AND** polyethylene glycol (MIRALAX)  "packet 17 g, 17 g, Oral, Daily PRN **AND** bisacodyl (DULCOLAX) EC tablet 5 mg, 5 mg, Oral, Daily PRN **AND** bisacodyl (DULCOLAX) suppository 10 mg, 10 mg, Rectal, Daily PRN, Jean Sy MD  •  HYDROcodone-acetaminophen (NORCO) 5-325 MG per tablet 1 tablet, 1 tablet, Oral, Q4H PRN, Jean Sy MD, 1 tablet at 08/06/22 1348  •  lactated ringers infusion, 9 mL/hr, Intravenous, Continuous, Librado White MD, Stopped at 08/05/22 1827  •  levothyroxine (SYNTHROID, LEVOTHROID) tablet 50 mcg, 50 mcg, Oral, Daily, Juju Bah PA-C, 50 mcg at 08/06/22 0858  •  sodium chloride 0.9 % flush 10 mL, 10 mL, Intravenous, Q12H, Jean Sy MD, 10 mL at 08/05/22 2154  •  sodium chloride 0.9 % flush 10 mL, 10 mL, Intravenous, PRN, Jean Sy MD      Physical Exam:    General:   Awake, alert, oriented x3. Speech clear with no aphasia  Back:    Incision has 2 Steri-Strips, C/D/I, no drainage  Motor: Normal muscle strength, bulk and tone in upper and lower extremities.  Left foot drop  Sensation: Normal to light touch  Coordination: Weaving all extremities well  Station and Gait:             Contact-guard assistance for stand to sit transfers and contact-guard supervision for ambulation with forward rolling walker, approximately 30 feet, per PT note  extremities:   Wearing SCD      Assessment & Plan     ASSESSMENT:      Spondylosis with myelopathy, lumbar region    Status post lumbar surgery      PLAN:  Consult Case Management for rehab placement, per patient request, per recommendation by physical therapy.  States she may be able to go home with home health in the morning, if she feels better  CBC in AM  Robaxin 750mg BID, prn for muscle spasms    I discussed the patient's findings and my recommendations with patient and Dr. Hernandez       LOS: 0 days       Renee Hoskins, APRN  8/6/2022  15:34 EDT    \"Dictated utilizing Dragon dictation\".      "

## 2022-08-06 NOTE — PLAN OF CARE
Goal Outcome Evaluation:              Outcome Evaluation: Pt is a 77 y.o. female admitted to [facility] with c/o low back pain and L foot drop. She underwent left L4-5 and L5-S1 laminectomy, foraminotomy, medial facetectomy on 8/5/22. Pt presents today with reports of increased low back pain, L foot drop, decreased functional strength, and decreased functional mobility. Pt required CGA-supervision for bed mobility, CGA-supervision for STS transfers, and CGA-supervision for ambulation with FWW for 30 ft. Pt will benefit from skilled PT to address the previous deficits and improve overall safety with functional mobility.  Anticipate pt will D/C to home with home health.

## 2022-08-06 NOTE — NURSING NOTE
Placed a call to neurosurgery regarding orders. Patient requesting pain medication.. Awaiting call back.
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
## 2022-08-07 LAB
ANION GAP SERPL CALCULATED.3IONS-SCNC: 12 MMOL/L (ref 5–15)
BASOPHILS # BLD AUTO: 0.1 10*3/MM3 (ref 0–0.2)
BASOPHILS NFR BLD AUTO: 0.6 % (ref 0–1.5)
BUN SERPL-MCNC: 19 MG/DL (ref 8–23)
BUN/CREAT SERPL: 16.4 (ref 7–25)
CALCIUM SPEC-SCNC: 8.2 MG/DL (ref 8.6–10.5)
CHLORIDE SERPL-SCNC: 105 MMOL/L (ref 98–107)
CO2 SERPL-SCNC: 23 MMOL/L (ref 22–29)
CREAT SERPL-MCNC: 1.16 MG/DL (ref 0.57–1)
DEPRECATED RDW RBC AUTO: 39 FL (ref 37–54)
EGFRCR SERPLBLD CKD-EPI 2021: 49.3 ML/MIN/1.73
EOSINOPHIL # BLD AUTO: 0.05 10*3/MM3 (ref 0–0.4)
EOSINOPHIL NFR BLD AUTO: 0.3 % (ref 0.3–6.2)
ERYTHROCYTE [DISTWIDTH] IN BLOOD BY AUTOMATED COUNT: 11.5 % (ref 12.3–15.4)
GLUCOSE SERPL-MCNC: 138 MG/DL (ref 65–99)
HCT VFR BLD AUTO: 31.4 % (ref 34–46.6)
HGB BLD-MCNC: 10.4 G/DL (ref 12–15.9)
IMM GRANULOCYTES # BLD AUTO: 0.09 10*3/MM3 (ref 0–0.05)
IMM GRANULOCYTES NFR BLD AUTO: 0.5 % (ref 0–0.5)
LYMPHOCYTES # BLD AUTO: 1.53 10*3/MM3 (ref 0.7–3.1)
LYMPHOCYTES NFR BLD AUTO: 9.1 % (ref 19.6–45.3)
MCH RBC QN AUTO: 31.5 PG (ref 26.6–33)
MCHC RBC AUTO-ENTMCNC: 33.1 G/DL (ref 31.5–35.7)
MCV RBC AUTO: 95.2 FL (ref 79–97)
MONOCYTES # BLD AUTO: 2.1 10*3/MM3 (ref 0.1–0.9)
MONOCYTES NFR BLD AUTO: 12.4 % (ref 5–12)
NEUTROPHILS NFR BLD AUTO: 13.03 10*3/MM3 (ref 1.7–7)
NEUTROPHILS NFR BLD AUTO: 77.1 % (ref 42.7–76)
NRBC BLD AUTO-RTO: 0 /100 WBC (ref 0–0.2)
PLATELET # BLD AUTO: 265 10*3/MM3 (ref 140–450)
PMV BLD AUTO: 11.2 FL (ref 6–12)
POTASSIUM SERPL-SCNC: 4.2 MMOL/L (ref 3.5–5.2)
QT INTERVAL: 351 MS
RBC # BLD AUTO: 3.3 10*6/MM3 (ref 3.77–5.28)
SODIUM SERPL-SCNC: 140 MMOL/L (ref 136–145)
WBC NRBC COR # BLD: 16.9 10*3/MM3 (ref 3.4–10.8)

## 2022-08-07 PROCEDURE — 80048 BASIC METABOLIC PNL TOTAL CA: CPT | Performed by: NEUROLOGICAL SURGERY

## 2022-08-07 PROCEDURE — 99024 POSTOP FOLLOW-UP VISIT: CPT | Performed by: NURSE PRACTITIONER

## 2022-08-07 PROCEDURE — 85025 COMPLETE CBC W/AUTO DIFF WBC: CPT | Performed by: NEUROLOGICAL SURGERY

## 2022-08-07 PROCEDURE — G0378 HOSPITAL OBSERVATION PER HR: HCPCS

## 2022-08-07 RX ADMIN — Medication 10 ML: at 09:51

## 2022-08-07 RX ADMIN — DOCUSATE SODIUM 50MG AND SENNOSIDES 8.6MG 2 TABLET: 8.6; 5 TABLET, FILM COATED ORAL at 21:34

## 2022-08-07 RX ADMIN — DOCUSATE SODIUM 50MG AND SENNOSIDES 8.6MG 2 TABLET: 8.6; 5 TABLET, FILM COATED ORAL at 09:30

## 2022-08-07 RX ADMIN — HYDROCODONE BITARTRATE AND ACETAMINOPHEN 1 TABLET: 5; 325 TABLET ORAL at 06:14

## 2022-08-07 RX ADMIN — Medication 10 ML: at 21:35

## 2022-08-07 RX ADMIN — GABAPENTIN 200 MG: 100 CAPSULE ORAL at 06:14

## 2022-08-07 RX ADMIN — DULOXETINE HYDROCHLORIDE 60 MG: 60 CAPSULE, DELAYED RELEASE ORAL at 21:34

## 2022-08-07 RX ADMIN — GABAPENTIN 200 MG: 100 CAPSULE ORAL at 13:52

## 2022-08-07 RX ADMIN — HYDROCODONE BITARTRATE AND ACETAMINOPHEN 1 TABLET: 5; 325 TABLET ORAL at 09:29

## 2022-08-07 RX ADMIN — LEVOTHYROXINE SODIUM 50 MCG: 0.05 TABLET ORAL at 09:30

## 2022-08-07 RX ADMIN — HYDROCODONE BITARTRATE AND ACETAMINOPHEN 1 TABLET: 5; 325 TABLET ORAL at 19:53

## 2022-08-07 RX ADMIN — HYDROCODONE BITARTRATE AND ACETAMINOPHEN 1 TABLET: 5; 325 TABLET ORAL at 13:52

## 2022-08-07 RX ADMIN — METHOCARBAMOL TABLETS 750 MG: 750 TABLET, COATED ORAL at 23:08

## 2022-08-07 RX ADMIN — GABAPENTIN 200 MG: 100 CAPSULE ORAL at 21:34

## 2022-08-07 RX ADMIN — DULOXETINE HYDROCHLORIDE 60 MG: 60 CAPSULE, DELAYED RELEASE ORAL at 09:30

## 2022-08-07 RX ADMIN — METHOCARBAMOL TABLETS 750 MG: 750 TABLET, COATED ORAL at 12:03

## 2022-08-07 NOTE — PROGRESS NOTES
[facility] NEUROSURGERY PROGRESS NOTE      CC: POD #2 left L4-5 and L5-S1 laminectomy, foraminotomy, medial facetectomy using microsurgical technique microsurgical instrumentation and minimal access spinal technologies      Subjective     Interval History: No events reported overnight.  Continues to state that she does not feel that she is ambulating very well, and will not be able to discharge until tomorrow.  She denies any electric shock feeling in her legs today, continues to complain of left foot drop, and numbness in left foot.      ROS:  Constitutional: No fever, chills  GI: No nausea, vomiting  MS:  back pain  Neuro: numbness in left foot and left foot weakness,  balance difficulties  : No difficulty voiding, no incontinence    Objective     Vital signs in last 24 hours:  Temp:  [97.7 °F (36.5 °C)-100.3 °F (37.9 °C)] 97.7 °F (36.5 °C)  Heart Rate:  [] 96  Resp:  [16-20] 16  BP: (112-151)/(59-98) 136/84    Intake/Output this shift:  I/O this shift:  In: 300 [P.O.:300]  Out: -     LABS:  Results from last 7 days   Lab Units 08/07/22  0506 08/06/22  0532 08/05/22 1917   WBC 10*3/mm3 16.90* 13.78* 13.04*   HEMOGLOBIN g/dL 10.4* 10.0* 10.1*   HEMATOCRIT % 31.4* 30.2* 30.6*   PLATELETS 10*3/mm3 265 279 279     Results from last 7 days   Lab Units 08/07/22  0506 08/06/22  0532 08/05/22 1917   SODIUM mmol/L 140 145 142   POTASSIUM mmol/L 4.2 4.9 4.9   CHLORIDE mmol/L 105 106 108*   CO2 mmol/L 23.0 22.5 19.7*   BUN mg/dL 19 25* 27*   CREATININE mg/dL 1.16* 1.25* 1.35*   CALCIUM mg/dL 8.2* 8.6 8.6   GLUCOSE mg/dL 138* 121* 128*       IMAGING STUDIES:  No new imaging    Meds reviewed/changed: Yes    Current Facility-Administered Medications:   •  sennosides-docusate (PERICOLACE) 8.6-50 MG per tablet 2 tablet, 2 tablet, Oral, BID, 2 tablet at 08/07/22 0930 **AND** polyethylene glycol (MIRALAX) packet 17 g, 17 g, Oral, Daily PRN **AND** bisacodyl (DULCOLAX) EC tablet 5 mg, 5 mg, Oral, Daily PRN **AND** bisacodyl  "(DULCOLAX) suppository 10 mg, 10 mg, Rectal, Daily PRN, Jean Sy MD  •  DULoxetine (CYMBALTA) DR capsule 60 mg, 60 mg, Oral, Q12H, [place] Hernandez IV, MD, 60 mg at 08/07/22 0930  •  gabapentin (NEURONTIN) capsule 200 mg, 200 mg, Oral, Q8H, [place] Hernandez IV, MD, 200 mg at 08/07/22 0614  •  HYDROcodone-acetaminophen (NORCO) 5-325 MG per tablet 1 tablet, 1 tablet, Oral, Q4H PRN, Jean Sy MD, 1 tablet at 08/07/22 0929  •  lactated ringers infusion, 9 mL/hr, Intravenous, Continuous, Librado White MD, Stopped at 08/05/22 1827  •  levothyroxine (SYNTHROID, LEVOTHROID) tablet 50 mcg, 50 mcg, Oral, Daily, Juju Bah PA-C, 50 mcg at 08/07/22 0930  •  methocarbamol (ROBAXIN) tablet 750 mg, 750 mg, Oral, BID PRN, Renee Hoskins APRN, 750 mg at 08/06/22 2033  •  sodium chloride 0.9 % flush 10 mL, 10 mL, Intravenous, Q12H, Jean Sy MD, 10 mL at 08/07/22 0951  •  sodium chloride 0.9 % flush 10 mL, 10 mL, Intravenous, PRN, Jean Sy MD      Physical Exam:    General:   Awake, alert, oriented x3. Speech clear with no aphasia  Abdomen:   Soft, passing gas  Back:    Incision has 2 Steri-Strips, C/D/I  Motor: Normal muscle strength, bulk and tone in upper and lower extremities.  Left foot drop  Sensation: Normal to light touch  Coordination: Moving all extremities well  Station and Gait:             Ambulating with physical therapy  extremities:   Wearing SCD      Assessment & Plan     ASSESSMENT:      Spondylosis with myelopathy, lumbar region    Status post lumbar surgery      PLAN:  Possible discharge home with home health in a.m.  CBC in AM  Encouraged to ambulate with staff  Encourage I-S    I discussed the patient's findings and my recommendations with patient and Dr. Hernandez       LOS: 1 day       LAURA Pagan  8/7/2022  11:12 EDT    \"Dictated utilizing Dragon dictation\".      "

## 2022-08-07 NOTE — PLAN OF CARE
Pt from [place], during transfer to [place], pt c/o L sided chest pain especially when she tries to take a deep breath, also c/o severe back pain, pt is severely anxious. VSS. Dayshift Charge Nurse w/ pt during transfer. Rapid Response was called. EKG (AFib, pt has h/o AFib) and CXR (mild bibasilar atelectasis) done. Temp was 100.3 this evening. Encouraged to do I.S.. 1x dose of Dilaudid 0.5mg IV given after new IV site was placed by the IV nurse, pt is very hard stick. Pt unable to feel comfortable in bed when head is elevated, placed flat on bed and slept some after midnight. PRN Norco and Robaxin for pain control. Voiding for PW this evening. SCD's on BLE.

## 2022-08-07 NOTE — CODE DOCUMENTATION
Patient Name:  Marylu Foreman  YOB: 1945  MRN:  [id number]  Admit Date:  8/5/2022    Visit Diagnoses:     ICD-10-CM ICD-9-CM   1. Spondylosis with myelopathy, lumbar region  M47.16 721.42       Reason For Rapid:   Severe back pain, chest pain with deep breaths  RN Communicated With:  Rapid team, primary RN, patient, Dr Ramos    Rapid Outcome:  Remain on unit  Communication From Rapid Team:   1 time IV pain med dose ordered and home meds to restart tonight, PO muscle relaxer given which also helped, pt already feels better, EKG normal, CXRAY done, VS, call back with any more questions    Most Recent Vital Signs  Temp:  [97.1 °F (36.2 °C)-99 °F (37.2 °C)] 98.3 °F (36.8 °C)  Heart Rate:  [] 91  Resp:  [16] 16  BP: (100-146)/(58-88) 135/79  SpO2:  [91 %-99 %] 98 %  on  Flow (L/min):  [2] 2;   Device (Oxygen Therapy): nasal cannula    Labs:  Results from last 7 days   Lab Units 08/03/22  0745   COVID19  Not Detected     No results found for: POCGLU  No results found for: SITE, ALLENTEST, PHART, IAX9XJW, PO2ART, NIP9CUY, BASEEXCESS, D8OYQJZC, HGBBG, HCTABG, OXYHEMOGLOBI, METHHGBN, CARBOXYHGB, CO2CT, BAROMETRIC, MODALITY, FIO2  Results from last 7 days   Lab Units 08/06/22  0532 08/05/22 1917 08/03/22  0729   WBC 10*3/mm3 13.78* 13.04* 10.48   HEMOGLOBIN g/dL 10.0* 10.1* 11.9*   PLATELETS 10*3/mm3 279 279 364     Results from last 7 days   Lab Units 08/06/22  0532 08/05/22 1917 08/03/22  0729   SODIUM mmol/L 145 142 141   POTASSIUM mmol/L 4.9 4.9 4.1   CHLORIDE mmol/L 106 108* 105   CO2 mmol/L 22.5 19.7* 22.0   BUN mg/dL 25* 27* 31*   CREATININE mg/dL 1.25* 1.35* 1.39*   GLUCOSE mg/dL 121* 128* 141*   Estimated Creatinine Clearance: 42.8 mL/min (A) (by C-G formula based on SCr of 1.25 mg/dL (H)).          No results found for: STREPPNEUAG, LEGANTIGENUR        NIH Stroke Scale:                                                         Please refer to full rapid documentation on summary page under Index  / Code Timeline

## 2022-08-08 LAB
BASOPHILS # BLD AUTO: 0.12 10*3/MM3 (ref 0–0.2)
BASOPHILS NFR BLD AUTO: 0.8 % (ref 0–1.5)
DEPRECATED RDW RBC AUTO: 41.1 FL (ref 37–54)
EOSINOPHIL # BLD AUTO: 0.43 10*3/MM3 (ref 0–0.4)
EOSINOPHIL NFR BLD AUTO: 2.8 % (ref 0.3–6.2)
ERYTHROCYTE [DISTWIDTH] IN BLOOD BY AUTOMATED COUNT: 11.6 % (ref 12.3–15.4)
HCT VFR BLD AUTO: 35.6 % (ref 34–46.6)
HGB BLD-MCNC: 11.4 G/DL (ref 12–15.9)
LYMPHOCYTES # BLD AUTO: 2.42 10*3/MM3 (ref 0.7–3.1)
LYMPHOCYTES NFR BLD AUTO: 15.7 % (ref 19.6–45.3)
MCH RBC QN AUTO: 31.4 PG (ref 26.6–33)
MCHC RBC AUTO-ENTMCNC: 32 G/DL (ref 31.5–35.7)
MCV RBC AUTO: 98.1 FL (ref 79–97)
MONOCYTES # BLD AUTO: 2.16 10*3/MM3 (ref 0.1–0.9)
MONOCYTES NFR BLD AUTO: 14 % (ref 5–12)
NEUTROPHILS NFR BLD AUTO: 10.11 10*3/MM3 (ref 1.7–7)
NEUTROPHILS NFR BLD AUTO: 65.3 % (ref 42.7–76)
PLATELET # BLD AUTO: 220 10*3/MM3 (ref 140–450)
PMV BLD AUTO: 12.2 FL (ref 6–12)
RBC # BLD AUTO: 3.63 10*6/MM3 (ref 3.77–5.28)
WBC NRBC COR # BLD: 15.46 10*3/MM3 (ref 3.4–10.8)

## 2022-08-08 PROCEDURE — 99024 POSTOP FOLLOW-UP VISIT: CPT | Performed by: NEUROLOGICAL SURGERY

## 2022-08-08 PROCEDURE — 85007 BL SMEAR W/DIFF WBC COUNT: CPT | Performed by: NURSE PRACTITIONER

## 2022-08-08 PROCEDURE — 97110 THERAPEUTIC EXERCISES: CPT

## 2022-08-08 PROCEDURE — G0378 HOSPITAL OBSERVATION PER HR: HCPCS

## 2022-08-08 PROCEDURE — 85025 COMPLETE CBC W/AUTO DIFF WBC: CPT | Performed by: NURSE PRACTITIONER

## 2022-08-08 RX ORDER — OXYCODONE HYDROCHLORIDE AND ACETAMINOPHEN 5; 325 MG/1; MG/1
1 TABLET ORAL EVERY 4 HOURS PRN
Status: DISCONTINUED | OUTPATIENT
Start: 2022-08-08 | End: 2022-08-09 | Stop reason: HOSPADM

## 2022-08-08 RX ORDER — METHOCARBAMOL 750 MG/1
750 TABLET, FILM COATED ORAL EVERY 6 HOURS SCHEDULED
Status: DISCONTINUED | OUTPATIENT
Start: 2022-08-08 | End: 2022-08-09 | Stop reason: HOSPADM

## 2022-08-08 RX ADMIN — DULOXETINE HYDROCHLORIDE 60 MG: 60 CAPSULE, DELAYED RELEASE ORAL at 08:35

## 2022-08-08 RX ADMIN — OXYCODONE AND ACETAMINOPHEN 1 TABLET: 5; 325 TABLET ORAL at 14:12

## 2022-08-08 RX ADMIN — GABAPENTIN 200 MG: 100 CAPSULE ORAL at 14:12

## 2022-08-08 RX ADMIN — DOCUSATE SODIUM 50MG AND SENNOSIDES 8.6MG 2 TABLET: 8.6; 5 TABLET, FILM COATED ORAL at 21:40

## 2022-08-08 RX ADMIN — LEVOTHYROXINE SODIUM 50 MCG: 0.05 TABLET ORAL at 08:33

## 2022-08-08 RX ADMIN — HYDROCODONE BITARTRATE AND ACETAMINOPHEN 1 TABLET: 5; 325 TABLET ORAL at 09:54

## 2022-08-08 RX ADMIN — Medication 10 ML: at 21:40

## 2022-08-08 RX ADMIN — DOCUSATE SODIUM 50MG AND SENNOSIDES 8.6MG 2 TABLET: 8.6; 5 TABLET, FILM COATED ORAL at 08:34

## 2022-08-08 RX ADMIN — METHOCARBAMOL TABLETS 750 MG: 750 TABLET, COATED ORAL at 18:04

## 2022-08-08 RX ADMIN — GABAPENTIN 200 MG: 100 CAPSULE ORAL at 05:20

## 2022-08-08 RX ADMIN — METHOCARBAMOL TABLETS 750 MG: 750 TABLET, COATED ORAL at 08:35

## 2022-08-08 RX ADMIN — DULOXETINE HYDROCHLORIDE 60 MG: 60 CAPSULE, DELAYED RELEASE ORAL at 21:40

## 2022-08-08 RX ADMIN — HYDROCODONE BITARTRATE AND ACETAMINOPHEN 1 TABLET: 5; 325 TABLET ORAL at 05:14

## 2022-08-08 RX ADMIN — GABAPENTIN 200 MG: 100 CAPSULE ORAL at 21:40

## 2022-08-08 RX ADMIN — BISACODYL 5 MG: 5 TABLET ORAL at 18:04

## 2022-08-08 RX ADMIN — HYDROCODONE BITARTRATE AND ACETAMINOPHEN 1 TABLET: 5; 325 TABLET ORAL at 00:00

## 2022-08-08 RX ADMIN — Medication 10 ML: at 08:36

## 2022-08-08 RX ADMIN — OXYCODONE AND ACETAMINOPHEN 1 TABLET: 5; 325 TABLET ORAL at 18:04

## 2022-08-08 NOTE — PLAN OF CARE
"Goal Outcome Evaluation:  Plan of Care Reviewed With: patient        Progress: improving  Outcome Evaluation: Pt tolerated treatment fair this date. Pt c/o pain and soreness in back, stating \"this is the worst day so far\". Required CGA-min A for bed mobility and to stand, then CGA-min A x2 to ambulate. Pt was slightly unsteady throughout, and very decreased lashay. Encouraged pt to sit up in chair later and ambulate more w/ nsg in the room.  "

## 2022-08-08 NOTE — PLAN OF CARE
Goal Outcome Evaluation:           Progress: no change  Outcome Evaluation: 78 y/o s/POD 3 L L4-S1 lami. Pt up w/ assist x2 to bsc. Voiding function intact, see PVR. Neuro checks WNL, C/O L foot numbness. Pt presents w/ L foot drop. C/O groin pain, managed via percocet and muscle relaxer. Chronic A-fib noted, tachy all shift. Currently RA. Needs met. Educated on importance of ambulating. Plan to d/c to rehab. Will CTM.

## 2022-08-08 NOTE — CASE MANAGEMENT/SOCIAL WORK
Continued Stay Note  [facility]     Patient Name: Marylu Foreman  MRN: [id number]  Today's Date: 8/8/2022    Admit Date: 8/5/2022     Discharge Plan     Row Name 08/08/22 1610       Plan    Plan SNF, pending referrals    Plan Comments CCP made referrals to the following facilities, [facility], CCP made outbound call this date to Saira at 4:04pm, no answer, left VM regarding referral and requested a return call or email regarding referral. CCP made referral to [facility], made outbound call this date at 4:05pm, spoke with Saskia who states she will review and follow up with CCP. CCP made referral to [facility] and [facility], made outbound call to Carrie this date at 4:09pm, Carrie states she will review and follow up with CCP.    Row Name 08/08/22 1427       Plan    Plan SNF, pending referrals from patient    Patient/Family in Agreement with Plan yes    Plan Comments CCP met with patient at bedside introduced self and explained role. Patient confirmed the demographic information on her face sheet is accurate. Patient states that she lives at home alone. Patient states she has worked with  in the past but she can’t remember the name of the [facility] Company. Patient states she has been to [facility] in the past for rehab. Patient denies using any DME. Patient states there are 3 stairs to get in/out of the home with handrails on both sides. Patient states her laundry is located in the basement but she does not have to go down there. Patient is enrolled in the [facility] M2B program. Patient states there is a lady that comes twice a day to check on and assist patient with any needs. Patient states she prefers to go home with [facility] at discharge. CCP to make referral to [facility]. Patient states her friend Denise can transport her at discharge. CCP to follow for discharge needs.               Discharge Codes    No documentation.               Expected Discharge Date and Time     Expected Discharge Date Expected Discharge Time     Aug 5, 2022  6:00 PM

## 2022-08-08 NOTE — CASE MANAGEMENT/SOCIAL WORK
Discharge Planning Assessment  [facility]     Patient Name: Marylu Foreman  MRN: [id number]  Today's Date: 8/8/2022    Admit Date: 8/5/2022     Discharge Needs Assessment     Row Name 08/08/22 1425       Living Environment    People in Home alone    Current Living Arrangements home    Primary Care Provided by self    Provides Primary Care For no one    Family Caregiver if Needed friend(s)    Family Caregiver Names Denise, friend    Quality of Family Relationships helpful;involved;supportive    Able to Return to Prior Arrangements no  Neurosurgery recommending SNF       Resource/Environmental Concerns    Resource/Environmental Concerns none    Transportation Concerns none       Transition Planning    Patient/Family Anticipates Transition to home with help/services    Patient/Family Anticipated Services at Transition none    Transportation Anticipated family or friend will provide       Discharge Needs Assessment    Readmission Within the Last 30 Days no previous admission in last 30 days    Equipment Currently Used at Home none    Concerns to be Addressed no discharge needs identified;denies needs/concerns at this time    Anticipated Changes Related to Illness none    Equipment Needed After Discharge none               Discharge Plan     Row Name 08/08/22 1427       Plan    Plan SNF, pending referrals from patient    Patient/Family in Agreement with Plan yes    Plan Comments CCP met with patient at bedside introduced self and explained role. Patient confirmed the demographic information on her face sheet is accurate. Patient states that she lives at home alone. Patient states she has worked with  in the past but she can’t remember the name of the [employer]. Patient states she has been to [facility] in the past for rehab. Patient denies using any DME. Patient states there are 3 stairs to get in/out of the home with handrails on both sides. Patient states her laundry is located in the basement but she does not have  to go down there. Patient is enrolled in the [facility] M[facility] program. Patient states there is a lady that comes twice a day to check on and assist patient with any needs. Patient states she prefers to go home with [facility] at discharge. CCP to make referral to [facility]. Patient states her friend Denise can transport her at discharge. CCP to follow for discharge needs.              Continued Care and Services - Admitted Since 8/5/2022    Coordination has not been started for this encounter.       Expected Discharge Date and Time     Expected Discharge Date Expected Discharge Time    Aug 5, 2022  6:00 PM         Demographic Summary     Row Name 08/08/22 1424       General Information    Admission Type observation    Arrived From PACU/recovery room    Required Notices Provided Observation Status Notice    Reason for Consult discharge planning    Preferred Language English               Functional Status     Row Name 08/08/22 1425       Functional Status    Usual Activity Tolerance moderate    Current Activity Tolerance moderate       Functional Status, IADL    Medications independent    Meal Preparation independent    Housekeeping independent    Laundry independent    Shopping independent       Mental Status    General Appearance WDL WDL       Mental Status Summary    Recent Changes in Mental Status/Cognitive Functioning no changes       Employment/    Employment Status retired               Psychosocial    No documentation.                Abuse/Neglect    No documentation.                Legal    No documentation.                Substance Abuse    No documentation.                Patient Forms    No documentation.

## 2022-08-08 NOTE — PLAN OF CARE
Goal Outcome Evaluation:         VSS, patient received pain relief from PO pain meds and muscle relaxants. Educated on repositioning options for comfort. Refused attempt to increase activity.

## 2022-08-08 NOTE — DISCHARGE PLACEMENT REQUEST
"Marylu Foreman (77 y.o. Female)             Date of Birth   1945    Social Security Number       Address   [address] [place] [address] [address] [place] [address]    Home Phone   [phone]    MRN   [id number]       Episcopalian   Lutheran    Marital Status                               Admission Date   8/5/22    Admission Type   Elective    Admitting Provider   Jean Sy MD    Attending Provider   Jean Sy MD    Department, Room/Bed   [address], P884/1       Discharge Date       Discharge Disposition       Discharge Destination                               Attending Provider: Jean Sy MD    Allergies: Amlodipine Besylate-valsartan, Cefdinir, Corticosteroids, Lisinopril, Nsaids, Other    Isolation: None   Infection: None   Code Status: CPR   Advance Care Planning Activity    Ht: 162.6 cm (64\")   Wt: 92.1 kg (203 lb)    Admission Cmt: None   Principal Problem: Spondylosis with myelopathy, lumbar region [M47.16]                 Active Insurance as of 8/5/2022     Primary Coverage     Payor Plan Insurance Group Employer/Plan Group    [facility] MEDICARE REPLACEMENT [facility] MEDICARE REPLACEMENT [id number]     Payor Plan Address Payor Plan Phone Number Payor Plan Fax Number Effective Dates    [address] [address]   1/1/2018 - None Entered    [facility] [id number]       Subscriber Name Subscriber Birth Date Member ID       MARYLU FOREMAN 1945 [id number]                 Emergency Contacts      (Rel.) Home Phone Work Phone Mobile Phone    Valdemar Foreman (Son) [phone] -- [phone]    AIDEN MOLINA (Friend) [phone] -- [phone]              "
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
## 2022-08-08 NOTE — PROGRESS NOTES
LOS: 1 day   Patient Care Team:  Arleen Dillon MD as PCP - General (Internal Medicine)  Joanna Quiñonez MD as Consulting Physician (Obstetrics and Gynecology)  West White Jr., MD as Consulting Physician (Hematology and Oncology)  Librado Monique MD (Psychiatry)  Cole Ramos III, MD as Consulting Physician (Cardiology)  Renée Cisneros MD as Consulting Physician (Nephrology)  Ángel Drew DO as Consulting Physician (Infectious Diseases)    Chief Complaint: Postop lumbar laminectomy    Subjective     This patient says that the pain down her left leg is markedly improved.  She does still have a foot drop on the left and some numbness on the left.    Interval History:     History taken from: patient chart    Objective      She seems to have pretty good movement of both lower extremities except for the foot drop.    Vital Signs  Temp:  [97.3 °F (36.3 °C)-98.4 °F (36.9 °C)] 98.4 °F (36.9 °C)  Heart Rate:  [] 105  Resp:  [16-17] 17  BP: (117-136)/(68-84) 117/73       Results Review:     I reviewed the patient's new clinical results.  She is afebrile.      Assessment & Plan       Spondylosis with myelopathy, lumbar region    Status post lumbar surgery      From my point of view the patient can go on to rehab at any time.      Jean Sy MD  08/08/22  10:20 EDT    ADDENDUM 1215-  Went to see patient as she began complaining of some lower abdominal pain and bladder pressure. Bladder scan volume was 405 mL. Assisted patient to BR- reports feeling like her bladder completely emptied. Do not feel like a UA is warranted at this time as she has no burning with urination, she is afebrile and WBC is down since yesterday. Will order PVR to ensure she is emptying completely.    Patient had significant difficulty with bed mobility and with sit to stand. She reports significant pain and soreness with this and states that the pain medication does nothing for her. She states  that she has had good results with Percocet in the past so I will change her pain medication to Percocet and add scheduled Robaxin. At this time, I do not feel like the patient is safe to be discharged home with HH and have asked PT to re-evaluate patient for rehab at discharge. Patient in agreement.

## 2022-08-08 NOTE — PLAN OF CARE
Stable. A/Ox4. No s/s distress. POD#3 Left L4-L5, L5-S1 laminectomy w/ metrx. PRN Bellamy for pain. Pt needs a lot of encouragement, can be very emotional and anxious at time. Pt did ambulate w/ walker, assistx2, going to the bathroom this morning. Refused to wear PW d/t burning sensation and thinks she has UTI, on-call Dr. Hernandez notified, still awaits call back. SCD's on BLE. Encouraged to do I.S.

## 2022-08-09 ENCOUNTER — READMISSION MANAGEMENT (OUTPATIENT)
Dept: CALL CENTER | Facility: HOSPITAL | Age: 77
End: 2022-08-09

## 2022-08-09 ENCOUNTER — HOME HEALTH ADMISSION (OUTPATIENT)
Dept: HOME HEALTH SERVICES | Facility: HOME HEALTHCARE | Age: 77
End: 2022-08-09

## 2022-08-09 VITALS
RESPIRATION RATE: 16 BRPM | TEMPERATURE: 98 F | HEART RATE: 118 BPM | HEIGHT: 64 IN | SYSTOLIC BLOOD PRESSURE: 142 MMHG | DIASTOLIC BLOOD PRESSURE: 86 MMHG | OXYGEN SATURATION: 99 % | WEIGHT: 203 LBS | BODY MASS INDEX: 34.66 KG/M2

## 2022-08-09 PROBLEM — M21.372 LEFT FOOT DROP: Chronic | Status: ACTIVE | Noted: 2022-08-09

## 2022-08-09 LAB
BASOPHILS # BLD AUTO: 0.07 10*3/MM3 (ref 0–0.2)
BASOPHILS NFR BLD AUTO: 0.6 % (ref 0–1.5)
DEPRECATED RDW RBC AUTO: 41.5 FL (ref 37–54)
EOSINOPHIL # BLD AUTO: 0.53 10*3/MM3 (ref 0–0.4)
EOSINOPHIL NFR BLD AUTO: 4.7 % (ref 0.3–6.2)
ERYTHROCYTE [DISTWIDTH] IN BLOOD BY AUTOMATED COUNT: 11.5 % (ref 12.3–15.4)
HCT VFR BLD AUTO: 32.8 % (ref 34–46.6)
HGB BLD-MCNC: 10.4 G/DL (ref 12–15.9)
IMM GRANULOCYTES # BLD AUTO: 0.03 10*3/MM3 (ref 0–0.05)
IMM GRANULOCYTES NFR BLD AUTO: 0.3 % (ref 0–0.5)
LYMPHOCYTES # BLD AUTO: 2.84 10*3/MM3 (ref 0.7–3.1)
LYMPHOCYTES NFR BLD AUTO: 25.1 % (ref 19.6–45.3)
MCH RBC QN AUTO: 31 PG (ref 26.6–33)
MCHC RBC AUTO-ENTMCNC: 31.7 G/DL (ref 31.5–35.7)
MCV RBC AUTO: 97.6 FL (ref 79–97)
MONOCYTES # BLD AUTO: 1.45 10*3/MM3 (ref 0.1–0.9)
MONOCYTES NFR BLD AUTO: 12.8 % (ref 5–12)
NEUTROPHILS NFR BLD AUTO: 56.5 % (ref 42.7–76)
NEUTROPHILS NFR BLD AUTO: 6.39 10*3/MM3 (ref 1.7–7)
NRBC BLD AUTO-RTO: 0 /100 WBC (ref 0–0.2)
PLATELET # BLD AUTO: 289 10*3/MM3 (ref 140–450)
PMV BLD AUTO: 12.2 FL (ref 6–12)
RBC # BLD AUTO: 3.36 10*6/MM3 (ref 3.77–5.28)
WBC NRBC COR # BLD: 11.31 10*3/MM3 (ref 3.4–10.8)

## 2022-08-09 PROCEDURE — 85025 COMPLETE CBC W/AUTO DIFF WBC: CPT | Performed by: NEUROLOGICAL SURGERY

## 2022-08-09 PROCEDURE — G0378 HOSPITAL OBSERVATION PER HR: HCPCS

## 2022-08-09 RX ORDER — METHOCARBAMOL 750 MG/1
750 TABLET, FILM COATED ORAL 2 TIMES DAILY PRN
Qty: 20 TABLET | Refills: 0 | Status: SHIPPED | OUTPATIENT
Start: 2022-08-09 | End: 2022-09-15

## 2022-08-09 RX ORDER — HYDROCODONE BITARTRATE AND ACETAMINOPHEN 5; 325 MG/1; MG/1
1 TABLET ORAL EVERY 6 HOURS PRN
Qty: 40 TABLET | Refills: 0 | Status: ON HOLD | OUTPATIENT
Start: 2022-08-09 | End: 2022-08-10 | Stop reason: SDUPTHER

## 2022-08-09 RX ADMIN — DULOXETINE HYDROCHLORIDE 60 MG: 60 CAPSULE, DELAYED RELEASE ORAL at 08:14

## 2022-08-09 RX ADMIN — LEVOTHYROXINE SODIUM 50 MCG: 0.05 TABLET ORAL at 08:14

## 2022-08-09 RX ADMIN — Medication 10 ML: at 08:15

## 2022-08-09 RX ADMIN — METHOCARBAMOL TABLETS 750 MG: 750 TABLET, COATED ORAL at 05:52

## 2022-08-09 RX ADMIN — METHOCARBAMOL TABLETS 750 MG: 750 TABLET, COATED ORAL at 00:14

## 2022-08-09 RX ADMIN — OXYCODONE AND ACETAMINOPHEN 1 TABLET: 5; 325 TABLET ORAL at 00:14

## 2022-08-09 RX ADMIN — GABAPENTIN 200 MG: 100 CAPSULE ORAL at 05:52

## 2022-08-09 RX ADMIN — OXYCODONE AND ACETAMINOPHEN 1 TABLET: 5; 325 TABLET ORAL at 05:52

## 2022-08-09 RX ADMIN — DOCUSATE SODIUM 50MG AND SENNOSIDES 8.6MG 2 TABLET: 8.6; 5 TABLET, FILM COATED ORAL at 08:14

## 2022-08-09 NOTE — DISCHARGE PLACEMENT REQUEST
"Marylu Foreman (77 y.o. Female)             Date of Birth   1945    Social Security Number       Address   [address] [place] [address] [address] [address]    Home Phone   [phone]    MRN   [id number]       Shinto   Oriental orthodox    Marital Status                               Admission Date   8/5/22    Admission Type   Elective    Admitting Provider   Jean Sy MD    Attending Provider   Jean Sy MD    Department, Room/Bed   [address], P884/1       Discharge Date       Discharge Disposition   Home or Self Care    Discharge Destination                               Attending Provider: Jean Sy MD    Allergies: Amlodipine Besylate-valsartan, Cefdinir, Corticosteroids, Lisinopril, Nsaids, Other    Isolation: None   Infection: None   Code Status: CPR   Advance Care Planning Activity    Ht: 162.6 cm (64\")   Wt: 92.1 kg (203 lb)    Admission Cmt: None   Principal Problem: Spondylosis with myelopathy, lumbar region [M47.16]                 Active Insurance as of 8/5/2022     Primary Coverage     Payor Plan Insurance Group Employer/Plan Group    [facility] MEDICARE REPLACEMENT [facility] MEDICARE REPLACEMENT [id number]     Payor Plan Address Payor Plan Phone Number Payor Plan Fax Number Effective Dates    [address] [address]   1/1/2018 - None Entered    [facility] [id number]       Subscriber Name Subscriber Birth Date Member ID       MARYLU FOREMAN 1945 [id number]                 Emergency Contacts      (Rel.) Home Phone Work Phone Mobile Phone    Valdemar Foreman (Son) [phone] -- [phone]    AIDEN MOLINA (Friend) [phone] -- [phone]              "

## 2022-08-09 NOTE — DISCHARGE SUMMARY
Marylu MUNIR Foreman  1945    Patient Care Team:  Arleen Dillon MD as PCP - General (Internal Medicine)  Joanna Quiñonez MD as Consulting Physician (Obstetrics and Gynecology)  West White Jr., MD as Consulting Physician (Hematology and Oncology)  Librado Monique MD (Psychiatry)  Cole Ramos III, MD as Consulting Physician (Cardiology)  Renée Cisneros MD as Consulting Physician (Nephrology)  Ángel Drew DO as Consulting Physician (Infectious Diseases)    Date of Admit: 8/5/2022    Date of Discharge:  8/9/2022    Discharge Diagnosis:  Spondylosis with myelopathy, lumbar region    Status post lumbar surgery      Procedures Performed  Procedure(s):  Left lumbar 4 to Lumbar 5, Lumbar 5 to sacral 1 laminectomy with metrx       Complications: None    Consultants:   Consults     No orders found from 7/7/2022 to 8/6/2022.          Condition on Discharge: stable    Discharge disposition: home with Home Health    Pertinent Test Results: None    Brief HPI: Patient evaluated in office for complaints of left foot drop, pain in lower part of her left leg. Imaging revealed T12-L1 and L1-2 are widely open.  L2-3 is open as well.  L3-4 shows a little lateral recess stenosis on both sides but not severe.  L4-5 shows left-sided lateral recess stenosis.  L5-S1 shows severe left lateral recess stenosis beginning just below the L5 pedicle and extending down to the S1 pedicle.. RBAs of treatment were discussed including the above procedure. Patient consented to above procedure.    Hospital Course: Patient admitted for above procedure. The procedure itself was without complication. The patient was transferred to [facility] following recovery.  She has been doing well, she states she was slow to move, but she is feeling much better this AM.  She has decided she would like to go home with home health physical therapy instead of to a rehab center.  She has friends that are at bedside, daughter can go  home with her to help her a couple days a week, in addition.  She states that she is no longer having the pain in her left leg, as she was on Saturday.  She has good anterior flexion, on the left and continues to have some weakness in the left dorsiflexion, although she states she can wiggle her toes on the left foot, which she has not been able to do for a while.  She has been ambulating in her room with a walker and assistance.    Discharge Physical Exam:    Temp:  [97.4 °F (36.3 °C)-98.9 °F (37.2 °C)] 98 °F (36.7 °C)  Heart Rate:  [] 118  Resp:  [16-17] 16  BP: (101-142)/(59-91) 142/86    Current labs:  Lab Results (last 24 hours)     Procedure Component Value Units Date/Time    CBC & Differential [[id number]]  (Abnormal) Collected: 08/09/22 0454    Specimen: Blood Updated: 08/09/22 0617    Narrative:      The following orders were created for panel order CBC & Differential.  Procedure                               Abnormality         Status                     ---------                               -----------         ------                     CBC Auto Differential[[id number]]        Abnormal            Final result                 Please view results for these tests on the individual orders.    CBC Auto Differential [[id number]]  (Abnormal) Collected: 08/09/22 0454    Specimen: Blood Updated: 08/09/22 0617     WBC 11.31 10*3/mm3      RBC 3.36 10*6/mm3      Hemoglobin 10.4 g/dL      Hematocrit 32.8 %      MCV 97.6 fL      MCH 31.0 pg      MCHC 31.7 g/dL      RDW 11.5 %      RDW-SD 41.5 fl      MPV 12.2 fL      Platelets 289 10*3/mm3      Neutrophil % 56.5 %      Lymphocyte % 25.1 %      Monocyte % 12.8 %      Eosinophil % 4.7 %      Basophil % 0.6 %      Immature Grans % 0.3 %      Neutrophils, Absolute 6.39 10*3/mm3      Lymphocytes, Absolute 2.84 10*3/mm3      Monocytes, Absolute 1.45 10*3/mm3      Eosinophils, Absolute 0.53 10*3/mm3      Basophils, Absolute 0.07 10*3/mm3      Immature Grans, Absolute  0.03 10*3/mm3      nRBC 0.0 /100 WBC             General Appearance No acute distress   HEENT NC/AT;    Neurological Awake, Alert, and oriented x 3   Cranial nerves CN II-XII intact   Motor Strength equal with plantar flexion, left dorsiflexion 1/5, tone normal   Sensory Intact to light touch except for decreased sensation in her left foot   Gait and station Ambulating with walker and assistance   Neck Supple   Back Incision with 2 Steri-Strips, slight bruising, no edema, no erythema, no drainage   Extremities Moves all extremities well, no edema, no cyanosis, no redness         Discharge Medications  Curtis has been reviewed and narcotic consent is on file in the patient's chart.     Your medication list      START taking these medications      Instructions Last Dose Given Next Dose Due   cephalexin 500 MG capsule  Commonly known as: Keflex      Take 1 capsule by mouth 4 (Four) Times a Day for 5 days.       HYDROcodone-acetaminophen 5-325 MG per tablet  Commonly known as: NORCO      Take 1 tablet by mouth Every 4 (Four) Hours As Needed for Moderate Pain  (pain).       HYDROcodone-acetaminophen 5-325 MG per tablet  Commonly known as: NORCO      Take 1 tablet by mouth Every 6 (Six) Hours As Needed for pain       methocarbamol 750 MG tablet  Commonly known as: ROBAXIN      Take 1 tablet by mouth 2 (Two) Times a Day As Needed for Muscle Spasms.          CHANGE how you take these medications      Instructions Last Dose Given Next Dose Due   apixaban 5 MG tablet tablet  Commonly known as: ELIQUIS  What changed: additional instructions      Take 1 tablet by mouth Every 12 (Twelve) Hours. Indications: Atrial Fibrillation          CONTINUE taking these medications      Instructions Last Dose Given Next Dose Due   CO Q 10 PO      Take 200 mg by mouth Daily. HELD FOR OR       dilTIAZem  MG 24 hr capsule  Commonly known as: CARDIZEM CD      Take 180 mg by mouth Daily.       DULoxetine 60 MG capsule  Commonly known as:  CYMBALTA      Take 60 mg by mouth 2 (Two) Times a Day.       gabapentin 100 MG capsule  Commonly known as: NEURONTIN      Start with 1 at night may increase to 3 if needed       irbesartan 300 MG tablet  Commonly known as: Avapro      Take 1 tablet by mouth Every Night.       levothyroxine 50 MCG tablet  Commonly known as: SYNTHROID, LEVOTHROID      Take 50 mcg by mouth Daily.       multivitamin tablet tablet  Commonly known as: THERAGRAN      Take 1 tablet by mouth Daily. HELD FOR OR       PROBIOTIC PO      Take  by mouth Daily. Lactobacillus rhamnosus GG      HELD FOR OR       VITAMIN B 12 PO      Take 1 tablet/day by mouth. HELD FOR OR       VITAMIN D PO      Take 1 tablet by mouth Daily. HELD FOR OR       vitamin E 400 UNIT capsule      Take 400 Units by mouth Daily. HELD FOR OR             Where to Get Your Medications      These medications were sent to [facility] [place] [address]    Hours: 7:00 AM-6:00 PM Mon-Fri, 8:00 AM-4:30 PM Sat-Sun (Closed 12-12:30PM) Phone: [phone]   · cephalexin 500 MG capsule  · HYDROcodone-acetaminophen 5-325 MG per tablet  · HYDROcodone-acetaminophen 5-325 MG per tablet  · methocarbamol 750 MG tablet         Discharge Diet:   Diet Instructions     Advance Diet as Tolerated          Diet Order   Procedures   • Diet Regular; Consistent Carbohydrate       Activity at Discharge:   Activity Instructions     Other Restrictions (Specify)      Patient to follow activities listed on instruction sheet          Call for: questions or concerns    Follow-up Appointments  Future Appointments   Date Time Provider Department Center   8/23/2022 10:15 AM Jean Sy MD [facility] NS [facility] J[facility]   9/12/2022 11:00 AM LAB CHAIR 1 CBC LAB [facility]   9/12/2022 11:30 AM RN REVIEW [facility] INFUS EP [facility]   10/3/2022 11:30 AM JIMENEZ MAMM 2 BH [facility] MAMMO [facility]Z   10/7/2022 10:40 AM [facility] PC EAPT2 [facility]   10/12/2022  3:45 PM  "JIMENEZ MRI 3 BH JIMENEZ MRI JIMENEZ   10/14/2022 11:15 AM Arleen Dillon MD [facility] PC EAPT2 [facility]   10/17/2022 11:00 AM Haley Suarez APRN NEK J[facility] SLPM None   10/19/2022  8:00 AM Brett Reed MD [facility] L100 J[facility]   12/5/2022 10:50 AM LAB CHAIR 1 CBC LAB [facility]   12/5/2022 11:20 AM West White Jr., MD [facility] CBC EAST [facility]   7/18/2023  2:30 PM Cole Ramos III, MD MG[facility] CD LCGKR JIMENEZ      Follow-up Information     Arleen Dillon MD .    Specialty: Internal Medicine  Contact information:  [address]  [address] [place] [address]  [phone]                       Additional Instructions for the Follow-ups that You Need to Schedule     Ambulatory Referral to Home Health ([facility])   As directed      Face to Face Visit Date: 8/9/2022    Follow-up provider for Plan of Care?: I will be treating the patient on an ongoing basis.  Please send me the Plan of Care for signature.    Follow-up provider: HENRY RIZO [[id number]]    Reason/Clinical Findings: Gait training, left drop foot    Describe mobility limitations that make leaving home difficult: unable to drive, lives alone    Nursing/Therapeutic Services Requested: Physical Therapy    PT orders: Gait Training    Weight Bearing Status: Full Weight Bearing    Frequency: 1 Week 1               Test Results Pending at Discharge     None    I discussed the discharge instructions with patient, family, nursing staff and LAURA [place]  08/09/22  12:03 EDT      \"Dictated utilizing Dragon dictation\".      "

## 2022-08-09 NOTE — DISCHARGE INSTRUCTIONS
[facility]rological Surgery    [address], [address]    [place] [address]    Phone: [phone]    Fax: [phone]    Jean Sy M.D., F.A.C.S.            INSTRUCTIONS & CARE AFTER YOUR LUMBAR SURGERY    1. No sitting except on the commode. You may lie on a firm couch but not on a waterbed or recliner. Do not lay on your stomach. You may lie on your back or side using only one pillow under your head. You may use pillows under or between your knees. Either stand at a counter or lie on your side for meals. Three weeks after surgery you may begin sitting for 30 minutes 3 times per day.    2. No driving for three weeks. You may ride in the car in a passenger seat that reclines or lying down in the back seat.    3. No bending. If you drop something allow someone else to pick it up for you.    4. Don’t lift anything heavier than a coffee cup or paperback book.    5. Gradually increase your activity each day. You should get out of bed every hour during the day. Walk outside as soon as you feel up to it. Walk short distances frequently rather than making a long trip. Frequency is more important than distance. Your goal is to be walking 2 to 3 miles per day when you return for your post-operative visit. (NEVER DO THIS IN ONE TRIP)    6. You may climb stairs.    7. Remove your bandage the second day after surgery and leave it off. If you notice any redness, swelling or drainage, call the office. There will be glue covering your incision. This will peel off on it’s own and shouldn’t be pulled off.    8. You may shower five days after surgery. Keep the incision dry until then. Don’t let the water beat directly on the incision, gently pat dry.    9. Physical Therapy will be arranged at your post-operative visit if needed.    10. Your prescription for pain medication may be refilled for only half the original amount prior to your return office visit. In order to have this medication refilled you must contact the  office four days prior to the due date.    11. Don’t be alarmed if you experience some of your pre-operative symptoms after going home. This is not uncommon and normally goes away in a few days but may last longer. If you have any questions or concerns, please call our office.

## 2022-08-09 NOTE — CASE MANAGEMENT/SOCIAL WORK
Continued Stay Note  [facility]     Patient Name: Marylu Foreman  MRN: [id number]  Today's Date: 8/9/2022    Admit Date: 8/5/2022     Discharge Plan     Row Name 08/09/22 1255       Plan    Plan Home with family/friend support & [facility].    Patient/Family in Agreement with Plan yes    Plan Comments Spoke with Carrie/Emma & Kortney/Jonas who's reviewing the case and will f/u with CCP. [facility] SNFs have accepted the patient. Updated the patient who declines SNF and said she plans to d/c home with family/friend support & HH. She requests [facility]. Discussed with LAURA Duran/BULMARO who's agreeable with HH. Referral sent in Epic. Called and left a message for [facility]. Patient plans for her friend to assist her regularly after d/c. She also plans for friends to transport her home at d/c. No other needs identifeid at this time.    Final Discharge Disposition Code 06 - home with home health care    Final Note Home with [facility].               Discharge Codes    No documentation.               Expected Discharge Date and Time     Expected Discharge Date Expected Discharge Time    Aug 9, 2022             Juliet PARRA RN

## 2022-08-09 NOTE — PROGRESS NOTES
[facility] following for home care needs.  Noted order in for home health PT.  Spoke with patient and verified info on facesheet and she is agreeable.

## 2022-08-10 ENCOUNTER — TELEPHONE (OUTPATIENT)
Dept: NEUROSURGERY | Facility: CLINIC | Age: 77
End: 2022-08-10

## 2022-08-10 ENCOUNTER — HOSPITAL ENCOUNTER (OUTPATIENT)
Facility: HOSPITAL | Age: 77
Setting detail: OBSERVATION
Discharge: NURSING, REHAB OR LONG-TERM CARE | End: 2022-08-12
Attending: EMERGENCY MEDICINE | Admitting: STUDENT IN AN ORGANIZED HEALTH CARE EDUCATION/TRAINING PROGRAM

## 2022-08-10 ENCOUNTER — APPOINTMENT (OUTPATIENT)
Dept: GENERAL RADIOLOGY | Facility: HOSPITAL | Age: 77
End: 2022-08-10

## 2022-08-10 ENCOUNTER — TRANSITIONAL CARE MANAGEMENT TELEPHONE ENCOUNTER (OUTPATIENT)
Dept: CALL CENTER | Facility: HOSPITAL | Age: 77
End: 2022-08-10

## 2022-08-10 DIAGNOSIS — R52 UNCONTROLLED PAIN: Primary | ICD-10-CM

## 2022-08-10 DIAGNOSIS — M25.552 LEFT HIP PAIN: ICD-10-CM

## 2022-08-10 DIAGNOSIS — M21.372 LEFT FOOT DROP: ICD-10-CM

## 2022-08-10 DIAGNOSIS — M47.16 SPONDYLOSIS WITH MYELOPATHY, LUMBAR REGION: ICD-10-CM

## 2022-08-10 DIAGNOSIS — M79.605 LEFT LEG PAIN: ICD-10-CM

## 2022-08-10 LAB
ALBUMIN SERPL-MCNC: 3.6 G/DL (ref 3.5–5.2)
ALBUMIN/GLOB SERPL: 1.2 G/DL
ALP SERPL-CCNC: 98 U/L (ref 39–117)
ALT SERPL W P-5'-P-CCNC: 8 U/L (ref 1–33)
ANION GAP SERPL CALCULATED.3IONS-SCNC: 8.7 MMOL/L (ref 5–15)
AST SERPL-CCNC: 16 U/L (ref 1–32)
BASOPHILS # BLD AUTO: 0.12 10*3/MM3 (ref 0–0.2)
BASOPHILS NFR BLD AUTO: 1.1 % (ref 0–1.5)
BILIRUB SERPL-MCNC: 0.5 MG/DL (ref 0–1.2)
BUN SERPL-MCNC: 23 MG/DL (ref 8–23)
BUN/CREAT SERPL: 17.2 (ref 7–25)
CALCIUM SPEC-SCNC: 9.4 MG/DL (ref 8.6–10.5)
CHLORIDE SERPL-SCNC: 99 MMOL/L (ref 98–107)
CO2 SERPL-SCNC: 25.3 MMOL/L (ref 22–29)
CREAT SERPL-MCNC: 1.34 MG/DL (ref 0.57–1)
DEPRECATED RDW RBC AUTO: 38.7 FL (ref 37–54)
EGFRCR SERPLBLD CKD-EPI 2021: 41.4 ML/MIN/1.73
EOSINOPHIL # BLD AUTO: 0.54 10*3/MM3 (ref 0–0.4)
EOSINOPHIL NFR BLD AUTO: 5 % (ref 0.3–6.2)
ERYTHROCYTE [DISTWIDTH] IN BLOOD BY AUTOMATED COUNT: 11.4 % (ref 12.3–15.4)
GLOBULIN UR ELPH-MCNC: 3 GM/DL
GLUCOSE SERPL-MCNC: 105 MG/DL (ref 65–99)
HCT VFR BLD AUTO: 32.4 % (ref 34–46.6)
HGB BLD-MCNC: 10.6 G/DL (ref 12–15.9)
IMM GRANULOCYTES # BLD AUTO: 0.03 10*3/MM3 (ref 0–0.05)
IMM GRANULOCYTES NFR BLD AUTO: 0.3 % (ref 0–0.5)
LYMPHOCYTES # BLD AUTO: 2.4 10*3/MM3 (ref 0.7–3.1)
LYMPHOCYTES NFR BLD AUTO: 22.1 % (ref 19.6–45.3)
MCH RBC QN AUTO: 31 PG (ref 26.6–33)
MCHC RBC AUTO-ENTMCNC: 32.7 G/DL (ref 31.5–35.7)
MCV RBC AUTO: 94.7 FL (ref 79–97)
MONOCYTES # BLD AUTO: 1.47 10*3/MM3 (ref 0.1–0.9)
MONOCYTES NFR BLD AUTO: 13.6 % (ref 5–12)
NEUTROPHILS NFR BLD AUTO: 57.9 % (ref 42.7–76)
NEUTROPHILS NFR BLD AUTO: 6.28 10*3/MM3 (ref 1.7–7)
NRBC BLD AUTO-RTO: 0 /100 WBC (ref 0–0.2)
PLATELET # BLD AUTO: 343 10*3/MM3 (ref 140–450)
PMV BLD AUTO: 11.2 FL (ref 6–12)
POTASSIUM SERPL-SCNC: 4.1 MMOL/L (ref 3.5–5.2)
PROT SERPL-MCNC: 6.6 G/DL (ref 6–8.5)
RBC # BLD AUTO: 3.42 10*6/MM3 (ref 3.77–5.28)
SARS-COV-2 RNA PNL SPEC NAA+PROBE: NOT DETECTED
SODIUM SERPL-SCNC: 133 MMOL/L (ref 136–145)
WBC NRBC COR # BLD: 10.84 10*3/MM3 (ref 3.4–10.8)

## 2022-08-10 PROCEDURE — 85025 COMPLETE CBC W/AUTO DIFF WBC: CPT | Performed by: EMERGENCY MEDICINE

## 2022-08-10 PROCEDURE — G0378 HOSPITAL OBSERVATION PER HR: HCPCS

## 2022-08-10 PROCEDURE — 87635 SARS-COV-2 COVID-19 AMP PRB: CPT | Performed by: EMERGENCY MEDICINE

## 2022-08-10 PROCEDURE — 80053 COMPREHEN METABOLIC PANEL: CPT | Performed by: EMERGENCY MEDICINE

## 2022-08-10 PROCEDURE — C9803 HOPD COVID-19 SPEC COLLECT: HCPCS

## 2022-08-10 PROCEDURE — 96375 TX/PRO/DX INJ NEW DRUG ADDON: CPT

## 2022-08-10 PROCEDURE — 99284 EMERGENCY DEPT VISIT MOD MDM: CPT

## 2022-08-10 PROCEDURE — 25010000002 HYDROMORPHONE 1 MG/ML SOLUTION: Performed by: EMERGENCY MEDICINE

## 2022-08-10 PROCEDURE — 25010000002 ONDANSETRON PER 1 MG: Performed by: EMERGENCY MEDICINE

## 2022-08-10 PROCEDURE — 72110 X-RAY EXAM L-2 SPINE 4/>VWS: CPT

## 2022-08-10 RX ORDER — LEVOTHYROXINE SODIUM 0.05 MG/1
50 TABLET ORAL DAILY
Status: DISCONTINUED | OUTPATIENT
Start: 2022-08-11 | End: 2022-08-12 | Stop reason: HOSPADM

## 2022-08-10 RX ORDER — POLYETHYLENE GLYCOL 3350 17 G/17G
17 POWDER, FOR SOLUTION ORAL DAILY
Status: DISCONTINUED | OUTPATIENT
Start: 2022-08-11 | End: 2022-08-12 | Stop reason: HOSPADM

## 2022-08-10 RX ORDER — SODIUM CHLORIDE 0.9 % (FLUSH) 0.9 %
10 SYRINGE (ML) INJECTION EVERY 12 HOURS SCHEDULED
Status: DISCONTINUED | OUTPATIENT
Start: 2022-08-10 | End: 2022-08-12 | Stop reason: HOSPADM

## 2022-08-10 RX ORDER — DIPHENOXYLATE HYDROCHLORIDE AND ATROPINE SULFATE 2.5; .025 MG/1; MG/1
1 TABLET ORAL DAILY
Status: DISCONTINUED | OUTPATIENT
Start: 2022-08-11 | End: 2022-08-12 | Stop reason: HOSPADM

## 2022-08-10 RX ORDER — METHOCARBAMOL 750 MG/1
750 TABLET, FILM COATED ORAL 2 TIMES DAILY PRN
Status: DISCONTINUED | OUTPATIENT
Start: 2022-08-10 | End: 2022-08-12 | Stop reason: HOSPADM

## 2022-08-10 RX ORDER — UREA 10 %
3 LOTION (ML) TOPICAL NIGHTLY PRN
Status: DISCONTINUED | OUTPATIENT
Start: 2022-08-10 | End: 2022-08-12 | Stop reason: HOSPADM

## 2022-08-10 RX ORDER — LOSARTAN POTASSIUM 100 MG/1
100 TABLET ORAL
Refills: 1 | Status: DISCONTINUED | OUTPATIENT
Start: 2022-08-11 | End: 2022-08-10

## 2022-08-10 RX ORDER — CEPHALEXIN 500 MG/1
500 CAPSULE ORAL 4 TIMES DAILY
Status: DISCONTINUED | OUTPATIENT
Start: 2022-08-10 | End: 2022-08-12 | Stop reason: HOSPADM

## 2022-08-10 RX ORDER — SODIUM CHLORIDE 0.9 % (FLUSH) 0.9 %
10 SYRINGE (ML) INJECTION AS NEEDED
Status: DISCONTINUED | OUTPATIENT
Start: 2022-08-10 | End: 2022-08-12 | Stop reason: HOSPADM

## 2022-08-10 RX ORDER — ONDANSETRON 2 MG/ML
4 INJECTION INTRAMUSCULAR; INTRAVENOUS ONCE
Status: COMPLETED | OUTPATIENT
Start: 2022-08-10 | End: 2022-08-10

## 2022-08-10 RX ORDER — HYDROCODONE BITARTRATE AND ACETAMINOPHEN 5; 325 MG/1; MG/1
1 TABLET ORAL EVERY 4 HOURS PRN
Status: DISCONTINUED | OUTPATIENT
Start: 2022-08-10 | End: 2022-08-12 | Stop reason: HOSPADM

## 2022-08-10 RX ORDER — PANTOPRAZOLE SODIUM 40 MG/1
40 TABLET, DELAYED RELEASE ORAL
Status: DISCONTINUED | OUTPATIENT
Start: 2022-08-10 | End: 2022-08-12 | Stop reason: HOSPADM

## 2022-08-10 RX ORDER — GABAPENTIN 100 MG/1
100 CAPSULE ORAL NIGHTLY
Status: DISCONTINUED | OUTPATIENT
Start: 2022-08-10 | End: 2022-08-12 | Stop reason: HOSPADM

## 2022-08-10 RX ORDER — DULOXETIN HYDROCHLORIDE 60 MG/1
60 CAPSULE, DELAYED RELEASE ORAL 2 TIMES DAILY
Status: DISCONTINUED | OUTPATIENT
Start: 2022-08-10 | End: 2022-08-12 | Stop reason: HOSPADM

## 2022-08-10 RX ORDER — DILTIAZEM HYDROCHLORIDE 180 MG/1
180 CAPSULE, COATED, EXTENDED RELEASE ORAL DAILY
Status: DISCONTINUED | OUTPATIENT
Start: 2022-08-11 | End: 2022-08-10

## 2022-08-10 RX ADMIN — HYDROMORPHONE HYDROCHLORIDE 1 MG: 1 INJECTION, SOLUTION INTRAMUSCULAR; INTRAVENOUS; SUBCUTANEOUS at 19:14

## 2022-08-10 RX ADMIN — CEPHALEXIN 500 MG: 500 CAPSULE ORAL at 22:36

## 2022-08-10 RX ADMIN — ONDANSETRON 4 MG: 2 INJECTION INTRAMUSCULAR; INTRAVENOUS at 19:14

## 2022-08-10 RX ADMIN — Medication 10 ML: at 22:36

## 2022-08-10 RX ADMIN — DULOXETINE HYDROCHLORIDE 60 MG: 60 CAPSULE, DELAYED RELEASE ORAL at 22:36

## 2022-08-10 RX ADMIN — GABAPENTIN 100 MG: 100 CAPSULE ORAL at 22:36

## 2022-08-10 RX ADMIN — HYDROCODONE BITARTRATE AND ACETAMINOPHEN 1 TABLET: 5; 325 TABLET ORAL at 22:36

## 2022-08-10 NOTE — DISCHARGE PLACEMENT REQUEST
"Marylu Foreman (77 y.o. Female)             Date of Birth   1945    Social Security Number       Address   [address] [place] [address] [address] [place] [address]    Home Phone   [phone]    MRN   [id number]       Judaism   Uatsdin    Marital Status                               Admission Date   8/10/22    Admission Type   Emergency    Admitting Provider       Attending Provider   Alexis Lin II, MD    Department, Room/Bed   [facility] Emergency Department, 34/34       Discharge Date       Discharge Disposition       Discharge Destination                               Attending Provider: Alexis Lin II, MD    Allergies: Amlodipine Besylate-valsartan, Cefdinir, Corticosteroids, Lisinopril, Nsaids, Other    Isolation: None   Infection: COVID Screen (preop/placement) (08/10/22)   Code Status: Prior   Advance Care Planning Activity    Ht: 162.6 cm (64\")   Wt: 92.1 kg (203 lb)    Admission Cmt: None   Principal Problem: None                Active Insurance as of 8/10/2022     Primary Coverage     Payor Plan Insurance Group Employer/Plan Group    [facility] MEDICARE REPLACEMENT [facility] MEDICARE REPLACEMENT [id number]     Payor Plan Address Payor Plan Phone Number Payor Plan Fax Number Effective Dates    [address] [address]   1/1/2018 - None Entered    [facility] [id number]       Subscriber Name Subscriber Birth Date Member ID       MARYLU FOREMAN 1945 [id number]                 Emergency Contacts      (Rel.) Home Phone Work Phone Mobile Phone    Valdemar Foreman (Son) [phone] -- [phone]    AIDEN MOLINA (Friend) [phone] -- [phone]              "

## 2022-08-10 NOTE — TELEPHONE ENCOUNTER
Per Case Management, r/t her wanting to go to Rehab, now that she has been home from the hospital. I was told that she will need to contact her PCP to assist with rehab assessment.  I have notified her of this, she verbalized understanding.  In addition, I explained that if she felt that her situation was urgent, and she was unsafe at home that she may need to return to the ER.  Again, she verbalized understanding.
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
## 2022-08-10 NOTE — TELEPHONE ENCOUNTER
"PATIENT CALLED AND STATED SHE WAS DISCHARGED YESTERDAY AFTER HAVING SURGERY W/ DR. RIZO; UNABLE TO MAKE IT UP STAIRS; FRIENDS \"PULLED HER UP BY ARMS THE REST OF THE STAIRS,\" \"SCOOTED TO BEDROOM; HAS TOO HIGH OF A BED; HAD TO CALL EMS AND THEY DID A LIFT TO GET HER INTO BED.\"     SHE'S IN PAIN WHEN ROLLING IN BED/ONLY CAN LAY FLAT; SENDING TO CLIVE IN CLINICAL D/T RECENT POST OP  "

## 2022-08-10 NOTE — ED NOTES
Patient states she had a bag of personal belongings that she saw EMS carry out of her home, but is not present in the ER room. Michelle Caal RN called and spoke with [facility] dispatch. EMS crew stated there were no belongings left in their possession. I informed patient that EMS does not have her belongings. She states she will have a friend check her home for her bag.

## 2022-08-10 NOTE — CASE MANAGEMENT/SOCIAL WORK
Continued Stay Note  [facility]     Patient Name: Marylu Foreman  MRN: [id number]  Today's Date: 8/10/2022    Admit Date: 8/10/2022     Discharge Plan     Row Name 08/10/22 1808       Plan    Plan Comments Pt was discharged on 8-9-2022, and declined rehab placed. Went home, fell and now requesting rehab. Spoke with provider, who stated that pain management was an issue at this time. Referrals placed per pt request               Discharge Codes    No documentation.                     Esmer Coon RN

## 2022-08-10 NOTE — TELEPHONE ENCOUNTER
I have no idea how to get her into rehab from home.  If we can manage that it would be good but if we cannot then she will need to go to the ER.

## 2022-08-10 NOTE — OUTREACH NOTE
Call Center TCM Note    Flowsheet Row Responses   [facility] patient discharged from? [place]   Does the patient have one of the following disease processes/diagnoses(primary or secondary)? General Surgery   TCM attempt successful? Yes   Discharge diagnosis Left lumbar 4 to Lumbar 5, Lumbar 5 to sacral 1 laminectomy    Meds reviewed with patient/caregiver? Yes   Is the patient having any side effects they believe may be caused by any medication additions or changes? No   Does the patient have all medications related to this admission filled (includes all antibiotics, pain medications, etc.) Yes   Is the patient taking all medications as directed (includes completed medication regime)? Yes   Does the patient have a follow up appointment scheduled with their surgeon? Yes   Has the patient kept scheduled appointments due by today? N/A   What is the Home health agency?  [facility] [place]   Home health comments [facility] RN due about 11am today.    Psychosocial issues? No   Did the patient receive a copy of their discharge instructions? Yes   Nursing interventions Reviewed instructions with patient   What is the patient's perception of their health status since discharge? Worsening   Nursing interventions Nurse provided patient education   Is the patient /caregiver able to teach back basic post-op care? Continue use of incentive spirometry at least 1 week post discharge, Take showers only when approved by MD-sponge bathe until then, Lifting as instructed by MD in discharge instructions, No tub bath, swimming, or hot tub until instructed by MD, Practice 'cough and deep breath', Drive as instructed by MD in discharge instructions, Keep incision areas clean,dry and protected, Do not remove steri-strips   Is the patient/caregiver able to teach back signs and symptoms of incisional infection? Increased redness, swelling or pain at the incisonal site, Pus or odor from incision, Fever, Increased drainage or bleeding, Incisional  warmth   Is the patient/caregiver able to teach back steps to recovery at home? Set small, achievable goals for return to baseline health, Practice good oral hygiene, Eat a well-balance diet, Rest and rebuild strength, gradually increase activity, Weigh daily, Make a list of questions for surgeon's appointment   If the patient is a current smoker, are they able to teach back resources for cessation? Not a smoker   Is the patient/caregiver able to teach back the hierarchy of who to call/visit for symptoms/problems? PCP, Specialist, Home health nurse, Urgent Care, ED, 911 Yes   TCM call completed? Yes   Wrap up additional comments D/C DX: Left L5-S1 Laminectomy** Pt in increased pain after issues getting in home yesterday. She could not step up stairs so SAT DOWN on steps and worked herself up, then her friends PULLED her up with difficulty causing severe pain. She also lost her Norco pills only to find the bottle in her bed. [facility] RN due at 11am today, I have strongly urged pt to notify surgeon yesterdays events. Pt says she was doing well & can now barely walk. First POST OP is 08/23          Martha Sandoval MA    8/10/2022, 10:00 EDT
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
## 2022-08-10 NOTE — TELEPHONE ENCOUNTER
I spoke with Renee and she is going to speak with case management about getting patient admitted to rehab.

## 2022-08-11 ENCOUNTER — READMISSION MANAGEMENT (OUTPATIENT)
Dept: CALL CENTER | Facility: HOSPITAL | Age: 77
End: 2022-08-11

## 2022-08-11 ENCOUNTER — APPOINTMENT (OUTPATIENT)
Dept: CARDIOLOGY | Facility: HOSPITAL | Age: 77
End: 2022-08-11

## 2022-08-11 PROBLEM — I82.409 ACUTE DVT (DEEP VENOUS THROMBOSIS): Status: ACTIVE | Noted: 2022-08-11

## 2022-08-11 LAB
ALBUMIN SERPL-MCNC: 3.7 G/DL (ref 3.5–5.2)
ALBUMIN/GLOB SERPL: 1.1 G/DL
ALP SERPL-CCNC: 108 U/L (ref 39–117)
ALT SERPL W P-5'-P-CCNC: 10 U/L (ref 1–33)
ANION GAP SERPL CALCULATED.3IONS-SCNC: 14.3 MMOL/L (ref 5–15)
APTT PPP: 29.5 SECONDS (ref 22.7–35.4)
AST SERPL-CCNC: 15 U/L (ref 1–32)
BASOPHILS # BLD AUTO: 0.15 10*3/MM3 (ref 0–0.2)
BASOPHILS NFR BLD AUTO: 1.5 % (ref 0–1.5)
BH CV LOW VAS LEFT SOLEAL VESSEL: 1
BH CV LOW VAS RIGHT GASTRONEMIUS VESSEL: 1
BH CV LOWER VASCULAR LEFT COMMON FEMORAL AUGMENT: NORMAL
BH CV LOWER VASCULAR LEFT COMMON FEMORAL COMPETENT: NORMAL
BH CV LOWER VASCULAR LEFT COMMON FEMORAL COMPRESS: NORMAL
BH CV LOWER VASCULAR LEFT COMMON FEMORAL PHASIC: NORMAL
BH CV LOWER VASCULAR LEFT COMMON FEMORAL SPONT: NORMAL
BH CV LOWER VASCULAR LEFT DISTAL FEMORAL COMPRESS: NORMAL
BH CV LOWER VASCULAR LEFT GASTRONEMIUS COMPRESS: NORMAL
BH CV LOWER VASCULAR LEFT GREATER SAPH AK COMPRESS: NORMAL
BH CV LOWER VASCULAR LEFT GREATER SAPH BK COMPRESS: NORMAL
BH CV LOWER VASCULAR LEFT LESSER SAPH COMPRESS: NORMAL
BH CV LOWER VASCULAR LEFT MID FEMORAL AUGMENT: NORMAL
BH CV LOWER VASCULAR LEFT MID FEMORAL COMPETENT: NORMAL
BH CV LOWER VASCULAR LEFT MID FEMORAL COMPRESS: NORMAL
BH CV LOWER VASCULAR LEFT MID FEMORAL PHASIC: NORMAL
BH CV LOWER VASCULAR LEFT MID FEMORAL SPONT: NORMAL
BH CV LOWER VASCULAR LEFT PERONEAL COMPRESS: NORMAL
BH CV LOWER VASCULAR LEFT POPLITEAL AUGMENT: NORMAL
BH CV LOWER VASCULAR LEFT POPLITEAL COMPETENT: NORMAL
BH CV LOWER VASCULAR LEFT POPLITEAL COMPRESS: NORMAL
BH CV LOWER VASCULAR LEFT POPLITEAL PHASIC: NORMAL
BH CV LOWER VASCULAR LEFT POPLITEAL SPONT: NORMAL
BH CV LOWER VASCULAR LEFT POSTERIOR TIBIAL COMPRESS: NORMAL
BH CV LOWER VASCULAR LEFT PROFUNDA FEMORAL COMPRESS: NORMAL
BH CV LOWER VASCULAR LEFT PROXIMAL FEMORAL COMPRESS: NORMAL
BH CV LOWER VASCULAR LEFT SAPHENOFEMORAL JUNCTION COMPRESS: NORMAL
BH CV LOWER VASCULAR LEFT SOLEAL COMPRESS: NORMAL
BH CV LOWER VASCULAR LEFT SOLEAL THROMBUS: NORMAL
BH CV LOWER VASCULAR RIGHT COMMON FEMORAL AUGMENT: NORMAL
BH CV LOWER VASCULAR RIGHT COMMON FEMORAL COMPETENT: NORMAL
BH CV LOWER VASCULAR RIGHT COMMON FEMORAL COMPRESS: NORMAL
BH CV LOWER VASCULAR RIGHT COMMON FEMORAL PHASIC: NORMAL
BH CV LOWER VASCULAR RIGHT COMMON FEMORAL SPONT: NORMAL
BH CV LOWER VASCULAR RIGHT DISTAL FEMORAL COMPRESS: NORMAL
BH CV LOWER VASCULAR RIGHT GASTRONEMIUS COMPRESS: NORMAL
BH CV LOWER VASCULAR RIGHT GASTRONEMIUS THROMBUS: NORMAL
BH CV LOWER VASCULAR RIGHT GREATER SAPH AK COMPRESS: NORMAL
BH CV LOWER VASCULAR RIGHT GREATER SAPH BK COMPRESS: NORMAL
BH CV LOWER VASCULAR RIGHT LESSER SAPH COMPRESS: NORMAL
BH CV LOWER VASCULAR RIGHT MID FEMORAL AUGMENT: NORMAL
BH CV LOWER VASCULAR RIGHT MID FEMORAL COMPETENT: NORMAL
BH CV LOWER VASCULAR RIGHT MID FEMORAL COMPRESS: NORMAL
BH CV LOWER VASCULAR RIGHT MID FEMORAL PHASIC: NORMAL
BH CV LOWER VASCULAR RIGHT MID FEMORAL SPONT: NORMAL
BH CV LOWER VASCULAR RIGHT PERONEAL COMPRESS: NORMAL
BH CV LOWER VASCULAR RIGHT POPLITEAL AUGMENT: NORMAL
BH CV LOWER VASCULAR RIGHT POPLITEAL COMPETENT: NORMAL
BH CV LOWER VASCULAR RIGHT POPLITEAL COMPRESS: NORMAL
BH CV LOWER VASCULAR RIGHT POPLITEAL PHASIC: NORMAL
BH CV LOWER VASCULAR RIGHT POPLITEAL SPONT: NORMAL
BH CV LOWER VASCULAR RIGHT POSTERIOR TIBIAL COMPRESS: NORMAL
BH CV LOWER VASCULAR RIGHT PROFUNDA FEMORAL COMPRESS: NORMAL
BH CV LOWER VASCULAR RIGHT PROXIMAL FEMORAL COMPRESS: NORMAL
BH CV LOWER VASCULAR RIGHT SAPHENOFEMORAL JUNCTION COMPRESS: NORMAL
BH CV LOWER VASCULAR RIGHT SOLEAL COMPRESS: NORMAL
BILIRUB SERPL-MCNC: 0.5 MG/DL (ref 0–1.2)
BUN SERPL-MCNC: 21 MG/DL (ref 8–23)
BUN/CREAT SERPL: 15.7 (ref 7–25)
CALCIUM SPEC-SCNC: 9.7 MG/DL (ref 8.6–10.5)
CHLORIDE SERPL-SCNC: 100 MMOL/L (ref 98–107)
CO2 SERPL-SCNC: 23.7 MMOL/L (ref 22–29)
CREAT SERPL-MCNC: 1.34 MG/DL (ref 0.57–1)
DEPRECATED RDW RBC AUTO: 38.9 FL (ref 37–54)
EGFRCR SERPLBLD CKD-EPI 2021: 41.4 ML/MIN/1.73
EOSINOPHIL # BLD AUTO: 0.7 10*3/MM3 (ref 0–0.4)
EOSINOPHIL NFR BLD AUTO: 7.1 % (ref 0.3–6.2)
ERYTHROCYTE [DISTWIDTH] IN BLOOD BY AUTOMATED COUNT: 11.3 % (ref 12.3–15.4)
GLOBULIN UR ELPH-MCNC: 3.5 GM/DL
GLUCOSE SERPL-MCNC: 164 MG/DL (ref 65–99)
HCT VFR BLD AUTO: 32 % (ref 34–46.6)
HGB BLD-MCNC: 10.4 G/DL (ref 12–15.9)
IMM GRANULOCYTES # BLD AUTO: 0.02 10*3/MM3 (ref 0–0.05)
IMM GRANULOCYTES NFR BLD AUTO: 0.2 % (ref 0–0.5)
INR PPP: 1.13 (ref 0.9–1.1)
LYMPHOCYTES # BLD AUTO: 1.79 10*3/MM3 (ref 0.7–3.1)
LYMPHOCYTES NFR BLD AUTO: 18.2 % (ref 19.6–45.3)
MAXIMAL PREDICTED HEART RATE: 145 BPM
MCH RBC QN AUTO: 31.3 PG (ref 26.6–33)
MCHC RBC AUTO-ENTMCNC: 32.5 G/DL (ref 31.5–35.7)
MCV RBC AUTO: 96.4 FL (ref 79–97)
MONOCYTES # BLD AUTO: 1.08 10*3/MM3 (ref 0.1–0.9)
MONOCYTES NFR BLD AUTO: 11 % (ref 5–12)
NEUTROPHILS NFR BLD AUTO: 6.08 10*3/MM3 (ref 1.7–7)
NEUTROPHILS NFR BLD AUTO: 62 % (ref 42.7–76)
NRBC BLD AUTO-RTO: 0 /100 WBC (ref 0–0.2)
PLATELET # BLD AUTO: 339 10*3/MM3 (ref 140–450)
PMV BLD AUTO: 11.1 FL (ref 6–12)
POTASSIUM SERPL-SCNC: 4.5 MMOL/L (ref 3.5–5.2)
PROT SERPL-MCNC: 7.2 G/DL (ref 6–8.5)
PROTHROMBIN TIME: 14.4 SECONDS (ref 11.7–14.2)
RBC # BLD AUTO: 3.32 10*6/MM3 (ref 3.77–5.28)
SODIUM SERPL-SCNC: 138 MMOL/L (ref 136–145)
STRESS TARGET HR: 123 BPM
WBC NRBC COR # BLD: 9.82 10*3/MM3 (ref 3.4–10.8)

## 2022-08-11 PROCEDURE — 85730 THROMBOPLASTIN TIME PARTIAL: CPT | Performed by: STUDENT IN AN ORGANIZED HEALTH CARE EDUCATION/TRAINING PROGRAM

## 2022-08-11 PROCEDURE — 99024 POSTOP FOLLOW-UP VISIT: CPT | Performed by: NURSE PRACTITIONER

## 2022-08-11 PROCEDURE — 85025 COMPLETE CBC W/AUTO DIFF WBC: CPT | Performed by: HOSPITALIST

## 2022-08-11 PROCEDURE — 25010000002 ONDANSETRON PER 1 MG: Performed by: HOSPITALIST

## 2022-08-11 PROCEDURE — 85610 PROTHROMBIN TIME: CPT | Performed by: STUDENT IN AN ORGANIZED HEALTH CARE EDUCATION/TRAINING PROGRAM

## 2022-08-11 PROCEDURE — 25010000002 HEPARIN (PORCINE) 25000-0.45 UT/250ML-% SOLUTION: Performed by: STUDENT IN AN ORGANIZED HEALTH CARE EDUCATION/TRAINING PROGRAM

## 2022-08-11 PROCEDURE — G0378 HOSPITAL OBSERVATION PER HR: HCPCS

## 2022-08-11 PROCEDURE — 93970 EXTREMITY STUDY: CPT

## 2022-08-11 PROCEDURE — 80053 COMPREHEN METABOLIC PANEL: CPT | Performed by: HOSPITALIST

## 2022-08-11 PROCEDURE — 25010000002 HYDROMORPHONE PER 4 MG: Performed by: STUDENT IN AN ORGANIZED HEALTH CARE EDUCATION/TRAINING PROGRAM

## 2022-08-11 PROCEDURE — 96376 TX/PRO/DX INJ SAME DRUG ADON: CPT

## 2022-08-11 PROCEDURE — 96365 THER/PROPH/DIAG IV INF INIT: CPT

## 2022-08-11 RX ORDER — HYDROMORPHONE HYDROCHLORIDE 1 MG/ML
0.25 INJECTION, SOLUTION INTRAMUSCULAR; INTRAVENOUS; SUBCUTANEOUS
Status: DISCONTINUED | OUTPATIENT
Start: 2022-08-11 | End: 2022-08-12

## 2022-08-11 RX ORDER — HEPARIN SODIUM 10000 [USP'U]/100ML
18 INJECTION, SOLUTION INTRAVENOUS
Status: DISCONTINUED | OUTPATIENT
Start: 2022-08-11 | End: 2022-08-11

## 2022-08-11 RX ORDER — AMOXICILLIN 250 MG
2 CAPSULE ORAL 2 TIMES DAILY
Status: DISCONTINUED | OUTPATIENT
Start: 2022-08-11 | End: 2022-08-12 | Stop reason: HOSPADM

## 2022-08-11 RX ORDER — HEPARIN SODIUM 5000 [USP'U]/ML
40-80 INJECTION, SOLUTION INTRAVENOUS; SUBCUTANEOUS EVERY 6 HOURS PRN
Status: DISCONTINUED | OUTPATIENT
Start: 2022-08-11 | End: 2022-08-11

## 2022-08-11 RX ORDER — ONDANSETRON 2 MG/ML
4 INJECTION INTRAMUSCULAR; INTRAVENOUS EVERY 6 HOURS PRN
Status: DISCONTINUED | OUTPATIENT
Start: 2022-08-11 | End: 2022-08-12 | Stop reason: HOSPADM

## 2022-08-11 RX ADMIN — Medication 3 MG: at 20:09

## 2022-08-11 RX ADMIN — APIXABAN 5 MG: 5 TABLET, FILM COATED ORAL at 20:08

## 2022-08-11 RX ADMIN — HYDROCODONE BITARTRATE AND ACETAMINOPHEN 1 TABLET: 5; 325 TABLET ORAL at 05:17

## 2022-08-11 RX ADMIN — CEPHALEXIN 500 MG: 500 CAPSULE ORAL at 16:31

## 2022-08-11 RX ADMIN — Medication 1 TABLET: at 10:05

## 2022-08-11 RX ADMIN — ONDANSETRON 4 MG: 2 INJECTION INTRAMUSCULAR; INTRAVENOUS at 10:05

## 2022-08-11 RX ADMIN — Medication 10 ML: at 20:09

## 2022-08-11 RX ADMIN — CEPHALEXIN 500 MG: 500 CAPSULE ORAL at 10:05

## 2022-08-11 RX ADMIN — DOCUSATE SODIUM 50MG AND SENNOSIDES 8.6MG 2 TABLET: 8.6; 5 TABLET, FILM COATED ORAL at 10:11

## 2022-08-11 RX ADMIN — GABAPENTIN 100 MG: 100 CAPSULE ORAL at 20:09

## 2022-08-11 RX ADMIN — APIXABAN 5 MG: 5 TABLET, FILM COATED ORAL at 12:26

## 2022-08-11 RX ADMIN — CEPHALEXIN 500 MG: 500 CAPSULE ORAL at 22:44

## 2022-08-11 RX ADMIN — HYDROMORPHONE HYDROCHLORIDE 0.25 MG: 1 INJECTION, SOLUTION INTRAMUSCULAR; INTRAVENOUS; SUBCUTANEOUS at 22:47

## 2022-08-11 RX ADMIN — HYDROMORPHONE HYDROCHLORIDE 0.25 MG: 1 INJECTION, SOLUTION INTRAMUSCULAR; INTRAVENOUS; SUBCUTANEOUS at 12:26

## 2022-08-11 RX ADMIN — HYDROMORPHONE HYDROCHLORIDE 0.25 MG: 1 INJECTION, SOLUTION INTRAMUSCULAR; INTRAVENOUS; SUBCUTANEOUS at 10:05

## 2022-08-11 RX ADMIN — HEPARIN SODIUM 18 UNITS/KG/HR: 10000 INJECTION, SOLUTION INTRAVENOUS at 11:23

## 2022-08-11 RX ADMIN — HYDROMORPHONE HYDROCHLORIDE 0.25 MG: 1 INJECTION, SOLUTION INTRAMUSCULAR; INTRAVENOUS; SUBCUTANEOUS at 20:07

## 2022-08-11 RX ADMIN — PANTOPRAZOLE SODIUM 40 MG: 40 TABLET, DELAYED RELEASE ORAL at 05:17

## 2022-08-11 RX ADMIN — LEVOTHYROXINE SODIUM 50 MCG: 0.05 TABLET ORAL at 10:05

## 2022-08-11 RX ADMIN — DOCUSATE SODIUM 50MG AND SENNOSIDES 8.6MG 2 TABLET: 8.6; 5 TABLET, FILM COATED ORAL at 20:09

## 2022-08-11 RX ADMIN — DULOXETINE HYDROCHLORIDE 60 MG: 60 CAPSULE, DELAYED RELEASE ORAL at 10:05

## 2022-08-11 RX ADMIN — POLYETHYLENE GLYCOL 3350 17 G: 17 POWDER, FOR SOLUTION ORAL at 10:05

## 2022-08-11 RX ADMIN — HYDROMORPHONE HYDROCHLORIDE 0.25 MG: 1 INJECTION, SOLUTION INTRAMUSCULAR; INTRAVENOUS; SUBCUTANEOUS at 16:31

## 2022-08-11 RX ADMIN — DULOXETINE HYDROCHLORIDE 60 MG: 60 CAPSULE, DELAYED RELEASE ORAL at 20:09

## 2022-08-11 NOTE — CASE MANAGEMENT/SOCIAL WORK
Saskia called back and stated that pre-auth has already been recieved for admission to [facility] and she will check w/ CCP in the am re expected discharge date-CCP will follow

## 2022-08-11 NOTE — OUTREACH NOTE
General Surgery Week 2 Survey    Flowsheet Row Responses   [facility] [place] patient discharged from? [place]   Does the patient have one of the following disease processes/diagnoses(primary or secondary)? General Surgery   Week 2 attempt successful? No   Revoke Readmitted          MADHAV MARTINO - Registered Nurse

## 2022-08-11 NOTE — PLAN OF CARE
Goal Outcome Evaluation:  Plan of Care Reviewed With: patient        Progress: no change  Outcome Evaluation: VSS, pain controlled with Norco, waiting for placement to rehab, neuro surg consult, left foot drop, brace at bedside, BSC, will continue to monitor
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
## 2022-08-11 NOTE — PLAN OF CARE
Goal Outcome Evaluation:  Plan of Care Reviewed With: patient        Progress: no change  Outcome Evaluation: Patient new to 5N, c/o muscle soreness in bilateral thighs. Otherwise no complaints at this time. Will CTM the rest of my shift.

## 2022-08-11 NOTE — PROGRESS NOTES
[facility] NEUROSURGERY PROGRESS NOTE      CC: POD #4 L4-5 and L5-S1 laminectiomy, foraminotomy, medial facetomy      Subjective     Interval History: Postop laminectomy on 8/4/2022.  She had been discharged home on 8/9/2022 and since then has had increased pain, difficulty moving around her apartment.  She stated that after she was discharged from the hospital, she went home with her friends, they tried to help her up the stairs and were unsuccessful. Friends were pulling on her arms and then she slipped from their hands and sat herself on her steps.  She then scooted herself to her bedroom, and had to call EMS to assist her to the bed.  She normally lives alone.  She normally takes Eliquis for history of pulmonary embolism, and has been off her Eliquis since surgery.  Upon admission to the ER, she stated that she had new onset of bilateral thigh/growing pain.     In addition, she states that pain was out of control on admission because she did not realize her friend was getting her medication together, and she thought she was taking her pain medicine and it was not working.  She later realized that her friend was giving her her antibiotic, and not pain medicine, and that she had not had her pain medicine in over 24 hours, so her pain was out of control upon admission.      ROS:  Constitutional: No fever, chills  GI: No nausea, vomiting  MS: back pain  Neuro: No numbness, tingling, and left dorsiflexion weakness, balance difficulties  : No difficulty voiding, no incontinence    Objective     Vital signs in last 24 hours:  Temp:  [97.3 °F (36.3 °C)-98.5 °F (36.9 °C)] 97.3 °F (36.3 °C)  Heart Rate:  [] 93  Resp:  [18] 18  BP: (122-135)/(61-86) 122/61    Intake/Output this shift:  No intake/output data recorded.    LABS:  Results from last 7 days   Lab Units 08/11/22  1022 08/10/22  1718 08/09/22  0454   WBC 10*3/mm3 9.82 10.84* 11.31*   HEMOGLOBIN g/dL 10.4* 10.6* 10.4*   HEMATOCRIT % 32.0* 32.4* 32.8*    PLATELETS 10*3/mm3 339 343 289       Results from last 7 days   Lab Units 08/11/22  1012 08/10/22  1718 08/07/22  0506   SODIUM mmol/L 138 133* 140   POTASSIUM mmol/L 4.5 4.1 4.2   CHLORIDE mmol/L 100 99 105   CO2 mmol/L 23.7 25.3 23.0   BUN mg/dL 21 23 19   CREATININE mg/dL 1.34* 1.34* 1.16*   CALCIUM mg/dL 9.7 9.4 8.2*   BILIRUBIN mg/dL 0.5 0.5  --    ALK PHOS U/L 108 98  --    ALT (SGPT) U/L 10 8  --    AST (SGOT) U/L 15 16  --    GLUCOSE mg/dL 164* 105* 138*       IMAGING STUDY  Doppler of bilateral lower extremities completed and preliminary report reveals bilateral acute calf pain DVTs.    I personally discussed the results of the Doppler with the RN, and Dr. Sy..    Meds reviewed/changed: Yes    Current Facility-Administered Medications:   •  apixaban (ELIQUIS) tablet 5 mg, 5 mg, Oral, Q12H, Mark Levine MD  •  cephalexin (KEFLEX) capsule 500 mg, 500 mg, Oral, 4x Daily, Jean Jordan MD, 500 mg at 08/11/22 1005  •  DULoxetine (CYMBALTA) DR capsule 60 mg, 60 mg, Oral, BID, Jean Jordan MD, 60 mg at 08/11/22 1005  •  gabapentin (NEURONTIN) capsule 100 mg, 100 mg, Oral, Nightly, Jean Jordan MD, 100 mg at 08/10/22 2236  •  HYDROcodone-acetaminophen (NORCO) 5-325 MG per tablet 1 tablet, 1 tablet, Oral, Q4H PRN, Jean Jordan MD, 1 tablet at 08/11/22 0517  •  HYDROmorphone (DILAUDID) injection 0.25 mg, 0.25 mg, Intravenous, Q2H PRN, Mark Levine MD, 0.25 mg at 08/11/22 1005  •  levothyroxine (SYNTHROID, LEVOTHROID) tablet 50 mcg, 50 mcg, Oral, Daily, Jean Jordan MD, 50 mcg at 08/11/22 1005  •  melatonin tablet 3 mg, 3 mg, Oral, Nightly PRN, Jean Jordan MD  •  methocarbamol (ROBAXIN) tablet 750 mg, 750 mg, Oral, BID PRN, Jean Jordan MD  •  multivitamin (THERAGRAN) tablet 1 tablet, 1 tablet, Oral, Daily, Jean Jordan MD, 1 tablet at 08/11/22 1005  •  ondansetron (ZOFRAN) injection 4 mg, 4 mg, Intravenous, Q6H PRN, Jean Jordan MD, 4 mg  "at 08/11/22 1005  •  pantoprazole (PROTONIX) EC tablet 40 mg, 40 mg, Oral, Q AM, Jean Jordan MD, 40 mg at 08/11/22 0517  •  polyethylene glycol (MIRALAX) packet 17 g, 17 g, Oral, Daily, Jean Jordan MD, 17 g at 08/11/22 1005  •  sennosides-docusate (PERICOLACE) 8.6-50 MG per tablet 2 tablet, 2 tablet, Oral, BID, HosMark de jesus MD, 2 tablet at 08/11/22 1011  •  [COMPLETED] Insert peripheral IV, , , Once **AND** sodium chloride 0.9 % flush 10 mL, 10 mL, Intravenous, PRN, Alexis Lin II, MD  •  sodium chloride 0.9 % flush 10 mL, 10 mL, Intravenous, Q12H, Jean Jordan MD, 10 mL at 08/10/22 2236  •  sodium chloride 0.9 % flush 10 mL, 10 mL, Intravenous, PRN, Jean Jordan MD      Physical Exam:    General:   Awake, alert, oriented x3. Speech clear with no aphasia  Back:    Incision C/D/I, no erythema, no drainage, 2 Steri-Strips at site  Motor: Normal muscle strength, bulk and tone in upper and lower extremities.  With the exception of left dorsiflexion weakness, able to wiggle toes   Sensation: Normal to light touch  Coordination: Moving extremities well  Extremities:   Wearing SCD      Assessment & Plan     ASSESSMENT:      Status post lumbar surgery    Uncontrolled pain    Acute DVT (deep venous thrombosis) (HCC)      PLAN:   She has a chronic foot drop, which is better since surgery, able to wiggle toes on left foot, was not able to wiggle toes prior to surgery.  Her plan is to go to rehab at discharge related to she lives alone, continues to have trouble with ambulation.  Okay to anticoagulate related to bilateral DVTs  Encourage oral pain medication    I discussed the patient's findings and my recommendations with patient, nursing staff, consulting provider and Dr. Sy       LOS: 0 days       Renee Hoskins, APRN  8/11/2022  12:11 EDT    \"Dictated utilizing Dragon dictation\".      "

## 2022-08-11 NOTE — CASE MANAGEMENT/SOCIAL WORK
[facility] agreed to [facility]. VM left for Saskia cisneros/ [facility] re starting pre-cert process

## 2022-08-11 NOTE — CASE MANAGEMENT/SOCIAL WORK
Accepted by both [facility]; chose [facility]. Updated Dominga w/ [facility] so she can begin pre-cert process; she stated patient will need PT eval (order already in chart). RN and Katerina w/ [facility] updated

## 2022-08-11 NOTE — PROGRESS NOTES
Patient sent from [facility] for a bilateral lower extremity venous duplex. Scan completed and preliminary report of bilateral acute calf vein DVT called to Joanna Ding RN taking care of patient in the ER.

## 2022-08-11 NOTE — CASE MANAGEMENT/SOCIAL WORK
[facility] called and stated they have not yet begun the pre-cert process, and she is not sure when a bed will be available

## 2022-08-11 NOTE — PROGRESS NOTES
Name: Marylu Foreman ADMIT: 8/10/2022   : 1945  PCP: Arleen Dillon MD    MRN: [id number] LOS: 0 days   AGE/SEX: 77 y.o. female  ROOM: 128/1     Subjective   Subjective   Pt reports significant LBP. Denies N/V/D. Does have some constipation.     Review of Systems   neg for CP, SO, N/V  Objective   Objective   Vital Signs  Temp:  [97.3 °F (36.3 °C)-98.5 °F (36.9 °C)] 97.3 °F (36.3 °C)  Heart Rate:  [] 93  Resp:  [18] 18  BP: (122-135)/(61-86) 122/61  SpO2:  [93 %-100 %] 94 %  on   ;   Device (Oxygen Therapy): room air  Body mass index is 33 kg/m².  Physical Exam  Constitutional:       Appearance: Normal appearance.   Cardiovascular:      Rate and Rhythm: Normal rate and regular rhythm.      Heart sounds: No murmur heard.    No gallop.   Pulmonary:      Effort: No respiratory distress.      Breath sounds: Normal breath sounds. No wheezing.   Abdominal:      General: Abdomen is flat. There is no distension.      Palpations: Abdomen is soft.   Neurological:      Mental Status: She is alert.      Comments: L leg strength 2/5, able to move toes on L foot; RLE 5/5         Results Review     I reviewed the patient's new clinical results.  Results from last 7 days   Lab Units 22  1022 08/10/22  1718 22  0454 22  0458   WBC 10*3/mm3 9.82 10.84* 11.31* 15.46*   HEMOGLOBIN g/dL 10.4* 10.6* 10.4* 11.4*   PLATELETS 10*3/mm3 339 343 289 220     Results from last 7 days   Lab Units 22  1012 08/10/22  1718 22  0506 22  0532   SODIUM mmol/L 138 133* 140 145   POTASSIUM mmol/L 4.5 4.1 4.2 4.9   CHLORIDE mmol/L 100 99 105 106   CO2 mmol/L 23.7 25.3 23.0 22.5   BUN mg/dL 21 23 19 25*   CREATININE mg/dL 1.34* 1.34* 1.16* 1.25*   GLUCOSE mg/dL 164* 105* 138* 121*   EGFR mL/min/1.73 41.4* 41.4* 49.3* 45.0*     Results from last 7 days   Lab Units 22  1012 08/10/22  1718   ALBUMIN g/dL 3.70 3.60   BILIRUBIN mg/dL 0.5 0.5   ALK PHOS U/L 108 98   AST (SGOT) U/L 15 16   ALT (SGPT) U/L 10  8     Results from last 7 days   Lab Units 08/11/22  1012 08/10/22  1718 08/07/22  0506 08/06/22  0532   CALCIUM mg/dL 9.7 9.4 8.2* 8.6   ALBUMIN g/dL 3.70 3.60  --   --        No results found for: HGBA1C, POCGLU    XR Spine Lumbar Complete 4+VW    Result Date: 8/10/2022   As described.    This report was finalized on 8/10/2022 5:32 PM by Dr. Maury Alberto M.D.      Scheduled Medications  apixaban, 5 mg, Oral, Q12H  cephalexin, 500 mg, Oral, 4x Daily  DULoxetine, 60 mg, Oral, BID  gabapentin, 100 mg, Oral, Nightly  levothyroxine, 50 mcg, Oral, Daily  multivitamin, 1 tablet, Oral, Daily  pantoprazole, 40 mg, Oral, Q AM  polyethylene glycol, 17 g, Oral, Daily  senna-docusate sodium, 2 tablet, Oral, BID  sodium chloride, 10 mL, Intravenous, Q12H    Infusions   Diet  Diet Regular       Assessment/Plan     Active Hospital Problems    Diagnosis  POA   • **Uncontrolled pain [R52]  Yes   • Acute DVT (deep venous thrombosis) ([facility]) [I82.409]  Unknown   • Status post lumbar surgery [Z98.890]  Not Applicable   • CKD (chronic kidney disease) stage 3, GFR 30-59 ml/min ([facility]) [N18.30]  Yes   • Permanent atrial fibrillation (HCC) [I48.21]  Yes   • Iron deficiency anemia [D50.9]  Yes   • Essential hypertension [I10]  Yes      Resolved Hospital Problems   No resolved problems to display.       77 y.o. female admitted with fall.    Lumbar spondylosis  Fall/debility   -s/p L4-S1 laminectomy 8/2022 w/ Dr Sy  -XR spine w/out acute fx  -Neurosurg consulted- no acute intervention  -PT consult pending for STR  -cont Keflex 500mg 4x/d through 8/13     HTN  -home meds: diltiazem 180mg, irbesartan 300mg  -holding, resume when appropriate      Acute B/l DVT  History of PE  -provoked s/p VATS, 2009  -LE Doppler (8/11/22): acute RLE DVT in gastrocnemius; acute LLE DVT in soleal  -likely provoked again following holding of AC for surgery; will ask Hematology to evaluate  -resume apixaban BID    Permanent Afib  -RC: metoprolol    -[id number]  -AC: apixaban 5mg BID    CKD3b  -stable    Hypothyroidism  -cont Synthroid 50mcg      JOE  -s/p bypass 2001  -follows OP w/ Hematology    Hx gastric bypass    · SCDs for DVT prophylaxis.  · Full code.  · Discussed with patient, care team on multidisciplinary rounds and ED provider.  · Anticipate discharge to acute rehab once precert has been obtained.      Mark Levine MD  [facility]  08/11/22  13:24 EDT

## 2022-08-12 VITALS
TEMPERATURE: 97.9 F | WEIGHT: 198.3 LBS | DIASTOLIC BLOOD PRESSURE: 57 MMHG | HEIGHT: 65 IN | BODY MASS INDEX: 33.04 KG/M2 | OXYGEN SATURATION: 90 % | SYSTOLIC BLOOD PRESSURE: 94 MMHG | RESPIRATION RATE: 18 BRPM | HEART RATE: 113 BPM

## 2022-08-12 PROBLEM — R52 UNCONTROLLED PAIN: Status: RESOLVED | Noted: 2022-08-10 | Resolved: 2022-08-12

## 2022-08-12 PROCEDURE — 96376 TX/PRO/DX INJ SAME DRUG ADON: CPT

## 2022-08-12 PROCEDURE — 99215 OFFICE O/P EST HI 40 MIN: CPT | Performed by: INTERNAL MEDICINE

## 2022-08-12 PROCEDURE — G0378 HOSPITAL OBSERVATION PER HR: HCPCS

## 2022-08-12 PROCEDURE — 97530 THERAPEUTIC ACTIVITIES: CPT

## 2022-08-12 PROCEDURE — 25010000002 HYDROMORPHONE PER 4 MG: Performed by: STUDENT IN AN ORGANIZED HEALTH CARE EDUCATION/TRAINING PROGRAM

## 2022-08-12 PROCEDURE — 99024 POSTOP FOLLOW-UP VISIT: CPT | Performed by: NURSE PRACTITIONER

## 2022-08-12 PROCEDURE — 97162 PT EVAL MOD COMPLEX 30 MIN: CPT

## 2022-08-12 RX ORDER — GABAPENTIN 100 MG/1
100 CAPSULE ORAL NIGHTLY
Qty: 5 CAPSULE | Refills: 0 | Status: SHIPPED | OUTPATIENT
Start: 2022-08-12 | End: 2022-10-14 | Stop reason: SDUPTHER

## 2022-08-12 RX ORDER — HYDROMORPHONE HYDROCHLORIDE 1 MG/ML
0.25 INJECTION, SOLUTION INTRAMUSCULAR; INTRAVENOUS; SUBCUTANEOUS EVERY 6 HOURS PRN
Status: DISCONTINUED | OUTPATIENT
Start: 2022-08-12 | End: 2022-08-12 | Stop reason: HOSPADM

## 2022-08-12 RX ORDER — HYDROCODONE BITARTRATE AND ACETAMINOPHEN 5; 325 MG/1; MG/1
1 TABLET ORAL EVERY 4 HOURS PRN
Qty: 20 TABLET | Refills: 0 | Status: SHIPPED | OUTPATIENT
Start: 2022-08-12 | End: 2022-09-15

## 2022-08-12 RX ORDER — AMOXICILLIN 250 MG
2 CAPSULE ORAL 2 TIMES DAILY
Qty: 120 TABLET | Refills: 0 | Status: SHIPPED | OUTPATIENT
Start: 2022-08-12 | End: 2022-09-11

## 2022-08-12 RX ORDER — POLYETHYLENE GLYCOL 3350 17 G/17G
17 POWDER, FOR SOLUTION ORAL DAILY
Qty: 30 PACKET | Refills: 0 | Status: SHIPPED | OUTPATIENT
Start: 2022-08-13 | End: 2022-09-12

## 2022-08-12 RX ADMIN — APIXABAN 5 MG: 5 TABLET, FILM COATED ORAL at 08:21

## 2022-08-12 RX ADMIN — HYDROMORPHONE HYDROCHLORIDE 0.25 MG: 1 INJECTION, SOLUTION INTRAMUSCULAR; INTRAVENOUS; SUBCUTANEOUS at 02:47

## 2022-08-12 RX ADMIN — HYDROCODONE BITARTRATE AND ACETAMINOPHEN 1 TABLET: 5; 325 TABLET ORAL at 13:34

## 2022-08-12 RX ADMIN — CEPHALEXIN 500 MG: 500 CAPSULE ORAL at 15:43

## 2022-08-12 RX ADMIN — CEPHALEXIN 500 MG: 500 CAPSULE ORAL at 10:12

## 2022-08-12 RX ADMIN — HYDROCODONE BITARTRATE AND ACETAMINOPHEN 1 TABLET: 5; 325 TABLET ORAL at 04:12

## 2022-08-12 RX ADMIN — HYDROCODONE BITARTRATE AND ACETAMINOPHEN 1 TABLET: 5; 325 TABLET ORAL at 18:05

## 2022-08-12 RX ADMIN — CEPHALEXIN 500 MG: 500 CAPSULE ORAL at 04:12

## 2022-08-12 RX ADMIN — POLYETHYLENE GLYCOL 3350 17 G: 17 POWDER, FOR SOLUTION ORAL at 08:21

## 2022-08-12 RX ADMIN — PANTOPRAZOLE SODIUM 40 MG: 40 TABLET, DELAYED RELEASE ORAL at 06:21

## 2022-08-12 RX ADMIN — LEVOTHYROXINE SODIUM 50 MCG: 0.05 TABLET ORAL at 08:21

## 2022-08-12 RX ADMIN — HYDROCODONE BITARTRATE AND ACETAMINOPHEN 1 TABLET: 5; 325 TABLET ORAL at 08:22

## 2022-08-12 RX ADMIN — DOCUSATE SODIUM 50MG AND SENNOSIDES 8.6MG 2 TABLET: 8.6; 5 TABLET, FILM COATED ORAL at 08:21

## 2022-08-12 RX ADMIN — HYDROMORPHONE HYDROCHLORIDE 0.25 MG: 1 INJECTION, SOLUTION INTRAMUSCULAR; INTRAVENOUS; SUBCUTANEOUS at 06:21

## 2022-08-12 RX ADMIN — DULOXETINE HYDROCHLORIDE 60 MG: 60 CAPSULE, DELAYED RELEASE ORAL at 08:21

## 2022-08-12 RX ADMIN — Medication 10 ML: at 08:22

## 2022-08-12 RX ADMIN — Medication 1 TABLET: at 08:21

## 2022-08-12 NOTE — PLAN OF CARE
Goal Outcome Evaluation:           Progress: improving   Pain under control. No acute changes this shift. Plan to discharge to rehab. Will CTM

## 2022-08-12 NOTE — CASE MANAGEMENT/SOCIAL WORK
Continued Stay Note  [facility]     Patient Name: Marylu Foreman  MRN: [id number]  Today's Date: 8/12/2022    Admit Date: 8/10/2022     Discharge Plan     Row Name 08/12/22 1217       Plan    Plan [facility], pre-cert obtained, via friend    Patient/Family in Agreement with Plan yes    Plan Comments Received vm from [facility] SN[facility] pre-cert obtained and pt can admit today. CCP spoke with pt at bedside along with friend (permission granted to speak in front of) to clarify her SNF choice- [facility] vs [facility] and explained per [facility] both had accepted. Explained [facility] had pre-cert. She verbalized understanding and wants to go to [facility] on [address]. CCP verbalized understanding. Discussed transportation options and pt states she anticipates her friend Sarahi being able to drive her to [facility]. Explained awaiting DC orders and Hem/Onc clearance. Notified [facility] plan dc today, pending dc orders. Packet in marlee. Ailin KULKARNI/CCP               Discharge Codes    No documentation.                     Sonia Nolan RN

## 2022-08-12 NOTE — PLAN OF CARE
Goal Outcome Evaluation:  Plan of Care Reviewed With: patient           Outcome Evaluation: Pt. is a 77 year old Female admitted with uncontrolled back pain and general weakness. Pt. with recent back surgery done here at [facility].  Pt. reports she was using a Rwx for ambulation at home just prior to admission.  Pt. currently presents with decreased strength, decreased balance, and decreased tolerance to functional activity. This AM, pt. able to ambulate 25 feet, Min.Assist x 1, with use of Rwx.  Pt. requires CGA x 1 for bed mobility and CGA x 1 for sit <-> stand transfers.  Pt. will benefit from skilled inpt. P.T. to address her functional deficits and to assist pt. in regaining her maximum level of independence with functional mobility.    Patient was wearing a face mask during this therapy encounter. Therapist used appropriate personal protective equipment including eye protection, mask, and gloves.  Mask used was standard procedure mask. Appropriate PPE was worn during the entire therapy session. Hand hygiene was completed before and after therapy session. Patient is not in enhanced droplet precautions.

## 2022-08-12 NOTE — DISCHARGE SUMMARY
Patient Name: Marylu Foreman  : 1945  MRN: [id number]    Date of Admission: 8/10/2022  Date of Discharge:  2022  Primary Care Physician: Arleen Dillon MD      Chief Complaint:   Fall      Discharge Diagnoses     Active Hospital Problems    Diagnosis  POA   • Acute DVT (deep venous thrombosis) ([facility]) [I82.409]  Unknown   • Status post lumbar surgery [Z98.890]  Not Applicable   • CKD (chronic kidney disease) stage 3, GFR 30-59 ml/min ([facility]) [N18.30]  Yes   • Permanent atrial fibrillation ([facility]) [I48.21]  Yes   • Iron deficiency anemia [D50.9]  Yes   • Essential hypertension [I10]  Yes      Resolved Hospital Problems    Diagnosis Date Resolved POA   • **Uncontrolled pain [R52] 2022 Yes        Brief Admitting HPI     This is a 77-year-old female has got a history of hypertension and pulmonary embolisms for for which she is on Eliquis who underwent recent lumbar surgery with Dr. Harris and was discharged from the hospital yesterday.  Patient fell at home.  She denies hitting her head.  She has some worsening back pain.  Is worse with movement.  She denies any change in bowel or bladder.  Pain radiates from her back to her left leg.     Patient almost fell when trying to get in the house, she then required the fire department to get her into bed.    Hospital Course     Pt admitted for fall after recent lumbar surgery.  Imaging nonrevealing for any new fracture.  Evaluated by neurosurgery team with no acute intervention.  Noted to have bilateral DVTs during this admission.  Evaluated by hematology given her history of prior DVT.  Attributed to surgical procedure, with interruption of anticoagulation most likely contributing.  She will be discharged on her home apixaban 5mg BID without need for a switch in agent.  Patient will be discharged to short-term rehab.       Discharge Plan     Lumbar spondylosis  Fall/debility   -cont Keflex 500mg 4x/d through   -f/u with Neurosurgery in 2wks  -to be discharge  to short-term rehab    Acute B/l DVT  History of PE  -provoked s/p VATS, 2009  -LE Doppler (8/11/22): acute RLE DVT in gastrocnemius; acute LLE DVT in soleal  -Hematology does not consider apixaban failure given recent surgery, immobilization, and interruption of AC. Resume apixaban 5mg BID pm dc    Day of Discharge     Subjective:    Patient reports the back pain is improved this morning.  Feels that left foot is moving moderately better than prior.    Physical Exam:  Temp:  [97.9 °F (36.6 °C)-98 °F (36.7 °C)] 97.9 °F (36.6 °C)  Heart Rate:  [113] 113  Resp:  [18] 18  BP: (93-94)/(57-70) 94/57  Body mass index is 33 kg/m².  Physical Exam  Constitutional:       General: She is not in acute distress.     Appearance: Normal appearance.   Eyes:      Extraocular Movements: Extraocular movements intact.      Pupils: Pupils are equal, round, and reactive to light.   Cardiovascular:      Rate and Rhythm: Regular rhythm. Tachycardia present.      Pulses: Normal pulses.      Heart sounds: Normal heart sounds. No murmur heard.    No gallop.   Pulmonary:      Effort: No respiratory distress.      Breath sounds: No stridor. No wheezing.   Abdominal:      General: Abdomen is flat. There is no distension.      Palpations: Abdomen is soft.      Tenderness: There is no abdominal tenderness.   Musculoskeletal:         General: No swelling.      Right lower leg: No edema.      Left lower leg: No edema.      Comments: L foot drop; L leg strength 2/5, able to move toes on L foot; RLE 5/5    Neurological:      Mental Status: She is alert.        Consultants     Consult Orders (all) (From admission, onward)     Start     Ordered    08/11/22 1329  Hematology & Oncology Inpatient Consult  Once        Specialty:  Hematology and Oncology  Provider:  West White Jr., MD    08/11/22 1330    08/10/22 2221  Inpatient Case Management  Consult  Once        Provider:  (Not yet assigned)    08/10/22 2221    08/10/22 2203  Inpatient  Neurosurgery Consult  Once,   Status:  Canceled        Specialty:  Neurosurgery  Provider:  Jean Sy MD    08/10/22 2203    08/10/22 2016  Inpatient Case Management  Consult  Once        Provider:  (Not yet assigned)    08/10/22 2015    08/10/22 1755  [facility] (on-call MD unless specified) Details  Once        Specialty:  Hospitalist  Provider:  (Not yet assigned)    08/10/22 1754    08/10/22 1633  Neurosurgery (on-call MD unless specified)  Once,   Status:  Canceled        Specialty:  Neurosurgery  Provider:  (Not yet assigned)    08/10/22 1633              Procedures     * Surgery not found *      Imaging Results (All)     Procedure Component Value Units Date/Time    XR Spine Lumbar Complete 4+VW [[id number]] Collected: 08/10/22 1730     Updated: 08/10/22 1735    Narrative:      XR SPINE LUMBAR COMPLETE 4+VW-     INDICATIONS: Trauma     TECHNIQUE: 5 views of the lumbar spine     COMPARISON: 08/05/2022     FINDINGS:     Thoracolumbar levoscoliosis is present. Leftward subluxation of L4 on L5  is noted, and mild anterolisthesis of L5 on the lumbarized S1.  Multilevel endplate spurring, facet arthropathy, disc space narrowing  are seen. No acute fracture is identified. Vertebral body heights appear  preserved. If there is further clinical concern, MRI could be considered  for further evaluation. Moderate colonic fecal retention is apparent.       Impression:         As described.           This report was finalized on 8/10/2022 5:32 PM by Dr. Maury Alberto M.D.           Results for orders placed during the hospital encounter of 08/10/22    Duplex Venous Lower Extremity - Bilateral CAR    Interpretation Summary  · Acute right lower extremity deep vein thrombosis noted in the gastrocnemius.  · Acute left lower extremity deep vein thrombosis noted in the soleal.  · All other veins appeared normal bilaterally.    Results for orders placed during the hospital encounter of 07/26/22    Adult  Transthoracic Echo Complete W/ Cont if Necessary Per Protocol    Interpretation Summary  · Left ventricular wall thickness is consistent with mild concentric hypertrophy.  · Estimated left ventricular EF = 59% Left ventricular systolic function is normal.  · Left ventricular diastolic function was indeterminate.  · There is calcification of the aortic valve.  · Estimated right ventricular systolic pressure from tricuspid regurgitation is normal (<35 mmHg).    Pertinent Labs     Results from last 7 days   Lab Units 08/11/22  1022 08/10/22  1718 08/09/22  0454 08/08/22  0458   WBC 10*3/mm3 9.82 10.84* 11.31* 15.46*   HEMOGLOBIN g/dL 10.4* 10.6* 10.4* 11.4*   PLATELETS 10*3/mm3 339 343 289 220     Results from last 7 days   Lab Units 08/11/22  1012 08/10/22  1718 08/07/22  0506 08/06/22  0532   SODIUM mmol/L 138 133* 140 145   POTASSIUM mmol/L 4.5 4.1 4.2 4.9   CHLORIDE mmol/L 100 99 105 106   CO2 mmol/L 23.7 25.3 23.0 22.5   BUN mg/dL 21 23 19 25*   CREATININE mg/dL 1.34* 1.34* 1.16* 1.25*   GLUCOSE mg/dL 164* 105* 138* 121*   EGFR mL/min/1.73 41.4* 41.4* 49.3* 45.0*     Results from last 7 days   Lab Units 08/11/22  1012 08/10/22  1718   ALBUMIN g/dL 3.70 3.60   BILIRUBIN mg/dL 0.5 0.5   ALK PHOS U/L 108 98   AST (SGOT) U/L 15 16   ALT (SGPT) U/L 10 8     Results from last 7 days   Lab Units 08/11/22  1012 08/10/22  1718 08/07/22  0506 08/06/22  0532   CALCIUM mg/dL 9.7 9.4 8.2* 8.6   ALBUMIN g/dL 3.70 3.60  --   --                Invalid input(s): LDLCALC      Results from last 7 days   Lab Units 08/10/22  1842   COVID19  Not Detected       Test Results Pending at Discharge       Discharge Details        Discharge Medications      New Medications      Instructions Start Date   polyethylene glycol 17 g packet  Commonly known as: MIRALAX   17 g, Oral, Daily   Start Date: August 13, 2022     sennosides-docusate 8.6-50 MG per tablet  Commonly known as: PERICOLACE   2 tablets, Oral, 2 Times Daily         Changes to  Medications      Instructions Start Date   apixaban 5 MG tablet tablet  Commonly known as: ELIQUIS  What changed: additional instructions   5 mg, Oral, Every 12 Hours Scheduled      gabapentin 100 MG capsule  Commonly known as: NEURONTIN  What changed:   · how much to take  · how to take this  · when to take this  · additional instructions   Start with 1 at night may increase to 3 if needed         Continue These Medications      Instructions Start Date   cephalexin 500 MG capsule  Commonly known as: Keflex   500 mg, Oral, 4 Times Daily      CO Q 10 PO   200 mg, Oral, Daily, HELD FOR OR      dilTIAZem  MG 24 hr capsule  Commonly known as: CARDIZEM CD   180 mg, Oral, Daily      DULoxetine 60 MG capsule  Commonly known as: CYMBALTA   60 mg, Oral, 2 Times Daily      HYDROcodone-acetaminophen 5-325 MG per tablet  Commonly known as: NORCO   1 tablet, Oral, Every 4 Hours PRN      irbesartan 300 MG tablet  Commonly known as: Avapro   300 mg, Oral, Nightly      levothyroxine 50 MCG tablet  Commonly known as: SYNTHROID, LEVOTHROID   50 mcg, Oral, Daily      methocarbamol 750 MG tablet  Commonly known as: ROBAXIN   750 mg, Oral, 2 Times Daily PRN      multivitamin tablet tablet  Commonly known as: THERAGRAN   1 tablet, Oral, Daily, HELD FOR OR      PROBIOTIC PO   Oral, Daily, Lactobacillus rhamnosus GG    HELD FOR OR      VITAMIN B 12 PO   1 tablet/day, Oral, HELD FOR OR      VITAMIN D PO   1 tablet, Oral, Daily, HELD FOR OR      vitamin E 400 UNIT capsule   400 Units, Oral, Daily, HELD FOR OR             Allergies   Allergen Reactions   • Amlodipine Besylate-Valsartan Unknown - Low Severity   • Cefdinir Unknown - Low Severity   • Corticosteroids Unknown - Low Severity     Severe depression caused from steroid injections in hands   • Lisinopril Unknown - Low Severity   • Nsaids Unknown - Low Severity   • Other Unknown - Low Severity     Steroids sever depression       Discharge Disposition:  Skilled Nursing Facility (DC  - External)      Discharge Diet:  Diet Order   Procedures   • Diet Regular       Discharge Activity:       CODE STATUS:    Code Status and Medical Interventions:   Ordered at: 08/10/22 2205     Code Status (Patient has no pulse and is not breathing):    CPR (Attempt to Resuscitate)     Medical Interventions (Patient has pulse or is breathing):    Full Support       Future Appointments   Date Time Provider Department Center   8/23/2022 10:15 AM Jean Sy MD [facility]   9/12/2022 11:00 AM LAB CHAIR 1 [facility]   9/12/2022 11:30 AM RN REVIEW [facility] INFUS EP [facility]   10/3/2022 11:30 AM [facility] MAMM 2 BH [facility] MAMMO [facility]   10[facility]22 10:[facility]   10/12/2022  3:45 PM [facility] MRI 3 BH [facility] MRI [facility]   10/14/2022 11:15 AM Arleen Dillon MD [facility] EAPT2 [facility]   10/17/2022 11:00 AM Haley Suarez APRN NEK [facility] SL[facility] None   10/19/2022  8:00 AM Brett Reed MD [facility] L100 [facility]   12/5/2022 10:50 AM LAB CHAIR 1 [facility]   12/5/2022 11:20 AM West White Jr., MD [facility]   7/18/2023  2:30 PM Cole Ramos III, MD [facility] CD [facility]      Contact information for follow-up providers     Arleen Dillon MD Follow up in 2 week(s).    Specialty: Internal Medicine  Contact information:  [address]  [address]  [facility] [id number] [phone]             Renee Hoskins APRN Follow up in 2 week(s).    Specialties: Nurse Practitioner, Neurosurgery  Contact information:  [address] [facility] [address]  [facility] [address]  [phone]                   Contact information for after-discharge care     Destination     [facility] .    Service: Skilled Nursing  Contact information:  [address] [facility] [id number] [phone]                               Mark Levine MD  [facility]  08/12/22  14:25 EDT
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
## 2022-08-12 NOTE — PROGRESS NOTES
[facility] PROGRESS NOTE      CC: POD #5 L4-5 and L5-S1 laminectiomy, foraminotomy, medial facetomy      Subjective     Interval History: No events reported overnight.  She is doing much better today, she states that her pain is better controlled, and that she is moving her arm left foot better.      ROS:  Constitutional: No fever, chills  GI: No nausea, vomiting  MS: back pain  Neuro: No numbness, tingling, and left dorsiflexion weakness, balance difficulties  : No difficulty voiding, no incontinence    Objective     Vital signs in last 24 hours:  Temp:  [97.9 °F (36.6 °C)-98.4 °F (36.9 °C)] 97.9 °F (36.6 °C)  Heart Rate:  [105-113] 113  Resp:  [18] 18  BP: ()/(57-70) 94/57    Intake/Output this shift:  No intake/output data recorded.    LABS:  Results from last 7 days   Lab Units 08/11/22  1022 08/10/22  1718 08/09/22  0454   WBC 10*3/mm3 9.82 10.84* 11.31*   HEMOGLOBIN g/dL 10.4* 10.6* 10.4*   HEMATOCRIT % 32.0* 32.4* 32.8*   PLATELETS 10*3/mm3 339 343 289       Results from last 7 days   Lab Units 08/11/22  1012 08/10/22  1718 08/07/22  0506   SODIUM mmol/L 138 133* 140   POTASSIUM mmol/L 4.5 4.1 4.2   CHLORIDE mmol/L 100 99 105   CO2 mmol/L 23.7 25.3 23.0   BUN mg/dL 21 23 19   CREATININE mg/dL 1.34* 1.34* 1.16*   CALCIUM mg/dL 9.7 9.4 8.2*   BILIRUBIN mg/dL 0.5 0.5  --    ALK PHOS U/L 108 98  --    ALT (SGPT) U/L 10 8  --    AST (SGOT) U/L 15 16  --    GLUCOSE mg/dL 164* 105* 138*       IMAGING STUDY  No new imaging    Meds reviewed/changed: Yes    Current Facility-Administered Medications:   •  apixaban (ELIQUIS) tablet 5 mg, 5 mg, Oral, Q12H, Mark Levine MD, 5 mg at 08/12/22 0821  •  cephalexin (KEFLEX) capsule 500 mg, 500 mg, Oral, 4x Daily, Jean Jordan MD, 500 mg at 08/12/22 0412  •  DULoxetine (CYMBALTA) DR capsule 60 mg, 60 mg, Oral, BID, Jean Jordan MD, 60 mg at 08/12/22 0821  •  gabapentin (NEURONTIN) capsule 100 mg, 100 mg, Oral, Nightly, Jean Jordan  MD, 100 mg at 08/11/22 2009  •  HYDROcodone-acetaminophen (NORCO) 5-325 MG per tablet 1 tablet, 1 tablet, Oral, Q4H PRN, Jean Jordan MD, 1 tablet at 08/12/22 0822  •  HYDROmorphone (DILAUDID) injection 0.25 mg, 0.25 mg, Intravenous, Q2H PRN, Mark Levine MD, 0.25 mg at 08/12/22 0621  •  levothyroxine (SYNTHROID, LEVOTHROID) tablet 50 mcg, 50 mcg, Oral, Daily, Jean Jordan MD, 50 mcg at 08/12/22 0821  •  melatonin tablet 3 mg, 3 mg, Oral, Nightly PRN, Jean Jordan MD, 3 mg at 08/11/22 2009  •  methocarbamol (ROBAXIN) tablet 750 mg, 750 mg, Oral, BID PRN, Jean Jordan MD  •  multivitamin (THERAGRAN) tablet 1 tablet, 1 tablet, Oral, Daily, Jean Jordan MD, 1 tablet at 08/12/22 0821  •  ondansetron (ZOFRAN) injection 4 mg, 4 mg, Intravenous, Q6H PRN, Jean Jordan MD, 4 mg at 08/11/22 1005  •  pantoprazole (PROTONIX) EC tablet 40 mg, 40 mg, Oral, Q AM, Jean Jordan MD, 40 mg at 08/12/22 0621  •  polyethylene glycol (MIRALAX) packet 17 g, 17 g, Oral, Daily, Jean Jordan MD, 17 g at 08/12/22 0821  •  sennosides-docusate (PERICOLACE) 8.6-50 MG per tablet 2 tablet, 2 tablet, Oral, BID, Mark Levine MD, 2 tablet at 08/12/22 0821  •  [COMPLETED] Insert peripheral IV, , , Once **AND** sodium chloride 0.9 % flush 10 mL, 10 mL, Intravenous, PRN, Alexis Lin II, MD  •  sodium chloride 0.9 % flush 10 mL, 10 mL, Intravenous, Q12H, Jean Jordan MD, 10 mL at 08/12/22 0822  •  sodium chloride 0.9 % flush 10 mL, 10 mL, Intravenous, PRN, Jean Jordan MD      Physical Exam:    General:   Awake, alert, oriented x3. Speech clear with no aphasia  Back:    Incision C/D/I, no erythema, no drainage, 2 Steri-Strips at site  Motor: Normal muscle strength, bulk and tone in upper and lower extremities.  With the exception of left dorsiflexion weakness, able to wiggle toes and can flex and extend heel slightly better  Sensation: Normal to light  "touch  Coordination: Moving extremities well  Extremities:   Wearing SCD      Assessment & Plan     ASSESSMENT:      Uncontrolled pain    Essential hypertension    Iron deficiency anemia    Permanent atrial fibrillation (HCC)    CKD (chronic kidney disease) stage 3, GFR 30-59 ml/min (HCC)    Status post lumbar surgery    Acute DVT (deep venous thrombosis) ([facility])      PLAN:   Pre-CERT has been granted at [facility], and she is okay to discharge, when medically stable  She has a follow-up with our office in 2 weeks, which has been prearranged  Eliquis has been restarted  DVT BLE and Hem/Onc has been consulted    I discussed the patient's findings and my recommendations with patient, nursing staff, consulting provider and Dr. Sy       LOS: 0 days       Renee Hoskins, APRN  8/12/2022  09:35 EDT    \"Dictated utilizing Dragon dictation\".      "

## 2022-08-12 NOTE — PLAN OF CARE
Goal Outcome Evaluation:  Plan of Care Reviewed With: patient        Progress: no change  Outcome Evaluation: A&O x4, IV and PO pain meds given, pain in back and bilateral inner thighs, up to BSC, assist x1-2, possible D/C to rehab 8/12, will CTM
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
## 2022-08-12 NOTE — PROGRESS NOTES
Name: Marylu Foreman ADMIT: 8/10/2022   : 1945  PCP: Arleen Dillon MD    MRN: [id number] LOS: 0 days   AGE/SEX: 77 y.o. female  ROOM: [facility]     Subjective   Subjective    Patient reports the back pain is improved this morning.  Feels that left foot is moving moderately better than prior.       Review of Systems   neg for CP, SO, N/V  Objective   Objective   Vital Signs  Temp:  [97.9 °F (36.6 °C)-98.4 °F (36.9 °C)] 97.9 °F (36.6 °C)  Heart Rate:  [105-113] 113  Resp:  [18] 18  BP: ()/(57-70) 94/57  SpO2:  [90 %] 90 %  on   ;   Device (Oxygen Therapy): room air  Body mass index is 33 kg/m².  Physical Exam  Constitutional:       Appearance: Normal appearance.   Cardiovascular:      Rate and Rhythm: Normal rate and regular rhythm.      Heart sounds: No murmur heard.    No gallop.   Pulmonary:      Effort: No respiratory distress.      Breath sounds: Normal breath sounds. No wheezing.   Abdominal:      General: Abdomen is flat. There is no distension.      Palpations: Abdomen is soft.   Neurological:      Mental Status: She is alert.      Comments: L leg strength 2/5, able to move toes on L foot; RLE 5/5         Results Review     I reviewed the patient's new clinical results.  Results from last 7 days   Lab Units 22  1022 08/10/22  1718 22  0454 22  0458   WBC 10*3/mm3 9.82 10.84* 11.31* 15.46*   HEMOGLOBIN g/dL 10.4* 10.6* 10.4* 11.4*   PLATELETS 10*3/mm3 339 343 289 220     Results from last 7 days   Lab Units 22  1012 08/10/22  1718 22  0506 22  0532   SODIUM mmol/L 138 133* 140 145   POTASSIUM mmol/L 4.5 4.1 4.2 4.9   CHLORIDE mmol/L 100 99 105 106   CO2 mmol/L 23.7 25.3 23.0 22.5   BUN mg/dL 21 23 19 25*   CREATININE mg/dL 1.34* 1.34* 1.16* 1.25*   GLUCOSE mg/dL 164* 105* 138* 121*   EGFR mL/min/1.73 41.4* 41.4* 49.3* 45.0*     Results from last 7 days   Lab Units 22  1012 08/10/22  1718   ALBUMIN g/dL 3.70 3.60   BILIRUBIN mg/dL 0.5 0.5   ALK PHOS U/L 108  98   AST (SGOT) U/L 15 16   ALT (SGPT) U/L 10 8     Results from last 7 days   Lab Units 08/11/22  1012 08/10/22  1718 08/07/22  0506 08/06/22  0532   CALCIUM mg/dL 9.7 9.4 8.2* 8.6   ALBUMIN g/dL 3.70 3.60  --   --        No results found for: HGBA1C, POCGLU    XR Spine Lumbar Complete 4+VW    Result Date: 8/10/2022   As described.    This report was finalized on 8/10/2022 5:32 PM by Dr. Maury Alberto M.D.      Scheduled Medications  apixaban, 5 mg, Oral, Q12H  cephalexin, 500 mg, Oral, 4x Daily  DULoxetine, 60 mg, Oral, BID  gabapentin, 100 mg, Oral, Nightly  levothyroxine, 50 mcg, Oral, Daily  multivitamin, 1 tablet, Oral, Daily  pantoprazole, 40 mg, Oral, Q AM  polyethylene glycol, 17 g, Oral, Daily  senna-docusate sodium, 2 tablet, Oral, BID  sodium chloride, 10 mL, Intravenous, Q12H    Infusions   Diet  Diet Regular       Assessment/Plan     Active Hospital Problems    Diagnosis  POA   • **Uncontrolled pain [R52]  Yes   • Acute DVT (deep venous thrombosis) (HCC) [I82.409]  Unknown   • Status post lumbar surgery [Z98.890]  Not Applicable   • CKD (chronic kidney disease) stage 3, GFR 30-59 ml/min (HCC) [N18.30]  Yes   • Permanent atrial fibrillation (HCC) [I48.21]  Yes   • Iron deficiency anemia [D50.9]  Yes   • Essential hypertension [I10]  Yes      Resolved Hospital Problems   No resolved problems to display.       77 y.o. female admitted with fall.    Lumbar spondylosis  Fall/debility   -s/p L4-S1 laminectomy 8/2022 w/ Dr Sy  -XR spine w/out acute fx  -Neurosurg consulted- no acute intervention  -PT consult pending for STR  -cont Keflex 500mg 4x/d through 8/13  -encouraged pt to use only PO pain medication today to attempt to transition     HTN  -home meds: diltiazem 180mg, irbesartan 300mg  -holding, resume when appropriate      Acute B/l DVT  History of PE  -provoked s/p VATS, 2009  -LE Doppler (8/11/22): acute RLE DVT in gastrocnemius; acute LLE DVT in soleal  -likely provoked again following  holding of AC for surgery; will ask Hematology to evaluate  -resume apixaban BID    Permanent Afib  -RC: metoprolol   -[id number]  -AC: apixaban 5mg BID    CKD3b  -stable    Hypothyroidism  -cont Synthroid 50mcg    JOE  -s/p bypass 2001  -follows OP w/ Hematology    Hx gastric bypass    · SCDs for DVT prophylaxis.  · Full code.  · Discussed with patient, care team on multidisciplinary rounds and ED provider.  · Anticipate discharge to acute rehab once precert has been obtained.      Mark Levine MD  [facility]  08/12/22  08:15 EDT

## 2022-08-13 NOTE — CASE MANAGEMENT/SOCIAL WORK
Case Management Discharge Note      Final Note: [facility] -SNF -Constance RN CCP         Selected Continued Care - Discharged on 8/12/2022 Admission date: 8/10/2022 - Discharge disposition: Skilled Nursing Facility (DC - External)    Destination Coordination complete.    Service Provider Selected Services Address Phone Fax Patient Preferred    [facility]  Skilled Nursing [address] [address] [phone] --          Durable Medical Equipment    No services have been selected for the patient.              Dialysis/Infusion    No services have been selected for the patient.              Home Medical Care    No services have been selected for the patient.              Therapy    No services have been selected for the patient.              Community Resources    No services have been selected for the patient.              Community & DME    No services have been selected for the patient.                Selected Continued Care - Prior Encounters Includes selections from prior encounters from 5/12/2022 to 8/12/2022    Discharged on 8/9/2022 Admission date: 8/5/2022 - Discharge disposition: Home or Self Care    Home Medical Care     Service Provider Selected Services Address Phone Fax Patient Preferred    [facility]  [facility] [address] [address] [phone] --                    Transportation Services  Private: Car    Final Discharge Disposition Code: 03 - skilled nursing facility (SNF)

## 2022-08-15 ENCOUNTER — TELEPHONE (OUTPATIENT)
Dept: NEUROSURGERY | Facility: CLINIC | Age: 77
End: 2022-08-15

## 2022-08-15 DIAGNOSIS — M47.16 SPONDYLOSIS WITH MYELOPATHY, LUMBAR REGION: Primary | ICD-10-CM

## 2022-08-15 NOTE — TELEPHONE ENCOUNTER
Solitario from  Rehab called for orders for back protocol/precautions as pt had Left L4/5. L5/S1 lami with metrx on 8/5/22.  [phone]

## 2022-08-22 NOTE — PROGRESS NOTES
"Subjective   Patient ID: Marylu Foreman is a 77 y.o. female is here today for 2 week PO follow-up. Patient had a Left lumbar 4 to Lumbar 5, Lumbar 5 to sacral 1 laminectomy with metrx on 08/05/2022    Today patient states that she has low back pain, and drop foot in the Left foot. Patient incision looks healthy, no redness, no drainage, no swelling    Patient, Provider, and MA are all wearing a mask in our office today.     History of Present Illness    This patient returns today.  She continues with a foot drop on the left side.  She had this preop.  It is not improved very much at all.  Her pain on the other hand is improved.    The following portions of the patient's history were reviewed and updated as appropriate: allergies, current medications, past family history, past medical history, past social history, past surgical history and problem list.    Review of Systems    I have reviewed the review of systems as documented by my MA.      Objective     Vitals:    08/23/22 1027   BP: 130/80   Cuff Size: Adult   Pulse: 94   Temp: 98 °F (36.7 °C)   SpO2: 97%   Weight: 89.9 kg (198 lb 4.8 oz)   Height: 165.1 cm (65\")     Body mass index is 33 kg/m².      Physical Exam  Neurological:      Mental Status: She is alert and oriented to person, place, and time.       Neurologic Exam     Mental Status   Oriented to person, place, and time.           Assessment & Plan   Independent Review of Radiographic Studies:      I personally reviewed the images from the following studies.    There is no new imaging to review    Medical Decision Making:      I told the patient that once she gets discharged from rehab she should call my office and we will get her set up for outpatient physical therapy.  I really do not want her doing physical therapy at home as I do not think it will be as effective as going to a physical therapy place.  Otherwise I will check her again in about 6 weeks.    Diagnoses and all orders for this visit:    1. " Follow-up examination following surgery (Primary)      Return in about 6 weeks (around 10/4/2022).

## 2022-08-23 ENCOUNTER — OFFICE VISIT (OUTPATIENT)
Dept: NEUROSURGERY | Facility: CLINIC | Age: 77
End: 2022-08-23

## 2022-08-23 VITALS
HEART RATE: 94 BPM | BODY MASS INDEX: 33.04 KG/M2 | WEIGHT: 198.3 LBS | OXYGEN SATURATION: 97 % | HEIGHT: 65 IN | TEMPERATURE: 98 F | SYSTOLIC BLOOD PRESSURE: 130 MMHG | DIASTOLIC BLOOD PRESSURE: 80 MMHG

## 2022-08-23 DIAGNOSIS — Z09 FOLLOW-UP EXAMINATION FOLLOWING SURGERY: Primary | ICD-10-CM

## 2022-08-23 PROCEDURE — 99024 POSTOP FOLLOW-UP VISIT: CPT | Performed by: NEUROLOGICAL SURGERY

## 2022-08-25 ENCOUNTER — TELEPHONE (OUTPATIENT)
Dept: NEUROSURGERY | Facility: CLINIC | Age: 77
End: 2022-08-25

## 2022-08-25 NOTE — TELEPHONE ENCOUNTER
Caller: MARYJANE DOBBS    Relationship: [facility]    Best call back number: [phone]    What is the medical concern/diagnosis: low back pain, and drop foot in the Left foot  What specialty or service is being requested: PHYSICAL THERAPY    What is the provider, practice or medical service name: [facility]  What is the office location: [facility]    What is the office phone number: [phone]    Any additional details: PT IS DUE TO BE DISCHARGED OF REHAB ON 8/26/2022

## 2022-08-26 DIAGNOSIS — M47.16 SPONDYLOSIS WITH MYELOPATHY, LUMBAR REGION: Primary | ICD-10-CM

## 2022-08-28 ENCOUNTER — READMISSION MANAGEMENT (OUTPATIENT)
Dept: CALL CENTER | Facility: HOSPITAL | Age: 77
End: 2022-08-28

## 2022-08-28 NOTE — OUTREACH NOTE
Prep Survey    Flowsheet Row Responses   [facility] patient discharged from? Non-[facility]   Is LACE score < 7 ? Non-BH Discharge   Emergency Room discharge w/ pulse ox? No   Eligibility [facility]    Date of Discharge 08/26/22   Discharge Disposition Home or Self Care   Discharge diagnosis Unknown   Does the patient have one of the following disease processes/diagnoses(primary or secondary)? Other   Prep survey completed? Yes          MABLE URBINA - Registered Nurse

## 2022-08-29 ENCOUNTER — TRANSITIONAL CARE MANAGEMENT TELEPHONE ENCOUNTER (OUTPATIENT)
Dept: CALL CENTER | Facility: HOSPITAL | Age: 77
End: 2022-08-29

## 2022-08-29 NOTE — OUTREACH NOTE
Call Center TCM Note    Flowsheet Row Responses   [facility] patient discharged from? Non-BH   Does the patient have one of the following disease processes/diagnoses(primary or secondary)? Other   TCM attempt successful? No   Unsuccessful attempts Attempt 1          Sravani Lizama LPN    8/29/2022, 15:27 EDT

## 2022-08-29 NOTE — OUTREACH NOTE
Call Center TCM Note    Flowsheet Row Responses   [facility] patient discharged from? Non-BH   Does the patient have one of the following disease processes/diagnoses(primary or secondary)? Other   TCM attempt successful? No   Unsuccessful attempts Attempt 2          Sravani Lizama LPN    8/29/2022, 15:39 EDT

## 2022-08-30 ENCOUNTER — TRANSITIONAL CARE MANAGEMENT TELEPHONE ENCOUNTER (OUTPATIENT)
Dept: CALL CENTER | Facility: HOSPITAL | Age: 77
End: 2022-08-30

## 2022-08-30 NOTE — OUTREACH NOTE
Call Center TCM Note    Flowsheet Row Responses   [facility] patient discharged from? Non-BH   Does the patient have one of the following disease processes/diagnoses(primary or secondary)? Other   TCM attempt successful? No   Unsuccessful attempts Attempt 3          Martha Sandoval MA    8/30/2022, 15:53 EDT

## 2022-09-07 ENCOUNTER — TELEPHONE (OUTPATIENT)
Dept: NEUROSURGERY | Facility: CLINIC | Age: 77
End: 2022-09-07

## 2022-09-07 NOTE — TELEPHONE ENCOUNTER
[facility] received a fax that requires your attention. The document has been indexed to the patient’s chart for your review.      Reason for sending: SIGNATURE NEEDED    Documents Description: PLN OF CARE_[facility]_9.2.22    Name of Sender: [facility]    Date Indexed: 09/07/2022    Notes (if needed): PLEASE SIGN AND FAX BACK

## 2022-09-12 ENCOUNTER — CLINICAL SUPPORT (OUTPATIENT)
Dept: ONCOLOGY | Facility: HOSPITAL | Age: 77
End: 2022-09-12

## 2022-09-12 ENCOUNTER — LAB (OUTPATIENT)
Dept: OTHER | Facility: HOSPITAL | Age: 77
End: 2022-09-12

## 2022-09-12 DIAGNOSIS — D63.1 ANEMIA DUE TO STAGE 3 CHRONIC KIDNEY DISEASE, UNSPECIFIED WHETHER STAGE 3A OR 3B CKD: ICD-10-CM

## 2022-09-12 DIAGNOSIS — Z17.0 MALIGNANT NEOPLASM OF OVERLAPPING SITES OF LEFT BREAST IN FEMALE, ESTROGEN RECEPTOR POSITIVE: ICD-10-CM

## 2022-09-12 DIAGNOSIS — C50.812 MALIGNANT NEOPLASM OF OVERLAPPING SITES OF LEFT BREAST IN FEMALE, ESTROGEN RECEPTOR POSITIVE: ICD-10-CM

## 2022-09-12 DIAGNOSIS — N18.30 ANEMIA DUE TO STAGE 3 CHRONIC KIDNEY DISEASE, UNSPECIFIED WHETHER STAGE 3A OR 3B CKD: ICD-10-CM

## 2022-09-12 LAB
BASOPHILS # BLD AUTO: 0.16 10*3/MM3 (ref 0–0.2)
BASOPHILS NFR BLD AUTO: 1.9 % (ref 0–1.5)
DEPRECATED RDW RBC AUTO: 48.1 FL (ref 37–54)
EOSINOPHIL # BLD AUTO: 0.61 10*3/MM3 (ref 0–0.4)
EOSINOPHIL NFR BLD AUTO: 7.3 % (ref 0.3–6.2)
ERYTHROCYTE [DISTWIDTH] IN BLOOD BY AUTOMATED COUNT: 13.8 % (ref 12.3–15.4)
HCT VFR BLD AUTO: 36 % (ref 34–46.6)
HGB BLD-MCNC: 11.6 G/DL (ref 12–15.9)
IMM GRANULOCYTES # BLD AUTO: 0.08 10*3/MM3 (ref 0–0.05)
IMM GRANULOCYTES NFR BLD AUTO: 1 % (ref 0–0.5)
LYMPHOCYTES # BLD AUTO: 2.12 10*3/MM3 (ref 0.7–3.1)
LYMPHOCYTES NFR BLD AUTO: 25.5 % (ref 19.6–45.3)
MCH RBC QN AUTO: 30.8 PG (ref 26.6–33)
MCHC RBC AUTO-ENTMCNC: 32.2 G/DL (ref 31.5–35.7)
MCV RBC AUTO: 95.5 FL (ref 79–97)
MONOCYTES # BLD AUTO: 0.59 10*3/MM3 (ref 0.1–0.9)
MONOCYTES NFR BLD AUTO: 7.1 % (ref 5–12)
NEUTROPHILS NFR BLD AUTO: 4.75 10*3/MM3 (ref 1.7–7)
NEUTROPHILS NFR BLD AUTO: 57.2 % (ref 42.7–76)
NRBC BLD AUTO-RTO: 1 /100 WBC (ref 0–0.2)
PLATELET # BLD AUTO: 143 10*3/MM3 (ref 140–450)
PMV BLD AUTO: 12.9 FL (ref 6–12)
RBC # BLD AUTO: 3.77 10*6/MM3 (ref 3.77–5.28)
WBC NRBC COR # BLD: 8.31 10*3/MM3 (ref 3.4–10.8)

## 2022-09-12 PROCEDURE — 85025 COMPLETE CBC W/AUTO DIFF WBC: CPT | Performed by: INTERNAL MEDICINE

## 2022-09-12 PROCEDURE — G0463 HOSPITAL OUTPT CLINIC VISIT: HCPCS

## 2022-09-12 PROCEDURE — 36415 COLL VENOUS BLD VENIPUNCTURE: CPT

## 2022-09-12 NOTE — NURSING NOTE
Lab Results   Component Value Date    WBC 8.31 09/12/2022    HGB 11.6 (L) 09/12/2022    HCT 36.0 09/12/2022    MCV 95.5 09/12/2022     09/12/2022     Patient is here for lab review with RN.  CBC reviewed, counts are stable for this patient at this time. Patient has no complaints. Copy of labs given to patient and follow up appointment reviewed. Patient is instructed to call the office with any concerns or new symptoms prior to next visit. Patient verbalized understanding and discharged in stable condition.

## 2022-09-15 ENCOUNTER — OFFICE VISIT (OUTPATIENT)
Dept: INTERNAL MEDICINE | Facility: CLINIC | Age: 77
End: 2022-09-15

## 2022-09-15 VITALS
SYSTOLIC BLOOD PRESSURE: 140 MMHG | WEIGHT: 195 LBS | BODY MASS INDEX: 32.49 KG/M2 | HEART RATE: 79 BPM | OXYGEN SATURATION: 98 % | DIASTOLIC BLOOD PRESSURE: 72 MMHG | TEMPERATURE: 97.8 F | HEIGHT: 65 IN

## 2022-09-15 DIAGNOSIS — I27.82 OTHER CHRONIC PULMONARY EMBOLISM WITHOUT ACUTE COR PULMONALE: Chronic | ICD-10-CM

## 2022-09-15 DIAGNOSIS — E78.5 HYPERLIPIDEMIA, UNSPECIFIED HYPERLIPIDEMIA TYPE: Primary | ICD-10-CM

## 2022-09-15 DIAGNOSIS — I10 ESSENTIAL HYPERTENSION: Chronic | ICD-10-CM

## 2022-09-15 DIAGNOSIS — E03.9 ACQUIRED HYPOTHYROIDISM: ICD-10-CM

## 2022-09-15 PROBLEM — M19.079 ARTHRITIS OF FOOT: Status: RESOLVED | Noted: 2022-06-30 | Resolved: 2022-09-15

## 2022-09-15 PROBLEM — M20.42 HAMMER TOE OF LEFT FOOT: Status: RESOLVED | Noted: 2022-06-30 | Resolved: 2022-09-15

## 2022-09-15 PROCEDURE — 99214 OFFICE O/P EST MOD 30 MIN: CPT | Performed by: INTERNAL MEDICINE

## 2022-09-15 NOTE — PROGRESS NOTES
Subjective   Marylu Foreman is a 77 y.o. female.     History of Present Illness   She had back sugery last month  She had blood clots in both legs  She is now back on a blood thinner  She does have some swelling in the foot on the left  She still cannot dorsiflex at all.  Pt has been taking BP meds as prescribed without any problems.  No HA  No episodes of orthostasis  She is off pain meds    The following portions of the patient's history were reviewed and updated as appropriate: allergies, current medications, past medical history, past social history and problem list.  No tob no etoh  Review of Systems    Objective   Physical Exam  Vitals reviewed.   Constitutional:       Appearance: She is well-developed.   HENT:      Head: Normocephalic and atraumatic.      Right Ear: External ear normal.      Left Ear: External ear normal.   Eyes:      Conjunctiva/sclera: Conjunctivae normal.      Pupils: Pupils are equal, round, and reactive to light.   Neck:      Thyroid: No thyromegaly.      Trachea: No tracheal deviation.   Cardiovascular:      Rate and Rhythm: Normal rate and regular rhythm.      Heart sounds: Normal heart sounds.   Pulmonary:      Effort: Pulmonary effort is normal.      Breath sounds: Normal breath sounds.   Abdominal:      General: Bowel sounds are normal. There is no distension.      Palpations: Abdomen is soft.      Tenderness: There is no abdominal tenderness.   Musculoskeletal:         General: No deformity. Normal range of motion.      Cervical back: Normal range of motion.   Skin:     General: Skin is warm and dry.   Neurological:      Mental Status: She is alert and oriented to person, place, and time.   Psychiatric:         Behavior: Behavior normal.         Thought Content: Thought content normal.         Judgment: Judgment normal.         Vitals:    09/15/22 1300   BP: 140/72   Pulse: 79   Temp: 97.8 °F (36.6 °C)   SpO2: 98%     Body mass index is 32.43 kg/m².      S/p spine sx         Assessment & Plan   Diagnoses and all orders for this visit:    1. Hyperlipidemia, unspecified hyperlipidemia type (Primary)    2. Other chronic pulmonary embolism without acute cor pulmonale (HCC)    3. Essential hypertension    4. Acquired hypothyroidism    1.  Foot drop-  S/p sx  She is getting better but very gradual  She is doing some better  No pain   stillworking with PT  Big fall risk but she is doing better  She goes slowly.    2.  HTN-  Ok with current meds  3.  DVT-  She will cont ton the eloquis  No SOB  4.  Hypothyroidism- ok with current meds  5.  LEft upper lip skin lesion-  Refer to derm             Answers for HPI/ROS submitted by the patient on 9/14/2022  Please describe your symptoms.: Follow up after back surgery for dropfoot.  Have you had these symptoms before?: Yes  How long have you been having these symptoms?: Greater than 2 weeks  Please list any medications you are currently taking for this condition.: None  Please describe any probable cause for these symptoms. : A bone was embedded on the nerve in the back.  What is the primary reason for your visit?: Other

## 2022-09-20 RX ORDER — DILTIAZEM HYDROCHLORIDE 180 MG/1
CAPSULE, COATED, EXTENDED RELEASE ORAL
Qty: 180 CAPSULE | Refills: 3 | Status: SHIPPED | OUTPATIENT
Start: 2022-09-20

## 2022-09-27 ENCOUNTER — TELEPHONE (OUTPATIENT)
Dept: NEUROSURGERY | Facility: CLINIC | Age: 77
End: 2022-09-27

## 2022-09-27 NOTE — TELEPHONE ENCOUNTER
[facility] received a fax that requires your attention. The document has been indexed to the patient’s chart for your review.      Reason for sending: SIGNATURE REQUESTED     Documents Description: LETTER OF MEDICAL NECESSITY_[facility]_9.26.22    Name of Sender: SEGUNDO HELMS    Date Indexed: 09/27/22    Notes (if needed): PLEASE SIGN AND FAX BACK [phone]

## 2022-10-03 ENCOUNTER — HOSPITAL ENCOUNTER (OUTPATIENT)
Dept: MAMMOGRAPHY | Facility: HOSPITAL | Age: 77
Discharge: HOME | End: 2022-10-03
Admitting: INTERNAL MEDICINE

## 2022-10-03 DIAGNOSIS — C50.812 MALIGNANT NEOPLASM OF OVERLAPPING SITES OF LEFT BREAST IN FEMALE, ESTROGEN RECEPTOR POSITIVE: ICD-10-CM

## 2022-10-03 DIAGNOSIS — Z17.0 MALIGNANT NEOPLASM OF OVERLAPPING SITES OF LEFT BREAST IN FEMALE, ESTROGEN RECEPTOR POSITIVE: ICD-10-CM

## 2022-10-03 DIAGNOSIS — N18.30 ANEMIA DUE TO STAGE 3 CHRONIC KIDNEY DISEASE, UNSPECIFIED WHETHER STAGE 3A OR 3B CKD: ICD-10-CM

## 2022-10-03 DIAGNOSIS — Z12.31 ENCOUNTER FOR SCREENING MAMMOGRAM FOR MALIGNANT NEOPLASM OF BREAST: ICD-10-CM

## 2022-10-03 DIAGNOSIS — D63.1 ANEMIA DUE TO STAGE 3 CHRONIC KIDNEY DISEASE, UNSPECIFIED WHETHER STAGE 3A OR 3B CKD: ICD-10-CM

## 2022-10-03 PROCEDURE — 77063 BREAST TOMOSYNTHESIS BI: CPT

## 2022-10-03 PROCEDURE — 77067 SCR MAMMO BI INCL CAD: CPT

## 2022-10-06 DIAGNOSIS — R73.09 ELEVATED GLUCOSE: ICD-10-CM

## 2022-10-06 DIAGNOSIS — I48.19 ATRIAL FIBRILLATION, PERSISTENT: ICD-10-CM

## 2022-10-06 DIAGNOSIS — I10 ESSENTIAL HYPERTENSION: ICD-10-CM

## 2022-10-07 ENCOUNTER — TELEPHONE (OUTPATIENT)
Dept: NEUROSURGERY | Facility: CLINIC | Age: 77
End: 2022-10-07

## 2022-10-07 ENCOUNTER — LAB (OUTPATIENT)
Dept: LAB | Facility: HOSPITAL | Age: 77
End: 2022-10-07

## 2022-10-07 DIAGNOSIS — D50.8 OTHER IRON DEFICIENCY ANEMIA: Primary | ICD-10-CM

## 2022-10-07 DIAGNOSIS — N04.6: ICD-10-CM

## 2022-10-07 LAB
ALBUMIN SERPL-MCNC: 4.4 G/DL (ref 3.5–5.2)
ALBUMIN/GLOB SERPL: 1.4 G/DL
ALP SERPL-CCNC: 128 U/L (ref 39–117)
ALT SERPL W P-5'-P-CCNC: 12 U/L (ref 1–33)
ANION GAP SERPL CALCULATED.3IONS-SCNC: 12.7 MMOL/L (ref 5–15)
AST SERPL-CCNC: 17 U/L (ref 1–32)
BASOPHILS # BLD AUTO: 0.16 10*3/MM3 (ref 0–0.2)
BASOPHILS NFR BLD AUTO: 1.6 % (ref 0–1.5)
BILIRUB SERPL-MCNC: 0.6 MG/DL (ref 0–1.2)
BUN SERPL-MCNC: 26 MG/DL (ref 8–23)
BUN/CREAT SERPL: 19.7 (ref 7–25)
CALCIUM SPEC-SCNC: 9.6 MG/DL (ref 8.6–10.5)
CHLORIDE SERPL-SCNC: 102 MMOL/L (ref 98–107)
CHOLEST SERPL-MCNC: 190 MG/DL (ref 0–200)
CO2 SERPL-SCNC: 24.3 MMOL/L (ref 22–29)
CREAT SERPL-MCNC: 1.32 MG/DL (ref 0.57–1)
DEPRECATED RDW RBC AUTO: 53.2 FL (ref 37–54)
EGFRCR SERPLBLD CKD-EPI 2021: 42.2 ML/MIN/1.73
EOSINOPHIL # BLD AUTO: 0.27 10*3/MM3 (ref 0–0.4)
EOSINOPHIL NFR BLD AUTO: 2.7 % (ref 0.3–6.2)
ERYTHROCYTE [DISTWIDTH] IN BLOOD BY AUTOMATED COUNT: 14.9 % (ref 12.3–15.4)
GLOBULIN UR ELPH-MCNC: 3.1 GM/DL
GLUCOSE SERPL-MCNC: 108 MG/DL (ref 65–99)
HBA1C MFR BLD: 6.1 % (ref 4.8–5.6)
HCT VFR BLD AUTO: 35.5 % (ref 34–46.6)
HDLC SERPL-MCNC: 76 MG/DL (ref 40–60)
HGB BLD-MCNC: 11 G/DL (ref 12–15.9)
IMM GRANULOCYTES # BLD AUTO: 0.02 10*3/MM3 (ref 0–0.05)
IMM GRANULOCYTES NFR BLD AUTO: 0.2 % (ref 0–0.5)
LDLC SERPL CALC-MCNC: 99 MG/DL (ref 0–100)
LDLC/HDLC SERPL: 1.28 {RATIO}
LYMPHOCYTES # BLD AUTO: 3.54 10*3/MM3 (ref 0.7–3.1)
LYMPHOCYTES NFR BLD AUTO: 35.5 % (ref 19.6–45.3)
MCH RBC QN AUTO: 30.1 PG (ref 26.6–33)
MCHC RBC AUTO-ENTMCNC: 31 G/DL (ref 31.5–35.7)
MCV RBC AUTO: 97 FL (ref 79–97)
MONOCYTES # BLD AUTO: 0.94 10*3/MM3 (ref 0.1–0.9)
MONOCYTES NFR BLD AUTO: 9.4 % (ref 5–12)
NEUTROPHILS NFR BLD AUTO: 5.03 10*3/MM3 (ref 1.7–7)
NEUTROPHILS NFR BLD AUTO: 50.6 % (ref 42.7–76)
NRBC BLD AUTO-RTO: 0 /100 WBC (ref 0–0.2)
PLATELET # BLD AUTO: 354 10*3/MM3 (ref 140–450)
PMV BLD AUTO: 10.9 FL (ref 6–12)
POTASSIUM SERPL-SCNC: 4.2 MMOL/L (ref 3.5–5.2)
PROT SERPL-MCNC: 7.5 G/DL (ref 6–8.5)
RBC # BLD AUTO: 3.66 10*6/MM3 (ref 3.77–5.28)
SODIUM SERPL-SCNC: 139 MMOL/L (ref 136–145)
TRIGL SERPL-MCNC: 82 MG/DL (ref 0–150)
TSH SERPL DL<=0.05 MIU/L-ACNC: 1.06 UIU/ML (ref 0.27–4.2)
VLDLC SERPL-MCNC: 15 MG/DL (ref 5–40)
WBC NRBC COR # BLD: 9.96 10*3/MM3 (ref 3.4–10.8)

## 2022-10-07 PROCEDURE — 85025 COMPLETE CBC W/AUTO DIFF WBC: CPT | Performed by: INTERNAL MEDICINE

## 2022-10-07 PROCEDURE — 80053 COMPREHEN METABOLIC PANEL: CPT | Performed by: INTERNAL MEDICINE

## 2022-10-07 PROCEDURE — 84443 ASSAY THYROID STIM HORMONE: CPT | Performed by: INTERNAL MEDICINE

## 2022-10-07 PROCEDURE — 80061 LIPID PANEL: CPT

## 2022-10-07 PROCEDURE — 83036 HEMOGLOBIN GLYCOSYLATED A1C: CPT | Performed by: INTERNAL MEDICINE

## 2022-10-07 PROCEDURE — 36415 COLL VENOUS BLD VENIPUNCTURE: CPT | Performed by: INTERNAL MEDICINE

## 2022-10-07 NOTE — TELEPHONE ENCOUNTER
[facility] received a fax that requires your attention. The document has been indexed to the patient’s chart for your review.      Reason for sending: SIGNATURE REQUEST    Documents Description: PLAN OF CARE_[facility]_10.04.22    Name of Sender: [facility]    Date Indexed: 10/07/22    Notes (if needed): SIGNATURE REQUEST FROM DR RIZO

## 2022-10-11 ENCOUNTER — OFFICE VISIT (OUTPATIENT)
Dept: NEUROSURGERY | Facility: CLINIC | Age: 77
End: 2022-10-11

## 2022-10-11 VITALS
SYSTOLIC BLOOD PRESSURE: 130 MMHG | BODY MASS INDEX: 32.49 KG/M2 | HEART RATE: 87 BPM | HEIGHT: 65 IN | TEMPERATURE: 97.9 F | WEIGHT: 195 LBS | DIASTOLIC BLOOD PRESSURE: 77 MMHG

## 2022-10-11 DIAGNOSIS — Z09 FOLLOW-UP EXAMINATION FOLLOWING SURGERY: Primary | ICD-10-CM

## 2022-10-11 PROCEDURE — 99024 POSTOP FOLLOW-UP VISIT: CPT | Performed by: NEUROLOGICAL SURGERY

## 2022-10-11 NOTE — PROGRESS NOTES
"Subjective   Patient ID: Marylu Foreman is a 77 y.o. female is here today for 6 week PO follow-up. Patient had a Left lumbar 4 to Lumbar 5, Lumbar 5 to sacral 1 laminectomy with metrx on 008/05/2022.     Today patient states that she has low back pain, and foot pain/swelling.  Patient is also having numbness in the L foot.     Patient, Provider, and MA are all wearing a mask in our office today.     History of Present Illness     This patient returns today.  She continues with some swelling and weakness in her left foot primarily.  She still has some pain in her back as well.    The following portions of the patient's history were reviewed and updated as appropriate: allergies, current medications, past family history, past medical history, past social history, past surgical history and problem list.    Review of Systems   Constitutional: Negative for chills and fever.   HENT: Negative for congestion.    Genitourinary: Negative for difficulty urinating and dysuria.   Musculoskeletal: Positive for back pain, gait problem and myalgias.   Neurological: Positive for weakness and numbness.       I have reviewed the review of systems as documented by my MA.      Objective     Vitals:    10/11/22 1139   BP: 130/77   Cuff Size: Adult   Pulse: 87   Temp: 97.9 °F (36.6 °C)   Weight: 88.5 kg (195 lb)   Height: 165.2 cm (65.02\")     Body mass index is 32.43 kg/m².      Physical Exam  Neurological:      Mental Status: She is alert and oriented to person, place, and time.       Neurologic Exam     Mental Status   Oriented to person, place, and time.           Assessment & Plan   Independent Review of Radiographic Studies:      I personally reviewed the images from the following studies.    There is no new imaging to review    Medical Decision Making:      I told the patient that she needs to stick with the therapy and be as active as possible.  I told her to call me if anything happens otherwise we will check her 1 more time in " about 3 months.  I did tell her it was too early to say that the nerve will not recover but I do not think that there is anything else we can or need to do to make it get better.    Diagnoses and all orders for this visit:    1. Follow-up examination following surgery (Primary)      Return in about 3 months (around 1/11/2023).         Answers for HPI/ROS submitted by the patient on 10/5/2022  Please describe your symptoms.: Surgery follow-up  Have you had these symptoms before?: Yes  How long have you been having these symptoms?: Greater than 2 weeks  Please list any medications you are currently taking for this condition.: None  Please describe any probable cause for these symptoms. : Footdrop  What is the primary reason for your visit?: Other

## 2022-10-12 ENCOUNTER — HOSPITAL ENCOUNTER (OUTPATIENT)
Dept: MRI IMAGING | Facility: HOSPITAL | Age: 77
Discharge: HOME | End: 2022-10-12
Admitting: ORTHOPAEDIC SURGERY

## 2022-10-12 DIAGNOSIS — M19.072 ARTHRITIS OF FIRST METATARSOPHALANGEAL (MTP) JOINT OF LEFT FOOT: ICD-10-CM

## 2022-10-12 DIAGNOSIS — M20.12 HALLUX VALGUS OF LEFT FOOT: ICD-10-CM

## 2022-10-12 DIAGNOSIS — M19.079 ARTHRITIS OF FOOT: ICD-10-CM

## 2022-10-12 PROCEDURE — 73718 MRI LOWER EXTREMITY W/O DYE: CPT

## 2022-10-14 ENCOUNTER — OFFICE VISIT (OUTPATIENT)
Dept: INTERNAL MEDICINE | Facility: CLINIC | Age: 77
End: 2022-10-14

## 2022-10-14 VITALS
SYSTOLIC BLOOD PRESSURE: 148 MMHG | TEMPERATURE: 96.9 F | BODY MASS INDEX: 32.65 KG/M2 | WEIGHT: 196 LBS | DIASTOLIC BLOOD PRESSURE: 80 MMHG | HEART RATE: 109 BPM | HEIGHT: 65 IN | OXYGEN SATURATION: 99 %

## 2022-10-14 DIAGNOSIS — I10 ESSENTIAL HYPERTENSION: Chronic | ICD-10-CM

## 2022-10-14 DIAGNOSIS — E78.5 HYPERLIPIDEMIA, UNSPECIFIED HYPERLIPIDEMIA TYPE: ICD-10-CM

## 2022-10-14 DIAGNOSIS — I82.403 ACUTE DEEP VEIN THROMBOSIS (DVT) OF BOTH LOWER EXTREMITIES, UNSPECIFIED VEIN: ICD-10-CM

## 2022-10-14 DIAGNOSIS — R73.09 ELEVATED GLUCOSE: ICD-10-CM

## 2022-10-14 DIAGNOSIS — N18.32 STAGE 3B CHRONIC KIDNEY DISEASE: Primary | Chronic | ICD-10-CM

## 2022-10-14 DIAGNOSIS — R52 UNCONTROLLED PAIN: ICD-10-CM

## 2022-10-14 PROCEDURE — 99214 OFFICE O/P EST MOD 30 MIN: CPT | Performed by: INTERNAL MEDICINE

## 2022-10-14 RX ORDER — GABAPENTIN 100 MG/1
200 CAPSULE ORAL NIGHTLY
Qty: 60 CAPSULE | Refills: 2 | Status: SHIPPED | OUTPATIENT
Start: 2022-10-14

## 2022-10-14 NOTE — PROGRESS NOTES
Subjective   Marylu Foreman is a 77 y.o. female.   [place]   History of Present Illness   She has been cnt to have neuropathy in the left foot  She laso has drop foot and has been getting PT  With the PT she is having some more pain and wants to go back on some gabapentin at night  Sx was agust 5th  Recovery expected to take a year  Pt has been doing well with thyroid meds.  Taking as perscribed without any complications  Pt has been taking BP meds as prescribed without any problems.  No HA  No episodes of orthostasis  No sig brusing or bleeding with blood clots    The following portions of the patient's history were reviewed and updated as appropriate: allergies, current medications, past medical history, past social history and problem list.  No tob no etoh  Review of Systems    Objective   Physical Exam  Vitals reviewed.   Constitutional:       Appearance: She is well-developed.   HENT:      Head: Normocephalic and atraumatic.      Right Ear: External ear normal.      Left Ear: External ear normal.   Eyes:      Conjunctiva/sclera: Conjunctivae normal.      Pupils: Pupils are equal, round, and reactive to light.   Neck:      Thyroid: No thyromegaly.      Trachea: No tracheal deviation.   Cardiovascular:      Rate and Rhythm: Normal rate and regular rhythm.      Heart sounds: Normal heart sounds.   Pulmonary:      Effort: Pulmonary effort is normal.      Breath sounds: Normal breath sounds.   Abdominal:      General: Bowel sounds are normal. There is no distension.      Palpations: Abdomen is soft.      Tenderness: There is no abdominal tenderness.   Musculoskeletal:         General: No deformity. Normal range of motion.      Cervical back: Normal range of motion.   Skin:     General: Skin is warm and dry.   Neurological:      Mental Status: She is alert and oriented to person, place, and time.   Psychiatric:         Behavior: Behavior normal.         Thought Content: Thought content normal.         Judgment:  Judgment normal.         Vitals:    10/14/22 1122   BP: 148/80   Pulse: 109   Temp: 96.9 °F (36.1 °C)   SpO2: 99%     Body mass index is 32.58 kg/m².         Assessment & Plan   Diagnoses and all orders for this visit:    1. Stage 3b chronic kidney disease (HCC) (Primary)    2. Acute deep vein thrombosis (DVT) of both lower extremities, unspecified vein (HCC)    3. Hyperlipidemia, unspecified hyperlipidemia type    4. Essential hypertension    5. Uncontrolled pain  -     gabapentin (NEURONTIN) 100 MG capsule; Take 2 capsules by mouth Every Night.  Dispense: 60 capsule; Refill: 2    1.  Chronic dvt-  On eloquis for life  2.  HPL_  Much better  Seeing ortho  3.  CRI- stable  4.  ELevated gluc- better today  5.  Neuropathy-  Will ad back the gabapentin             Answers for HPI/ROS submitted by the patient on 10/13/2022  What is the primary reason for your visit?: Neurological Problem

## 2022-10-17 ENCOUNTER — OFFICE VISIT (OUTPATIENT)
Dept: SLEEP MEDICINE | Facility: HOSPITAL | Age: 77
End: 2022-10-17

## 2022-10-17 VITALS
BODY MASS INDEX: 33.15 KG/M2 | DIASTOLIC BLOOD PRESSURE: 73 MMHG | HEIGHT: 65 IN | HEART RATE: 91 BPM | WEIGHT: 199 LBS | OXYGEN SATURATION: 98 % | SYSTOLIC BLOOD PRESSURE: 149 MMHG

## 2022-10-17 DIAGNOSIS — G47.33 OSA (OBSTRUCTIVE SLEEP APNEA): Primary | ICD-10-CM

## 2022-10-17 DIAGNOSIS — E66.9 OBESITY (BMI 30-39.9): ICD-10-CM

## 2022-10-17 PROCEDURE — G0463 HOSPITAL OUTPT CLINIC VISIT: HCPCS
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
## 2022-10-20 ENCOUNTER — OFFICE VISIT (OUTPATIENT)
Dept: ORTHOPEDIC SURGERY | Facility: CLINIC | Age: 77
End: 2022-10-20

## 2022-10-20 VITALS — HEIGHT: 65 IN | WEIGHT: 197 LBS | TEMPERATURE: 97.4 F | BODY MASS INDEX: 32.82 KG/M2

## 2022-10-20 DIAGNOSIS — M19.079 ARTHRITIS OF FOOT: ICD-10-CM

## 2022-10-20 DIAGNOSIS — M20.12 HALLUX VALGUS OF LEFT FOOT: Primary | ICD-10-CM

## 2022-10-20 DIAGNOSIS — M20.42 HAMMER TOE OF LEFT FOOT: ICD-10-CM

## 2022-10-20 DIAGNOSIS — M19.072 ARTHRITIS OF FIRST METATARSOPHALANGEAL (MTP) JOINT OF LEFT FOOT: ICD-10-CM

## 2022-10-20 DIAGNOSIS — M21.372 LEFT FOOT DROP: ICD-10-CM

## 2022-10-20 PROCEDURE — 99213 OFFICE O/P EST LOW 20 MIN: CPT | Performed by: ORTHOPAEDIC SURGERY
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
## 2022-11-07 ENCOUNTER — OFFICE VISIT (OUTPATIENT)
Dept: INTERNAL MEDICINE | Facility: CLINIC | Age: 77
End: 2022-11-07

## 2022-11-07 VITALS
BODY MASS INDEX: 32.97 KG/M2 | HEART RATE: 90 BPM | DIASTOLIC BLOOD PRESSURE: 78 MMHG | OXYGEN SATURATION: 98 % | TEMPERATURE: 97.2 F | WEIGHT: 198.1 LBS | SYSTOLIC BLOOD PRESSURE: 130 MMHG | RESPIRATION RATE: 18 BRPM

## 2022-11-07 DIAGNOSIS — R55 POSTURAL DIZZINESS WITH PRESYNCOPE: Primary | ICD-10-CM

## 2022-11-07 DIAGNOSIS — R42 POSTURAL DIZZINESS WITH PRESYNCOPE: Primary | ICD-10-CM

## 2022-11-07 PROCEDURE — 99214 OFFICE O/P EST MOD 30 MIN: CPT | Performed by: INTERNAL MEDICINE

## 2022-11-07 NOTE — PROGRESS NOTES
Subjective   Marylu Foreman is a 77 y.o. female.   She has been having  More problerm with balance and feeling nlike she is going to fall      History of Present Illness   She s starting to gettng some burning burning back in the foot  She has had no sensation for a while.  Physical therapy was also concerned that she was seeming off balance after she was laying down doing her exercises there.  Patient says that she has been laying down a while and she stands or sits she does feel off balance.  This coupled with the numbness in her feet makes her concern for falling.  She denies any palpitations no shortness of breath.  No chest    The following portions of the patient's history were reviewed and updated as appropriate: allergies, current medications, past medical history, past social history and problem list.  Patient denies any recent changes in her medications and is following with cardiology on her A. fib  Review of Systems    Objective   Physical Exam  Vitals reviewed.   Constitutional:       Appearance: She is well-developed.   HENT:      Head: Normocephalic and atraumatic.      Right Ear: External ear normal.      Left Ear: External ear normal.   Eyes:      Conjunctiva/sclera: Conjunctivae normal.      Pupils: Pupils are equal, round, and reactive to light.   Neck:      Thyroid: No thyromegaly.      Trachea: No tracheal deviation.   Cardiovascular:      Rate and Rhythm: Normal rate and regular rhythm.      Heart sounds: Normal heart sounds.   Pulmonary:      Effort: Pulmonary effort is normal.      Breath sounds: Normal breath sounds.   Abdominal:      General: Bowel sounds are normal. There is no distension.      Palpations: Abdomen is soft.      Tenderness: There is no abdominal tenderness.   Musculoskeletal:         General: No deformity. Normal range of motion.      Cervical back: Normal range of motion.   Skin:     General: Skin is warm and dry.   Neurological:      Mental Status: She is alert and  oriented to person, place, and time.   Psychiatric:         Behavior: Behavior normal.         Thought Content: Thought content normal.         Judgment: Judgment normal.         Vitals:    11/07/22 1013   BP: 130/78   Pulse: 90   Resp: 18   Temp: 97.2 °F (36.2 °C)   SpO2: 98%     Body mass index is 32.97 kg/m².         Assessment & Plan   Diagnoses and all orders for this visit:    1. Postural dizziness with presyncope (Primary)  -     Vascular Screening (Carotid) CAR      1.  Orthostasis-her lightheadedness really sounds orthostatic and is likely exacerbated by her medications in particular her Cardizem.  She is now on 2 of those a day to keep her A. fib under control but we discussed this at length and explained how it will definitely make the orthostatic symptoms worse.  She is going to be very cautious when going from laying to sitting and then sitting to standing.  She is also going to make sure she stays well-hydrated.  She will address this further with cardiology.  We will also go ahead and check carotid Dopplers as she has not had those checked in a while  2.  Ataxia and imbalance-patient's neuropathy and orthostasis are making her a fall risk.  She has had a couple of falls.  She does feel off balance frequently.  I really think she would benefit from working with physical therapy on this.  We also discussed some simple exercises to work on at home.  This is all of utmost concern because she is on a blood thinner

## 2022-11-11 ENCOUNTER — TELEPHONE (OUTPATIENT)
Dept: NEUROSURGERY | Facility: CLINIC | Age: 77
End: 2022-11-11

## 2022-11-11 NOTE — TELEPHONE ENCOUNTER
The [facility] received a fax that requires your attention. The document has been indexed to the patient’s chart for your review.      Reason for sending: SIGNATURE REQUEST    Documents Description: PLAN OF CARE_[facility]_11.03.22    Name of Sender: [facility]    Date Indexed: 11.11.22    Notes (if needed):

## 2022-12-02 NOTE — PROGRESS NOTES
Chief Complaint  Stage I ([id number]) left breast cancer in 2007, pulmonary emboli in 2009, atrial fibrillation, history of GI bleed, history of iron deficiency status post prior gastric bypass in 2001    Subjective        History of Present Illness  Patient returns today in follow-up with laboratory studies to review.  The patient has had multiple difficulties since her last visit here.  She was experiencing foot drop when she was last seen and was subsequently evaluated by neurosurgery.  She eventually underwent surgery with lumbar laminectomy on 8/9/2022.  She did hold Eliquis prior to and following the procedure.  She was admitted 8/10 - 8/12/2022 with identification of acute bilateral calf DVT.  This was felt to be related to surgery and being off anticoagulation it was recommended for her to resume Eliquis.  There is some discrepancy regarding her dose.  She had previously been receiving Eliquis at 2.5 mg twice daily for atrial fibrillation.  At some point this dose has been increased up to 5 mg twice daily however patient feels that she has been continuing only on the 2.5 mg twice daily dose.  She is still unsure what dose she is currently taking and will check this when she gets home, believes that she is still only taking the 2.5 mg twice daily dose.  She has not had any further difficulty in terms of symptomatic DVT.  She has however had ongoing difficulty with left foot drop that persists.  She has started to develop some slight sensation in her toes since her spine surgery.  She has been following up with a brace company in regards to her foot drop and notes that she is going to be receiving a new brace soon.  She has developed a slight ulceration on her left great toe.  In the interim since her last visit she did undergo annual mammogram on the right side 10/3/2022 which was negative, BI-RADS 1.      Objective   Vital Signs:   /75   Pulse 88   Temp 97.3 °F (36.3 °C) (Temporal)   Resp 18   Ht  "165.1 cm (65\")   Wt 87.4 kg (192 lb 9.6 oz)   SpO2 99%   BMI 32.05 kg/m²     Physical Exam  Constitutional:       Appearance: She is well-developed.   Eyes:      Conjunctiva/sclera: Conjunctivae normal.   Neck:      Thyroid: No thyromegaly.   Cardiovascular:      Rate and Rhythm: Normal rate. Rhythm irregular.      Heart sounds: Murmur heard.     No friction rub. No gallop.   Pulmonary:      Effort: No respiratory distress.      Breath sounds: Normal breath sounds. No wheezing.      Comments: Status post left mastectomy, chest wall without masses or adenitis palpated.  Right breast without masses or abnormalities palpated.  Abdominal:      General: Bowel sounds are normal. There is no distension.      Palpations: Abdomen is soft.      Tenderness: There is no abdominal tenderness.   Lymphadenopathy:      Head:      Right side of head: No submandibular adenopathy.      Cervical: No cervical adenopathy.      Upper Body:      Right upper body: No supraclavicular adenopathy.      Left upper body: No supraclavicular adenopathy.   Skin:     General: Skin is warm and dry.      Findings: No rash.      Comments: There is a small ulceration in the lateral aspect of the left great toe   Neurological:      Mental Status: She is alert and oriented to person, place, and time.      Cranial Nerves: No cranial nerve deficit.      Motor: No abnormal muscle tone.      Deep Tendon Reflexes: Reflexes normal.      Comments: Patient with continued left foot drop   Psychiatric:         Behavior: Behavior normal.            Result Review : Reviewed CBC, CMP, iron panel, ferritin from today.  Reviewed multiple records including emergency department records, records from PCP visits, plain film lumbar spine 5/10/2022, MRI lumbar spine 5/14/2022, lower extremity Doppler 4/23/2022.       Assessment and Plan     1. Stage I ([id number]) left breast cancer:  · Biopsy 5/8/2007 with in situ and invasive ductal carcinoma, grade 2,/AK positive, " HER-2/jorge negative  · Left mastectomy 6/18/0742 foci carcinoma, 4 mm, grade 1 in situ and invasive ductal carcinoma and 2 mm grade 2 in situ and invasive ductal carcinoma, negative margins, negative sentinel lymph nodes x3 with 5 additional negative lymph nodes.  · Arimidex initiated 7/2/2007  · Arimidex interrupted patient developed bilateral pulmonary emboli in May 2009, resume shortly thereafter.  Completed 5 years treatment in 2012.  · Patient today with no clinical evidence of recurrent disease.  Exam today was negative.  She underwent right-sided mammogram recently on 10/3/2022 which was negative, BI-RADS 1.  2. Pulmonary emboli 5/2/2009 and bilateral calf DVT 8/11/2022:  · Family history of DVT in the patient's mother occurring at age 70 postoperatively  · Patient developed bilateral lower lobe pulmonary emboli 5/2/2009 following right VATS on 4/30/2009  · Felt to be a provoked thrombosis related to surgery  · Hypercoagulable evaluation with negative factor V Leiden, negative prothrombin gene mutation, normal homocystine, protein C antigen 92, free to protein S 75, anticardiolipin antibody panel negative, lupus anticoagulant negative, Antithrombin %  · Continuing on chronic anticoagulation primarily for atrial fibrillation at this point as prior thrombosis was provoked.  · Transitioned from Coumadin to Eliquis 5 mg twice daily in May 2020  · At time of hospitalization for IJEOMA/CKD 11/15-11/18/2021, Eliquis dose was decreased to 2.5 mg twice daily.  · Patient required lumbar laminectomy 8/9/2022, Eliquis was held perioperatively.  Subsequently admitted 8/10 - 8/12/2022 with Doppler 8/11/2022 that showed bilateral acute calf DVT.  This occurred postoperatively and while off anticoagulation.  Recommended to resume Eliquis at 5 mg twice daily dose.  · Patient returns today in follow-up continuing on Eliquis.  She cannot recall whether she has been taking 2.5 mg twice daily or 5 mg twice daily as intended  with her recent bilateral calf DVT.  I have asked her to check on this at home and notify us later today what dose she has been receiving and if needed we can send in a new prescription for the 5 mg twice daily dose which she should be receiving.  She will require long-term anticoagulation for both thrombophilia and atrial fibrillation.  3. Iron deficiency anemia:  · Prior gastric bypass in 2001  · Intolerant of oral iron.  · Patient has required IV iron on multiple occasions: Feraheme 8/13 and 8/20/2018; Injectafer 3/9 and 3/16/2020  · Labs on 11/15/2021 showed decline in hemoglobin to 10.8 however this was in the setting of UTI and IJEOMA/CKD3.  Her iron panel was normal with iron 114, iron saturation 34%, TIBC 337 with ferritin elevated at 292.  B12 was normal at 883.  We continue to follow these values given her history of prior gastric bypass and iron deficiency requiring intermittent IV iron (intolerant of oral iron).   · On 12/20/2021, hemoglobin further declined to 9.5.  Repeat labs with iron 47, ferritin 262, iron saturation 14%, TIBC 328, B12 level 1162.  Additional labs performed with negative serum protein electrophoresis and immunoelectrophoresis with normal quantitative immunoglobulins.  Free light chains elevated with kappa 73, lambda 36 with ratio 2.04, consistent with patient's degree of renal dysfunction.  Haptoglobin normal 186, LDH normal 171, CRP borderline elevated 0.62, erythropoietin level 20.1.  Anemia felt to be related to CKD3.  Patient felt to be a potential candidate for Procrit if hemoglobin remains below 10.  · Patient experienced improvement in hemoglobin into the 11-12 range  · Today, labs show hemoglobin that is improved up to 12.7 and iron studies that show iron replete status however his ferritin has decreased to 88.2, iron 109, iron saturation 26%, TIBC 425.  We will repeat at 6-month interval when she returns for follow-up.  7. GI bleed in July 2018:  · Acute lower GI bleed with  supratherapeutic INR Coumadin.  · Angiogram performed with coil embolization of artery to sigmoid colon  · No clinical evidence of further GI blood loss  8. Atrial fibrillation:  · Requires ongoing anticoagulation for this indication  · As above, transitioned from Coumadin to Eliquis 5 mg twice daily in May 2020  · As above, during hospitalization 11/15-11/18/2021 for IJEOMA/CKD3, Eliquis dose was decreased to 2.5 mg twice daily  · See above discussion, patient developed bilateral calf DVT while briefly off anticoagulation following lumbar laminectomy on 8/11/2022.  Patient is to be receiving full dose Eliquis 5 mg twice daily.  9. Status post right VATS 4/30/2020 for pulmonary nodule with finding of necrotizing granulomatous inflammation  10. Severe depression/anxiety:  · Patient has had chronic issues with this, is followed by psychiatry, Dr. Librado Monique.  She also underwent previous counseling which she found helpful.  · The patient had ongoing difficulty with control of her depression.  She has been on multiple antidepressants  · In June/July 2021 patient underwent transcranial magnetic stimulation (TMS) with significant improvement in depressive symptoms and resolution of headaches.  Symptoms subsequently gradually recurred, patient reports there has been difficulty with insurance regarding maintenance treatments.  · Patient reports symptoms have been stable recently  11. IJEOMA/CKD3  · Patient with CKD3 with baseline creatinine in the 1.3-1.8 range  · At time of routine follow-up visit 11/15/2021, creatinine was 3.19 and BUN of 59.  This was compared to prior value 6/2/2021 with BUN 41, creatinine 1.53.  · Patient was hospitalized 11/15-11/18/2021  · Patient is continuing follow-up with Dr. Cisneros in nephrology  · Creatinine on 11/24/2021 remained elevated at 2.43 however on 12/20/2021 declined to 1.7, near her prior baseline.  · Additional labs on 12/20/2021 with negative serum protein electrophoresis and  immunoelectrophoresis with normal quantitative immunoglobulins.  Free light chains elevated with kappa 73, lambda 36 with ratio 2.04, consistent with patient's degree of renal dysfunction.  · Creatinine today stable at 1.41  12. ESBL E. coli UTI  · Urine culture positive on 11/6/2021 in urgent care, patient treated with 7 days nitrofurantoin (was sensitive on culture result)  · Patient with recurrent symptoms, was treated during recent hospitalization 11/15-11/18/2021 with Levaquin  13. Hypothyroidism   · Patient continues on levothyroxine 50 mcg daily  · Patient developed cold sensitivity, labs on 12/20/2021 with TSH 2.03 and free T4 1.07.  14. Severe left-sided sciatica with left foot drop  · Plain film of the lumbar spine on 5/10/2022 was negative.    · MRI lumbar spine at [facility] on 5/14/2022 showed no evidence of metastatic disease however did show evidence of degenerative change with disc disease and neuroforaminal compromise.     · She developed a left foot drop and was referred to neurosurgery, eventually underwent lumbar laminectomy on 8/9/2022  · Patient continues with left foot drop, is regaining some sensation however in her left foot.  She has developed a small ulceration on her left great toe and we will contact orthopedics regarding potential referral for shoe prosthesis.  She does continue with brace in place.      Plan:  1. Patient will notify us of current dose of Eliquis (2.5 versus 5 mg) twice daily.  She needs to be on the 5 mg twice daily dose and we will send a new prescription if needed.  2. We will investigate with patient's orthopedist where she can be referred to address her shoes and left great toe pressure ulceration in the setting of ongoing left foot drop  3. MD visit in 6 months with CBC, CMP, stat iron panel/ferritin.

## 2022-12-05 ENCOUNTER — LAB (OUTPATIENT)
Dept: OTHER | Facility: HOSPITAL | Age: 77
End: 2022-12-05

## 2022-12-05 ENCOUNTER — OFFICE VISIT (OUTPATIENT)
Dept: ONCOLOGY | Facility: CLINIC | Age: 77
End: 2022-12-05

## 2022-12-05 VITALS
HEART RATE: 88 BPM | TEMPERATURE: 97.3 F | OXYGEN SATURATION: 99 % | SYSTOLIC BLOOD PRESSURE: 117 MMHG | RESPIRATION RATE: 18 BRPM | HEIGHT: 65 IN | BODY MASS INDEX: 32.09 KG/M2 | WEIGHT: 192.6 LBS | DIASTOLIC BLOOD PRESSURE: 75 MMHG

## 2022-12-05 DIAGNOSIS — C50.812 MALIGNANT NEOPLASM OF OVERLAPPING SITES OF LEFT BREAST IN FEMALE, ESTROGEN RECEPTOR POSITIVE: Primary | ICD-10-CM

## 2022-12-05 DIAGNOSIS — N18.30 ANEMIA DUE TO STAGE 3 CHRONIC KIDNEY DISEASE, UNSPECIFIED WHETHER STAGE 3A OR 3B CKD: ICD-10-CM

## 2022-12-05 DIAGNOSIS — Z17.0 MALIGNANT NEOPLASM OF OVERLAPPING SITES OF LEFT BREAST IN FEMALE, ESTROGEN RECEPTOR POSITIVE: ICD-10-CM

## 2022-12-05 DIAGNOSIS — C50.812 MALIGNANT NEOPLASM OF OVERLAPPING SITES OF LEFT BREAST IN FEMALE, ESTROGEN RECEPTOR POSITIVE: ICD-10-CM

## 2022-12-05 DIAGNOSIS — D63.1 ANEMIA DUE TO STAGE 3 CHRONIC KIDNEY DISEASE, UNSPECIFIED WHETHER STAGE 3A OR 3B CKD: ICD-10-CM

## 2022-12-05 DIAGNOSIS — D50.8 OTHER IRON DEFICIENCY ANEMIA: ICD-10-CM

## 2022-12-05 DIAGNOSIS — Z17.0 MALIGNANT NEOPLASM OF OVERLAPPING SITES OF LEFT BREAST IN FEMALE, ESTROGEN RECEPTOR POSITIVE: Primary | ICD-10-CM

## 2022-12-05 LAB
ALBUMIN SERPL-MCNC: 4.2 G/DL (ref 3.5–5.2)
ALBUMIN/GLOB SERPL: 1.4 G/DL
ALP SERPL-CCNC: 129 U/L (ref 39–117)
ALT SERPL W P-5'-P-CCNC: 15 U/L (ref 1–33)
ANION GAP SERPL CALCULATED.3IONS-SCNC: 14.5 MMOL/L (ref 5–15)
AST SERPL-CCNC: 19 U/L (ref 1–32)
BASOPHILS # BLD AUTO: 0.15 10*3/MM3 (ref 0–0.2)
BASOPHILS NFR BLD AUTO: 1.4 % (ref 0–1.5)
BILIRUB SERPL-MCNC: 0.5 MG/DL (ref 0–1.2)
BUN SERPL-MCNC: 37 MG/DL (ref 8–23)
BUN/CREAT SERPL: 26.2 (ref 7–25)
CALCIUM SPEC-SCNC: 9.9 MG/DL (ref 8.6–10.5)
CHLORIDE SERPL-SCNC: 106 MMOL/L (ref 98–107)
CO2 SERPL-SCNC: 20.5 MMOL/L (ref 22–29)
CREAT SERPL-MCNC: 1.41 MG/DL (ref 0.57–1)
DEPRECATED RDW RBC AUTO: 48.9 FL (ref 37–54)
EGFRCR SERPLBLD CKD-EPI 2021: 39 ML/MIN/1.73
EOSINOPHIL # BLD AUTO: 0.28 10*3/MM3 (ref 0–0.4)
EOSINOPHIL NFR BLD AUTO: 2.6 % (ref 0.3–6.2)
ERYTHROCYTE [DISTWIDTH] IN BLOOD BY AUTOMATED COUNT: 14.2 % (ref 12.3–15.4)
FERRITIN SERPL-MCNC: 88.2 NG/ML (ref 13–150)
GLOBULIN UR ELPH-MCNC: 3.1 GM/DL
GLUCOSE SERPL-MCNC: 164 MG/DL (ref 65–99)
HCT VFR BLD AUTO: 39.9 % (ref 34–46.6)
HGB BLD-MCNC: 12.7 G/DL (ref 12–15.9)
IMM GRANULOCYTES # BLD AUTO: 0.02 10*3/MM3 (ref 0–0.05)
IMM GRANULOCYTES NFR BLD AUTO: 0.2 % (ref 0–0.5)
IRON 24H UR-MRATE: 109 MCG/DL (ref 37–145)
IRON SATN MFR SERPL: 26 % (ref 20–50)
LYMPHOCYTES # BLD AUTO: 3.44 10*3/MM3 (ref 0.7–3.1)
LYMPHOCYTES NFR BLD AUTO: 31.9 % (ref 19.6–45.3)
MCH RBC QN AUTO: 30 PG (ref 26.6–33)
MCHC RBC AUTO-ENTMCNC: 31.8 G/DL (ref 31.5–35.7)
MCV RBC AUTO: 94.3 FL (ref 79–97)
MONOCYTES # BLD AUTO: 0.83 10*3/MM3 (ref 0.1–0.9)
MONOCYTES NFR BLD AUTO: 7.7 % (ref 5–12)
NEUTROPHILS NFR BLD AUTO: 56.2 % (ref 42.7–76)
NEUTROPHILS NFR BLD AUTO: 6.05 10*3/MM3 (ref 1.7–7)
NRBC BLD AUTO-RTO: 0 /100 WBC (ref 0–0.2)
PLATELET # BLD AUTO: 393 10*3/MM3 (ref 140–450)
PMV BLD AUTO: 10.5 FL (ref 6–12)
POTASSIUM SERPL-SCNC: 4 MMOL/L (ref 3.5–5.2)
PROT SERPL-MCNC: 7.3 G/DL (ref 6–8.5)
RBC # BLD AUTO: 4.23 10*6/MM3 (ref 3.77–5.28)
SODIUM SERPL-SCNC: 141 MMOL/L (ref 136–145)
TIBC SERPL-MCNC: 425 MCG/DL (ref 298–536)
TRANSFERRIN SERPL-MCNC: 285 MG/DL (ref 200–360)
WBC NRBC COR # BLD: 10.77 10*3/MM3 (ref 3.4–10.8)

## 2022-12-05 PROCEDURE — 83540 ASSAY OF IRON: CPT | Performed by: INTERNAL MEDICINE

## 2022-12-05 PROCEDURE — 36415 COLL VENOUS BLD VENIPUNCTURE: CPT

## 2022-12-05 PROCEDURE — 99214 OFFICE O/P EST MOD 30 MIN: CPT | Performed by: INTERNAL MEDICINE

## 2022-12-05 PROCEDURE — 82728 ASSAY OF FERRITIN: CPT | Performed by: INTERNAL MEDICINE

## 2022-12-05 PROCEDURE — 84466 ASSAY OF TRANSFERRIN: CPT | Performed by: INTERNAL MEDICINE

## 2022-12-05 PROCEDURE — 85025 COMPLETE CBC W/AUTO DIFF WBC: CPT | Performed by: INTERNAL MEDICINE

## 2022-12-05 PROCEDURE — 80053 COMPREHEN METABOLIC PANEL: CPT | Performed by: INTERNAL MEDICINE

## 2022-12-05 NOTE — TELEPHONE ENCOUNTER
Caller: EDNA ALVARADO     Best call back number: [phone]    Requested Prescriptions:   Requested Prescriptions     Pending Prescriptions Disp Refills   • apixaban (ELIQUIS) 5 MG tablet tablet 180 tablet 3     Sig: Take 1 tablet by mouth Every 12 (Twelve) Hours. Indications: Atrial Fibrillation        Pharmacy where request should be sent: [facility] - [address] [phone] [facility] [phone] [facility]     Additional details provided by patient: PT WANTED TO MAKE SURE THE SCRIPT SHOWS 5MG TWICE A DAY.    Does the patient have less than a 3 day supply:  [] Yes  [x] No    Would you like a call back once the refill request has been completed: [x] Yes [] No    If the office needs to give you a call back, can they leave a voicemail: [x] Yes [] No

## 2022-12-19 RX ORDER — IRBESARTAN 300 MG/1
TABLET ORAL
Qty: 90 TABLET | Refills: 3 | Status: SHIPPED | OUTPATIENT
Start: 2022-12-19

## 2023-01-11 NOTE — PROGRESS NOTES
"Subjective   Patient ID: Marylu Foreman is a 77 y.o. female is here today for 3 month follow-up.    Today patient states that she has L foot pain    Patient, Provider, and MA are all wearing a mask in our office today    History of Present Illness     This patient returns today.  She has some pain in the toes of her left foot.  She has no radiating pain at all.  She has a lot of swelling in her left foot.  She still has a foot drop.    The following portions of the patient's history were reviewed and updated as appropriate: allergies, current medications, past family history, past medical history, past social history, past surgical history and problem list.    Review of Systems   Constitutional: Negative for chills and fever.   HENT: Negative for congestion.    Genitourinary: Negative for difficulty urinating, dysuria and hematuria.   Musculoskeletal: Positive for gait problem and myalgias. Negative for back pain.        L foot pain   Neurological: Positive for weakness and numbness.       I have reviewed the review of systems as documented by my MA.      Objective     Vitals:    01/12/23 1159   BP: 132/80   Cuff Size: Adult   Pulse: 99   Temp: 96.9 °F (36.1 °C)   SpO2: 100%   Weight: 87.4 kg (192 lb 9.6 oz)   Height: 165.1 cm (65\")     Body mass index is 32.05 kg/m².    Tobacco Use: Low Risk    • Smoking Tobacco Use: Never   • Smokeless Tobacco Use: Never   • Passive Exposure: Not on file          Physical Exam  Neurological:      Mental Status: She is alert and oriented to person, place, and time.       Neurologic Exam     Mental Status   Oriented to person, place, and time.           Assessment & Plan   Independent Review of Radiographic Studies:      I personally reviewed the images from the following studies.    There is no new imaging to review    Medical Decision Making:      The patient plans to see a foot and ankle specialist next week.  Think that is a good idea.  I told her I do not really need to see her " back unless there is an issue and if there is she is to call.  She says she will.    Diagnoses and all orders for this visit:    1. Left foot drop (Primary)      Return if symptoms worsen or fail to improve.

## 2023-01-12 ENCOUNTER — OFFICE VISIT (OUTPATIENT)
Dept: NEUROSURGERY | Facility: CLINIC | Age: 78
End: 2023-01-12
Payer: MEDICARE

## 2023-01-12 VITALS
SYSTOLIC BLOOD PRESSURE: 132 MMHG | HEIGHT: 65 IN | BODY MASS INDEX: 32.09 KG/M2 | HEART RATE: 99 BPM | TEMPERATURE: 96.9 F | DIASTOLIC BLOOD PRESSURE: 80 MMHG | WEIGHT: 192.6 LBS | OXYGEN SATURATION: 100 %

## 2023-01-12 DIAGNOSIS — M21.372 LEFT FOOT DROP: Primary | Chronic | ICD-10-CM

## 2023-01-12 PROCEDURE — 99213 OFFICE O/P EST LOW 20 MIN: CPT | Performed by: NEUROLOGICAL SURGERY
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
## 2023-01-16 ENCOUNTER — OFFICE VISIT (OUTPATIENT)
Dept: ORTHOPEDIC SURGERY | Facility: CLINIC | Age: 78
End: 2023-01-16
Payer: MEDICARE

## 2023-01-16 VITALS — HEIGHT: 65 IN | BODY MASS INDEX: 31.99 KG/M2 | TEMPERATURE: 96.5 F | WEIGHT: 192 LBS

## 2023-01-16 DIAGNOSIS — M19.072 ARTHRITIS OF FIRST METATARSOPHALANGEAL (MTP) JOINT OF LEFT FOOT: ICD-10-CM

## 2023-01-16 DIAGNOSIS — M19.079 ARTHRITIS OF FOOT: ICD-10-CM

## 2023-01-16 DIAGNOSIS — R52 PAIN: ICD-10-CM

## 2023-01-16 DIAGNOSIS — M20.42 HAMMER TOE OF LEFT FOOT: ICD-10-CM

## 2023-01-16 DIAGNOSIS — M21.372 LEFT FOOT DROP: ICD-10-CM

## 2023-01-16 DIAGNOSIS — M20.12 HALLUX VALGUS OF LEFT FOOT: Primary | ICD-10-CM

## 2023-01-16 PROCEDURE — 73630 X-RAY EXAM OF FOOT: CPT | Performed by: ORTHOPAEDIC SURGERY

## 2023-01-16 PROCEDURE — 99213 OFFICE O/P EST LOW 20 MIN: CPT | Performed by: ORTHOPAEDIC SURGERY

## 2023-03-13 ENCOUNTER — OFFICE VISIT (OUTPATIENT)
Dept: ORTHOPEDIC SURGERY | Facility: CLINIC | Age: 78
End: 2023-03-13
Payer: MEDICARE

## 2023-03-13 VITALS — BODY MASS INDEX: 31.65 KG/M2 | WEIGHT: 190 LBS | HEIGHT: 65 IN | TEMPERATURE: 98.8 F

## 2023-03-13 DIAGNOSIS — M20.42 HAMMER TOE OF LEFT FOOT: ICD-10-CM

## 2023-03-13 DIAGNOSIS — M19.079 ARTHRITIS OF FOOT: ICD-10-CM

## 2023-03-13 DIAGNOSIS — M21.372 LEFT FOOT DROP: ICD-10-CM

## 2023-03-13 DIAGNOSIS — R52 PAIN: Primary | ICD-10-CM

## 2023-03-13 DIAGNOSIS — M19.072 ARTHRITIS OF FIRST METATARSOPHALANGEAL (MTP) JOINT OF LEFT FOOT: ICD-10-CM

## 2023-03-13 DIAGNOSIS — L84 CALLUS OF TOE: ICD-10-CM

## 2023-03-13 DIAGNOSIS — M20.12 HALLUX VALGUS OF LEFT FOOT: ICD-10-CM

## 2023-03-13 PROCEDURE — 73630 X-RAY EXAM OF FOOT: CPT | Performed by: ORTHOPAEDIC SURGERY

## 2023-03-13 PROCEDURE — 99213 OFFICE O/P EST LOW 20 MIN: CPT | Performed by: ORTHOPAEDIC SURGERY
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
## 2023-03-21 ENCOUNTER — HOSPITAL ENCOUNTER (OUTPATIENT)
Dept: CARDIOLOGY | Facility: HOSPITAL | Age: 78
Discharge: HOME | End: 2023-03-21
Admitting: INTERNAL MEDICINE

## 2023-03-21 LAB
BH CV XLRA MEAS LEFT ICA/CCA RATIO: 0.9
BH CV XLRA MEAS LEFT MID CCA PSV: NORMAL CM/SEC
BH CV XLRA MEAS LEFT MID ICA PSV: NORMAL CM/SEC
BH CV XLRA MEAS LEFT PROX ECA PSV: NORMAL CM/SEC
BH CV XLRA MEAS RIGHT ICA/CCA RATIO: 0.95
BH CV XLRA MEAS RIGHT MID CCA PSV: NORMAL CM/SEC
BH CV XLRA MEAS RIGHT MID ICA PSV: NORMAL CM/SEC
BH CV XLRA MEAS RIGHT PROX ECA PSV: NORMAL CM/SEC
MAXIMAL PREDICTED HEART RATE: 145 BPM
STRESS TARGET HR: 123 BPM

## 2023-03-21 PROCEDURE — 93799 UNLISTED CV SVC/PROCEDURE: CPT

## 2023-03-28 ENCOUNTER — TELEPHONE (OUTPATIENT)
Dept: ORTHOPEDIC SURGERY | Facility: CLINIC | Age: 78
End: 2023-03-28

## 2023-03-28 NOTE — TELEPHONE ENCOUNTER
Caller: EDNA ALVARADO     Relationship to patient: SELF    Best call back number: [phone]    Patient is needing: PATIENT HAS DROP FOOT AND IS HAVING SOME BLISTERING, BLEEDING, COLORIZATION ON HER HAMMERTOES.  SHE WOULD LIKE TO KNOW IF SHE CAN PROCEED WITH THE SURGERY.  UNABLE TO WARM TRANSFER DUE TO SYMPTOMS SHE'S EXPERIENCING.  PATIENT WOULD LIKE A CALL BACK ASAP.

## 2023-03-28 NOTE — TELEPHONE ENCOUNTER
I returned call the patient she said last week she developed a sore on her second toe of the left foot that had not been evident when I saw her previously on 3/13/2023.  She said it is red and somewhat swollen and painful.  We will have her come in tomorrow morning and take a look at it.  She was asking about proceeding with surgery for the hammertoes reviewed her that I would be a good idea while this is going on that we can take a look at things seem to get this to settle down with antibiotics dressing changes postop shoe etc. prior to doing any more formal surgical treatment.  She appreciated the call

## 2023-03-29 ENCOUNTER — OFFICE VISIT (OUTPATIENT)
Dept: ORTHOPEDIC SURGERY | Facility: CLINIC | Age: 78
End: 2023-03-29
Payer: MEDICARE

## 2023-03-29 VITALS — BODY MASS INDEX: 32.46 KG/M2 | WEIGHT: 194.8 LBS | HEIGHT: 65 IN | TEMPERATURE: 97.5 F

## 2023-03-29 DIAGNOSIS — L97.521 TOE ULCER, LEFT, LIMITED TO BREAKDOWN OF SKIN: ICD-10-CM

## 2023-03-29 DIAGNOSIS — M21.372 LEFT FOOT DROP: ICD-10-CM

## 2023-03-29 DIAGNOSIS — R52 PAIN: Primary | ICD-10-CM

## 2023-03-29 DIAGNOSIS — L84 CALLUS OF TOE: ICD-10-CM

## 2023-03-29 DIAGNOSIS — M20.42 HAMMER TOE OF LEFT FOOT: ICD-10-CM

## 2023-03-29 DIAGNOSIS — M20.12 HALLUX VALGUS OF LEFT FOOT: ICD-10-CM

## 2023-03-29 PROBLEM — I50.20 UNSPECIFIED SYSTOLIC (CONGESTIVE) HEART FAILURE: Status: ACTIVE | Noted: 2022-08-12

## 2023-03-29 PROBLEM — Z98.890 OTHER SPECIFIED POSTPROCEDURAL STATES: Status: ACTIVE | Noted: 2022-08-12

## 2023-03-29 PROBLEM — I12.9 BENIGN HYPERTENSION WITH CHRONIC KIDNEY DISEASE: Status: ACTIVE | Noted: 2023-03-27

## 2023-03-29 PROBLEM — Z91.81 HISTORY OF FALLING: Status: ACTIVE | Noted: 2022-08-12

## 2023-03-29 PROBLEM — F41.9 ANXIETY DISORDER, UNSPECIFIED: Status: ACTIVE | Noted: 2022-08-12

## 2023-03-29 PROBLEM — N18.30 CHRONIC KIDNEY DISEASE, STAGE 3 UNSPECIFIED: Status: ACTIVE | Noted: 2022-08-12

## 2023-03-29 PROBLEM — M47.16 OTHER SPONDYLOSIS WITH MYELOPATHY, LUMBAR REGION: Status: ACTIVE | Noted: 2022-08-12

## 2023-03-29 PROBLEM — I82.409 ACUTE EMBOLISM AND THROMBOSIS OF UNSPECIFIED DEEP VEINS OF UNSPECIFIED LOWER EXTREMITY: Status: ACTIVE | Noted: 2022-08-12

## 2023-03-29 PROBLEM — K21.9 GASTRO-ESOPHAGEAL REFLUX DISEASE WITHOUT ESOPHAGITIS: Status: ACTIVE | Noted: 2022-08-12

## 2023-03-29 PROBLEM — I10 ESSENTIAL (PRIMARY) HYPERTENSION: Status: ACTIVE | Noted: 2022-08-12

## 2023-03-29 PROBLEM — E03.9 HYPOTHYROIDISM, UNSPECIFIED: Status: ACTIVE | Noted: 2022-08-12

## 2023-03-29 PROBLEM — Z87.440 PERSONAL HISTORY OF URINARY (TRACT) INFECTIONS: Status: ACTIVE | Noted: 2022-08-19

## 2023-03-29 PROBLEM — D50.9 IRON DEFICIENCY ANEMIA: Status: ACTIVE | Noted: 2022-08-12

## 2023-03-29 PROBLEM — F32.A DEPRESSION, UNSPECIFIED: Status: ACTIVE | Noted: 2022-08-12

## 2023-03-29 PROBLEM — C50.812 MALIGNANT NEOPLASM OF OVERLAPPING SITES OF LEFT FEMALE BREAST: Status: ACTIVE | Noted: 2022-08-12

## 2023-03-29 PROBLEM — I48.21 PERMANENT ATRIAL FIBRILLATION: Status: ACTIVE | Noted: 2022-08-12

## 2023-03-29 PROBLEM — M62.81 GENERALIZED MUSCLE WEAKNESS: Status: ACTIVE | Noted: 2022-08-12

## 2023-03-29 PROBLEM — Z74.09 OTHER REDUCED MOBILITY: Status: ACTIVE | Noted: 2022-08-12

## 2023-03-29 PROBLEM — M13.80 OTHER SPECIFIED ARTHRITIS, UNSPECIFIED SITE: Status: ACTIVE | Noted: 2022-08-12

## 2023-03-29 PROBLEM — Z17.0 ESTROGEN RECEPTOR POSITIVE STATUS (ER+): Status: ACTIVE | Noted: 2022-08-12

## 2023-03-29 PROCEDURE — 99214 OFFICE O/P EST MOD 30 MIN: CPT | Performed by: ORTHOPAEDIC SURGERY

## 2023-03-29 RX ORDER — CLINDAMYCIN HYDROCHLORIDE 150 MG/1
150 CAPSULE ORAL 3 TIMES DAILY
Qty: 30 CAPSULE | Refills: 0 | Status: SHIPPED | OUTPATIENT
Start: 2023-03-29 | End: 2023-04-04 | Stop reason: SINTOL

## 2023-03-29 RX ORDER — UBIDECARENONE 200 MG
201 CAPSULE ORAL DAILY
COMMUNITY

## 2023-03-29 RX ORDER — NITROFURANTOIN 25; 75 MG/1; MG/1
CAPSULE ORAL
COMMUNITY
Start: 2023-03-22

## 2023-03-29 RX ORDER — VITAMIN E 200 UNIT
1 CAPSULE ORAL
COMMUNITY

## 2023-03-29 RX ORDER — PHENAZOPYRIDINE HYDROCHLORIDE 200 MG/1
TABLET, FILM COATED ORAL
COMMUNITY
Start: 2023-03-22

## 2023-03-29 RX ORDER — METHYLPHENIDATE HYDROCHLORIDE 5 MG/1
1 TABLET ORAL EVERY 12 HOURS SCHEDULED
COMMUNITY
Start: 2023-02-22

## 2023-03-29 RX ORDER — LANOLIN ALCOHOL/MO/W.PET/CERES
400 CREAM (GRAM) TOPICAL
COMMUNITY

## 2023-03-29 RX ORDER — HYDROCODONE BITARTRATE AND ACETAMINOPHEN 7.5; 325 MG/1; MG/1
TABLET ORAL
COMMUNITY
Start: 2023-02-20
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
## 2023-04-03 ENCOUNTER — TELEPHONE (OUTPATIENT)
Dept: ORTHOPEDIC SURGERY | Facility: CLINIC | Age: 78
End: 2023-04-03
Payer: MEDICARE

## 2023-04-03 NOTE — TELEPHONE ENCOUNTER
Tried to call patient multiple times today on both available numbers with no answer and left messages.  We will call her again tomorrow.

## 2023-04-03 NOTE — TELEPHONE ENCOUNTER
Caller: Marylu Foreman     Relationship: SELF    Best call back number: [phone]    What is your medical concern? THE PATIENT STATED SHE NEEDS TO BE PRESCRIBED A DIFFERENT ANTIBIOTIC FOR HER TOE INFECTION BECAUSE THE CLINDAMYCIN IS MAKING HER SICK TO HER STOMACH AND GIVING HER DIARRHEA     How long has this issue been going on? SINCE 4/1/23    Is your provider already aware of this issue? NO    Have you been treated for this issue? NO

## 2023-04-04 RX ORDER — SULFAMETHOXAZOLE AND TRIMETHOPRIM 800; 160 MG/1; MG/1
1 TABLET ORAL EVERY 12 HOURS
Qty: 20 TABLET | Refills: 0 | Status: SHIPPED | OUTPATIENT
Start: 2023-04-04 | End: 2023-04-10

## 2023-04-04 RX ORDER — SACCHAROMYCES BOULARDII 250 MG
250 CAPSULE ORAL 2 TIMES DAILY
Qty: 14 CAPSULE | Refills: 1 | Status: SHIPPED | OUTPATIENT
Start: 2023-04-04

## 2023-04-04 NOTE — TELEPHONE ENCOUNTER
I spoke with patient she said she has had diarrhea for several days after being on the clindamycin.  She has tolerated Bactrim in the past so we will put her on that we will also send in a prescription for Florastor.  We will send in prescription for Bactrim DS 1 p.o. twice daily and if she has persistent problems she will let us know.

## 2023-04-10 ENCOUNTER — OFFICE VISIT (OUTPATIENT)
Dept: ORTHOPEDIC SURGERY | Facility: CLINIC | Age: 78
End: 2023-04-10
Payer: MEDICARE

## 2023-04-10 VITALS — BODY MASS INDEX: 31.65 KG/M2 | TEMPERATURE: 97.7 F | HEIGHT: 65 IN | WEIGHT: 190 LBS

## 2023-04-10 DIAGNOSIS — L97.521 TOE ULCER, LEFT, LIMITED TO BREAKDOWN OF SKIN: ICD-10-CM

## 2023-04-10 DIAGNOSIS — M20.12 HALLUX VALGUS OF LEFT FOOT: ICD-10-CM

## 2023-04-10 DIAGNOSIS — M20.42 HAMMER TOE OF LEFT FOOT: Primary | ICD-10-CM

## 2023-04-10 DIAGNOSIS — M21.372 LEFT FOOT DROP: ICD-10-CM

## 2023-04-10 PROCEDURE — 99213 OFFICE O/P EST LOW 20 MIN: CPT | Performed by: ORTHOPAEDIC SURGERY
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
## 2023-04-12 PROBLEM — L97.521 TOE ULCER, LEFT, LIMITED TO BREAKDOWN OF SKIN: Status: ACTIVE | Noted: 2023-04-12

## 2023-04-19 ENCOUNTER — OFFICE VISIT (OUTPATIENT)
Dept: INTERNAL MEDICINE | Facility: CLINIC | Age: 78
End: 2023-04-19
Payer: MEDICARE

## 2023-04-19 VITALS
HEART RATE: 80 BPM | DIASTOLIC BLOOD PRESSURE: 64 MMHG | BODY MASS INDEX: 31.96 KG/M2 | TEMPERATURE: 97.8 F | OXYGEN SATURATION: 98 % | HEIGHT: 65 IN | SYSTOLIC BLOOD PRESSURE: 126 MMHG | WEIGHT: 191.8 LBS

## 2023-04-19 DIAGNOSIS — I48.21 PERMANENT ATRIAL FIBRILLATION: ICD-10-CM

## 2023-04-19 DIAGNOSIS — G62.9 NEUROPATHY: ICD-10-CM

## 2023-04-19 DIAGNOSIS — I10 ESSENTIAL HYPERTENSION: Primary | Chronic | ICD-10-CM

## 2023-04-19 DIAGNOSIS — M20.42 HAMMER TOE OF LEFT FOOT: ICD-10-CM

## 2023-04-19 DIAGNOSIS — E78.5 HYPERLIPIDEMIA, UNSPECIFIED HYPERLIPIDEMIA TYPE: ICD-10-CM

## 2023-04-19 PROBLEM — F41.9 ANXIETY DISORDER, UNSPECIFIED: Status: RESOLVED | Noted: 2022-08-12 | Resolved: 2023-04-19

## 2023-04-19 PROBLEM — M19.079 ARTHRITIS OF FOOT: Status: RESOLVED | Noted: 2022-06-30 | Resolved: 2023-04-19

## 2023-04-19 PROBLEM — M20.12 HALLUX VALGUS OF LEFT FOOT: Status: RESOLVED | Noted: 2022-06-30 | Resolved: 2023-04-19

## 2023-04-19 PROBLEM — N18.30 ANEMIA DUE TO STAGE 3 CHRONIC KIDNEY DISEASE: Status: RESOLVED | Noted: 2021-11-16 | Resolved: 2023-04-19

## 2023-04-19 PROBLEM — I12.9 BENIGN HYPERTENSION WITH CHRONIC KIDNEY DISEASE: Status: RESOLVED | Noted: 2023-03-27 | Resolved: 2023-04-19

## 2023-04-19 PROBLEM — D50.9 IRON DEFICIENCY ANEMIA: Status: RESOLVED | Noted: 2022-08-12 | Resolved: 2023-04-19

## 2023-04-19 PROBLEM — R52 PAIN: Status: RESOLVED | Noted: 2022-08-10 | Resolved: 2023-04-19

## 2023-04-19 PROBLEM — Z98.890 OTHER SPECIFIED POSTPROCEDURAL STATES: Status: RESOLVED | Noted: 2022-08-12 | Resolved: 2023-04-19

## 2023-04-19 PROBLEM — F32.A DEPRESSION, UNSPECIFIED: Status: RESOLVED | Noted: 2022-08-12 | Resolved: 2023-04-19

## 2023-04-19 PROBLEM — Z98.890 STATUS POST LUMBAR SURGERY: Status: RESOLVED | Noted: 2022-08-05 | Resolved: 2023-04-19

## 2023-04-19 PROBLEM — D63.1 ANEMIA DUE TO STAGE 3 CHRONIC KIDNEY DISEASE: Status: RESOLVED | Noted: 2021-11-16 | Resolved: 2023-04-19

## 2023-04-19 PROBLEM — M47.16 OTHER SPONDYLOSIS WITH MYELOPATHY, LUMBAR REGION: Status: RESOLVED | Noted: 2022-08-12 | Resolved: 2023-04-19

## 2023-04-19 PROBLEM — M13.80 OTHER SPECIFIED ARTHRITIS, UNSPECIFIED SITE: Status: RESOLVED | Noted: 2022-08-12 | Resolved: 2023-04-19

## 2023-04-19 PROBLEM — Z74.09 OTHER REDUCED MOBILITY: Status: RESOLVED | Noted: 2022-08-12 | Resolved: 2023-04-19

## 2023-04-19 PROBLEM — C50.812 MALIGNANT NEOPLASM OF OVERLAPPING SITES OF LEFT FEMALE BREAST: Status: RESOLVED | Noted: 2022-08-12 | Resolved: 2023-04-19

## 2023-04-19 PROBLEM — E66.9 OBESITY (BMI 30-39.9): Status: RESOLVED | Noted: 2021-11-17 | Resolved: 2023-04-19

## 2023-04-19 PROBLEM — M62.81 GENERALIZED MUSCLE WEAKNESS: Status: RESOLVED | Noted: 2022-08-12 | Resolved: 2023-04-19

## 2023-04-19 PROBLEM — Z91.81 HISTORY OF FALLING: Status: RESOLVED | Noted: 2022-08-12 | Resolved: 2023-04-19

## 2023-04-19 PROBLEM — I82.409 ACUTE DVT (DEEP VENOUS THROMBOSIS): Status: RESOLVED | Noted: 2022-08-11 | Resolved: 2023-04-19

## 2023-04-19 PROBLEM — Z87.440 PERSONAL HISTORY OF URINARY (TRACT) INFECTIONS: Status: RESOLVED | Noted: 2022-08-19 | Resolved: 2023-04-19

## 2023-04-19 PROBLEM — L97.521 TOE ULCER, LEFT, LIMITED TO BREAKDOWN OF SKIN: Status: RESOLVED | Noted: 2023-04-12 | Resolved: 2023-04-19

## 2023-04-19 PROBLEM — E03.9 HYPOTHYROIDISM, UNSPECIFIED: Status: RESOLVED | Noted: 2022-08-12 | Resolved: 2023-04-19

## 2023-04-19 PROBLEM — G47.10 HYPERSOMNIA: Status: RESOLVED | Noted: 2020-10-20 | Resolved: 2023-04-19

## 2023-04-19 RX ORDER — GABAPENTIN 100 MG/1
200 CAPSULE ORAL NIGHTLY
Qty: 60 CAPSULE | Refills: 5 | Status: SHIPPED | OUTPATIENT
Start: 2023-04-19

## 2023-04-19 NOTE — PROGRESS NOTES
The ABCs of the Annual Wellness Visit  Subsequent Medicare Wellness Visit    Subjective    Marylu Foreman is a 77 y.o. female who presents for a Subsequent Medicare Wellness Visit.    The following portions of the patient's history were reviewed and   updated as appropriate: allergies, current medications, past medical history, past social history and problem list.    Compared to one year ago, the patient feels her physical   health is the same.    Compared to one year ago, the patient feels her mental   health is the same.    Recent Hospitalizations:  This patient has had a [facility] admission record on file within the last 365 days.    Current Medical Providers:  Patient Care Team:  Arleen Dillon MD as PCP - General (Internal Medicine)  Joanna Quiñonez MD as Consulting Physician (Obstetrics and Gynecology)  West White Jr., MD as Consulting Physician (Hematology and Oncology)  Librado Monique MD (Psychiatry)  Cole Ramos III, MD as Consulting Physician (Cardiology)  Renée Cisneros MD as Consulting Physician (Nephrology)  Ángel Drew DO as Consulting Physician (Infectious Diseases)  Jigna Fajardo MD as Referring Physician (General Practice)    Outpatient Medications Prior to Visit   Medication Sig Dispense Refill   • apixaban (ELIQUIS) 5 MG tablet tablet Take 1 tablet by mouth Every 12 (Twelve) Hours. Indications: Atrial Fibrillation 180 tablet 3   • ascorbic acid (VITAMIN C) 1000 MG tablet Take 1 tablet by mouth.     • Cholecalciferol (VITAMIN D PO) Take 1 tablet by mouth Daily. HELD FOR OR     • Coenzyme Q10 200 MG capsule Take 201 capsules by mouth Daily.     • Cyanocobalamin (VITAMIN B 12 PO) Take 1 tablet/day by mouth. HELD FOR OR     • dilTIAZem CD (CARDIZEM CD) 180 MG 24 hr capsule TAKE 1 CAPSULE TWICE A  capsule 3   • DULoxetine (CYMBALTA) 60 MG capsule Take 1 capsule by mouth 2 (Two) Times a Day.     • folic acid (FOLVITE) 400 MCG tablet Take 1  tablet by mouth.     • gabapentin (NEURONTIN) 100 MG capsule Take 2 capsules by mouth Every Night. 60 capsule 2   • irbesartan (AVAPRO) 300 MG tablet TAKE 1 TABLET EVERY NIGHT 90 tablet 3   • levothyroxine (SYNTHROID, LEVOTHROID) 50 MCG tablet Take 1 tablet by mouth Daily.     • MegaRed Omega-3 Krill Oil 350 MG capsule Take 1 capsule by mouth.     • Multiple Vitamin (MULTI-VITAMIN PO) Take 1 tablet by mouth Daily. HELD FOR OR     • Probiotic Product (PROBIOTIC PO) Take  by mouth Daily. Lactobacillus rhamnosus GG      HELD FOR OR     • saccharomyces boulardii (Florastor) 250 MG capsule Take 1 capsule by mouth 2 (Two) Times a Day. 14 capsule 1   • Turmeric Curcumin 500 MG capsule Take 3 capsules by mouth.     • vitamin E 400 UNIT capsule Take 1 capsule by mouth Daily. HELD FOR OR     • Coenzyme Q10 (CO Q 10 PO) Take 200 mg by mouth Daily. HELD FOR OR     • methylphenidate (RITALIN) 5 MG tablet Take 1 tablet by mouth Every 12 (Twelve) Hours. (Patient not taking: Reported on 3/29/2023)     • nitrofurantoin, macrocrystal-monohydrate, (MACROBID) 100 MG capsule TAKE 1 CAPSULE BY MOUTH TWICE DAILY WITH FOOD AND PROBIOTICS FOR 5 DAYS (Patient not taking: Reported on 3/29/2023)     • phenazopyridine (PYRIDIUM) 200 MG tablet TAKE 1 TABLET BY MOUTH TWICE DAILY FOR 2 DAYS AND WILL COLOR URINE ORANGE (Patient not taking: Reported on 4/10/2023)       No facility-administered medications prior to visit.       No opioid medication identified on active medication list. I have reviewed chart for other potential  high risk medication/s and harmful drug interactions in the elderly.          Aspirin is not on active medication list.  Aspirin use is not indicated based on review of current medical condition/s. Risk of harm outweighs potential benefits.  .    Patient Active Problem List   Diagnosis   • Anxiety   • Essential hypertension   • Depression   • Malignant neoplasm of overlapping sites of left breast in female, estrogen receptor  "positive   • Class 1 obesity due to excess calories without serious comorbidity with body mass index (BMI) of 34.0 to 34.9 in adult   • Hyperlipidemia   • Hypothyroidism   • Iron deficiency anemia   • Postsurgical nonabsorption   • Permanent atrial fibrillation   • GURDEEP (obstructive sleep apnea)   • Chronic fatigue   • Heart murmur   • Aortic calcification   • CKD (chronic kidney disease) stage 3, GFR 30-59 ml/min   • Headache   • Arthritis of first metatarsophalangeal (MTP) joint of left foot   • Spondylosis with myelopathy, lumbar region   • Hammer toe of left foot   • Left foot drop   • Acute embolism and thrombosis of unspecified deep veins of unspecified lower extremity   • Estrogen receptor positive status (ER+)   • Gastro-esophageal reflux disease without esophagitis   • Unspecified systolic (congestive) heart failure   • Chronic kidney disease, stage 3 unspecified   • Neuropathy     Advance Care Planning   Advance Care Planning     Advance Directive is not on file.  ACP discussion was held with the patient during this visit. Patient has an advance directive (not in EMR), copy requested.     Objective    Vitals:    04/19/23 1043   BP: 126/64   BP Location: Right arm   Patient Position: Sitting   Pulse: 80   Temp: 97.8 °F (36.6 °C)   TempSrc: Oral   SpO2: 98%   Weight: 87 kg (191 lb 12.8 oz)   Height: 165.1 cm (65\")   PainSc:   4     Estimated body mass index is 31.92 kg/m² as calculated from the following:    Height as of this encounter: 165.1 cm (65\").    Weight as of this encounter: 87 kg (191 lb 12.8 oz).    BMI is >= 30 and <35. (Class 1 Obesity). The following options were offered after discussion;: exercise counseling/recommendations and nutrition counseling/recommendations      Does the patient have evidence of cognitive impairment? No    Lab Results   Component Value Date    CHLPL 210 (H) 04/12/2023    TRIG 86 04/12/2023    HDL 67 04/12/2023     (H) 04/12/2023    VLDL 15 04/12/2023    HGBA1C 6.2 " (H) 2023        HEALTH RISK ASSESSMENT    Smoking Status:  Social History     Tobacco Use   Smoking Status Never   Smokeless Tobacco Never   Tobacco Comments    None - Father was a very heavy smoker and  from lung cancer.     Alcohol Consumption:  Social History     Substance and Sexual Activity   Alcohol Use No    Comment: Caffeine use- 2 cups tea daily, 1 coffee      Fall Risk Screen:    CONNIE Fall Risk Assessment was completed, and patient is at HIGH risk for falls. Assessment completed on:2023    Depression Screenin/19/2023    10:00 AM   PHQ-2/PHQ-9 Depression Screening   Little Interest or Pleasure in Doing Things 2-->more than half the days   Feeling Down, Depressed or Hopeless 2-->more than half the days   Trouble Falling or Staying Asleep, or Sleeping Too Much 3-->nearly every day   Feeling Tired or Having Little Energy 2-->more than half the days   Poor Appetite or Overeating 0-->not at all   Feeling Bad about Yourself - or that You are a Failure or Have Let Yourself or Your Family Down 1-->several days   Trouble Concentrating on Things, Such as Reading the Newspaper or Watching Television 0-->not at all   Moving or Speaking So Slowly that Other People Could Have Noticed? Or the Opposite - Being So Fidgety 2-->more than half the days   Thoughts that You Would be Better Off Dead or of Hurting Yourself in Some Way 0-->not at all   PHQ-9: Brief Depression Severity Measure Score 12   If You Checked Off Any Problems, How Difficult Have These Problems Made It For You to Do Your Work, Take Care of Things at Home, or Get Along with Other People? not difficult at all       Health Habits and Functional and Cognitive Screenin/19/2023    10:00 AM   Functional & Cognitive Status   Do you have difficulty preparing food and eating? No   Do you have difficulty bathing yourself, getting dressed or grooming yourself? No   Do you have difficulty using the toilet? No   Do you have difficulty  moving around from place to place? Yes   Do you have trouble with steps or getting out of a bed or a chair? No   Current Diet Unhealthy Diet   Dental Exam Up to date   Eye Exam Not up to date   Exercise (times per week) 0 times per week   Current Exercises Include No Regular Exercise   Do you need help using the phone?  No   Are you deaf or do you have serious difficulty hearing?  Yes   Do you need help with transportation? No   Do you need help shopping? No   Do you need help preparing meals?  No   Do you need help with housework?  No   Do you need help with laundry? No   Do you need help taking your medications? No   Do you need help managing money? No   Do you ever drive or ride in a car without wearing a seat belt? No   Have you felt unusual stress, anger or loneliness in the last month? Yes   Who do you live with? Alone   If you need help, do you have trouble finding someone available to you? No   Have you been bothered in the last four weeks by sexual problems? No   Do you have difficulty concentrating, remembering or making decisions? No       Age-appropriate Screening Schedule:  Refer to the list below for future screening recommendations based on patient's age, sex and/or medical conditions. Orders for these recommended tests are listed in the plan section. The patient has been provided with a written plan.    Health Maintenance   Topic Date Due   • DXA SCAN  11/26/2016   • ZOSTER VACCINE (2 of 2) 12/25/2020   • ANNUAL WELLNESS VISIT  06/09/2022   • INFLUENZA VACCINE  08/01/2023   • MAMMOGRAM  10/03/2023   • LIPID PANEL  04/12/2024   • COLONOSCOPY  11/12/2028   • TDAP/TD VACCINES (4 - Td or Tdap) 02/22/2031   • HEPATITIS C SCREENING  Completed   • COVID-19 Vaccine  Completed   • Pneumococcal Vaccine 65+  Completed                  CMS Preventative Services Quick Reference  Risk Factors Identified During Encounter  Inactivity/Sedentary: Patient was advised to exercise at least 150 minutes a week per CDC  "recommendations.  The above risks/problems have been discussed with the patient.  Pertinent information has been shared with the patient in the After Visit Summary.  An After Visit Summary and PPPS were made available to the patient.    Follow Up:   Next Medicare Wellness visit to be scheduled in 1 year.       Additional E&M Note during same encounter follows:  Patient has multiple medical problems which are significant and separately identifiable that require additional work above and beyond the Medicare Wellness Visit.      Chief Complaint  Hypertension, Hypothyroidism, Hyperlipidemia, Atrial Fibrillation, and Medicare Wellness-subsequent    Subjective        HPI  Marylu Foreman is also being seen today for she has been taking the gabapentin at night and it is helping with the nerve pain in the botom of the foot neurotin does help  Pt has been taking BP meds as prescribed without any problems.  No HA  No episodes of orthostasis  Pt has been doing well with thyroid meds.  Taking as perscribed without any complications  She is schedule to get sx on her left foot for hammar toes that afre severly deformed           Objective   Vital Signs:  /64 (BP Location: Right arm, Patient Position: Sitting)   Pulse 80   Temp 97.8 °F (36.6 °C) (Oral)   Ht 165.1 cm (65\")   Wt 87 kg (191 lb 12.8 oz)   SpO2 98%   BMI 31.92 kg/m²     Physical Exam  Vitals reviewed.   Constitutional:       Appearance: She is well-developed.   HENT:      Head: Normocephalic and atraumatic.      Right Ear: External ear normal.      Left Ear: External ear normal.   Eyes:      Conjunctiva/sclera: Conjunctivae normal.      Pupils: Pupils are equal, round, and reactive to light.   Neck:      Thyroid: No thyromegaly.      Trachea: No tracheal deviation.   Cardiovascular:      Rate and Rhythm: Normal rate and regular rhythm.      Heart sounds: Normal heart sounds.   Pulmonary:      Effort: Pulmonary effort is normal.      Breath sounds: Normal " breath sounds.   Abdominal:      General: Bowel sounds are normal. There is no distension.      Palpations: Abdomen is soft.      Tenderness: There is no abdominal tenderness.   Musculoskeletal:         General: No deformity. Normal range of motion.      Cervical back: Normal range of motion.   Skin:     General: Skin is warm and dry.   Neurological:      Mental Status: She is alert and oriented to person, place, and time.   Psychiatric:         Behavior: Behavior normal.         Thought Content: Thought content normal.         Judgment: Judgment normal.          The following data was reviewed by: Arleen Dillon MD on 04/19/2023:  Common labs        10/7/2022    14:09 12/5/2022    10:57 4/12/2023    14:12   Common Labs   Glucose 108   164   121     BUN 26   37   30     Creatinine 1.32   1.41   1.30     Sodium 139   141   140     Potassium 4.2   4.0   4.8     Chloride 102   106   105     Calcium 9.6   9.9   9.5     Total Protein   6.7     Albumin 4.40   4.20   4.2     Total Bilirubin 0.6   0.5   0.4     Alkaline Phosphatase 128   129   135     AST (SGOT) 17   19   21     ALT (SGPT) 12   15   20     WBC 9.96   10.77   8.9     Hemoglobin 11.0   12.7   12.3     Hematocrit 35.5   39.9   38.3     Platelets 354   393   427     Total Cholesterol 190       Total Cholesterol   210     Triglycerides 82    86     HDL Cholesterol 76    67     LDL Cholesterol  99    128     Hemoglobin A1C 6.10    6.2                  Assessment and Plan   Diagnoses and all orders for this visit:    1. Essential hypertension (Primary)    2. Neuropathy    3. Hyperlipidemia, unspecified hyperlipidemia type    4. Permanent atrial fibrillation    5. Hammer toe of left foot             Follow Up   No follow-ups on file.  Patient was given instructions and counseling regarding her condition or for health maintenance advice. Please see specific information pulled into the AVS if appropriate.     1. Hammer toe on the left and gunion-  Scheduled for sx soon   She will need to resume blood thinner right after sx due to hx of blood clots p[ost op  2.  Neuropathy-  Doing well with gabapentin   Will refil today  3.  AF-  No sx  She is controlled  And cont on eliquis  4.  HTN-  Ok with irbesartan  5.  HPL-  She does like cheese and needs to limit that

## 2023-05-15 ENCOUNTER — OFFICE VISIT (OUTPATIENT)
Dept: ORTHOPEDIC SURGERY | Facility: CLINIC | Age: 78
End: 2023-05-15
Payer: MEDICARE

## 2023-05-15 VITALS — BODY MASS INDEX: 31.82 KG/M2 | WEIGHT: 191 LBS | HEIGHT: 65 IN | TEMPERATURE: 97.3 F

## 2023-05-15 DIAGNOSIS — L97.521 TOE ULCER, LEFT, LIMITED TO BREAKDOWN OF SKIN: ICD-10-CM

## 2023-05-15 DIAGNOSIS — L84 CALLUS OF TOE: ICD-10-CM

## 2023-05-15 DIAGNOSIS — M20.42 HAMMER TOE OF LEFT FOOT: Primary | ICD-10-CM

## 2023-05-15 DIAGNOSIS — M20.12 HALLUX VALGUS OF LEFT FOOT: ICD-10-CM

## 2023-05-15 DIAGNOSIS — M21.372 LEFT FOOT DROP: ICD-10-CM

## 2023-05-15 PROCEDURE — 99213 OFFICE O/P EST LOW 20 MIN: CPT | Performed by: ORTHOPAEDIC SURGERY
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
## 2023-06-08 ENCOUNTER — OFFICE VISIT (OUTPATIENT)
Dept: ORTHOPEDIC SURGERY | Facility: CLINIC | Age: 78
End: 2023-06-08
Payer: MEDICARE

## 2023-06-08 ENCOUNTER — TELEPHONE (OUTPATIENT)
Dept: ONCOLOGY | Facility: CLINIC | Age: 78
End: 2023-06-08
Payer: MEDICARE

## 2023-06-08 VITALS — WEIGHT: 193.6 LBS | TEMPERATURE: 97.6 F | HEIGHT: 65 IN | BODY MASS INDEX: 32.26 KG/M2

## 2023-06-08 DIAGNOSIS — M77.42 METATARSALGIA OF LEFT FOOT: ICD-10-CM

## 2023-06-08 DIAGNOSIS — R52 PAIN: ICD-10-CM

## 2023-06-08 DIAGNOSIS — M20.42 HAMMER TOE OF LEFT FOOT: ICD-10-CM

## 2023-06-08 DIAGNOSIS — M20.12 HALLUX VALGUS OF LEFT FOOT: ICD-10-CM

## 2023-06-08 DIAGNOSIS — M21.372 LEFT FOOT DROP: ICD-10-CM

## 2023-06-08 DIAGNOSIS — M20.12 ACQUIRED HALLUX VALGUS OF LEFT FOOT: Primary | ICD-10-CM

## 2023-06-08 DIAGNOSIS — M19.072 ARTHRITIS OF FIRST METATARSOPHALANGEAL (MTP) JOINT OF LEFT FOOT: ICD-10-CM

## 2023-06-08 NOTE — TELEPHONE ENCOUNTER
Caller: Edna Foreman    Relationship: Self    Best call back number: [phone]    What is the best time to reach you: ANYTIME    Who are you requesting to speak with (clinical staff, provider,  specific staff member): CLINICAL     Do you know the name of the person who called: EDNA     What was the call regarding: PATIENT IS HAVING FOOT SURGERY IN JULY. NEEDS TO KNOW WHEN TO STOP TAKING HER ELIQUIS AND WHEN TO GO BACK ON?    Is it okay if the provider responds through MyChart: N/A
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
## 2023-06-09 NOTE — TELEPHONE ENCOUNTER
Returned call to patient. Informed her that Dr. White and Dr. Reed have been in touch with each other regarding her upcoming surgery and timeframe for holding her Eliquis. Reviewed with patient that she will need to hold her Eliquis 48 hours prior to surgery and will resume 24 hours following surgery. Also reinforced that when she sees Crys SANCHEZ at her upcoming office visit, that this information will again be reviewed and she will be able to ask any additional questions she may have. Patient v/u.

## 2023-06-12 PROBLEM — M20.12 HALLUX VALGUS OF LEFT FOOT: Status: ACTIVE | Noted: 2023-06-12

## 2023-06-12 PROBLEM — M77.42 METATARSALGIA OF LEFT FOOT: Status: ACTIVE | Noted: 2023-06-12

## 2023-06-12 PROBLEM — M20.12 ACQUIRED HALLUX VALGUS OF LEFT FOOT: Status: ACTIVE | Noted: 2023-06-12

## 2023-06-19 ENCOUNTER — OFFICE VISIT (OUTPATIENT)
Dept: ONCOLOGY | Facility: CLINIC | Age: 78
End: 2023-06-19
Payer: MEDICARE

## 2023-06-19 VITALS
BODY MASS INDEX: 32.65 KG/M2 | OXYGEN SATURATION: 96 % | HEIGHT: 65 IN | TEMPERATURE: 97 F | DIASTOLIC BLOOD PRESSURE: 69 MMHG | HEART RATE: 95 BPM | RESPIRATION RATE: 16 BRPM | SYSTOLIC BLOOD PRESSURE: 107 MMHG | WEIGHT: 196 LBS

## 2023-06-19 DIAGNOSIS — Z12.31 ENCOUNTER FOR SCREENING MAMMOGRAM FOR MALIGNANT NEOPLASM OF BREAST: ICD-10-CM

## 2023-06-19 DIAGNOSIS — D63.1 ANEMIA DUE TO STAGE 3 CHRONIC KIDNEY DISEASE, UNSPECIFIED WHETHER STAGE 3A OR 3B CKD: Primary | ICD-10-CM

## 2023-06-19 DIAGNOSIS — Z17.0 MALIGNANT NEOPLASM OF OVERLAPPING SITES OF LEFT BREAST IN FEMALE, ESTROGEN RECEPTOR POSITIVE: ICD-10-CM

## 2023-06-19 DIAGNOSIS — Z79.01 CHRONIC ANTICOAGULATION: ICD-10-CM

## 2023-06-19 DIAGNOSIS — C50.812 MALIGNANT NEOPLASM OF OVERLAPPING SITES OF LEFT BREAST IN FEMALE, ESTROGEN RECEPTOR POSITIVE: ICD-10-CM

## 2023-06-19 DIAGNOSIS — N18.30 ANEMIA DUE TO STAGE 3 CHRONIC KIDNEY DISEASE, UNSPECIFIED WHETHER STAGE 3A OR 3B CKD: Primary | ICD-10-CM

## 2023-06-19 PROCEDURE — 99215 OFFICE O/P EST HI 40 MIN: CPT | Performed by: NURSE PRACTITIONER

## 2023-06-19 PROCEDURE — 1125F AMNT PAIN NOTED PAIN PRSNT: CPT | Performed by: NURSE PRACTITIONER

## 2023-06-19 PROCEDURE — 3074F SYST BP LT 130 MM HG: CPT | Performed by: NURSE PRACTITIONER

## 2023-06-19 PROCEDURE — 3078F DIAST BP <80 MM HG: CPT | Performed by: NURSE PRACTITIONER

## 2023-06-19 NOTE — PROGRESS NOTES
"Chief Complaint  Stage I ([id number]) left breast cancer in 2007, pulmonary emboli in 2009, atrial fibrillation, history of GI bleed, history of iron deficiency status post prior gastric bypass in 2001    Subjective      History of Present Illness  Patient returns today in follow-up with laboratory studies to review.  Since we last saw the patient she has been seen by Dr. Watkins, orthopedics, and is now scheduled for surgery on her left foot specifically to correct hammertoe and malaligned toes on 7/11/2023.  We have already been in discussion with Dr. Reed regarding timing of holding Eliquis around this procedure.  Patient currently is wearing a boot on the left foot.  She otherwise is tolerating Eliquis well and denies any bleeding.    Patient's last mammogram was October 2022, negative, BI-RADS 1.  She denies any concerns in this regard.    She denies other concerns at this time.    Objective   Vital Signs:   /69   Pulse 95   Temp 97 °F (36.1 °C) (Temporal)   Resp 16   Ht 165.1 cm (65\")   Wt 88.9 kg (196 lb) Comment: boot on lt foot  SpO2 96%   BMI 32.62 kg/m²     Physical Exam  Constitutional:       Appearance: She is well-developed.   Eyes:      Conjunctiva/sclera: Conjunctivae normal.   Neck:      Thyroid: No thyromegaly.   Cardiovascular:      Rate and Rhythm: Normal rate. Rhythm irregular.      Heart sounds: Murmur heard.     No friction rub. No gallop.   Pulmonary:      Effort: No respiratory distress.      Breath sounds: Normal breath sounds. No wheezing.      Comments: Status post left mastectomy, chest wall without masses or adenitis palpated.  Right breast without masses or abnormalities palpated.  Abdominal:      General: Bowel sounds are normal. There is no distension.      Palpations: Abdomen is soft.      Tenderness: There is no abdominal tenderness.   Musculoskeletal:         General: Deformity (L foot with hammertoe/malaligned toes; in boot) present.   Lymphadenopathy:      Head: "      Right side of head: No submandibular adenopathy.      Cervical: No cervical adenopathy.      Upper Body:      Right upper body: No supraclavicular adenopathy.      Left upper body: No supraclavicular adenopathy.   Skin:     General: Skin is warm and dry.      Findings: No rash.   Neurological:      Mental Status: She is alert and oriented to person, place, and time.      Cranial Nerves: No cranial nerve deficit.      Motor: No abnormal muscle tone.      Deep Tendon Reflexes: Reflexes normal.      Comments: Patient with continued left foot drop   Psychiatric:         Behavior: Behavior normal.          Result Review : Reviewed CBC, CMP, iron panel, ferritin from today.  Reviewed multiple records including emergency department records, records from PCP visits, plain film lumbar spine 5/10/2022, MRI lumbar spine 5/14/2022, lower extremity Doppler 4/23/2022.       Assessment and Plan     Stage I ([id number]) left breast cancer:  Biopsy 5/8/2007 with in situ and invasive ductal carcinoma, grade 2,/NH positive, HER-2/jorge negative  Left mastectomy 6/18/0742 foci carcinoma, 4 mm, grade 1 in situ and invasive ductal carcinoma and 2 mm grade 2 in situ and invasive ductal carcinoma, negative margins, negative sentinel lymph nodes x3 with 5 additional negative lymph nodes.  Arimidex initiated 7/2/2007  Arimidex interrupted patient developed bilateral pulmonary emboli in May 2009, resume shortly thereafter.  Completed 5 years treatment in 2012.  Patient today with no clinical evidence of recurrent disease.  Exam today was negative.  She underwent right-sided mammogram recently on 10/3/2022 which was negative, BI-RADS 1.  Pulmonary emboli 5/2/2009 and bilateral calf DVT 8/11/2022:  Family history of DVT in the patient's mother occurring at age 70 postoperatively  Patient developed bilateral lower lobe pulmonary emboli 5/2/2009 following right VATS on 4/30/2009  Felt to be a provoked thrombosis related to  surgery  Hypercoagulable evaluation with negative factor V Leiden, negative prothrombin gene mutation, normal homocystine, protein C antigen 92, free to protein S 75, anticardiolipin antibody panel negative, lupus anticoagulant negative, Antithrombin %  Continuing on chronic anticoagulation primarily for atrial fibrillation at this point as prior thrombosis was provoked.  Transitioned from Coumadin to Eliquis 5 mg twice daily in May 2020  At time of hospitalization for IJEOMA/CKD 11/15-11/18/2021, Eliquis dose was decreased to 2.5 mg twice daily.  Patient required lumbar laminectomy 8/9/2022, Eliquis was held perioperatively.  Subsequently admitted 8/10 - 8/12/2022 with Doppler 8/11/2022 that showed bilateral acute calf DVT.  This occurred postoperatively and while off anticoagulation.  Recommended to resume Eliquis at 5 mg twice daily dose.  Patient returns today in follow-up continuing on Eliquis 5 mg twice daily. Tolerating well. Plan to hold for upcoming procedure as detailed below.  She otherwise will require long-term anticoagulation for both thrombophilia and atrial fibrillation.  Iron deficiency anemia:  Prior gastric bypass in 2001  Intolerant of oral iron.  Patient has required IV iron on multiple occasions: Feraheme 8/13 and 8/20/2018; Injectafer 3/9 and 3/16/2020  Labs on 11/15/2021 showed decline in hemoglobin to 10.8 however this was in the setting of UTI and IJEOMA/CKD3.  Her iron panel was normal with iron 114, iron saturation 34%, TIBC 337 with ferritin elevated at 292.  B12 was normal at 883.  We continue to follow these values given her history of prior gastric bypass and iron deficiency requiring intermittent IV iron (intolerant of oral iron).   On 12/20/2021, hemoglobin further declined to 9.5.  Repeat labs with iron 47, ferritin 262, iron saturation 14%, TIBC 328, B12 level 1162.  Additional labs performed with negative serum protein electrophoresis and immunoelectrophoresis with normal  quantitative immunoglobulins.  Free light chains elevated with kappa 73, lambda 36 with ratio 2.04, consistent with patient's degree of renal dysfunction.  Haptoglobin normal 186, LDH normal 171, CRP borderline elevated 0.62, erythropoietin level 20.1.  Anemia felt to be related to CKD3.  Patient felt to be a potential candidate for Procrit if hemoglobin remains below 10.  Patient experienced improvement in hemoglobin into the 11-12 range  Today, labs show hemoglobin down to 11.7 with ferritin of 41, iron saturation of 12%.  We will pursue insurance approval for IV iron in the form of either Injectafer or Venofer.    GI bleed in July 2018:  Acute lower GI bleed with supratherapeutic INR Coumadin.  Angiogram performed with coil embolization of artery to sigmoid colon  No current evidence of further GI blood loss  Atrial fibrillation:  Requires ongoing anticoagulation for this indication  As above, transitioned from Coumadin to Eliquis 5 mg twice daily in May 2020  As above, during hospitalization 11/15-11/18/2021 for IJEOMA/CKD3, Eliquis dose was decreased to 2.5 mg twice daily  See above discussion, patient developed bilateral calf DVT while briefly off anticoagulation following lumbar laminectomy on 8/11/2022.  Patient is to be receiving full dose Eliquis 5 mg twice daily.  Status post right VATS 4/30/2020 for pulmonary nodule with finding of necrotizing granulomatous inflammation  Severe depression/anxiety:  Patient has had chronic issues with this, is followed by psychiatry, Dr. Librado Monique.  She also underwent previous counseling which she found helpful.  The patient had ongoing difficulty with control of her depression.  She has been on multiple antidepressants  In June/July 2021 patient underwent transcranial magnetic stimulation (TMS) with significant improvement in depressive symptoms and resolution of headaches.  Symptoms subsequently gradually recurred, patient reports there has been difficulty with  insurance regarding maintenance treatments.  Patient reports symptoms have been stable recently  IJEOMA/CKD3  Patient with CKD3 with baseline creatinine in the 1.3-1.8 range  At time of routine follow-up visit 11/15/2021, creatinine was 3.19 and BUN of 59.  This was compared to prior value 6/2/2021 with BUN 41, creatinine 1.53.  Patient was hospitalized 11/15-11/18/2021  Patient is continuing follow-up with Dr. Cisneros in nephrology  Creatinine on 11/24/2021 remained elevated at 2.43 however on 12/20/2021 declined to 1.7, near her prior baseline.  Additional labs on 12/20/2021 with negative serum protein electrophoresis and immunoelectrophoresis with normal quantitative immunoglobulins.  Free light chains elevated with kappa 73, lambda 36 with ratio 2.04, consistent with patient's degree of renal dysfunction.  Creatinine today stable at 1.29  ESBL E. coli UTI  Urine culture positive on 11/6/2021 in urgent care, patient treated with 7 days nitrofurantoin (was sensitive on culture result)  Patient with recurrent symptoms, was treated during recent hospitalization 11/15-11/18/2021 with Levaquin  Hypothyroidism   Patient continues on levothyroxine 50 mcg daily  Patient developed cold sensitivity, labs on 12/20/2021 with TSH 2.03 and free T4 1.07.  Severe left-sided sciatica with left foot drop  Plain film of the lumbar spine on 5/10/2022 was negative.    [facility] lumbar [facility] on 5/14/2022 showed no evidence of metastatic disease however did show evidence of degenerative change with disc disease and neuroforaminal compromise.     She developed a left foot drop and was referred to neurosurgery, eventually underwent lumbar laminectomy on 8/9/2022  Patient continues with left foot drop, is regaining some sensation however in her left foot.  She has developed a small ulceration on her left great toe and we will contact orthopedics regarding potential referral for shoe prosthesis.  She does continue with brace in  place.  Upcoming surgery with Dr. Reed 7/11/2023 to correct hammertoe and misaligned toes on the left foot.  Patient educated to hold her Eliquis and resume in the following fashion:  1.  Take last dose of Eliquis the night of 7/8/2023   2.  Resume Eliquis the night of 7/12/2023.    3.  Call with any unusual bleeding   4.  Call with any new lower extremity swelling, shortness of breath or chest pain.      Plan:  Continue Eliquis 5 mg twice daily.    Patient will hold Eliquis around the time of surgery as outlined above.    We will pursue insurance approval for IV iron for the patient either in the form of Injectafer or Venofer.    Plan to have patient return to begin IV iron once approved.    Patient due for annual mammogram in October, ordered today.  Otherwise patient will return in 6 months for follow-up with Dr. White with repeat CBC, CMP, iron profile and ferritin.      I spent 55 minutes caring for Marylu on this date of service. This time includes time spent by me in the following activities: preparing for the visit, reviewing tests, obtaining and/or reviewing a separately obtained history, performing a medically appropriate examination and/or evaluation, counseling and educating the patient/family/caregiver, ordering medications, tests, or procedures, referring and communicating with other health care professionals, documenting information in the medical record, and care coordination

## 2023-06-19 NOTE — PATIENT INSTRUCTIONS
1.  Take last dose of Eliquis the night of 7/8/2023   2.  Resume Eliquis the night of 7/12/2023.    3.  Call with any unusual bleeding   4.  Call with any new lower extremity swelling, shortness of breath or chest pain.

## 2023-06-20 PROBLEM — T45.4X5A ADVERSE EFFECT OF IRON: Status: ACTIVE | Noted: 2023-06-20

## 2023-07-11 PROBLEM — R09.02 POSTOPERATIVE HYPOXIA: Status: ACTIVE | Noted: 2023-07-11

## 2023-07-11 PROBLEM — Z98.890 POSTOPERATIVE HYPOXIA: Status: ACTIVE | Noted: 2023-07-11

## 2023-07-24 ENCOUNTER — OFFICE VISIT (OUTPATIENT)
Dept: ORTHOPEDIC SURGERY | Facility: CLINIC | Age: 78
End: 2023-07-24
Payer: MEDICARE

## 2023-07-24 VITALS — TEMPERATURE: 97.5 F | WEIGHT: 192 LBS | HEIGHT: 66 IN | BODY MASS INDEX: 30.86 KG/M2

## 2023-07-24 DIAGNOSIS — M21.372 LEFT FOOT DROP: ICD-10-CM

## 2023-07-24 DIAGNOSIS — M20.12 ACQUIRED HALLUX VALGUS OF LEFT FOOT: Primary | ICD-10-CM

## 2023-07-24 DIAGNOSIS — M20.42 HAMMER TOE OF LEFT FOOT: ICD-10-CM

## 2023-07-24 DIAGNOSIS — M77.42 METATARSALGIA OF LEFT FOOT: ICD-10-CM

## 2023-07-24 DIAGNOSIS — M19.072 ARTHRITIS OF FIRST METATARSOPHALANGEAL (MTP) JOINT OF LEFT FOOT: ICD-10-CM
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
## 2023-07-25 ENCOUNTER — TELEPHONE (OUTPATIENT)
Dept: INTERNAL MEDICINE | Facility: CLINIC | Age: 78
End: 2023-07-25
Payer: MEDICARE

## 2023-07-25 RX ORDER — CIPROFLOXACIN 250 MG/1
250 TABLET, FILM COATED ORAL 2 TIMES DAILY
Qty: 10 TABLET | Refills: 0 | Status: SHIPPED | OUTPATIENT
Start: 2023-07-25

## 2023-07-25 NOTE — TELEPHONE ENCOUNTER
Caller: Marylu Foreman    Relationship: Self    Best call back number: [phone]    What medication are you requesting: CIPRO    What are your current symptoms: UTI, BURNING     How long have you been experiencing symptoms: 2 DAYS    Have you had these symptoms before:    [x] Yes  [] No    Have you been treated for these symptoms before:   [x] Yes  [] No    If a prescription is needed, what is your preferred pharmacy and phone number: [id number] [place], [place] - [facility]  AT [facility] [address]  - [phone]  - [phone] FX     Additional notes:SHE DID A TEST STRIP AND IT WAS POSITIVE FOR UTI.  SHE JUST HAD SURGERY ON HER TOE AND CAN'T MAKE IT IN

## 2023-08-09 ENCOUNTER — OFFICE VISIT (OUTPATIENT)
Dept: ORTHOPEDIC SURGERY | Facility: CLINIC | Age: 78
End: 2023-08-09
Payer: MEDICARE

## 2023-08-09 VITALS — HEIGHT: 66 IN | TEMPERATURE: 98.6 F | WEIGHT: 185 LBS | BODY MASS INDEX: 29.73 KG/M2

## 2023-08-09 DIAGNOSIS — M19.072 ARTHRITIS OF FIRST METATARSOPHALANGEAL (MTP) JOINT OF LEFT FOOT: ICD-10-CM

## 2023-08-09 DIAGNOSIS — M20.12 ACQUIRED HALLUX VALGUS OF LEFT FOOT: ICD-10-CM

## 2023-08-09 DIAGNOSIS — M21.372 LEFT FOOT DROP: ICD-10-CM

## 2023-08-09 DIAGNOSIS — M20.42 HAMMER TOE OF LEFT FOOT: ICD-10-CM

## 2023-08-09 DIAGNOSIS — R52 PAIN: Primary | ICD-10-CM

## 2023-08-09 DIAGNOSIS — M77.42 METATARSALGIA OF LEFT FOOT: ICD-10-CM
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
## 2023-08-21 ENCOUNTER — TELEPHONE (OUTPATIENT)
Dept: ORTHOPEDIC SURGERY | Facility: CLINIC | Age: 78
End: 2023-08-21

## 2023-08-21 ENCOUNTER — OFFICE VISIT (OUTPATIENT)
Dept: ORTHOPEDIC SURGERY | Facility: CLINIC | Age: 78
End: 2023-08-21
Payer: MEDICARE

## 2023-08-21 VITALS — BODY MASS INDEX: 29.73 KG/M2 | TEMPERATURE: 97.7 F | WEIGHT: 185 LBS | HEIGHT: 66 IN

## 2023-08-21 DIAGNOSIS — M19.072 ARTHRITIS OF FIRST METATARSOPHALANGEAL (MTP) JOINT OF LEFT FOOT: ICD-10-CM

## 2023-08-21 DIAGNOSIS — M20.12 ACQUIRED HALLUX VALGUS OF LEFT FOOT: ICD-10-CM

## 2023-08-21 DIAGNOSIS — M77.42 METATARSALGIA OF LEFT FOOT: ICD-10-CM

## 2023-08-21 DIAGNOSIS — M21.372 LEFT FOOT DROP: ICD-10-CM

## 2023-08-21 DIAGNOSIS — R52 PAIN: Primary | ICD-10-CM

## 2023-08-21 DIAGNOSIS — M20.42 HAMMER TOE OF LEFT FOOT: ICD-10-CM
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
## 2023-08-21 NOTE — TELEPHONE ENCOUNTER
Hub staff attempted to follow warm transfer process and was unsuccessful     Caller: Marylu Foreman    Relationship to patient: Self    Best call back number:[phone]    Patient is needing: TO R/S 8.23.23 TO AN EARLIER TIME AND HUB DOES NOT HAVE AVAILABILITY

## 2023-09-05 RX ORDER — APIXABAN 5 MG/1
TABLET, FILM COATED ORAL
Qty: 180 TABLET | Refills: 3 | OUTPATIENT
Start: 2023-09-05

## 2023-09-05 NOTE — TELEPHONE ENCOUNTER
Last OV 7/15/22.  NO future OV.  Labs 7/12/23.  Patient needs an appointment-please schedule.  [facility] RMA

## 2023-09-07 ENCOUNTER — TELEPHONE (OUTPATIENT)
Dept: ORTHOPEDIC SURGERY | Facility: CLINIC | Age: 78
End: 2023-09-07
Payer: MEDICARE

## 2023-09-07 NOTE — TELEPHONE ENCOUNTER
UNABLE TO WARM TRANSFER    Caller: Marylu Foreman    Relationship to patient: Self    Best call back number: [phone]    Type of visit: 3 WK POST-OP LEFT FOOT    Requested date: ANY OTHER DAY EXCEPT 09/14     If rescheduling, when is the original appointment: 09/14     Additional notes:HUB UNABLE TO R/S APPT UNTIL OCTOBER

## 2023-09-12 ENCOUNTER — OFFICE VISIT (OUTPATIENT)
Dept: CARDIOLOGY | Facility: CLINIC | Age: 78
End: 2023-09-12
Payer: MEDICARE

## 2023-09-12 VITALS
HEART RATE: 53 BPM | DIASTOLIC BLOOD PRESSURE: 74 MMHG | OXYGEN SATURATION: 98 % | BODY MASS INDEX: 30.86 KG/M2 | WEIGHT: 192 LBS | HEIGHT: 66 IN | SYSTOLIC BLOOD PRESSURE: 118 MMHG

## 2023-09-12 DIAGNOSIS — E78.5 HYPERLIPIDEMIA, UNSPECIFIED HYPERLIPIDEMIA TYPE: ICD-10-CM

## 2023-09-12 DIAGNOSIS — I10 ESSENTIAL HYPERTENSION: Chronic | ICD-10-CM

## 2023-09-12 DIAGNOSIS — I48.21 PERMANENT ATRIAL FIBRILLATION: Primary | ICD-10-CM
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
## 2023-09-14 ENCOUNTER — OFFICE VISIT (OUTPATIENT)
Dept: ORTHOPEDIC SURGERY | Facility: CLINIC | Age: 78
End: 2023-09-14
Payer: MEDICARE

## 2023-09-14 VITALS — HEIGHT: 66 IN | BODY MASS INDEX: 30.86 KG/M2 | WEIGHT: 192 LBS | TEMPERATURE: 96.9 F

## 2023-09-14 DIAGNOSIS — M19.072 ARTHRITIS OF FIRST METATARSOPHALANGEAL (MTP) JOINT OF LEFT FOOT: ICD-10-CM

## 2023-09-14 DIAGNOSIS — M20.12 ACQUIRED HALLUX VALGUS OF LEFT FOOT: ICD-10-CM

## 2023-09-14 DIAGNOSIS — R52 PAIN: Primary | ICD-10-CM

## 2023-09-14 DIAGNOSIS — M21.372 LEFT FOOT DROP: ICD-10-CM

## 2023-09-14 DIAGNOSIS — M20.42 HAMMER TOE OF LEFT FOOT: ICD-10-CM

## 2023-09-14 DIAGNOSIS — M77.42 METATARSALGIA OF LEFT FOOT: ICD-10-CM
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
## 2023-09-27 RX ORDER — APIXABAN 5 MG/1
TABLET, FILM COATED ORAL
Qty: 180 TABLET | Refills: 0 | Status: SHIPPED | OUTPATIENT
Start: 2023-09-27

## 2023-10-04 ENCOUNTER — HOSPITAL ENCOUNTER (OUTPATIENT)
Dept: MAMMOGRAPHY | Facility: HOSPITAL | Age: 78
Discharge: HOME | End: 2023-10-04
Admitting: NURSE PRACTITIONER
Payer: MEDICARE

## 2023-10-04 DIAGNOSIS — Z17.0 MALIGNANT NEOPLASM OF OVERLAPPING SITES OF LEFT BREAST IN FEMALE, ESTROGEN RECEPTOR POSITIVE: ICD-10-CM

## 2023-10-04 DIAGNOSIS — Z12.31 ENCOUNTER FOR SCREENING MAMMOGRAM FOR MALIGNANT NEOPLASM OF BREAST: ICD-10-CM

## 2023-10-04 DIAGNOSIS — C50.812 MALIGNANT NEOPLASM OF OVERLAPPING SITES OF LEFT BREAST IN FEMALE, ESTROGEN RECEPTOR POSITIVE: ICD-10-CM

## 2023-10-04 PROCEDURE — 77067 SCR MAMMO BI INCL CAD: CPT

## 2023-10-04 PROCEDURE — 77063 BREAST TOMOSYNTHESIS BI: CPT

## 2023-10-05 ENCOUNTER — TELEPHONE (OUTPATIENT)
Dept: ONCOLOGY | Facility: CLINIC | Age: 78
End: 2023-10-05
Payer: MEDICARE

## 2023-10-05 DIAGNOSIS — R92.8 ABNORMALITY OF RIGHT BREAST ON SCREENING MAMMOGRAM: Primary | ICD-10-CM

## 2023-10-05 NOTE — TELEPHONE ENCOUNTER
Phoned patient to inform her that there was an area of incomplete visualization on her recent right breast screening mammogram that needs additional imaging, and that we have ordered a right breast diagnostic mammogram with possible ultrasound if needed. Advised patient that scheduling would arrange this for her and notify her with the date and time of her mammogram. Patient v/u.

## 2023-10-10 ENCOUNTER — HOSPITAL ENCOUNTER (OUTPATIENT)
Dept: ULTRASOUND IMAGING | Facility: HOSPITAL | Age: 78
Discharge: HOME | End: 2023-10-10
Payer: MEDICARE

## 2023-10-10 ENCOUNTER — HOSPITAL ENCOUNTER (OUTPATIENT)
Dept: MAMMOGRAPHY | Facility: HOSPITAL | Age: 78
Discharge: HOME | End: 2023-10-10
Payer: MEDICARE

## 2023-10-10 DIAGNOSIS — R92.8 ABNORMALITY OF RIGHT BREAST ON SCREENING MAMMOGRAM: ICD-10-CM

## 2023-10-10 PROCEDURE — 76642 ULTRASOUND BREAST LIMITED: CPT

## 2023-10-10 PROCEDURE — 77065 DX MAMMO INCL CAD UNI: CPT

## 2023-10-10 PROCEDURE — G0279 TOMOSYNTHESIS, MAMMO: HCPCS

## 2023-10-11 ENCOUNTER — TELEPHONE (OUTPATIENT)
Dept: ONCOLOGY | Facility: CLINIC | Age: 78
End: 2023-10-11
Payer: MEDICARE

## 2023-10-11 NOTE — TELEPHONE ENCOUNTER
"Phoned patient to inform her that her diagnostic mammogram results showed a \"probably benign\" lymph node and recommended 6-month interval follow-up. No biopsy was recommended at this time. Patient v/u.  "

## 2023-10-16 ENCOUNTER — OFFICE VISIT (OUTPATIENT)
Dept: ORTHOPEDIC SURGERY | Facility: CLINIC | Age: 78
End: 2023-10-16
Payer: MEDICARE

## 2023-10-16 VITALS — HEIGHT: 66 IN | BODY MASS INDEX: 30.86 KG/M2 | TEMPERATURE: 97.7 F | WEIGHT: 192 LBS

## 2023-10-16 DIAGNOSIS — E78.5 HYPERLIPIDEMIA, UNSPECIFIED HYPERLIPIDEMIA TYPE: Primary | ICD-10-CM

## 2023-10-16 DIAGNOSIS — R73.09 ELEVATED GLUCOSE: ICD-10-CM

## 2023-10-16 DIAGNOSIS — G62.9 NEUROPATHY: ICD-10-CM

## 2023-10-16 DIAGNOSIS — M20.42 HAMMER TOE OF LEFT FOOT: ICD-10-CM

## 2023-10-16 DIAGNOSIS — M77.42 METATARSALGIA OF LEFT FOOT: ICD-10-CM

## 2023-10-16 DIAGNOSIS — R52 PAIN: Primary | ICD-10-CM

## 2023-10-16 DIAGNOSIS — G57.92 NEURITIS OF LEFT FOOT: ICD-10-CM

## 2023-10-16 DIAGNOSIS — I10 ESSENTIAL HYPERTENSION: ICD-10-CM

## 2023-10-16 DIAGNOSIS — M19.072 ARTHRITIS OF FIRST METATARSOPHALANGEAL (MTP) JOINT OF LEFT FOOT: ICD-10-CM

## 2023-10-16 DIAGNOSIS — M21.372 LEFT FOOT DROP: ICD-10-CM

## 2023-10-16 DIAGNOSIS — M20.12 ACQUIRED HALLUX VALGUS OF LEFT FOOT: ICD-10-CM
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
## 2023-10-18 ENCOUNTER — HOSPITAL ENCOUNTER (OUTPATIENT)
Dept: PHYSICAL THERAPY | Facility: HOSPITAL | Age: 78
Setting detail: THERAPIES SERIES
Discharge: HOME | End: 2023-10-18
Payer: MEDICARE

## 2023-10-18 LAB
ALBUMIN SERPL-MCNC: 4.5 G/DL (ref 3.8–4.8)
ALBUMIN/GLOB SERPL: 1.8 {RATIO} (ref 1.2–2.2)
ALP SERPL-CCNC: 177 IU/L (ref 44–121)
ALT SERPL-CCNC: 15 IU/L (ref 0–32)
AST SERPL-CCNC: 18 IU/L (ref 0–40)
BASOPHILS # BLD AUTO: 0.1 X10E3/UL (ref 0–0.2)
BASOPHILS NFR BLD AUTO: 1 %
BILIRUB SERPL-MCNC: 0.5 MG/DL (ref 0–1.2)
BUN SERPL-MCNC: 28 MG/DL (ref 8–27)
BUN/CREAT SERPL: 25 (ref 12–28)
CALCIUM SERPL-MCNC: 9.6 MG/DL (ref 8.7–10.3)
CHLORIDE SERPL-SCNC: 103 MMOL/L (ref 96–106)
CHOLEST SERPL-MCNC: 205 MG/DL (ref 100–199)
CO2 SERPL-SCNC: 20 MMOL/L (ref 20–29)
CREAT SERPL-MCNC: 1.14 MG/DL (ref 0.57–1)
EGFRCR SERPLBLD CKD-EPI 2021: 50 ML/MIN/1.73
EOSINOPHIL # BLD AUTO: 0.4 X10E3/UL (ref 0–0.4)
EOSINOPHIL NFR BLD AUTO: 4 %
ERYTHROCYTE [DISTWIDTH] IN BLOOD BY AUTOMATED COUNT: 12.5 % (ref 11.7–15.4)
GLOBULIN SER CALC-MCNC: 2.5 G/DL (ref 1.5–4.5)
GLUCOSE SERPL-MCNC: 105 MG/DL (ref 70–99)
HBA1C MFR BLD: 6.1 % (ref 4.8–5.6)
HCT VFR BLD AUTO: 41.4 % (ref 34–46.6)
HDLC SERPL-MCNC: 61 MG/DL
HGB BLD-MCNC: 13.5 G/DL (ref 11.1–15.9)
IMM GRANULOCYTES # BLD AUTO: 0 X10E3/UL (ref 0–0.1)
IMM GRANULOCYTES NFR BLD AUTO: 0 %
LDLC SERPL CALC-MCNC: 123 MG/DL (ref 0–99)
LDLC/HDLC SERPL: 2 RATIO (ref 0–3.2)
LYMPHOCYTES # BLD AUTO: 2.9 X10E3/UL (ref 0.7–3.1)
LYMPHOCYTES NFR BLD AUTO: 31 %
MCH RBC QN AUTO: 31.8 PG (ref 26.6–33)
MCHC RBC AUTO-ENTMCNC: 32.6 G/DL (ref 31.5–35.7)
MCV RBC AUTO: 98 FL (ref 79–97)
MONOCYTES # BLD AUTO: 0.8 X10E3/UL (ref 0.1–0.9)
MONOCYTES NFR BLD AUTO: 9 %
NEUTROPHILS # BLD AUTO: 5.3 X10E3/UL (ref 1.4–7)
NEUTROPHILS NFR BLD AUTO: 55 %
PLATELET # BLD AUTO: 368 X10E3/UL (ref 150–450)
POTASSIUM SERPL-SCNC: 4.2 MMOL/L (ref 3.5–5.2)
PROT SERPL-MCNC: 7 G/DL (ref 6–8.5)
RBC # BLD AUTO: 4.24 X10E6/UL (ref 3.77–5.28)
SODIUM SERPL-SCNC: 139 MMOL/L (ref 134–144)
T4 FREE SERPL-MCNC: 1.27 NG/DL (ref 0.82–1.77)
TRIGL SERPL-MCNC: 116 MG/DL (ref 0–149)
TSH SERPL DL<=0.005 MIU/L-ACNC: 0.42 UIU/ML (ref 0.45–4.5)
VLDLC SERPL CALC-MCNC: 21 MG/DL (ref 5–40)
WBC # BLD AUTO: 9.4 X10E3/UL (ref 3.4–10.8)

## 2023-10-18 NOTE — Clinical Note
"Pt underwent 7/11/23    Pt reports no sensation prior to surgery, however now becoming painful. Pt states it feels as if sticks are in foot. Pt feels as if the foot is stiff. Summer 2022 lumbar surgery due to drop foot, which did not help with drop foot. Foot drop started 2 years ago, fell then continued to fall. Realized could not raise foot. Had slight LBP but nothing bad. Recovered IR/EV but not DF. Bone on nerve in LB. Numbness in foot began along with toe problems. 7 weeks until she could walk after surgery. Now has feeling in bottom of foot which she did not previously have. Pt reporting poor balance, fell last week, slid down wall. [place]wood to carpet, idn't get foot up enough. Rwx out, nothing in house. Furniture walking. Planning to get AFO and custom orthotic/shoe. Pt continues with LBP, due to recent mammogram. Breast CA 2007 L mastectomy. B TKA 20 yearts ago.    STG:    Pt will report pain rated ***/10 at worst in order to demonstrate ability to return to normalized ADLs and functional activities.    Pt will be independent with initial HEP for symptom management.    LTG:    Pt will be independent and compliant with advanced HEP for long term management of symptoms and prevention of future occurrence.     Pt will reduce level of perceived disability as measured by the {outcome measures:32339}  to ***% in order to improve QOL.     78 y.o. female referred to outpatient physical therapy for evaluation and treatment of left foot pain s/p left first MTP fusion for bunion correction, second and third metatarsal phalangeal joint release and Weil osteotomy as well as left second third fourth and fifth hammertoe correction with PIP resection/fusion on 7/11/2023 .  Patient presents with ***. Pt's signs and symptoms are consistent with {referring diagnosis:60286::\"***\"}.  Pertinent comorbidities and personal factors that may affect progress include, but are not limited to, foot drop, history LB surgery, B knee TKA.  " Pt scored *** on the {outcome measures:57064} where *** is no disability and is in {condition:02509} clinical condition. Pt would benefit from skilled PT to address functional deficits and return to PLOF.

## 2023-10-19 ENCOUNTER — OFFICE VISIT (OUTPATIENT)
Dept: INTERNAL MEDICINE | Facility: CLINIC | Age: 78
End: 2023-10-19
Payer: MEDICARE

## 2023-10-19 VITALS
OXYGEN SATURATION: 99 % | HEIGHT: 66 IN | DIASTOLIC BLOOD PRESSURE: 88 MMHG | SYSTOLIC BLOOD PRESSURE: 140 MMHG | BODY MASS INDEX: 30.37 KG/M2 | TEMPERATURE: 98.4 F | WEIGHT: 189 LBS | HEART RATE: 74 BPM

## 2023-10-19 DIAGNOSIS — E78.5 HYPERLIPIDEMIA, UNSPECIFIED HYPERLIPIDEMIA TYPE: ICD-10-CM

## 2023-10-19 DIAGNOSIS — E03.9 ACQUIRED HYPOTHYROIDISM: ICD-10-CM

## 2023-10-19 DIAGNOSIS — Z00.00 HEALTH CARE MAINTENANCE: Primary | ICD-10-CM

## 2023-10-19 DIAGNOSIS — F33.2 SEVERE EPISODE OF RECURRENT MAJOR DEPRESSIVE DISORDER, WITHOUT PSYCHOTIC FEATURES: ICD-10-CM

## 2023-10-19 DIAGNOSIS — R73.09 ELEVATED GLUCOSE: ICD-10-CM

## 2023-10-19 DIAGNOSIS — N18.32 STAGE 3B CHRONIC KIDNEY DISEASE: Chronic | ICD-10-CM

## 2023-10-19 RX ORDER — AMOXICILLIN 500 MG/1
CAPSULE ORAL
COMMUNITY
Start: 2023-09-27 | End: 2023-10-19

## 2023-10-19 RX ORDER — LIDOCAINE 50 MG/G
1 PATCH TOPICAL EVERY 24 HOURS
Qty: 30 PATCH | Refills: 0 | Status: SHIPPED | OUTPATIENT
Start: 2023-10-19
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
## 2023-10-27 ENCOUNTER — HOSPITAL ENCOUNTER (OUTPATIENT)
Dept: PHYSICAL THERAPY | Facility: HOSPITAL | Age: 78
Discharge: HOME | End: 2023-10-27
Payer: MEDICARE

## 2023-10-27 DIAGNOSIS — G57.92 NEURITIS OF LEFT FOOT: ICD-10-CM

## 2023-10-27 DIAGNOSIS — M21.372 LEFT FOOT DROP: ICD-10-CM

## 2023-10-27 DIAGNOSIS — M19.072 ARTHRITIS OF FIRST METATARSOPHALANGEAL (MTP) JOINT OF LEFT FOOT: ICD-10-CM

## 2023-10-27 DIAGNOSIS — Z47.89 ORTHOPEDIC AFTERCARE: ICD-10-CM

## 2023-10-27 DIAGNOSIS — M79.672 LEFT FOOT PAIN: ICD-10-CM

## 2023-10-27 DIAGNOSIS — M20.42 HAMMER TOE OF LEFT FOOT: ICD-10-CM

## 2023-10-27 DIAGNOSIS — M20.12 ACQUIRED HALLUX VALGUS OF LEFT FOOT: Primary | ICD-10-CM

## 2023-10-27 DIAGNOSIS — M77.42 METATARSALGIA OF LEFT FOOT: ICD-10-CM

## 2023-10-27 PROCEDURE — 97110 THERAPEUTIC EXERCISES: CPT | Performed by: PHYSICAL THERAPIST
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
## 2023-10-30 ENCOUNTER — APPOINTMENT (OUTPATIENT)
Dept: PHYSICAL THERAPY | Facility: HOSPITAL | Age: 78
End: 2023-10-30
Payer: MEDICARE

## 2023-11-06 ENCOUNTER — HOSPITAL ENCOUNTER (OUTPATIENT)
Dept: PHYSICAL THERAPY | Facility: HOSPITAL | Age: 78
Setting detail: THERAPIES SERIES
Discharge: HOME | End: 2023-11-06
Payer: MEDICARE

## 2023-11-06 DIAGNOSIS — M20.42 HAMMER TOE OF LEFT FOOT: ICD-10-CM

## 2023-11-06 DIAGNOSIS — M20.12 ACQUIRED HALLUX VALGUS OF LEFT FOOT: Primary | ICD-10-CM

## 2023-11-06 DIAGNOSIS — Z47.89 ORTHOPEDIC AFTERCARE: ICD-10-CM

## 2023-11-06 DIAGNOSIS — M19.072 ARTHRITIS OF FIRST METATARSOPHALANGEAL (MTP) JOINT OF LEFT FOOT: ICD-10-CM

## 2023-11-06 DIAGNOSIS — M79.672 LEFT FOOT PAIN: ICD-10-CM

## 2023-11-06 DIAGNOSIS — G57.92 NEURITIS OF LEFT FOOT: ICD-10-CM

## 2023-11-06 DIAGNOSIS — M77.42 METATARSALGIA OF LEFT FOOT: ICD-10-CM

## 2023-11-06 DIAGNOSIS — M21.372 LEFT FOOT DROP: ICD-10-CM

## 2023-11-06 PROCEDURE — 97110 THERAPEUTIC EXERCISES: CPT | Performed by: PHYSICAL THERAPIST
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
## 2023-11-08 ENCOUNTER — HOSPITAL ENCOUNTER (OUTPATIENT)
Dept: PHYSICAL THERAPY | Facility: HOSPITAL | Age: 78
Setting detail: THERAPIES SERIES
Discharge: HOME | End: 2023-11-08
Payer: MEDICARE

## 2023-11-08 DIAGNOSIS — M21.372 LEFT FOOT DROP: ICD-10-CM

## 2023-11-08 DIAGNOSIS — M79.672 LEFT FOOT PAIN: ICD-10-CM

## 2023-11-08 DIAGNOSIS — M19.072 ARTHRITIS OF FIRST METATARSOPHALANGEAL (MTP) JOINT OF LEFT FOOT: ICD-10-CM

## 2023-11-08 DIAGNOSIS — Z47.89 ORTHOPEDIC AFTERCARE: ICD-10-CM

## 2023-11-08 DIAGNOSIS — M20.12 ACQUIRED HALLUX VALGUS OF LEFT FOOT: Primary | ICD-10-CM

## 2023-11-08 DIAGNOSIS — M20.42 HAMMER TOE OF LEFT FOOT: ICD-10-CM

## 2023-11-08 DIAGNOSIS — M77.42 METATARSALGIA OF LEFT FOOT: ICD-10-CM

## 2023-11-08 DIAGNOSIS — G57.92 NEURITIS OF LEFT FOOT: ICD-10-CM

## 2023-11-08 PROCEDURE — 97110 THERAPEUTIC EXERCISES: CPT
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
## 2023-11-13 ENCOUNTER — HOSPITAL ENCOUNTER (OUTPATIENT)
Dept: PHYSICAL THERAPY | Facility: HOSPITAL | Age: 78
Setting detail: THERAPIES SERIES
Discharge: HOME | End: 2023-11-13
Payer: MEDICARE

## 2023-11-13 DIAGNOSIS — Z47.89 ORTHOPEDIC AFTERCARE: ICD-10-CM

## 2023-11-13 DIAGNOSIS — M77.42 METATARSALGIA OF LEFT FOOT: ICD-10-CM

## 2023-11-13 DIAGNOSIS — M19.072 ARTHRITIS OF FIRST METATARSOPHALANGEAL (MTP) JOINT OF LEFT FOOT: ICD-10-CM

## 2023-11-13 DIAGNOSIS — M21.372 LEFT FOOT DROP: ICD-10-CM

## 2023-11-13 DIAGNOSIS — M20.12 ACQUIRED HALLUX VALGUS OF LEFT FOOT: Primary | ICD-10-CM

## 2023-11-13 DIAGNOSIS — M20.42 HAMMER TOE OF LEFT FOOT: ICD-10-CM

## 2023-11-13 DIAGNOSIS — G57.92 NEURITIS OF LEFT FOOT: ICD-10-CM

## 2023-11-13 DIAGNOSIS — M79.672 LEFT FOOT PAIN: ICD-10-CM

## 2023-11-13 PROCEDURE — 97110 THERAPEUTIC EXERCISES: CPT | Performed by: PHYSICAL THERAPIST
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
## 2023-11-15 ENCOUNTER — HOSPITAL ENCOUNTER (OUTPATIENT)
Dept: PHYSICAL THERAPY | Facility: HOSPITAL | Age: 78
Setting detail: THERAPIES SERIES
Discharge: HOME | End: 2023-11-15
Payer: MEDICARE

## 2023-11-15 DIAGNOSIS — M77.42 METATARSALGIA OF LEFT FOOT: ICD-10-CM

## 2023-11-15 DIAGNOSIS — M20.42 HAMMER TOE OF LEFT FOOT: ICD-10-CM

## 2023-11-15 DIAGNOSIS — M20.12 ACQUIRED HALLUX VALGUS OF LEFT FOOT: Primary | ICD-10-CM

## 2023-11-15 DIAGNOSIS — M19.072 ARTHRITIS OF FIRST METATARSOPHALANGEAL (MTP) JOINT OF LEFT FOOT: ICD-10-CM

## 2023-11-15 PROCEDURE — 97110 THERAPEUTIC EXERCISES: CPT
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
## 2023-11-20 ENCOUNTER — APPOINTMENT (OUTPATIENT)
Dept: PHYSICAL THERAPY | Facility: HOSPITAL | Age: 78
End: 2023-11-20
Payer: MEDICARE

## 2023-11-27 ENCOUNTER — OFFICE VISIT (OUTPATIENT)
Dept: ORTHOPEDIC SURGERY | Facility: CLINIC | Age: 78
End: 2023-11-27
Payer: MEDICARE

## 2023-11-27 VITALS — TEMPERATURE: 96 F | BODY MASS INDEX: 30.37 KG/M2 | HEIGHT: 66 IN | WEIGHT: 189 LBS

## 2023-11-27 DIAGNOSIS — M19.072 ARTHRITIS OF FIRST METATARSOPHALANGEAL (MTP) JOINT OF LEFT FOOT: ICD-10-CM

## 2023-11-27 DIAGNOSIS — M77.42 METATARSALGIA OF LEFT FOOT: ICD-10-CM

## 2023-11-27 DIAGNOSIS — M20.12 ACQUIRED HALLUX VALGUS OF LEFT FOOT: ICD-10-CM

## 2023-11-27 DIAGNOSIS — R52 PAIN: Primary | ICD-10-CM

## 2023-11-27 PROCEDURE — 99213 OFFICE O/P EST LOW 20 MIN: CPT | Performed by: ORTHOPAEDIC SURGERY
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
## 2023-12-04 RX ORDER — APIXABAN 5 MG/1
TABLET, FILM COATED ORAL
Qty: 180 TABLET | Refills: 0 | Status: SHIPPED | OUTPATIENT
Start: 2023-12-04

## 2023-12-05 ENCOUNTER — APPOINTMENT (OUTPATIENT)
Dept: PHYSICAL THERAPY | Facility: HOSPITAL | Age: 78
End: 2023-12-05
Payer: MEDICARE

## 2023-12-05 ENCOUNTER — TELEPHONE (OUTPATIENT)
Dept: PHYSICAL THERAPY | Facility: HOSPITAL | Age: 78
End: 2023-12-05
Payer: MEDICARE

## 2023-12-05 NOTE — TELEPHONE ENCOUNTER
Caller: Marylu Foreman    Relationship: Self       What was the call regarding: STOMACH ISSUES AND COUGH     Is

## 2023-12-06 RX ORDER — DILTIAZEM HYDROCHLORIDE 180 MG/1
180 CAPSULE, COATED, EXTENDED RELEASE ORAL 2 TIMES DAILY
Qty: 180 CAPSULE | Refills: 3 | Status: SHIPPED | OUTPATIENT
Start: 2023-12-06

## 2023-12-06 NOTE — TELEPHONE ENCOUNTER
Patient states she is completely out of her diltiazem and needs it sent into the [facility] pharmacy on [address].         Rx Refill Note  Requested Prescriptions     Pending Prescriptions Disp Refills    dilTIAZem CD (CARDIZEM CD) 180 MG 24 hr capsule 180 capsule 3     Sig: Take 1 capsule by mouth 2 (Two) Times a Day.      Last office visit with prescribing clinician: 9/12/2023   Last telemedicine visit with prescribing clinician: Visit date not found   Next office visit with prescribing clinician: 9/17/2024                         Would you like a call back once the refill request has been completed: [] Yes [] No    If the office needs to give you a call back, can they leave a voicemail: [] Yes [] No    Ainsley Helms CMA  12/06/23, 12:31 EST

## 2023-12-07 ENCOUNTER — HOSPITAL ENCOUNTER (OUTPATIENT)
Dept: PHYSICAL THERAPY | Facility: HOSPITAL | Age: 78
Setting detail: THERAPIES SERIES
Discharge: HOME | End: 2023-12-07
Payer: MEDICARE

## 2023-12-07 DIAGNOSIS — M79.672 LEFT FOOT PAIN: ICD-10-CM

## 2023-12-07 DIAGNOSIS — M20.42 HAMMER TOE OF LEFT FOOT: ICD-10-CM

## 2023-12-07 DIAGNOSIS — M21.372 LEFT FOOT DROP: ICD-10-CM

## 2023-12-07 DIAGNOSIS — G57.92 NEURITIS OF LEFT FOOT: ICD-10-CM

## 2023-12-07 DIAGNOSIS — M77.42 METATARSALGIA OF LEFT FOOT: ICD-10-CM

## 2023-12-07 DIAGNOSIS — M19.072 ARTHRITIS OF FIRST METATARSOPHALANGEAL (MTP) JOINT OF LEFT FOOT: ICD-10-CM

## 2023-12-07 DIAGNOSIS — M20.12 ACQUIRED HALLUX VALGUS OF LEFT FOOT: Primary | ICD-10-CM

## 2023-12-07 PROCEDURE — 97110 THERAPEUTIC EXERCISES: CPT | Performed by: PHYSICAL THERAPIST

## 2023-12-07 PROCEDURE — 97112 NEUROMUSCULAR REEDUCATION: CPT | Performed by: PHYSICAL THERAPIST
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
## 2023-12-11 ENCOUNTER — TELEPHONE (OUTPATIENT)
Dept: ORTHOPEDIC SURGERY | Facility: CLINIC | Age: 78
End: 2023-12-11
Payer: MEDICARE

## 2023-12-11 NOTE — TELEPHONE ENCOUNTER
Caller: Marylu Foreman    Relationship: Self    Best call back number:     What is the best time to reach you: ANY    Who are you requesting to speak with (clinical staff, provider,  specific staff member): CLINICAL    What was the call regarding: PATIENT BELIEVES SHE LEFT A WHITE SHOE THAT SAYS ORTHO ON IT AT THE OFFICE WHEN SHE CAME AND GOT FITTED FOR A BOOT SOME TIME BACK.      Is it okay if the provider responds through [employer]: PLEASE CHECK LOST AND FOUND AND CONTACT PATIENT

## 2023-12-12 ENCOUNTER — HOSPITAL ENCOUNTER (OUTPATIENT)
Dept: PHYSICAL THERAPY | Facility: HOSPITAL | Age: 78
Setting detail: THERAPIES SERIES
Discharge: HOME | End: 2023-12-12
Payer: MEDICARE

## 2023-12-12 DIAGNOSIS — M20.12 ACQUIRED HALLUX VALGUS OF LEFT FOOT: Primary | ICD-10-CM

## 2023-12-12 DIAGNOSIS — M19.072 ARTHRITIS OF FIRST METATARSOPHALANGEAL (MTP) JOINT OF LEFT FOOT: ICD-10-CM

## 2023-12-12 DIAGNOSIS — M20.42 HAMMER TOE OF LEFT FOOT: ICD-10-CM

## 2023-12-12 DIAGNOSIS — Z47.89 ORTHOPEDIC AFTERCARE: ICD-10-CM

## 2023-12-12 DIAGNOSIS — G57.92 NEURITIS OF LEFT FOOT: ICD-10-CM

## 2023-12-12 DIAGNOSIS — M21.372 LEFT FOOT DROP: ICD-10-CM

## 2023-12-12 DIAGNOSIS — M79.672 LEFT FOOT PAIN: ICD-10-CM

## 2023-12-12 DIAGNOSIS — M77.42 METATARSALGIA OF LEFT FOOT: ICD-10-CM

## 2023-12-12 PROCEDURE — 97112 NEUROMUSCULAR REEDUCATION: CPT | Performed by: PHYSICAL THERAPIST

## 2023-12-12 PROCEDURE — 97116 GAIT TRAINING THERAPY: CPT | Performed by: PHYSICAL THERAPIST

## 2023-12-12 PROCEDURE — 97110 THERAPEUTIC EXERCISES: CPT | Performed by: PHYSICAL THERAPIST

## 2023-12-14 ENCOUNTER — HOSPITAL ENCOUNTER (OUTPATIENT)
Dept: PHYSICAL THERAPY | Facility: HOSPITAL | Age: 78
Setting detail: THERAPIES SERIES
Discharge: HOME | End: 2023-12-14
Payer: MEDICARE

## 2023-12-14 DIAGNOSIS — M21.372 LEFT FOOT DROP: ICD-10-CM

## 2023-12-14 DIAGNOSIS — G57.92 NEURITIS OF LEFT FOOT: ICD-10-CM

## 2023-12-14 DIAGNOSIS — M20.42 HAMMER TOE OF LEFT FOOT: ICD-10-CM

## 2023-12-14 DIAGNOSIS — M79.672 LEFT FOOT PAIN: ICD-10-CM

## 2023-12-14 DIAGNOSIS — Z47.89 ORTHOPEDIC AFTERCARE: ICD-10-CM

## 2023-12-14 DIAGNOSIS — M19.072 ARTHRITIS OF FIRST METATARSOPHALANGEAL (MTP) JOINT OF LEFT FOOT: ICD-10-CM

## 2023-12-14 DIAGNOSIS — M77.42 METATARSALGIA OF LEFT FOOT: ICD-10-CM

## 2023-12-14 DIAGNOSIS — M20.12 ACQUIRED HALLUX VALGUS OF LEFT FOOT: Primary | ICD-10-CM

## 2023-12-14 PROCEDURE — 97116 GAIT TRAINING THERAPY: CPT | Performed by: PHYSICAL THERAPIST

## 2023-12-14 PROCEDURE — 97530 THERAPEUTIC ACTIVITIES: CPT | Performed by: PHYSICAL THERAPIST

## 2023-12-14 PROCEDURE — 97110 THERAPEUTIC EXERCISES: CPT | Performed by: PHYSICAL THERAPIST

## 2023-12-14 RX ORDER — IRBESARTAN 300 MG/1
300 TABLET ORAL NIGHTLY
Qty: 90 TABLET | Refills: 3 | Status: SHIPPED | OUTPATIENT
Start: 2023-12-14
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
## 2023-12-19 ENCOUNTER — HOSPITAL ENCOUNTER (OUTPATIENT)
Dept: PHYSICAL THERAPY | Facility: HOSPITAL | Age: 78
Setting detail: THERAPIES SERIES
Discharge: HOME | End: 2023-12-19
Payer: MEDICARE

## 2023-12-19 DIAGNOSIS — M20.42 HAMMER TOE OF LEFT FOOT: ICD-10-CM

## 2023-12-19 DIAGNOSIS — M20.12 ACQUIRED HALLUX VALGUS OF LEFT FOOT: Primary | ICD-10-CM

## 2023-12-19 DIAGNOSIS — M79.672 LEFT FOOT PAIN: ICD-10-CM

## 2023-12-19 DIAGNOSIS — G57.92 NEURITIS OF LEFT FOOT: ICD-10-CM

## 2023-12-19 DIAGNOSIS — M77.42 METATARSALGIA OF LEFT FOOT: ICD-10-CM

## 2023-12-19 DIAGNOSIS — M19.072 ARTHRITIS OF FIRST METATARSOPHALANGEAL (MTP) JOINT OF LEFT FOOT: ICD-10-CM

## 2023-12-19 DIAGNOSIS — Z47.89 ORTHOPEDIC AFTERCARE: ICD-10-CM

## 2023-12-19 DIAGNOSIS — M21.372 LEFT FOOT DROP: ICD-10-CM

## 2023-12-19 PROCEDURE — 97110 THERAPEUTIC EXERCISES: CPT
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
## 2023-12-21 ENCOUNTER — APPOINTMENT (OUTPATIENT)
Dept: PHYSICAL THERAPY | Facility: HOSPITAL | Age: 78
End: 2023-12-21
Payer: MEDICARE

## 2023-12-27 ENCOUNTER — APPOINTMENT (OUTPATIENT)
Dept: PHYSICAL THERAPY | Facility: HOSPITAL | Age: 78
End: 2023-12-27
Payer: MEDICARE

## 2023-12-29 ENCOUNTER — APPOINTMENT (OUTPATIENT)
Dept: PHYSICAL THERAPY | Facility: HOSPITAL | Age: 78
End: 2023-12-29
Payer: MEDICARE

## 2024-01-05 NOTE — PROGRESS NOTES
Chief Complaint  Stage I (J3hD0P1) left breast cancer in 2007, pulmonary emboli in 2009, atrial fibrillation, history of GI bleed, history of iron deficiency status post prior gastric bypass in 2001    Subjective        History of Present Illness  Patient returns today in follow-up with laboratory studies and mammogram findings to review.  She was found to have recurrent iron deficiency and was treated with Venofer 300 mg IV x 4 on 6/26, 6/28, 7/3, 7/7/2023.  She was not experiencing any visible evidence of blood in her stool, last underwent endoscopic evaluation with EGD and colonoscopy with Dr. Bess on 11/12/2018.  She has undergone prior gastric bypass in 2001 and his underlying malabsorption.  The patient has experienced ongoing difficulty with left foot issues related to chronic malformations as well as left foot drop.  She did require surgical intervention on 7/11/2023 with Dr. Reed.  Unfortunately she has not done well since that time and has continued pain in the left foot, has been unable to wear shoe.  She ambulates with the use of a walker.  She has had difficulty managing with her left foot drop and has been undergoing physical therapy.  She had 1 minor fall in August fortunately with no significant injury.  She does continue on anticoagulation with Eliquis at 5 mg twice daily having undergone previous pulmonary embolism as well as bilateral calf DVT when she was briefly off Eliquis after her back surgery.  She does continue in atrial fibrillation and continues follow-up with cardiology as well.  She underwent her annual mammogram on 10//23 which was BI-RADS 0 in the right having undergone previous left mastectomy.  She underwent a diagnostic mammogram and ultrasound of the right breast on 10/10/2023 with identification of a probably benign 0.5 cm lymph node in the 9 o'clock position and a 0.7 cm lymph node that was benign appearing in the 8 o'clock position, recommended 6-month interval follow-up  "right breast mammogram and ultrasound.      Objective   Vital Signs:   /82   Pulse 80   Temp 97.2 °F (36.2 °C) (Temporal)   Resp 16   Ht 165 cm (64.96\")   Wt 85.6 kg (188 lb 12.8 oz)   SpO2 98%   BMI 31.46 kg/m²     Physical Exam  Constitutional:       Appearance: She is well-developed.   Eyes:      Conjunctiva/sclera: Conjunctivae normal.   Neck:      Thyroid: No thyromegaly.   Cardiovascular:      Rate and Rhythm: Normal rate. Rhythm irregular.      Heart sounds: Murmur heard.      No friction rub. No gallop.   Pulmonary:      Effort: No respiratory distress.      Breath sounds: Normal breath sounds. No wheezing.      Comments: Status post left mastectomy, chest wall without masses or adenitis palpated.  Right breast without masses or abnormalities palpated.  Abdominal:      General: Bowel sounds are normal. There is no distension.      Palpations: Abdomen is soft.      Tenderness: There is no abdominal tenderness.   Lymphadenopathy:      Head:      Right side of head: No submandibular adenopathy.      Cervical: No cervical adenopathy.      Upper Body:      Right upper body: No supraclavicular adenopathy.      Left upper body: No supraclavicular adenopathy.   Skin:     General: Skin is warm and dry.      Findings: No rash.      Comments: Patient with soft brace in place left foot   Neurological:      Mental Status: She is alert and oriented to person, place, and time.      Cranial Nerves: No cranial nerve deficit.      Motor: No abnormal muscle tone.      Deep Tendon Reflexes: Reflexes normal.      Comments: Patient with continued left foot drop   Psychiatric:         Behavior: Behavior normal.            Result Review : Reviewed CBC, CMP, iron panel, ferritin from today.  Reviewed mammogram from 10//23 and mammogram and ultrasound from 10/10/2023.  Reviewed records from orthopedics and cardiology       Assessment and Plan     *Stage I (O9yT0D9) left breast cancer:  Biopsy 5/8/2007 with in situ and " invasive ductal carcinoma, grade 2,/GA positive, HER-2/jorge negative  Left mastectomy 6/18/0742 foci carcinoma, 4 mm, grade 1 in situ and invasive ductal carcinoma and 2 mm grade 2 in situ and invasive ductal carcinoma, negative margins, negative sentinel lymph nodes x3 with 5 additional negative lymph nodes.  Arimidex initiated 7/2/2007  Arimidex interrupted patient developed bilateral pulmonary emboli in May 2009, resume shortly thereafter.  Completed 5 years treatment in 2012.  Patient returns today in follow-up with recent mammogram to review.  On 10/4/2023, right-sided mammogram showed BI-RADS 0 findings with asymmetry seen on the right.  Subsequent diagnostic mammogram and ultrasound on the right on 10/10/2023 showed 0.5 cm benign-appearing lymph node at the 9 o'clock position and 0.7 cm benign-appearing lymph node at the 8 o'clock position.  Recommendation for 6-month interval follow-up diagnostic right breast mammogram and ultrasound.  Patient's exam today is negative.  We will go ahead and schedule diagnostic right mammogram and ultrasound for April 2024.    *Pulmonary emboli 5/2/2009 and bilateral calf DVT 8/11/2022:  Family history of DVT in the patient's mother occurring at age 70 postoperatively  Patient developed bilateral lower lobe pulmonary emboli 5/2/2009 following right VATS on 4/30/2009  Felt to be a provoked thrombosis related to surgery  Hypercoagulable evaluation with negative factor V Leiden, negative prothrombin gene mutation, normal homocystine, protein C antigen 92, free to protein S 75, anticardiolipin antibody panel negative, lupus anticoagulant negative, Antithrombin %  Continuing on chronic anticoagulation primarily for atrial fibrillation at this point as prior thrombosis was provoked.  Transitioned from Coumadin to Eliquis 5 mg twice daily in May 2020  At time of hospitalization for IJEOMA/CKD 11/15-11/18/2021, Eliquis dose was decreased to 2.5 mg twice daily.  Patient required  lumbar laminectomy 8/9/2022, Eliquis was held perioperatively.  Subsequently admitted 8/10 - 8/12/2022 with Doppler 8/11/2022 that showed bilateral acute calf DVT.  This occurred postoperatively and while off anticoagulation.  Recommended to resume Eliquis at 5 mg twice daily dose.  Patient will require long-term anticoagulation with Eliquis 5 mg twice daily for both thrombophilia and atrial fibrillation.  Patient returns today in follow-up continuing on Eliquis 5 mg twice daily.  She has not experienced any significant bleeding issues.  Her iron deficiency is felt to likely be related to malabsorption rather than GI blood loss.    *Iron deficiency anemia:  Prior gastric bypass in 2001  Intolerant of oral iron.  Patient has required IV iron on multiple occasions: Feraheme 8/13 and 8/20/2018; Injectafer 3/9 and 3/16/2020  Labs on 11/15/2021 showed decline in hemoglobin to 10.8 however this was in the setting of UTI and IJEOMA/CKD3.  Her iron panel was normal with iron 114, iron saturation 34%, TIBC 337 with ferritin elevated at 292.  B12 was normal at 883.  We continue to follow these values given her history of prior gastric bypass and iron deficiency requiring intermittent IV iron (intolerant of oral iron).   On 12/20/2021, hemoglobin further declined to 9.5.  Repeat labs with iron 47, ferritin 262, iron saturation 14%, TIBC 328, B12 level 1162.  Additional labs performed with negative serum protein electrophoresis and immunoelectrophoresis with normal quantitative immunoglobulins.  Free light chains elevated with kappa 73, lambda 36 with ratio 2.04, consistent with patient's degree of renal dysfunction.  Haptoglobin normal 186, LDH normal 171, CRP borderline elevated 0.62, erythropoietin level 20.1.  Anemia felt to be related to CKD3.  Patient felt to be a potential candidate for Procrit if hemoglobin remains below 10.  Patient experienced improvement in hemoglobin into the 11-12 range  Labs on 6/19/2023 with  hemoglobin 11.7, iron 50, ferritin 41.6, iron saturation 12%, TIBC 404 indicating recurrent iron deficiency likely related to malabsorption from prior gastric bypass  Patient received IV iron with Venofer 300 mg on 6/26, 6/28, 7/3, 7/7/2023  Labs today with hemoglobin normal at 13.6, patient is iron replete with iron 95, ferritin 223, iron saturation 23%, TIBC 405.  Recheck labs at return visit in 6 months.  As above, recurrent iron deficiency to be likely related to malabsorption from prior gastric bypass.  Patient did undergo previous EGD and colonoscopy last on 11/12/2018 and I did suggest that we refer the patient back to Dr. Bess to discuss whether endoscopic evaluation is warranted at this point.    *GI bleed in July 2018:  Acute lower GI bleed with supratherapeutic INR Coumadin.  Angiogram performed with coil embolization of artery to sigmoid colon  No clinical evidence of further GI blood loss    *Atrial fibrillation:  Requires ongoing anticoagulation for this indication  As above, transitioned from Coumadin to Eliquis 5 mg twice daily in May 2020  As above, during hospitalization 11/15-11/18/2021 for IJEOMA/CKD3, Eliquis dose was decreased to 2.5 mg twice daily  See above discussion, patient developed bilateral calf DVT while briefly off anticoagulation following lumbar laminectomy on 8/11/2022.  Patient is continuing on full dose Eliquis 5 mg twice daily.    *Status post right VATS 4/30/2020 for pulmonary nodule with finding of necrotizing granulomatous inflammation    *Severe depression/anxiety:  Patient has had chronic issues with this, is followed by psychiatry, Dr. Librado Monique.  She also underwent previous counseling which she found helpful.  The patient had ongoing difficulty with control of her depression.  She has been on multiple antidepressants  In June/July 2021 patient underwent transcranial magnetic stimulation (TMS) with significant improvement in depressive symptoms and resolution of  headaches.  Symptoms subsequently gradually recurred, patient reports there has been difficulty with insurance regarding maintenance treatments.  Patient reports symptoms have been stable recently    *IJEOMA/CKD3  Patient with CKD3 with baseline creatinine in the 1.3-1.8 range  At time of routine follow-up visit 11/15/2021, creatinine was 3.19 and BUN of 59.  This was compared to prior value 6/2/2021 with BUN 41, creatinine 1.53.  Patient was hospitalized 11/15-11/18/2021  Patient is continuing follow-up with Dr. Cisneros in nephrology  Creatinine on 11/24/2021 remained elevated at 2.43 however on 12/20/2021 declined to 1.7, near her prior baseline.  Additional labs on 12/20/2021 with negative serum protein electrophoresis and immunoelectrophoresis with normal quantitative immunoglobulins.  Free light chains elevated with kappa 73, lambda 36 with ratio 2.04, consistent with patient's degree of renal dysfunction.  Creatinine today stable at 1.26    *ESBL E. coli UTI  Urine culture positive on 11/6/2021 in urgent care, patient treated with 7 days nitrofurantoin (was sensitive on culture result)  Patient with recurrent symptoms, was treated during recent hospitalization 11/15-11/18/2021 with Levaquin    *Hypothyroidism   Patient continues on levothyroxine 50 mcg daily  Patient developed cold sensitivity, labs on 12/20/2021 with TSH 2.03 and free T4 1.07.    *Severe left-sided sciatica with left foot drop  Plain film of the lumbar spine on 5/10/2022 was negative.    MRI lumbar [facility] at [facility] on 5/14/2022 showed no evidence of metastatic disease however did show evidence of degenerative change with disc disease and neuroforaminal compromise.     She developed a left foot drop and was referred to neurosurgery, eventually underwent lumbar laminectomy on 8/9/2022  Patient continues with chronic difficulty regarding left foot drop    *Left foot deformities  Patient with multiple issues regarding her left foot  including hammertoes, hallux valgus  Patient did require left foot surgery on 7/11/2023  Patient reports that her pain in the left foot has worsened recently, is unable to wear a shoe and continues follow-up with pediatrics.      Plan:  Continue Eliquis 5 mg twice daily  Referral back to GI Dr. Bess to review whether patient requires repeat endoscopic evaluation in the setting of recurrent iron deficiency with prior EGD and colonoscopy performed 11/12/2018.  Patient with chronic malabsorption secondary to prior gastric bypass.  Schedule 6-month interval right breast diagnostic mammogram and ultrasound in April 2024.  MD visit in 6 months with CBC, CMP, stat iron panel/ferritin.

## 2024-01-08 ENCOUNTER — OFFICE VISIT (OUTPATIENT)
Dept: ORTHOPEDIC SURGERY | Facility: CLINIC | Age: 79
End: 2024-01-08
Payer: MEDICARE

## 2024-01-08 ENCOUNTER — OFFICE VISIT (OUTPATIENT)
Dept: ONCOLOGY | Facility: CLINIC | Age: 79
End: 2024-01-08
Payer: MEDICARE

## 2024-01-08 ENCOUNTER — LAB (OUTPATIENT)
Dept: OTHER | Facility: HOSPITAL | Age: 79
End: 2024-01-08
Payer: MEDICARE

## 2024-01-08 VITALS
BODY MASS INDEX: 31.46 KG/M2 | WEIGHT: 188.8 LBS | RESPIRATION RATE: 16 BRPM | DIASTOLIC BLOOD PRESSURE: 82 MMHG | TEMPERATURE: 97.2 F | HEIGHT: 65 IN | SYSTOLIC BLOOD PRESSURE: 145 MMHG | HEART RATE: 80 BPM | OXYGEN SATURATION: 98 %

## 2024-01-08 VITALS — HEIGHT: 65 IN | TEMPERATURE: 98.2 F | BODY MASS INDEX: 30.82 KG/M2 | WEIGHT: 185 LBS

## 2024-01-08 DIAGNOSIS — M19.072 ARTHRITIS OF FIRST METATARSOPHALANGEAL (MTP) JOINT OF LEFT FOOT: ICD-10-CM

## 2024-01-08 DIAGNOSIS — M21.372 LEFT FOOT DROP: ICD-10-CM

## 2024-01-08 DIAGNOSIS — Z17.0 MALIGNANT NEOPLASM OF OVERLAPPING SITES OF LEFT BREAST IN FEMALE, ESTROGEN RECEPTOR POSITIVE: Primary | ICD-10-CM

## 2024-01-08 DIAGNOSIS — Z17.0 MALIGNANT NEOPLASM OF OVERLAPPING SITES OF LEFT BREAST IN FEMALE, ESTROGEN RECEPTOR POSITIVE: ICD-10-CM

## 2024-01-08 DIAGNOSIS — D63.1 ANEMIA DUE TO STAGE 3 CHRONIC KIDNEY DISEASE, UNSPECIFIED WHETHER STAGE 3A OR 3B CKD: ICD-10-CM

## 2024-01-08 DIAGNOSIS — M19.079 ARTHRITIS OF FOOT: ICD-10-CM

## 2024-01-08 DIAGNOSIS — M20.12 ACQUIRED HALLUX VALGUS OF LEFT FOOT: ICD-10-CM

## 2024-01-08 DIAGNOSIS — D50.8 OTHER IRON DEFICIENCY ANEMIA: ICD-10-CM

## 2024-01-08 DIAGNOSIS — Z79.01 CHRONIC ANTICOAGULATION: ICD-10-CM

## 2024-01-08 DIAGNOSIS — R52 PAIN: Primary | ICD-10-CM

## 2024-01-08 DIAGNOSIS — N18.30 ANEMIA DUE TO STAGE 3 CHRONIC KIDNEY DISEASE, UNSPECIFIED WHETHER STAGE 3A OR 3B CKD: ICD-10-CM

## 2024-01-08 DIAGNOSIS — G57.92 NEURITIS OF LEFT FOOT: ICD-10-CM

## 2024-01-08 DIAGNOSIS — C50.812 MALIGNANT NEOPLASM OF OVERLAPPING SITES OF LEFT BREAST IN FEMALE, ESTROGEN RECEPTOR POSITIVE: ICD-10-CM

## 2024-01-08 DIAGNOSIS — C50.812 MALIGNANT NEOPLASM OF OVERLAPPING SITES OF LEFT BREAST IN FEMALE, ESTROGEN RECEPTOR POSITIVE: Primary | ICD-10-CM

## 2024-01-08 DIAGNOSIS — M20.42 HAMMER TOE OF LEFT FOOT: ICD-10-CM

## 2024-01-08 LAB
ALBUMIN SERPL-MCNC: 4.7 G/DL (ref 3.5–5.2)
ALBUMIN/GLOB SERPL: 1.8 G/DL
ALP SERPL-CCNC: 180 U/L (ref 39–117)
ALT SERPL W P-5'-P-CCNC: 18 U/L (ref 1–33)
ANION GAP SERPL CALCULATED.3IONS-SCNC: 18 MMOL/L (ref 5–15)
AST SERPL-CCNC: 20 U/L (ref 1–32)
BASOPHILS # BLD AUTO: 0.12 10*3/MM3 (ref 0–0.2)
BASOPHILS NFR BLD AUTO: 1.4 % (ref 0–1.5)
BILIRUB SERPL-MCNC: 0.5 MG/DL (ref 0–1.2)
BUN SERPL-MCNC: 25 MG/DL (ref 8–23)
BUN/CREAT SERPL: 19.8 (ref 7–25)
CALCIUM SPEC-SCNC: 9.6 MG/DL (ref 8.6–10.5)
CHLORIDE SERPL-SCNC: 105 MMOL/L (ref 98–107)
CO2 SERPL-SCNC: 19 MMOL/L (ref 22–29)
CREAT SERPL-MCNC: 1.26 MG/DL (ref 0.57–1)
DEPRECATED RDW RBC AUTO: 50.8 FL (ref 37–54)
EGFRCR SERPLBLD CKD-EPI 2021: 44.3 ML/MIN/1.73
EOSINOPHIL # BLD AUTO: 0.19 10*3/MM3 (ref 0–0.4)
EOSINOPHIL NFR BLD AUTO: 2.2 % (ref 0.3–6.2)
ERYTHROCYTE [DISTWIDTH] IN BLOOD BY AUTOMATED COUNT: 14 % (ref 12.3–15.4)
FERRITIN SERPL-MCNC: 223 NG/ML (ref 13–150)
GLOBULIN UR ELPH-MCNC: 2.6 GM/DL
GLUCOSE SERPL-MCNC: 115 MG/DL (ref 65–99)
HCT VFR BLD AUTO: 42.4 % (ref 34–46.6)
HGB BLD-MCNC: 13.6 G/DL (ref 12–15.9)
IMM GRANULOCYTES # BLD AUTO: 0.02 10*3/MM3 (ref 0–0.05)
IMM GRANULOCYTES NFR BLD AUTO: 0.2 % (ref 0–0.5)
IRON 24H UR-MRATE: 95 MCG/DL (ref 37–145)
IRON SATN MFR SERPL: 23 % (ref 20–50)
LYMPHOCYTES # BLD AUTO: 3.16 10*3/MM3 (ref 0.7–3.1)
LYMPHOCYTES NFR BLD AUTO: 35.8 % (ref 19.6–45.3)
MCH RBC QN AUTO: 31.6 PG (ref 26.6–33)
MCHC RBC AUTO-ENTMCNC: 32.1 G/DL (ref 31.5–35.7)
MCV RBC AUTO: 98.6 FL (ref 79–97)
MONOCYTES # BLD AUTO: 0.52 10*3/MM3 (ref 0.1–0.9)
MONOCYTES NFR BLD AUTO: 5.9 % (ref 5–12)
NEUTROPHILS NFR BLD AUTO: 4.81 10*3/MM3 (ref 1.7–7)
NEUTROPHILS NFR BLD AUTO: 54.5 % (ref 42.7–76)
NRBC BLD AUTO-RTO: 0 /100 WBC (ref 0–0.2)
PLATELET # BLD AUTO: 341 10*3/MM3 (ref 140–450)
PMV BLD AUTO: 12.2 FL (ref 6–12)
POTASSIUM SERPL-SCNC: 3.7 MMOL/L (ref 3.5–5.2)
PROT SERPL-MCNC: 7.3 G/DL (ref 6–8.5)
RBC # BLD AUTO: 4.3 10*6/MM3 (ref 3.77–5.28)
SODIUM SERPL-SCNC: 142 MMOL/L (ref 136–145)
TIBC SERPL-MCNC: 405 MCG/DL (ref 298–536)
TRANSFERRIN SERPL-MCNC: 272 MG/DL (ref 200–360)
WBC NRBC COR # BLD AUTO: 8.82 10*3/MM3 (ref 3.4–10.8)

## 2024-01-08 PROCEDURE — 1159F MED LIST DOCD IN RCRD: CPT | Performed by: INTERNAL MEDICINE

## 2024-01-08 PROCEDURE — 36415 COLL VENOUS BLD VENIPUNCTURE: CPT

## 2024-01-08 PROCEDURE — 73630 X-RAY EXAM OF FOOT: CPT | Performed by: ORTHOPAEDIC SURGERY

## 2024-01-08 PROCEDURE — 1160F RVW MEDS BY RX/DR IN RCRD: CPT | Performed by: INTERNAL MEDICINE

## 2024-01-08 PROCEDURE — 85025 COMPLETE CBC W/AUTO DIFF WBC: CPT | Performed by: NURSE PRACTITIONER

## 2024-01-08 PROCEDURE — 3079F DIAST BP 80-89 MM HG: CPT | Performed by: INTERNAL MEDICINE

## 2024-01-08 PROCEDURE — 80053 COMPREHEN METABOLIC PANEL: CPT | Performed by: NURSE PRACTITIONER

## 2024-01-08 PROCEDURE — 1125F AMNT PAIN NOTED PAIN PRSNT: CPT | Performed by: INTERNAL MEDICINE

## 2024-01-08 PROCEDURE — 83540 ASSAY OF IRON: CPT | Performed by: NURSE PRACTITIONER

## 2024-01-08 PROCEDURE — 3077F SYST BP >= 140 MM HG: CPT | Performed by: INTERNAL MEDICINE

## 2024-01-08 PROCEDURE — 99214 OFFICE O/P EST MOD 30 MIN: CPT | Performed by: ORTHOPAEDIC SURGERY

## 2024-01-08 PROCEDURE — 99214 OFFICE O/P EST MOD 30 MIN: CPT | Performed by: INTERNAL MEDICINE

## 2024-01-08 PROCEDURE — 82728 ASSAY OF FERRITIN: CPT | Performed by: NURSE PRACTITIONER

## 2024-01-08 PROCEDURE — 84466 ASSAY OF TRANSFERRIN: CPT | Performed by: NURSE PRACTITIONER

## 2024-01-08 NOTE — LETTER
January 8, 2024     Arleen Dillon MD  [address]  [address]  [facility] [address]    Patient: Marylu Foreman   YOB: 1945   Date of Visit: 1/8/2024     Dear Arleen Dillon MD:       Thank you for referring Marylu Foreman to me for evaluation. Below are the relevant portions of my assessment and plan of care.    If you have questions, please do not hesitate to call me. I look forward to following Marylu along with you.         Sincerely,        West White MD        CC: No Recipients    West White Jr., MD  01/08/24 6711  Sign when Signing Visit  Chief Complaint  Stage I ([id number]) left breast cancer in 2007, pulmonary emboli in 2009, atrial fibrillation, history of GI bleed, history of iron deficiency status post prior gastric bypass in 2001    Subjective       History of Present Illness  Patient returns today in follow-up with laboratory studies and mammogram findings to review.  She was found to have recurrent iron deficiency and was treated with Venofer 300 mg IV x 4 on 6/26, 6/28, 7/3, 7/7/2023.  She was not experiencing any visible evidence of blood in her stool, last underwent endoscopic evaluation with EGD and colonoscopy with Dr. Bess on 11/12/2018.  She has undergone prior gastric bypass in 2001 and his underlying malabsorption.  The patient has experienced ongoing difficulty with left foot issues related to chronic malformations as well as left foot drop.  She did require surgical intervention on 7/11/2023 with Dr. Reed.  Unfortunately she has not done well since that time and has continued pain in the left foot, has been unable to wear shoe.  She ambulates with the use of a walker.  She has had difficulty managing with her left foot drop and has been undergoing physical therapy.  She had 1 minor fall in August fortunately with no significant injury.  She does continue on anticoagulation with Eliquis at 5 mg twice daily having undergone previous pulmonary embolism as well as bilateral  "calf DVT when she was briefly off Eliquis after her back surgery.  She does continue in atrial fibrillation and continues follow-up with cardiology as well.  She underwent her annual mammogram on 10//23 which was BI-RADS 0 in the right having undergone previous left mastectomy.  She underwent a diagnostic mammogram and ultrasound of the right breast on 10/10/2023 with identification of a probably benign 0.5 cm lymph node in the 9 o'clock position and a 0.7 cm lymph node that was benign appearing in the 8 o'clock position, recommended 6-month interval follow-up right breast mammogram and ultrasound.      Objective  Vital Signs:   /82   Pulse 80   Temp 97.2 °F (36.2 °C) (Temporal)   Resp 16   Ht 165 cm (64.96\")   Wt 85.6 kg (188 lb 12.8 oz)   SpO2 98%   BMI 31.46 kg/m²     Physical Exam  Constitutional:       Appearance: She is well-developed.   Eyes:      Conjunctiva/sclera: Conjunctivae normal.   Neck:      Thyroid: No thyromegaly.   Cardiovascular:      Rate and Rhythm: Normal rate. Rhythm irregular.      Heart sounds: Murmur heard.      No friction rub. No gallop.   Pulmonary:      Effort: No respiratory distress.      Breath sounds: Normal breath sounds. No wheezing.      Comments: Status post left mastectomy, chest wall without masses or adenitis palpated.  Right breast without masses or abnormalities palpated.  Abdominal:      General: Bowel sounds are normal. There is no distension.      Palpations: Abdomen is soft.      Tenderness: There is no abdominal tenderness.   Lymphadenopathy:      Head:      Right side of head: No submandibular adenopathy.      Cervical: No cervical adenopathy.      Upper Body:      Right upper body: No supraclavicular adenopathy.      Left upper body: No supraclavicular adenopathy.   Skin:     General: Skin is warm and dry.      Findings: No rash.      Comments: Patient with soft brace in place left foot   Neurological:      Mental Status: She is alert and oriented to " person, place, and time.      Cranial Nerves: No cranial nerve deficit.      Motor: No abnormal muscle tone.      Deep Tendon Reflexes: Reflexes normal.      Comments: Patient with continued left foot drop   Psychiatric:         Behavior: Behavior normal.            Result Review: Reviewed CBC, CMP, iron panel, ferritin from today.  Reviewed mammogram from 10//23 and mammogram and ultrasound from 10/10/2023.  Reviewed records from orthopedics and cardiology       Assessment and Plan     *Stage I ([id number]) left breast cancer:  Biopsy 5/8/2007 with in situ and invasive ductal carcinoma, grade 2,/AL positive, HER-2/jorge negative  Left mastectomy 6/18/0742 foci carcinoma, 4 mm, grade 1 in situ and invasive ductal carcinoma and 2 mm grade 2 in situ and invasive ductal carcinoma, negative margins, negative sentinel lymph nodes x3 with 5 additional negative lymph nodes.  Arimidex initiated 7/2/2007  Arimidex interrupted patient developed bilateral pulmonary emboli in May 2009, resume shortly thereafter.  Completed 5 years treatment in 2012.  Patient returns today in follow-up with recent mammogram to review.  On 10/4/2023, right-sided mammogram showed BI-RADS 0 findings with asymmetry seen on the right.  Subsequent diagnostic mammogram and ultrasound on the right on 10/10/2023 showed 0.5 cm benign-appearing lymph node at the 9 o'clock position and 0.7 cm benign-appearing lymph node at the 8 o'clock position.  Recommendation for 6-month interval follow-up diagnostic right breast mammogram and ultrasound.  Patient's exam today is negative.  We will go ahead and schedule diagnostic right mammogram and ultrasound for April 2024.    *Pulmonary emboli 5/2/2009 and bilateral calf DVT 8/11/2022:  Family history of DVT in the patient's mother occurring at age 70 postoperatively  Patient developed bilateral lower lobe pulmonary emboli 5/2/2009 following right VATS on 4/30/2009  Felt to be a provoked thrombosis related to  surgery  Hypercoagulable evaluation with negative factor V Leiden, negative prothrombin gene mutation, normal homocystine, protein C antigen 92, free to protein S 75, anticardiolipin antibody panel negative, lupus anticoagulant negative, Antithrombin %  Continuing on chronic anticoagulation primarily for atrial fibrillation at this point as prior thrombosis was provoked.  Transitioned from Coumadin to Eliquis 5 mg twice daily in May 2020  At time of hospitalization for IJEOMA/CKD 11/15-11/18/2021, Eliquis dose was decreased to 2.5 mg twice daily.  Patient required lumbar laminectomy 8/9/2022, Eliquis was held perioperatively.  Subsequently admitted 8/10 - 8/12/2022 with Doppler 8/11/2022 that showed bilateral acute calf DVT.  This occurred postoperatively and while off anticoagulation.  Recommended to resume Eliquis at 5 mg twice daily dose.  Patient will require long-term anticoagulation with Eliquis 5 mg twice daily for both thrombophilia and atrial fibrillation.  Patient returns today in follow-up continuing on Eliquis 5 mg twice daily.  She has not experienced any significant bleeding issues.  Her iron deficiency is felt to likely be related to malabsorption rather than GI blood loss.    *Iron deficiency anemia:  Prior gastric bypass in 2001  Intolerant of oral iron.  Patient has required IV iron on multiple occasions: Feraheme 8/13 and 8/20/2018; Injectafer 3/9 and 3/16/2020  Labs on 11/15/2021 showed decline in hemoglobin to 10.8 however this was in the setting of UTI and IJEOMA/CKD3.  Her iron panel was normal with iron 114, iron saturation 34%, TIBC 337 with ferritin elevated at 292.  B12 was normal at 883.  We continue to follow these values given her history of prior gastric bypass and iron deficiency requiring intermittent IV iron (intolerant of oral iron).   On 12/20/2021, hemoglobin further declined to 9.5.  Repeat labs with iron 47, ferritin 262, iron saturation 14%, TIBC 328, B12 level 1162.   Additional labs performed with negative serum protein electrophoresis and immunoelectrophoresis with normal quantitative immunoglobulins.  Free light chains elevated with kappa 73, lambda 36 with ratio 2.04, consistent with patient's degree of renal dysfunction.  Haptoglobin normal 186, LDH normal 171, CRP borderline elevated 0.62, erythropoietin level 20.1.  Anemia felt to be related to CKD3.  Patient felt to be a potential candidate for Procrit if hemoglobin remains below 10.  Patient experienced improvement in hemoglobin into the 11-12 range  Labs on 6/19/2023 with hemoglobin 11.7, iron 50, ferritin 41.6, iron saturation 12%, TIBC 404 indicating recurrent iron deficiency likely related to malabsorption from prior gastric bypass  Patient received IV iron with Venofer 300 mg on 6/26, 6/28, 7/3, 7/7/2023  Labs today with hemoglobin normal at 13.6, patient is iron replete with iron 95, ferritin 223, iron saturation 23%, TIBC 405.  Recheck labs at return visit in 6 months.  As above, recurrent iron deficiency to be likely related to malabsorption from prior gastric bypass.  Patient did undergo previous EGD and colonoscopy last on 11/12/2018 and I did suggest that we refer the patient back to Dr. Bess to discuss whether endoscopic evaluation is warranted at this point.    *GI bleed in July 2018:  Acute lower GI bleed with supratherapeutic INR Coumadin.  Angiogram performed with coil embolization of artery to sigmoid colon  No clinical evidence of further GI blood loss    *Atrial fibrillation:  Requires ongoing anticoagulation for this indication  As above, transitioned from Coumadin to Eliquis 5 mg twice daily in May 2020  As above, during hospitalization 11/15-11/18/2021 for IJEOMA/CKD3, Eliquis dose was decreased to 2.5 mg twice daily  See above discussion, patient developed bilateral calf DVT while briefly off anticoagulation following lumbar laminectomy on 8/11/2022.  Patient is continuing on full dose Eliquis 5 mg  twice daily.    *Status post right VATS 4/30/2020 for pulmonary nodule with finding of necrotizing granulomatous inflammation    *Severe depression/anxiety:  Patient has had chronic issues with this, is followed by psychiatry, Dr. Librado Monique.  She also underwent previous counseling which she found helpful.  The patient had ongoing difficulty with control of her depression.  She has been on multiple antidepressants  In June/July 2021 patient underwent transcranial magnetic stimulation (TMS) with significant improvement in depressive symptoms and resolution of headaches.  Symptoms subsequently gradually recurred, patient reports there has been difficulty with insurance regarding maintenance treatments.  Patient reports symptoms have been stable recently    *IJEOMA/CKD3  Patient with CKD3 with baseline creatinine in the 1.3-1.8 range  At time of routine follow-up visit 11/15/2021, creatinine was 3.19 and BUN of 59.  This was compared to prior value 6/2/2021 with BUN 41, creatinine 1.53.  Patient was hospitalized 11/15-11/18/2021  Patient is continuing follow-up with Dr. Cisneros in nephrology  Creatinine on 11/24/2021 remained elevated at 2.43 however on 12/20/2021 declined to 1.7, near her prior baseline.  Additional labs on 12/20/2021 with negative serum protein electrophoresis and immunoelectrophoresis with normal quantitative immunoglobulins.  Free light chains elevated with kappa 73, lambda 36 with ratio 2.04, consistent with patient's degree of renal dysfunction.  Creatinine today stable at 1.26    *ESBL E. coli UTI  Urine culture positive on 11/6/2021 in urgent care, patient treated with 7 days nitrofurantoin (was sensitive on culture result)  Patient with recurrent symptoms, was treated during recent hospitalization 11/15-11/18/2021 with Levaquin    *Hypothyroidism   Patient continues on levothyroxine 50 mcg daily  Patient developed cold sensitivity, labs on 12/20/2021 with TSH 2.03 and free T4  1.07.    *Severe left-sided sciatica with left foot drop  Plain film of the lumbar spine on 5/10/2022 was negative.    MRI lumbar spine at [facility] on 5/14/2022 showed no evidence of metastatic disease however did show evidence of degenerative change with disc disease and neuroforaminal compromise.     She developed a left foot drop and was referred to neurosurgery, eventually underwent lumbar laminectomy on 8/9/2022  Patient continues with chronic difficulty regarding left foot drop    *Left foot deformities  Patient with multiple issues regarding her left foot including hammertoes, hallux valgus  Patient did require left foot surgery on 7/11/2023  Patient reports that her pain in the left foot has worsened recently, is unable to wear a shoe and continues follow-up with pediatrics.      Plan:  Continue Eliquis 5 mg twice daily  Referral back to GI Dr. Bess to review whether patient requires repeat endoscopic evaluation in the setting of recurrent iron deficiency with prior EGD and colonoscopy performed 11/12/2018.  Patient with chronic malabsorption secondary to prior gastric bypass.  Schedule 6-month interval right breast diagnostic mammogram and ultrasound in April 2024.  MD visit in 6 months with CBC, CMP, stat iron panel/ferritin.

## 2024-01-31 ENCOUNTER — OFFICE (OUTPATIENT)
Dept: URBAN - METROPOLITAN AREA CLINIC 66 | Facility: CLINIC | Age: 79
End: 2024-01-31

## 2024-01-31 VITALS
HEART RATE: 92 BPM | WEIGHT: 184 LBS | HEIGHT: 65 IN | SYSTOLIC BLOOD PRESSURE: 102 MMHG | DIASTOLIC BLOOD PRESSURE: 64 MMHG

## 2024-01-31 DIAGNOSIS — K57.30 DIVERTICULOSIS OF LARGE INTESTINE WITHOUT PERFORATION OR ABS: ICD-10-CM

## 2024-01-31 DIAGNOSIS — Z98.84 BARIATRIC SURGERY STATUS: ICD-10-CM

## 2024-01-31 DIAGNOSIS — K21.9 GASTRO-ESOPHAGEAL REFLUX DISEASE WITHOUT ESOPHAGITIS: ICD-10-CM

## 2024-01-31 PROCEDURE — 99203 OFFICE O/P NEW LOW 30 MIN: CPT | Performed by: INTERNAL MEDICINE

## 2024-02-01 RX ORDER — APIXABAN 5 MG/1
TABLET, FILM COATED ORAL
Qty: 180 TABLET | Refills: 3 | OUTPATIENT
Start: 2024-02-01
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
## 2024-02-21 ENCOUNTER — OFFICE VISIT (OUTPATIENT)
Dept: ORTHOPEDIC SURGERY | Facility: CLINIC | Age: 79
End: 2024-02-21
Payer: MEDICARE

## 2024-02-21 VITALS — WEIGHT: 191.8 LBS | HEIGHT: 65 IN | BODY MASS INDEX: 31.96 KG/M2 | TEMPERATURE: 98.1 F

## 2024-02-21 DIAGNOSIS — M19.072 ARTHRITIS OF FIRST METATARSOPHALANGEAL (MTP) JOINT OF LEFT FOOT: ICD-10-CM

## 2024-02-21 DIAGNOSIS — M21.372 LEFT FOOT DROP: ICD-10-CM

## 2024-02-21 DIAGNOSIS — M20.42 HAMMER TOE OF LEFT FOOT: ICD-10-CM

## 2024-02-21 DIAGNOSIS — M20.12 ACQUIRED HALLUX VALGUS OF LEFT FOOT: Primary | ICD-10-CM

## 2024-02-21 DIAGNOSIS — G57.92 NEURITIS OF LEFT FOOT: ICD-10-CM

## 2024-02-21 PROCEDURE — 99213 OFFICE O/P EST LOW 20 MIN: CPT | Performed by: ORTHOPAEDIC SURGERY

## 2024-04-10 ENCOUNTER — HOSPITAL ENCOUNTER (OUTPATIENT)
Dept: ULTRASOUND IMAGING | Facility: HOSPITAL | Age: 79
Discharge: HOME | End: 2024-04-10
Payer: MEDICARE

## 2024-04-10 ENCOUNTER — HOSPITAL ENCOUNTER (OUTPATIENT)
Dept: MAMMOGRAPHY | Facility: HOSPITAL | Age: 79
Discharge: HOME | End: 2024-04-10
Payer: MEDICARE

## 2024-04-10 DIAGNOSIS — R92.8 ABNORMAL MAMMOGRAM OF RIGHT BREAST: ICD-10-CM

## 2024-04-10 DIAGNOSIS — Z17.0 MALIGNANT NEOPLASM OF OVERLAPPING SITES OF LEFT BREAST IN FEMALE, ESTROGEN RECEPTOR POSITIVE: Primary | ICD-10-CM

## 2024-04-10 DIAGNOSIS — C50.812 MALIGNANT NEOPLASM OF OVERLAPPING SITES OF LEFT BREAST IN FEMALE, ESTROGEN RECEPTOR POSITIVE: ICD-10-CM

## 2024-04-10 DIAGNOSIS — Z17.0 MALIGNANT NEOPLASM OF OVERLAPPING SITES OF LEFT BREAST IN FEMALE, ESTROGEN RECEPTOR POSITIVE: ICD-10-CM

## 2024-04-10 DIAGNOSIS — C50.812 MALIGNANT NEOPLASM OF OVERLAPPING SITES OF LEFT BREAST IN FEMALE, ESTROGEN RECEPTOR POSITIVE: Primary | ICD-10-CM

## 2024-04-10 DIAGNOSIS — N63.10 MASS OF RIGHT BREAST, UNSPECIFIED QUADRANT: ICD-10-CM

## 2024-04-10 DIAGNOSIS — D50.8 OTHER IRON DEFICIENCY ANEMIA: ICD-10-CM

## 2024-04-10 PROCEDURE — 76642 ULTRASOUND BREAST LIMITED: CPT

## 2024-04-10 PROCEDURE — 77065 DX MAMMO INCL CAD UNI: CPT

## 2024-04-10 PROCEDURE — G0279 TOMOSYNTHESIS, MAMMO: HCPCS

## 2024-04-11 ENCOUNTER — TELEPHONE (OUTPATIENT)
Dept: CARDIOLOGY | Facility: CLINIC | Age: 79
End: 2024-04-11
Payer: MEDICARE

## 2024-04-11 ENCOUNTER — TELEPHONE (OUTPATIENT)
Dept: ONCOLOGY | Facility: CLINIC | Age: 79
End: 2024-04-11
Payer: MEDICARE

## 2024-04-11 NOTE — TELEPHONE ENCOUNTER
----- Message from Arnel Cox RN sent at 4/11/2024  1:22 PM EDT -----  Regarding: Blood Thinner Hold Protocol  Randall Ramos,    Patient of yours Marylu Foreman has upcoming US Guided Breast Biopsy scheduled for 4/18/24. Patient takes Eliquis. Per our Radiologist's protocol, patient is to hold for 24 horus prior to procedure. Please advise if this is okay for patient to hold, and if so, when patient can restart after biopsy.     Thank you,  SHAD Seals

## 2024-04-12 NOTE — TELEPHONE ENCOUNTER
She needs to have a breast biopsy completed next week.  According to the notes, radiology has recommended holding her apixaban 24 hours before the procedure.  She has known permanent atrial fibrillation.  She is at acceptable risk to proceed with the biopsy from a cardiac standpoint.  When holding the apixaban, she is at potential, nonmodifiable risk of stroke.    I have left a message for patient to return my call to discuss.

## 2024-04-17 ENCOUNTER — OFFICE VISIT (OUTPATIENT)
Dept: ORTHOPEDIC SURGERY | Facility: CLINIC | Age: 79
End: 2024-04-17
Payer: MEDICARE

## 2024-04-17 VITALS — WEIGHT: 191 LBS | BODY MASS INDEX: 31.82 KG/M2 | TEMPERATURE: 97.3 F | HEIGHT: 65 IN

## 2024-04-17 DIAGNOSIS — M20.42 HAMMER TOE OF LEFT FOOT: ICD-10-CM

## 2024-04-17 DIAGNOSIS — M20.12 ACQUIRED HALLUX VALGUS OF LEFT FOOT: ICD-10-CM

## 2024-04-17 DIAGNOSIS — G57.92 NEURITIS OF LEFT FOOT: Primary | ICD-10-CM

## 2024-04-17 DIAGNOSIS — M21.372 LEFT FOOT DROP: ICD-10-CM

## 2024-04-17 DIAGNOSIS — M19.072 ARTHRITIS OF FIRST METATARSOPHALANGEAL (MTP) JOINT OF LEFT FOOT: ICD-10-CM

## 2024-04-17 DIAGNOSIS — R52 PAIN: ICD-10-CM

## 2024-04-17 PROCEDURE — 99213 OFFICE O/P EST LOW 20 MIN: CPT | Performed by: ORTHOPAEDIC SURGERY

## 2024-04-17 NOTE — TELEPHONE ENCOUNTER
I tried calling both numbers; no answer, no voicemail.     I left a message on her son's voicemail asking to return our call to triage RN. I would like to talk with patient or son. Thanks.
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
## 2024-04-18 ENCOUNTER — HOSPITAL ENCOUNTER (OUTPATIENT)
Dept: MAMMOGRAPHY | Facility: HOSPITAL | Age: 79
Discharge: HOME | End: 2024-04-18
Payer: MEDICARE

## 2024-04-18 ENCOUNTER — HOSPITAL ENCOUNTER (OUTPATIENT)
Dept: ULTRASOUND IMAGING | Facility: HOSPITAL | Age: 79
Discharge: HOME | End: 2024-04-18
Payer: MEDICARE

## 2024-04-18 VITALS
HEIGHT: 65 IN | OXYGEN SATURATION: 100 % | DIASTOLIC BLOOD PRESSURE: 84 MMHG | SYSTOLIC BLOOD PRESSURE: 151 MMHG | WEIGHT: 191 LBS | BODY MASS INDEX: 31.82 KG/M2 | RESPIRATION RATE: 18 BRPM | TEMPERATURE: 98.2 F | HEART RATE: 105 BPM

## 2024-04-18 DIAGNOSIS — Z17.0 MALIGNANT NEOPLASM OF OVERLAPPING SITES OF LEFT BREAST IN FEMALE, ESTROGEN RECEPTOR POSITIVE: ICD-10-CM

## 2024-04-18 DIAGNOSIS — C50.812 MALIGNANT NEOPLASM OF OVERLAPPING SITES OF LEFT BREAST IN FEMALE, ESTROGEN RECEPTOR POSITIVE: ICD-10-CM

## 2024-04-18 DIAGNOSIS — N63.10 MASS OF RIGHT BREAST, UNSPECIFIED QUADRANT: ICD-10-CM

## 2024-04-18 DIAGNOSIS — R92.8 ABNORMAL MAMMOGRAM OF RIGHT BREAST: ICD-10-CM

## 2024-04-18 PROCEDURE — 88360 TUMOR IMMUNOHISTOCHEM/MANUAL: CPT | Performed by: INTERNAL MEDICINE

## 2024-04-18 PROCEDURE — 88305 TISSUE EXAM BY PATHOLOGIST: CPT | Performed by: INTERNAL MEDICINE

## 2024-04-18 PROCEDURE — A4648 IMPLANTABLE TISSUE MARKER: HCPCS

## 2024-04-18 PROCEDURE — 77065 DX MAMMO INCL CAD UNI: CPT

## 2024-04-18 PROCEDURE — 88342 IMHCHEM/IMCYTCHM 1ST ANTB: CPT | Performed by: INTERNAL MEDICINE

## 2024-04-18 PROCEDURE — 25010000002 LIDOCAINE 1 % SOLUTION: Performed by: RADIOLOGY

## 2024-04-18 PROCEDURE — 88341 IMHCHEM/IMCYTCHM EA ADD ANTB: CPT | Performed by: INTERNAL MEDICINE

## 2024-04-18 RX ORDER — DIAZEPAM 5 MG/1
5 TABLET ORAL ONCE AS NEEDED
Status: DISCONTINUED | OUTPATIENT
Start: 2024-04-18 | End: 2024-04-19 | Stop reason: HOSPADM

## 2024-04-18 RX ORDER — LIDOCAINE HYDROCHLORIDE 10 MG/ML
10 INJECTION, SOLUTION INFILTRATION; PERINEURAL ONCE
Status: COMPLETED | OUTPATIENT
Start: 2024-04-18 | End: 2024-04-18

## 2024-04-18 RX ORDER — LIDOCAINE HYDROCHLORIDE AND EPINEPHRINE 10; 10 MG/ML; UG/ML
10 INJECTION, SOLUTION INFILTRATION; PERINEURAL ONCE
Status: COMPLETED | OUTPATIENT
Start: 2024-04-18 | End: 2024-04-18

## 2024-04-18 RX ADMIN — LIDOCAINE HYDROCHLORIDE 7 ML: 10 INJECTION, SOLUTION INFILTRATION; PERINEURAL at 11:19

## 2024-04-18 RX ADMIN — Medication 8 ML: at 11:19

## 2024-04-18 NOTE — TELEPHONE ENCOUNTER
I spoke with patient via phone about biopsy and preop recommendations. Her biopsy is today. She is aware of the stroke risk.

## 2024-04-18 NOTE — TELEPHONE ENCOUNTER
Pt on phone, but unable to reach Maribell.  Pt at [phone] (H) until 9:30a when she leaves for her biopsy.    Lyndsey Schmitz, SHAD  Triage RN  04/18/24 08:57 EDT

## 2024-04-18 NOTE — NURSING NOTE
Biopsy site to right lower outer breast clear with Exofin dry and intact. No firmness or swelling noted at or around biopsy site. Denies pain. Ice pack with protective covering applied to biopsy site. Discharge instructions discussed with understanding voiced by patient. Copies provided to patient. No distress noted. To home via personal vehicle with friends.

## 2024-04-20 LAB
LAB AP CASE REPORT: NORMAL
LAB AP CLINICAL INFORMATION: NORMAL
LAB AP SPECIAL STAINS: NORMAL
LAB AP SYNOPTIC CHECKLIST: NORMAL
PATH REPORT.FINAL DX SPEC: NORMAL
PATH REPORT.GROSS SPEC: NORMAL

## 2024-04-22 ENCOUNTER — TELEPHONE (OUTPATIENT)
Dept: ONCOLOGY | Facility: CLINIC | Age: 79
End: 2024-04-22

## 2024-04-22 ENCOUNTER — TELEPHONE (OUTPATIENT)
Dept: ONCOLOGY | Facility: CLINIC | Age: 79
End: 2024-04-22
Payer: MEDICARE

## 2024-04-22 DIAGNOSIS — C50.812 MALIGNANT NEOPLASM OF OVERLAPPING SITES OF LEFT BREAST IN FEMALE, ESTROGEN RECEPTOR POSITIVE: ICD-10-CM

## 2024-04-22 DIAGNOSIS — Z17.0 MALIGNANT NEOPLASM OF OVERLAPPING SITES OF LEFT BREAST IN FEMALE, ESTROGEN RECEPTOR POSITIVE: ICD-10-CM

## 2024-04-22 DIAGNOSIS — N63.15 MASS OVERLAPPING MULTIPLE QUADRANTS OF RIGHT BREAST: Primary | ICD-10-CM

## 2024-04-22 NOTE — TELEPHONE ENCOUNTER
Caller: Marylu Foreman    Relationship: Self    Best call back number: [phone]    Caller requesting test results: YES    What test was performed: BREAST BIOPSY     When was the test performed: LAST THURSDAY    Where was the test performed:     Additional notes: PLEASE ADVISE

## 2024-04-22 NOTE — PROGRESS NOTES
Contacted patient today with biopsy results from right breast on 4/18/2024.  Previous mammogram and ultrasound showed a 0.6 cm abnormality in the 9 o'clock position right breast.  Pathology from biopsy 4/18/2024 confirms invasive lobular carcinoma, well-differentiated (grade 1) with atypical ductal and lobular hyperplasia, no lymphovascular invasion, strongly ER positive (%), MD negative (2%), HER2/jorge negative (1+ IHC) with Ki-67 of 2%.  We discussed that it appears she has early stage disease.  We will schedule breast MRI and referral to breast surgery.  I will see her back within the next few weeks as well for follow-up.

## 2024-04-22 NOTE — TELEPHONE ENCOUNTER
CALLED PT WITH DATE AND TIME OF BREAST MRI - ALSO ASKED PT TO CALL AND LET US KNOW WHEN HER APPT IS SCHEDULED WITH DR. ZEPEDA SO WE CAN SCHEDULE HER APPT WITH DR. VALLE AFTER THAT

## 2024-04-25 ENCOUNTER — PATIENT OUTREACH (OUTPATIENT)
Dept: OTHER | Facility: HOSPITAL | Age: 79
End: 2024-04-25
Payer: MEDICARE

## 2024-04-25 DIAGNOSIS — C50.919 MALIGNANT NEOPLASM OF FEMALE BREAST, UNSPECIFIED ESTROGEN RECEPTOR STATUS, UNSPECIFIED LATERALITY, UNSPECIFIED SITE OF BREAST: Primary | ICD-10-CM

## 2024-04-25 NOTE — PROGRESS NOTES
Introductory call placed to Ms. Garcia. Left voicemail Introduced myself and navigation services. Left my contact number and asked that she return my call at her convenience. Will plan to meet her May 1st.

## 2024-04-26 ENCOUNTER — LAB (OUTPATIENT)
Facility: HOSPITAL | Age: 79
End: 2024-04-26
Payer: MEDICARE

## 2024-04-26 ENCOUNTER — HOSPITAL ENCOUNTER (OUTPATIENT)
Facility: HOSPITAL | Age: 79
Discharge: HOME | End: 2024-04-26
Payer: MEDICARE

## 2024-04-26 DIAGNOSIS — D50.8 OTHER IRON DEFICIENCY ANEMIA: ICD-10-CM

## 2024-04-26 DIAGNOSIS — E78.5 HYPERLIPIDEMIA, UNSPECIFIED HYPERLIPIDEMIA TYPE: Primary | ICD-10-CM

## 2024-04-26 DIAGNOSIS — E03.9 ACQUIRED HYPOTHYROIDISM: ICD-10-CM

## 2024-04-26 DIAGNOSIS — Z17.0 MALIGNANT NEOPLASM OF OVERLAPPING SITES OF LEFT BREAST IN FEMALE, ESTROGEN RECEPTOR POSITIVE: ICD-10-CM

## 2024-04-26 DIAGNOSIS — N18.32 STAGE 3B CHRONIC KIDNEY DISEASE: ICD-10-CM

## 2024-04-26 DIAGNOSIS — N63.15 MASS OVERLAPPING MULTIPLE QUADRANTS OF RIGHT BREAST: ICD-10-CM

## 2024-04-26 DIAGNOSIS — C50.812 MALIGNANT NEOPLASM OF OVERLAPPING SITES OF LEFT BREAST IN FEMALE, ESTROGEN RECEPTOR POSITIVE: ICD-10-CM

## 2024-04-26 DIAGNOSIS — F33.2 SEVERE EPISODE OF RECURRENT MAJOR DEPRESSIVE DISORDER, WITHOUT PSYCHOTIC FEATURES: ICD-10-CM

## 2024-04-26 LAB
CHOLEST SERPL-MCNC: 222 MG/DL (ref 0–200)
HDLC SERPL-MCNC: 76 MG/DL (ref 40–60)
IRON 24H UR-MRATE: 114 MCG/DL (ref 37–145)
IRON SATN MFR SERPL: 30 % (ref 20–50)
LDLC SERPL CALC-MCNC: 129 MG/DL (ref 0–100)
LDLC/HDLC SERPL: 1.66 {RATIO}
TIBC SERPL-MCNC: 386 MCG/DL (ref 298–536)
TRANSFERRIN SERPL-MCNC: 259 MG/DL (ref 200–360)
TRIGL SERPL-MCNC: 98 MG/DL (ref 0–150)
VLDLC SERPL-MCNC: 17 MG/DL (ref 5–40)

## 2024-04-26 PROCEDURE — C8908 MRI W/O FOL W/CONT, BREAST,: HCPCS

## 2024-04-26 PROCEDURE — A9577 INJ MULTIHANCE: HCPCS | Performed by: INTERNAL MEDICINE

## 2024-04-26 PROCEDURE — 80061 LIPID PANEL: CPT

## 2024-04-26 PROCEDURE — 84466 ASSAY OF TRANSFERRIN: CPT | Performed by: INTERNAL MEDICINE

## 2024-04-26 PROCEDURE — 83036 HEMOGLOBIN GLYCOSYLATED A1C: CPT | Performed by: INTERNAL MEDICINE

## 2024-04-26 PROCEDURE — 83540 ASSAY OF IRON: CPT | Performed by: INTERNAL MEDICINE

## 2024-04-26 PROCEDURE — 0 GADOBENATE DIMEGLUMINE 529 MG/ML SOLUTION: Performed by: INTERNAL MEDICINE

## 2024-04-26 PROCEDURE — C8937 CAD BREAST MRI: HCPCS

## 2024-04-26 PROCEDURE — 80053 COMPREHEN METABOLIC PANEL: CPT | Performed by: INTERNAL MEDICINE

## 2024-04-26 PROCEDURE — 84443 ASSAY THYROID STIM HORMONE: CPT | Performed by: INTERNAL MEDICINE

## 2024-04-26 RX ADMIN — GADOBENATE DIMEGLUMINE 20 ML: 529 INJECTION, SOLUTION INTRAVENOUS at 09:36

## 2024-04-30 ENCOUNTER — PATIENT OUTREACH (OUTPATIENT)
Dept: OTHER | Facility: HOSPITAL | Age: 79
End: 2024-04-30
Payer: MEDICARE

## 2024-04-30 NOTE — PROGRESS NOTES
Called Ms. Foreman to introduce myself and discuss tomorrows appointment. Left voicemail with my contact info asking her to call at her convenience.

## 2024-05-01 ENCOUNTER — OFFICE VISIT (OUTPATIENT)
Dept: SURGERY | Facility: CLINIC | Age: 79
End: 2024-05-01
Payer: MEDICARE

## 2024-05-01 ENCOUNTER — PATIENT OUTREACH (OUTPATIENT)
Dept: OTHER | Facility: HOSPITAL | Age: 79
End: 2024-05-01
Payer: MEDICARE

## 2024-05-01 VITALS
BODY MASS INDEX: 31.82 KG/M2 | SYSTOLIC BLOOD PRESSURE: 130 MMHG | HEIGHT: 65 IN | WEIGHT: 191 LBS | DIASTOLIC BLOOD PRESSURE: 86 MMHG

## 2024-05-01 DIAGNOSIS — C50.919 MALIGNANT NEOPLASM OF FEMALE BREAST, UNSPECIFIED ESTROGEN RECEPTOR STATUS, UNSPECIFIED LATERALITY, UNSPECIFIED SITE OF BREAST: Primary | ICD-10-CM

## 2024-05-01 PROCEDURE — 3079F DIAST BP 80-89 MM HG: CPT | Performed by: STUDENT IN AN ORGANIZED HEALTH CARE EDUCATION/TRAINING PROGRAM

## 2024-05-01 PROCEDURE — 3075F SYST BP GE 130 - 139MM HG: CPT | Performed by: STUDENT IN AN ORGANIZED HEALTH CARE EDUCATION/TRAINING PROGRAM

## 2024-05-01 PROCEDURE — 1160F RVW MEDS BY RX/DR IN RCRD: CPT | Performed by: STUDENT IN AN ORGANIZED HEALTH CARE EDUCATION/TRAINING PROGRAM

## 2024-05-01 PROCEDURE — 99204 OFFICE O/P NEW MOD 45 MIN: CPT | Performed by: STUDENT IN AN ORGANIZED HEALTH CARE EDUCATION/TRAINING PROGRAM

## 2024-05-01 PROCEDURE — 1159F MED LIST DOCD IN RCRD: CPT | Performed by: STUDENT IN AN ORGANIZED HEALTH CARE EDUCATION/TRAINING PROGRAM

## 2024-05-01 RX ORDER — SODIUM BICARBONATE 650 MG/1
TABLET ORAL DAILY
COMMUNITY
Start: 2024-04-30

## 2024-05-01 NOTE — H&P (VIEW-ONLY)
General Surgery Breast Cancer History and Physical Exam     Summary:    Marylu Foreman is a 78 y.o. lady who presents with a new diagnosis of right breast invasive lobular carcinoma: Grade I, Ki-67 2%; ER+/AR weakly positive, Her2-; [id number], Stage I.      A multidisciplinary plan has been formulated for the patient:    (1) Breast Surgical Oncology:  -Follow up [employer] 9 panel genetic testing. I will call her with results.   -Nurse navigator consult.   -Surgical plan: She would like to proceed with right breast total mastectomy with sentinel lymph node biopsy and possible axillary dissection.  -Plastic surgery referral declined.    (2) Medical Oncology:  -Will refer postoperatively for evaluation for endocrine therapy and possible need for chemotherapy.    (3) Radiation Oncology:  -Will refer postoperatively for evaluation for radiation therapy as clinically indicated.    (4) History of left breast cancer in 2007:   -Invasive ductal carcinoma with DCIS, grade II, ER/AR +, her2-  -s/p L breast mastectomy [id number].   -On Arimidex, held due to PE, but completed 5 year course.     (5) History of PE:   -On eliquis. Have reached out to Dr. White regarding bridging.    Referring Provider: West White Jr., MD    Chief Complaint: abnormal breast imaging    History of Present Illness: Ms. Marylu Foreman is a 78 y.o. year old lady, seen at the request of West White Jr., MD for a new diagnosis of right breast cancer.      This was initially detected as an imaging abnormality. She has had annual mammograms each year.  Her work-up is detailed in the oncologic history below.     She denies any breast lumps, pain, skin changes, or nipple discharge. She denies any family history of breast or ovarian cancer.     Workup of Current Diagnosis:    4/10/2024 Right Breast Diagnostic Mammogram with Ultrasound:   There are scattered areas of fibroglandular density.   In the outer posterior right breast, there is an approximately 1.0 cm  asymmetry with questioned/mild architectural distortion. This area was further assessed with ultrasound. There are benign-appearing calcifications.   ULTRASOUND:  Targeted sonographic evaluation of the right breast was performed from 7:00 to 11:00 in the region of the mammographic abnormality and for follow-up. At 9:00, 13 cm from the nipple, there is a 0.5 x 0.3 x 0.4 cm benign-appearing intramammary lymph node. This was previously labeled 9:00, 8 cm from the nipple. At 8:00, 14 cm from the nipple, there is a 0.4 x 0.3 x 0.6 cm benign-appearing intramammary lymph node. This corresponds to the probably benign mass labeled 8:00, 9 cm from the nipple on prior ultrasound. This is similar in size, previously 0.6 x  0.2 x 0.7 cm. At 9:00, 7 cm from the nipple, there is a 0.6 x 0.3 x 0.6 cm subtle heterogeneous mass with indistinct margins, which is in the region of mammographic asymmetry and is suspicious. This is best appreciate with harmonics.   IMPRESSION:  Suspicious 0.6 cm mass at 9:00 in the right breast. Recommend further evaluation with ultrasound guided core needle biopsy and clip correlation with postbiopsy mammogram. If the area is unable to be reproduced due to target on the day of biopsy or if the clip does not correspond to the mammographic asymmetry on post breast mammogram, further evaluation with stereotactic/tomosynthesis guided core needle biopsy would then be recommended.   BI-RADS Category 4: Suspicious    Right Breast US Guided Biopsy:   PROCEDURE NOTE: Informed consent was obtained. Preliminary sonography of the right breast was performed. The lesion at the 9 o'clock position on the order of 7 cm from the nipple was visualized. The overlying skin was prepped in the usual sterile fashion. Local anesthesia was achieved with 1% lidocaine. A nick was made in the skin with a scalpel. Through the nick was inserted a 10-gauge Mammotome vacuum assisted device under sonographic guidance. Multiple tissue  specimens were obtained. A bowtie shaped metallic clip was placed to ghulam the site. Pressure was applied  until bleeding subsided and the overlying skin was cleaned and bandaged. The patient tolerated the procedure without evidence for complication.   Postbiopsy mammography of the right breast consisting of digital CC and 90 degree lateral images were obtained to demonstrate postbiopsy change with a bowtie shaped metallic clip at the site of the pre-existing mammographic lesion seen in the lower outer quadrant of the right breast. Therefore, sonographic and mammographic concordance is  established. The pathology result has returned as invasive lobular carcinoma with atypical ductal hyperplasia and ALH. This is concordant with imaging findings  IMPRESSION:  Technically successful ultrasound guided Mammotome vacuum assisted right breast biopsy with placement of a bowtie shaped metallic clip. The pathology result has returned as invasive lobular carcinoma with ADH and ALH. Surgical excision is recommended.    BI-RADS Category 6: Known malignancy.    4/18/2024 Pathology:   Final Diagnosis   1. Right Breast, 9:00, 7 Cm from Nipple, Ultrasound-Guided Core Needle Biopsy for a Mass:               A. INVASIVE LOBULAR CARCINOMA, Well-Differentiated; Yovanny Histologic Grade I/III      (tubule score = 3, nuclear score = 1, mitoses score = 1), measuring at least 6 mm.               B. Atypical ductal hyperplasia and atypical lobular hyperplasia.               C. Negative for lymphovascular space invasion.               D. See biomarker template.     4/26/2024 Bilateral Breast MRI   IMPRESSION:  1. Biopsy-proven malignancy in the right breast in the posterior one third at the 9 o'clock position represented by the bowtie shaped metallic clip within a 1.7 cm postbiopsy cavity. Enhancement of a probable reactive postbiopsy character is seen anterior and posterior to the biopsy cavity. A 1.1 cm equivocal right axillary lymph node  is noted.  2. Status post prior left mastectomy without reconstruction. No suspicious findings are seen within the left post mastectomy bed.   BI-RADS category 6: Known malignancy    Past Medical History:   History of PE, on Eliquis   HTN  CAD    Past Surgical History:    L total knee arthroplasty   Left toe fusion   Left mastectomy   Lumbar discectomy   Gastric bypass   Sigmoid colectomy   Cholecystectomy   Carpal tunnel     Family History:    As above     Social History:  Denies tobacco use  Occasional alcohol use    Allergies:   Allergies   Allergen Reactions    Bactrim [Sulfamethoxazole-Trimethoprim] Diarrhea    Amlodipine Besylate-Valsartan Nausea And Vomiting    Cefdinir Diarrhea    Corticosteroids Unknown - Low Severity     Severe depression caused from steroid injections in hands    Lisinopril Nausea And Vomiting    Nsaids Unknown - Low Severity    Other Unknown - Low Severity     Steroids sever depression     Medications:     Current Outpatient Medications:     ascorbic acid (VITAMIN C) 1000 MG tablet, Take 1 tablet by mouth Daily., Disp: , Rfl:     Cholecalciferol (VITAMIN D PO), Take 1 tablet by mouth Daily., Disp: , Rfl:     Coenzyme Q10 200 MG capsule, Take 200 mg by mouth Daily., Disp: , Rfl:     Cyanocobalamin (VITAMIN B 12 PO), Take 1 tablet/day by mouth Daily., Disp: , Rfl:     dilTIAZem CD (CARDIZEM CD) 180 MG 24 hr capsule, Take 1 capsule by mouth 2 (Two) Times a Day., Disp: 180 capsule, Rfl: 3    DULoxetine (CYMBALTA) 60 MG capsule, Take 1 capsule by mouth 2 (Two) Times a Day., Disp: , Rfl:     Eliquis 5 MG tablet tablet, TAKE 1 TABLET BY MOUTH EVERY 12 HOURS FOR ATRIAL FIBRILLATION, Disp: 180 tablet, Rfl: 0    folic acid (FOLVITE) 400 MCG tablet, Take 1 tablet by mouth Daily., Disp: , Rfl:     Ibuprofen 3 %, Gabapentin 10 %, Baclofen 2 %, lidocaine 4 %, Ketamine HCl 4 %, Apply 1-2 g topically to the appropriate area as directed 3 (Three) to 4 (Four) times daily., Disp: 90 g, Rfl: 1    Ibuprofen  3 %, Gabapentin 10 %, Baclofen 2 %, lidocaine 4 %, Ketamine HCl 4 %, Apply 1-2 g topically to the appropriate area as directed 3 (Three) to 4 (Four) times daily., Disp: 90 g, Rfl: 1    Ibuprofen 3 %, Gabapentin 10 %, Baclofen 2 %, lidocaine 4 %, Ketamine HCl 4 %, Apply 1-2 g topically to the appropriate area as directed 3 (Three) to 4 (Four) times daily., Disp: 90 g, Rfl: 1    irbesartan (AVAPRO) 300 MG tablet, Take 1 tablet by mouth Every Night. PT TO HOLD 24 HOURS PRIOR TO SURGERY, Disp: 90 tablet, Rfl: 3    levothyroxine (SYNTHROID, LEVOTHROID) 50 MCG tablet, Take 1 tablet by mouth Daily., Disp: , Rfl:     lidocaine (Lidoderm) 5 %, Place 1 patch on the skin as directed by provider Daily. Remove & Discard patch within 12 hours or as directed by MD, Disp: 30 patch, Rfl: 0    MegaRed Omega-3 Krill Oil 350 MG capsule, Take 1 capsule by mouth Daily. HELD FOR SURGERY, Disp: , Rfl:     Multiple Vitamin (MULTI-VITAMIN PO), Take 1 tablet by mouth Daily., Disp: , Rfl:     Probiotic Product (PROBIOTIC PO), Take 1 tablet by mouth Daily. Lactobacillus rhamnosus GG, Disp: , Rfl:     vitamin E 400 UNIT capsule, Take 1 capsule by mouth Daily. HELD FOR OR, Disp: , Rfl:     Laboratory Values:    Labs from 4/26/2024 reviewed    Review of Systems:   Influenza-like illness: no fever, no  cough, no  sore throat, no  body aches, no loss of sense of taste or smell, no known exposure to person with Covid-19.  Constitutional: Negative for fevers or chills  HENT: Negative for hearing loss or runny nose  Eyes: Negative for vision changes or scleral icterus  Respiratory: Negative for cough or shortness of breath  Cardiovascular: Negative for chest pain or heart palpitations  Gastrointestinal: Negative for abdominal pain, nausea, vomiting, constipation, melena, or hematochezia  Genitourinary: Negative for hematuria or dysuria  Musculoskeletal: Negative for joint swelling or gait instability  Neurologic: Negative for tremors or  seizures  Psychiatric: Negative for suicidal ideations or depression  All other systems reviewed and negative    Physical Exam:   ECO - Asymptomatic  Constitutional: Well-developed well-nourished, no acute distress  Eyes: Conjunctiva normal, sclera nonicteric  ENMT: Hearing grossly normal, oral mucosa moist  Neck: Supple, no palpable mass, trachea midline  Respiratory: Clear to auscultation, normal inspiratory effort  Cardiovascular: Regular rate, no peripheral edema, no jugular venous distention  Breast: symmetric  Right: No visible abnormalities on inspection while seated, with arms raised or hands on hips. No masses, skin changes, or nipple abnormalities.  Left: Left breast mastectomy with flap closure, well-healed, no masses or skin changes  Biopsy site appreciated in right breast, otherwise no skin changes.   No clinical chest wall involvement.  Gastrointestinal: Soft, nontender  Lymphatics (palpable nodes): No cervical, supraclavicular or axillary lymphadenopathy  Skin:  Warm, dry, no rash on visualized skin surfaces  Musculoskeletal: Symmetric strength, normal gait  Psychiatric: Alert and oriented ×3, normal affect       Discussion:  I had an extensive discussion with the patient and her family about the nature of her breast cancer diagnosis. We reviewed the components of breast tissue including ducts and lobules. We reviewed her pathology report in detail. We reviewed breast cancer histology, including stage, grade, ER/OR receptors, HER2 receptors and how this applies to her diagnosis. We reviewed the basics of systemic and local/regional management of breast cancer.     We discussed that most breast cancer is not hereditary, however given her family history, this may play a role in her case. I believe genetic testing is warranted and could affect surgical decision making.     We reviewed potential surgical treatments to include partial mastectomy, mastectomy, sentinel lymph node biopsy and axillary node  dissection and discussed the rationale associated with each approach. Regarding radiation therapy, we discussed that radiation is indicated in all cases of breast conservation and in only limited circumstances following mastectomy. We discussed that the primary goal of adjuvant radiation is to decrease the likelihood of local recurrence.     We discussed axillary staging. I described the procedure for sentinel lymph node biopsy in detail, including the preoperative injection of technetium sulfur colloid and intraoperative injection of lymphazurin blue dye. I explained that this is a mapping test and not a cancer test, that all of the lymph nodes containing these dyes will be removed for complete testing by pathology, and that the results could impact the decision for adjuvant treatment or additional surgery.    I described additional risks and potential complications associated with surgery, including, but not limited to, bleeding, infection, complications related to blue dye, lymphedema, deformity/poor cosmetic result, chronic pain, neurovascular injury, numbness, seroma, hematoma, deep venous thrombosis, skin flap necrosis, disease recurrence and the possibility of requiring additional surgery. We also discussed other treatment options including the option of not undergoing any surgical treatment and the risks associated with this including disease progression. She expressed an understanding of these factors and wished to proceed.    We discussed that in her case, systemic treatment would involve endocrine therapy and possibly chemotherapy. She will be referred to medical oncology postoperatively to discuss this further.     DUNG ZEPEDA M.D.  General and Endoscopic Surgery  [facility], [facility]    [address], [address]  [place], [place], [address]  P: [phone]  F: [phone]

## 2024-05-01 NOTE — PROGRESS NOTES
Referral received from Dr. Diaz's office. Met Ms. Foreman during her surgery consult. I introduced myself and navigational services. She has a good understanding of her pathology and treatment options presented to her by Dr. Diaz and was able to verbalize teach back. After the consult she is leaning toward having a mastectomy without reconstruction. She is comfortable with this plan and has no questions or concerns following the consult.      She stated she has a wonderful support system with friends. She stated she feels comfortable talking to them about needs or issues.      She stated she has no financial or transportation concerns at this time. She has no resource needs or ongoing concerns at this time.      She stated she is doing well emotionally and has no needs at this time. We discussed we have support options if the need arises. She was thankful for the information.      We discussed integrative therapies and other services at [facility]. She received a navigation folder with the following information:     [facility], Cancer and Restorative Exercise (CARE), [employer] Exercise program, Guide for the Newly Diagnosed, [facility], [facility], Massage Therapy, Reiki Therapy, [employer], Cancer Nutrition, and Survivorship Clinic.     She verbalized appreciation for navigational services and she has my contact information and will call with any questions that arise.

## 2024-05-06 ENCOUNTER — PREP FOR SURGERY (OUTPATIENT)
Dept: OTHER | Facility: HOSPITAL | Age: 79
End: 2024-05-06
Payer: MEDICARE

## 2024-05-06 ENCOUNTER — OFFICE VISIT (OUTPATIENT)
Dept: INTERNAL MEDICINE | Facility: CLINIC | Age: 79
End: 2024-05-06
Payer: MEDICARE

## 2024-05-06 ENCOUNTER — OFFICE VISIT (OUTPATIENT)
Dept: ONCOLOGY | Facility: CLINIC | Age: 79
End: 2024-05-06
Payer: MEDICARE

## 2024-05-06 ENCOUNTER — LAB (OUTPATIENT)
Dept: LAB | Facility: HOSPITAL | Age: 79
End: 2024-05-06
Payer: MEDICARE

## 2024-05-06 VITALS
SYSTOLIC BLOOD PRESSURE: 142 MMHG | RESPIRATION RATE: 18 BRPM | OXYGEN SATURATION: 99 % | BODY MASS INDEX: 31.69 KG/M2 | DIASTOLIC BLOOD PRESSURE: 84 MMHG | HEART RATE: 100 BPM | TEMPERATURE: 97.8 F | WEIGHT: 190.2 LBS | HEIGHT: 65 IN

## 2024-05-06 VITALS
OXYGEN SATURATION: 99 % | HEART RATE: 89 BPM | HEIGHT: 65 IN | TEMPERATURE: 98.3 F | SYSTOLIC BLOOD PRESSURE: 120 MMHG | BODY MASS INDEX: 31.65 KG/M2 | WEIGHT: 190 LBS | DIASTOLIC BLOOD PRESSURE: 64 MMHG

## 2024-05-06 DIAGNOSIS — C50.919 MALIGNANT NEOPLASM OF FEMALE BREAST, UNSPECIFIED ESTROGEN RECEPTOR STATUS, UNSPECIFIED LATERALITY, UNSPECIFIED SITE OF BREAST: Primary | ICD-10-CM

## 2024-05-06 DIAGNOSIS — C50.812 MALIGNANT NEOPLASM OF OVERLAPPING SITES OF LEFT BREAST IN FEMALE, ESTROGEN RECEPTOR POSITIVE: Primary | ICD-10-CM

## 2024-05-06 DIAGNOSIS — Z00.00 MEDICARE ANNUAL WELLNESS VISIT, SUBSEQUENT: Primary | ICD-10-CM

## 2024-05-06 DIAGNOSIS — I48.21 PERMANENT ATRIAL FIBRILLATION: ICD-10-CM

## 2024-05-06 DIAGNOSIS — N63.15 MASS OVERLAPPING MULTIPLE QUADRANTS OF RIGHT BREAST: ICD-10-CM

## 2024-05-06 DIAGNOSIS — M85.89 OSTEOPENIA OF MULTIPLE SITES: ICD-10-CM

## 2024-05-06 DIAGNOSIS — C50.812 MALIGNANT NEOPLASM OF OVERLAPPING SITES OF LEFT BREAST IN FEMALE, ESTROGEN RECEPTOR POSITIVE: ICD-10-CM

## 2024-05-06 DIAGNOSIS — Z17.0 MALIGNANT NEOPLASM OF OVERLAPPING SITES OF LEFT BREAST IN FEMALE, ESTROGEN RECEPTOR POSITIVE: ICD-10-CM

## 2024-05-06 DIAGNOSIS — R30.0 DYSURIA: ICD-10-CM

## 2024-05-06 DIAGNOSIS — C50.411 MALIGNANT NEOPLASM OF UPPER-OUTER QUADRANT OF RIGHT FEMALE BREAST, UNSPECIFIED ESTROGEN RECEPTOR STATUS: ICD-10-CM

## 2024-05-06 DIAGNOSIS — Z17.0 MALIGNANT NEOPLASM OF OVERLAPPING SITES OF LEFT BREAST IN FEMALE, ESTROGEN RECEPTOR POSITIVE: Primary | ICD-10-CM

## 2024-05-06 DIAGNOSIS — E78.5 HYPERLIPIDEMIA, UNSPECIFIED HYPERLIPIDEMIA TYPE: ICD-10-CM

## 2024-05-06 LAB
ALBUMIN SERPL-MCNC: 4.3 G/DL (ref 3.5–5.2)
ALBUMIN/GLOB SERPL: 1.5 G/DL
ALP SERPL-CCNC: 148 U/L (ref 39–117)
ALT SERPL W P-5'-P-CCNC: 12 U/L (ref 1–33)
ANION GAP SERPL CALCULATED.3IONS-SCNC: 13.5 MMOL/L (ref 5–15)
AST SERPL-CCNC: 23 U/L (ref 1–32)
BASOPHILS # BLD AUTO: 0.14 10*3/MM3 (ref 0–0.2)
BASOPHILS NFR BLD AUTO: 1.9 % (ref 0–1.5)
BILIRUB BLD-MCNC: NEGATIVE MG/DL
BILIRUB SERPL-MCNC: 0.6 MG/DL (ref 0–1.2)
BUN SERPL-MCNC: 34 MG/DL (ref 8–23)
BUN/CREAT SERPL: 23.4 (ref 7–25)
CALCIUM SPEC-SCNC: 9.5 MG/DL (ref 8.6–10.5)
CHLORIDE SERPL-SCNC: 104 MMOL/L (ref 98–107)
CLARITY, POC: CLEAR
CO2 SERPL-SCNC: 23.5 MMOL/L (ref 22–29)
COLOR UR: NORMAL
CREAT SERPL-MCNC: 1.45 MG/DL (ref 0.57–1)
DEPRECATED RDW RBC AUTO: 48.9 FL (ref 37–54)
EGFRCR SERPLBLD CKD-EPI 2021: 37.5 ML/MIN/1.73
EOSINOPHIL # BLD AUTO: 0.23 10*3/MM3 (ref 0–0.4)
EOSINOPHIL NFR BLD AUTO: 3 % (ref 0.3–6.2)
ERYTHROCYTE [DISTWIDTH] IN BLOOD BY AUTOMATED COUNT: 13.7 % (ref 12.3–15.4)
EXPIRATION DATE: NORMAL
GLOBULIN UR ELPH-MCNC: 2.8 GM/DL
GLUCOSE SERPL-MCNC: 131 MG/DL (ref 65–99)
GLUCOSE UR STRIP-MCNC: NEGATIVE MG/DL
HCT VFR BLD AUTO: 39.9 % (ref 34–46.6)
HGB BLD-MCNC: 12.9 G/DL (ref 12–15.9)
IMM GRANULOCYTES # BLD AUTO: 0.02 10*3/MM3 (ref 0–0.05)
IMM GRANULOCYTES NFR BLD AUTO: 0.3 % (ref 0–0.5)
KETONES UR QL: NEGATIVE
LEUKOCYTE EST, POC: NEGATIVE
LYMPHOCYTES # BLD AUTO: 3.03 10*3/MM3 (ref 0.7–3.1)
LYMPHOCYTES NFR BLD AUTO: 40.1 % (ref 19.6–45.3)
Lab: NORMAL
MCH RBC QN AUTO: 31.4 PG (ref 26.6–33)
MCHC RBC AUTO-ENTMCNC: 32.3 G/DL (ref 31.5–35.7)
MCV RBC AUTO: 97.1 FL (ref 79–97)
MONOCYTES # BLD AUTO: 0.75 10*3/MM3 (ref 0.1–0.9)
MONOCYTES NFR BLD AUTO: 9.9 % (ref 5–12)
NEUTROPHILS NFR BLD AUTO: 3.39 10*3/MM3 (ref 1.7–7)
NEUTROPHILS NFR BLD AUTO: 44.8 % (ref 42.7–76)
NITRITE UR-MCNC: NEGATIVE MG/ML
NRBC BLD AUTO-RTO: 0 /100 WBC (ref 0–0.2)
PH UR: 5.5 [PH] (ref 5–8)
PLATELET # BLD AUTO: 309 10*3/MM3 (ref 140–450)
PMV BLD AUTO: 11 FL (ref 6–12)
POTASSIUM SERPL-SCNC: 5.1 MMOL/L (ref 3.5–5.2)
PROT SERPL-MCNC: 7.1 G/DL (ref 6–8.5)
PROT UR STRIP-MCNC: NEGATIVE MG/DL
RBC # BLD AUTO: 4.11 10*6/MM3 (ref 3.77–5.28)
RBC # UR STRIP: NEGATIVE /UL
SODIUM SERPL-SCNC: 141 MMOL/L (ref 136–145)
SP GR UR: 1.02 (ref 1–1.03)
UROBILINOGEN UR QL: NORMAL
WBC NRBC COR # BLD AUTO: 7.56 10*3/MM3 (ref 3.4–10.8)

## 2024-05-06 PROCEDURE — 3074F SYST BP LT 130 MM HG: CPT | Performed by: INTERNAL MEDICINE

## 2024-05-06 PROCEDURE — 3078F DIAST BP <80 MM HG: CPT | Performed by: INTERNAL MEDICINE

## 2024-05-06 PROCEDURE — 99214 OFFICE O/P EST MOD 30 MIN: CPT | Performed by: INTERNAL MEDICINE

## 2024-05-06 PROCEDURE — 85025 COMPLETE CBC W/AUTO DIFF WBC: CPT

## 2024-05-06 PROCEDURE — 1125F AMNT PAIN NOTED PAIN PRSNT: CPT | Performed by: INTERNAL MEDICINE

## 2024-05-06 PROCEDURE — G0439 PPPS, SUBSEQ VISIT: HCPCS | Performed by: INTERNAL MEDICINE

## 2024-05-06 PROCEDURE — 3079F DIAST BP 80-89 MM HG: CPT | Performed by: INTERNAL MEDICINE

## 2024-05-06 PROCEDURE — 80053 COMPREHEN METABOLIC PANEL: CPT

## 2024-05-06 PROCEDURE — 3077F SYST BP >= 140 MM HG: CPT | Performed by: INTERNAL MEDICINE

## 2024-05-06 PROCEDURE — 1170F FXNL STATUS ASSESSED: CPT | Performed by: INTERNAL MEDICINE

## 2024-05-06 PROCEDURE — 36415 COLL VENOUS BLD VENIPUNCTURE: CPT

## 2024-05-06 PROCEDURE — 81003 URINALYSIS AUTO W/O SCOPE: CPT | Performed by: INTERNAL MEDICINE

## 2024-05-06 RX ORDER — KETOCONAZOLE 20 MG/ML
SHAMPOO TOPICAL 2 TIMES WEEKLY
Qty: 100 ML | Refills: 1 | Status: SHIPPED | OUTPATIENT
Start: 2024-05-06

## 2024-05-06 RX ORDER — DIAZEPAM 5 MG/1
10 TABLET ORAL ONCE
OUTPATIENT
Start: 2024-05-06 | End: 2024-05-06

## 2024-05-06 RX ORDER — CLOBETASOL PROPIONATE 0.5 MG/G
1 CREAM TOPICAL 2 TIMES DAILY
Qty: 45 G | Refills: 1 | Status: SHIPPED | OUTPATIENT
Start: 2024-05-06

## 2024-05-09 ENCOUNTER — TELEPHONE (OUTPATIENT)
Dept: SURGERY | Facility: CLINIC | Age: 79
End: 2024-05-09
Payer: MEDICARE

## 2024-05-13 ENCOUNTER — TELEPHONE (OUTPATIENT)
Dept: SURGERY | Facility: CLINIC | Age: 79
End: 2024-05-13
Payer: MEDICARE

## 2024-05-13 NOTE — TELEPHONE ENCOUNTER
Left voice mail for Marylu informing her results of genetic testing was negative.  Okay per verbal release.

## 2024-05-16 ENCOUNTER — PATIENT OUTREACH (OUTPATIENT)
Dept: OTHER | Facility: HOSPITAL | Age: 79
End: 2024-05-16
Payer: MEDICARE

## 2024-05-16 ENCOUNTER — PRE-ADMISSION TESTING (OUTPATIENT)
Dept: PREADMISSION TESTING | Facility: HOSPITAL | Age: 79
End: 2024-05-16
Payer: MEDICARE

## 2024-05-16 ENCOUNTER — TELEPHONE (OUTPATIENT)
Dept: ONCOLOGY | Facility: CLINIC | Age: 79
End: 2024-05-16
Payer: MEDICARE

## 2024-05-16 VITALS
TEMPERATURE: 98.5 F | OXYGEN SATURATION: 95 % | SYSTOLIC BLOOD PRESSURE: 156 MMHG | HEIGHT: 64 IN | DIASTOLIC BLOOD PRESSURE: 73 MMHG | BODY MASS INDEX: 32.56 KG/M2 | HEART RATE: 102 BPM | WEIGHT: 190.7 LBS | RESPIRATION RATE: 18 BRPM

## 2024-05-16 PROCEDURE — 93005 ELECTROCARDIOGRAM TRACING: CPT

## 2024-05-16 NOTE — TELEPHONE ENCOUNTER
Caller: Marylu Foreman    Relationship: Self    Best call back number: [phone]    What is the best time to reach you: ANYTIME    Who are you requesting to speak with (clinical staff, provider,  specific staff member): CLINICAL    What was the call regarding: PATIENT HAS SURGERY 5/23 - PATIENT NEEDS TO KNOW WHEN SHE SHOULD STOP HER ELIQUIS. PLEASE CALL TO ADVISE.

## 2024-05-16 NOTE — PROGRESS NOTES
Called Ms. Foreman to see how she was doing. She stated she is doing well and just finished her pre admission testing. She has an outstanding questions about her Eliquis with Dr. White's office. She is waiting to hear back about when she needs to stop and if she needs bridge therapy with Lovenox in the meantime. Message sent to Dr. Diaz as well asking her thoughts on her blood thinner needs. Her surgery is a week from today. Otherwise, she has no needs at this time. She was thankful for the call and will reach out if any questions or needs arise.

## 2024-05-16 NOTE — DISCHARGE INSTRUCTIONS
Take the following medications the morning of surgery:    DILTIAZEM,DULOXETINE AND LEVOTHYROXINE    If you are on prescription narcotic pain medication to control your pain you may also take that medication the morning of surgery.    General Instructions:  Do not eat solid food after midnight the night before surgery.  You may drink clear liquids day of surgery but must stop at least one hour before your hospital arrival time.  It is beneficial for you to have a clear drink that contains carbohydrates the day of surgery.  We suggest a 12 to 20 ounce bottle of Gatorade or Powerade for non-diabetic patients or a 12 to 20 ounce bottle of G2 or Powerade Zero for diabetic patients. (Pediatric patients, are not advised to drink a 12 to 20 ounce carbohydrate drink)    Clear liquids are liquids you can see through.  Nothing red in color.     Plain water                               Sports drinks  Sodas                                   Gelatin (Jell-O)  Fruit juices without pulp such as white grape juice and apple juice  Popsicles that contain no fruit or yogurt  Tea or coffee (no cream or milk added)  Gatorade / Powerade  G2 / Powerade Zero    Infants may have breast milk up to four hours before surgery.  Infants drinking formula may drink formula up to six hours before surgery.   Patients who avoid smoking, chewing tobacco and alcohol for 4 weeks prior to surgery have a reduced risk of post-operative complications.  Quit smoking as many days before surgery as you can.  Do not smoke, use chewing tobacco or drink alcohol the day of surgery.   If applicable bring your C-PAP/ BI-PAP machine in with you to preop day of surgery.  Bring any papers given to you in the doctor’s office.  Wear clean comfortable clothes.  Do not wear contact lenses, false eyelashes or make-up.  Bring a case for your glasses.   Bring crutches or walker if applicable.  Remove all piercings.  Leave jewelry and any other valuables at home.  Hair  extensions with metal clips must be removed prior to surgery.  The Pre-Admission Testing nurse will instruct you to bring medications if unable to obtain an accurate list in Pre-Admission Testing.        If you were given a blood bank ID arm band remember to bring it with you the day of surgery.    Preventing a Surgical Site Infection:  For 2 to 3 days before surgery, avoid shaving with a razor because the razor can irritate skin and make it easier to develop an infection.    Any areas of open skin can increase the risk of a post-operative wound infection by allowing bacteria to enter and travel throughout the body.  Notify your surgeon if you have any skin wounds / rashes even if it is not near the expected surgical site.  The area will need assessed to determine if surgery should be delayed until it is healed.  The night prior to surgery shower using a fresh bar of anti-bacterial soap (such as Dial) and clean washcloth.  Sleep in a clean bed with clean clothing.  Do not allow pets to sleep with you.  Shower on the morning of surgery using a fresh bar of anti-bacterial soap (such as Dial) and clean washcloth.  Dry with a clean towel and dress in clean clothing.  Ask your surgeon if you will be receiving antibiotics prior to surgery.  Make sure you, your family, and all healthcare providers clean their hands with soap and water or an alcohol based hand  before caring for you or your wound.    Day of surgery:  Your arrival time is approximately two hours before your scheduled surgery time.  Upon arrival, a Pre-op nurse and Anesthesiologist will review your health history, obtain vital signs, and answer questions you may have.  The only belongings needed at this time will be a list of your home medications and if applicable your C-PAP/BI-PAP machine.  A Pre-op nurse will start an IV and you may receive medication in preparation for surgery, including something to help you relax.     Please be aware that  surgery does come with discomfort.  We want to make every effort to control your discomfort so please discuss any uncontrolled symptoms with your nurse.   Your doctor will most likely have prescribed pain medications.      If you are going home after surgery you will receive individualized written care instructions before being discharged.  A responsible adult must drive you to and from the hospital on the day of your surgery and ideally stay with you through the night.   .  Discharge prescriptions can be filled by the hospital pharmacy during regular pharmacy hours.  If you are having surgery late in the day/evening your prescription may be e-prescribed to your pharmacy.  Please verify your pharmacy hours or chose a 24 hour pharmacy to avoid not having access to your prescription because your pharmacy has closed for the day.    If you are staying overnight following surgery, you will be transported to your hospital room following the recovery period.  [facility] has all private rooms.    If you have any questions please call Pre-Admission Testing at [phone].  Deductibles and co-payments are collected on the day of service. Please be prepared to pay the required co-pay, deductible or deposit on the day of service as defined by your plan.    Call your surgeon immediately if you experience any of the following symptoms:  Sore Throat  Shortness of Breath or difficulty breathing  Cough  Chills  Body soreness or muscle pain  Headache  Fever  New loss of taste or smell  Do not arrive for your surgery ill.  Your procedure will need to be rescheduled to another time.  You will need to call your physician before the day of surgery to avoid any unnecessary exposure to hospital staff as well as other patients.      CHLORHEXIDINE CLOTH INSTRUCTIONS  The morning of surgery follow these instructions using the Chlorhexidine cloths you've been given.  These steps reduce bacteria on the body.  Do not use the  cloths near your eyes, ears mouth, genitalia or on open wounds.  Throw the cloths away after use but do not try to flush them down a toilet.      Open and remove one cloth at a time from the package.    Leave the cloth unfolded and begin the bathing.  Massage the skin with the cloths using gentle pressure to remove bacteria.  Do not scrub harshly.   Follow the steps below with one 2% CHG cloth per area (6 total cloths).  One cloth for neck, shoulders and chest.  One cloth for both arms, hands, fingers and underarms (do underarms last).  One cloth for the abdomen followed by groin.  One cloth for right leg and foot including between the toes.  One cloth for left leg and foot including between the toes.  The last cloth is to be used for the back of the neck, back and buttocks.    Allow the CHG to air dry 3 minutes on the skin which will give it time to work and decrease the chance of irritation.  The skin may feel sticky until it is dry.  Do not rinse with water or any other liquid or you will lose the beneficial effects of the CHG.  If mild skin irritation occurs, do rinse the skin to remove the CHG.  Report this to the nurse at time of admission.  Do not apply lotions, creams, ointments, deodorants or perfumes after using the clothes. Dress in clean clothes before coming to the hospital.

## 2024-05-16 NOTE — TELEPHONE ENCOUNTER
Patient called and informed per Dr Whtie's last note patient will hold Eliquis 48 hours prior to upcoming mastectomy. If acceptable with Dr. Diaz, would request patient to receive Lovenox 40 mg daily until she is ready to resume full dose Eliquis 5 mg twice daily postoperatively.     Explained to patient will have Dr White's RN to call tomorrow 5/17/24 to clarify instructions.    Patient v/u

## 2024-05-17 ENCOUNTER — TELEPHONE (OUTPATIENT)
Dept: ONCOLOGY | Facility: CLINIC | Age: 79
End: 2024-05-17
Payer: MEDICARE

## 2024-05-17 LAB
QT INTERVAL: 354 MS
QTC INTERVAL: 424 MS
REF LAB TEST RESULTS: NORMAL

## 2024-05-17 NOTE — TELEPHONE ENCOUNTER
Caller: Marylu Foreman    Relationship: Self    Best call back number: [phone]     What is the best time to reach you: ANYTIME    Who are you requesting to speak with (clinical staff, provider,  specific staff member): CLINICAL    What was the call regarding: PT REQUESTING CALL BACK TO CLARIFY INSTRUCTIONS REGARDING HER ELIQUIS AND WHEN EXACTLY IT NEEDS TO BE STOPPED PRIOR TO SURGERY ON 5-

## 2024-05-17 NOTE — TELEPHONE ENCOUNTER
Explained to patient have not spoken with Dr White yet about the Eliquis. Will call patient back on Monday with instructions.    Patient v/u

## 2024-05-20 ENCOUNTER — TELEPHONE (OUTPATIENT)
Dept: SURGERY | Facility: CLINIC | Age: 79
End: 2024-05-20
Payer: MEDICARE

## 2024-05-20 NOTE — TELEPHONE ENCOUNTER
Left message informing patient to hold Eliquis 2 days prior to her surgery. I did ask the patient to call our office back to confirm she received our message.

## 2024-05-20 NOTE — TELEPHONE ENCOUNTER
Aliya with Dr. Santos office, would like to clarify medications for patient prior to surgery on 05/23.

## 2024-05-22 ENCOUNTER — HOSPITAL ENCOUNTER (OUTPATIENT)
Dept: BONE DENSITY | Facility: HOSPITAL | Age: 79
Discharge: HOME | End: 2024-05-22
Admitting: INTERNAL MEDICINE
Payer: MEDICARE

## 2024-05-22 DIAGNOSIS — M85.89 OSTEOPENIA OF MULTIPLE SITES: ICD-10-CM

## 2024-05-22 DIAGNOSIS — N63.15 MASS OVERLAPPING MULTIPLE QUADRANTS OF RIGHT BREAST: ICD-10-CM

## 2024-05-22 DIAGNOSIS — Z17.0 MALIGNANT NEOPLASM OF OVERLAPPING SITES OF LEFT BREAST IN FEMALE, ESTROGEN RECEPTOR POSITIVE: ICD-10-CM

## 2024-05-22 DIAGNOSIS — C50.812 MALIGNANT NEOPLASM OF OVERLAPPING SITES OF LEFT BREAST IN FEMALE, ESTROGEN RECEPTOR POSITIVE: ICD-10-CM

## 2024-05-22 PROCEDURE — 77080 DXA BONE DENSITY AXIAL: CPT

## 2024-05-23 ENCOUNTER — ANCILLARY PROCEDURE (OUTPATIENT)
Dept: LAB | Facility: HOSPITAL | Age: 79
End: 2024-05-23
Payer: MEDICARE

## 2024-05-23 ENCOUNTER — TRANSCRIBE ORDERS (OUTPATIENT)
Dept: LAB | Facility: HOSPITAL | Age: 79
End: 2024-05-23
Payer: MEDICARE

## 2024-05-23 ENCOUNTER — HOSPITAL ENCOUNTER (OUTPATIENT)
Dept: NUCLEAR MEDICINE | Facility: HOSPITAL | Age: 79
Discharge: HOME | End: 2024-05-23
Payer: MEDICARE

## 2024-05-23 ENCOUNTER — ANESTHESIA (OUTPATIENT)
Dept: PERIOP | Facility: HOSPITAL | Age: 79
End: 2024-05-23
Payer: MEDICARE

## 2024-05-23 ENCOUNTER — ANESTHESIA EVENT (OUTPATIENT)
Dept: PERIOP | Facility: HOSPITAL | Age: 79
End: 2024-05-23
Payer: MEDICARE

## 2024-05-23 ENCOUNTER — HOSPITAL ENCOUNTER (OUTPATIENT)
Facility: HOSPITAL | Age: 79
Setting detail: OBSERVATION
Discharge: HOME | End: 2024-05-24
Attending: STUDENT IN AN ORGANIZED HEALTH CARE EDUCATION/TRAINING PROGRAM | Admitting: STUDENT IN AN ORGANIZED HEALTH CARE EDUCATION/TRAINING PROGRAM
Payer: MEDICARE

## 2024-05-23 DIAGNOSIS — C50.919 MALIGNANT NEOPLASM OF FEMALE BREAST, UNSPECIFIED ESTROGEN RECEPTOR STATUS, UNSPECIFIED LATERALITY, UNSPECIFIED SITE OF BREAST: Primary | ICD-10-CM

## 2024-05-23 DIAGNOSIS — C50.919 MALIGNANT NEOPLASM OF FEMALE BREAST, UNSPECIFIED ESTROGEN RECEPTOR STATUS, UNSPECIFIED LATERALITY, UNSPECIFIED SITE OF BREAST: ICD-10-CM

## 2024-05-23 DIAGNOSIS — C50.911 MALIGNANT NEOPLASM OF RIGHT FEMALE BREAST, UNSPECIFIED ESTROGEN RECEPTOR STATUS, UNSPECIFIED SITE OF BREAST: Primary | ICD-10-CM

## 2024-05-23 DIAGNOSIS — C50.911 MALIGNANT NEOPLASM OF RIGHT FEMALE BREAST, UNSPECIFIED ESTROGEN RECEPTOR STATUS, UNSPECIFIED SITE OF BREAST: ICD-10-CM

## 2024-05-23 PROCEDURE — 0 TECHETIUM TC99M TILMANOCEPT: Performed by: STUDENT IN AN ORGANIZED HEALTH CARE EDUCATION/TRAINING PROGRAM

## 2024-05-23 PROCEDURE — 25010000002 DROPERIDOL PER 5 MG

## 2024-05-23 PROCEDURE — G0378 HOSPITAL OBSERVATION PER HR: HCPCS

## 2024-05-23 PROCEDURE — 25010000002 CEFAZOLIN PER 500 MG: Performed by: STUDENT IN AN ORGANIZED HEALTH CARE EDUCATION/TRAINING PROGRAM

## 2024-05-23 PROCEDURE — A9520 TC99 TILMANOCEPT DIAG 0.5MCI: HCPCS | Performed by: STUDENT IN AN ORGANIZED HEALTH CARE EDUCATION/TRAINING PROGRAM

## 2024-05-23 PROCEDURE — 38525 BIOPSY/REMOVAL LYMPH NODES: CPT | Performed by: STUDENT IN AN ORGANIZED HEALTH CARE EDUCATION/TRAINING PROGRAM

## 2024-05-23 PROCEDURE — 38792 RA TRACER ID OF SENTINL NODE: CPT

## 2024-05-23 PROCEDURE — 76098 X-RAY EXAM SURGICAL SPECIMEN: CPT

## 2024-05-23 PROCEDURE — 88342 IMHCHEM/IMCYTCHM 1ST ANTB: CPT | Performed by: STUDENT IN AN ORGANIZED HEALTH CARE EDUCATION/TRAINING PROGRAM

## 2024-05-23 PROCEDURE — 25010000002 HYDROMORPHONE 1 MG/ML SOLUTION

## 2024-05-23 PROCEDURE — 25010000002 PROPOFOL 200 MG/20ML EMULSION

## 2024-05-23 PROCEDURE — 25010000002 ESMOLOL 100 MG/10ML SOLUTION

## 2024-05-23 PROCEDURE — 19303 MAST SIMPLE COMPLETE: CPT | Performed by: STUDENT IN AN ORGANIZED HEALTH CARE EDUCATION/TRAINING PROGRAM

## 2024-05-23 PROCEDURE — 38900 IO MAP OF SENT LYMPH NODE: CPT | Performed by: STUDENT IN AN ORGANIZED HEALTH CARE EDUCATION/TRAINING PROGRAM

## 2024-05-23 PROCEDURE — 25810000003 LACTATED RINGERS PER 1000 ML

## 2024-05-23 PROCEDURE — 25810000003 LACTATED RINGERS PER 1000 ML: Performed by: ANESTHESIOLOGY

## 2024-05-23 PROCEDURE — 25010000002 DEXAMETHASONE SODIUM PHOSPHATE 20 MG/5ML SOLUTION

## 2024-05-23 PROCEDURE — 25010000002 ONDANSETRON PER 1 MG

## 2024-05-23 PROCEDURE — 88307 TISSUE EXAM BY PATHOLOGIST: CPT | Performed by: STUDENT IN AN ORGANIZED HEALTH CARE EDUCATION/TRAINING PROGRAM

## 2024-05-23 PROCEDURE — 25010000002 FENTANYL CITRATE (PF) 100 MCG/2ML SOLUTION

## 2024-05-23 PROCEDURE — 88331 PATH CONSLTJ SURG 1 BLK 1SPC: CPT | Performed by: STUDENT IN AN ORGANIZED HEALTH CARE EDUCATION/TRAINING PROGRAM

## 2024-05-23 DEVICE — ARISTA AH ABSORBABLE HEMOSTATIC PARTICLES
Type: IMPLANTABLE DEVICE | Site: BREAST | Status: FUNCTIONAL
Brand: ARISTA™ AH

## 2024-05-23 DEVICE — HORIZON TI MED 6/CART
Type: IMPLANTABLE DEVICE | Site: BREAST | Status: FUNCTIONAL
Brand: WECK

## 2024-05-23 RX ORDER — SODIUM CHLORIDE 0.9 % (FLUSH) 0.9 %
3-10 SYRINGE (ML) INJECTION AS NEEDED
Status: DISCONTINUED | OUTPATIENT
Start: 2024-05-23 | End: 2024-05-23 | Stop reason: HOSPADM

## 2024-05-23 RX ORDER — DULOXETIN HYDROCHLORIDE 60 MG/1
60 CAPSULE, DELAYED RELEASE ORAL 2 TIMES DAILY
Status: DISCONTINUED | OUTPATIENT
Start: 2024-05-23 | End: 2024-05-24 | Stop reason: HOSPADM

## 2024-05-23 RX ORDER — ESMOLOL HYDROCHLORIDE 10 MG/ML
INJECTION INTRAVENOUS AS NEEDED
Status: DISCONTINUED | OUTPATIENT
Start: 2024-05-23 | End: 2024-05-23 | Stop reason: SURG

## 2024-05-23 RX ORDER — SODIUM CHLORIDE 9 MG/ML
40 INJECTION, SOLUTION INTRAVENOUS AS NEEDED
Status: DISCONTINUED | OUTPATIENT
Start: 2024-05-23 | End: 2024-05-24 | Stop reason: HOSPADM

## 2024-05-23 RX ORDER — DROPERIDOL 2.5 MG/ML
0.62 INJECTION, SOLUTION INTRAMUSCULAR; INTRAVENOUS
Status: DISCONTINUED | OUTPATIENT
Start: 2024-05-23 | End: 2024-05-23 | Stop reason: HOSPADM

## 2024-05-23 RX ORDER — FENTANYL CITRATE 50 UG/ML
50 INJECTION, SOLUTION INTRAMUSCULAR; INTRAVENOUS ONCE AS NEEDED
Status: DISCONTINUED | OUTPATIENT
Start: 2024-05-23 | End: 2024-05-23 | Stop reason: HOSPADM

## 2024-05-23 RX ORDER — HYDROMORPHONE HYDROCHLORIDE 1 MG/ML
0.25 INJECTION, SOLUTION INTRAMUSCULAR; INTRAVENOUS; SUBCUTANEOUS
Status: DISCONTINUED | OUTPATIENT
Start: 2024-05-23 | End: 2024-05-23 | Stop reason: HOSPADM

## 2024-05-23 RX ORDER — SODIUM CHLORIDE 0.9 % (FLUSH) 0.9 %
3 SYRINGE (ML) INJECTION EVERY 12 HOURS SCHEDULED
Status: DISCONTINUED | OUTPATIENT
Start: 2024-05-23 | End: 2024-05-23 | Stop reason: HOSPADM

## 2024-05-23 RX ORDER — PROMETHAZINE HYDROCHLORIDE 25 MG/1
25 SUPPOSITORY RECTAL ONCE AS NEEDED
Status: DISCONTINUED | OUTPATIENT
Start: 2024-05-23 | End: 2024-05-23 | Stop reason: HOSPADM

## 2024-05-23 RX ORDER — DILTIAZEM HYDROCHLORIDE 180 MG/1
180 CAPSULE, COATED, EXTENDED RELEASE ORAL 2 TIMES DAILY
Status: DISCONTINUED | OUTPATIENT
Start: 2024-05-23 | End: 2024-05-24 | Stop reason: HOSPADM

## 2024-05-23 RX ORDER — IPRATROPIUM BROMIDE AND ALBUTEROL SULFATE 2.5; .5 MG/3ML; MG/3ML
3 SOLUTION RESPIRATORY (INHALATION) ONCE AS NEEDED
Status: DISCONTINUED | OUTPATIENT
Start: 2024-05-23 | End: 2024-05-23 | Stop reason: HOSPADM

## 2024-05-23 RX ORDER — LOSARTAN POTASSIUM 100 MG/1
100 TABLET ORAL
Status: DISCONTINUED | OUTPATIENT
Start: 2024-05-23 | End: 2024-05-24 | Stop reason: HOSPADM

## 2024-05-23 RX ORDER — OXYCODONE HYDROCHLORIDE 5 MG/1
5 TABLET ORAL EVERY 4 HOURS PRN
Status: DISCONTINUED | OUTPATIENT
Start: 2024-05-23 | End: 2024-05-24 | Stop reason: HOSPADM

## 2024-05-23 RX ORDER — HYDRALAZINE HYDROCHLORIDE 20 MG/ML
5 INJECTION INTRAMUSCULAR; INTRAVENOUS
Status: DISCONTINUED | OUTPATIENT
Start: 2024-05-23 | End: 2024-05-23 | Stop reason: HOSPADM

## 2024-05-23 RX ORDER — ONDANSETRON 2 MG/ML
8 INJECTION INTRAMUSCULAR; INTRAVENOUS EVERY 8 HOURS PRN
Status: DISCONTINUED | OUTPATIENT
Start: 2024-05-23 | End: 2024-05-24 | Stop reason: HOSPADM

## 2024-05-23 RX ORDER — HYDROCODONE BITARTRATE AND ACETAMINOPHEN 7.5; 325 MG/1; MG/1
1 TABLET ORAL EVERY 4 HOURS PRN
Status: DISCONTINUED | OUTPATIENT
Start: 2024-05-23 | End: 2024-05-23 | Stop reason: HOSPADM

## 2024-05-23 RX ORDER — MIDAZOLAM HYDROCHLORIDE 1 MG/ML
0.5 INJECTION INTRAMUSCULAR; INTRAVENOUS
Status: DISCONTINUED | OUTPATIENT
Start: 2024-05-23 | End: 2024-05-23 | Stop reason: HOSPADM

## 2024-05-23 RX ORDER — DEXAMETHASONE SODIUM PHOSPHATE 4 MG/ML
INJECTION, SOLUTION INTRA-ARTICULAR; INTRALESIONAL; INTRAMUSCULAR; INTRAVENOUS; SOFT TISSUE AS NEEDED
Status: DISCONTINUED | OUTPATIENT
Start: 2024-05-23 | End: 2024-05-23 | Stop reason: SURG

## 2024-05-23 RX ORDER — ONDANSETRON 2 MG/ML
INJECTION INTRAMUSCULAR; INTRAVENOUS AS NEEDED
Status: DISCONTINUED | OUTPATIENT
Start: 2024-05-23 | End: 2024-05-23 | Stop reason: SURG

## 2024-05-23 RX ORDER — SODIUM CHLORIDE, SODIUM LACTATE, POTASSIUM CHLORIDE, CALCIUM CHLORIDE 600; 310; 30; 20 MG/100ML; MG/100ML; MG/100ML; MG/100ML
INJECTION, SOLUTION INTRAVENOUS CONTINUOUS PRN
Status: DISCONTINUED | OUTPATIENT
Start: 2024-05-23 | End: 2024-05-23 | Stop reason: SURG

## 2024-05-23 RX ORDER — ONDANSETRON 2 MG/ML
4 INJECTION INTRAMUSCULAR; INTRAVENOUS ONCE AS NEEDED
Status: DISCONTINUED | OUTPATIENT
Start: 2024-05-23 | End: 2024-05-23 | Stop reason: HOSPADM

## 2024-05-23 RX ORDER — DIAZEPAM 5 MG/1
10 TABLET ORAL ONCE
Status: COMPLETED | OUTPATIENT
Start: 2024-05-23 | End: 2024-05-23

## 2024-05-23 RX ORDER — LIDOCAINE 40 MG/G
1 CREAM TOPICAL ONCE
Status: COMPLETED | OUTPATIENT
Start: 2024-05-23 | End: 2024-05-23

## 2024-05-23 RX ORDER — FENTANYL CITRATE 50 UG/ML
INJECTION, SOLUTION INTRAMUSCULAR; INTRAVENOUS AS NEEDED
Status: DISCONTINUED | OUTPATIENT
Start: 2024-05-23 | End: 2024-05-23 | Stop reason: SURG

## 2024-05-23 RX ORDER — PROPOFOL 10 MG/ML
INJECTION, EMULSION INTRAVENOUS AS NEEDED
Status: DISCONTINUED | OUTPATIENT
Start: 2024-05-23 | End: 2024-05-23 | Stop reason: SURG

## 2024-05-23 RX ORDER — FAMOTIDINE 10 MG/ML
20 INJECTION, SOLUTION INTRAVENOUS ONCE
Status: COMPLETED | OUTPATIENT
Start: 2024-05-23 | End: 2024-05-23

## 2024-05-23 RX ORDER — DIPHENHYDRAMINE HYDROCHLORIDE 50 MG/ML
12.5 INJECTION INTRAMUSCULAR; INTRAVENOUS
Status: DISCONTINUED | OUTPATIENT
Start: 2024-05-23 | End: 2024-05-23 | Stop reason: HOSPADM

## 2024-05-23 RX ORDER — FLUMAZENIL 0.1 MG/ML
0.2 INJECTION INTRAVENOUS AS NEEDED
Status: DISCONTINUED | OUTPATIENT
Start: 2024-05-23 | End: 2024-05-23 | Stop reason: HOSPADM

## 2024-05-23 RX ORDER — SUCCINYLCHOLINE/SOD CL,ISO/PF 200MG/10ML
SYRINGE (ML) INTRAVENOUS AS NEEDED
Status: DISCONTINUED | OUTPATIENT
Start: 2024-05-23 | End: 2024-05-23 | Stop reason: SURG

## 2024-05-23 RX ORDER — PHENYLEPHRINE HCL IN 0.9% NACL 1 MG/10 ML
SYRINGE (ML) INTRAVENOUS AS NEEDED
Status: DISCONTINUED | OUTPATIENT
Start: 2024-05-23 | End: 2024-05-23 | Stop reason: SURG

## 2024-05-23 RX ORDER — LIDOCAINE HYDROCHLORIDE 10 MG/ML
0.5 INJECTION, SOLUTION INFILTRATION; PERINEURAL ONCE AS NEEDED
Status: COMPLETED | OUTPATIENT
Start: 2024-05-23 | End: 2024-05-23

## 2024-05-23 RX ORDER — LIDOCAINE HYDROCHLORIDE 20 MG/ML
INJECTION, SOLUTION EPIDURAL; INFILTRATION; INTRACAUDAL; PERINEURAL AS NEEDED
Status: DISCONTINUED | OUTPATIENT
Start: 2024-05-23 | End: 2024-05-23 | Stop reason: SURG

## 2024-05-23 RX ORDER — SODIUM CHLORIDE 0.9 % (FLUSH) 0.9 %
10 SYRINGE (ML) INJECTION EVERY 12 HOURS SCHEDULED
Status: DISCONTINUED | OUTPATIENT
Start: 2024-05-23 | End: 2024-05-24 | Stop reason: HOSPADM

## 2024-05-23 RX ORDER — LABETALOL HYDROCHLORIDE 5 MG/ML
5 INJECTION, SOLUTION INTRAVENOUS
Status: DISCONTINUED | OUTPATIENT
Start: 2024-05-23 | End: 2024-05-23 | Stop reason: HOSPADM

## 2024-05-23 RX ORDER — EPHEDRINE SULFATE 50 MG/ML
5 INJECTION, SOLUTION INTRAVENOUS ONCE AS NEEDED
Status: DISCONTINUED | OUTPATIENT
Start: 2024-05-23 | End: 2024-05-23 | Stop reason: HOSPADM

## 2024-05-23 RX ORDER — FENTANYL CITRATE 50 UG/ML
25 INJECTION, SOLUTION INTRAMUSCULAR; INTRAVENOUS
Status: DISCONTINUED | OUTPATIENT
Start: 2024-05-23 | End: 2024-05-23 | Stop reason: HOSPADM

## 2024-05-23 RX ORDER — IBUPROFEN 800 MG/1
800 TABLET ORAL EVERY 8 HOURS
Status: DISCONTINUED | OUTPATIENT
Start: 2024-05-23 | End: 2024-05-24 | Stop reason: HOSPADM

## 2024-05-23 RX ORDER — HYDROCODONE BITARTRATE AND ACETAMINOPHEN 5; 325 MG/1; MG/1
1 TABLET ORAL ONCE AS NEEDED
Status: DISCONTINUED | OUTPATIENT
Start: 2024-05-23 | End: 2024-05-23 | Stop reason: HOSPADM

## 2024-05-23 RX ORDER — BUPIVACAINE HYDROCHLORIDE AND EPINEPHRINE 5; 5 MG/ML; UG/ML
INJECTION, SOLUTION PERINEURAL AS NEEDED
Status: DISCONTINUED | OUTPATIENT
Start: 2024-05-23 | End: 2024-05-23 | Stop reason: HOSPADM

## 2024-05-23 RX ORDER — LEVOTHYROXINE SODIUM 0.05 MG/1
50 TABLET ORAL DAILY
Status: DISCONTINUED | OUTPATIENT
Start: 2024-05-23 | End: 2024-05-24 | Stop reason: HOSPADM

## 2024-05-23 RX ORDER — NALOXONE HCL 0.4 MG/ML
0.2 VIAL (ML) INJECTION AS NEEDED
Status: DISCONTINUED | OUTPATIENT
Start: 2024-05-23 | End: 2024-05-23 | Stop reason: HOSPADM

## 2024-05-23 RX ORDER — SODIUM CHLORIDE 0.9 % (FLUSH) 0.9 %
10 SYRINGE (ML) INJECTION AS NEEDED
Status: DISCONTINUED | OUTPATIENT
Start: 2024-05-23 | End: 2024-05-24 | Stop reason: HOSPADM

## 2024-05-23 RX ORDER — ACETAMINOPHEN 500 MG
1000 TABLET ORAL EVERY 8 HOURS
Status: DISCONTINUED | OUTPATIENT
Start: 2024-05-23 | End: 2024-05-24 | Stop reason: HOSPADM

## 2024-05-23 RX ORDER — GABAPENTIN 100 MG/1
100 CAPSULE ORAL 3 TIMES DAILY
Status: DISCONTINUED | OUTPATIENT
Start: 2024-05-23 | End: 2024-05-24 | Stop reason: HOSPADM

## 2024-05-23 RX ORDER — SODIUM CHLORIDE, SODIUM LACTATE, POTASSIUM CHLORIDE, CALCIUM CHLORIDE 600; 310; 30; 20 MG/100ML; MG/100ML; MG/100ML; MG/100ML
9 INJECTION, SOLUTION INTRAVENOUS CONTINUOUS
Status: DISCONTINUED | OUTPATIENT
Start: 2024-05-23 | End: 2024-05-24 | Stop reason: HOSPADM

## 2024-05-23 RX ORDER — PROMETHAZINE HYDROCHLORIDE 25 MG/1
25 TABLET ORAL ONCE AS NEEDED
Status: DISCONTINUED | OUTPATIENT
Start: 2024-05-23 | End: 2024-05-23 | Stop reason: HOSPADM

## 2024-05-23 RX ADMIN — SODIUM CHLORIDE, POTASSIUM CHLORIDE, SODIUM LACTATE AND CALCIUM CHLORIDE 9 ML/HR: 600; 310; 30; 20 INJECTION, SOLUTION INTRAVENOUS at 10:31

## 2024-05-23 RX ADMIN — ESMOLOL HYDROCHLORIDE 20 MG: 100 INJECTION, SOLUTION INTRAVENOUS at 12:16

## 2024-05-23 RX ADMIN — Medication 100 MG: at 12:11

## 2024-05-23 RX ADMIN — FENTANYL CITRATE 50 MCG: 50 INJECTION, SOLUTION INTRAMUSCULAR; INTRAVENOUS at 13:58

## 2024-05-23 RX ADMIN — FAMOTIDINE 20 MG: 10 INJECTION INTRAVENOUS at 10:46

## 2024-05-23 RX ADMIN — LIDOCAINE HYDROCHLORIDE 0.5 ML: 10 INJECTION, SOLUTION EPIDURAL; INFILTRATION; INTRACAUDAL; PERINEURAL at 10:31

## 2024-05-23 RX ADMIN — ESMOLOL HYDROCHLORIDE 30 MG: 100 INJECTION, SOLUTION INTRAVENOUS at 12:14

## 2024-05-23 RX ADMIN — LIDOCAINE HYDROCHLORIDE 100 MG: 20 INJECTION, SOLUTION EPIDURAL; INFILTRATION; INTRACAUDAL; PERINEURAL at 12:11

## 2024-05-23 RX ADMIN — LIDOCAINE 4% 1 APPLICATION: 4 CREAM TOPICAL at 10:39

## 2024-05-23 RX ADMIN — GABAPENTIN 100 MG: 100 CAPSULE ORAL at 21:48

## 2024-05-23 RX ADMIN — METOPROLOL TARTRATE 2.5 MG: 5 INJECTION INTRAVENOUS at 14:20

## 2024-05-23 RX ADMIN — TILMANOCEPT 1 DOSE: KIT at 11:25

## 2024-05-23 RX ADMIN — DULOXETINE HYDROCHLORIDE 60 MG: 60 CAPSULE, DELAYED RELEASE ORAL at 21:48

## 2024-05-23 RX ADMIN — SODIUM CHLORIDE 2000 MG: 900 INJECTION INTRAVENOUS at 12:03

## 2024-05-23 RX ADMIN — IBUPROFEN 800 MG: 800 TABLET, FILM COATED ORAL at 19:23

## 2024-05-23 RX ADMIN — HYDROMORPHONE HYDROCHLORIDE 0.25 MG: 1 INJECTION, SOLUTION INTRAMUSCULAR; INTRAVENOUS; SUBCUTANEOUS at 14:16

## 2024-05-23 RX ADMIN — Medication 100 MCG: at 13:35

## 2024-05-23 RX ADMIN — FENTANYL CITRATE 50 MCG: 50 INJECTION, SOLUTION INTRAMUSCULAR; INTRAVENOUS at 12:24

## 2024-05-23 RX ADMIN — ACETAMINOPHEN 1000 MG: 500 TABLET ORAL at 17:58

## 2024-05-23 RX ADMIN — HYDROMORPHONE HYDROCHLORIDE 0.25 MG: 1 INJECTION, SOLUTION INTRAMUSCULAR; INTRAVENOUS; SUBCUTANEOUS at 14:12

## 2024-05-23 RX ADMIN — Medication 100 MCG: at 12:27

## 2024-05-23 RX ADMIN — PROPOFOL 150 MG: 10 INJECTION, EMULSION INTRAVENOUS at 12:11

## 2024-05-23 RX ADMIN — DROPERIDOL 0.62 MG: 2.5 INJECTION, SOLUTION INTRAMUSCULAR; INTRAVENOUS at 15:22

## 2024-05-23 RX ADMIN — Medication 10 ML: at 21:46

## 2024-05-23 RX ADMIN — SODIUM CHLORIDE, POTASSIUM CHLORIDE, SODIUM LACTATE AND CALCIUM CHLORIDE: 600; 310; 30; 20 INJECTION, SOLUTION INTRAVENOUS at 12:11

## 2024-05-23 RX ADMIN — DIAZEPAM 10 MG: 5 TABLET ORAL at 10:37

## 2024-05-23 RX ADMIN — METOPROLOL TARTRATE 2.5 MG: 5 INJECTION INTRAVENOUS at 12:25

## 2024-05-23 RX ADMIN — ONDANSETRON 4 MG: 2 INJECTION INTRAMUSCULAR; INTRAVENOUS at 13:35

## 2024-05-23 RX ADMIN — DEXAMETHASONE SODIUM PHOSPHATE 4 MG: 4 INJECTION, SOLUTION INTRAMUSCULAR; INTRAVENOUS at 12:25

## 2024-05-23 NOTE — ANESTHESIA PROCEDURE NOTES
Airway  Urgency: elective    Date/Time: 5/23/2024 12:14 PM  Airway not difficult    General Information and Staff    Patient location during procedure: OR  Anesthesiologist: Yomi Ureña MD  CRNA/CAA: Maureen Chopra CRNA    Indications and Patient Condition  Indications for airway management: airway protection    Preoxygenated: yes  Mask difficulty assessment: 1 - vent by mask    Final Airway Details  Final airway type: endotracheal airway      Successful airway: ETT  Cuffed: yes   Successful intubation technique: direct laryngoscopy  Endotracheal tube insertion site: oral  Blade: Holley  Blade size: 3  ETT size (mm): 7.0  Cormack-Lehane Classification: grade I - full view of glottis  Placement verified by: chest auscultation and capnometry   Measured from: lips  ETT/EBT  to lips (cm): 22  Number of attempts at approach: 1  Assessment: lips, teeth, and gum same as pre-op and atraumatic intubation

## 2024-05-23 NOTE — ANESTHESIA PREPROCEDURE EVALUATION
Anesthesia Evaluation     NPO Solid Status: > 8 hours  NPO Liquid Status: > 8 hours           Airway   Mallampati: I  No difficulty expected  Dental      Pulmonary    (+) pulmonary embolism,sleep apnea  Cardiovascular     (+) hypertension, CAD, dysrhythmias, CHF , DVT, hyperlipidemia    ROS comment: · Left ventricular wall thickness is consistent with mild concentric hypertrophy.  · Estimated left ventricular EF = 59% Left ventricular systolic function is normal.  · Left ventricular diastolic function was indeterminate.  · There is calcification of the aortic valve.  · Estimated right ventricular systolic pressure from tricuspid regurgitation is normal (<35 mmHg).         Neuro/Psych  (+) headaches  GI/Hepatic/Renal/Endo    (+) obesity, GERD, renal disease-, thyroid problem     Musculoskeletal     Abdominal    Substance History      OB/GYN          Other   arthritis,   history of cancer                Anesthesia Plan    ASA 3     general     intravenous induction     Anesthetic plan, risks, benefits, and alternatives have been provided, discussed and informed consent has been obtained with: patient.    CODE STATUS:

## 2024-05-23 NOTE — ANESTHESIA POSTPROCEDURE EVALUATION
Patient: Marylu Foreman    Procedure Summary       Date: 05/23/24 Room / Location:  [facility] /  [facility]    Anesthesia Start: 1206 Anesthesia Stop: 1439    Procedure: right breast total mastectomy, right sentinel lymph node biopsy (Right: Breast) Diagnosis:       Malignant neoplasm of female breast, unspecified estrogen receptor status, unspecified laterality, unspecified site of breast      (Malignant neoplasm of female breast, unspecified estrogen receptor status, unspecified laterality, unspecified site of breast [C50.919])    Surgeons: Rosey Diaz MD Provider: Yomi Ureña MD    Anesthesia Type: general ASA Status: 3            Anesthesia Type: general    Vitals  Vitals Value Taken Time   /87 05/23/24 1445   Temp     Pulse 104 05/23/24 1452   Resp     SpO2 96 % 05/23/24 1452   Vitals shown include unfiled device data.        Post Anesthesia Care and Evaluation    Patient location during evaluation: PACU  Patient participation: complete - patient participated  Level of consciousness: awake  Pain management: adequate    Airway patency: patent  Anesthetic complications: No anesthetic complications  PONV Status: none  Cardiovascular status: acceptable  Respiratory status: acceptable  Hydration status: acceptable

## 2024-05-23 NOTE — PLAN OF CARE
Goal Outcome Evaluation:  Plan of Care Reviewed With: patient        Progress: no change  Outcome Evaluation: received pt from PACU, vss, right chest incision intact and dressing/ace CDI, ILYA x1 with bloody drainage, scds on, pt sleepy but arousable, DTV, iv will be saline locked, regular diet ordered, pt with chronic left foot drop - so pt uses walker - she brought her walker from home

## 2024-05-24 ENCOUNTER — NURSE TRIAGE (OUTPATIENT)
Dept: CALL CENTER | Facility: HOSPITAL | Age: 79
End: 2024-05-24
Payer: MEDICARE

## 2024-05-24 ENCOUNTER — READMISSION MANAGEMENT (OUTPATIENT)
Dept: CALL CENTER | Facility: HOSPITAL | Age: 79
End: 2024-05-24
Payer: MEDICARE

## 2024-05-24 VITALS
SYSTOLIC BLOOD PRESSURE: 152 MMHG | RESPIRATION RATE: 16 BRPM | HEART RATE: 77 BPM | TEMPERATURE: 97.2 F | DIASTOLIC BLOOD PRESSURE: 92 MMHG | OXYGEN SATURATION: 97 %

## 2024-05-24 DIAGNOSIS — C50.919 MALIGNANT NEOPLASM OF FEMALE BREAST, UNSPECIFIED ESTROGEN RECEPTOR STATUS, UNSPECIFIED LATERALITY, UNSPECIFIED SITE OF BREAST: Primary | ICD-10-CM

## 2024-05-24 PROCEDURE — G0378 HOSPITAL OBSERVATION PER HR: HCPCS

## 2024-05-24 RX ORDER — IBUPROFEN 800 MG/1
800 TABLET ORAL EVERY 8 HOURS
Qty: 30 TABLET | Refills: 0 | Status: SHIPPED | OUTPATIENT
Start: 2024-05-24 | End: 2024-05-24

## 2024-05-24 RX ORDER — ACETAMINOPHEN 500 MG
1000 TABLET ORAL EVERY 8 HOURS
Qty: 60 TABLET | Refills: 0 | Status: SHIPPED | OUTPATIENT
Start: 2024-05-24 | End: 2024-06-03

## 2024-05-24 RX ORDER — ENOXAPARIN SODIUM 100 MG/ML
40 INJECTION SUBCUTANEOUS
Qty: 2 ML | Refills: 0 | Status: SHIPPED | OUTPATIENT
Start: 2024-05-24 | End: 2024-05-29

## 2024-05-24 RX ORDER — ENOXAPARIN SODIUM 100 MG/ML
40 INJECTION SUBCUTANEOUS NIGHTLY
Status: DISCONTINUED | OUTPATIENT
Start: 2024-05-24 | End: 2024-05-24

## 2024-05-24 RX ORDER — GABAPENTIN 100 MG/1
100 CAPSULE ORAL 3 TIMES DAILY
Qty: 30 CAPSULE | Refills: 0 | Status: SHIPPED | OUTPATIENT
Start: 2024-05-24 | End: 2024-06-03

## 2024-05-24 RX ORDER — ENOXAPARIN SODIUM 100 MG/ML
40 INJECTION SUBCUTANEOUS
Qty: 2 ML | Refills: 0 | Status: SHIPPED | OUTPATIENT
Start: 2024-05-24 | End: 2024-05-24

## 2024-05-24 RX ORDER — OXYCODONE HYDROCHLORIDE 5 MG/1
5 TABLET ORAL EVERY 4 HOURS PRN
Qty: 10 TABLET | Refills: 0 | Status: SHIPPED | OUTPATIENT
Start: 2024-05-24 | End: 2024-05-31

## 2024-05-24 RX ADMIN — DULOXETINE HYDROCHLORIDE 60 MG: 60 CAPSULE, DELAYED RELEASE ORAL at 09:02

## 2024-05-24 RX ADMIN — GABAPENTIN 100 MG: 100 CAPSULE ORAL at 09:00

## 2024-05-24 RX ADMIN — ACETAMINOPHEN 1000 MG: 500 TABLET ORAL at 09:00

## 2024-05-24 RX ADMIN — IBUPROFEN 800 MG: 800 TABLET, FILM COATED ORAL at 04:44

## 2024-05-24 RX ADMIN — LEVOTHYROXINE SODIUM 50 MCG: 50 TABLET ORAL at 09:01

## 2024-05-24 RX ADMIN — ACETAMINOPHEN 1000 MG: 500 TABLET ORAL at 01:32

## 2024-05-24 NOTE — PLAN OF CARE
Goal Outcome Evaluation:  Plan of Care Reviewed With: patient        Progress: improving  Outcome Evaluation: VSS. minimal amount of pain.  scheduled tylenol given.  right chest dressing CDI. ILYA x1 with moderate amount of bloody drainage.  voiding freely.  History of left chronic foot drop.  Up with assist x 1 using her walker.  . IS use up to 1000

## 2024-05-24 NOTE — OUTREACH NOTE
Prep Survey      Flowsheet Row Responses   [facility] patient discharged from? [place]   Is LACE score < 7 ? No   Eligibility [facility]   Date of Admission 05/23/24   Date of Discharge 05/24/24   Discharge Disposition Home or Self Care   Discharge diagnosis Malignant neoplasm of female breast, right breast total mastectomy,   Does the patient have one of the following disease processes/diagnoses(primary or secondary)? General Surgery   Does the patient have Home health ordered? No   Is there a DME ordered? No   Prep survey completed? Yes            Jelena CHOI - Registered Nurse

## 2024-05-24 NOTE — PROGRESS NOTES
Case Management Discharge Note      Final Note: Discharged home. Esmer Brown RN         Selected Continued Care - Admitted Since 5/23/2024          Transportation Services  Private: Car    Final Discharge Disposition Code: 01 - home or self-care

## 2024-05-24 NOTE — TELEPHONE ENCOUNTER
Mother was discharged a short time ago She is to have a blood thinner injectable sent to [facility] pharmacy and it is not there [facility] has not received the script    No mention of injectable blood thinner on AVS    I called Ashley RN on [address].     She said she messaged the MD she probably has not sent it yet. The MD is aware of medication to go to [facility]    She advises to keep phone notifications on and the pharmacy will notify them when it is ready.        Reason for Disposition   [1] Caller has URGENT medicine question about med that PCP or specialist prescribed AND [2] triager unable to answer question    Additional Information   Negative: [1] Intentional drug overdose AND [2] suicidal thoughts or ideas   Negative: Drug overdose and triager unable to answer question   Negative: Caller requesting a renewal or refill of a medicine patient is currently taking   Negative: Caller requesting information unrelated to medicine   Negative: Caller requesting information about COVID-19 Vaccine   Negative: Caller requesting information about Emergency Contraception   Negative: Caller requesting information about Combined Birth Control Pills   Negative: Caller requesting information about Progestin Birth Control Pills   Negative: Caller requesting information about Post-Op pain or medicines   Negative: Caller requesting a prescription antibiotic (such as Penicillin) for Strep throat and has a positive culture result   Negative: Caller requesting a prescription anti-viral med (such as Tamiflu) and has influenza (flu) symptoms   Negative: Immunization reaction suspected   Negative: Rash while taking a medicine or within 3 days of stopping it   Negative: [1] Asthma and [2] having symptoms of asthma (cough, wheezing, etc.)   Negative: [1] Symptom of illness (e.g., headache, abdominal pain, earache, vomiting) AND [2] more than mild   Negative: Breastfeeding questions about mother's medicines and diet   Negative: MORE  "THAN A DOUBLE DOSE of a prescription or over-the-counter (OTC) drug   Negative: [1] DOUBLE DOSE (an extra dose or lesser amount) of prescription drug AND [2] any symptoms (e.g., dizziness, nausea, pain, sleepiness)   Negative: [1] DOUBLE DOSE (an extra dose or lesser amount) of over-the-counter (OTC) drug AND [2] any symptoms (e.g., dizziness, nausea, pain, sleepiness)   Negative: Took another person's prescription drug   Negative: [1] DOUBLE DOSE (an extra dose or lesser amount) of prescription drug AND [2] NO symptoms  (Exception: A double dose of antibiotics.)   Negative: Diabetes drug error or overdose (e.g., took wrong type of insulin or took extra dose)   Negative: [1] Prescription not at pharmacy AND [2] was prescribed by PCP recently (Exception: Triager has access to EMR and prescription is recorded there. Go to Home Care and confirm for pharmacy.)   Negative: [1] Pharmacy calling with prescription question AND [2] triager unable to answer question    Answer Assessment - Initial Assessment Questions  1. NAME of MEDICINE: \"What medicine(s) are you calling about?\"  lovonex  2. QUESTION: \"What is your question?\" (e.g., double dose of medicine, side effect)  Not at [facility]  3. PRESCRIBER: \"Who prescribed the medicine?\" Reason: if prescribed by specialist, call should be referred to that group.      Emily Diaz MD  4. SYMPTOMS: \"Do you have any symptoms?\" If Yes, ask: \"What symptoms are you having?\"  \"How bad are the symptoms (e.g., mild, moderate, severe)      *No Answer*  5. PREGNANCY:  \"Is there any chance that you are pregnant?\" \"When was your last menstrual period?\"      *No Answer*    Protocols used: Medication Question Call-ADULT-    "

## 2024-05-24 NOTE — DISCHARGE SUMMARY
DATE OF ADMISSION:  5/23/2024  DATE OF DISCHARGE:  5/24/2024    ATTENDING:        Rosey Diaz M.D.       GENERAL SURGERY    CONSULTS:    None    PRINCIPAL DIAGNOSIS:     right breast cancer    DISCHARGE DIAGNOSES:     right breast cancer    HOSPITAL PROCEDURES:     right breast total mastectomy, right sentinel lymph node biopsy    HOSPITAL COURSE:   The patient was admitted after the above listed elective procedure. She had an uneventful postoperative course. She was discharged on POD1 with no issues. She was tolerating a diet, her pain was controlled with PO pain medication, and she was ambulating without assistance.    ACTIVITY:  Okay to shower.  Ambulate and climb stairs as tolerated.  No lifting over 10 lbs for 2 weeks.  Okay to drive if not taking pain medications.    DIET:  Regular diet    FOLLOW UP:  With Dr Diaz in 3-4 weeks. Instructed to call [phone] for an appointment.

## 2024-05-24 NOTE — PLAN OF CARE
Goal Outcome Evaluation:  Plan of Care Reviewed With: patient, son        Progress: improving  Outcome Evaluation: BP sl low so BP meds held and pt instructed to check BP at home. drain with mod bloody output; son and pt return demonstrated care. pain well controlled with PO meds. going home.

## 2024-05-24 NOTE — TELEPHONE ENCOUNTER
He did not reach provider,the lovenox, script needs to rewritten.  A message was sent to CM. Please go to the ER, unable to authorize taking any medication differently. He was asking about taking her eliquis. Explained can not authorize this. Need to go to ER. He will call the provider one more time.     She was discharged from [facility] on 05/24/24- with Malignant neoplasm. The insurance would not approve   Enoxaparin Sodium 40 mg Subcutaneous Nightly, Give subcutaneous in abdomen only. Do not massage site after injection. Need to call the surgeon or go to the ER. Message will be sent to CM.   Reason for Disposition   [1] Prescription not at pharmacy AND [2] was prescribed by PCP recently (Exception: Triager has access to EMR and prescription is recorded there. Go to Home Care and confirm for pharmacy.)    Additional Information   Negative: [1] Intentional drug overdose AND [2] suicidal thoughts or ideas   Negative: Drug overdose and triager unable to answer question   Negative: Caller requesting a renewal or refill of a medicine patient is currently taking   Negative: Caller requesting information unrelated to medicine   Negative: Caller requesting information about COVID-19 Vaccine   Negative: Caller requesting information about Emergency Contraception   Negative: Caller requesting information about Combined Birth Control Pills   Negative: Caller requesting information about Progestin Birth Control Pills   Negative: Caller requesting information about Post-Op pain or medicines   Negative: Caller requesting a prescription antibiotic (such as Penicillin) for Strep throat and has a positive culture result   Negative: Caller requesting a prescription anti-viral med (such as Tamiflu) and has influenza (flu) symptoms   Negative: Immunization reaction suspected   Negative: Rash while taking a medicine or within 3 days of stopping it   Negative: [1] Asthma and [2] having symptoms of asthma (cough, wheezing, etc.)    "Negative: [1] Symptom of illness (e.g., headache, abdominal pain, earache, vomiting) AND [2] more than mild   Negative: Breastfeeding questions about mother's medicines and diet   Negative: MORE THAN A DOUBLE DOSE of a prescription or over-the-counter (OTC) drug   Negative: [1] DOUBLE DOSE (an extra dose or lesser amount) of prescription drug AND [2] any symptoms (e.g., dizziness, nausea, pain, sleepiness)   Negative: [1] DOUBLE DOSE (an extra dose or lesser amount) of over-the-counter (OTC) drug AND [2] any symptoms (e.g., dizziness, nausea, pain, sleepiness)   Negative: Took another person's prescription drug   Negative: [1] DOUBLE DOSE (an extra dose or lesser amount) of prescription drug AND [2] NO symptoms  (Exception: A double dose of antibiotics.)   Negative: Diabetes drug error or overdose (e.g., took wrong type of insulin or took extra dose)    Answer Assessment - Initial Assessment Questions  1. NAME of MEDICINE: \"What medicine(s) are you calling about?\"      Lovenox   2. QUESTION: \"What is your question?\" (e.g., double dose of medicine, side effect)      See note   3. PRESCRIBER: \"Who prescribed the medicine?\" Reason: if prescribed by specialist, call should be referred to that group.      Surgeon  4. SYMPTOMS: \"Do you have any symptoms?\" If Yes, ask: \"What symptoms are you having?\"  \"How bad are the symptoms (e.g., mild, moderate, severe)   Has afib   5. PREGNANCY:  \"Is there any chance that you are pregnant?\" \"When was your last menstrual period?\"      none    Protocols used: Medication Question Call-ADULT-AH    "

## 2024-05-24 NOTE — NURSING NOTE
Secure chat to Dr. Diaz re: when to restart Eliquis. MD responded that she is going to prescribe Lovenox. Patient anxious to be discharged so MD notified that pharmacy switched to [facility]'s [facility]- and to send Rx there and patient and son aware of change and need to  Rx today and give injections.

## 2024-05-25 ENCOUNTER — NURSE TRIAGE (OUTPATIENT)
Dept: CALL CENTER | Facility: HOSPITAL | Age: 79
End: 2024-05-25
Payer: MEDICARE

## 2024-05-25 ENCOUNTER — HOSPITAL ENCOUNTER (EMERGENCY)
Facility: HOSPITAL | Age: 79
Discharge: HOME | End: 2024-05-25
Attending: STUDENT IN AN ORGANIZED HEALTH CARE EDUCATION/TRAINING PROGRAM
Payer: MEDICARE

## 2024-05-25 VITALS
HEART RATE: 90 BPM | WEIGHT: 190.7 LBS | DIASTOLIC BLOOD PRESSURE: 69 MMHG | BODY MASS INDEX: 32.56 KG/M2 | OXYGEN SATURATION: 98 % | HEIGHT: 64 IN | TEMPERATURE: 97.8 F | SYSTOLIC BLOOD PRESSURE: 120 MMHG | RESPIRATION RATE: 16 BRPM

## 2024-05-25 DIAGNOSIS — T81.89XA DRAINING POSTOPERATIVE WOUND, INITIAL ENCOUNTER: Primary | ICD-10-CM

## 2024-05-25 DIAGNOSIS — Z90.11 S/P MASTECTOMY, RIGHT: ICD-10-CM

## 2024-05-25 PROCEDURE — 99282 EMERGENCY DEPT VISIT SF MDM: CPT
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
## 2024-05-25 NOTE — TELEPHONE ENCOUNTER
Caller has mastectomy and has a drain. The drain has stopped draining today and she is leaking a large amount of drainage from around the drain site. She was not sent home with any dressings. She does not see any clots in the tubing. Discussed how to strip/milk the drainage tubing and she does understand the process.  Reason for Disposition  • Sounds like a serious complication to the triager    Additional Information  • Negative: [1] Major abdominal surgical incision AND [2] wound gaping open AND [3] visible internal organs  • Negative: Sounds like a life-threatening emergency to the triager  • Negative: [1] Bleeding from incision AND [2] won't stop after 10 minutes of direct pressure  • Negative: [1] Widespread rash AND [2] bright red, sunburn-like  • Negative: Severe pain in the incision  • Negative: [1] Incision gaping open AND [2] < 48 hours since wound re-opened  • Negative: [1] Incision gaping open AND [2] length of opening > 2 inches (5 cm)  • Negative: Patient sounds very sick or weak to the triager  • Negative: Fever > 100.4 F (38.0 C)  • Negative: [1] Incision looks infected (spreading redness, pain) AND [2] fever > 99.5 F (37.5 C)  • Negative: [1] Incision looks infected (spreading redness, pain) AND [2] large red area (> 2 in. or 5 cm)  • Negative: [1] Incision looks infected (spreading redness, pain) AND [2] face wound  • Negative: [1] Red streak runs from the incision AND [2] longer than 1 inch (2.5 cm)  • Negative: [1] Pus or bad-smelling fluid draining from incision AND [2] no fever  • Negative: [1] Post-op pain AND [2] not controlled with pain medications  • Negative: Dressing soaked with blood or body fluid (e.g., drainage)  • Negative: [1] Raised bruise and [2] size > 2 inches (5 cm) and expanding  • Negative: [1] Caller has URGENT question AND [2] triager unable to answer question  • Negative: [1] INCREASING pain in incision AND [2] > 2 days (48 hours) since surgery  • Negative: [1] Small red  "area or streak AND [2] no fever  • Negative: [1] Clear or blood-tinged fluid draining from wound AND [2] no fever  • Negative: [1] Incision gaping open AND [2] > 48 hours since wound re-opened AND [3] length of opening > 1/2 inch (12 mm)  • Negative: [1] Incision on face gaping open after skin glue AND [2] > 48 hours since wound re-opened 3] length of opening > 1/4 inch (6 mm)  • Negative: Suture or staple removal is overdue  • Negative: [1] Suture or staple came out early AND [2] caller wants wound checked  • Negative: [1] Caller has NON-URGENT question AND [2] triager unable to answer question  • Negative: Pimple where a stitch comes through the skin  • Negative: Suture (or staple) came out early    Answer Assessment - Initial Assessment Questions  1. SYMPTOM: \"What's the main symptom you're concerned about?\" (e.g., redness, pain, drainage)      Drainage around tube insertion site  2. ONSET: \"When did drainage  start?\"      today  3. SURGERY: \"What surgery was performed?\"      Right mastectomy  4. DATE of SURGERY: \"When was surgery performed?\"       5/23  5. INCISION SITE: \"Where is the incision located?\"       Right breast   6. REDNESS: \"Is there any redness at the incision site?\" If yes, ask: \"How wide across is the redness?\" (Inches, centimeters)       Dressing on  7. PAIN: \"Is there any pain?\" If so, ask: \"How bad is it?\"  (Scale 1-10; or mild, moderate, severe)      mild  8. BLEEDING: \"Is there any bleeding?\" If so, ask: \"How much?\" and \"Where?\"      Serosang drainage around tube insertion site  9. DRAINAGE: \"Is there any drainage from the incision site?\" If yes, ask: \"What color and how much?\" (e.g., red, cloudy, pus; drops, teaspoon)      serosang  10. FEVER: \"Do you have a fever?\" If so, ask: \"What is your temperature, how was it measured, and when did it start?\"        no  11. OTHER SYMPTOMS: \"Do you have any other symptoms?\" (e.g., shaking chills, weakness, rash elsewhere on body)        no    Protocols " used: Post-Op Incision Symptoms-ADULT-AH

## 2024-05-25 NOTE — ED PROVIDER NOTES
EMERGENCY DEPARTMENT MD ATTESTATION NOTE    Room Number:  38/38  PCP: Arleen Dillon MD  Independent Historians: Patient    HPI:    Context: Marylu Foreman is a 78 y.o. female with a medical history of hypertension, breast cancer, hyperlipidemia, DVT who presents to the ED c/o acute postoperative complication.  Patient recently had mastectomy performed.  Patient presents today with concern for complications with ILYA drain and packing issues.  Patient contacted her surgeon today and was instructed to come to the ER for evaluation.  Patient's ABD and packing have been dislodged.  Patient has noted some drainage around the ILYA drain and felt like it may be clotted.        Review of prior external notes (non-ED) -and- Review of prior external test results outside of this encounter: Discharge summary from yesterday reviewed and notable for admission secondary to right breast total mastectomy and right sentinel lymph node biopsy.  Plan was to follow-up in 3 to 4 weeks.    Prescription drug monitoring program review:           PHYSICAL EXAM    I have reviewed the triage vital signs and nursing notes.    ED Triage Vitals   Temp Heart Rate Resp BP SpO2   05/25/24 1627 05/25/24 1627 05/25/24 1627 05/25/24 1644 05/25/24 1627   97.8 °F (36.6 °C) 109 16 133/78 98 %      Temp src Heart Rate Source Patient Position BP Location FiO2 (%)   05/25/24 1627 05/25/24 1627 05/25/24 1644 05/25/24 1644 --   Tympanic Monitor Lying Left arm        Physical Exam  GENERAL: alert, no acute distress  SKIN: Warm, dry  HENT: Normocephalic, atraumatic  EYES: no scleral icterus  CV: regular rhythm, regular rate  RESPIRATORY: normal effort, lungs clear  ABDOMEN: soft, nontender, nondistended  MUSCULOSKELETAL: no deformity.  ILYA drain present in the right upper breast with appropriate drainage and no clots noted.  NEURO: alert, moves all extremities, follows commands            MEDICATIONS GIVEN IN ER  Medications - No data to display      ORDERS PLACED  DURING THIS VISIT:  No orders of the defined types were placed in this encounter.        PROCEDURES  Procedures            PROGRESS, DATA ANALYSIS, CONSULTS, AND MEDICAL DECISION MAKING  All labs have been independently interpreted by me.  All radiology studies have been reviewed by me. All EKG's have been independently viewed and interpreted by me.  Discussion below represents my analysis of pertinent findings related to patient's condition, differential diagnosis, treatment plan and final disposition.    Differential diagnosis includes but is not limited to packing complication, postoperative complication, draining malfunction.    Clinical Scores:                   ED Course as of 05/26/24 2010   Sat May 25, 2024   1950 Patient presents to emergency department with concern about postoperative mastectomy wound and ILYA drain.  Wound incision looks clean, dry, intact.  ILYA drain is actively draining.  There were several small clots in the drain tubing but this was easily milked into the bulb of the drain.  Educated patient on how to strip and milk the drain.  Encouraged her to keep scheduled postoperative appointment with Dr. Diaz.  Discussed ED return precautions.  She is otherwise well-appearing, hemodynamically stable, and therefore appropriate for discharge. [MP]      ED Course User Index  [MP] Marleni Leblanc PA-C       MDM: 78-year-old female presenting for evaluation of ILYA drain complication and packing concerns after right total mastectomy.  Drain appears to be functioning properly and is actively draining.  Packing and bandage will be redressed.  No evidence of infection or complications warranting admission at this time.  Patient to follow-up with her surgeon early next week.  Patient is agreeable.  Return precautions discussed.      COMPLEXITY OF CARE  Admission was considered but after careful review of the patient's presentation, physical examination, diagnostic results, and response to treatment the  patient may be safely discharged with outpatient follow-up.    Please note that portions of this document were completed with a voice recognition program.    Note Disclaimer: At [facility], we believe that sharing information builds trust and better relationships. You are receiving this note because you recently visited [facility]. It is possible you will see health information before a provider has talked with you about it. This kind of information can be easy to misunderstand. To help you fully understand what it means for your health, we urge you to discuss this note with your provider.         Zachery Hu MD  05/25/24 1827       Zachery Hu MD  05/26/24 2010

## 2024-05-25 NOTE — DISCHARGE INSTRUCTIONS
Call Dr. Diaz on Tuesday to discuss postoperative status.  Follow-up with PCP as needed.  Continue measuring drainage output.  Return to emergency department for any worsening symptoms.

## 2024-05-28 ENCOUNTER — TELEPHONE (OUTPATIENT)
Dept: SURGERY | Facility: CLINIC | Age: 79
End: 2024-05-28
Payer: MEDICARE

## 2024-05-28 ENCOUNTER — NURSE TRIAGE (OUTPATIENT)
Dept: CALL CENTER | Facility: HOSPITAL | Age: 79
End: 2024-05-28
Payer: MEDICARE

## 2024-05-28 ENCOUNTER — TELEPHONE (OUTPATIENT)
Dept: INTERNAL MEDICINE | Facility: CLINIC | Age: 79
End: 2024-05-28

## 2024-05-28 ENCOUNTER — TRANSITIONAL CARE MANAGEMENT TELEPHONE ENCOUNTER (OUTPATIENT)
Dept: CALL CENTER | Facility: HOSPITAL | Age: 79
End: 2024-05-28
Payer: MEDICARE

## 2024-05-28 NOTE — TELEPHONE ENCOUNTER
Called and spoke with patient regarding message received from Nurse Triage Line 05/28/24.  Advised patient per Dr. Diaz that unfortunately there is not much to do about drainage around the drain tube.  Advised patient to change guaze around the drain as needed. Scheduled appointment with Dr. Diaz for 05/31/24

## 2024-05-28 NOTE — TELEPHONE ENCOUNTER
Caller: Marylu Foreman    Relationship to patient: Self    Best call back number:[phone]    Chief complaint: PATIENT WAS TOLD TO FOLLOW UP WITH PRIMARY REGARDING LUMP NEXT ON THROAT    Type of visit: SAME DAY/OFFICE VISIT    Requested date: ASAP     If rescheduling, when is the original appointment: N/A     Additional notes:PATIENT HAS HAS BUMP ON BREAST BONE CLOSE TO THROAT ,SORE TO TOUCH, HURTS WHEN RASING ARM,AND A BAD COUGH SHE THINKS RELATED ALLERGIES.. PATIENT REACHED OUT TO SURGERY TEAM AS SHE HAD A MASTECTOMY AND THEY TOLD HER TO TRY TO GET INTO HER PRIMARY TOMORROW 5/29/24. THERE AS LION AVAILABILITY SO PATIENT IS WANTING TO KNOW IF THERE IS ANYWAY SHE CAN BE WORKED IT.PLEASE ADVISE.

## 2024-05-28 NOTE — TELEPHONE ENCOUNTER
Patient called the triage line stating her breast bone is a little sore, she noticed today an elongated bump near her hooker apples and runs to her right side, patient did not specify size, but she mentioned it is about 4 inches away from her incision area and it hurst when moving right arm, as well as no changes in color of the area. Patient also stated she's been having a lot of cough related to allergies and not taking any medication for pain, allergies or cough. Advised there soreness could be from the cough and surgery and patient should take a cough syrup, allergy medication, tylenol and alternate with ibuprofen to manage the pain and if possible to send a picture of the lump.     Patient commented she would try to get an appointment to see her regular doctor tomorrow morning. Advised if she could not, to call us back. Patient already scheduled to see Dr. Diaz on 05/31/2024

## 2024-05-28 NOTE — OUTREACH NOTE
Call Center TCM Note      Flowsheet Row Responses   [facility] patient discharged from? [facility]   Does the patient have one of the following disease processes/diagnoses(primary or secondary)? General Surgery   TCM attempt successful? Yes   Call start time 1004   Call end time 1008   Discharge diagnosis Malignant neoplasm of female breast, right breast total mastectomy,   Meds reviewed with patient/caregiver? Yes   Is the patient having any side effects they believe may be caused by any medication additions or changes? No   Does the patient have all medications related to this admission filled (includes all antibiotics, pain medications, etc.) Yes   Is the patient taking all medications as directed (includes completed medication regime)? Yes   Comments 5/30/2024  8:30 AM   Does the patient have an appointment with their PCP within 7-14 days of discharge? Yes   Has home health visited the patient within 72 hours of discharge? N/A   Psychosocial issues? No   What is the patient's perception of their health status since discharge? Improving  [Sore]   Is the patient/caregiver able to teach back signs and symptoms of incisional infection? Increased redness, swelling or pain at the incisonal site, Increased drainage or bleeding, Incisional warmth, Pus or odor from incision, Fever   TCM call completed? Yes   Wrap up additional comments Pt has drainage that is not infectious.   Call end time 1008            Ashlyn Morris RN    5/28/2024, 10:08 EDT

## 2024-05-28 NOTE — TELEPHONE ENCOUNTER
Caller is S/P right mastectomy on 05/23/2024 per Dr. Diaz. Reports ILYA drain remains in place but is worried about increased serosanguinous drainage from around ILYA drain exit site. States has been emptying bulb daily and stripping the tubing every 8 hours as instructed. Daily output of drain:  05/24/2024 90cc  05/25/2024 80cc  05/26/2024 60cc  05/27/2024 30cc  Reports no redness, swelling or fever.    Reports as the amount drained from bulb has decreased, states amount from around exit site has increased. Reviewed with patient the instructions on cleansing /washing drain site per AVS and using split 4x4 gauze dressings around exit site. Verbalized understanding.   Caller is anxious re: drainage. Call placed to Dr. Hopper, transferred to [facility]. Message left with information as above and requested call back to patient for any further recommendations and reassurance.    This note also routed to provider clinical pool    Reason for Disposition   [1] Caller has NON-URGENT question AND [2] triager unable to answer question   [1] Caller has URGENT question AND [2] triager unable to answer question    Additional Information   Negative: Sounds like a life-threatening emergency to the triager   Negative: Chest pain   Negative: Difficulty breathing   Negative: Acting confused (e.g., disoriented, slurred speech) or excessively sleepy   Negative: Post-Op tonsil and adenoid surgery, symptoms or questions about   Negative: Surgical incision symptoms and questions   Negative: [1] Pain or burning with passing urine (urination) AND [2] male   Negative: [1] Pain or burning with passing urine (urination) AND [2] female   Negative: Constipation   Negative: New or worsening leg (calf, thigh) pain   Negative: New or worsening leg swelling   Negative: Dizziness is severe, or persists > 24 hours after surgery   Negative: Pain, redness, swelling, or pus at IV Site   Negative: Symptoms arising from use of a urinary catheter (e.g., Coude,  "Freddy)   Negative: Cast problems or questions   Negative: Medication question   Negative: [1] Widespread rash AND [2] bright red, sunburn-like   Negative: [1] SEVERE headache AND [2] after spinal (epidural) anesthesia   Negative: [1] Vomiting AND [2] persists > 4 hours   Negative: [1] Vomiting AND [2] abdomen looks much more swollen than usual   Negative: [1] Drinking very little AND [2] dehydration suspected (e.g., no urine > 12 hours, very dry mouth, very lightheaded)   Negative: Patient sounds very sick or weak to the triager   Negative: Sounds like a serious complication to the triager   Negative: [1] SEVERE post-op pain (e.g., excruciating, pain scale 8-10) AND [2] not controlled with pain medications   Negative: [1] Headache AND [2] after spinal (epidural) anesthesia AND [3] not severe   Negative: Fever present > 3 days (72 hours)   Negative: [1] MILD-MODERATE post-op pain (e.g., pain scale 1-7) AND [2] not controlled with pain medications   Negative: Fever > 100.4 F (38.0 C)    Answer Assessment - Initial Assessment Questions  1. SYMPTOM: \"What's the main symptom you're concerned about?\" (e.g., pain, fever, vomiting)      Drainage (serosanguinous) from around ILYA drain exit site  2. ONSET: \"When did   start?\"      Since surgery 05/23/2024  3. SURGERY: \"What surgery did you have?\"      Right total Mastectomy  4. DATE of SURGERY: \"When was the surgery?\"       05/23/2024  5. ANESTHESIA: \" What type of anesthesia did you have?\" (e.g., general, spinal, epidural, local)      General  6. PAIN: \"Is there any pain?\" If Yes, ask: \"How bad is it?\"  (Scale 1-10; or mild, moderate, severe)      Denies  7. FEVER: \"Do you have a fever?\" If Yes, ask: \"What is your temperature, how was it measured, and when did it start?\"      Denies  8. VOMITING: \"Is there any vomiting?\" If Yes, ask: \"How many times?\"      Denies  9. BLEEDING: \"Is there any bleeding?\" If Yes, ask: \"How much?\" and \"Where?\"      Denies  10. OTHER SYMPTOMS: \"Do " "you have any other symptoms?\" (e.g., drainage from wound, painful urination, constipation)        Denies    Protocols used: Post-Op Symptoms and Questions-[facility]    "

## 2024-05-28 NOTE — OP NOTE
OPERATIVE REPORT     DATE: 5/23/2024     SURGEON: Rosey Diaz MD      ASSISTANT: Kristyn Cerda, who was present for necessary suctioning, retracting, suturing throughout the procedure      OPERATION PERFORMED: Right breast total mastectomy with sentinel lymph node biopsy      PREOPERATIVE DIAGNOSIS: Invasive lobular carcinoma of the right breast      POSTOPERATIVE DIAGNOSIS: Same     ANESTHESIA: General     SPECIMEN:   Right breast (stitch at 12:00)    Right axillary sentinel lymph node #1  Right axillary sentinel lymph node #2   Right axillary sentinel lymph node #3   Right axillary sentinel lymph node #4    DRAINS: None     BLOOD LOSS: Minimal     INDICATION FOR OPERATION: Marylu Foreman is a 78 y.o. lady who was recently diagnosed with right breast invasive lobular carcinoma.  She has a history of left breast cancer and has previously undergone a mastectomy.  She requested to proceed with right breast total mastectomy with sentinel lymph node biopsy. All risks (including bleeding, infection, damage to surrounding structures, need for further surgery, positive margins ), benefits and alternatives were explained to the patient who agreed and wished to proceed. Informed consent was signed.      OPERATIVE COURSE: The patient was taken to the operating room, transferred onto the operating room table, and underwent anesthesia without incident. 5 cc of blue dye was injected into the dermal layer of the nipple areolar complex. The patient was prepped and draped in sterile fashion. A time out was performed and preoperative antibiotics were given. An elliptical incision was drawn around the nipple areolar complex. Half percent Marcaine with epinephrine was injected into the skin and subcutaneous tissues. A scalpel was used to transect the skin and dermal layer. Subcutaneous flaps were created approximately 1cm in thickness circumferentially. These were extended to the clavicle superiorly, the sternum medially, the  inframammary fold inferiorly, and the serratus laterally. Once this was circumferentially dissected free with Bovie electrocautery, the breast was removed from the chest wall at the level of the pectoralis fascia. It was marked as listed above and passed off for fresh permanent specimen. The area was irrigated and appeared hemostatic. Bovie electrocautery was used for any small muscle bleeding. The remainder of the half percent Marcaine with epinephrine was injected into the intercostal nerve bundles and the subcutaneous tissue.  A 19 Armenian Dar drain was passed through the mastectomy incision and across the bed.  3 g of Tanya was placed.    We then performed a sentinel lymph node biopsy. Bovie electrocautery was used to dissect down through the subcutaneous tissues and through the clavipectoral fascia into the axilla.  4 Escalante lymph nodes were identified. These were removed with Bovie electrocautery and clips across all major lymphovascular structures. Once this was done, there were no hot, blue, or palpable lymph nodes remaining. The area was irrigated and appeared hemostatic.  It was sent for frozen specimen and returned as negative.    The rest of the local anesthetic was administered.  Incisions closed with interrupted 3-0 Vicryl sutures and a running 4-0 Monocryl suture.  Skin glue was placed over the incision.  The patient tolerated the procedure well. All needle and lap counts were correct at the end of the case. The patient was then awoken from anesthesia and taken to recovery for further monitoring.  Patient was wrapped with a light pressure dressing.    Escalante Node Biopsy for Breast Cancer - Right  Operation performed with curative intent. Yes   Tracer(s) used to identify sentinel nodes in the upfront surgery (non-neoadjuvant) setting (select all that apply). Dye and Radioactive tracer   Tracer(s) used to identify sentinel nodes in the neoadjuvant setting (select all that apply). N/A   All nodes  (colored or non-colored) present at the end of a dye-filled lymphatic channel were removed. Yes   All significantly radioactive nodes were removed. Yes   All palpably suspicious nodes were removed. Yes   Biopsy-proven positive nodes marked with clips prior to chemotherapy were identified and removed. N/A             Rosey Diaz MD   General and Endoscopic Surgery  [facility]     [address], [address]  [place], [place], [address]  P: [phone]  F: [phone]

## 2024-05-29 LAB
LAB AP CASE REPORT: NORMAL
LAB AP CLINICAL INFORMATION: NORMAL
LAB AP DIAGNOSIS COMMENT: NORMAL
LAB AP SYNOPTIC CHECKLIST: NORMAL
Lab: NORMAL
PATH REPORT.FINAL DX SPEC: NORMAL
PATH REPORT.GROSS SPEC: NORMAL

## 2024-05-30 ENCOUNTER — OFFICE VISIT (OUTPATIENT)
Dept: INTERNAL MEDICINE | Facility: CLINIC | Age: 79
End: 2024-05-30
Payer: MEDICARE

## 2024-05-30 ENCOUNTER — HOSPITAL ENCOUNTER (OUTPATIENT)
Dept: GENERAL RADIOLOGY | Facility: HOSPITAL | Age: 79
Discharge: HOME | End: 2024-05-30
Admitting: INTERNAL MEDICINE
Payer: MEDICARE

## 2024-05-30 ENCOUNTER — TELEPHONE (OUTPATIENT)
Dept: SURGERY | Facility: CLINIC | Age: 79
End: 2024-05-30
Payer: MEDICARE

## 2024-05-30 VITALS
OXYGEN SATURATION: 98 % | DIASTOLIC BLOOD PRESSURE: 70 MMHG | HEIGHT: 64 IN | BODY MASS INDEX: 33.02 KG/M2 | SYSTOLIC BLOOD PRESSURE: 114 MMHG | TEMPERATURE: 98 F | WEIGHT: 193.4 LBS | HEART RATE: 51 BPM

## 2024-05-30 DIAGNOSIS — R05.1 ACUTE COUGH: ICD-10-CM

## 2024-05-30 DIAGNOSIS — Z90.11 S/P RIGHT MASTECTOMY: Primary | ICD-10-CM

## 2024-05-30 DIAGNOSIS — J18.9 PNEUMONIA OF RIGHT LUNG DUE TO INFECTIOUS ORGANISM, UNSPECIFIED PART OF LUNG: Primary | ICD-10-CM

## 2024-05-30 PROBLEM — C50.919 BREAST CANCER: Status: RESOLVED | Noted: 2024-05-23 | Resolved: 2024-05-30

## 2024-05-30 PROCEDURE — 71046 X-RAY EXAM CHEST 2 VIEWS: CPT

## 2024-05-30 RX ORDER — LEVOFLOXACIN 500 MG/1
500 TABLET, FILM COATED ORAL DAILY
Qty: 7 TABLET | Refills: 0 | Status: SHIPPED | OUTPATIENT
Start: 2024-05-30

## 2024-05-30 RX ORDER — ALBUTEROL SULFATE 90 UG/1
2 AEROSOL, METERED RESPIRATORY (INHALATION) EVERY 6 HOURS PRN
Qty: 18 G | Refills: 0 | Status: SHIPPED | OUTPATIENT
Start: 2024-05-30

## 2024-05-30 RX ORDER — BENZONATATE 100 MG/1
100 CAPSULE ORAL 3 TIMES DAILY PRN
Qty: 30 CAPSULE | Refills: 0 | Status: SHIPPED | OUTPATIENT
Start: 2024-05-30

## 2024-05-30 NOTE — PROGRESS NOTES
"Transitional Care Follow Up Visit  Subjective     Marylu Foreman is a 78 y.o. female who presents for a transitional care management visit.    Within 48 business hours after discharge our office contacted her via telephone to coordinate her care and needs.      I reviewed and discussed the details of that call along with the discharge summary, hospital problems, inpatient lab results, inpatient diagnostic studies, and consultation reports with Marylu.     Current outpatient and discharge medications have been reconciled for the patient.  Reviewed by: Arleen Dillon MD          5/24/2024     5:17 PM   Date of TCM Phone Call   [facility]   Date of Admission 5/23/2024   Date of Discharge 5/24/2024   Discharge Disposition Home or Self Care     Risk for Readmission (LACE) Score: 7 (5/24/2024  6:00 AM)      History of Present Illness   Course During Hospital Stay:  fu after mastectomy    She was sent home on 5/24 amd was seen back in the ER a couple days later for drainage  no pain  no fever    She has aslo had a cough for the last few days  since she came home from the hospital  cough is mostly dry patient has not had any fevers or chills she is not short of breath she has not noticed any wheezing.  She says she is coughing so hard at night it is keeping her up.  She denies any swelling in her legs no orthopnea or PND     The following portions of the patient's history were reviewed and updated as appropriate: allergies, current medications, past family history, past medical history, past social history, past surgical history, and problem list.  She has no history of asthma and had no respiratory symptoms before going to the hospital  Review of Systems    Objective   /70 (BP Location: Left arm, Patient Position: Sitting) Comment (BP Location): lower arm  Pulse 51   Temp 98 °F (36.7 °C) (Oral)   Ht 162.6 cm (64\")   Wt 87.7 kg (193 lb 6.4 oz)   SpO2 98%   BMI 33.20 kg/m²   Physical " Exam  Vitals reviewed.   Constitutional:       Appearance: She is well-developed.   HENT:      Head: Normocephalic and atraumatic.      Right Ear: External ear normal.      Left Ear: External ear normal.   Eyes:      Conjunctiva/sclera: Conjunctivae normal.      Pupils: Pupils are equal, round, and reactive to light.   Neck:      Thyroid: No thyromegaly.      Trachea: No tracheal deviation.   Cardiovascular:      Rate and Rhythm: Normal rate and regular rhythm.      Heart sounds: Normal heart sounds.   Pulmonary:      Effort: Pulmonary effort is normal.      Breath sounds: Normal breath sounds.      Comments: Well-healing incision across chest but there is a small area of erythema and induration midline incision on the superior aspect of it.  There is no drainage from the incision other than at the site of the drain.    It is also noted to have wheezing on the right side of the chest  Abdominal:      General: Bowel sounds are normal. There is no distension.      Palpations: Abdomen is soft.      Tenderness: There is no abdominal tenderness.   Musculoskeletal:         General: No deformity. Normal range of motion.      Cervical back: Normal range of motion.   Skin:     General: Skin is warm and dry.   Neurological:      Mental Status: She is alert and oriented to person, place, and time.   Psychiatric:         Behavior: Behavior normal.         Thought Content: Thought content normal.         Judgment: Judgment normal.         Assessment & Plan   Diagnoses and all orders for this visit:    1. S/P right mastectomy (Primary)    2. Acute cough  -     XR Chest PA & Lateral    Other orders  -     benzonatate (Tessalon Perles) 100 MG capsule; Take 1 capsule by mouth 3 (Three) Times a Day As Needed for Cough.  Dispense: 30 capsule; Refill: 0  -     albuterol sulfate  (90 Base) MCG/ACT inhaler; Inhale 2 puffs Every 6 (Six) Hours As Needed for Wheezing.  Dispense: 18 g; Refill: 0        1.  Status post right  mastectomy: She does have a little redness around the wound site and is having a significant amount of drainage.  It is serosanguineous but it seems to be leaking around the drain site.  Definitely her coughing is not helping with this.  She is seeing the surgeon tomorrow.  2.  Cough: Patient has had a cough for the past 5 days.  It is mostly dry.  On exam she has a lot of wheezing on the right side of her chest no rales and a few rhonchi that did clear with coughing.  I am going to go ahead and get a chest x-ray today.  She is to use Delsym for the cough as well as some Tessalon Perles which have worked for her in the past.  In addition we will try an albuterol inhaler to help with the wheezing.    Addendum chest x-ray does show a right middle lobe infiltrate.  We will go ahead and treat her with Levaquin 500 mg a day for a week.

## 2024-05-31 ENCOUNTER — OFFICE VISIT (OUTPATIENT)
Dept: SURGERY | Facility: CLINIC | Age: 79
End: 2024-05-31
Payer: MEDICARE

## 2024-05-31 VITALS — BODY MASS INDEX: 32.68 KG/M2 | HEIGHT: 64 IN | WEIGHT: 191.4 LBS

## 2024-05-31 DIAGNOSIS — C50.919 MALIGNANT NEOPLASM OF FEMALE BREAST, UNSPECIFIED ESTROGEN RECEPTOR STATUS, UNSPECIFIED LATERALITY, UNSPECIFIED SITE OF BREAST: Primary | ICD-10-CM

## 2024-05-31 NOTE — PROGRESS NOTES
General Surgery Breast Cancer History and Physical Exam      Summary:    Marylu Foreman is a 78 y.o. lady who presents with a history of right breast invasive lobular carcinoma: Grade I, Ki-67 2%; ER+/TN weakly positive, Her2-; [id number], Stage I.       A multidisciplinary plan has been formulated for the patient:    (1) Breast Surgical Oncology:  -[employer] extended panel genetic testing: negative.  --S/p right breast total mastectomy with sentinel lymph node biopsy May 2024.  -Lymphedema clinic referral.  -Drain removed in the office today.  -Follow-up in 1 year for clinical breast exam.     (2) Medical Oncology:  -Follow-up as arranged with Dr. White.     (3) Radiation Oncology:  -Will refer postoperatively for evaluation for radiation therapy as clinically indicated.     (4) History of left breast cancer in 2007:   -Invasive ductal carcinoma with DCIS, grade II, ER/TN +, her2-  -s/p L breast mastectomy [id number].   -On Arimidex, held due to PE, but completed 5 year course.      (5) History of PE:   -Has been bridging on Lovenox.  Okay to resume Eliquis at this time.     Referring Provider: West White Jr., MD     Chief Complaint: abnormal breast imaging     History of Present Illness: Ms. Marylu Foreman is a 78 y.o. year old lady, seen at the request of West White Jr., MD for a new diagnosis of right breast cancer.       This was initially detected as an imaging abnormality. She has had annual mammograms each year.  Her work-up is detailed in the oncologic history below.      She denies any breast lumps, pain, skin changes, or nipple discharge. She denies any family history of breast or ovarian cancer.     5/31/2024 she presents today for follow-up.  She is done well since surgery with no concerns or complaints.  Her pain is controlled and she is getting back to her daily activities.     Workup of Current Diagnosis:    4/10/2024 Right Breast Diagnostic Mammogram with Ultrasound:   There are scattered areas of  fibroglandular density.   In the outer posterior right breast, there is an approximately 1.0 cm asymmetry with questioned/mild architectural distortion. This area was further assessed with ultrasound. There are benign-appearing calcifications.   ULTRASOUND:  Targeted sonographic evaluation of the right breast was performed from 7:00 to 11:00 in the region of the mammographic abnormality and for follow-up. At 9:00, 13 cm from the nipple, there is a 0.5 x 0.3 x 0.4 cm benign-appearing intramammary lymph node. This was previously labeled 9:00, 8 cm from the nipple. At 8:00, 14 cm from the nipple, there is a 0.4 x 0.3 x 0.6 cm benign-appearing intramammary lymph node. This corresponds to the probably benign mass labeled 8:00, 9 cm from the nipple on prior ultrasound. This is similar in size, previously 0.6 x  0.2 x 0.7 cm. At 9:00, 7 cm from the nipple, there is a 0.6 x 0.3 x 0.6 cm subtle heterogeneous mass with indistinct margins, which is in the region of mammographic asymmetry and is suspicious. This is best appreciate with harmonics.   IMPRESSION:  Suspicious 0.6 cm mass at 9:00 in the right breast. Recommend further evaluation with ultrasound guided core needle biopsy and clip correlation with postbiopsy mammogram. If the area is unable to be reproduced due to target on the day of biopsy or if the clip does not correspond to the mammographic asymmetry on post breast mammogram, further evaluation with stereotactic/tomosynthesis guided core needle biopsy would then be recommended.   BI-RADS Category 4: Suspicious     Right Breast US Guided Biopsy:   PROCEDURE NOTE: Informed consent was obtained. Preliminary sonography of the right breast was performed. The lesion at the 9 o'clock position on the order of 7 cm from the nipple was visualized. The overlying skin was prepped in the usual sterile fashion. Local anesthesia was achieved with 1% lidocaine. A nick was made in the skin with a scalpel. Through the nick was  inserted a 10-gauge Mammotome vacuum assisted device under sonographic guidance. Multiple tissue specimens were obtained. A bowtie shaped metallic clip was placed to ghulam the site. Pressure was applied  until bleeding subsided and the overlying skin was cleaned and bandaged. The patient tolerated the procedure without evidence for complication.   Postbiopsy mammography of the right breast consisting of digital CC and 90 degree lateral images were obtained to demonstrate postbiopsy change with a bowtie shaped metallic clip at the site of the pre-existing mammographic lesion seen in the lower outer quadrant of the right breast. Therefore, sonographic and mammographic concordance is  established. The pathology result has returned as invasive lobular carcinoma with atypical ductal hyperplasia and ALH. This is concordant with imaging findings  IMPRESSION:  Technically successful ultrasound guided Mammotome vacuum assisted right breast biopsy with placement of a bowtie shaped metallic clip. The pathology result has returned as invasive lobular carcinoma with ADH and ALH. Surgical excision is recommended.    BI-RADS Category 6: Known malignancy.     4/18/2024 Pathology:   Final Diagnosis   1. Right Breast, 9:00, 7 Cm from Nipple, Ultrasound-Guided Core Needle Biopsy for a Mass:               A. INVASIVE LOBULAR CARCINOMA, Well-Differentiated; Yovanyn Histologic Grade I/III      (tubule score = 3, nuclear score = 1, mitoses score = 1), measuring at least 6 mm.               B. Atypical ductal hyperplasia and atypical lobular hyperplasia.               C. Negative for lymphovascular space invasion.               D. See biomarker template.      4/26/2024 Bilateral Breast MRI   IMPRESSION:  1. Biopsy-proven malignancy in the right breast in the posterior one third at the 9 o'clock position represented by the bowtie shaped metallic clip within a 1.7 cm postbiopsy cavity. Enhancement of a probable reactive postbiopsy  character is seen anterior and posterior to the biopsy cavity. A 1.1 cm equivocal right axillary lymph node is noted.  2. Status post prior left mastectomy without reconstruction. No suspicious findings are seen within the left post mastectomy bed.   BI-RADS category 6: Known malignancy     5/23/2024 Right breast total mastectomy with sentinel lymph node biopsy   Final Diagnosis   1. Right axillary sentinel lymph node #1, hot, 3, biopsy (FS):               A. 1 lymph node, negative for carcinoma (0/1).               B. AE1/AE3 immunostain performed on block 1A is negative (control reacted appropriately).     2. Right axillary sentinel lymph node #2, hot, blue, 160, biopsy (FS):               A. 1 lymph node, negative for carcinoma (0/1).               B. AE1/AE3 immunostain performed on block 2A is negative (control reacted appropriately).     3. Right axillary sentinel lymph node #3, blue, biopsy (FS):               A. 1 lymph node, negative for carcinoma (0/1).               B. AE1/AE3 immunostain performed on block 3A is negative (control reacted appropriately).     4. Right axillary sentinel lymph node #4, palpable, biopsy (FS):               A. 1 lymph node, negative for carcinoma (0/1).               B. AE1/AE3 immunostain performed on block 4A is negative (control reacted appropriately).     5. Right breast, simple mastectomy (816 g):               A. Negative for residual invasive carcinoma.               B. Atypical lobular hyperplasia and atypical ductal hyperplasia.               C. Clip and biopsy site changes are present.               D. 2 lymph nodes, negative for carcinoma (0/2).               E. Unremarkable skin and nipple.               F. See synoptic report and comment.     Past Medical History:   History of PE, on Eliquis   HTN  CAD     Past Surgical History:    L total knee arthroplasty   Left toe fusion   Left mastectomy   Lumbar discectomy   Gastric bypass   Sigmoid colectomy    Cholecystectomy   Carpal tunnel      Family History:    As above      Social History:  Denies tobacco use  Occasional alcohol use     Allergies:   Allergies         Allergies   Allergen Reactions    Bactrim [Sulfamethoxazole-Trimethoprim] Diarrhea    Amlodipine Besylate-Valsartan Nausea And Vomiting    Cefdinir Diarrhea    Corticosteroids Unknown - Low Severity       Severe depression caused from steroid injections in hands    Lisinopril Nausea And Vomiting    Nsaids Unknown - Low Severity    Other Unknown - Low Severity       Steroids sever depression         Medications:     Current Medications      Current Outpatient Medications:     ascorbic acid (VITAMIN C) 1000 MG tablet, Take 1 tablet by mouth Daily., Disp: , Rfl:     Cholecalciferol (VITAMIN D PO), Take 1 tablet by mouth Daily., Disp: , Rfl:     Coenzyme Q10 200 MG capsule, Take 200 mg by mouth Daily., Disp: , Rfl:     Cyanocobalamin (VITAMIN B 12 PO), Take 1 tablet/day by mouth Daily., Disp: , Rfl:     dilTIAZem CD (CARDIZEM CD) 180 MG 24 hr capsule, Take 1 capsule by mouth 2 (Two) Times a Day., Disp: 180 capsule, Rfl: 3    DULoxetine (CYMBALTA) 60 MG capsule, Take 1 capsule by mouth 2 (Two) Times a Day., Disp: , Rfl:     Eliquis 5 MG tablet tablet, TAKE 1 TABLET BY MOUTH EVERY 12 HOURS FOR ATRIAL FIBRILLATION, Disp: 180 tablet, Rfl: 0    folic acid (FOLVITE) 400 MCG tablet, Take 1 tablet by mouth Daily., Disp: , Rfl:     Ibuprofen 3 %, Gabapentin 10 %, Baclofen 2 %, lidocaine 4 %, Ketamine HCl 4 %, Apply 1-2 g topically to the appropriate area as directed 3 (Three) to 4 (Four) times daily., Disp: 90 g, Rfl: 1    Ibuprofen 3 %, Gabapentin 10 %, Baclofen 2 %, lidocaine 4 %, Ketamine HCl 4 %, Apply 1-2 g topically to the appropriate area as directed 3 (Three) to 4 (Four) times daily., Disp: 90 g, Rfl: 1    Ibuprofen 3 %, Gabapentin 10 %, Baclofen 2 %, lidocaine 4 %, Ketamine HCl 4 %, Apply 1-2 g topically to the appropriate area as directed 3 (Three) to 4  (Four) times daily., Disp: 90 g, Rfl: 1    irbesartan (AVAPRO) 300 MG tablet, Take 1 tablet by mouth Every Night. PT TO HOLD 24 HOURS PRIOR TO SURGERY, Disp: 90 tablet, Rfl: 3    levothyroxine (SYNTHROID, LEVOTHROID) 50 MCG tablet, Take 1 tablet by mouth Daily., Disp: , Rfl:     lidocaine (Lidoderm) 5 %, Place 1 patch on the skin as directed by provider Daily. Remove & Discard patch within 12 hours or as directed by MD, Disp: 30 patch, Rfl: 0    MegaRed Omega-3 Krill Oil 350 MG capsule, Take 1 capsule by mouth Daily. HELD FOR SURGERY, Disp: , Rfl:     Multiple Vitamin (MULTI-VITAMIN PO), Take 1 tablet by mouth Daily., Disp: , Rfl:     Probiotic Product (PROBIOTIC PO), Take 1 tablet by mouth Daily. Lactobacillus rhamnosus GG, Disp: , Rfl:     vitamin E 400 UNIT capsule, Take 1 capsule by mouth Daily. HELD FOR OR, Disp: , Rfl:         Laboratory Values:    Labs from 2024 reviewed     Review of Systems:   Influenza-like illness: no fever, no  cough, no  sore throat, no  body aches, no loss of sense of taste or smell, no known exposure to person with Covid-19.  Constitutional: Negative for fevers or chills  HENT: Negative for hearing loss or runny nose  Eyes: Negative for vision changes or scleral icterus  Respiratory: Negative for cough or shortness of breath  Cardiovascular: Negative for chest pain or heart palpitations  Gastrointestinal: Negative for abdominal pain, nausea, vomiting, constipation, melena, or hematochezia  Genitourinary: Negative for hematuria or dysuria  Musculoskeletal: Negative for joint swelling or gait instability  Neurologic: Negative for tremors or seizures  Psychiatric: Negative for suicidal ideations or depression  All other systems reviewed and negative     Physical Exam:   ECO - Asymptomatic  Constitutional: Well-developed well-nourished, no acute distress  Eyes: Conjunctiva normal, sclera nonicteric  ENMT: Hearing grossly normal, oral mucosa moist  Neck: Supple, no palpable mass,  trachea midline  Respiratory: Clear to auscultation, normal inspiratory effort  Cardiovascular: Regular rate, no peripheral edema, no jugular venous distention  Breast: symmetric  Right: Mastectomy incision clean and dry with no erythema or drainage, no hematoma or seroma.  Left: Left breast mastectomy with flap closure, well-healed, no masses or skin changes  Biopsy site appreciated in right breast, otherwise no skin changes.   No clinical chest wall involvement.  Gastrointestinal: Soft, nontender  Lymphatics (palpable nodes): No cervical, supraclavicular or axillary lymphadenopathy  Skin:  Warm, dry, no rash on visualized skin surfaces  Musculoskeletal: Symmetric strength, normal gait  Psychiatric: Alert and oriented ×3, normal affect      DUNG ZEPEDA M.D.  General and Endoscopic Surgery  [facility]     [address], [address]  [place], [place], [address]  P: [phone]  F: [phone]

## 2024-06-04 ENCOUNTER — TELEPHONE (OUTPATIENT)
Dept: INTERNAL MEDICINE | Facility: CLINIC | Age: 79
End: 2024-06-04
Payer: MEDICARE

## 2024-06-04 DIAGNOSIS — J18.9 PNEUMONIA OF RIGHT LUNG DUE TO INFECTIOUS ORGANISM, UNSPECIFIED PART OF LUNG: ICD-10-CM

## 2024-06-04 RX ORDER — LEVOFLOXACIN 500 MG/1
500 TABLET, FILM COATED ORAL DAILY
Qty: 3 TABLET | Refills: 0 | Status: SHIPPED | OUTPATIENT
Start: 2024-06-04

## 2024-06-04 NOTE — TELEPHONE ENCOUNTER
----- Message from Kimberly DONG sent at 6/4/2024 12:51 PM EDT -----  Regarding: BAD COUGH  Patient phoned stating she seen Dr. Dillon may 30th and had pneumonia patient stated she is on her last pill today and she still has a bad cough. Patient also stated she had back pain. We do not have any openings so she would like to speak to a ma. Thank you

## 2024-06-04 NOTE — TELEPHONE ENCOUNTER
Called and spoke with patient. She said that she felt significantly better yesterday, but today is experiencing back pain and cough again. Patient has one levaquin tablet left. She has been taking the tessalon and using the albuterol inhaler as needed with some relief of symptoms, but they are still unresolved.

## 2024-06-05 ENCOUNTER — READMISSION MANAGEMENT (OUTPATIENT)
Dept: CALL CENTER | Facility: HOSPITAL | Age: 79
End: 2024-06-05
Payer: MEDICARE

## 2024-06-05 NOTE — OUTREACH NOTE
General Surgery Week 2 Survey      Flowsheet Row Responses   [facility] patient discharged from? [facility]   Does the patient have one of the following disease processes/diagnoses(primary or secondary)? General Surgery   Week 2 attempt successful? Yes   Call start time 1318   Call end time 1321   Discharge diagnosis Malignant neoplasm of female breast, right breast total mastectomy,   Meds reviewed with patient/caregiver? Yes   Is the patient having any side effects they believe may be caused by any medication additions or changes? No   Does the patient have all medications related to this admission filled (includes all antibiotics, pain medications, etc.) Yes   Is the patient taking all medications as directed (includes completed medication regime)? Yes   Does the patient have a follow up appointment scheduled with their surgeon? Yes   Has the patient kept scheduled appointments due by today? Yes   Comments 5/30/2024  8:30 AM   Has home health visited the patient within 72 hours of discharge? N/A   Psychosocial issues? No   Did the patient receive a copy of their discharge instructions? Yes   Nursing interventions Reviewed instructions with patient   What is the patient's perception of their health status since discharge? Improving   Nursing interventions Nurse provided patient education   Is the patient /caregiver able to teach back basic post-op care? Keep incision areas clean,dry and protected   Is the patient/caregiver able to teach back signs and symptoms of incisional infection? Increased redness, swelling or pain at the incisonal site, Increased drainage or bleeding, Incisional warmth, Pus or odor from incision, Fever   Is the patient/caregiver able to teach back steps to recovery at home? Set small, achievable goals for return to baseline health, Rest and rebuild strength, gradually increase activity, Eat a well-balance diet   Is the patient/caregiver able to teach back the hierarchy of who to  call/visit for symptoms/problems? PCP, Specialist, Home health nurse, Urgent Care, ED, 911 Yes   Week 2 call completed? Yes   Is the patient interested in additional calls from an ambulatory ? No   Would this patient benefit from a Referral to [facility] Social Work? No   Call end time 1321            Kristin MARTINO - Registered Nurse

## 2024-06-10 ENCOUNTER — TELEPHONE (OUTPATIENT)
Dept: SURGERY | Facility: CLINIC | Age: 79
End: 2024-06-10
Payer: MEDICARE

## 2024-06-10 RX ORDER — BENZONATATE 100 MG/1
100 CAPSULE ORAL 3 TIMES DAILY PRN
Qty: 30 CAPSULE | Refills: 0 | Status: SHIPPED | OUTPATIENT
Start: 2024-06-10

## 2024-06-10 RX ORDER — DILTIAZEM HYDROCHLORIDE 180 MG/1
180 CAPSULE, COATED, EXTENDED RELEASE ORAL 2 TIMES DAILY
Qty: 180 CAPSULE | Refills: 3 | Status: SHIPPED | OUTPATIENT
Start: 2024-06-10

## 2024-06-10 RX ORDER — IRBESARTAN 300 MG/1
300 TABLET ORAL NIGHTLY
Qty: 90 TABLET | Refills: 1 | Status: SHIPPED | OUTPATIENT
Start: 2024-06-10

## 2024-06-10 RX ORDER — ALBUTEROL SULFATE 90 UG/1
2 AEROSOL, METERED RESPIRATORY (INHALATION) EVERY 6 HOURS PRN
Qty: 18 G | Refills: 0 | Status: SHIPPED | OUTPATIENT
Start: 2024-06-10

## 2024-06-10 RX ORDER — DULOXETIN HYDROCHLORIDE 60 MG/1
60 CAPSULE, DELAYED RELEASE ORAL 2 TIMES DAILY
OUTPATIENT
Start: 2024-06-10

## 2024-06-10 NOTE — PROGRESS NOTES
"Chief Complaint  Stage I ([id number]) left breast cancer in 2007, pulmonary emboli in 2009, atrial fibrillation, history of GI bleed, history of iron deficiency status post prior gastric bypass in 2001.  Stage I ([id number]) right breast cancer status post right mastectomy 5/28/2024 (ER positive, [place] negative, HER2/jorge negative)    Subjective        History of Present Illness  Patient returns today in follow-up following her right mastectomy on 5/28/2024 to review pathologic findings and discuss recommendations for adjuvant therapy.  She has laboratory studies and new baseline DEXA scan to review.  Since her mastectomy, she has had a number of difficulties.  She developed increased cough and was treated for respiratory infection with Levaquin.  The cough did persist and Levaquin was extended by 3 days.  She reports that this has now resolved.  She also has developed acute pain and swelling in her left foot for the past 5 days, sensitive to touch, no erythema.  She has never experienced gout in the past.  She has had previous surgery on her left foot with Dr. Reed.  She also has experienced increasing swelling in the right chest wall following her mastectomy that began after drain removal.  There has been some slight erythema around the wound.  She denies any fevers.  She is ambulating with the use of a walker currently.  She did well in terms of anticoagulation perioperatively, held Eliquis 48 hours prior to the surgery and received temporary dosing of Lovenox 40 mg daily postoperatively until she was able to resume full dose Eliquis thereafter.      Objective   Vital Signs:   /73   Pulse 94   Temp 97.9 °F (36.6 °C) (Oral)   Resp 16   Ht 162.6 cm (64\")   Wt 90.3 kg (199 lb)   SpO2 98%   BMI 34.16 kg/m²     Physical Exam  Constitutional:       Appearance: She is well-developed.      Comments: Patient ambulating with use of a walker   Eyes:      Conjunctiva/sclera: Conjunctivae normal.   Neck:      " Thyroid: No thyromegaly.   Cardiovascular:      Rate and Rhythm: Normal rate. Rhythm irregular.      Heart sounds: Murmur heard.      No friction rub. No gallop.   Pulmonary:      Effort: No respiratory distress.      Breath sounds: Normal breath sounds. No wheezing.      Comments: Right mastectomy incision appears to be healing well although there is some slight associated erythema with a slightly greater area of erythema medially.  There is swelling noted in the right chest wall beneath the mastectomy incision.  Abdominal:      General: Bowel sounds are normal. There is no distension.      Palpations: Abdomen is soft.      Tenderness: There is no abdominal tenderness.   Musculoskeletal:      Comments: There is focal swelling in the left foot with sensitivity to touch more distally around the toes.   Lymphadenopathy:      Head:      Right side of head: No submandibular adenopathy.      Cervical: No cervical adenopathy.      Upper Body:      Right upper body: No supraclavicular adenopathy.      Left upper body: No supraclavicular adenopathy.   Skin:     General: Skin is warm and dry.      Findings: No rash.   Neurological:      Mental Status: She is alert and oriented to person, place, and time.      Cranial Nerves: No cranial nerve deficit.      Motor: No abnormal muscle tone.      Deep Tendon Reflexes: Reflexes normal.      Comments: Patient with continued left foot drop   Psychiatric:         Behavior: Behavior normal.            Result Review : Reviewed CBC, CMP from today.  Reviewed DEXA scan from 5/22/2024.  Reviewed operative note and pathology from right mastectomy 5/28/2024.  Reviewed PCP records.       Assessment and Plan    *Stage I ([id number]) right breast cancer (ER positive, [place] negative, HER2/jorge negative)  Mammogram and right breast ultrasound on 4/10/2024 with a suspicious 0.6 cm abnormality in the right breast at the 9 o'clock position.    Biopsy on 4/18/2024 of the 9:00 lesion in the right breast.   Pathology showed a well-differentiated (grade 1) invasive lobular carcinoma, no lymphovascular invasion, evidence of ALH and ADH.  ER positive (%), IN negative (2%), HER2/jorge negative (1+ IHC), Ki-67 2%.    Breast MRI on 4/26/2024 confirmed the abnormality in the 9 o'clock position with postbiopsy cavity/abnormality measuring 1.7 cm.  There was a 1.1 cm equivocal right axillary lymph node.    Right mastectomy with sentinel node on 5/28/2024 performed by Dr. Diaz.  Pathology showed no residual cancer, did show ALH/ADH, 2 negative lymph nodes in the specimen and 4 additional negative sentinel lymph nodes.  [employer] 70 gene germline panel test 5/14/2024 with VUS in MLH1 and POLE  DEXA scan 5/22/2024 with evidence of osteoporosis (see below)  Patient not a candidate for adjuvant tamoxifen due to thrombophilia (see below).  Decision to pursue adjuvant aromatase chemotherapy again with Arimidex (previously well-tolerated) along with treatment for osteoporosis with Prolia.  Patient returns today in follow-up after recent right mastectomy on 5/28/2024 with an lymph node biopsy.  Fortunately there was no evidence of residual malignancy in the mastectomy specimen and lymph nodes were negative.  The patient is having some difficulty recovering from surgery, has developed a seroma in the right chest wall and does have some degree of erythema around the medial portion of the mastectomy incision, no discharge.  I will go ahead and prescribe doxycycline today (allergy to Bactrim and cephalosporins) 100 mg twice daily x 7 days.  Patient will be seeing Dr. Diaz in a.m. to assess the wound and seroma.  We reviewed her pathology and her stage of disease today.  Reviewed her excellent prognosis with a small 6 mm strongly ER positive grade 1 lobular carcinoma.  Would not recommend adjuvant chemotherapy in this situation given her age and favorable prognostic features and therefore Oncotype DX test will not be sent.  We  discussed difficulties in regards to choice of adjuvant endocrine therapy.  Patient with significant thrombophilia and is not a candidate for tamoxifen.  She does have underlying significant osteoporosis with maximal T-score -3.0 on the left hip on DEXA scan 5/22/2024.  We discussed that if she began treatment with aromatase inhibitor we would need to concurrently treat her osteoporosis.  Patient previously tolerated Arimidex very well with her previous breast cancer and we discussed using Arimidex again.  Patient was in agreement.  We will allow her additional time to recover from her surgery.  She also appears to have gout in her left foot which we are addressing today (see below).  We will plan for her to return here in 1 month for follow-up and pending her overall status likely begin adjuvant endocrine therapy with Arimidex 1 mg daily and begin concurrent Prolia as treatment for her osteoporosis.     *Stage I ([id number]) left breast cancer:  Biopsy 5/8/2007 with in situ and invasive ductal carcinoma, grade 2,/SC positive, HER-2/jorge negative  Left mastectomy 6/18/0742 foci carcinoma, 4 mm, grade 1 in situ and invasive ductal carcinoma and 2 mm grade 2 in situ and invasive ductal carcinoma, negative margins, negative sentinel lymph nodes x3 with 5 additional negative lymph nodes.  Arimidex initiated 7/2/2007  Arimidex interrupted patient developed bilateral pulmonary emboli in May 2009, resume shortly thereafter.  Completed 5 years treatment in 2012.    *Pulmonary emboli 5/2/2009 and bilateral calf DVT 8/11/2022:  Family history of DVT in the patient's mother occurring at age 70 postoperatively  Patient developed bilateral lower lobe pulmonary emboli 5/2/2009 following right VATS on 4/30/2009  Felt to be a provoked thrombosis related to surgery  Hypercoagulable evaluation with negative factor V Leiden, negative prothrombin gene mutation, normal homocystine, protein C antigen 92, free to protein S 75, anticardiolipin  antibody panel negative, lupus anticoagulant negative, Antithrombin %  Continuing on chronic anticoagulation primarily for atrial fibrillation at this point as prior thrombosis was provoked.  Transitioned from Coumadin to Eliquis 5 mg twice daily in May 2020  At time of hospitalization for IJEOMA/CKD 11/15-11/18/2021, Eliquis dose was decreased to 2.5 mg twice daily.  Patient required lumbar laminectomy 8/9/2022, Eliquis was held perioperatively.  Subsequently admitted 8/10 - 8/12/2022 with Doppler 8/11/2022 that showed bilateral acute calf DVT.  This occurred postoperatively and while off anticoagulation.  Recommended to resume Eliquis at 5 mg twice daily dose.  Patient will require long-term anticoagulation with Eliquis 5 mg twice daily for both thrombophilia and atrial fibrillation.  Patient returns today in follow-up continuing on Eliquis 5 mg twice daily.  The patient did well managing her anticoagulation perioperatively, held Eliquis 48 hours prior to the surgery, received Lovenox 40 mg daily postoperatively until she was able to resume Eliquis at 5 mg twice daily.  No bleeding complications.    *Iron deficiency anemia:  Prior gastric bypass in 2001  Intolerant of oral iron.  Patient has required IV iron on multiple occasions: Feraheme 8/13 and 8/20/2018; Injectafer 3/9 and 3/16/2020  Labs on 11/15/2021 showed decline in hemoglobin to 10.8 however this was in the setting of UTI and IJEOMA/CKD3.  Her iron panel was normal with iron 114, iron saturation 34%, TIBC 337 with ferritin elevated at 292.  B12 was normal at 883.  We continue to follow these values given her history of prior gastric bypass and iron deficiency requiring intermittent IV iron (intolerant of oral iron).   On 12/20/2021, hemoglobin further declined to 9.5.  Repeat labs with iron 47, ferritin 262, iron saturation 14%, TIBC 328, B12 level 1162.  Additional labs performed with negative serum protein electrophoresis and immunoelectrophoresis  with normal quantitative immunoglobulins.  Free light chains elevated with kappa 73, lambda 36 with ratio 2.04, consistent with patient's degree of renal dysfunction.  Haptoglobin normal 186, LDH normal 171, CRP borderline elevated 0.62, erythropoietin level 20.1.  Anemia felt to be related to CKD3.  Patient felt to be a potential candidate for Procrit if hemoglobin remains below 10.  Patient experienced improvement in hemoglobin into the 11-12 range  Labs on 6/19/2023 with hemoglobin 11.7, iron 50, ferritin 41.6, iron saturation 12%, TIBC 404 indicating recurrent iron deficiency likely related to malabsorption from prior gastric bypass  Patient received IV iron with Venofer 300 mg on 6/26, 6/28, 7/3, 7/7/2023  Labs on 1/8/2024 with hemoglobin normal at 13.6, patient is iron replete with iron 95, ferritin 223, iron saturation 23%, TIBC 405.  Patient was iron replete, no need for further IV iron.    Patient was seen by Dr. Bess in GI on 1/31/2024, did not recommend any immediate endoscopic evaluation, agreement that iron deficiency likely related to malabsorption from prior gastric bypass.  Plans for upper and lower endoscopy in November 2028  Labs today with hemoglobin 11.6.  Recheck iron panel and ferritin at return visit in 1 month.    *GI bleed in July 2018:  Acute lower GI bleed with supratherapeutic INR Coumadin.  Angiogram performed with coil embolization of artery to sigmoid colon  No clinical evidence of further GI blood loss    *Atrial fibrillation:  Requires ongoing anticoagulation for this indication  As above, transitioned from Coumadin to Eliquis 5 mg twice daily in May 2020  As above, during hospitalization 11/15-11/18/2021 for IJEOMA/CKD3, Eliquis dose was decreased to 2.5 mg twice daily  See above discussion, patient developed bilateral calf DVT while briefly off anticoagulation following lumbar laminectomy on 8/11/2022.  Patient is continuing on full dose Eliquis 5 mg twice daily.      *Status post  right VATS 4/30/2020 for pulmonary nodule with finding of necrotizing granulomatous inflammation    *Severe depression/anxiety:  Patient has had chronic issues with this, is followed by psychiatry, Dr. Librado Monique.  She also underwent previous counseling which she found helpful.  The patient had ongoing difficulty with control of her depression.  She has been on multiple antidepressants  In June/July 2021 patient underwent transcranial magnetic stimulation (TMS) with significant improvement in depressive symptoms and resolution of headaches.  Symptoms subsequently gradually recurred, patient reports there has been difficulty with insurance regarding maintenance treatments.  Patient reports symptoms have been stable recently    *IJEOMA/CKD3  Patient with CKD3 with baseline creatinine in the 1.3-1.8 range  At time of routine follow-up visit 11/15/2021, creatinine was 3.19 and BUN of 59.  This was compared to prior value 6/2/2021 with BUN 41, creatinine 1.53.  Patient was hospitalized 11/15-11/18/2021  Patient is continuing follow-up with Dr. Cisneros in nephrology  Creatinine on 11/24/2021 remained elevated at 2.43 however on 12/20/2021 declined to 1.7, near her prior baseline.  Additional labs on 12/20/2021 with negative serum protein electrophoresis and immunoelectrophoresis with normal quantitative immunoglobulins.  Free light chains elevated with kappa 73, lambda 36 with ratio 2.04, consistent with patient's degree of renal dysfunction.  Creatinine today stable at 1.22    *Hypothyroidism   Patient continues on levothyroxine 50 mcg daily    *Severe left-sided sciatica with left foot drop  Plain film of the lumbar spine on 5/10/2022 was negative.    MRI lumbar [facility] at [facility] on 5/14/2022 showed no evidence of metastatic disease however did show evidence of degenerative change with disc disease and neuroforaminal compromise.     She developed a left foot drop and was referred to neurosurgery,  eventually underwent lumbar laminectomy on 8/9/2022  Patient has continued with chronic difficulty regarding left foot drop.  She has been using a brace    *Left foot deformities  Patient with multiple issues regarding her left foot including hammertoes, hallux valgus  Patient did require left foot surgery on 7/11/2023    *Possible gout  Patient was swelling in the left foot and sensitivity to touch which has been present for the past 5 days.  Symptoms and exam suggestive of gout.  We will check a uric acid level today.  Unfortunately patient cannot receive NSAIDs due to her renal function and ongoing anticoagulation.  She does not tolerate steroids well.  Therefore we discussed using colchicine 0.6 mg daily.  There is however an interaction with diltiazem which may increase risk of side effects from colchicine.  Discussed with pharmacy staff and we will prescribe a half dose of colchicine 0.3 mg daily x 7 days.  Refer patient back to Dr. Reed to reassess status of her left foot given her surgery that was performed last year    *Osteoporosis  DEXA scan on 5/22/2024 with T-score L-spine 0.0, left hip -3.0, right hip -2.7  Patient will be initiating adjuvant endocrine therapy with aromatase inhibitor when she returns in 1 month.  With potential to further decrease her bone density, we discussed need for treatment of her osteoporosis.  She is not a candidate for Reclast due to her CKD3a.  Recommended use of Prolia every 6 months.  We did discuss risk of osteonecrosis of the jaw and electrolyte disturbances.  Patient was willing to proceed.  We will check 25-hydroxy vitamin D level today as well.        Plan:  Prescription sent today for empiric doxycycline 100 mg twice daily due to right mastectomy site erythema (patient with multiple antibiotic allergies)  Patient will follow-up tomorrow with Dr. Diaz in breast surgery as scheduled to evaluate seroma and right mastectomy site erythema  Prescription sent today  for colchicine 0.3 mg daily for presumed gout involving the left foot (dose reduced from 0.6 mg due to interaction with diltiazem, discussed with pharmacy staff).  Add uric acid and 25-hydroxy vitamin D level to today's labs  Referral back to Dr. Reed in orthopedics due to left foot swelling and pain  MD visit in 1 month with CBC, CMP, magnesium, phosphorus, stat iron panel/ferritin.  We will plan to begin Prolia at that time and also begin adjuvant endocrine therapy with Arimidex 1 mg daily.    I did spend 45 minutes total time caring for the patient today, time spent in review of records, face-to-face consultation, coordination of care, placement of orders, completion of documentation.

## 2024-06-10 NOTE — TELEPHONE ENCOUNTER
Patient called the triage line concerned she has been having a lot of swelling on her chest area since her chest tube was removed on the 31st along with a lot of cough, she was diagnosed with pneumonia a week after surgery.  Two days ago her left foot started to swelled, painful 7/10 when walking and described as shiny. Patient denies difficulty breathing, swelling or redness of leg, advised patient should wear sports bra to compress the area. Patient has scheduled an appointment to see Dr. Diaz on Wednesday and patient should talk to her PCP regarding her foot per Dr. Diaz.

## 2024-06-11 ENCOUNTER — LAB (OUTPATIENT)
Dept: LAB | Facility: HOSPITAL | Age: 79
End: 2024-06-11
Payer: MEDICARE

## 2024-06-11 ENCOUNTER — OFFICE VISIT (OUTPATIENT)
Dept: ONCOLOGY | Facility: CLINIC | Age: 79
End: 2024-06-11
Payer: MEDICARE

## 2024-06-11 VITALS
RESPIRATION RATE: 16 BRPM | DIASTOLIC BLOOD PRESSURE: 73 MMHG | SYSTOLIC BLOOD PRESSURE: 120 MMHG | TEMPERATURE: 97.9 F | WEIGHT: 199 LBS | OXYGEN SATURATION: 98 % | HEIGHT: 64 IN | BODY MASS INDEX: 33.97 KG/M2 | HEART RATE: 94 BPM

## 2024-06-11 DIAGNOSIS — C50.812 MALIGNANT NEOPLASM OF OVERLAPPING SITES OF LEFT BREAST IN FEMALE, ESTROGEN RECEPTOR POSITIVE: ICD-10-CM

## 2024-06-11 DIAGNOSIS — M85.89 OSTEOPENIA OF MULTIPLE SITES: ICD-10-CM

## 2024-06-11 DIAGNOSIS — M81.0 AGE-RELATED OSTEOPOROSIS WITHOUT CURRENT PATHOLOGICAL FRACTURE: ICD-10-CM

## 2024-06-11 DIAGNOSIS — Z17.0 MALIGNANT NEOPLASM OF OVERLAPPING SITES OF LEFT BREAST IN FEMALE, ESTROGEN RECEPTOR POSITIVE: Primary | ICD-10-CM

## 2024-06-11 DIAGNOSIS — Z17.0 MALIGNANT NEOPLASM OF OVERLAPPING SITES OF LEFT BREAST IN FEMALE, ESTROGEN RECEPTOR POSITIVE: ICD-10-CM

## 2024-06-11 DIAGNOSIS — N63.15 MASS OVERLAPPING MULTIPLE QUADRANTS OF RIGHT BREAST: ICD-10-CM

## 2024-06-11 DIAGNOSIS — C50.812 MALIGNANT NEOPLASM OF OVERLAPPING SITES OF LEFT BREAST IN FEMALE, ESTROGEN RECEPTOR POSITIVE: Primary | ICD-10-CM

## 2024-06-11 LAB
25(OH)D3 SERPL-MCNC: 45.6 NG/ML (ref 30–100)
ALBUMIN SERPL-MCNC: 3.8 G/DL (ref 3.5–5.2)
ALBUMIN/GLOB SERPL: 1.4 G/DL
ALP SERPL-CCNC: 136 U/L (ref 39–117)
ALT SERPL W P-5'-P-CCNC: 10 U/L (ref 1–33)
ANION GAP SERPL CALCULATED.3IONS-SCNC: 15.1 MMOL/L (ref 5–15)
AST SERPL-CCNC: 27 U/L (ref 1–32)
BASOPHILS # BLD AUTO: 0.19 10*3/MM3 (ref 0–0.2)
BASOPHILS NFR BLD AUTO: 2.2 % (ref 0–1.5)
BILIRUB SERPL-MCNC: 0.5 MG/DL (ref 0–1.2)
BUN SERPL-MCNC: 34 MG/DL (ref 8–23)
BUN/CREAT SERPL: 27.9 (ref 7–25)
CALCIUM SPEC-SCNC: 8.9 MG/DL (ref 8.6–10.5)
CHLORIDE SERPL-SCNC: 106 MMOL/L (ref 98–107)
CO2 SERPL-SCNC: 20.9 MMOL/L (ref 22–29)
CREAT SERPL-MCNC: 1.22 MG/DL (ref 0.57–1)
DEPRECATED RDW RBC AUTO: 51.2 FL (ref 37–54)
EGFRCR SERPLBLD CKD-EPI 2021: 45.8 ML/MIN/1.73
EOSINOPHIL # BLD AUTO: 0.77 10*3/MM3 (ref 0–0.4)
EOSINOPHIL NFR BLD AUTO: 9 % (ref 0.3–6.2)
ERYTHROCYTE [DISTWIDTH] IN BLOOD BY AUTOMATED COUNT: 14.2 % (ref 12.3–15.4)
GLOBULIN UR ELPH-MCNC: 2.8 GM/DL
GLUCOSE SERPL-MCNC: 100 MG/DL (ref 65–99)
HCT VFR BLD AUTO: 36.2 % (ref 34–46.6)
HGB BLD-MCNC: 11.6 G/DL (ref 12–15.9)
IMM GRANULOCYTES # BLD AUTO: 0.01 10*3/MM3 (ref 0–0.05)
IMM GRANULOCYTES NFR BLD AUTO: 0.1 % (ref 0–0.5)
LYMPHOCYTES # BLD AUTO: 2.38 10*3/MM3 (ref 0.7–3.1)
LYMPHOCYTES NFR BLD AUTO: 27.9 % (ref 19.6–45.3)
MCH RBC QN AUTO: 31.6 PG (ref 26.6–33)
MCHC RBC AUTO-ENTMCNC: 32 G/DL (ref 31.5–35.7)
MCV RBC AUTO: 98.6 FL (ref 79–97)
MONOCYTES # BLD AUTO: 0.88 10*3/MM3 (ref 0.1–0.9)
MONOCYTES NFR BLD AUTO: 10.3 % (ref 5–12)
NEUTROPHILS NFR BLD AUTO: 4.3 10*3/MM3 (ref 1.7–7)
NEUTROPHILS NFR BLD AUTO: 50.5 % (ref 42.7–76)
NRBC BLD AUTO-RTO: 0 /100 WBC (ref 0–0.2)
PLATELET # BLD AUTO: 393 10*3/MM3 (ref 140–450)
PMV BLD AUTO: 10.5 FL (ref 6–12)
POTASSIUM SERPL-SCNC: 4.5 MMOL/L (ref 3.5–5.2)
PROT SERPL-MCNC: 6.6 G/DL (ref 6–8.5)
RBC # BLD AUTO: 3.67 10*6/MM3 (ref 3.77–5.28)
SODIUM SERPL-SCNC: 142 MMOL/L (ref 136–145)
URATE SERPL-MCNC: 6.4 MG/DL (ref 2.4–5.7)
WBC NRBC COR # BLD AUTO: 8.53 10*3/MM3 (ref 3.4–10.8)

## 2024-06-11 PROCEDURE — 3078F DIAST BP <80 MM HG: CPT | Performed by: INTERNAL MEDICINE

## 2024-06-11 PROCEDURE — 80053 COMPREHEN METABOLIC PANEL: CPT

## 2024-06-11 PROCEDURE — 3074F SYST BP LT 130 MM HG: CPT | Performed by: INTERNAL MEDICINE

## 2024-06-11 PROCEDURE — 84550 ASSAY OF BLOOD/URIC ACID: CPT | Performed by: INTERNAL MEDICINE

## 2024-06-11 PROCEDURE — 1160F RVW MEDS BY RX/DR IN RCRD: CPT | Performed by: INTERNAL MEDICINE

## 2024-06-11 PROCEDURE — 1126F AMNT PAIN NOTED NONE PRSNT: CPT | Performed by: INTERNAL MEDICINE

## 2024-06-11 PROCEDURE — 1159F MED LIST DOCD IN RCRD: CPT | Performed by: INTERNAL MEDICINE

## 2024-06-11 PROCEDURE — 99215 OFFICE O/P EST HI 40 MIN: CPT | Performed by: INTERNAL MEDICINE

## 2024-06-11 PROCEDURE — 82306 VITAMIN D 25 HYDROXY: CPT | Performed by: INTERNAL MEDICINE

## 2024-06-11 PROCEDURE — 85025 COMPLETE CBC W/AUTO DIFF WBC: CPT

## 2024-06-11 PROCEDURE — 36415 COLL VENOUS BLD VENIPUNCTURE: CPT

## 2024-06-11 RX ORDER — DOXYCYCLINE HYCLATE 100 MG/1
100 CAPSULE ORAL 2 TIMES DAILY
Qty: 14 CAPSULE | Refills: 0 | Status: SHIPPED | OUTPATIENT
Start: 2024-06-11

## 2024-06-11 RX ORDER — COLCHICINE 0.6 MG/1
0.3 TABLET ORAL DAILY
Qty: 7 TABLET | Refills: 1 | Status: SHIPPED | OUTPATIENT
Start: 2024-06-11

## 2024-06-11 NOTE — LETTER
June 11, 2024     Arleen Dillon MD  [address]  [address]  [facility] [address]    Patient: Marylu Foreman   YOB: 1945   Date of Visit: 6/11/2024     Dear Arleen Dillon MD:       Thank you for referring Marylu Foreman to me for evaluation. Below are the relevant portions of my assessment and plan of care.    If you have questions, please do not hesitate to call me. I look forward to following Marylu along with you.         Sincerely,        West White MD        CC: MD Christopher Balderas John L Jr., MD  06/11/24 5646  Sign when Signing Visit  Chief Complaint  Stage I ([id number]) left breast cancer in 2007, pulmonary emboli in 2009, atrial fibrillation, history of GI bleed, history of iron deficiency status post prior gastric bypass in 2001.  Stage I ([id number]) right breast cancer status post right mastectomy 5/28/2024 (ER positive, WY negative, HER2/jorge negative)    Subjective       History of Present Illness  Patient returns today in follow-up following her right mastectomy on 5/28/2024 to review pathologic findings and discuss recommendations for adjuvant therapy.  She has laboratory studies and new baseline DEXA scan to review.  Since her mastectomy, she has had a number of difficulties.  She developed increased cough and was treated for respiratory infection with Levaquin.  The cough did persist and Levaquin was extended by 3 days.  She reports that this has now resolved.  She also has developed acute pain and swelling in her left foot for the past 5 days, sensitive to touch, no erythema.  She has never experienced gout in the past.  She has had previous surgery on her left foot with Dr. Reed.  She also has experienced increasing swelling in the right chest wall following her mastectomy that began after drain removal.  There has been some slight erythema around the wound.  She denies any fevers.  She is ambulating with the use of a walker currently.  She did well in terms of  "anticoagulation perioperatively, held Eliquis 48 hours prior to the surgery and received temporary dosing of Lovenox 40 mg daily postoperatively until she was able to resume full dose Eliquis thereafter.      Objective  Vital Signs:   /73   Pulse 94   Temp 97.9 °F (36.6 °C) (Oral)   Resp 16   Ht 162.6 cm (64\")   Wt 90.3 kg (199 lb)   SpO2 98%   BMI 34.16 kg/m²     Physical Exam  Constitutional:       Appearance: She is well-developed.      Comments: Patient ambulating with use of a walker   Eyes:      Conjunctiva/sclera: Conjunctivae normal.   Neck:      Thyroid: No thyromegaly.   Cardiovascular:      Rate and Rhythm: Normal rate. Rhythm irregular.      Heart sounds: Murmur heard.      No friction rub. No gallop.   Pulmonary:      Effort: No respiratory distress.      Breath sounds: Normal breath sounds. No wheezing.      Comments: Right mastectomy incision appears to be healing well although there is some slight associated erythema with a slightly greater area of erythema medially.  There is swelling noted in the right chest wall beneath the mastectomy incision.  Abdominal:      General: Bowel sounds are normal. There is no distension.      Palpations: Abdomen is soft.      Tenderness: There is no abdominal tenderness.   Musculoskeletal:      Comments: There is focal swelling in the left foot with sensitivity to touch more distally around the toes.   Lymphadenopathy:      Head:      Right side of head: No submandibular adenopathy.      Cervical: No cervical adenopathy.      Upper Body:      Right upper body: No supraclavicular adenopathy.      Left upper body: No supraclavicular adenopathy.   Skin:     General: Skin is warm and dry.      Findings: No rash.   Neurological:      Mental Status: She is alert and oriented to person, place, and time.      Cranial Nerves: No cranial nerve deficit.      Motor: No abnormal muscle tone.      Deep Tendon Reflexes: Reflexes normal.      Comments: Patient with " continued left foot drop   Psychiatric:         Behavior: Behavior normal.            Result Review: Reviewed CBC, CMP from today.  Reviewed DEXA scan from 5/22/2024.  Reviewed operative note and pathology from right mastectomy 5/28/2024.  Reviewed PCP records.       Assessment and Plan    *Stage I ([id number]) right breast cancer (ER positive, WY negative, HER2/jorge negative)  Mammogram and right breast ultrasound on 4/10/2024 with a suspicious 0.6 cm abnormality in the right breast at the 9 o'clock position.    Biopsy on 4/18/2024 of the 9:00 lesion in the right breast.  Pathology showed a well-differentiated (grade 1) invasive lobular carcinoma, no lymphovascular invasion, evidence of ALH and ADH.  ER positive (%), WY negative (2%), HER2/jorge negative (1+ IHC), Ki-67 2%.    Breast MRI on 4/26/2024 confirmed the abnormality in the 9 o'clock position with postbiopsy cavity/abnormality measuring 1.7 cm.  There was a 1.1 cm equivocal right axillary lymph node.    Right mastectomy with sentinel node on 5/28/2024 performed by Dr. Diaz.  Pathology showed no residual cancer, did show ALH/ADH, 2 negative lymph nodes in the specimen and 4 additional negative sentinel lymph nodes.  DEXA scan 5/22/2024 with evidence of osteoporosis (see below)  Patient not a candidate for adjuvant tamoxifen due to thrombophilia (see below).  Decision to pursue adjuvant aromatase chemotherapy again with Arimidex (previously well-tolerated) along with treatment for osteoporosis with Prolia.  Patient returns today in follow-up after recent right mastectomy on 5/28/2024 with an lymph node biopsy.  Fortunately there was no evidence of residual malignancy in the mastectomy specimen and lymph nodes were negative.  The patient is having some difficulty recovering from surgery, has developed a seroma in the right chest wall and does have some degree of erythema around the medial portion of the mastectomy incision, no discharge.  I will go ahead  and prescribe doxycycline today (allergy to Bactrim and cephalosporins) 100 mg twice daily x 7 days.  Patient will be seeing Dr. Diaz in a.m. to assess the wound and seroma.  We reviewed her pathology and her stage of disease today.  Reviewed her excellent prognosis with a small 6 mm strongly ER positive grade 1 lobular carcinoma.  Would not recommend adjuvant chemotherapy in this situation given her age and favorable prognostic features and therefore Oncotype DX test will not be sent.  We discussed difficulties in regards to choice of adjuvant endocrine therapy.  Patient with significant thrombophilia and is not a candidate for tamoxifen.  She does have underlying significant osteoporosis with maximal T-score -3.0 on the left hip on DEXA scan 5/22/2024.  We discussed that if she began treatment with aromatase inhibitor we would need to concurrently treat her osteoporosis.  Patient previously tolerated Arimidex very well with her previous breast cancer and we discussed using Arimidex again.  Patient was in agreement.  We will allow her additional time to recover from her surgery.  She also appears to have gout in her left foot which we are addressing today (see below).  We will plan for her to return here in 1 month for follow-up and pending her overall status likely begin adjuvant endocrine therapy with Arimidex 1 mg daily and begin concurrent Prolia as treatment for her osteoporosis.     *Stage I ([id number]) left breast cancer:  Biopsy 5/8/2007 with in situ and invasive ductal carcinoma, grade 2,/GA positive, HER-2/jorge negative  Left mastectomy 6/18/0742 foci carcinoma, 4 mm, grade 1 in situ and invasive ductal carcinoma and 2 mm grade 2 in situ and invasive ductal carcinoma, negative margins, negative sentinel lymph nodes x3 with 5 additional negative lymph nodes.  Arimidex initiated 7/2/2007  Arimidex interrupted patient developed bilateral pulmonary emboli in May 2009, resume shortly thereafter.  Completed 5  years treatment in 2012.    *Pulmonary emboli 5/2/2009 and bilateral calf DVT 8/11/2022:  Family history of DVT in the patient's mother occurring at age 70 postoperatively  Patient developed bilateral lower lobe pulmonary emboli 5/2/2009 following right VATS on 4/30/2009  Felt to be a provoked thrombosis related to surgery  Hypercoagulable evaluation with negative factor V Leiden, negative prothrombin gene mutation, normal homocystine, protein C antigen 92, free to protein S 75, anticardiolipin antibody panel negative, lupus anticoagulant negative, Antithrombin %  Continuing on chronic anticoagulation primarily for atrial fibrillation at this point as prior thrombosis was provoked.  Transitioned from Coumadin to Eliquis 5 mg twice daily in May 2020  At time of hospitalization for IJEOMA/CKD 11/15-11/18/2021, Eliquis dose was decreased to 2.5 mg twice daily.  Patient required lumbar laminectomy 8/9/2022, Eliquis was held perioperatively.  Subsequently admitted 8/10 - 8/12/2022 with Doppler 8/11/2022 that showed bilateral acute calf DVT.  This occurred postoperatively and while off anticoagulation.  Recommended to resume Eliquis at 5 mg twice daily dose.  Patient will require long-term anticoagulation with Eliquis 5 mg twice daily for both thrombophilia and atrial fibrillation.  Patient returns today in follow-up continuing on Eliquis 5 mg twice daily.  The patient did well managing her anticoagulation perioperatively, held Eliquis 48 hours prior to the surgery, received Lovenox 40 mg daily postoperatively until she was able to resume Eliquis at 5 mg twice daily.  No bleeding complications.    *Iron deficiency anemia:  Prior gastric bypass in 2001  Intolerant of oral iron.  Patient has required IV iron on multiple occasions: Feraheme 8/13 and 8/20/2018; Injectafer 3/9 and 3/16/2020  Labs on 11/15/2021 showed decline in hemoglobin to 10.8 however this was in the setting of UTI and IJEOMA/CKD3.  Her iron panel was  normal with iron 114, iron saturation 34%, TIBC 337 with ferritin elevated at 292.  B12 was normal at 883.  We continue to follow these values given her history of prior gastric bypass and iron deficiency requiring intermittent IV iron (intolerant of oral iron).   On 12/20/2021, hemoglobin further declined to 9.5.  Repeat labs with iron 47, ferritin 262, iron saturation 14%, TIBC 328, B12 level 1162.  Additional labs performed with negative serum protein electrophoresis and immunoelectrophoresis with normal quantitative immunoglobulins.  Free light chains elevated with kappa 73, lambda 36 with ratio 2.04, consistent with patient's degree of renal dysfunction.  Haptoglobin normal 186, LDH normal 171, CRP borderline elevated 0.62, erythropoietin level 20.1.  Anemia felt to be related to CKD3.  Patient felt to be a potential candidate for Procrit if hemoglobin remains below 10.  Patient experienced improvement in hemoglobin into the 11-12 range  Labs on 6/19/2023 with hemoglobin 11.7, iron 50, ferritin 41.6, iron saturation 12%, TIBC 404 indicating recurrent iron deficiency likely related to malabsorption from prior gastric bypass  Patient received IV iron with Venofer 300 mg on 6/26, 6/28, 7/3, 7/7/2023  Labs on 1/8/2024 with hemoglobin normal at 13.6, patient is iron replete with iron 95, ferritin 223, iron saturation 23%, TIBC 405.  Patient was iron replete, no need for further IV iron.    Patient was seen by Dr. Bess in GI on 1/31/2024, did not recommend any immediate endoscopic evaluation, agreement that iron deficiency likely related to malabsorption from prior gastric bypass.  Plans for upper and lower endoscopy in November 2028  Labs today with hemoglobin 11.6.  Recheck iron panel and ferritin at return visit in 1 month.    *GI bleed in July 2018:  Acute lower GI bleed with supratherapeutic INR Coumadin.  Angiogram performed with coil embolization of artery to sigmoid colon  No clinical evidence of further GI  blood loss    *Atrial fibrillation:  Requires ongoing anticoagulation for this indication  As above, transitioned from Coumadin to Eliquis 5 mg twice daily in May 2020  As above, during hospitalization 11/15-11/18/2021 for IJEOMA/CKD3, Eliquis dose was decreased to 2.5 mg twice daily  See above discussion, patient developed bilateral calf DVT while briefly off anticoagulation following lumbar laminectomy on 8/11/2022.  Patient is continuing on full dose Eliquis 5 mg twice daily.      *Status post right VATS 4/30/2020 for pulmonary nodule with finding of necrotizing granulomatous inflammation    *Severe depression/anxiety:  Patient has had chronic issues with this, is followed by psychiatry, Dr. Librado Monique.  She also underwent previous counseling which she found helpful.  The patient had ongoing difficulty with control of her depression.  She has been on multiple antidepressants  In June/July 2021 patient underwent transcranial magnetic stimulation (TMS) with significant improvement in depressive symptoms and resolution of headaches.  Symptoms subsequently gradually recurred, patient reports there has been difficulty with insurance regarding maintenance treatments.  Patient reports symptoms have been stable recently    *IJEOMA/CKD3  Patient with CKD3 with baseline creatinine in the 1.3-1.8 range  At time of routine follow-up visit 11/15/2021, creatinine was 3.19 and BUN of 59.  This was compared to prior value 6/2/2021 with BUN 41, creatinine 1.53.  Patient was hospitalized 11/15-11/18/2021  Patient is continuing follow-up with Dr. Cisneros in nephrology  Creatinine on 11/24/2021 remained elevated at 2.43 however on 12/20/2021 declined to 1.7, near her prior baseline.  Additional labs on 12/20/2021 with negative serum protein electrophoresis and immunoelectrophoresis with normal quantitative immunoglobulins.  Free light chains elevated with kappa 73, lambda 36 with ratio 2.04, consistent with patient's degree of  renal dysfunction.  Creatinine today stable at 1.22    *Hypothyroidism   Patient continues on levothyroxine 50 mcg daily    *Severe left-sided sciatica with left foot drop  Plain film of the lumbar spine on 5/10/2022 was negative.    MRI lumbar spine at [facility] on 5/14/2022 showed no evidence of metastatic disease however did show evidence of degenerative change with disc disease and neuroforaminal compromise.     She developed a left foot drop and was referred to neurosurgery, eventually underwent lumbar laminectomy on 8/9/2022  Patient has continued with chronic difficulty regarding left foot drop.  She has been using a brace    *Left foot deformities  Patient with multiple issues regarding her left foot including hammertoes, hallux valgus  Patient did require left foot surgery on 7/11/2023    *Possible gout  Patient was swelling in the left foot and sensitivity to touch which has been present for the past 5 days.  Symptoms and exam suggestive of gout.  We will check a uric acid level today.  Unfortunately patient cannot receive NSAIDs due to her renal function and ongoing anticoagulation.  She does not tolerate steroids well.  Therefore we discussed using colchicine 0.6 mg daily.  There is however an interaction with diltiazem which may increase risk of side effects from colchicine.  Discussed with pharmacy staff and we will prescribe a half dose of colchicine 0.3 mg daily x 7 days.  Refer patient back to Dr. Reed to reassess status of her left foot given her surgery that was performed last year    *Osteoporosis  DEXA scan on 5/22/2024 with T-score L-spine 0.0, left hip -3.0, right hip -2.7  Patient will be initiating adjuvant endocrine therapy with aromatase inhibitor when she returns in 1 month.  With potential to further decrease her bone density, we discussed need for treatment of her osteoporosis.  She is not a candidate for Reclast due to her CKD3a.  Recommended use of Prolia every 6 months.   We did discuss risk of osteonecrosis of the jaw and electrolyte disturbances.  Patient was willing to proceed.  We will check 25-hydroxy vitamin D level today as well.        Plan:  Prescription sent today for empiric doxycycline 100 mg twice daily due to right mastectomy site erythema (patient with multiple antibiotic allergies)  Patient will follow-up tomorrow with Dr. Diaz in breast surgery as scheduled to evaluate seroma and right mastectomy site erythema  Prescription sent today for colchicine 0.3 mg daily for presumed gout involving the left foot (dose reduced from 0.6 mg due to interaction with diltiazem, discussed with pharmacy staff).  Add uric acid and 25-hydroxy vitamin D level to today's labs  Referral back to Dr. Reed in orthopedics due to left foot swelling and pain  MD visit in 1 month with CBC, CMP, magnesium, phosphorus, stat iron panel/ferritin.  We will plan to begin Prolia at that time and also begin adjuvant endocrine therapy with Arimidex 1 mg daily.    I did spend 45 minutes total time caring for the patient today, time spent in review of records, face-to-face consultation, coordination of care, placement of orders, completion of documentation.

## 2024-06-12 ENCOUNTER — OFFICE VISIT (OUTPATIENT)
Dept: SURGERY | Facility: CLINIC | Age: 79
End: 2024-06-12
Payer: MEDICARE

## 2024-06-12 VITALS
WEIGHT: 199 LBS | DIASTOLIC BLOOD PRESSURE: 75 MMHG | BODY MASS INDEX: 33.97 KG/M2 | SYSTOLIC BLOOD PRESSURE: 122 MMHG | HEIGHT: 64 IN

## 2024-06-12 DIAGNOSIS — C50.919 MALIGNANT NEOPLASM OF FEMALE BREAST, UNSPECIFIED ESTROGEN RECEPTOR STATUS, UNSPECIFIED LATERALITY, UNSPECIFIED SITE OF BREAST: Primary | ICD-10-CM

## 2024-06-13 NOTE — PROGRESS NOTES
General Surgery Breast Cancer History and Physical Exam      Summary:    Marylu Foreman is a 78 y.o. lady who presents with a history of right breast invasive lobular carcinoma: Grade I, Ki-67 2%; ER+/NM weakly positive, Her2-; [id number], Stage I.       A multidisciplinary plan has been formulated for the patient:    (1) Breast Surgical Oncology:  -[facility] extended panel genetic testing: negative.  --S/p right breast total mastectomy with sentinel lymph node biopsy May 2024.  -Lymphedema clinic referral.  -Seroma aspirated today.  Follow-up Monday for repeat aspiration.  -Follow-up in 1 year for clinical breast exam.     (2) Medical Oncology:  -Following with Dr. White.     (3) History of left breast cancer in 2007:   -Invasive ductal carcinoma with DCIS, grade II, ER/NM +, her2-  -s/p L breast mastectomy [id number].   -On Arimidex, held due to PE, but completed 5 year course.       Referring Provider: West White Jr., MD     Chief Complaint: abnormal breast imaging     History of Present Illness: Ms. Marylu Foreman is a 78 y.o. year old lady, seen at the request of West White Jr., MD for a new diagnosis of right breast cancer.       This was initially detected as an imaging abnormality. She has had annual mammograms each year.  Her work-up is detailed in the oncologic history below.      She denies any breast lumps, pain, skin changes, or nipple discharge. She denies any family history of breast or ovarian cancer.     5/31/2024 she presents today for follow-up.  She is done well since surgery with no concerns or complaints.  Her pain is controlled and she is getting back to her daily activities.    6/12/2024 she presents today for follow-up.  She has developed a very large seroma over the right breast.  She is on antibiotics and is otherwise doing well.     Workup of Current Diagnosis:    4/10/2024 Right Breast Diagnostic Mammogram with Ultrasound:   There are scattered areas of fibroglandular density.   In the  I have reviewed the patient’s controlled substance prescription history, as maintained in the [place] prescription monitoring program, so that the prescription(s) for a  controlled substance can be given     outer posterior right breast, there is an approximately 1.0 cm asymmetry with questioned/mild architectural distortion. This area was further assessed with ultrasound. There are benign-appearing calcifications.   ULTRASOUND:  Targeted sonographic evaluation of the right breast was performed from 7:00 to 11:00 in the region of the mammographic abnormality and for follow-up. At 9:00, 13 cm from the nipple, there is a 0.5 x 0.3 x 0.4 cm benign-appearing intramammary lymph node. This was previously labeled 9:00, 8 cm from the nipple. At 8:00, 14 cm from the nipple, there is a 0.4 x 0.3 x 0.6 cm benign-appearing intramammary lymph node. This corresponds to the probably benign mass labeled 8:00, 9 cm from the nipple on prior ultrasound. This is similar in size, previously 0.6 x  0.2 x 0.7 cm. At 9:00, 7 cm from the nipple, there is a 0.6 x 0.3 x 0.6 cm subtle heterogeneous mass with indistinct margins, which is in the region of mammographic asymmetry and is suspicious. This is best appreciate with harmonics.   IMPRESSION:  Suspicious 0.6 cm mass at 9:00 in the right breast. Recommend further evaluation with ultrasound guided core needle biopsy and clip correlation with postbiopsy mammogram. If the area is unable to be reproduced due to target on the day of biopsy or if the clip does not correspond to the mammographic asymmetry on post breast mammogram, further evaluation with stereotactic/tomosynthesis guided core needle biopsy would then be recommended.   BI-RADS Category 4: Suspicious     Right Breast US Guided Biopsy:   PROCEDURE NOTE: Informed consent was obtained. Preliminary sonography of the right breast was performed. The lesion at the 9 o'clock position on the order of 7 cm from the nipple was visualized. The overlying skin was prepped in the usual sterile fashion. Local anesthesia was achieved with 1% lidocaine. A nick was made in the skin with a scalpel. Through the nick was inserted a 10-gauge Mammotome vacuum  assisted device under sonographic guidance. Multiple tissue specimens were obtained. A bowtie shaped metallic clip was placed to ghulam the site. Pressure was applied  until bleeding subsided and the overlying skin was cleaned and bandaged. The patient tolerated the procedure without evidence for complication.   Postbiopsy mammography of the right breast consisting of digital CC and 90 degree lateral images were obtained to demonstrate postbiopsy change with a bowtie shaped metallic clip at the site of the pre-existing mammographic lesion seen in the lower outer quadrant of the right breast. Therefore, sonographic and mammographic concordance is  established. The pathology result has returned as invasive lobular carcinoma with atypical ductal hyperplasia and ALH. This is concordant with imaging findings  IMPRESSION:  Technically successful ultrasound guided Mammotome vacuum assisted right breast biopsy with placement of a bowtie shaped metallic clip. The pathology result has returned as invasive lobular carcinoma with ADH and ALH. Surgical excision is recommended.    BI-RADS Category 6: Known malignancy.     4/18/2024 Pathology:   Final Diagnosis   1. Right Breast, 9:00, 7 Cm from Nipple, Ultrasound-Guided Core Needle Biopsy for a Mass:               A. INVASIVE LOBULAR CARCINOMA, Well-Differentiated; [place] Histologic Grade I/III      (tubule score = 3, nuclear score = 1, mitoses score = 1), measuring at least 6 mm.               B. Atypical ductal hyperplasia and atypical lobular hyperplasia.               C. Negative for lymphovascular space invasion.               D. See biomarker template.      4/26/2024 Bilateral Breast MRI   IMPRESSION:  1. Biopsy-proven malignancy in the right breast in the posterior one third at the 9 o'clock position represented by the bowtie shaped metallic clip within a 1.7 cm postbiopsy cavity. Enhancement of a probable reactive postbiopsy character is seen anterior and posterior to  the biopsy cavity. A 1.1 cm equivocal right axillary lymph node is noted.  2. Status post prior left mastectomy without reconstruction. No suspicious findings are seen within the left post mastectomy bed.   BI-RADS category 6: Known malignancy     5/23/2024 Right breast total mastectomy with sentinel lymph node biopsy   Final Diagnosis   1. Right axillary sentinel lymph node #1, hot, 3, biopsy (FS):               A. 1 lymph node, negative for carcinoma (0/1).               B. AE1/AE3 immunostain performed on block 1A is negative (control reacted appropriately).     2. Right axillary sentinel lymph node #2, hot, blue, 160, biopsy (FS):               A. 1 lymph node, negative for carcinoma (0/1).               B. AE1/AE3 immunostain performed on block 2A is negative (control reacted appropriately).     3. Right axillary sentinel lymph node #3, blue, biopsy (FS):               A. 1 lymph node, negative for carcinoma (0/1).               B. AE1/AE3 immunostain performed on block 3A is negative (control reacted appropriately).     4. Right axillary sentinel lymph node #4, palpable, biopsy (FS):               A. 1 lymph node, negative for carcinoma (0/1).               B. AE1/AE3 immunostain performed on block 4A is negative (control reacted appropriately).     5. Right breast, simple mastectomy (816 g):               A. Negative for residual invasive carcinoma.               B. Atypical lobular hyperplasia and atypical ductal hyperplasia.               C. Clip and biopsy site changes are present.               D. 2 lymph nodes, negative for carcinoma (0/2).               E. Unremarkable skin and nipple.               F. See synoptic report and comment.     Past Medical History:   History of PE, on Eliquis   HTN  CAD     Past Surgical History:    L total knee arthroplasty   Left toe fusion   Left mastectomy   Lumbar discectomy   Gastric bypass   Sigmoid colectomy   Cholecystectomy   Carpal tunnel      Family History:     As above      Social History:  Denies tobacco use  Occasional alcohol use     Allergies:   Allergies         Allergies   Allergen Reactions    Bactrim [Sulfamethoxazole-Trimethoprim] Diarrhea    Amlodipine Besylate-Valsartan Nausea And Vomiting    Cefdinir Diarrhea    Corticosteroids Unknown - Low Severity       Severe depression caused from steroid injections in hands    Lisinopril Nausea And Vomiting    Nsaids Unknown - Low Severity    Other Unknown - Low Severity       Steroids sever depression         Medications:     Current Medications      Current Outpatient Medications:     ascorbic acid (VITAMIN C) 1000 MG tablet, Take 1 tablet by mouth Daily., Disp: , Rfl:     Cholecalciferol (VITAMIN D PO), Take 1 tablet by mouth Daily., Disp: , Rfl:     Coenzyme Q10 200 MG capsule, Take 200 mg by mouth Daily., Disp: , Rfl:     Cyanocobalamin (VITAMIN B 12 PO), Take 1 tablet/day by mouth Daily., Disp: , Rfl:     dilTIAZem CD (CARDIZEM CD) 180 MG 24 hr capsule, Take 1 capsule by mouth 2 (Two) Times a Day., Disp: 180 capsule, Rfl: 3    DULoxetine (CYMBALTA) 60 MG capsule, Take 1 capsule by mouth 2 (Two) Times a Day., Disp: , Rfl:     Eliquis 5 MG tablet tablet, TAKE 1 TABLET BY MOUTH EVERY 12 HOURS FOR ATRIAL FIBRILLATION, Disp: 180 tablet, Rfl: 0    folic acid (FOLVITE) 400 MCG tablet, Take 1 tablet by mouth Daily., Disp: , Rfl:     Ibuprofen 3 %, Gabapentin 10 %, Baclofen 2 %, lidocaine 4 %, Ketamine HCl 4 %, Apply 1-2 g topically to the appropriate area as directed 3 (Three) to 4 (Four) times daily., Disp: 90 g, Rfl: 1    Ibuprofen 3 %, Gabapentin 10 %, Baclofen 2 %, lidocaine 4 %, Ketamine HCl 4 %, Apply 1-2 g topically to the appropriate area as directed 3 (Three) to 4 (Four) times daily., Disp: 90 g, Rfl: 1    Ibuprofen 3 %, Gabapentin 10 %, Baclofen 2 %, lidocaine 4 %, Ketamine HCl 4 %, Apply 1-2 g topically to the appropriate area as directed 3 (Three) to 4 (Four) times daily., Disp: 90 g, Rfl: 1    irbesartan  (AVAPRO) 300 MG tablet, Take 1 tablet by mouth Every Night. PT TO HOLD 24 HOURS PRIOR TO SURGERY, Disp: 90 tablet, Rfl: 3    levothyroxine (SYNTHROID, LEVOTHROID) 50 MCG tablet, Take 1 tablet by mouth Daily., Disp: , Rfl:     lidocaine (Lidoderm) 5 %, Place 1 patch on the skin as directed by provider Daily. Remove & Discard patch within 12 hours or as directed by MD, Disp: 30 patch, Rfl: 0    MegaRed Omega-3 Krill Oil 350 MG capsule, Take 1 capsule by mouth Daily. HELD FOR SURGERY, Disp: , Rfl:     Multiple Vitamin (MULTI-VITAMIN PO), Take 1 tablet by mouth Daily., Disp: , Rfl:     Probiotic Product (PROBIOTIC PO), Take 1 tablet by mouth Daily. Lactobacillus rhamnosus GG, Disp: , Rfl:     vitamin E 400 UNIT capsule, Take 1 capsule by mouth Daily. HELD FOR OR, Disp: , Rfl:         Laboratory Values:    Labs from 2024 reviewed     Review of Systems:   Influenza-like illness: no fever, no  cough, no  sore throat, no  body aches, no loss of sense of taste or smell, no known exposure to person with Covid-19.  Constitutional: Negative for fevers or chills  HENT: Negative for hearing loss or runny nose  Eyes: Negative for vision changes or scleral icterus  Respiratory: Negative for cough or shortness of breath  Cardiovascular: Negative for chest pain or heart palpitations  Gastrointestinal: Negative for abdominal pain, nausea, vomiting, constipation, melena, or hematochezia  Genitourinary: Negative for hematuria or dysuria  Musculoskeletal: Negative for joint swelling or gait instability  Neurologic: Negative for tremors or seizures  Psychiatric: Negative for suicidal ideations or depression  All other systems reviewed and negative     Physical Exam:   ECO - Asymptomatic  Constitutional: Well-developed well-nourished, no acute distress  Eyes: Conjunctiva normal, sclera nonicteric  ENMT: Hearing grossly normal, oral mucosa moist  Neck: Supple, no palpable mass, trachea midline  Respiratory: Clear to auscultation,  normal inspiratory effort  Cardiovascular: Regular rate, no peripheral edema, no jugular venous distention  Breast: symmetric  Right: Mastectomy incision clean and dry with no erythema or drainage, no hematoma.  Very large seroma.  Left: Left breast mastectomy with flap closure, well-healed, no masses or skin changes  No clinical chest wall involvement.  Gastrointestinal: Soft, nontender  Lymphatics (palpable nodes): No cervical, supraclavicular or axillary lymphadenopathy  Skin:  Warm, dry, no rash on visualized skin surfaces  Musculoskeletal: Symmetric strength, normal gait  Psychiatric: Alert and oriented ×3, normal affect     Procedure note:  Area prepped and draped in sterile fashion.  1% lidocaine injected into the skin and subcutaneous tissues.  An 18-gauge needle was inserted into the very large seroma.  Over 600 cc of straw-colored fluid was aspirated.  There is still some fluid remaining, but the majority was gone.  A Band-Aid was placed over this.  She will follow-up Monday.     DUNG ZEPEDA M.D.  General and Endoscopic Surgery  [facility]     [address], [address]  [place], [place], [address]  P: [phone]  F: [phone]

## 2024-06-17 ENCOUNTER — OFFICE VISIT (OUTPATIENT)
Dept: SURGERY | Facility: CLINIC | Age: 79
End: 2024-06-17
Payer: MEDICARE

## 2024-06-17 ENCOUNTER — TELEPHONE (OUTPATIENT)
Dept: SURGERY | Facility: CLINIC | Age: 79
End: 2024-06-17

## 2024-06-17 ENCOUNTER — PATIENT OUTREACH (OUTPATIENT)
Dept: OTHER | Facility: HOSPITAL | Age: 79
End: 2024-06-17
Payer: MEDICARE

## 2024-06-17 VITALS — BODY MASS INDEX: 33.97 KG/M2 | WEIGHT: 199 LBS | HEIGHT: 64 IN

## 2024-06-17 DIAGNOSIS — C50.919 MALIGNANT NEOPLASM OF FEMALE BREAST, UNSPECIFIED ESTROGEN RECEPTOR STATUS, UNSPECIFIED LATERALITY, UNSPECIFIED SITE OF BREAST: Primary | ICD-10-CM

## 2024-06-17 PROCEDURE — 99024 POSTOP FOLLOW-UP VISIT: CPT | Performed by: STUDENT IN AN ORGANIZED HEALTH CARE EDUCATION/TRAINING PROGRAM

## 2024-06-17 PROCEDURE — 1160F RVW MEDS BY RX/DR IN RCRD: CPT | Performed by: STUDENT IN AN ORGANIZED HEALTH CARE EDUCATION/TRAINING PROGRAM

## 2024-06-17 PROCEDURE — 1159F MED LIST DOCD IN RCRD: CPT | Performed by: STUDENT IN AN ORGANIZED HEALTH CARE EDUCATION/TRAINING PROGRAM

## 2024-06-17 NOTE — TELEPHONE ENCOUNTER
Patient called stating that the drainage was not going into the bulb but all down her side. Talked patient though how to make sure the bulb had suction. Advised to make sure it has suction after every time she emptied the bulb. Also advised to call back if she was still having drainage on the side even after making sure the bulb had suction. Patient voiced understanding.

## 2024-06-17 NOTE — PROGRESS NOTES
Called Ms. Foreman to see how she was doing. She stated she had to go back in to the surgery office today and have another drain placed. She also stated her foot is in pain and that she is not getting relief. Dr. White sent in medication to help with gout and will have her see Dr. Reed again. Otherwise she is doing well and has no needs at this time. She was thankful for the call and will reach out if any questions or needs arise.

## 2024-06-20 ENCOUNTER — TELEPHONE (OUTPATIENT)
Dept: SURGERY | Facility: CLINIC | Age: 79
End: 2024-06-20
Payer: MEDICARE

## 2024-06-20 NOTE — TELEPHONE ENCOUNTER
Hub staff attempted to follow warm transfer process and was unsuccessful     Caller: Marylu Foreman    Relationship to patient: Self    Best call back number: [phone]   CAN BE REACHED ANYTIME. DETAILED MESSAGE CAN BE LEFT IF NO ANSWER     Patient is needing: WHILE CLEARING A CLOT IN TUBE LAST NIGHT, TUBE SLIPPED OUT

## 2024-06-20 NOTE — TELEPHONE ENCOUNTER
Spoke with patient, she states the drain tube came out last night and has not been  keeping track of the amount of drainage coming out since it was placed, but she did noticed an improvement side her last office visit, she mentioned having leakage around the tube. She described the fluid coming out as orange. The area under her arm and side are still swollen. Patient states she has been feeling drowsy/bad today, unsure if she has fever, she has presented chills, the area is not warm to the touch, a little red, but she does not believe it is infected. She took a Covid test today which came back negative. Advised put pressure on the area if possible with a sports bra.     Last office visit 06/17  Total Mastectomy of right breast 05/23

## 2024-06-21 ENCOUNTER — OFFICE VISIT (OUTPATIENT)
Dept: SURGERY | Facility: CLINIC | Age: 79
End: 2024-06-21
Payer: MEDICARE

## 2024-06-21 VITALS
HEIGHT: 64 IN | BODY MASS INDEX: 33.97 KG/M2 | SYSTOLIC BLOOD PRESSURE: 125 MMHG | WEIGHT: 199 LBS | DIASTOLIC BLOOD PRESSURE: 72 MMHG

## 2024-06-21 DIAGNOSIS — Z17.0 MALIGNANT NEOPLASM OF RIGHT BREAST IN FEMALE, ESTROGEN RECEPTOR POSITIVE, UNSPECIFIED SITE OF BREAST: ICD-10-CM

## 2024-06-21 DIAGNOSIS — N64.89 SEROMA OF BREAST: Primary | ICD-10-CM

## 2024-06-21 DIAGNOSIS — C50.911 MALIGNANT NEOPLASM OF RIGHT BREAST IN FEMALE, ESTROGEN RECEPTOR POSITIVE, UNSPECIFIED SITE OF BREAST: ICD-10-CM

## 2024-06-21 PROCEDURE — 99024 POSTOP FOLLOW-UP VISIT: CPT

## 2024-06-21 PROCEDURE — 3074F SYST BP LT 130 MM HG: CPT

## 2024-06-21 PROCEDURE — 3078F DIAST BP <80 MM HG: CPT

## 2024-06-21 RX ORDER — CEPHALEXIN 500 MG/1
500 CAPSULE ORAL 3 TIMES DAILY
Qty: 30 CAPSULE | Refills: 0 | Status: SHIPPED | OUTPATIENT
Start: 2024-06-21 | End: 2024-07-01
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
## 2024-06-24 ENCOUNTER — OFFICE VISIT (OUTPATIENT)
Dept: SURGERY | Facility: CLINIC | Age: 79
End: 2024-06-24
Payer: MEDICARE

## 2024-06-24 VITALS
WEIGHT: 199 LBS | BODY MASS INDEX: 33.97 KG/M2 | DIASTOLIC BLOOD PRESSURE: 80 MMHG | HEIGHT: 64 IN | SYSTOLIC BLOOD PRESSURE: 126 MMHG

## 2024-06-24 DIAGNOSIS — N64.89 SEROMA OF BREAST: ICD-10-CM

## 2024-06-24 DIAGNOSIS — Z09 ENCOUNTER FOR FOLLOW-UP: Primary | ICD-10-CM

## 2024-06-24 PROCEDURE — 1160F RVW MEDS BY RX/DR IN RCRD: CPT | Performed by: PHYSICIAN ASSISTANT

## 2024-06-24 PROCEDURE — 99024 POSTOP FOLLOW-UP VISIT: CPT | Performed by: PHYSICIAN ASSISTANT

## 2024-06-24 PROCEDURE — 1159F MED LIST DOCD IN RCRD: CPT | Performed by: PHYSICIAN ASSISTANT

## 2024-06-24 PROCEDURE — 3079F DIAST BP 80-89 MM HG: CPT | Performed by: PHYSICIAN ASSISTANT

## 2024-06-24 PROCEDURE — 3074F SYST BP LT 130 MM HG: CPT | Performed by: PHYSICIAN ASSISTANT

## 2024-06-24 RX ORDER — APIXABAN 5 MG/1
TABLET, FILM COATED ORAL
COMMUNITY
Start: 2024-06-20

## 2024-06-24 NOTE — PROGRESS NOTES
79-year-old lady seen in follow-up from office visit 6/21/2024 with Ashley High for large seroma after drain fell out.  At that time she had approximately 300 cc of jonh-colored fluid aspirated.  Since then she has had a reaccumulation of this fluid with her right chest now being very tight and uncomfortable.  The erythema has improved dramatically since having started Keflex.    I drained her in office using a 60 cc syringe and 18-gauge needle and managed to withdraw 840 cc of jonh-colored fluid which dramatically decreased the size of the fluid collection.  I was unable to aspirate the entire area.    Orders have already been placed for IR guided drain placement for this Thursday.  Will proceed with this accordingly.  All questions were answered and she was willing to proceed with all recommendations.  She knows to return to the office if she feels a rapid increase of fluid or begins to notice signs of worsening infection.    Chase Colbert PA-C

## 2024-06-24 NOTE — PROGRESS NOTES
06/27/24 0001   Pre-Procedure Phone Call   Procedure Time Verified Yes   Arrival Time 0930   Procedure Location Verified Yes   Medical History Reviewed No   NPO Status Reinforced Yes   Ride and Caregiver Arranged Yes   Patient Knows to Bring Current Medications No   Bring Outside Films Requested No

## 2024-06-25 ENCOUNTER — HOSPITAL ENCOUNTER (OUTPATIENT)
Dept: OCCUPATIONAL THERAPY | Facility: HOSPITAL | Age: 79
Setting detail: THERAPIES SERIES
Discharge: HOME | End: 2024-06-25
Payer: MEDICARE

## 2024-06-25 DIAGNOSIS — C50.911 MALIGNANT NEOPLASM OF RIGHT BREAST IN FEMALE, ESTROGEN RECEPTOR POSITIVE, UNSPECIFIED SITE OF BREAST: Primary | ICD-10-CM

## 2024-06-25 DIAGNOSIS — Z91.89 AT RISK FOR LYMPHEDEMA: ICD-10-CM

## 2024-06-25 DIAGNOSIS — Z17.0 MALIGNANT NEOPLASM OF RIGHT BREAST IN FEMALE, ESTROGEN RECEPTOR POSITIVE, UNSPECIFIED SITE OF BREAST: Primary | ICD-10-CM

## 2024-06-25 PROCEDURE — 97166 OT EVAL MOD COMPLEX 45 MIN: CPT

## 2024-06-25 PROCEDURE — 93702 BIS XTRACELL FLUID ANALYSIS: CPT
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
## 2024-06-26 ENCOUNTER — OFFICE VISIT (OUTPATIENT)
Dept: ORTHOPEDIC SURGERY | Facility: CLINIC | Age: 79
End: 2024-06-26
Payer: MEDICARE

## 2024-06-26 VITALS — HEIGHT: 64 IN | WEIGHT: 199 LBS | TEMPERATURE: 95.9 F | BODY MASS INDEX: 33.97 KG/M2

## 2024-06-26 DIAGNOSIS — M19.072 ARTHRITIS OF FIRST METATARSOPHALANGEAL (MTP) JOINT OF LEFT FOOT: ICD-10-CM

## 2024-06-26 DIAGNOSIS — M77.42 METATARSALGIA OF LEFT FOOT: ICD-10-CM

## 2024-06-26 DIAGNOSIS — M20.12 ACQUIRED HALLUX VALGUS OF LEFT FOOT: ICD-10-CM

## 2024-06-26 DIAGNOSIS — M20.42 HAMMER TOE OF LEFT FOOT: ICD-10-CM

## 2024-06-26 DIAGNOSIS — M19.079 ARTHRITIS OF FOOT: ICD-10-CM

## 2024-06-26 DIAGNOSIS — G57.92 NEURITIS OF LEFT FOOT: Primary | ICD-10-CM

## 2024-06-26 PROCEDURE — 99214 OFFICE O/P EST MOD 30 MIN: CPT | Performed by: ORTHOPAEDIC SURGERY
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
## 2024-06-27 ENCOUNTER — HOSPITAL ENCOUNTER (OUTPATIENT)
Dept: ULTRASOUND IMAGING | Facility: HOSPITAL | Age: 79
Discharge: HOME | End: 2024-06-27
Payer: MEDICARE

## 2024-06-27 VITALS
HEART RATE: 97 BPM | WEIGHT: 185 LBS | OXYGEN SATURATION: 94 % | RESPIRATION RATE: 14 BRPM | HEIGHT: 64 IN | DIASTOLIC BLOOD PRESSURE: 86 MMHG | TEMPERATURE: 98.4 F | SYSTOLIC BLOOD PRESSURE: 141 MMHG | BODY MASS INDEX: 31.58 KG/M2

## 2024-06-27 DIAGNOSIS — N64.89 SEROMA OF BREAST: ICD-10-CM

## 2024-06-27 PROCEDURE — 88305 TISSUE EXAM BY PATHOLOGIST: CPT

## 2024-06-27 PROCEDURE — C1729 CATH, DRAINAGE: HCPCS

## 2024-06-27 PROCEDURE — 88112 CYTOPATH CELL ENHANCE TECH: CPT

## 2024-06-27 PROCEDURE — 25010000002 LIDOCAINE 1 % SOLUTION: Performed by: RADIOLOGY

## 2024-06-27 RX ORDER — SODIUM CHLORIDE 9 MG/ML
25 INJECTION, SOLUTION INTRAVENOUS ONCE
Status: DISCONTINUED | OUTPATIENT
Start: 2024-06-27 | End: 2024-06-28 | Stop reason: HOSPADM

## 2024-06-27 RX ORDER — SODIUM CHLORIDE 0.9 % (FLUSH) 0.9 %
10 SYRINGE (ML) INJECTION AS NEEDED
Status: DISCONTINUED | OUTPATIENT
Start: 2024-06-27 | End: 2024-06-28 | Stop reason: HOSPADM

## 2024-06-27 RX ORDER — SODIUM CHLORIDE 0.9 % (FLUSH) 0.9 %
3 SYRINGE (ML) INJECTION EVERY 12 HOURS SCHEDULED
Status: DISCONTINUED | OUTPATIENT
Start: 2024-06-27 | End: 2024-06-28 | Stop reason: HOSPADM

## 2024-06-27 RX ORDER — SODIUM CHLORIDE 9 MG/ML
40 INJECTION, SOLUTION INTRAVENOUS AS NEEDED
Status: DISCONTINUED | OUTPATIENT
Start: 2024-06-27 | End: 2024-06-28 | Stop reason: HOSPADM

## 2024-06-27 RX ORDER — ALPRAZOLAM 0.5 MG/1
0.5 TABLET ORAL ONCE
Status: COMPLETED | OUTPATIENT
Start: 2024-06-27 | End: 2024-06-27

## 2024-06-27 RX ORDER — LIDOCAINE HYDROCHLORIDE 10 MG/ML
10 INJECTION, SOLUTION INFILTRATION; PERINEURAL ONCE
Status: COMPLETED | OUTPATIENT
Start: 2024-06-27 | End: 2024-06-27

## 2024-06-27 RX ADMIN — ALPRAZOLAM 0.5 MG: 0.5 TABLET ORAL at 10:03

## 2024-06-27 RX ADMIN — LIDOCAINE HYDROCHLORIDE 3 ML: 10 INJECTION, SOLUTION INFILTRATION; PERINEURAL at 10:44

## 2024-06-27 NOTE — NURSING NOTE
Total output from right breast drain 500 cc serosanguinous fluid. Patient instructed on how to empty PHILIP drain, and how to instill 5 cc of normal saline daily. Given supplies to patient for irrigating and to change dry gauze dressing around insertion site as needed. Understanding voiced.  Gauze dressing with paper tape dry and intact around philip insertion site. Stitches placed by MD.

## 2024-06-27 NOTE — DISCHARGE INSTRUCTIONS
EDUCATION /DISCHARGE INSTRUCTIONS Drainage procedure:  This procedure is performed by a physician with the purpose of removing a collection of fluid.  Images are obtained prior to the procedure to locate the target area.   A physician will use antiseptic soap to clean the area, place a sterile towel around the site and administer a local anesthetic to numb the area.  The physician will insert a special needle into the fluid collection.  A catheter tube may also be placed into the fluid collection and left for several days to facilitate drainage.  The catheter drains into a fluid collection bag or bulb. If necessary fluid is sent to the laboratory for analysis.      Risks of the procedure include but are not limited to:   *  Bleeding    *  Infection   *  Puncture of surrounding organs *  Death       Benefits of the procedure:  Removal of fluid.  It is also possible to irrigate the area and keep the area draining over days.    Alternatives to the procedure:  Fluid drainage and catheter placement can be performed in surgery.  Risks of a surgery  include exposure to anesthesia, infection, excessive bleeding and injury to abdominal organs.  Benefit of surgical drainage include removal of fluid, ability to irrigate the area and keep the area draining over days.  THIS EDUCATION INFORMATION WAS REVIEWED PRIOR TO THE PROCEDURE AND CONSENT. Patient initials_____________Time ______________    Instruction regarding care of a collection bag or bulb:    *  Wash hands thoroughly before handling the drain and after emptying the drain.   *  Use measurements on the bag or bulb to note the       amount collected.  If bulb type the drain plug should be removed to allow the      bulb to expand before recording the measurement.  Record the amount.   *  To empty, open the drain plug.  Discard contents in toilet.   *  To close bulb type squeeze bulb and replace drain plug while it is compressed.   *  Keep the dressing around the insertion  site dry and be sure there is no tension       on or twists / kinks in the tubing.     Call your doctor for the following:   *  The drain falls out.   *  There is leakage around the drain insertion site.      *  If the drain is no longer collecting fluid.   *  Signs of infection such as redness, swelling, excessive pain and / or foul        smelling drainage from the puncture site.   *  Chills or fever over 101 degrees (by mouth).   *  Unrelieved pain.   *  Any new or severe symptoms.  Following the procedure:     Follow-up with the ordering physician as directed.    If you received sedation do not drive for 24 hours.    Continue to take other medications as directed by your physician unless    otherwise instructed.    If you have any concerns please call the Radiology Nurses Desk at [phone]. 7AM-10PM   You are the most important factor in your recovery.  Follow the above instructions carefully.

## 2024-06-28 ENCOUNTER — TELEPHONE (OUTPATIENT)
Dept: INTERVENTIONAL RADIOLOGY/VASCULAR | Facility: HOSPITAL | Age: 79
End: 2024-06-28
Payer: MEDICARE

## 2024-06-28 LAB
CYTO UR: NORMAL
LAB AP CASE REPORT: NORMAL
PATH REPORT.FINAL DX SPEC: NORMAL
PATH REPORT.GROSS SPEC: NORMAL

## 2024-07-01 ENCOUNTER — TELEPHONE (OUTPATIENT)
Dept: SURGERY | Facility: CLINIC | Age: 79
End: 2024-07-01

## 2024-07-01 NOTE — TELEPHONE ENCOUNTER
----- Message from Ashley High sent at 6/29/2024 11:27 AM EDT -----  Can you let her know the fluid they collected from her drain was benign?    She needs a follow-up appointment next week with either myself of Dr. Diaz.     Thanks.  ----- Message -----  From: Lab, Background User  Sent: 6/28/2024   1:07 PM EDT  To: Ashley High PA-C

## 2024-07-01 NOTE — TELEPHONE ENCOUNTER
Patient informed of results, and scheduled an appointment for next Monday, patient verbalized understanding

## 2024-07-03 NOTE — PROGRESS NOTES
"Chief Complaint  Stage I ([id number]) left breast cancer in 2007, pulmonary emboli in 2009, atrial fibrillation, history of GI bleed, history of iron deficiency status post prior gastric bypass in 2001.  Stage I ([id number]) right breast cancer status post right mastectomy 5/28/2024 (ER positive, UT negative, HER2/jorge negative)    Subjective        History of Present Illness    Patient returns today in follow-up with plans to begin adjuvant Arimidex 1 mg daily and initiate treatment with Prolia every 6 months for her osteoporosis.  Unfortunately she has had ongoing difficulty with her drain in the right chest wall.  The drain did fall out however she was experiencing recurrent accumulation of her seroma that required intermittent percutaneous drainage.  She eventually underwent placement of another drain by ultrasound on 6/27/2024.  The drain has continued with significant output.  Possibility the drain may be removed early next week.  In regards to her left foot pain/sensitivity, there was concern for gout initially and we did prescribe colchicine however this did not provide any relief.  She was seen by Dr. Reed, no evidence of fracture present.  Patient was referred to physical therapy and is due to begin this later this week.  She has been using a soft boot on her foot which does help.  She notes however currently that her pain is fairly severe in the left foot and has a burning/neuropathic quality at this point.  She is inquiring about potential treatment for this.  She also notes difficulty with dysuria that developed over the past few days and is concerned about UTI.      Objective   Vital Signs:   /83   Pulse 91   Temp 98.3 °F (36.8 °C) (Oral)   Resp 16   Ht 162.6 cm (64\")   Wt 86.5 kg (190 lb 11.2 oz)   SpO2 96%   BMI 32.73 kg/m²     Physical Exam  Constitutional:       Appearance: She is well-developed.   Eyes:      Conjunctiva/sclera: Conjunctivae normal.   Neck:      Thyroid: No thyromegaly. "   Cardiovascular:      Rate and Rhythm: Normal rate. Rhythm irregular.      Heart sounds: Murmur heard.      No friction rub. No gallop.   Pulmonary:      Effort: No respiratory distress.      Breath sounds: Normal breath sounds. No wheezing.      Comments: Right mastectomy incision was not evaluated today, ILYA drain remains in place with serous fluid   Abdominal:      General: Bowel sounds are normal. There is no distension.      Palpations: Abdomen is soft.      Tenderness: There is no abdominal tenderness.   Musculoskeletal:      Comments: Patient with soft boot in place left foot, minimal residual swelling   Lymphadenopathy:      Head:      Right side of head: No submandibular adenopathy.      Cervical: No cervical adenopathy.      Upper Body:      Right upper body: No supraclavicular adenopathy.      Left upper body: No supraclavicular adenopathy.   Skin:     General: Skin is warm and dry.      Findings: No rash.   Neurological:      Mental Status: She is alert and oriented to person, place, and time.      Cranial Nerves: No cranial nerve deficit.      Motor: No abnormal muscle tone.      Deep Tendon Reflexes: Reflexes normal.      Comments: Patient with continued left foot drop   Psychiatric:         Behavior: Behavior normal.            Result Review : Reviewed CBC, CMP, magnesium, phosphorus, iron panel, ferritin from today.  Reviewed 25-hydroxy vitamin D level from 6/11/2024.       Assessment and Plan    *Stage I ([id number]) right breast cancer (ER positive, ID negative, HER2/jorge negative)  Mammogram and right breast ultrasound on 4/10/2024 with a suspicious 0.6 cm abnormality in the right breast at the 9 o'clock position.    Biopsy on 4/18/2024 of the 9:00 lesion in the right breast.  Pathology showed a well-differentiated (grade 1) invasive lobular carcinoma, no lymphovascular invasion, evidence of ALH and ADH.  ER positive (%), ID negative (2%), HER2/jorge negative (1+ IHC), Ki-67 2%.    Breast MRI on  4/26/2024 confirmed the abnormality in the 9 o'clock position with postbiopsy cavity/abnormality measuring 1.7 cm.  There was a 1.1 cm equivocal right axillary lymph node.    Right mastectomy with sentinel node on 5/28/2024 performed by Dr. Diaz.  Pathology showed no residual cancer, did show ALH/ADH, 2 negative lymph nodes in the specimen and 4 additional negative sentinel lymph nodes.  [employer] 70 gene germline panel test 5/14/2024 with VUS in MLH1 and POLE  DEXA scan 5/22/2024 with evidence of osteoporosis (see below)  Patient not a candidate for adjuvant tamoxifen due to thrombophilia (see below).  Decision to pursue adjuvant aromatase chemotherapy again with Arimidex (previously well-tolerated) along with treatment for osteoporosis with Prolia.  Patient initiated adjuvant Arimidex 1 mg daily on 7/10/2024 with intent to treat x 5 years  Patient initiated concurrent Prolia on 7/10/2024 for treatment of osteoporosis while receiving aromatase therapy  Patient returns today in follow-up ready to begin adjuvant endocrine therapy with Arimidex and also to begin treatment with Prolia for her osteoporosis concurrently.  Unfortunately she has continued with difficulty regarding her seroma.  Her drain did fall out and she required a number of percutaneous drainage procedures, ultimately decided to replace the drain via ultrasound guidance on 6/27/2024.  Cytology was performed on the fluid which was negative for malignancy.  She does continue with ongoing output from the drain, notes there will be consideration of drain removal early next week.  We did discuss initiating adjuvant Arimidex today and patient is in agreement to proceed.  Prescription will be sent to her pharmacy.  We do plan to treat for 5 years adjuvantly.  I will see the patient back in 6 to 8 weeks to assess tolerance of Arimidex.  She did well with the medication previously.     *Stage I ([id number]) left breast cancer:  Biopsy 5/8/2007 with in situ and  invasive ductal carcinoma, grade 2,/VT positive, HER-2/jorge negative  Left mastectomy 6/18/0742 foci carcinoma, 4 mm, grade 1 in situ and invasive ductal carcinoma and 2 mm grade 2 in situ and invasive ductal carcinoma, negative margins, negative sentinel lymph nodes x3 with 5 additional negative lymph nodes.  Arimidex initiated 7/2/2007  Arimidex interrupted patient developed bilateral pulmonary emboli in May 2009, resume shortly thereafter.  Completed 5 years treatment in 2012.    *Pulmonary emboli 5/2/2009 and bilateral calf DVT 8/11/2022:  Family history of DVT in the patient's mother occurring at age 70 postoperatively  Patient developed bilateral lower lobe pulmonary emboli 5/2/2009 following right VATS on 4/30/2009  Felt to be a provoked thrombosis related to surgery  Hypercoagulable evaluation with negative factor V Leiden, negative prothrombin gene mutation, normal homocystine, protein C antigen 92, free to protein S 75, anticardiolipin antibody panel negative, lupus anticoagulant negative, Antithrombin %  Continuing on chronic anticoagulation primarily for atrial fibrillation at this point as prior thrombosis was provoked.  Transitioned from Coumadin to Eliquis 5 mg twice daily in May 2020  At time of hospitalization for IJEOMA/CKD 11/15-11/18/2021, Eliquis dose was decreased to 2.5 mg twice daily.  Patient required lumbar laminectomy 8/9/2022, Eliquis was held perioperatively.  Subsequently admitted 8/10 - 8/12/2022 with Doppler 8/11/2022 that showed bilateral acute calf DVT.  This occurred postoperatively and while off anticoagulation.  Recommended to resume Eliquis at 5 mg twice daily dose.  Patient will require long-term anticoagulation with Eliquis 5 mg twice daily for both thrombophilia and atrial fibrillation.  Patient returns today in follow-up continuing on Eliquis 5 mg twice daily.  She has not experienced any bleeding complications    *Iron deficiency anemia:  Prior gastric bypass in  2001  Intolerant of oral iron.  Patient has required IV iron on multiple occasions: Feraheme 8/13 and 8/20/2018; Injectafer 3/9 and 3/16/2020  Labs on 11/15/2021 showed decline in hemoglobin to 10.8 however this was in the setting of UTI and IJEOMA/CKD3.  Her iron panel was normal with iron 114, iron saturation 34%, TIBC 337 with ferritin elevated at 292.  B12 was normal at 883.  We continue to follow these values given her history of prior gastric bypass and iron deficiency requiring intermittent IV iron (intolerant of oral iron).   On 12/20/2021, hemoglobin further declined to 9.5.  Repeat labs with iron 47, ferritin 262, iron saturation 14%, TIBC 328, B12 level 1162.  Additional labs performed with negative serum protein electrophoresis and immunoelectrophoresis with normal quantitative immunoglobulins.  Free light chains elevated with kappa 73, lambda 36 with ratio 2.04, consistent with patient's degree of renal dysfunction.  Haptoglobin normal 186, LDH normal 171, CRP borderline elevated 0.62, erythropoietin level 20.1.  Anemia felt to be related to CKD3.  Patient felt to be a potential candidate for Procrit if hemoglobin remains below 10.  Patient experienced improvement in hemoglobin into the 11-12 range  Labs on 6/19/2023 with hemoglobin 11.7, iron 50, ferritin 41.6, iron saturation 12%, TIBC 404 indicating recurrent iron deficiency likely related to malabsorption from prior gastric bypass  Patient received IV iron with Venofer 300 mg on 6/26, 6/28, 7/3, 7/7/2023  Labs on 1/8/2024 with hemoglobin normal at 13.6, patient is iron replete with iron 95, ferritin 223, iron saturation 23%, TIBC 405.  Patient was iron replete, no need for further IV iron.    Patient was seen by Dr. Bess in GI on 1/31/2024, did not recommend any immediate endoscopic evaluation, agreement that iron deficiency likely related to malabsorption from prior gastric bypass.  Plans for upper and lower endoscopy in November 2028  Labs today with  hemoglobin 12 and iron replete status with iron 94, ferritin 213, iron saturation 27%, TIBC 347.    *GI bleed in July 2018:  Acute lower GI bleed with supratherapeutic INR Coumadin.  Angiogram performed with coil embolization of artery to sigmoid colon  No clinical evidence of further GI blood loss    *Atrial fibrillation:  Requires ongoing anticoagulation for this indication  As above, transitioned from Coumadin to Eliquis 5 mg twice daily in May 2020  As above, during hospitalization 11/15-11/18/2021 for IJEOMA/CKD3, Eliquis dose was decreased to 2.5 mg twice daily  See above discussion, patient developed bilateral calf DVT while briefly off anticoagulation following lumbar laminectomy on 8/11/2022.  Patient is continuing on full dose Eliquis 5 mg twice daily.      *Status post right VATS 4/30/2020 for pulmonary nodule with finding of necrotizing granulomatous inflammation    *Severe depression/anxiety:  Patient has had chronic issues with this, is followed by psychiatry, Dr. Librado Monique.  She also underwent previous counseling which she found helpful.  The patient had ongoing difficulty with control of her depression.  She has been on multiple antidepressants  In June/July 2021 patient underwent transcranial magnetic stimulation (TMS) with significant improvement in depressive symptoms and resolution of headaches.  Symptoms subsequently gradually recurred, patient reports there has been difficulty with insurance regarding maintenance treatments.  Patient reports symptoms have been stable recently    *IJEOMA/CKD3  Patient with CKD3 with baseline creatinine in the 1.3-1.8 range  At time of routine follow-up visit 11/15/2021, creatinine was 3.19 and BUN of 59.  This was compared to prior value 6/2/2021 with BUN 41, creatinine 1.53.  Patient was hospitalized 11/15-11/18/2021  Patient is continuing follow-up with Dr. Cisneros in nephrology  Creatinine on 11/24/2021 remained elevated at 2.43 however on 12/20/2021  declined to 1.7, near her prior baseline.  Additional labs on 12/20/2021 with negative serum protein electrophoresis and immunoelectrophoresis with normal quantitative immunoglobulins.  Free light chains elevated with kappa 73, lambda 36 with ratio 2.04, consistent with patient's degree of renal dysfunction.  Creatinine today stable at 1.21    *Hypothyroidism   Patient continues on levothyroxine 50 mcg daily    *Severe left-sided sciatica with left foot drop  Plain film of the lumbar spine on 5/10/2022 was negative.    MRI lumbar spine at [facility] on 5/14/2022 showed no evidence of metastatic disease however did show evidence of degenerative change with disc disease and neuroforaminal compromise.     She developed a left foot drop and was referred to neurosurgery, eventually underwent lumbar laminectomy on 8/9/2022  Patient has continued with chronic difficulty regarding left foot drop.    The patient is experiencing significant neuropathic type pain in the distal left lower extremity.  We will refer her to neurology for evaluation and prescribe gabapentin 100 mg 3 times daily.    *Left foot deformities  Patient with multiple issues regarding her left foot including hammertoes, hallux valgus  Patient did require left foot surgery on 7/11/2023  Patient was experiencing significant swelling in her foot.  She was seen by orthopedics Dr. Reed on 7/26/2024.  There was no evidence of fracture.  Patient has been referred to physical therapy, will be initiating this later this week.  There was some initial concern regarding possible gout, no response to colchicine, appears that swelling and pain were related to acute on chronic issues    *Osteoporosis  DEXA scan on 5/22/2024 with T-score L-spine 0.0, left hip -3.0, right hip -2.7  25-hydroxy vitamin D level was normal at 45.6 on 6/11/2024  Patient continuing on calcium and vitamin D supplementation.  Patient initiated adjuvant aromatase inhibitor therapy with  Arimidex 1 mg daily x 5 years on 7/10/2024  Patient initiated treatment for osteoporosis concurrently with every 6-month Prolia on 7/10/2024      Plan:  Begin adjuvant Arimidex 1 mg daily with plan to treat x 5 years adjuvantly  Begin Prolia today with plans to treat every 6 months for osteoporosis  Continue calcium and vitamin D supplementation  Referral to neurology regarding neuropathic pain left lower extremity and left foot drop  Prescription sent for gabapentin 100 mg 3 times daily  Check urinalysis and urine culture today due to dysuria  Patient is beginning physical therapy for her left foot later this week  Patient will follow-up with Dr. Diaz next week regarding right mastectomy site drain  MD visit in 6 to 8 weeks with CBC, CMP    I did spend 40 minutes total time caring for the patient today, time spent in review of records, face-to-face consultation, coordination of care, placement of orders, completion of documentation.

## 2024-07-08 ENCOUNTER — OFFICE VISIT (OUTPATIENT)
Dept: SURGERY | Facility: CLINIC | Age: 79
End: 2024-07-08
Payer: MEDICARE

## 2024-07-08 VITALS — SYSTOLIC BLOOD PRESSURE: 170 MMHG | HEIGHT: 64 IN | BODY MASS INDEX: 31.76 KG/M2 | DIASTOLIC BLOOD PRESSURE: 92 MMHG

## 2024-07-08 DIAGNOSIS — M79.673 PAIN OF FOOT, UNSPECIFIED LATERALITY: Primary | ICD-10-CM

## 2024-07-08 PROCEDURE — 99024 POSTOP FOLLOW-UP VISIT: CPT | Performed by: STUDENT IN AN ORGANIZED HEALTH CARE EDUCATION/TRAINING PROGRAM

## 2024-07-08 PROCEDURE — 3080F DIAST BP >= 90 MM HG: CPT | Performed by: STUDENT IN AN ORGANIZED HEALTH CARE EDUCATION/TRAINING PROGRAM

## 2024-07-08 PROCEDURE — 1160F RVW MEDS BY RX/DR IN RCRD: CPT | Performed by: STUDENT IN AN ORGANIZED HEALTH CARE EDUCATION/TRAINING PROGRAM

## 2024-07-08 PROCEDURE — 3077F SYST BP >= 140 MM HG: CPT | Performed by: STUDENT IN AN ORGANIZED HEALTH CARE EDUCATION/TRAINING PROGRAM

## 2024-07-08 PROCEDURE — 1159F MED LIST DOCD IN RCRD: CPT | Performed by: STUDENT IN AN ORGANIZED HEALTH CARE EDUCATION/TRAINING PROGRAM
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
## 2024-07-10 ENCOUNTER — OFFICE VISIT (OUTPATIENT)
Dept: ONCOLOGY | Facility: CLINIC | Age: 79
End: 2024-07-10
Payer: MEDICARE

## 2024-07-10 ENCOUNTER — LAB (OUTPATIENT)
Dept: LAB | Facility: HOSPITAL | Age: 79
End: 2024-07-10
Payer: MEDICARE

## 2024-07-10 ENCOUNTER — INFUSION (OUTPATIENT)
Dept: ONCOLOGY | Facility: HOSPITAL | Age: 79
End: 2024-07-10
Payer: MEDICARE

## 2024-07-10 VITALS
SYSTOLIC BLOOD PRESSURE: 141 MMHG | OXYGEN SATURATION: 96 % | HEIGHT: 64 IN | BODY MASS INDEX: 32.56 KG/M2 | RESPIRATION RATE: 16 BRPM | HEART RATE: 91 BPM | TEMPERATURE: 98.3 F | DIASTOLIC BLOOD PRESSURE: 83 MMHG | WEIGHT: 190.7 LBS

## 2024-07-10 DIAGNOSIS — C50.812 MALIGNANT NEOPLASM OF OVERLAPPING SITES OF LEFT BREAST IN FEMALE, ESTROGEN RECEPTOR POSITIVE: ICD-10-CM

## 2024-07-10 DIAGNOSIS — Z17.0 MALIGNANT NEOPLASM OF OVERLAPPING SITES OF LEFT BREAST IN FEMALE, ESTROGEN RECEPTOR POSITIVE: Primary | ICD-10-CM

## 2024-07-10 DIAGNOSIS — M81.0 AGE-RELATED OSTEOPOROSIS WITHOUT CURRENT PATHOLOGICAL FRACTURE: ICD-10-CM

## 2024-07-10 DIAGNOSIS — C50.812 MALIGNANT NEOPLASM OF OVERLAPPING SITES OF LEFT BREAST IN FEMALE, ESTROGEN RECEPTOR POSITIVE: Primary | ICD-10-CM

## 2024-07-10 DIAGNOSIS — Z17.0 MALIGNANT NEOPLASM OF OVERLAPPING SITES OF LEFT BREAST IN FEMALE, ESTROGEN RECEPTOR POSITIVE: ICD-10-CM

## 2024-07-10 DIAGNOSIS — R30.0 DYSURIA: ICD-10-CM

## 2024-07-10 DIAGNOSIS — M81.0 AGE-RELATED OSTEOPOROSIS WITHOUT CURRENT PATHOLOGICAL FRACTURE: Primary | ICD-10-CM

## 2024-07-10 DIAGNOSIS — D50.8 OTHER IRON DEFICIENCY ANEMIA: ICD-10-CM

## 2024-07-10 DIAGNOSIS — G57.92 NEUROPATHY OF LEFT LOWER EXTREMITY: ICD-10-CM

## 2024-07-10 LAB
ALBUMIN SERPL-MCNC: 3.8 G/DL (ref 3.5–5.2)
ALBUMIN/GLOB SERPL: 1.3 G/DL
ALP SERPL-CCNC: 147 U/L (ref 39–117)
ALT SERPL W P-5'-P-CCNC: 13 U/L (ref 1–33)
ANION GAP SERPL CALCULATED.3IONS-SCNC: 12.4 MMOL/L (ref 5–15)
AST SERPL-CCNC: 21 U/L (ref 1–32)
BASOPHILS # BLD AUTO: 0.12 10*3/MM3 (ref 0–0.2)
BASOPHILS NFR BLD AUTO: 1.3 % (ref 0–1.5)
BILIRUB SERPL-MCNC: 0.5 MG/DL (ref 0–1.2)
BUN SERPL-MCNC: 29 MG/DL (ref 8–23)
BUN/CREAT SERPL: 24 (ref 7–25)
CALCIUM SPEC-SCNC: 9.2 MG/DL (ref 8.6–10.5)
CHLORIDE SERPL-SCNC: 102 MMOL/L (ref 98–107)
CO2 SERPL-SCNC: 23.6 MMOL/L (ref 22–29)
CREAT SERPL-MCNC: 1.21 MG/DL (ref 0.57–1)
DEPRECATED RDW RBC AUTO: 47.7 FL (ref 37–54)
EGFRCR SERPLBLD CKD-EPI 2021: 46.3 ML/MIN/1.73
EOSINOPHIL # BLD AUTO: 0.26 10*3/MM3 (ref 0–0.4)
EOSINOPHIL NFR BLD AUTO: 2.9 % (ref 0.3–6.2)
ERYTHROCYTE [DISTWIDTH] IN BLOOD BY AUTOMATED COUNT: 13.6 % (ref 12.3–15.4)
FERRITIN SERPL-MCNC: 213 NG/ML (ref 13–150)
GLOBULIN UR ELPH-MCNC: 3 GM/DL
GLUCOSE SERPL-MCNC: 126 MG/DL (ref 65–99)
HCT VFR BLD AUTO: 37.4 % (ref 34–46.6)
HGB BLD-MCNC: 12 G/DL (ref 12–15.9)
IMM GRANULOCYTES # BLD AUTO: 0.02 10*3/MM3 (ref 0–0.05)
IMM GRANULOCYTES NFR BLD AUTO: 0.2 % (ref 0–0.5)
IRON 24H UR-MRATE: 94 MCG/DL (ref 37–145)
IRON SATN MFR SERPL: 27 % (ref 20–50)
LYMPHOCYTES # BLD AUTO: 2.87 10*3/MM3 (ref 0.7–3.1)
LYMPHOCYTES NFR BLD AUTO: 32 % (ref 19.6–45.3)
MAGNESIUM SERPL-MCNC: 1.8 MG/DL (ref 1.6–2.4)
MCH RBC QN AUTO: 30.8 PG (ref 26.6–33)
MCHC RBC AUTO-ENTMCNC: 32.1 G/DL (ref 31.5–35.7)
MCV RBC AUTO: 96.1 FL (ref 79–97)
MONOCYTES # BLD AUTO: 0.73 10*3/MM3 (ref 0.1–0.9)
MONOCYTES NFR BLD AUTO: 8.1 % (ref 5–12)
NEUTROPHILS NFR BLD AUTO: 4.97 10*3/MM3 (ref 1.7–7)
NEUTROPHILS NFR BLD AUTO: 55.5 % (ref 42.7–76)
NRBC BLD AUTO-RTO: 0 /100 WBC (ref 0–0.2)
PHOSPHATE SERPL-MCNC: 4 MG/DL (ref 2.5–4.5)
PLATELET # BLD AUTO: 534 10*3/MM3 (ref 140–450)
PMV BLD AUTO: 10.2 FL (ref 6–12)
POTASSIUM SERPL-SCNC: 4.9 MMOL/L (ref 3.5–5.2)
PROT SERPL-MCNC: 6.8 G/DL (ref 6–8.5)
RBC # BLD AUTO: 3.89 10*6/MM3 (ref 3.77–5.28)
SODIUM SERPL-SCNC: 138 MMOL/L (ref 136–145)
TIBC SERPL-MCNC: 347 MCG/DL (ref 298–536)
TRANSFERRIN SERPL-MCNC: 233 MG/DL (ref 200–360)
WBC NRBC COR # BLD AUTO: 8.97 10*3/MM3 (ref 3.4–10.8)

## 2024-07-10 PROCEDURE — 84466 ASSAY OF TRANSFERRIN: CPT

## 2024-07-10 PROCEDURE — 25010000002 DENOSUMAB 60 MG/ML SOLUTION PREFILLED SYRINGE: Performed by: INTERNAL MEDICINE

## 2024-07-10 PROCEDURE — 85025 COMPLETE CBC W/AUTO DIFF WBC: CPT

## 2024-07-10 PROCEDURE — 84100 ASSAY OF PHOSPHORUS: CPT

## 2024-07-10 PROCEDURE — 81003 URINALYSIS AUTO W/O SCOPE: CPT | Performed by: INTERNAL MEDICINE

## 2024-07-10 PROCEDURE — 96372 THER/PROPH/DIAG INJ SC/IM: CPT

## 2024-07-10 PROCEDURE — 82728 ASSAY OF FERRITIN: CPT

## 2024-07-10 PROCEDURE — 36415 COLL VENOUS BLD VENIPUNCTURE: CPT

## 2024-07-10 PROCEDURE — 83540 ASSAY OF IRON: CPT

## 2024-07-10 PROCEDURE — 83735 ASSAY OF MAGNESIUM: CPT

## 2024-07-10 PROCEDURE — 80053 COMPREHEN METABOLIC PANEL: CPT

## 2024-07-10 RX ORDER — ANASTROZOLE 1 MG/1
1 TABLET ORAL DAILY
Qty: 30 TABLET | Refills: 5 | Status: SHIPPED | OUTPATIENT
Start: 2024-07-10

## 2024-07-10 RX ORDER — GABAPENTIN 100 MG/1
100 CAPSULE ORAL 3 TIMES DAILY
Qty: 90 CAPSULE | Refills: 0 | Status: SHIPPED | OUTPATIENT
Start: 2024-07-10

## 2024-07-10 RX ADMIN — DENOSUMAB 60 MG: 60 INJECTION SUBCUTANEOUS at 12:37

## 2024-07-10 NOTE — PROGRESS NOTES
General Surgery Breast Cancer History and Physical Exam      Summary:    Marylu Foreman is a 78 y.o. lady who presents with a history of right breast invasive lobular carcinoma: Grade I, Ki-67 2%; ER+/MA weakly positive, Her2-; [id number], Stage I.       A multidisciplinary plan has been formulated for the patient:    (1) Breast Surgical Oncology:  -[employer] extended panel genetic testing: negative.  --S/p right breast total mastectomy with sentinel lymph node biopsy May 2024.  -Lymphedema clinic referral.  -Seroma catheter placed today.  -Follow-up in 1 week.     (2) Medical Oncology:  -Following with Dr. White.     (3) History of left breast cancer in 2007:   -Invasive ductal carcinoma with DCIS, grade II, ER/MA +, her2-  -s/p L breast mastectomy [id number].   -On Arimidex, held due to PE, but completed 5 year course.       Referring Provider: West White Jr., MD     Chief Complaint: abnormal breast imaging     History of Present Illness: Ms. Marylu Foreman is a 78 y.o. year old lady, seen at the request of West White Jr., MD for a new diagnosis of right breast cancer.       This was initially detected as an imaging abnormality. She has had annual mammograms each year.  Her work-up is detailed in the oncologic history below.      She denies any breast lumps, pain, skin changes, or nipple discharge. She denies any family history of breast or ovarian cancer.      5/31/2024 she presents today for follow-up.  She is done well since surgery with no concerns or complaints.  Her pain is controlled and she is getting back to her daily activities.     6/12/2024 she presents today for follow-up.  She has developed a very large seroma over the right breast.  She is on antibiotics and is otherwise doing well.     Workup of Current Diagnosis:    4/10/2024 Right Breast Diagnostic Mammogram with Ultrasound:   There are scattered areas of fibroglandular density.   In the outer posterior right breast, there is an approximately 1.0  cm asymmetry with questioned/mild architectural distortion. This area was further assessed with ultrasound. There are benign-appearing calcifications.   ULTRASOUND:  Targeted sonographic evaluation of the right breast was performed from 7:00 to 11:00 in the region of the mammographic abnormality and for follow-up. At 9:00, 13 cm from the nipple, there is a 0.5 x 0.3 x 0.4 cm benign-appearing intramammary lymph node. This was previously labeled 9:00, 8 cm from the nipple. At 8:00, 14 cm from the nipple, there is a 0.4 x 0.3 x 0.6 cm benign-appearing intramammary lymph node. This corresponds to the probably benign mass labeled 8:00, 9 cm from the nipple on prior ultrasound. This is similar in size, previously 0.6 x  0.2 x 0.7 cm. At 9:00, 7 cm from the nipple, there is a 0.6 x 0.3 x 0.6 cm subtle heterogeneous mass with indistinct margins, which is in the region of mammographic asymmetry and is suspicious. This is best appreciate with harmonics.   IMPRESSION:  Suspicious 0.6 cm mass at 9:00 in the right breast. Recommend further evaluation with ultrasound guided core needle biopsy and clip correlation with postbiopsy mammogram. If the area is unable to be reproduced due to target on the day of biopsy or if the clip does not correspond to the mammographic asymmetry on post breast mammogram, further evaluation with stereotactic/tomosynthesis guided core needle biopsy would then be recommended.   BI-RADS Category 4: Suspicious     Right Breast US Guided Biopsy:   PROCEDURE NOTE: Informed consent was obtained. Preliminary sonography of the right breast was performed. The lesion at the 9 o'clock position on the order of 7 cm from the nipple was visualized. The overlying skin was prepped in the usual sterile fashion. Local anesthesia was achieved with 1% lidocaine. A nick was made in the skin with a scalpel. Through the nick was inserted a 10-gauge Mammotome vacuum assisted device under sonographic guidance. Multiple tissue  specimens were obtained. A bowtie shaped metallic clip was placed to ghulam the site. Pressure was applied  until bleeding subsided and the overlying skin was cleaned and bandaged. The patient tolerated the procedure without evidence for complication.   Postbiopsy mammography of the right breast consisting of digital CC and 90 degree lateral images were obtained to demonstrate postbiopsy change with a bowtie shaped metallic clip at the site of the pre-existing mammographic lesion seen in the lower outer quadrant of the right breast. Therefore, sonographic and mammographic concordance is  established. The pathology result has returned as invasive lobular carcinoma with atypical ductal hyperplasia and ALH. This is concordant with imaging findings  IMPRESSION:  Technically successful ultrasound guided Mammotome vacuum assisted right breast biopsy with placement of a bowtie shaped metallic clip. The pathology result has returned as invasive lobular carcinoma with ADH and ALH. Surgical excision is recommended.    BI-RADS Category 6: Known malignancy.     4/18/2024 Pathology:   Final Diagnosis   1. Right Breast, 9:00, 7 Cm from Nipple, Ultrasound-Guided Core Needle Biopsy for a Mass:               A. INVASIVE LOBULAR CARCINOMA, Well-Differentiated; [place] Histologic Grade I/III      (tubule score = 3, nuclear score = 1, mitoses score = 1), measuring at least 6 mm.               B. Atypical ductal hyperplasia and atypical lobular hyperplasia.               C. Negative for lymphovascular space invasion.               D. See biomarker template.      4/26/2024 Bilateral Breast MRI   IMPRESSION:  1. Biopsy-proven malignancy in the right breast in the posterior one third at the 9 o'clock position represented by the bowtie shaped metallic clip within a 1.7 cm postbiopsy cavity. Enhancement of a probable reactive postbiopsy character is seen anterior and posterior to the biopsy cavity. A 1.1 cm equivocal right axillary lymph  node is noted.  2. Status post prior left mastectomy without reconstruction. No suspicious findings are seen within the left post mastectomy bed.   BI-RADS category 6: Known malignancy     5/23/2024 Right breast total mastectomy with sentinel lymph node biopsy   Final Diagnosis   1. Right axillary sentinel lymph node #1, hot, 3, biopsy (FS):               A. 1 lymph node, negative for carcinoma (0/1).               B. AE1/AE3 immunostain performed on block 1A is negative (control reacted appropriately).     2. Right axillary sentinel lymph node #2, hot, blue, 160, biopsy (FS):               A. 1 lymph node, negative for carcinoma (0/1).               B. AE1/AE3 immunostain performed on block 2A is negative (control reacted appropriately).     3. Right axillary sentinel lymph node #3, blue, biopsy (FS):               A. 1 lymph node, negative for carcinoma (0/1).               B. AE1/AE3 immunostain performed on block 3A is negative (control reacted appropriately).     4. Right axillary sentinel lymph node #4, palpable, biopsy (FS):               A. 1 lymph node, negative for carcinoma (0/1).               B. AE1/AE3 immunostain performed on block 4A is negative (control reacted appropriately).     5. Right breast, simple mastectomy (816 g):               A. Negative for residual invasive carcinoma.               B. Atypical lobular hyperplasia and atypical ductal hyperplasia.               C. Clip and biopsy site changes are present.               D. 2 lymph nodes, negative for carcinoma (0/2).               E. Unremarkable skin and nipple.               F. See synoptic report and comment.      Past Medical History:   History of PE, on Eliquis   HTN  CAD     Past Surgical History:    L total knee arthroplasty   Left toe fusion   Left mastectomy   Lumbar discectomy   Gastric bypass   Sigmoid colectomy   Cholecystectomy   Carpal tunnel      Family History:    As above      Social History:  Denies tobacco  use  Occasional alcohol use     Allergies:   Allergies             Allergies   Allergen Reactions    Bactrim [Sulfamethoxazole-Trimethoprim] Diarrhea    Amlodipine Besylate-Valsartan Nausea And Vomiting    Cefdinir Diarrhea    Corticosteroids Unknown - Low Severity       Severe depression caused from steroid injections in hands    Lisinopril Nausea And Vomiting    Nsaids Unknown - Low Severity    Other Unknown - Low Severity       Steroids sever depression         Medications:      Current Medications      Current Outpatient Medications:     ascorbic acid (VITAMIN C) 1000 MG tablet, Take 1 tablet by mouth Daily., Disp: , Rfl:     Cholecalciferol (VITAMIN D PO), Take 1 tablet by mouth Daily., Disp: , Rfl:     Coenzyme Q10 200 MG capsule, Take 200 mg by mouth Daily., Disp: , Rfl:     Cyanocobalamin (VITAMIN B 12 PO), Take 1 tablet/day by mouth Daily., Disp: , Rfl:     dilTIAZem CD (CARDIZEM CD) 180 MG 24 hr capsule, Take 1 capsule by mouth 2 (Two) Times a Day., Disp: 180 capsule, Rfl: 3    DULoxetine (CYMBALTA) 60 MG capsule, Take 1 capsule by mouth 2 (Two) Times a Day., Disp: , Rfl:     Eliquis 5 MG tablet tablet, TAKE 1 TABLET BY MOUTH EVERY 12 HOURS FOR ATRIAL FIBRILLATION, Disp: 180 tablet, Rfl: 0    folic acid (FOLVITE) 400 MCG tablet, Take 1 tablet by mouth Daily., Disp: , Rfl:     Ibuprofen 3 %, Gabapentin 10 %, Baclofen 2 %, lidocaine 4 %, Ketamine HCl 4 %, Apply 1-2 g topically to the appropriate area as directed 3 (Three) to 4 (Four) times daily., Disp: 90 g, Rfl: 1    Ibuprofen 3 %, Gabapentin 10 %, Baclofen 2 %, lidocaine 4 %, Ketamine HCl 4 %, Apply 1-2 g topically to the appropriate area as directed 3 (Three) to 4 (Four) times daily., Disp: 90 g, Rfl: 1    Ibuprofen 3 %, Gabapentin 10 %, Baclofen 2 %, lidocaine 4 %, Ketamine HCl 4 %, Apply 1-2 g topically to the appropriate area as directed 3 (Three) to 4 (Four) times daily., Disp: 90 g, Rfl: 1    irbesartan (AVAPRO) 300 MG tablet, Take 1 tablet by mouth  Every Night. PT TO HOLD 24 HOURS PRIOR TO SURGERY, Disp: 90 tablet, Rfl: 3    levothyroxine (SYNTHROID, LEVOTHROID) 50 MCG tablet, Take 1 tablet by mouth Daily., Disp: , Rfl:     lidocaine (Lidoderm) 5 %, Place 1 patch on the skin as directed by provider Daily. Remove & Discard patch within 12 hours or as directed by MD, Disp: 30 patch, Rfl: 0    MegaRed Omega-3 Krill Oil 350 MG capsule, Take 1 capsule by mouth Daily. HELD FOR SURGERY, Disp: , Rfl:     Multiple Vitamin (MULTI-VITAMIN PO), Take 1 tablet by mouth Daily., Disp: , Rfl:     Probiotic Product (PROBIOTIC PO), Take 1 tablet by mouth Daily. Lactobacillus rhamnosus GG, Disp: , Rfl:     vitamin E 400 UNIT capsule, Take 1 capsule by mouth Daily. HELD FOR OR, Disp: , Rfl:          Laboratory Values:    Labs from 2024 reviewed     Review of Systems:   Influenza-like illness: no fever, no  cough, no  sore throat, no  body aches, no loss of sense of taste or smell, no known exposure to person with Covid-19.  Constitutional: Negative for fevers or chills  HENT: Negative for hearing loss or runny nose  Eyes: Negative for vision changes or scleral icterus  Respiratory: Negative for cough or shortness of breath  Cardiovascular: Negative for chest pain or heart palpitations  Gastrointestinal: Negative for abdominal pain, nausea, vomiting, constipation, melena, or hematochezia  Genitourinary: Negative for hematuria or dysuria  Musculoskeletal: Negative for joint swelling or gait instability  Neurologic: Negative for tremors or seizures  Psychiatric: Negative for suicidal ideations or depression  All other systems reviewed and negative     Physical Exam:   ECO - Asymptomatic  Constitutional: Well-developed well-nourished, no acute distress  Eyes: Conjunctiva normal, sclera nonicteric  ENMT: Hearing grossly normal, oral mucosa moist  Neck: Supple, no palpable mass, trachea midline  Respiratory: Clear to auscultation, normal inspiratory effort  Cardiovascular:  Regular rate, no peripheral edema, no jugular venous distention  Breast: symmetric  Right: Mastectomy incision clean and dry with no erythema or drainage, no hematoma. Large seroma.  Left: Left breast mastectomy with flap closure, well-healed, no masses or skin changes  No clinical chest wall involvement.  Gastrointestinal: Soft, nontender  Lymphatics (palpable nodes): No cervical, supraclavicular or axillary lymphadenopathy  Skin:  Warm, dry, no rash on visualized skin surfaces  Musculoskeletal: Symmetric strength, normal gait  Psychiatric: Alert and oriented ×3, normal affect      Procedure note:  Area prepped and draped in sterile fashion.  1% lidocaine injected into the skin and subcutaneous tissues.  An 18-gauge needle was inserted into the very large seroma.  Over 200cc of straw-colored fluid was aspirated.  Seroma catheter was inserted into the cavity and sutured in place.  Was placed to bulb suction.     DUNG ZEPEDA M.D.  General and Endoscopic Surgery  [facility]     [address], [address]  [place], [place], [address]  P: [phone]  F: [phone]

## 2024-07-10 NOTE — LETTER
July 10, 2024     Arleen Dillon MD  [address]  [address]  [facility] [address]    Patient: Marylu Foreman   YOB: 1945   Date of Visit: 7/10/2024     Dear Arleen Dillon MD:       Thank you for referring Marylu Foreman to me for evaluation. Below are the relevant portions of my assessment and plan of care.    If you have questions, please do not hesitate to call me. I look forward to following Marylu along with you.         Sincerely,        West White MD        CC: MD Christopher Balderas John L Jr., MD  07/10/24 8940  Sign when Signing Visit  Chief Complaint  Stage I ([id number]) left breast cancer in 2007, pulmonary emboli in 2009, atrial fibrillation, history of GI bleed, history of iron deficiency status post prior gastric bypass in 2001.  Stage I ([id number]) right breast cancer status post right mastectomy 5/28/2024 (ER positive, OH negative, HER2/jorge negative)    Subjective       History of Present Illness    Patient returns today in follow-up with plans to begin adjuvant Arimidex 1 mg daily and initiate treatment with Prolia every 6 months for her osteoporosis.  Unfortunately she has had ongoing difficulty with her drain in the right chest wall.  The drain did fall out however she was experiencing recurrent accumulation of her seroma that required intermittent percutaneous drainage.  She eventually underwent placement of another drain by ultrasound on 6/27/2024.  The drain has continued with significant output.  Possibility the drain may be removed early next week.  In regards to her left foot pain/sensitivity, there was concern for gout initially and we did prescribe colchicine however this did not provide any relief.  She was seen by Dr. Reed, no evidence of fracture present.  Patient was referred to physical therapy and is due to begin this later this week.  She has been using a soft boot on her foot which does help.  She notes however currently that her pain is fairly severe in  "the left foot and has a burning/neuropathic quality at this point.  She is inquiring about potential treatment for this.  She also notes difficulty with dysuria that developed over the past few days and is concerned about UTI.      Objective  Vital Signs:   /83   Pulse 91   Temp 98.3 °F (36.8 °C) (Oral)   Resp 16   Ht 162.6 cm (64\")   Wt 86.5 kg (190 lb 11.2 oz)   SpO2 96%   BMI 32.73 kg/m²     Physical Exam  Constitutional:       Appearance: She is well-developed.   Eyes:      Conjunctiva/sclera: Conjunctivae normal.   Neck:      Thyroid: No thyromegaly.   Cardiovascular:      Rate and Rhythm: Normal rate. Rhythm irregular.      Heart sounds: Murmur heard.      No friction rub. No gallop.   Pulmonary:      Effort: No respiratory distress.      Breath sounds: Normal breath sounds. No wheezing.      Comments: Right mastectomy incision was not evaluated today, ILYA drain remains in place with serous fluid   Abdominal:      General: Bowel sounds are normal. There is no distension.      Palpations: Abdomen is soft.      Tenderness: There is no abdominal tenderness.   Musculoskeletal:      Comments: Patient with soft boot in place left foot, minimal residual swelling   Lymphadenopathy:      Head:      Right side of head: No submandibular adenopathy.      Cervical: No cervical adenopathy.      Upper Body:      Right upper body: No supraclavicular adenopathy.      Left upper body: No supraclavicular adenopathy.   Skin:     General: Skin is warm and dry.      Findings: No rash.   Neurological:      Mental Status: She is alert and oriented to person, place, and time.      Cranial Nerves: No cranial nerve deficit.      Motor: No abnormal muscle tone.      Deep Tendon Reflexes: Reflexes normal.      Comments: Patient with continued left foot drop   Psychiatric:         Behavior: Behavior normal.            Result Review: Reviewed CBC, CMP, magnesium, phosphorus, iron panel, ferritin from today.  Reviewed 25-hydroxy " vitamin D level from 6/11/2024.       Assessment and Plan    *Stage I ([id number]) right breast cancer (ER positive, GA negative, HER2/jorge negative)  Mammogram and right breast ultrasound on 4/10/2024 with a suspicious 0.6 cm abnormality in the right breast at the 9 o'clock position.    Biopsy on 4/18/2024 of the 9:00 lesion in the right breast.  Pathology showed a well-differentiated (grade 1) invasive lobular carcinoma, no lymphovascular invasion, evidence of ALH and ADH.  ER positive (%), GA negative (2%), HER2/jorge negative (1+ IHC), Ki-67 2%.    Breast MRI on 4/26/2024 confirmed the abnormality in the 9 o'clock position with postbiopsy cavity/abnormality measuring 1.7 cm.  There was a 1.1 cm equivocal right axillary lymph node.    Right mastectomy with sentinel node on 5/28/2024 performed by Dr. Diaz.  Pathology showed no residual cancer, did show ALH/ADH, 2 negative lymph nodes in the specimen and 4 additional negative sentinel lymph nodes.  Inv[employer]ae 70 gene germline panel test 5/14/2024 with VUS in MLH1 and POLE  DEXA scan 5/22/2024 with evidence of osteoporosis (see below)  Patient not a candidate for adjuvant tamoxifen due to thrombophilia (see below).  Decision to pursue adjuvant aromatase chemotherapy again with Arimidex (previously well-tolerated) along with treatment for osteoporosis with Prolia.  Patient initiated adjuvant Arimidex 1 mg daily on 7/10/2024 with intent to treat x 5 years  Patient initiated concurrent Prolia on 7/10/2024 for treatment of osteoporosis while receiving aromatase therapy  Patient returns today in follow-up ready to begin adjuvant endocrine therapy with Arimidex and also to begin treatment with Prolia for her osteoporosis concurrently.  Unfortunately she has continued with difficulty regarding her seroma.  Her drain did fall out and she required a number of percutaneous drainage procedures, ultimately decided to replace the drain via ultrasound guidance on 6/27/2024.   Cytology was performed on the fluid which was negative for malignancy.  She does continue with ongoing output from the drain, notes there will be consideration of drain removal early next week.  We did discuss initiating adjuvant Arimidex today and patient is in agreement to proceed.  Prescription will be sent to her pharmacy.  We do plan to treat for 5 years adjuvantly.  I will see the patient back in 6 to 8 weeks to assess tolerance of Arimidex.  She did well with the medication previously.     *Stage I ([id number]) left breast cancer:  Biopsy 5/8/2007 with in situ and invasive ductal carcinoma, grade 2,/AL positive, HER-2/jorge negative  Left mastectomy 6/18/0742 foci carcinoma, 4 mm, grade 1 in situ and invasive ductal carcinoma and 2 mm grade 2 in situ and invasive ductal carcinoma, negative margins, negative sentinel lymph nodes x3 with 5 additional negative lymph nodes.  Arimidex initiated 7/2/2007  Arimidex interrupted patient developed bilateral pulmonary emboli in May 2009, resume shortly thereafter.  Completed 5 years treatment in 2012.    *Pulmonary emboli 5/2/2009 and bilateral calf DVT 8/11/2022:  Family history of DVT in the patient's mother occurring at age 70 postoperatively  Patient developed bilateral lower lobe pulmonary emboli 5/2/2009 following right VATS on 4/30/2009  Felt to be a provoked thrombosis related to surgery  Hypercoagulable evaluation with negative factor V Leiden, negative prothrombin gene mutation, normal homocystine, protein C antigen 92, free to protein S 75, anticardiolipin antibody panel negative, lupus anticoagulant negative, Antithrombin %  Continuing on chronic anticoagulation primarily for atrial fibrillation at this point as prior thrombosis was provoked.  Transitioned from Coumadin to Eliquis 5 mg twice daily in May 2020  At time of hospitalization for IJEOMA/CKD 11/15-11/18/2021, Eliquis dose was decreased to 2.5 mg twice daily.  Patient required lumbar laminectomy  8/9/2022, Eliquis was held perioperatively.  Subsequently admitted 8/10 - 8/12/2022 with Doppler 8/11/2022 that showed bilateral acute calf DVT.  This occurred postoperatively and while off anticoagulation.  Recommended to resume Eliquis at 5 mg twice daily dose.  Patient will require long-term anticoagulation with Eliquis 5 mg twice daily for both thrombophilia and atrial fibrillation.  Patient returns today in follow-up continuing on Eliquis 5 mg twice daily.  She has not experienced any bleeding complications    *Iron deficiency anemia:  Prior gastric bypass in 2001  Intolerant of oral iron.  Patient has required IV iron on multiple occasions: Feraheme 8/13 and 8/20/2018; Injectafer 3/9 and 3/16/2020  Labs on 11/15/2021 showed decline in hemoglobin to 10.8 however this was in the setting of UTI and IJEOMA/CKD3.  Her iron panel was normal with iron 114, iron saturation 34%, TIBC 337 with ferritin elevated at 292.  B12 was normal at 883.  We continue to follow these values given her history of prior gastric bypass and iron deficiency requiring intermittent IV iron (intolerant of oral iron).   On 12/20/2021, hemoglobin further declined to 9.5.  Repeat labs with iron 47, ferritin 262, iron saturation 14%, TIBC 328, B12 level 1162.  Additional labs performed with negative serum protein electrophoresis and immunoelectrophoresis with normal quantitative immunoglobulins.  Free light chains elevated with kappa 73, lambda 36 with ratio 2.04, consistent with patient's degree of renal dysfunction.  Haptoglobin normal 186, LDH normal 171, CRP borderline elevated 0.62, erythropoietin level 20.1.  Anemia felt to be related to CKD3.  Patient felt to be a potential candidate for Procrit if hemoglobin remains below 10.  Patient experienced improvement in hemoglobin into the 11-12 range  Labs on 6/19/2023 with hemoglobin 11.7, iron 50, ferritin 41.6, iron saturation 12%, TIBC 404 indicating recurrent iron deficiency likely related to  malabsorption from prior gastric bypass  Patient received IV iron with Venofer 300 mg on 6/26, 6/28, 7/3, 7/7/2023  Labs on 1/8/2024 with hemoglobin normal at 13.6, patient is iron replete with iron 95, ferritin 223, iron saturation 23%, TIBC 405.  Patient was iron replete, no need for further IV iron.    Patient was seen by Dr. Bess in GI on 1/31/2024, did not recommend any immediate endoscopic evaluation, agreement that iron deficiency likely related to malabsorption from prior gastric bypass.  Plans for upper and lower endoscopy in November 2028  Labs today with hemoglobin 12 and iron replete status with iron 94, ferritin 213, iron saturation 27%, TIBC 347.    *GI bleed in July 2018:  Acute lower GI bleed with supratherapeutic INR Coumadin.  Angiogram performed with coil embolization of artery to sigmoid colon  No clinical evidence of further GI blood loss    *Atrial fibrillation:  Requires ongoing anticoagulation for this indication  As above, transitioned from Coumadin to Eliquis 5 mg twice daily in May 2020  As above, during hospitalization 11/15-11/18/2021 for IJEOMA/CKD3, Eliquis dose was decreased to 2.5 mg twice daily  See above discussion, patient developed bilateral calf DVT while briefly off anticoagulation following lumbar laminectomy on 8/11/2022.  Patient is continuing on full dose Eliquis 5 mg twice daily.      *Status post right VATS 4/30/2020 for pulmonary nodule with finding of necrotizing granulomatous inflammation    *Severe depression/anxiety:  Patient has had chronic issues with this, is followed by psychiatry, Dr. Librado Monique.  She also underwent previous counseling which she found helpful.  The patient had ongoing difficulty with control of her depression.  She has been on multiple antidepressants  In June/July 2021 patient underwent transcranial magnetic stimulation (TMS) with significant improvement in depressive symptoms and resolution of headaches.  Symptoms subsequently gradually  recurred, patient reports there has been difficulty with insurance regarding maintenance treatments.  Patient reports symptoms have been stable recently    *IJEOMA/CKD3  Patient with CKD3 with baseline creatinine in the 1.3-1.8 range  At time of routine follow-up visit 11/15/2021, creatinine was 3.19 and BUN of 59.  This was compared to prior value 6/2/2021 with BUN 41, creatinine 1.53.  Patient was hospitalized 11/15-11/18/2021  Patient is continuing follow-up with Dr. Cisneros in nephrology  Creatinine on 11/24/2021 remained elevated at 2.43 however on 12/20/2021 declined to 1.7, near her prior baseline.  Additional labs on 12/20/2021 with negative serum protein electrophoresis and immunoelectrophoresis with normal quantitative immunoglobulins.  Free light chains elevated with kappa 73, lambda 36 with ratio 2.04, consistent with patient's degree of renal dysfunction.  Creatinine today stable at 1.21    *Hypothyroidism   Patient continues on levothyroxine 50 mcg daily    *Severe left-sided sciatica with left foot drop  Plain film of the lumbar spine on 5/10/2022 was negative.    MRI lumbar spine at [facility] on 5/14/2022 showed no evidence of metastatic disease however did show evidence of degenerative change with disc disease and neuroforaminal compromise.     She developed a left foot drop and was referred to neurosurgery, eventually underwent lumbar laminectomy on 8/9/2022  Patient has continued with chronic difficulty regarding left foot drop.    The patient is experiencing significant neuropathic type pain in the distal left lower extremity.  We will refer her to neurology for evaluation and prescribe gabapentin 100 mg 3 times daily.    *Left foot deformities  Patient with multiple issues regarding her left foot including hammertoes, hallux valgus  Patient did require left foot surgery on 7/11/2023  Patient was experiencing significant swelling in her foot.  She was seen by orthopedics Dr. Reed on  7/26/2024.  There was no evidence of fracture.  Patient has been referred to physical therapy, will be initiating this later this week.  There was some initial concern regarding possible gout, no response to colchicine, appears that swelling and pain were related to acute on chronic issues    *Osteoporosis  DEXA scan on 5/22/2024 with T-score L-spine 0.0, left hip -3.0, right hip -2.7  25-hydroxy vitamin D level was normal at 45.6 on 6/11/2024  Patient continuing on calcium and vitamin D supplementation.  Patient initiated adjuvant aromatase inhibitor therapy with Arimidex 1 mg daily x 5 years on 7/10/2024  Patient initiated treatment for osteoporosis concurrently with every 6-month Prolia on 7/10/2024      Plan:  Begin adjuvant Arimidex 1 mg daily with plan to treat x 5 years adjuvantly  Begin Prolia today with plans to treat every 6 months for osteoporosis  Continue calcium and vitamin D supplementation  Referral to neurology regarding neuropathic pain left lower extremity and left foot drop  Prescription sent for gabapentin 100 mg 3 times daily  Check urinalysis and urine culture today due to dysuria  Patient is beginning physical therapy for her left foot later this week  Patient will follow-up with Dr. Diaz next week regarding right mastectomy site drain  MD visit in 6 to 8 weeks with CBC, CMP    I did spend 40 minutes total time caring for the patient today, time spent in review of records, face-to-face consultation, coordination of care, placement of orders, completion of documentation.

## 2024-07-11 ENCOUNTER — TELEPHONE (OUTPATIENT)
Dept: ONCOLOGY | Facility: CLINIC | Age: 79
End: 2024-07-11
Payer: MEDICARE

## 2024-07-11 LAB
BILIRUB UR QL STRIP: NEGATIVE
CLARITY UR: CLEAR
COLOR UR: YELLOW
GLUCOSE UR STRIP-MCNC: NEGATIVE MG/DL
HGB UR QL STRIP.AUTO: NEGATIVE
KETONES UR QL STRIP: NEGATIVE
LEUKOCYTE ESTERASE UR QL STRIP.AUTO: NEGATIVE
NITRITE UR QL STRIP: NEGATIVE
PH UR STRIP.AUTO: 5.5 [PH] (ref 4.5–8)
PROT UR QL STRIP: NEGATIVE
SP GR UR STRIP: 1.02 (ref 1–1.03)
UROBILINOGEN UR QL STRIP: NORMAL

## 2024-07-11 NOTE — TELEPHONE ENCOUNTER
----- Message from West White sent at 7/11/2024  8:08 AM EDT -----  Please notify patient that urinalysis was negative from yesterday.  Thanks!  ----- Message -----  From: Lab, Background User  Sent: 7/11/2024   8:07 AM EDT  To: West White Jr., MD

## 2024-07-12 ENCOUNTER — TREATMENT (OUTPATIENT)
Dept: PHYSICAL THERAPY | Facility: CLINIC | Age: 79
End: 2024-07-12
Payer: MEDICARE

## 2024-07-12 DIAGNOSIS — M79.672 LEFT FOOT PAIN: Primary | ICD-10-CM

## 2024-07-12 DIAGNOSIS — M25.672 DECREASED RANGE OF MOTION OF LEFT ANKLE: ICD-10-CM

## 2024-07-12 DIAGNOSIS — R29.898 WEAKNESS OF BOTH LOWER EXTREMITIES: ICD-10-CM

## 2024-07-12 NOTE — PROGRESS NOTES
Physical Therapy Initial Evaluation and Plan of Care  [facility] [address], [address]  [place] [address]     Patient: Marylu Foreman   : 1945  Referring practitioner: Brett Reed MD  Date of Initial Visit: 2024  Today's Date: 2024  Patient seen for 1 sessions  PT Clinic location: [address], [address]  [address]          Visit Diagnoses:    ICD-10-CM ICD-9-CM   1. Left foot pain  M79.672 729.5   2. Weakness of both lower extremities  R29.898 729.89   3. Decreased range of motion of left ankle  M25.672 719.57       Subjective Questionnaire: LEFS: 58/80    Subjective Evaluation    History of Present Illness  Mechanism of injury: Pt presents with several year history of left sided foot issues.   She notes that she had  surgery of the lumbar spine for nerve issues that caused drop foot, which has not improved since that time. She had PT not through [facility] after lumbar surgery, without improvements in foot symptoms.  She had  surgery for hammer toe and bunion corrections.    She notes that she has constant minor burning in the left foot to ankle region, worsening over the last few months. She has seen Dr Reed recently, who referred to PT. She has prescription for compounding cream that she reports doesn't help at all.    She reports that she is responsible for housework and grocery shopping and some social activity, but minimal activity/exercise otherwise.    She currently ambulates with surgical shoe, AFO on left foot and with FWW, but self-reports that she doesn't use any AD or shoe at home and instead furniture walks. She notes no falls in the last year.    Her goal for therapy is to decrease the burning/nerve pains in the left foot. She notes no follow-ups with spinal surgeon since after PT.    Pain  At best pain rating: 3  At worst pain ratin      Medical history: breast  cancer diagnosed 2007 L, B mastectomy most recently 5/2024, HTN, anxiety, depression, GURDEEP, hypothyroidism, CKD, DVT, A-fib, osteoporosis. See chart for further detail.   Therapy Precautions: N/A        Objective          Palpation   Left   Tenderness of the anterior tibialis, lateral gastrocnemius, medial gastrocnemius, peroneus, posterior tibialis and soleus.   Trigger point to lateral gastrocnemius, medial gastrocnemius, posterior tibialis and soleus.     Passive Range of Motion   Left Hip   Normal passive range of motion    Right Hip   Normal passive range of motion  Left Ankle/Foot    Dorsiflexion (ke): 5 degrees   Plantar flexion: 50 degrees   Inversion: 40 degrees   Eversion: 5 degrees     Joint Play   Left Ankle/Foot  Hypomobile in the talocrural joint, subtalar joint and midfoot.     Strength/Myotome Testing     Left Hip   Planes of Motion   Flexion: 4-  Extension: 3-  Abduction: 3+  External rotation: 3+  Internal rotation: 4    Right Hip   Planes of Motion   Flexion: 4-  Extension: 3-  Abduction: 3+  External rotation: 4  Internal rotation: 4+    Left Knee   Flexion: 4  Extension: 4+    Right Knee   Flexion: 4  Extension: 4+    Left Ankle/Foot   Dorsiflexion: 0  Inversion: 4-  Eversion: 3-  Great toe flexion: 3  Great toe extension: 0    Right Ankle/Foot   Dorsiflexion: 4+  Inversion: 4+  Eversion: 4-  Great toe extension: 4+    Tests       Thoracic   Negative slump.     Lumbar     Left   Negative passive SLR and quadrant.     Right   Negative passive SLR and quadrant.     Additional Tests Details  Pt reports improvements in symptoms in L foot with passive prone positioning with elbow prop after < 30 seconds    Ambulation   Weight-Bearing Status   Assistive device used: front-wheeled walker    Additional Weight-Bearing Status Details  L foot AFO with surgical shoe    Ambulation: Level Surfaces   Ambulation with assistive device: independent  Ambulation without assistive device: moderate  assist    Observational Gait   Gait: antalgic   Decreased walking speed and stride length.   Base of support: increased            Assessment & Plan       Assessment  Impairments: abnormal gait, abnormal or restricted ROM, activity intolerance, impaired balance, impaired physical strength, lacks appropriate home exercise program and pain with function   Functional limitations: walking, uncomfortable because of pain and standing   Assessment details: The patient is a 79 y.o. female who presents to physical therapy today for L foot pain. Upon initial evaluation, the patient demonstrates the following impairments: decreased L ankle ROM and foot/ankle joint mobility, decreased B LE strength, and pain on palpation to L calf and foot structures. Due to these impairments, the patient is unable to perform or has difficulty with the following functional tasks: standing, walking, stairs. The patient would benefit from skilled PT services to address functional limitations and impairments and to improve patient quality of life.    Prognosis: good    Goals  Plan Goals: ST. Pt will be independent and compliant with initial HEP in 2 weeks.  2. Pt will report a 40% improvement in symptoms since starting therapy in 4 weeks.  3. Pt will demonstrate an increase in B hip extension strength 4- within 4 weeks.   4. Pt will report pain level at worst <5 during walking in home in 4 weeks.    LT. Pt will be independent with final HEP for self-management of condition by DC.  2. Pt will improve score on LEFS to greater than 67/80 by DC.   3. Pt will report a 70% improvement in symptoms by DC in order to allow return to PLOF.  4. Pt will improve B hip strength to 4/5 to decrease functional mobility difficulty by DC.       Plan  Therapy options: will be seen for skilled therapy services  Planned modality interventions: cryotherapy, electrical stimulation/Russian stimulation, thermotherapy (hydrocollator packs) and ultrasound  Planned  therapy interventions: abdominal trunk stabilization, ADL retraining, balance/weight-bearing training, body mechanics training, flexibility, functional ROM exercises, gait training, home exercise program, joint mobilization, manual therapy, motor coordination training, neuromuscular re-education, orthotic fitting/training, postural training, soft tissue mobilization, spinal/joint mobilization, strengthening, stretching and therapeutic activities  Frequency: 2x week  Duration in weeks: 12  Treatment plan discussed with: patient        See flowsheets for treatment detail.  Education: HEP, POC, pathoanatomy, rationale    History # of Personal Factors and/or Comorbidities: HIGH (3+)  Examination of Body System(s): # of elements: MODERATE (3)  Clinical Presentation: EVOLVING  Clinical Decision Making: MODERATE      Timed:         Manual Therapy:         mins  92812;     Therapeutic Exercise:         mins  74486;     Neuromuscular [place]:    10    mins  55823;    Therapeutic Activity:     10     mins  71831;     Gait Training:           mins  40013;     Ultrasound:          mins  07287;    Ionto                                   mins   02259  Self Care                       20     mins   76720      Un-Timed:  Electrical Stimulation:         mins  64442 ( );  Dry Needling          mins self-pay  Traction          mins 75336  Low Eval          Mins  45507  Mod Eval      20    Mins  52747  High Eval                            Mins  85367  Re-Eval                               mins  08763      Timed Treatment:   40   mins   Total Treatment:     60   mins    PT SIGNATURE: [facility] PT   [place] PT license #: [id number]  DATE TREATMENT INITIATED: 7/12/2024    Initial Certification  Certification Period: 10/9/2024  I certify that the therapy services are furnished while this patient is under my care.  The services outlined above are required by this patient, and will be reviewed every 90 days.    PHYSICIAN: Derek  Brett Dunham MD  NPI: [id number]                                      DATE:     Please sign and return via fax to [facility] - Fax #: [phone]. Thank you, [facility].

## 2024-07-15 ENCOUNTER — TREATMENT (OUTPATIENT)
Dept: PHYSICAL THERAPY | Facility: CLINIC | Age: 79
End: 2024-07-15
Payer: MEDICARE

## 2024-07-15 ENCOUNTER — OFFICE VISIT (OUTPATIENT)
Dept: SURGERY | Facility: CLINIC | Age: 79
End: 2024-07-15
Payer: MEDICARE

## 2024-07-15 VITALS
SYSTOLIC BLOOD PRESSURE: 132 MMHG | DIASTOLIC BLOOD PRESSURE: 74 MMHG | HEIGHT: 64 IN | WEIGHT: 190 LBS | BODY MASS INDEX: 32.44 KG/M2

## 2024-07-15 DIAGNOSIS — M25.672 DECREASED RANGE OF MOTION OF LEFT ANKLE: ICD-10-CM

## 2024-07-15 DIAGNOSIS — N64.89 SEROMA OF BREAST: ICD-10-CM

## 2024-07-15 DIAGNOSIS — Z17.0 MALIGNANT NEOPLASM OF RIGHT BREAST IN FEMALE, ESTROGEN RECEPTOR POSITIVE, UNSPECIFIED SITE OF BREAST: ICD-10-CM

## 2024-07-15 DIAGNOSIS — M79.672 LEFT FOOT PAIN: Primary | ICD-10-CM

## 2024-07-15 DIAGNOSIS — C50.911 MALIGNANT NEOPLASM OF RIGHT BREAST IN FEMALE, ESTROGEN RECEPTOR POSITIVE, UNSPECIFIED SITE OF BREAST: ICD-10-CM

## 2024-07-15 DIAGNOSIS — Z09 ENCOUNTER FOR FOLLOW-UP: Primary | ICD-10-CM

## 2024-07-15 DIAGNOSIS — R29.898 WEAKNESS OF BOTH LOWER EXTREMITIES: ICD-10-CM

## 2024-07-15 PROCEDURE — 3075F SYST BP GE 130 - 139MM HG: CPT

## 2024-07-15 PROCEDURE — 1160F RVW MEDS BY RX/DR IN RCRD: CPT

## 2024-07-15 PROCEDURE — 99024 POSTOP FOLLOW-UP VISIT: CPT

## 2024-07-15 PROCEDURE — 3078F DIAST BP <80 MM HG: CPT

## 2024-07-15 PROCEDURE — 1159F MED LIST DOCD IN RCRD: CPT

## 2024-07-15 NOTE — PROGRESS NOTES
Physical Therapy Daily Treatment Note    [place]  [address] [address], [address]   [place]  [address]      Patient: Marylu Foreman   : 1945  Referring practitioner: Brett Reed*  Date of Initial Visit: Type: THERAPY  Noted: 2024  Today's Date: 7/15/2024  Patient seen for 2 sessions       Visit Diagnoses:    ICD-10-CM ICD-9-CM   1. Left foot pain  M79.672 729.5   2. Weakness of both lower extremities  R29.898 729.89   3. Decreased range of motion of left ankle  M25.672 719.57       Subjective Evaluation    History of Present Illness    Subjective comment: pt denies presence of pacemaker. Open to TENS and estim. Top of foot significantly hypersensitive but admits pressure feels okay. No relief from compound cream so far.       Objective   See Exercise, Manual, and Modality Logs for complete treatment.       Assessment & Plan       Assessment  Assessment details: Focus on pain control today thus assessed benefit of STM pressure and potential for relief from TENS sock.   P: assess post rx benefit today. Look into Flex-Gar sock from [facility] at next visit on 24.           Timed:         Manual Therapy:    10     mins  56362;     Therapeutic Exercise:         mins  62284;     Neuromuscular Scott:        mins  66926;    Therapeutic Activity:          mins  27619;     Gait Training:           mins  27685;     Ultrasound:          mins  82935;    Ionto                                   mins   60930  Self Care                            mins   95075  Canalith Repos         mins 34632  Work hardening   __   min 89161/68821      Un-Timed:  Electrical Stimulation:    15     mins  48075 ( );  Dry Needling          mins self-pay  Traction          mins 50412      Timed Treatment:   10   mins   Total Treatment:     25   mins    Zorre Zeno Kimura, PT  [place] License: [id number]    In License:  [id number]
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
## 2024-07-22 ENCOUNTER — TREATMENT (OUTPATIENT)
Dept: PHYSICAL THERAPY | Facility: CLINIC | Age: 79
End: 2024-07-22
Payer: MEDICARE

## 2024-07-22 DIAGNOSIS — M79.672 LEFT FOOT PAIN: Primary | ICD-10-CM

## 2024-07-22 DIAGNOSIS — R29.898 WEAKNESS OF BOTH LOWER EXTREMITIES: ICD-10-CM

## 2024-07-22 DIAGNOSIS — M25.672 DECREASED RANGE OF MOTION OF LEFT ANKLE: ICD-10-CM

## 2024-07-22 NOTE — PROGRESS NOTES
Physical Therapy Daily Treatment Note    [place]  [address], [address]   [place]  [address]      Patient: Marylu Foreman   : 1945  Referring practitioner: Brett Reed*  Date of Initial Visit: Type: THERAPY  Noted: 2024  Today's Date: 2024  Patient seen for 3 sessions       Visit Diagnoses:    ICD-10-CM ICD-9-CM   1. Left foot pain  M79.672 729.5   2. Weakness of both lower extremities  R29.898 729.89   3. Decreased range of motion of left ankle  M25.672 719.57       Subjective Evaluation    History of Present Illness    Subjective comment: pt eager to try TENS with sock device for her neuropathy.       Objective   See Exercise, Manual, and Modality Logs for complete treatment.       Assessment & Plan       Assessment  Assessment details: Trial of Flex-LIne sock today. Strong sensation at ground vs. Sock so changed to just 2 electrodes at dorsal foot today.   This was tolerated well and suggested by PT and TENS rep due to her decreased sensation.   Suggested to stay with 2 or 4 electrodes bid x 30 minutes over the next week then try sock again.     P: assess benefit of estim. Resume manual therapy and ex as needed.           Timed:         Manual Therapy:         mins  48090;     Therapeutic Exercise:         mins  34296;     Neuromuscular Scott:        mins  81743;    Therapeutic Activity:          mins  00469;     Gait Training:           mins  55286;     Ultrasound:          mins  97003;    Ionto                                   mins   30808  Self Care                            mins   10119  Canalith Repos         mins 39790  Work hardening   __   min 90503/99787      Un-Timed:  Electrical Stimulation:    25     mins  92908 ( );  Dry Needling          mins self-pay  Traction          mins 01155      Timed Treatment:   0   mins   Total Treatment:    25    mins    Zorre Zeno Kimura, PT  [place] License: [id number]    [place] License:  [id number]

## 2024-07-25 ENCOUNTER — TREATMENT (OUTPATIENT)
Dept: PHYSICAL THERAPY | Facility: CLINIC | Age: 79
End: 2024-07-25
Payer: MEDICARE

## 2024-07-25 DIAGNOSIS — M25.672 DECREASED RANGE OF MOTION OF LEFT ANKLE: ICD-10-CM

## 2024-07-25 DIAGNOSIS — R29.898 WEAKNESS OF BOTH LOWER EXTREMITIES: ICD-10-CM

## 2024-07-25 DIAGNOSIS — M79.672 LEFT FOOT PAIN: Primary | ICD-10-CM

## 2024-07-25 NOTE — PROGRESS NOTES
Physical Therapy Daily Treatment Note  [facility] [place]  [address], [address]  [place] [address]     Patient: Marylu Foreman  : 1945  Referring practitioner: Brett Reed MD  Today's Date: 2024    VISIT#: 4    Visit Diagnoses:    ICD-10-CM ICD-9-CM   1. Left foot pain  M79.672 729.5   2. Weakness of both lower extremities  R29.898 729.89   3. Decreased range of motion of left ankle  M25.672 719.57       Subjective   Marylu Foreman reports: that she has noticed improving active control of inversion motion of her left ankle. She reports that she has been doing her alphabets exercises a lot but not much of her clams or bridges.      Objective       See Exercise, Manual, and Modality Logs for complete treatment.     Patient Education: HEP update; AROM for neuromuscular control. Compliance with HEP      Assessment/Plan  Pt tolerated session well with recurrent left hamstring cramping with bridging task. Educated further on importance of compliance with HEP for strengthening and tolerance of hip musculature to load to decrease stress of foot and ankle structures.      Progress per Plan of Care          Timed:         Manual Therapy:         mins  30036;     Therapeutic Exercise:         mins  85571;     Neuromuscular Scott:    20    mins  48921;    Therapeutic Activity:          mins  62307;     Gait Training:           mins  42836;     Ultrasound:          mins  18107;    Ionto:                                   mins  33728  Self Care:                            mins  91679    Un-Timed:  Electrical Stimulation:         mins  70454 ( );  Dry Needling          mins self-pay  Traction          mins 36183  Re-Eval                               mins  53431  Group Therapy           ___ mins 63109    Timed Treatment:   20   mins   Total Treatment:     50   mins    Chase Mclean PT  Physical Therapist  [place] [facility] license #: [id number]

## 2024-07-29 ENCOUNTER — OFFICE VISIT (OUTPATIENT)
Dept: PAIN MEDICINE | Facility: CLINIC | Age: 79
End: 2024-07-29
Payer: MEDICARE

## 2024-07-29 ENCOUNTER — HOSPITAL ENCOUNTER (OUTPATIENT)
Dept: OCCUPATIONAL THERAPY | Facility: HOSPITAL | Age: 79
Setting detail: THERAPIES SERIES
Discharge: HOME | End: 2024-07-29
Payer: MEDICARE

## 2024-07-29 ENCOUNTER — PREP FOR SURGERY (OUTPATIENT)
Dept: SURGERY | Facility: SURGERY CENTER | Age: 79
End: 2024-07-29
Payer: MEDICARE

## 2024-07-29 VITALS
DIASTOLIC BLOOD PRESSURE: 73 MMHG | BODY MASS INDEX: 32.54 KG/M2 | RESPIRATION RATE: 20 BRPM | WEIGHT: 190.6 LBS | HEIGHT: 64 IN | HEART RATE: 112 BPM | SYSTOLIC BLOOD PRESSURE: 126 MMHG | OXYGEN SATURATION: 96 % | TEMPERATURE: 96.6 F

## 2024-07-29 DIAGNOSIS — G57.92 NEUROPATHY OF LEFT LOWER EXTREMITY: ICD-10-CM

## 2024-07-29 DIAGNOSIS — Z17.0 MALIGNANT NEOPLASM OF RIGHT BREAST IN FEMALE, ESTROGEN RECEPTOR POSITIVE, UNSPECIFIED SITE OF BREAST: Primary | ICD-10-CM

## 2024-07-29 DIAGNOSIS — G90.522 COMPLEX REGIONAL PAIN SYNDROME I OF LEFT LOWER LIMB: Primary | ICD-10-CM

## 2024-07-29 DIAGNOSIS — Z91.89 AT RISK FOR LYMPHEDEMA: ICD-10-CM

## 2024-07-29 DIAGNOSIS — C50.911 MALIGNANT NEOPLASM OF RIGHT BREAST IN FEMALE, ESTROGEN RECEPTOR POSITIVE, UNSPECIFIED SITE OF BREAST: Primary | ICD-10-CM

## 2024-07-29 PROCEDURE — 97535 SELF CARE MNGMENT TRAINING: CPT

## 2024-07-29 PROCEDURE — 1160F RVW MEDS BY RX/DR IN RCRD: CPT | Performed by: PHYSICIAN ASSISTANT

## 2024-07-29 PROCEDURE — 1159F MED LIST DOCD IN RCRD: CPT | Performed by: PHYSICIAN ASSISTANT

## 2024-07-29 PROCEDURE — 1125F AMNT PAIN NOTED PAIN PRSNT: CPT | Performed by: PHYSICIAN ASSISTANT

## 2024-07-29 PROCEDURE — 3074F SYST BP LT 130 MM HG: CPT | Performed by: PHYSICIAN ASSISTANT

## 2024-07-29 PROCEDURE — 3078F DIAST BP <80 MM HG: CPT | Performed by: PHYSICIAN ASSISTANT

## 2024-07-29 PROCEDURE — 99215 OFFICE O/P EST HI 40 MIN: CPT | Performed by: PHYSICIAN ASSISTANT

## 2024-07-29 PROCEDURE — 93702 BIS XTRACELL FLUID ANALYSIS: CPT

## 2024-07-29 RX ORDER — DIAZEPAM 5 MG/1
5 TABLET ORAL ONCE
OUTPATIENT
Start: 2024-07-29 | End: 2024-07-29

## 2024-07-29 NOTE — PROGRESS NOTES
CHIEF COMPLAINT  Pt here to discuss left foot pain    Subjective   Marylu Foreman is a 79 y.o. female.   She presents to the office for evaluation of left foot pain. She was referred here by Dr. Rosey Diaz.  This patient states that she has a 2-year history of left foot pain which initially started following lumbar surgery which consisted of left L4-5, L5-S1 laminectomy with Metrix performed 8/5/2022 with Dr Jean Sy.  She developed foot drop as a result of the surgery stated that initially it was aching and swelling and then improved.  On this past year she underwent hammertoe surgery of the left foot and since that occurred the pain has progressively worsened.  She illustrates an electrical shock, shooting and burning sensation at the base of the toes on the left foot with extreme sensitivity to anything touching it.  She states that she feels the best when able to walk around her home barefoot and has to ambulate outside of the home with an AFO placed into a soft shoe brace.  She states that it feels like needles are running up and down in the foot and she does note some reddish discoloration.    Medical history is complicated by right breast invasive lobular carcinoma grade 1 and she underwent right breast mastectomy with Dr. Diaz in May 2024.  Previous left breast mastectomy performed in 2007.  The patient states that she did not have to undergo chemo or radiation therapy after either surgery.    The patient is currently in physical therapy twice weekly with some improvement of range of motion of the left foot.  She alternates with ice and heat therapy with very limited relief.  She is currently on gabapentin 100 mg p.o. 3 times daily which she is tolerating however does not significantly reducing the neuropathic pain.    Anticoagulated on Eliquis with history of atrial fibrillation.    Pain today 6/10 VAS in severity.      Foot Pain  This is a recurrent problem. The current episode started more than  1 year ago. The problem occurs constantly. The problem has been unchanged. Associated symptoms include numbness (left foot) and weakness (left foot). Pertinent negatives include no abdominal pain, chest pain, coughing, fatigue, fever or headaches. Associated symptoms comments: ELECTRICAL FEELING; PINS AND NEEDLES; BURNING. The symptoms are aggravated by walking (WEARING SHOES/ANYTHING TOUCHING IT LIKE SHEETS/SOCKS). She has tried ice and heat for the symptoms. The treatment provided mild relief.        PEG Assessment   What number best describes your pain on average in the past week?7  What number best describes how, during the past week, pain has interfered with your enjoyment of life?9  What number best describes how, during the past week, pain has interfered with your general activity?  9        Current Outpatient Medications:     albuterol sulfate  (90 Base) MCG/ACT inhaler, Inhale 2 puffs Every 6 (Six) Hours As Needed for Wheezing., Disp: 18 g, Rfl: 0    anastrozole (Arimidex) 1 MG tablet, Take 1 tablet by mouth Daily., Disp: 30 tablet, Rfl: 5    ascorbic acid (VITAMIN C) 1000 MG tablet, Take 1 tablet by mouth Daily., Disp: , Rfl:     benzonatate (Tessalon Perles) 100 MG capsule, Take 1 capsule by mouth 3 (Three) Times a Day As Needed for Cough., Disp: 30 capsule, Rfl: 0    Cholecalciferol (VITAMIN D PO), Take 1 tablet by mouth Daily., Disp: , Rfl:     clobetasol propionate (TEMOVATE) 0.05 % cream, Apply 1 Application topically to the appropriate area as directed 2 (Two) Times a Day., Disp: 45 g, Rfl: 1    Coenzyme Q10 200 MG capsule, Take 200 mg by mouth Daily., Disp: , Rfl:     colchicine 0.6 MG tablet, Take 0.5 tablets by mouth Daily., Disp: 7 tablet, Rfl: 1    Cyanocobalamin (VITAMIN B 12 PO), Take 1 tablet/day by mouth Daily. HOLD PRIOR TO SURG, Disp: , Rfl:     dilTIAZem CD (CARDIZEM CD) 180 MG 24 hr capsule, Take 1 capsule by mouth 2 (Two) Times a Day., Disp: 180 capsule, Rfl: 3    doxycycline  (VIBRAMYCIN) 100 MG capsule, Take 1 capsule by mouth 2 (Two) Times a Day., Disp: 14 capsule, Rfl: 0    DULoxetine (CYMBALTA) 60 MG capsule, Take 1 capsule by mouth 2 (Two) Times a Day., Disp: , Rfl:     Eliquis 5 MG tablet tablet, TAKE 1 TABLET BY MOUTH EVERY 12 HOURS FOR ATRIAL FIBRILLATION, Disp: , Rfl:     folic acid (FOLVITE) 400 MCG tablet, Take 1 tablet by mouth Daily., Disp: , Rfl:     gabapentin (NEURONTIN) 100 MG capsule, Take 1 capsule by mouth 3 (Three) Times a Day., Disp: 90 capsule, Rfl: 0    Ibuprofen 3 %, Gabapentin 10 %, Baclofen 2 %, lidocaine 4 %, Ketamine HCl 4 %, Apply 1-2 g topically to the appropriate area as directed 3 (Three) to 4 (Four) times daily., Disp: 90 g, Rfl: 1    Ibuprofen 3 %, Gabapentin 10 %, Baclofen 2 %, lidocaine 4 %, Ketamine HCl 4 %, Apply 1-2 g topically to the appropriate area as directed 3 (Three) to 4 (Four) times daily., Disp: 90 g, Rfl: 1    irbesartan (AVAPRO) 300 MG tablet, Take 1 tablet by mouth Every Night., Disp: 90 tablet, Rfl: 1    ketoconazole (NIZORAL) 2 % shampoo, Apply  topically to the appropriate area as directed 2 (Two) Times a Week., Disp: 100 mL, Rfl: 1    levoFLOXacin (Levaquin) 500 MG tablet, Take 1 tablet by mouth Daily., Disp: 3 tablet, Rfl: 0    levothyroxine (SYNTHROID, LEVOTHROID) 50 MCG tablet, Take 1 tablet by mouth Daily., Disp: , Rfl:     MegaRed Omega-3 Krill Oil 350 MG capsule, Take 1 capsule by mouth Daily., Disp: , Rfl:     Multiple Vitamin (MULTI-VITAMIN PO), Take 1 tablet by mouth Daily. HOLD PRIOR TO SURG, Disp: , Rfl:     Probiotic Product (PROBIOTIC PO), Take 1 tablet by mouth Daily. Lactobacillus rhamnosus GG, Disp: , Rfl:     sodium bicarbonate 650 MG tablet, Take 1 tablet by mouth Daily., Disp: , Rfl:     vitamin E 400 UNIT capsule, Take 1 capsule by mouth Daily., Disp: , Rfl:     The following portions of the patient's history were reviewed and updated as appropriate: allergies, current medications, past family history, past  "medical history, past social history, past surgical history, and problem list.      REVIEW OF PERTINENT MEDICAL DATA    No imaging for review    Review of Systems   Constitutional:  Negative for activity change (less), fatigue and fever.   Respiratory:  Negative for cough and chest tightness.    Cardiovascular:  Negative for chest pain.   Gastrointestinal:  Negative for abdominal pain, constipation and diarrhea.   Genitourinary:  Positive for dysuria. Negative for difficulty urinating.   Neurological:  Positive for weakness (left foot) and numbness (left foot). Negative for dizziness, light-headedness and headaches.   Psychiatric/Behavioral:  Positive for agitation and sleep disturbance. Negative for suicidal ideas. The patient is nervous/anxious.        I have reviewed and confirmed the accuracy of the ROS as documented by the MA/LPN/RN DARREN Schmitz    Vitals:    07/29/24 1442   BP: 126/73   BP Location: Left arm   Patient Position: Sitting   Cuff Size: Large Adult   Pulse: 112   Resp: 20   Temp: 96.6 °F (35.9 °C)   TempSrc: Temporal   SpO2: 96%   Weight: 86.5 kg (190 lb 9.6 oz)   Height: 162.6 cm (64\")   PainSc:   6         Objective       Physical Exam  Vitals and nursing note reviewed.   Constitutional:       Appearance: Normal appearance. She is normal weight.   HENT:      Head: Normocephalic.   Cardiovascular:      Pulses:           Dorsalis pedis pulses are 1+ on the right side and 1+ on the left side.        Posterior tibial pulses are 1+ on the right side and 1+ on the left side.   Musculoskeletal:      Left foot: Foot drop present.        Feet:    Feet:      Left foot:      Skin integrity: Skin integrity normal.      Comments: ALLODYNIA NOTED TO LIGHT TOUCH ALONG BASES OF MTP ON LEFT FOOT  Neurological:      Mental Status: She is alert and oriented to person, place, and time.      Cranial Nerves: Cranial nerves 2-12 are intact.      Sensory: Sensation is intact.      Motor: Motor function is intact. "      Gait: Gait abnormal (AMBULATES WITH A WALKER; SLOW GAIT).      Deep Tendon Reflexes:      Reflex Scores:       Patellar reflexes are 1+ on the right side and 1+ on the left side.  Psychiatric:         Mood and Affect: Mood normal.         Behavior: Behavior normal.         Thought Content: Thought content normal.         Judgment: Judgment normal.         Assessment & Plan   Diagnoses and all orders for this visit:    1. Complex regional pain syndrome i of left lower limb (Primary)    2. Neuropathy of left lower extremity        --- Marylu Foreman reports a pain score of 6.  Given her pain assessment as noted, treatment options were discussed and the following options were decided upon as a follow-up plan to address the patient's pain: continuation of current treatment plan for pain, prescription for non-opiod analgesics, and use of non-medical modalities (ice, heat, stretching and/or behavior modifications).    --- I have discussed with the patient that I do feel that she may benefit with increasing dose of gabapentin therefore a slow titration schedule has been provided for her to increase gabapentin to 200 mg p.o. 3 times daily slowly and if tolerated we will then increase again to 300 mg p.o. 3 times daily.  She will reach out to this provider via [facility] in order to notify me of her progress so that I can send in a new prescription.  --- Based on her physical exam findings on today I do feel that she would benefit from #1 diagnostic left lumbar sympathetic nerve block for CRPS of the left lower extremity with plan to proceed to radiofrequency ablation if favorable response is obtained.  The risk and benefits of the procedure been discussed with the patient and she is provided with patient education materials.  --- Follow-up 1 week after undergoing injective therapy      ---  [place] Criteria (for Complex Regional Pain Syndrome diagnosis)    All of these criteria must be met to confirm a clinical diagnosis  of CRPS:  Continuing Pain that is disproportionate to the inciting event... yes  At least 1 Symptom in at least 3 of the 4 Categories  At least 1 Sign from at least 2 of the 4 Categories  No other diagnosis can better explain the signs / symptoms  yes    Location of painful symptoms: Left foot    [facility] Clinical Symptoms Scoresheet   Category: Symptom:   Sensory Allodynia (pain from things not normally painful) and Hyperalgesia (heightened pain severity)   Vasomotor Difference in Skin Coloration (i.e. L vs R)   Sudomotor/Edema Swelling / Edema and Sweating / Damp / Clamminess   Motor/Trophic Decreased Range of Motion    TOTAL:  4 out of 4         [facility] Clinical Signs Scoresheet   Category: Symptom:   Sensory Allodynia (pain from things not normally painful) and Hyperalgesia (heightened pain severity)   Vasomotor Difference in Skin Coloration (i.e. L vs R)   Sudomotor/Edema Sweating / Damp / Clamminess   Motor/Trophic Decreased Range of Motion    TOTAL:  4 out of 4     Confirmation of a clinical diagnosis of Complex Regional Pain Syndrome  Yes, describe: Patient has noted allodynia as well as hyper analgesia over the left foot.  There is also difference in coloration left foot compared to the right with more of a dusky reddish coloration noted within the left affected foot.  Patient does report intermittent dampness or clamminess with as well as intermittent swelling at the base of the toes.  ---       Pain / Disability Scale    The scale used for measurement of pain and/or disability for this patient was the [place] back pain disability scale.  The score was 43 on 07/29/2024        I spent 50 minutes caring for Marylu on this date of service. This time includes time spent by me in the following activities: preparing for the visit, reviewing tests, performing a medically appropriate examination and/or evaluation, counseling and educating the patient/family/caregiver, documenting information in the medical record,  independently interpreting results and communicating that information with the patient/family/caregiver, ordering procedure(s), and reviewing a separately obtained history              LAY REPORT    As part of the patient's treatment plan, I am prescribing controlled substances. The patient has been made aware of appropriate use of such medications, including potential risk of somnolence, limited ability to drive and/or work safely, and the potential for dependence or overdose. It has also bee made clear that these medications are for use by this patient only, without concomitant use of alcohol or other substances unless prescribed.     Patient has completed prescribing agreement detailing terms of continued prescribing of controlled substances, including monitoring LAY reports, urine drug screening, and pill counts if necessary. The patient is aware that inappropriate use will results in cessation of prescribing such medications.    LAY report has been reviewed and scanned into the patient's chart.    As the clinician, I personally reviewed the LAY from 7/29/2024 while the patient was in the office today.    History and physical exam exhibit continued safe and appropriate use of controlled substances.       Dictated utilizing Dragon dictation.
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
## 2024-07-30 ENCOUNTER — TREATMENT (OUTPATIENT)
Dept: PHYSICAL THERAPY | Facility: CLINIC | Age: 79
End: 2024-07-30
Payer: MEDICARE

## 2024-07-30 DIAGNOSIS — M25.672 DECREASED RANGE OF MOTION OF LEFT ANKLE: ICD-10-CM

## 2024-07-30 DIAGNOSIS — R29.898 WEAKNESS OF BOTH LOWER EXTREMITIES: ICD-10-CM

## 2024-07-30 DIAGNOSIS — M79.672 LEFT FOOT PAIN: Primary | ICD-10-CM

## 2024-07-30 NOTE — PROGRESS NOTES
Physical Therapy Daily Treatment Note  [facility] Physical Therapy [place]  [address], [address]  [place] [address]     Patient: Marylu Foreman  : 1945  Referring practitioner: Brett Reed MD  Today's Date: 2024    VISIT#: 5    Visit Diagnoses:    ICD-10-CM ICD-9-CM   1. Left foot pain  M79.672 729.5   2. Weakness of both lower extremities  R29.898 729.89   3. Decreased range of motion of left ankle  M25.672 719.57       Subjective   Marylu Foreman reports: she feels radiating pain in her left foot still but has noticed improvement in ankle mobility.      Objective   See Exercise, Manual, and Modality Logs for complete treatment.     Patient Education: continuation of HEP, motor control during session      Assessment/Plan  Pt tolerated session well, noted dull pain at R apex of patella during NuStep. Pt required verbal and tactile cueing for proper form during bridges for hold time and clams to prevent pelvic rotation, and breathing throughout both. Noticed pain at R patella no longer present during clams. Pt noted seated marches were not as challenging anymore, progressed to standing marches with B UE support provided by chair/table at sides. Pt noticed increased muscle fatigue in quadriceps.       Progress per Plan of Care          Timed:         Manual Therapy:         mins  74891;     Therapeutic Exercise:    5     mins  72046;     Neuromuscular Scott:    10    mins  46968;    Therapeutic Activity:     15     mins  41854;     Gait Training:           mins  42714;     Ultrasound:          mins  41301;    Ionto:                                   mins  23298  Self Care:                            mins  40410    Un-Timed:  Electrical Stimulation:         mins  08758 ( );  Dry Needling          mins self-pay  Traction          mins 82088  Re-Eval                               mins  79737  Group Therapy           ___ mins 12447    Timed Treatment:   30   mins    Total Treatment:     54   mins    Chase Mclean PT  Physical Therapist  [place] license #: [id number]

## 2024-07-31 ENCOUNTER — PATIENT OUTREACH (OUTPATIENT)
Dept: OTHER | Facility: HOSPITAL | Age: 79
End: 2024-07-31
Payer: MEDICARE

## 2024-08-01 ENCOUNTER — TREATMENT (OUTPATIENT)
Dept: PHYSICAL THERAPY | Facility: CLINIC | Age: 79
End: 2024-08-01
Payer: MEDICARE

## 2024-08-01 DIAGNOSIS — M25.672 DECREASED RANGE OF MOTION OF LEFT ANKLE: ICD-10-CM

## 2024-08-01 DIAGNOSIS — M79.672 LEFT FOOT PAIN: Primary | ICD-10-CM

## 2024-08-01 DIAGNOSIS — R29.898 WEAKNESS OF BOTH LOWER EXTREMITIES: ICD-10-CM

## 2024-08-01 NOTE — PROGRESS NOTES
Physical Therapy Daily Treatment Note  [facility] Physical Therapy [place]  [address]  [place] [id number]     Patient: Marylu Foreman  : 1945  Referring practitioner: Brett Reed MD  Today's Date: 2024    VISIT#: 6    Visit Diagnoses:    ICD-10-CM ICD-9-CM   1. Left foot pain  M79.672 729.5   2. Weakness of both lower extremities  R29.898 729.89   3. Decreased range of motion of left ankle  M25.672 719.57       Subjective   Marylu Foreman reports: that she hasn't been doing the prone press exercise because she tried it on the floor and found it too hard to get up. She noted some soreness in her back when she tried it.      Objective   Pt noted decrease in L foot burning after 20 seconds in prone press position    See Exercise, Manual, and Modality Logs for complete treatment.     Patient Education: shorter hold time for prone press and perform on bed to avoid transfer issues.       Assessment/Plan  Pt requires cuing for attention to technique during session to avoid compensatory movement and for correct pacing of activity, particularly with ankle alphabets and clams. She noted that prone press with shorter hold times 'felt good in her back' and decreased her left foot symptoms. She tolerated new tandem stance task well with active motion evident in left foot more during right forward position than when left foot is forward.      Progress per Plan of Care          Timed:         Manual Therapy:         mins  42008;     Therapeutic Exercise:    8     mins  45440;     Neuromuscular Scott:    30    mins  76190;    Therapeutic Activity:     10     mins  07663;     Gait Training:           mins  41139;     Ultrasound:          mins  35034;    Ionto:                                   mins  90928  Self Care:                       8     mins  61427    Un-Timed:  Electrical Stimulation:         mins  99857 ( );  Dry Needling          mins self-pay  Traction           mins 91444  Re-Eval                               mins  36315  Group Therapy           ___ mins 67263    Timed Treatment:   56   mins   Total Treatment:     56   mins    Chase Mclean PT  Physical Therapist  [place] license #: [id number]

## 2024-08-06 ENCOUNTER — TREATMENT (OUTPATIENT)
Dept: PHYSICAL THERAPY | Facility: CLINIC | Age: 79
End: 2024-08-06
Payer: MEDICARE

## 2024-08-06 DIAGNOSIS — M79.672 LEFT FOOT PAIN: Primary | ICD-10-CM

## 2024-08-06 DIAGNOSIS — R29.898 WEAKNESS OF BOTH LOWER EXTREMITIES: ICD-10-CM

## 2024-08-06 DIAGNOSIS — M25.672 DECREASED RANGE OF MOTION OF LEFT ANKLE: ICD-10-CM

## 2024-08-06 NOTE — PROGRESS NOTES
Physical Therapy Daily Treatment Note  [facility] Physical Therapy [place]  [address]  [place] [address]     Patient: Marylu Foreman  : 1945  Referring practitioner: Brett Reed MD  Today's Date: 2024    VISIT#: 7    Visit Diagnoses:    ICD-10-CM ICD-9-CM   1. Left foot pain  M79.672 729.5   2. Weakness of both lower extremities  R29.898 729.89   3. Decreased range of motion of left ankle  M25.672 719.57       Subjective   Marylu Foreman reports: that she has continued to do a lot of inversion/eversion work while sitting in her recliner. She asked about if drop foot is permanent.      Objective     See Exercise, Manual, and Modality Logs for complete treatment.     Patient Education: discussed muscle activation and active dorsiflexion that she is able to perform; increase repetition throughout the day.      Assessment/Plan  Pt tolerated session well overall with further education on current active dorsiflexion motion and necessity for increased frequency of performance during the day to increase activation. She tolerated progression of functional tasks (STS, step ups) effectively.      Progress per Plan of Care          Timed:         Manual Therapy:         mins  98015;     Therapeutic Exercise:         mins  57322;     Neuromuscular Scott:    15    mins  34163;    Therapeutic Activity:     15     mins  56642;     Gait Training:           mins  25208;     Ultrasound:          mins  27139;    Ionto:                                   mins  11750  Self Care:                            mins  70299    Un-Timed:  Electrical Stimulation:         mins  89205 ( );  Dry Needling          mins self-pay  Traction          mins 99500  Re-Eval                               mins  02313  Group Therapy           ___ mins 49511    Timed Treatment:   30   mins   Total Treatment:     50   mins    Chase Mclean PT  Physical Therapist  [place] license #: [id number]

## 2024-08-08 ENCOUNTER — TREATMENT (OUTPATIENT)
Dept: PHYSICAL THERAPY | Facility: CLINIC | Age: 79
End: 2024-08-08
Payer: MEDICARE

## 2024-08-08 DIAGNOSIS — M25.672 DECREASED RANGE OF MOTION OF LEFT ANKLE: ICD-10-CM

## 2024-08-08 DIAGNOSIS — M79.672 LEFT FOOT PAIN: Primary | ICD-10-CM

## 2024-08-08 DIAGNOSIS — R29.898 WEAKNESS OF BOTH LOWER EXTREMITIES: ICD-10-CM

## 2024-08-08 NOTE — PROGRESS NOTES
Physical Therapy Daily Treatment Note  [facility] Physical Therapy [place]  [address]  [place] [id number]     Patient: Marylu Foreman  : 1945  Referring practitioner: Brett Reed MD  Today's Date: 2024    VISIT#: 8    Visit Diagnoses:    ICD-10-CM ICD-9-CM   1. Left foot pain  M79.672 729.5   2. Weakness of both lower extremities  R29.898 729.89   3. Decreased range of motion of left ankle  M25.672 719.57       Subjective   Marylu Foreman reports: she feels good but hot due to the weather. She has noticed she can lift her foot more during walking.      Objective     See Exercise, Manual, and Modality Logs for complete treatment.     Patient Education: continuation of HEP, nerve innervation of dorsiflexors      Assessment/Plan  Pt tolerated session well with slight aching discomfort at B anterior knees during last set of STS, noted as muscular tightness. Pt required verbal and visual cuing to maintain hip extension during step up and drives, decrease UE support on rails during R LE stance leg. Pt required verbal cuing to maintain COG over LE's during lateral rocker board movement.   She noted good tolerance of new eccentric dorsiflexion task with leg crossed over using UE to position foot in DF to start.    Progress per Plan of Care          Timed:         Manual Therapy:         mins  75094;     Therapeutic Exercise:    8     mins  74907;     Neuromuscular Scott:    20    mins  10404;    Therapeutic Activity:     20     mins  06801;     Gait Training:           mins  21737;     Ultrasound:          mins  24603;    Ionto:                                   mins  00633  Self Care:                      7      mins  69108    Un-Timed:  Electrical Stimulation:         mins  86972 ( );  Dry Needling          mins self-pay  Traction          mins 52053  Re-Eval                               mins  42965  Group Therapy           ___ mins 80880    Timed  Treatment:   55   mins   Total Treatment:     64   mins    Chase Mclean PT  Physical Therapist  [place] license #: [id number]

## 2024-08-09 ENCOUNTER — PATIENT ROUNDING (BHMG ONLY) (OUTPATIENT)
Dept: PAIN MEDICINE | Facility: CLINIC | Age: 79
End: 2024-08-09
Payer: MEDICARE

## 2024-08-09 NOTE — PROGRESS NOTES
August 9, 2024    Hello, may I speak with Marylu Foreman?    My name is Kaila    I am  with [facility]  [facility] [address] [address] [phone].    Before we get started may I verify your date of birth? 1945    I am calling to officially welcome you to our practice and ask about your recent visit. Is this a good time to talk? yes    Tell me about your visit with us. What things went well?  It went well       We're always looking for ways to make our patients' experiences even better. Do you have recommendations on ways we may improve?  no    Overall were you satisfied with your first visit to our practice? yes       I appreciate you taking the time to speak with me today. Is there anything else I can do for you? no      Thank you, and have a great day.

## 2024-08-13 ENCOUNTER — TREATMENT (OUTPATIENT)
Dept: PHYSICAL THERAPY | Facility: CLINIC | Age: 79
End: 2024-08-13
Payer: MEDICARE

## 2024-08-13 DIAGNOSIS — M25.672 DECREASED RANGE OF MOTION OF LEFT ANKLE: ICD-10-CM

## 2024-08-13 DIAGNOSIS — R29.898 WEAKNESS OF BOTH LOWER EXTREMITIES: ICD-10-CM

## 2024-08-13 DIAGNOSIS — M79.672 LEFT FOOT PAIN: Primary | ICD-10-CM

## 2024-08-13 NOTE — LETTER
Physical Therapy Progress Note  [facility] [place]  [address], [address]  [place] [id number]     Patient: Marylu Foreman  : 1945  Referring practitioner: Brett Reed MD  Today's Date: 2024    VISIT#: 9    Visit Diagnoses:    ICD-10-CM ICD-9-CM   1. Left foot pain  M79.672 729.5   2. Weakness of both lower extremities  R29.898 729.89   3. Decreased range of motion of left ankle  M25.672 719.57     LEFS: 45/80    Subjective Evaluation    Pain  At best pain ratin  At worst pain ratin    Marylu Foreman reports: that she feels like she's around 70% improved since the start of her POC. She notes that the nerve pain in her left foot all the time.        Objective          Palpation   Left   Tenderness of the anterior tibialis, lateral gastrocnemius, medial gastrocnemius, peroneus, posterior tibialis and soleus.   Trigger point to lateral gastrocnemius, medial gastrocnemius, posterior tibialis and soleus.     Passive Range of Motion   Left Hip   Normal passive range of motion    Right Hip   Normal passive range of motion  Left Ankle/Foot    Dorsiflexion (ke): 5 degrees   Plantar flexion: 50 degrees   Inversion: 40 degrees   Eversion: 5 degrees     Joint Play   Left Ankle/Foot  Hypomobile in the talocrural joint, subtalar joint and midfoot.     Strength/Myotome Testing     Left Hip   Planes of Motion   Flexion: 4-  Extension: 3  Abduction: 3+  External rotation: 4  Internal rotation: 4-    Right Hip   Planes of Motion   Flexion: 4-  Extension: 3  Abduction: 3+  External rotation: 4+  Internal rotation: 4+    Left Knee   Flexion: 4+  Extension: 4+    Right Knee   Flexion: 4+  Extension: 5    Left Ankle/Foot   Dorsiflexion: 2  Inversion: 4  Eversion: 4  Great toe flexion: 3  Great toe extension: 1    Right Ankle/Foot   Dorsiflexion: 4+  Inversion: 4+  Eversion: 4+  Great toe extension: 4+    Tests       Thoracic   Negative slump.     Lumbar     Left    Negative passive SLR and quadrant.     Right   Negative passive SLR and quadrant.     Additional Tests Details  Pt reports improvements in symptoms in L foot with passive prone positioning with elbow prop after < 30 seconds    Ambulation   Weight-Bearing Status   Assistive device used: front-wheeled walker    Additional Weight-Bearing Status Details  L foot AFO with surgical shoe    Ambulation: Level Surfaces   Ambulation with assistive device: independent  Ambulation without assistive device: moderate assist    Observational Gait   Gait: antalgic   Decreased walking speed and stride length.   Base of support: increased          Assessment & Plan       Assessment  Assessment details: Pt continues to present to PT with complaints of L foot pain and drop foot. She has reported ~70% overall improvements during her POC to date, and notes less maximal pain and lower average pain levels. Her LEFS score has shown regression since evaluation.  She has demonstrated some minor LE strength improvements with significant gains in L ankle strength including dorsiflexion. Her hip strength remains significantly limited.  She has met 2/4 STGs and 1/4 LTGs and is progressing toward her other goals.  Prognosis: fair    Goals  Plan Goals: ST. Pt will be independent and compliant with initial HEP in 2 weeks. PROGRESSING  2. Pt will report a 40% improvement in symptoms since starting therapy in 4 weeks. MET  3. Pt will demonstrate an increase in B hip extension strength 4- within 4 weeks.  PROGRESSING  4. Pt will report pain level at worst <5 during walking in home in 4 weeks. MET    LT. Pt will be independent with final HEP for self-management of condition by DC.  2. Pt will improve score on LEFS to greater than 67/80 by DC.   3. Pt will report a 70% improvement in symptoms by DC in order to allow return to PLOF. MET  4. Pt will improve B hip strength to 4/5 to decrease functional mobility difficulty by DC.     Plan  Therapy  options: will be seen for skilled therapy services  Treatment plan discussed with: patient      Progress per Plan of Care      Chase Mclean PT  Physical Therapist  [place] license #: [id number]

## 2024-08-13 NOTE — PROGRESS NOTES
Physical Therapy Daily Treatment and Progress Note  [facility] [place]  [address], [address]  [place] [id number]     Patient: Marylu Foreman  : 1945  Referring practitioner: Brett Reed MD  Today's Date: 2024    VISIT#: 9    Visit Diagnoses:    ICD-10-CM ICD-9-CM   1. Left foot pain  M79.672 729.5   2. Weakness of both lower extremities  R29.898 729.89   3. Decreased range of motion of left ankle  M25.672 719.57     LEFS: 45/80    Subjective Evaluation    Pain  At best pain ratin  At worst pain ratin    Marylu Foreman reports: that she feels like she's around 70% improved since the start of her POC. She notes that the nerve pain in her left foot all the time.        Objective          Palpation   Left   Tenderness of the anterior tibialis, lateral gastrocnemius, medial gastrocnemius, peroneus, posterior tibialis and soleus.   Trigger point to lateral gastrocnemius, medial gastrocnemius, posterior tibialis and soleus.     Passive Range of Motion   Left Hip   Normal passive range of motion    Right Hip   Normal passive range of motion  Left Ankle/Foot    Dorsiflexion (ke): 5 degrees   Plantar flexion: 50 degrees   Inversion: 40 degrees   Eversion: 5 degrees     Joint Play   Left Ankle/Foot  Hypomobile in the talocrural joint, subtalar joint and midfoot.     Strength/Myotome Testing     Left Hip   Planes of Motion   Flexion: 4-  Extension: 3  Abduction: 3+  External rotation: 4  Internal rotation: 4-    Right Hip   Planes of Motion   Flexion: 4-  Extension: 3  Abduction: 3+  External rotation: 4+  Internal rotation: 4+    Left Knee   Flexion: 4+  Extension: 4+    Right Knee   Flexion: 4+  Extension: 5    Left Ankle/Foot   Dorsiflexion: 2  Inversion: 4  Eversion: 4  Great toe flexion: 3  Great toe extension: 1    Right Ankle/Foot   Dorsiflexion: 4+  Inversion: 4+  Eversion: 4+  Great toe extension: 4+    Tests       Thoracic   Negative slump.      Lumbar     Left   Negative passive SLR and quadrant.     Right   Negative passive SLR and quadrant.     Additional Tests Details  Pt reports improvements in symptoms in L foot with passive prone positioning with elbow prop after < 30 seconds    Ambulation   Weight-Bearing Status   Assistive device used: front-wheeled walker    Additional Weight-Bearing Status Details  L foot AFO with surgical shoe    Ambulation: Level Surfaces   Ambulation with assistive device: independent  Ambulation without assistive device: moderate assist    Observational Gait   Gait: antalgic   Decreased walking speed and stride length.   Base of support: increased        See Exercise, Manual, and Modality Logs for complete treatment.     Patient Education: assessment findings and progress; HEP update and compliance with frequency; discussion on nutrition, sleep schedule, and recovery/muscle gain        Assessment & Plan       Assessment  Assessment details: Pt continues to present to PT with complaints of L foot pain and drop foot. She has reported ~70% overall improvements during her POC to date, and notes less maximal pain and lower average pain levels. Her LEFS score has shown regression since evaluation.  She has demonstrated some minor LE strength improvements with significant gains in L ankle strength including dorsiflexion. Her hip strength remains significantly limited.  She has met 2/4 STGs and 1/4 LTGs and is progressing toward her other goals.  Prognosis: fair    Goals  Plan Goals: ST. Pt will be independent and compliant with initial HEP in 2 weeks. PROGRESSING  2. Pt will report a 40% improvement in symptoms since starting therapy in 4 weeks. MET  3. Pt will demonstrate an increase in B hip extension strength 4- within 4 weeks.  PROGRESSING  4. Pt will report pain level at worst <5 during walking in home in 4 weeks. MET    LT. Pt will be independent with final HEP for self-management of condition by DC.  2. Pt will  improve score on LEFS to greater than 67/80 by DC.   3. Pt will report a 70% improvement in symptoms by DC in order to allow return to PLOF. MET  4. Pt will improve B hip strength to 4/5 to decrease functional mobility difficulty by DC.     Plan  Therapy options: will be seen for skilled therapy services  Treatment plan discussed with: patient      Progress per Plan of Care          Timed:         Manual Therapy:         mins  00329;     Therapeutic Exercise:    8     mins  43219;     Neuromuscular Scott:    15    mins  29579;    Therapeutic Activity:     15     mins  78453;     Gait Training:           mins  90350;     Ultrasound:          mins  95852;    Ionto:                                   mins  19402  Self Care:                       15     mins  80045    Un-Timed:  Electrical Stimulation:         mins  98721 ( );  Dry Needling          mins self-pay  Traction          mins 81910  Re-Eval                               mins  82349  Group Therapy           ___ mins 71178    Timed Treatment:   53   mins   Total Treatment:     53   mins    Chase Mclean PT  Physical Therapist  [place] license #: [id number]

## 2024-08-15 ENCOUNTER — TELEPHONE (OUTPATIENT)
Dept: PHYSICAL THERAPY | Facility: CLINIC | Age: 79
End: 2024-08-15

## 2024-08-19 ENCOUNTER — TELEPHONE (OUTPATIENT)
Dept: INTERNAL MEDICINE | Facility: CLINIC | Age: 79
End: 2024-08-19
Payer: MEDICARE

## 2024-08-19 NOTE — TELEPHONE ENCOUNTER
Caller: Marylu Foreman    Relationship: Self    Best call back number: [phone]     What medication are you requesting: ANTIBIOTIC TO HELP WITH UTI     What are your current symptoms: BURNING WHILE URINATING    How long have you been experiencing symptoms: 08/17/24    Have you had these symptoms before:    [x] Yes  [] No    Have you been treated for these symptoms before:   [x] Yes  [] No    If a prescription is needed, what is your preferred pharmacy and phone number: [facility] #[id number] [place], [place] - [facility]  AT [facility] - [phone]  - [phone]      Additional notes: PATIENT IS HAVING SYMPTOMS RELATED TO UTI AND IS WANTING AN ANTIBIOTIC TO HELP WITH THE SYMPTOMS.

## 2024-08-20 ENCOUNTER — OFFICE VISIT (OUTPATIENT)
Dept: INTERNAL MEDICINE | Facility: CLINIC | Age: 79
End: 2024-08-20
Payer: MEDICARE

## 2024-08-20 ENCOUNTER — TREATMENT (OUTPATIENT)
Dept: PHYSICAL THERAPY | Facility: CLINIC | Age: 79
End: 2024-08-20
Payer: MEDICARE

## 2024-08-20 VITALS
TEMPERATURE: 98.2 F | HEART RATE: 95 BPM | HEIGHT: 64 IN | DIASTOLIC BLOOD PRESSURE: 72 MMHG | WEIGHT: 193 LBS | BODY MASS INDEX: 32.95 KG/M2 | SYSTOLIC BLOOD PRESSURE: 140 MMHG | OXYGEN SATURATION: 98 %

## 2024-08-20 DIAGNOSIS — M79.672 LEFT FOOT PAIN: Primary | ICD-10-CM

## 2024-08-20 DIAGNOSIS — I10 ESSENTIAL HYPERTENSION: Chronic | ICD-10-CM

## 2024-08-20 DIAGNOSIS — R30.0 DYSURIA: Primary | ICD-10-CM

## 2024-08-20 DIAGNOSIS — R29.898 WEAKNESS OF BOTH LOWER EXTREMITIES: ICD-10-CM

## 2024-08-20 DIAGNOSIS — M25.672 DECREASED RANGE OF MOTION OF LEFT ANKLE: ICD-10-CM

## 2024-08-20 LAB
BILIRUB BLD-MCNC: NEGATIVE MG/DL
CLARITY, POC: CLEAR
COLOR UR: ABNORMAL
EXPIRATION DATE: ABNORMAL
GLUCOSE UR STRIP-MCNC: NEGATIVE MG/DL
KETONES UR QL: NEGATIVE
LEUKOCYTE EST, POC: NEGATIVE
Lab: ABNORMAL
NITRITE UR-MCNC: NEGATIVE MG/ML
PH UR: 5 [PH] (ref 5–8)
PROT UR STRIP-MCNC: NEGATIVE MG/DL
RBC # UR STRIP: NEGATIVE /UL
SP GR UR: 1.01 (ref 1–1.03)
UROBILINOGEN UR QL: NORMAL

## 2024-08-20 PROCEDURE — 1159F MED LIST DOCD IN RCRD: CPT | Performed by: FAMILY MEDICINE

## 2024-08-20 PROCEDURE — 99213 OFFICE O/P EST LOW 20 MIN: CPT | Performed by: FAMILY MEDICINE

## 2024-08-20 PROCEDURE — 3078F DIAST BP <80 MM HG: CPT | Performed by: FAMILY MEDICINE

## 2024-08-20 PROCEDURE — 1125F AMNT PAIN NOTED PAIN PRSNT: CPT | Performed by: FAMILY MEDICINE

## 2024-08-20 PROCEDURE — 3077F SYST BP >= 140 MM HG: CPT | Performed by: FAMILY MEDICINE

## 2024-08-20 PROCEDURE — 81003 URINALYSIS AUTO W/O SCOPE: CPT | Performed by: FAMILY MEDICINE

## 2024-08-20 PROCEDURE — 1160F RVW MEDS BY RX/DR IN RCRD: CPT | Performed by: FAMILY MEDICINE

## 2024-08-20 NOTE — PROGRESS NOTES
Subjective   Marylu Foreman is a 79 y.o. female.   Chief Complaint   Patient presents with    vaginal burning       Dysuria-patient reports burning sensation with urination.  Started 5 days ago.  She had chills, but no fever.  No flank pain.  She took amoxicillin, high-dose yesterday in preparation to dental appointment.  She reports significant decrease in burning sensation.  No chills today.    Review of Systems   Constitutional:  Negative for fever.   Genitourinary:  Positive for dysuria. Negative for hematuria.         Objective   Wt Readings from Last 3 Encounters:   08/20/24 87.5 kg (193 lb)   07/29/24 86.5 kg (190 lb 9.6 oz)   07/15/24 86.2 kg (190 lb)      Vitals:    08/20/24 0921   BP: 140/72   Pulse: 95   Temp: 98.2 °F (36.8 °C)   SpO2: 98%     Temp Readings from Last 3 Encounters:   08/20/24 98.2 °F (36.8 °C)   07/29/24 96.6 °F (35.9 °C) (Temporal)   07/10/24 98.3 °F (36.8 °C) (Oral)     BP Readings from Last 3 Encounters:   08/20/24 140/72   07/29/24 126/73   07/15/24 132/74     Pulse Readings from Last 3 Encounters:   08/20/24 95   07/29/24 112   07/10/24 91     Body mass index is 33.11 kg/m².    Physical Exam  Constitutional:       Appearance: Normal appearance.   Abdominal:      Palpations: Abdomen is soft.      Tenderness: There is no abdominal tenderness. There is no right CVA tenderness or left CVA tenderness.         Assessment & Plan   Diagnoses and all orders for this visit:    1. Dysuria (Primary)        Dysuria-urine dip negative.  Patient is encouraged to increase hydration.  Follow-up as needed.    Hypertension-blood pressure is minimally elevated today.  She was stressed on the way to the office.  Patient takes blood pressure medications as prescribed.  No change in medications at this time.  She will monitor blood pressure at home and if it stays at or above 140/90 she will call the office.

## 2024-08-20 NOTE — PROGRESS NOTES
Physical Therapy Daily Treatment Note  [facility] [place]  [address], [address]  [place] [id number]     Patient: Marylu Foreman  : 1945  Referring practitioner: Brett Reed MD  Today's Date: 2024    VISIT#: 10    Visit Diagnoses:    ICD-10-CM ICD-9-CM   1. Left foot pain  M79.672 729.5   2. Weakness of both lower extremities  R29.898 729.89   3. Decreased range of motion of left ankle  M25.672 719.57       Subjective   Marylu Foreman reports: that she's having a severe headache that she feels is due to her TMJ dysfunction.      Objective   Tenderness with reproduction of headache on palpation of B splenius capitis, suboccipitals, UT, SCM, cervical ES.    See Exercise, Manual, and Modality Logs for complete treatment.     Patient Education: TMJ discussion with suggestion for referral to PT specialist. Relaxation and cervical involvement; cease session due to dizziness/headache      Assessment/Plan  Pt reported dizziness and headache during session today worse after gentle supine exercises, therefore session was ceased early with discussion of onward referral for TMJ/cervical PT.      Progress per Plan of Care           Timed:         Manual Therapy:    6     mins  61592;     Therapeutic Exercise:    8     mins  02976;     Neuromuscular Scott:    12    mins  46981;    Therapeutic Activity:     4     mins  94580;     Gait Training:           mins  55532;     Ultrasound:          mins  17480;    Ionto:                                   mins  71948  Self Care:                       10     mins  34000    Un-Timed:  Electrical Stimulation:         mins  36753 ( );  Dry Needling          mins self-pay  Traction          mins 28339  Re-Eval                               mins  42086  Group Therapy           ___ mins 42697    Timed Treatment:   40   mins   Total Treatment:     40   mins    Chase Mclean PT  Physical Therapist  [place] license #:  [id number]

## 2024-08-22 ENCOUNTER — TREATMENT (OUTPATIENT)
Dept: PHYSICAL THERAPY | Facility: CLINIC | Age: 79
End: 2024-08-22
Payer: MEDICARE

## 2024-08-22 DIAGNOSIS — M25.672 DECREASED RANGE OF MOTION OF LEFT ANKLE: ICD-10-CM

## 2024-08-22 DIAGNOSIS — M79.672 LEFT FOOT PAIN: Primary | ICD-10-CM

## 2024-08-22 DIAGNOSIS — R29.898 WEAKNESS OF BOTH LOWER EXTREMITIES: ICD-10-CM

## 2024-08-22 NOTE — PROGRESS NOTES
Physical Therapy Daily Treatment Note  [facility] [place]  [address], [address]  [place] [address]     Patient: Marylu Foreman  : 1945  Referring practitioner: Brett Reed MD  Today's Date: 2024    VISIT#: 11    Visit Diagnoses:    ICD-10-CM ICD-9-CM   1. Left foot pain  M79.672 729.5   2. Weakness of both lower extremities  R29.898 729.89   3. Decreased range of motion of left ankle  M25.672 719.57       Subjective   Marylu Foreman reports: that she still has a headache since Monday, but she has a referral to PT for TMJ treatment.         Objective     See Exercise, Manual, and Modality Logs for complete treatment.     Patient Education: continue HEP, focus on AROM ankle        Assessment/Plan  Pt tolerated session well with improving eccentric control of dorsiflexion both gravity eliminated and against gravity during exercises. She notes that she does a lot of passive stretching but less AROM tasks, so pt was encouraged to focus on AROM of toes and ankle motion at home throughout the day wherever able.      Progress per Plan of Care          Timed:         Manual Therapy:         mins  43938;     Therapeutic Exercise:    8     mins  40834;     Neuromuscular Scott:    20    mins  02830;    Therapeutic Activity:          mins  97372;     Gait Training:           mins  15142;     Ultrasound:          mins  79133;    Ionto:                                   mins  07781  Self Care:                       2     mins  32425    Un-Timed:  Electrical Stimulation:         mins  44464 ( );  Dry Needling          mins self-pay  Traction          mins 97921  Re-Eval                               mins  42244  Group Therapy           ___ mins 62844    Timed Treatment:   30   mins   Total Treatment:     48   mins    Chase Mclean PT  Physical Therapist  [place] [facility] license #: [id number]

## 2024-08-27 ENCOUNTER — TREATMENT (OUTPATIENT)
Dept: PHYSICAL THERAPY | Facility: CLINIC | Age: 79
End: 2024-08-27
Payer: MEDICARE

## 2024-08-27 DIAGNOSIS — M79.672 LEFT FOOT PAIN: Primary | ICD-10-CM

## 2024-08-27 DIAGNOSIS — R29.898 WEAKNESS OF BOTH LOWER EXTREMITIES: ICD-10-CM

## 2024-08-27 DIAGNOSIS — M25.672 DECREASED RANGE OF MOTION OF LEFT ANKLE: ICD-10-CM

## 2024-08-27 NOTE — PROGRESS NOTES
Chief Complaint  Stage I ([id number]) left breast cancer in 2007, pulmonary emboli in 2009, atrial fibrillation, history of GI bleed, history of iron deficiency status post prior gastric bypass in 2001.  Stage I ([id number]) right breast cancer status post right mastectomy 5/28/2024 (ER positive, MI negative, HER2/jorge negative)    Subjective        History of Present Illness    Patient returns today in follow-up continuing on adjuvant Arimidex 1 mg daily (initiated 7/10/2024).  She is also continuing on Prolia as treatment for osteoporosis while on aromatase inhibitor therapy (initiated 7/10/2024).  After her last visit, her drain fell out of her surgical site and fortunately she has not developed any significant seroma after her right mastectomy.  She is not having any local issues in terms of discomfort.  She is overall tolerating Arimidex well as she did when she took Arimidex previously for her left breast cancer.  She has had previous symptoms of dysuria however evaluation in our office and again with PCP on 8/20/2024 showed no evidence of UTI and she does not report any ongoing symptoms today.  She does report today that she continues to work with physical therapy in terms of her left lower extremity pain and foot drop.  She we will complete her physical therapy course later this week but is doing exercises as well at home.  We had referred her to neurology and she is scheduled to be seen on 10/8/2024 in regards to the potential need for additional evaluation.  She was into see pain management.  We had started her on gabapentin 100 mg 3 times daily and they have escalated her dose now up to 300 mg 3 times daily and she does feel that there has been improvement in the neuropathic pain in her left leg.  She reports ongoing difficulty with chronic headaches that have been in the past attributable to TMJ.  She reports severe difficulty with TMJ over the years.  She also notes that she has had worsening of her  "depressive symptoms, continues on Cymbalta 60 mg twice daily which she has taken for quite some time.  She has follow-up with Dr. Monique her psychiatrist in a few weeks.  She does note that her son and his family including twin granddaughters have moved to town.      Objective   Vital Signs:   /81   Pulse 87   Temp 98.1 °F (36.7 °C) (Oral)   Resp 16   Ht 162.6 cm (64.02\")   Wt 87.6 kg (193 lb 1.6 oz)   SpO2 96%   BMI 33.13 kg/m²     Physical Exam  Constitutional:       Appearance: She is well-developed.   Eyes:      Conjunctiva/sclera: Conjunctivae normal.   Neck:      Thyroid: No thyromegaly.   Cardiovascular:      Rate and Rhythm: Normal rate. Rhythm irregular.      Heart sounds: Murmur heard.      No friction rub. No gallop.   Pulmonary:      Effort: No respiratory distress.      Breath sounds: Normal breath sounds. No wheezing.      Comments: Chest wall was not examined today  Abdominal:      General: Bowel sounds are normal. There is no distension.      Palpations: Abdomen is soft.      Tenderness: There is no abdominal tenderness.   Musculoskeletal:      Comments: Soft brace in place left foot   Lymphadenopathy:      Head:      Right side of head: No submandibular adenopathy.      Cervical: No cervical adenopathy.      Upper Body:      Right upper body: No supraclavicular adenopathy.      Left upper body: No supraclavicular adenopathy.   Skin:     General: Skin is warm and dry.      Findings: No rash.   Neurological:      Mental Status: She is alert and oriented to person, place, and time.      Cranial Nerves: No cranial nerve deficit.      Motor: No abnormal muscle tone.      Deep Tendon Reflexes: Reflexes normal.      Comments: Patient with continued left foot drop   Psychiatric:         Behavior: Behavior normal.            Result Review : Reviewed CBC, CMP from today.  Reviewed pain management records.  Reviewed PCP records.       Assessment and Plan    *Stage I ([id number]) right breast cancer " (ER positive, [place] negative, HER2/jorge negative)  Mammogram and right breast ultrasound on 4/10/2024 with a suspicious 0.6 cm abnormality in the right breast at the 9 o'clock position.    Biopsy on 4/18/2024 of the 9:00 lesion in the right breast.  Pathology showed a well-differentiated (grade 1) invasive lobular carcinoma, no lymphovascular invasion, evidence of ALH and ADH.  ER positive (%), [place] negative (2%), HER2/jorge negative (1+ IHC), Ki-67 2%.    Breast MRI on 4/26/2024 confirmed the abnormality in the 9 o'clock position with postbiopsy cavity/abnormality measuring 1.7 cm.  There was a 1.1 cm equivocal right axillary lymph node.    Right mastectomy with sentinel node on 5/28/2024 performed by Dr. Diaz.  Pathology showed no residual cancer, did show ALH/ADH, 2 negative lymph nodes in the specimen and 4 additional negative sentinel lymph nodes.  [employer] 70 gene germline panel test 5/14/2024 with VUS in MLH1 and POLE  DEXA scan 5/22/2024 with evidence of osteoporosis (see below)  Patient not a candidate for adjuvant tamoxifen due to thrombophilia (see below).  Decision to pursue adjuvant aromatase chemotherapy again with Arimidex (previously well-tolerated) along with treatment for osteoporosis with Prolia.  Patient initiated adjuvant Arimidex 1 mg daily on 7/10/2024 with intent to treat x 5 years  Patient initiated concurrent Prolia on 7/10/2024 for treatment of osteoporosis while receiving aromatase therapy  Patient returns today in follow-up continuing on adjuvant anastrozole 1 mg daily (initiated 7/10/2024 with intent to treat x 5 years).  She is tolerating this well without any side effects.  The patient has recovered relatively well from her surgery, drain remains out.  She has had some minor reaccumulation of the seroma that has been drained intermittently by plastic surgery.  She has no clinical evidence of recurrent disease.  I will plan to see her back for further follow-up in 3 months.     *Stage I  ([id number]) left breast cancer:  Biopsy 5/8/2007 with in situ and invasive ductal carcinoma, grade 2,/WA positive, HER-2/jorge negative  Left mastectomy 6/18/0742 foci carcinoma, 4 mm, grade 1 in situ and invasive ductal carcinoma and 2 mm grade 2 in situ and invasive ductal carcinoma, negative margins, negative sentinel lymph nodes x3 with 5 additional negative lymph nodes.  Arimidex initiated 7/2/2007  Arimidex interrupted patient developed bilateral pulmonary emboli in May 2009, resume shortly thereafter.  Completed 5 years treatment in 2012.    *Pulmonary emboli 5/2/2009 and bilateral calf DVT 8/11/2022:  Family history of DVT in the patient's mother occurring at age 70 postoperatively  Patient developed bilateral lower lobe pulmonary emboli 5/2/2009 following right VATS on 4/30/2009  Felt to be a provoked thrombosis related to surgery  Hypercoagulable evaluation with negative factor V Leiden, negative prothrombin gene mutation, normal homocystine, protein C antigen 92, free to protein S 75, anticardiolipin antibody panel negative, lupus anticoagulant negative, Antithrombin %  Continuing on chronic anticoagulation primarily for atrial fibrillation at this point as prior thrombosis was provoked.  Transitioned from Coumadin to Eliquis 5 mg twice daily in May 2020  At time of hospitalization for IJEOMA/CKD 11/15-11/18/2021, Eliquis dose was decreased to 2.5 mg twice daily.  Patient required lumbar laminectomy 8/9/2022, Eliquis was held perioperatively.  Subsequently admitted 8/10 - 8/12/2022 with Doppler 8/11/2022 that showed bilateral acute calf DVT.  This occurred postoperatively and while off anticoagulation.  Recommended to resume Eliquis at 5 mg twice daily dose.  Patient will require long-term anticoagulation with Eliquis 5 mg twice daily for both thrombophilia and atrial fibrillation.  Patient returns today in follow-up continuing on Eliquis 5 mg twice daily.  She has not experienced any bleeding  complications    *Iron deficiency anemia:  Prior gastric bypass in 2001  Intolerant of oral iron.  Patient has required IV iron on multiple occasions: Feraheme 8/13 and 8/20/2018; Injectafer 3/9 and 3/16/2020  Labs on 11/15/2021 showed decline in hemoglobin to 10.8 however this was in the setting of UTI and IJEOMA/CKD3.  Her iron panel was normal with iron 114, iron saturation 34%, TIBC 337 with ferritin elevated at 292.  B12 was normal at 883.  We continue to follow these values given her history of prior gastric bypass and iron deficiency requiring intermittent IV iron (intolerant of oral iron).   On 12/20/2021, hemoglobin further declined to 9.5.  Repeat labs with iron 47, ferritin 262, iron saturation 14%, TIBC 328, B12 level 1162.  Additional labs performed with negative serum protein electrophoresis and immunoelectrophoresis with normal quantitative immunoglobulins.  Free light chains elevated with kappa 73, lambda 36 with ratio 2.04, consistent with patient's degree of renal dysfunction.  Haptoglobin normal 186, LDH normal 171, CRP borderline elevated 0.62, erythropoietin level 20.1.  Anemia felt to be related to CKD3.  Patient felt to be a potential candidate for Procrit if hemoglobin remains below 10.  Patient experienced improvement in hemoglobin into the 11-12 range  Labs on 6/19/2023 with hemoglobin 11.7, iron 50, ferritin 41.6, iron saturation 12%, TIBC 404 indicating recurrent iron deficiency likely related to malabsorption from prior gastric bypass  Patient received IV iron with Venofer 300 mg on 6/26, 6/28, 7/3, 7/7/2023  Labs on 1/8/2024 with hemoglobin normal at 13.6, patient is iron replete with iron 95, ferritin 223, iron saturation 23%, TIBC 405.  Patient was iron replete, no need for further IV iron.    Patient was seen by Dr. Bess in GI on 1/31/2024, did not recommend any immediate endoscopic evaluation, agreement that iron deficiency likely related to malabsorption from prior gastric bypass.   Plans for upper and lower endoscopy in November 2028  Labs today with hemoglobin 12.6.  Recheck stat iron panel/ferritin in 3 months at return visit.    *GI bleed in July 2018:  Acute lower GI bleed with supratherapeutic INR Coumadin.  Angiogram performed with coil embolization of artery to sigmoid colon  No clinical evidence of further GI blood loss    *Atrial fibrillation:  Requires ongoing anticoagulation for this indication  As above, transitioned from Coumadin to Eliquis 5 mg twice daily in May 2020  As above, during hospitalization 11/15-11/18/2021 for IJEOMA/CKD3, Eliquis dose was decreased to 2.5 mg twice daily  See above discussion, patient developed bilateral calf DVT while briefly off anticoagulation following lumbar laminectomy on 8/11/2022.  Patient is continuing on full dose Eliquis 5 mg twice daily.      *Status post right VATS 4/30/2020 for pulmonary nodule with finding of necrotizing granulomatous inflammation    *Severe depression/anxiety:  Patient has had chronic issues with this, is followed by psychiatry, Dr. Librado Monique.  She also underwent previous counseling which she found helpful.  The patient had ongoing difficulty with control of her depression.  She has been on multiple antidepressants  In June/July 2021 patient underwent transcranial magnetic stimulation (TMS) with significant improvement in depressive symptoms and resolution of headaches.  Symptoms subsequently gradually recurred, patient reports there has been difficulty with insurance regarding maintenance treatments.  Patient reports significant difficulty with depression recently.  She is continuing on Cymbalta 60 mg twice daily managed by her psychiatrist Dr. Monique.  She will be seeing him back in follow-up in approximately 2 weeks.  She does note however that she is quite excited that her son and his family will be moving to [place].    *IJEOMA/CKD3  Patient with CKD3 with baseline creatinine in the 1.3-1.8 range  At time  of routine follow-up visit 11/15/2021, creatinine was 3.19 and BUN of 59.  This was compared to prior value 6/2/2021 with BUN 41, creatinine 1.53.  Patient was hospitalized 11/15-11/18/2021  Patient is continuing follow-up with Dr. Cisneros in nephrology  Creatinine on 11/24/2021 remained elevated at 2.43 however on 12/20/2021 declined to 1.7, near her prior baseline.  Additional labs on 12/20/2021 with negative serum protein electrophoresis and immunoelectrophoresis with normal quantitative immunoglobulins.  Free light chains elevated with kappa 73, lambda 36 with ratio 2.04, consistent with patient's degree of renal dysfunction.  Creatinine today stable at 1.13    *Hypothyroidism   Patient continues on levothyroxine 50 mcg daily    *Severe left-sided sciatica with left foot drop  Plain film of the lumbar spine on 5/10/2022 was negative.    MRI lumbar spine at [facility] on 5/14/2022 showed no evidence of metastatic disease however did show evidence of degenerative change with disc disease and neuroforaminal compromise.     She developed a left foot drop and was referred to neurosurgery, eventually underwent lumbar laminectomy on 8/9/2022  Patient has continued with chronic difficulty regarding left foot drop.    The patient with significant neuropathic type pain in the distal left lower extremity.  Initiated gabapentin 100 mg 3 times daily.  Patient returns today in follow-up, has been continuing on physical therapy with some improvement in her left foot drop.  She did follow-up with pain management and gabapentin dose has been titrated up to 300 mg 3 times daily and will defer gabapentin to pain management.  Patient is scheduled to see neurology on 10/8/2024.    *Left foot deformities  Patient with multiple issues regarding her left foot including hammertoes, hallux valgus  Patient did require left foot surgery on 7/11/2023  Patient was experiencing significant swelling in her foot.  She was seen by  orthopedics Dr. Reed on 7/26/2024.  There was no evidence of fracture.  Patient has been referred to physical therapy, will be initiating this later this week.  There was some initial concern regarding possible gout, no response to colchicine, appears that swelling and pain were related to acute on chronic issues    *Osteoporosis  DEXA scan on 5/22/2024 with T-score L-spine 0.0, left hip -3.0, right hip -2.7  25-hydroxy vitamin D level was normal at 45.6 on 6/11/2024  Patient continuing on calcium and vitamin D supplementation.  Patient initiated adjuvant aromatase inhibitor therapy with Arimidex 1 mg daily x 5 years on 7/10/2024  Patient initiated treatment for osteoporosis concurrently with every 6-month Prolia on 7/10/2024  Next dose of Prolia due on 1/8/2024    *Chronic headaches  Patient notes chronic history of headaches that were felt to be related to TMJ.  She notes that headaches have increased somewhat recently      Plan:  Continue adjuvant Arimidex 1 mg daily with plan to treat x 5 years adjuvantly (initiated 7/10/2024)  Patient continues on Prolia every 6 months for osteoporosis (initiated 7/10/2024, due next on 1/8/2025)  Continue calcium and vitamin D supplementation  Patient is scheduled to see neurology on 10/8/2024 regarding neuropathic pain left lower extremity and left foot drop  Patient is continuing on gabapentin 300 mg 3 times daily, currently prescribed by pain management  Patient continuing with physical therapy  Patient will follow-up with Dr. Monique and psychiatry in 2 weeks regarding depression  MD visit in 3 months with CBC, CMP, stat iron panel/ferritin

## 2024-08-27 NOTE — PROGRESS NOTES
Physical Therapy Daily Treatment Note  [facility] [place]  [address], [address]  [place] [address]     Patient: Marylu Foreman  : 1945  Referring practitioner: Brett Reed MD  Today's Date: 2024    VISIT#: 12    Visit Diagnoses:    ICD-10-CM ICD-9-CM   1. Left foot pain  M79.672 729.5   2. Weakness of both lower extremities  R29.898 729.89   3. Decreased range of motion of left ankle  M25.672 719.57       Subjective   Marylu Foreman reports: that she's been doing her seated exercises very regularly, but that she hasn't been doing the standing balance type tasks much.      Objective       See Exercise, Manual, and Modality Logs for complete treatment.     Patient Education: HEP compliance with balance and weightbearing strengthening.      Assessment/Plan  Pt tolerated session well with some ongoing difficulty noted with balance and marching tasks. She required cuing for focus on active dorsiflexion motion during marching particularly and to avoid use of B UE with balancing. Plan to discuss discharge planning next session.      Progress per Plan of Care          Timed:         Manual Therapy:         mins  25963;     Therapeutic Exercise:    8     mins  43535;     Neuromuscular Scott:    15    mins  76273;    Therapeutic Activity:     4     mins  66471;     Gait Training:           mins  30352;     Ultrasound:          mins  22037;    Ionto:                                   mins  05670  Self Care:                       3     mins  01951    Un-Timed:  Electrical Stimulation:         mins  40156 ( );  Dry Needling          mins self-pay  Traction          mins 11425  Re-Eval                               mins  62129  Group Therapy           ___ mins 20084    Timed Treatment:   30   mins   Total Treatment:     50   mins    Chase Mclean PT  Physical Therapist  [place] [facility] license #: [id number]

## 2024-08-28 ENCOUNTER — OFFICE VISIT (OUTPATIENT)
Dept: ONCOLOGY | Facility: CLINIC | Age: 79
End: 2024-08-28
Payer: MEDICARE

## 2024-08-28 ENCOUNTER — LAB (OUTPATIENT)
Dept: LAB | Facility: HOSPITAL | Age: 79
End: 2024-08-28
Payer: MEDICARE

## 2024-08-28 VITALS
BODY MASS INDEX: 32.97 KG/M2 | SYSTOLIC BLOOD PRESSURE: 137 MMHG | RESPIRATION RATE: 16 BRPM | HEIGHT: 64 IN | WEIGHT: 193.1 LBS | TEMPERATURE: 98.1 F | OXYGEN SATURATION: 96 % | DIASTOLIC BLOOD PRESSURE: 81 MMHG | HEART RATE: 87 BPM

## 2024-08-28 DIAGNOSIS — D50.8 OTHER IRON DEFICIENCY ANEMIA: ICD-10-CM

## 2024-08-28 DIAGNOSIS — Z17.0 MALIGNANT NEOPLASM OF OVERLAPPING SITES OF LEFT BREAST IN FEMALE, ESTROGEN RECEPTOR POSITIVE: Primary | ICD-10-CM

## 2024-08-28 DIAGNOSIS — M81.0 AGE-RELATED OSTEOPOROSIS WITHOUT CURRENT PATHOLOGICAL FRACTURE: ICD-10-CM

## 2024-08-28 DIAGNOSIS — C50.812 MALIGNANT NEOPLASM OF OVERLAPPING SITES OF LEFT BREAST IN FEMALE, ESTROGEN RECEPTOR POSITIVE: Primary | ICD-10-CM

## 2024-08-28 DIAGNOSIS — G57.92 NEUROPATHY OF LEFT LOWER EXTREMITY: ICD-10-CM

## 2024-08-28 DIAGNOSIS — Z17.0 MALIGNANT NEOPLASM OF OVERLAPPING SITES OF LEFT BREAST IN FEMALE, ESTROGEN RECEPTOR POSITIVE: ICD-10-CM

## 2024-08-28 DIAGNOSIS — C50.812 MALIGNANT NEOPLASM OF OVERLAPPING SITES OF LEFT BREAST IN FEMALE, ESTROGEN RECEPTOR POSITIVE: ICD-10-CM

## 2024-08-28 DIAGNOSIS — R30.0 DYSURIA: ICD-10-CM

## 2024-08-28 LAB
ALBUMIN SERPL-MCNC: 4.2 G/DL (ref 3.5–5.2)
ALBUMIN/GLOB SERPL: 1.5 G/DL
ALP SERPL-CCNC: 102 U/L (ref 39–117)
ALT SERPL W P-5'-P-CCNC: 16 U/L (ref 1–33)
ANION GAP SERPL CALCULATED.3IONS-SCNC: 11.8 MMOL/L (ref 5–15)
AST SERPL-CCNC: 21 U/L (ref 1–32)
BASOPHILS # BLD AUTO: 0.12 10*3/MM3 (ref 0–0.2)
BASOPHILS NFR BLD AUTO: 1.4 % (ref 0–1.5)
BILIRUB SERPL-MCNC: 0.6 MG/DL (ref 0–1.2)
BUN SERPL-MCNC: 33 MG/DL (ref 8–23)
BUN/CREAT SERPL: 29.2 (ref 7–25)
CALCIUM SPEC-SCNC: 9.1 MG/DL (ref 8.6–10.5)
CHLORIDE SERPL-SCNC: 104 MMOL/L (ref 98–107)
CO2 SERPL-SCNC: 24.2 MMOL/L (ref 22–29)
CREAT SERPL-MCNC: 1.13 MG/DL (ref 0.57–1)
DEPRECATED RDW RBC AUTO: 50.4 FL (ref 37–54)
EGFRCR SERPLBLD CKD-EPI 2021: 50.2 ML/MIN/1.73
EOSINOPHIL # BLD AUTO: 0.33 10*3/MM3 (ref 0–0.4)
EOSINOPHIL NFR BLD AUTO: 3.8 % (ref 0.3–6.2)
ERYTHROCYTE [DISTWIDTH] IN BLOOD BY AUTOMATED COUNT: 14.4 % (ref 12.3–15.4)
GLOBULIN UR ELPH-MCNC: 2.8 GM/DL
GLUCOSE SERPL-MCNC: 124 MG/DL (ref 65–99)
HCT VFR BLD AUTO: 39.1 % (ref 34–46.6)
HGB BLD-MCNC: 12.6 G/DL (ref 12–15.9)
IMM GRANULOCYTES # BLD AUTO: 0.01 10*3/MM3 (ref 0–0.05)
IMM GRANULOCYTES NFR BLD AUTO: 0.1 % (ref 0–0.5)
LYMPHOCYTES # BLD AUTO: 2.74 10*3/MM3 (ref 0.7–3.1)
LYMPHOCYTES NFR BLD AUTO: 31.5 % (ref 19.6–45.3)
MCH RBC QN AUTO: 31 PG (ref 26.6–33)
MCHC RBC AUTO-ENTMCNC: 32.2 G/DL (ref 31.5–35.7)
MCV RBC AUTO: 96.1 FL (ref 79–97)
MONOCYTES # BLD AUTO: 0.76 10*3/MM3 (ref 0.1–0.9)
MONOCYTES NFR BLD AUTO: 8.7 % (ref 5–12)
NEUTROPHILS NFR BLD AUTO: 4.73 10*3/MM3 (ref 1.7–7)
NEUTROPHILS NFR BLD AUTO: 54.5 % (ref 42.7–76)
NRBC BLD AUTO-RTO: 0 /100 WBC (ref 0–0.2)
PLATELET # BLD AUTO: 345 10*3/MM3 (ref 140–450)
PMV BLD AUTO: 10.4 FL (ref 6–12)
POTASSIUM SERPL-SCNC: 5 MMOL/L (ref 3.5–5.2)
PROT SERPL-MCNC: 7 G/DL (ref 6–8.5)
RBC # BLD AUTO: 4.07 10*6/MM3 (ref 3.77–5.28)
SODIUM SERPL-SCNC: 140 MMOL/L (ref 136–145)
WBC NRBC COR # BLD AUTO: 8.69 10*3/MM3 (ref 3.4–10.8)

## 2024-08-28 PROCEDURE — 80053 COMPREHEN METABOLIC PANEL: CPT

## 2024-08-28 PROCEDURE — 85025 COMPLETE CBC W/AUTO DIFF WBC: CPT

## 2024-08-28 PROCEDURE — 36415 COLL VENOUS BLD VENIPUNCTURE: CPT

## 2024-08-29 ENCOUNTER — TREATMENT (OUTPATIENT)
Dept: PHYSICAL THERAPY | Facility: CLINIC | Age: 79
End: 2024-08-29
Payer: MEDICARE

## 2024-08-29 DIAGNOSIS — R29.898 WEAKNESS OF BOTH LOWER EXTREMITIES: ICD-10-CM

## 2024-08-29 DIAGNOSIS — M79.672 LEFT FOOT PAIN: Primary | ICD-10-CM

## 2024-08-29 DIAGNOSIS — M25.672 DECREASED RANGE OF MOTION OF LEFT ANKLE: ICD-10-CM

## 2024-08-29 NOTE — PROGRESS NOTES
Physical Therapy Daily Treatment Note  [facility] [place]  [address], [address]  [place] [address]     Patient: Marylu Foreman  : 1945  Referring practitioner: Brett Reed MD  Today's Date: 2024    VISIT#: 13    Visit Diagnoses:    ICD-10-CM ICD-9-CM   1. Left foot pain  M79.672 729.5   2. Weakness of both lower extremities  R29.898 729.89   3. Decreased range of motion of left ankle  M25.672 719.57       Subjective   Marylu Foreman reports: that she has noticed that she doesn't have to use her arms to counterbalance her and for momentum when she's doing her STS exercise and standing from a chair.      Objective     See Exercise, Manual, and Modality Logs for complete treatment.     Patient Education: continue HEP - 2 more visits spaced 2 weeks apart each.      Assessment/Plan  Pt continues to demonstrated improvements in performance with each exercise that doesn't have AFO on, with improved DF and inv/eversion activity of her left foot. She notes less discomfort with all tasks. She continues to require frequent cuing for technique, rep count, and hold times. Plan to wean from PT to focus on independent HEP.      Progress per Plan of Care          Timed:         Manual Therapy:         mins  46316;     Therapeutic Exercise:    8     mins  95378;     Neuromuscular Scott:    18    mins  38302;    Therapeutic Activity:     12     mins  43579;     Gait Training:           mins  77609;     Ultrasound:          mins  89965;    Ionto:                                   mins  87242  Self Care:                            mins  13028    Un-Timed:  Electrical Stimulation:         mins  24186 ( );  Dry Needling          mins self-pay  Traction          mins 27705  Re-Eval                               mins  70581  Group Therapy           ___ mins 35002    Timed Treatment:   38   mins   Total Treatment:     50   mins    Chase Mclean PT  Physical  Therapist  [place] license #: [id number]

## 2024-09-01 DIAGNOSIS — Z17.0 MALIGNANT NEOPLASM OF OVERLAPPING SITES OF LEFT BREAST IN FEMALE, ESTROGEN RECEPTOR POSITIVE: ICD-10-CM

## 2024-09-01 DIAGNOSIS — M81.0 AGE-RELATED OSTEOPOROSIS WITHOUT CURRENT PATHOLOGICAL FRACTURE: ICD-10-CM

## 2024-09-01 DIAGNOSIS — C50.812 MALIGNANT NEOPLASM OF OVERLAPPING SITES OF LEFT BREAST IN FEMALE, ESTROGEN RECEPTOR POSITIVE: ICD-10-CM

## 2024-09-01 DIAGNOSIS — R30.0 DYSURIA: ICD-10-CM

## 2024-09-01 DIAGNOSIS — G57.92 NEUROPATHY OF LEFT LOWER EXTREMITY: ICD-10-CM

## 2024-09-01 DIAGNOSIS — D50.8 OTHER IRON DEFICIENCY ANEMIA: ICD-10-CM

## 2024-09-03 RX ORDER — GABAPENTIN 100 MG/1
100 CAPSULE ORAL 3 TIMES DAILY
Qty: 90 CAPSULE | OUTPATIENT
Start: 2024-09-03

## 2024-09-06 ENCOUNTER — TELEPHONE (OUTPATIENT)
Dept: NEUROLOGY | Facility: CLINIC | Age: 79
End: 2024-09-06
Payer: MEDICARE

## 2024-09-06 NOTE — TELEPHONE ENCOUNTER
LVM in regards to r/s appt due to provider being out of office. Set up new date of appt with LAURA Burns and sent mychart and mail reminder regarding updated appt.

## 2024-09-09 ENCOUNTER — OFFICE VISIT (OUTPATIENT)
Dept: INTERNAL MEDICINE | Facility: CLINIC | Age: 79
End: 2024-09-09
Payer: MEDICARE

## 2024-09-09 VITALS
HEART RATE: 84 BPM | WEIGHT: 197.9 LBS | SYSTOLIC BLOOD PRESSURE: 130 MMHG | TEMPERATURE: 98.3 F | HEIGHT: 64 IN | BODY MASS INDEX: 33.79 KG/M2 | DIASTOLIC BLOOD PRESSURE: 74 MMHG | OXYGEN SATURATION: 96 %

## 2024-09-09 DIAGNOSIS — C50.911 MALIGNANT NEOPLASM OF RIGHT BREAST IN FEMALE, ESTROGEN RECEPTOR POSITIVE, UNSPECIFIED SITE OF BREAST: ICD-10-CM

## 2024-09-09 DIAGNOSIS — I10 ESSENTIAL HYPERTENSION: Chronic | ICD-10-CM

## 2024-09-09 DIAGNOSIS — F32.A DEPRESSION, UNSPECIFIED DEPRESSION TYPE: Primary | ICD-10-CM

## 2024-09-09 DIAGNOSIS — E78.5 HYPERLIPIDEMIA, UNSPECIFIED HYPERLIPIDEMIA TYPE: ICD-10-CM

## 2024-09-09 DIAGNOSIS — E03.9 ACQUIRED HYPOTHYROIDISM: ICD-10-CM

## 2024-09-09 DIAGNOSIS — K21.9 GASTRO-ESOPHAGEAL REFLUX DISEASE WITHOUT ESOPHAGITIS: ICD-10-CM

## 2024-09-09 DIAGNOSIS — Z17.0 MALIGNANT NEOPLASM OF RIGHT BREAST IN FEMALE, ESTROGEN RECEPTOR POSITIVE, UNSPECIFIED SITE OF BREAST: ICD-10-CM

## 2024-09-09 DIAGNOSIS — F33.2 SEVERE EPISODE OF RECURRENT MAJOR DEPRESSIVE DISORDER, WITHOUT PSYCHOTIC FEATURES: ICD-10-CM

## 2024-09-09 PROCEDURE — 99214 OFFICE O/P EST MOD 30 MIN: CPT | Performed by: INTERNAL MEDICINE

## 2024-09-09 PROCEDURE — 1125F AMNT PAIN NOTED PAIN PRSNT: CPT | Performed by: INTERNAL MEDICINE

## 2024-09-09 PROCEDURE — 3078F DIAST BP <80 MM HG: CPT | Performed by: INTERNAL MEDICINE

## 2024-09-09 PROCEDURE — G2211 COMPLEX E/M VISIT ADD ON: HCPCS | Performed by: INTERNAL MEDICINE

## 2024-09-09 PROCEDURE — 3075F SYST BP GE 130 - 139MM HG: CPT | Performed by: INTERNAL MEDICINE

## 2024-09-09 NOTE — PROGRESS NOTES
Subjective   Marylu Foreman is a 79 y.o. female.   Worsening depression    Hypertension     She has been on meds for many years  does not think the Cymbalta  is working any more  She has a very strong FH.  She says that this got wokrse aftr her surgery for breast cancer  Pt has been doing well with thyroid meds.  Taking as perscribed without any complications  Pt has been taking BP meds as prescribed without any problems.  No HA  No episodes of orthostasis    The following portions of the patient's history were reviewed and updated as appropriate: allergies, current medications, past family history, past medical history, past social history, past surgical history, and problem list.  No tob noetoh  Review of Systems    Objective   Physical Exam  Vitals reviewed.   Constitutional:       Appearance: She is well-developed.   HENT:      Head: Normocephalic and atraumatic.      Right Ear: External ear normal.      Left Ear: External ear normal.   Eyes:      Conjunctiva/sclera: Conjunctivae normal.      Pupils: Pupils are equal, round, and reactive to light.   Neck:      Thyroid: No thyromegaly.      Trachea: No tracheal deviation.   Cardiovascular:      Rate and Rhythm: Normal rate and regular rhythm.      Heart sounds: Normal heart sounds.   Pulmonary:      Effort: Pulmonary effort is normal.      Breath sounds: Normal breath sounds.   Abdominal:      General: Bowel sounds are normal. There is no distension.      Palpations: Abdomen is soft.      Tenderness: There is no abdominal tenderness.   Musculoskeletal:         General: No deformity. Normal range of motion.      Cervical back: Normal range of motion.   Skin:     General: Skin is warm and dry.   Neurological:      Mental Status: She is alert and oriented to person, place, and time.   Psychiatric:         Behavior: Behavior normal.         Thought Content: Thought content normal.         Judgment: Judgment normal.       Vitals:    09/09/24 1046   BP: 130/74   Pulse:  84   Temp: 98.3 °F (36.8 °C)   SpO2: 96%     Body mass index is 33.95 kg/m².         Assessment & Plan   Diagnoses and all orders for this visit:    1. Depression, unspecified depression type (Primary)  -     Ambulatory Referral to Psychiatry    2. Essential hypertension    3. Gastro-esophageal reflux disease without esophagitis    4. Malignant neoplasm of right breast in female, estrogen receptor positive, unspecified site of breast    5. Severe episode of recurrent major depressive disorder, without psychotic features    6. Acquired hypothyroidism    7. Hyperlipidemia, unspecified hyperlipidemia type       Depression-  this has been a life long issues  she has been on meds for many years and they always quit wokring  try deplin and see how that wokr  we will refer to psych  may consider trying something like vraylar  Hearing loss-she has tried hearing aid online she ordered  Breast cancer-  ok anti estrogen therapy  HTN-  ok with avapro.  Patient tries to limit her salt intake and monitors her blood pressure.  Foot drop on the right  she says related to nerve on back and takes the gabapentin.  She has worked with physical therapy and has a brace that she uses

## 2024-09-12 ENCOUNTER — TREATMENT (OUTPATIENT)
Dept: PHYSICAL THERAPY | Facility: CLINIC | Age: 79
End: 2024-09-12
Payer: MEDICARE

## 2024-09-12 DIAGNOSIS — M79.672 LEFT FOOT PAIN: Primary | ICD-10-CM

## 2024-09-12 DIAGNOSIS — M25.672 DECREASED RANGE OF MOTION OF LEFT ANKLE: ICD-10-CM

## 2024-09-12 DIAGNOSIS — R29.898 WEAKNESS OF BOTH LOWER EXTREMITIES: ICD-10-CM

## 2024-09-12 NOTE — PROGRESS NOTES
Physical Therapy Daily Treatment and Progress Note  [facility] [place]  [address], [address]  [place] [id number]     Patient: Marylu Foreman  : 1945  Referring practitioner: Brett Reed MD  Today's Date: 2024    VISIT#: 14    Visit Diagnoses:    ICD-10-CM ICD-9-CM   1. Left foot pain  M79.672 729.5   2. Weakness of both lower extremities  R29.898 729.89   3. Decreased range of motion of left ankle  M25.672 719.57     LEFS: 48/80    Subjective Evaluation    Pain  At worst pain rating: 3    Marylu Foreman reports: that she feels like she's seen around 75% improvements in symptoms and function since the start of her POC. She still feels some pains in the anterior shin area, and has ongoing weakness in the ankle particularly with dorsiflexion.        Objective          Palpation   Left   No palpable tenderness to the anterior tibialis and peroneus.   Tenderness of the lateral gastrocnemius, medial gastrocnemius, posterior tibialis and soleus.   Trigger point to medial gastrocnemius and soleus.     Passive Range of Motion   Left Hip   Normal passive range of motion    Right Hip   Normal passive range of motion  Left Ankle/Foot    Dorsiflexion (ke): 5 degrees   Plantar flexion: 50 degrees   Inversion: 40 degrees   Eversion: 6 degrees     Joint Play   Left Ankle/Foot  Joints within functional limits are the subtalar joint and midfoot. Hypomobile in the talocrural joint.     Strength/Myotome Testing     Left Hip   Planes of Motion   Flexion: 4  Extension: 4-  Abduction: 4-  External rotation: 4+  Internal rotation: 4    Right Hip   Planes of Motion   Flexion: 4  Extension: 4-  Abduction: 4-  External rotation: 4+  Internal rotation: 4+    Left Knee   Flexion: 4+  Extension: 4+    Right Knee   Flexion: 4+  Extension: 5    Left Ankle/Foot   Dorsiflexion: 2-  Inversion: 4+  Eversion: 4  Great toe flexion: 4  Great toe extension: 2-    Right Ankle/Foot    Dorsiflexion: 4+  Inversion: 4+  Eversion: 4+  Great toe extension: 4+    Tests       Thoracic   Negative slump.     Lumbar     Left   Negative passive SLR and quadrant.     Right   Negative passive SLR and quadrant.     Additional Tests Details  Pt reports improvements in symptoms in L foot with passive prone positioning with elbow prop after < 30 seconds    Ambulation   Weight-Bearing Status   Assistive device used: front-wheeled walker    Additional Weight-Bearing Status Details  L foot AFO with surgical shoe    Ambulation: Level Surfaces   Ambulation with assistive device: independent  Ambulation without assistive device: moderate assist    Observational Gait   Gait: antalgic   Decreased walking speed and stride length.   Base of support: increased      See Exercise, Manual, and Modality Logs for complete treatment.     Patient Education: assessment findings and progress; continue focus on dorsiflexion/toe extension with every step, even when wearing the brace        Assessment & Plan       Assessment  Assessment details: Pt continues to present to PT with complaints of left foot area pain and drop foot. She has reported good improvements, at least 75% better since the start of her POC. Her pain at worst has improved from 6/10 to 3/10, and her LEFS is slightly better than last report at 48/80. Her initial report was much higher but was based on use of AFO instead of barefoot.  She has demonstrated some further improvements in L LE strength, with improving activation of dorsiflexors and functional abilities increasing. She does however require significant ongoing cuing for activation of dorsiflexors during all weightbearing tasks else she lapses into passive drop foot pattern.  She has met 3/4 STGs and 1/4 LTGs for therapy at this time. She will benefit from ongoing skilled PT services to ensure adequate understanding of her HEP and educate further on compliance prior to DC.  Prognosis: fair    Goals  Plan  Goals: ST. Pt will be independent and compliant with initial HEP in 2 weeks. PROGRESSING  2. Pt will report a 40% improvement in symptoms since starting therapy in 4 weeks. MET  3. Pt will demonstrate an increase in B hip extension strength 4- within 4 weeks.  MET  4. Pt will report pain level at worst <5 during walking in home in 4 weeks. MET    LT. Pt will be independent with final HEP for self-management of condition by DC.  2. Pt will improve score on LEFS to greater than 67/80 by DC.   3. Pt will report a 70% improvement in symptoms by DC in order to allow return to PLOF. MET  4. Pt will improve B hip strength to 4/5 to decrease functional mobility difficulty by DC.    Plan  Therapy options: will be seen for skilled therapy services  Treatment plan discussed with: patient        Progress per Plan of Care          Timed:         Manual Therapy:         mins  59501;     Therapeutic Exercise:    8     mins  19281;     Neuromuscular Scott:    15    mins  73738;    Therapeutic Activity:     20     mins  86861;     Gait Training:           mins  94846;     Ultrasound:          mins  69701;    Ionto:                                   mins  90109  Self Care:                       10     mins  42629    Un-Timed:  Electrical Stimulation:         mins  52662 ( );  Dry Needling          mins self-pay  Traction          mins 41198  Re-Eval                               mins  42040  Group Therapy           ___ mins 77693    Timed Treatment:   53   mins   Total Treatment:     53   mins    Chase Mclean PT  Physical Therapist  [place] RUIZ license #: [id number]

## 2024-09-12 NOTE — LETTER
Physical Therapy Progress Note  [facility] [place]  [address], [address]  [place] [address]     Patient: Marylu Foreman  : 1945  Referring practitioner: Brett Reed MD  Today's Date: 2024    VISIT#: 14    Visit Diagnoses:    ICD-10-CM ICD-9-CM   1. Left foot pain  M79.672 729.5   2. Weakness of both lower extremities  R29.898 729.89   3. Decreased range of motion of left ankle  M25.672 719.57     LEFS: 48/80    Subjective Evaluation    Pain  At worst pain rating: 3    Marylu Foreman reports: that she feels like she's seen around 75% improvements in symptoms and function since the start of her POC. She still feels some pains in the anterior shin area, and has ongoing weakness in the ankle particularly with dorsiflexion.        Objective      Palpation   Left   No palpable tenderness to the anterior tibialis and peroneus.   Tenderness of the lateral gastrocnemius, medial gastrocnemius, posterior tibialis and soleus.   Trigger point to medial gastrocnemius and soleus.     Passive Range of Motion   Left Hip   Normal passive range of motion    Right Hip   Normal passive range of motion  Left Ankle/Foot    Dorsiflexion (ke): 5 degrees   Plantar flexion: 50 degrees   Inversion: 40 degrees   Eversion: 6 degrees     Joint Play   Left Ankle/Foot  Joints within functional limits are the subtalar joint and midfoot. Hypomobile in the talocrural joint.     Strength/Myotome Testing     Left Hip   Planes of Motion   Flexion: 4  Extension: 4-  Abduction: 4-  External rotation: 4+  Internal rotation: 4    Right Hip   Planes of Motion   Flexion: 4  Extension: 4-  Abduction: 4-  External rotation: 4+  Internal rotation: 4+    Left Knee   Flexion: 4+  Extension: 4+    Right Knee   Flexion: 4+  Extension: 5    Left Ankle/Foot   Dorsiflexion: 2-  Inversion: 4+  Eversion: 4  Great toe flexion: 4  Great toe extension: 2-    Right Ankle/Foot   Dorsiflexion: 4+  Inversion:  4+  Eversion: 4+  Great toe extension: 4+    Tests       Thoracic   Negative slump.     Lumbar     Left   Negative passive SLR and quadrant.     Right   Negative passive SLR and quadrant.     Additional Tests Details  Pt reports improvements in symptoms in L foot with passive prone positioning with elbow prop after < 30 seconds    Ambulation   Weight-Bearing Status   Assistive device used: front-wheeled walker    Additional Weight-Bearing Status Details  L foot AFO with surgical shoe    Ambulation: Level Surfaces   Ambulation with assistive device: independent  Ambulation without assistive device: moderate assist    Observational Gait   Gait: antalgic   Decreased walking speed and stride length.   Base of support: increased        Assessment & Plan     Assessment  Assessment details: Pt continues to present to PT with complaints of left foot area pain and drop foot. She has reported good improvements, at least 75% better since the start of her POC. Her pain at worst has improved from 6/10 to 3/10, and her LEFS is slightly better than last report at 48/80. Her initial report was much higher but was based on use of AFO instead of barefoot.  She has demonstrated some further improvements in L LE strength, with improving activation of dorsiflexors and functional abilities increasing. She does however require significant ongoing cuing for activation of dorsiflexors during all weightbearing tasks else she lapses into passive drop foot pattern.  She has met 3/4 STGs and 1/4 LTGs for therapy at this time. She will benefit from ongoing skilled PT services to ensure adequate understanding of her HEP and educate further on compliance prior to DC.  Prognosis: fair    Goals  Plan Goals: ST. Pt will be independent and compliant with initial HEP in 2 weeks. PROGRESSING  2. Pt will report a 40% improvement in symptoms since starting therapy in 4 weeks. MET  3. Pt will demonstrate an increase in B hip extension strength 4-  within 4 weeks.  MET  4. Pt will report pain level at worst <5 during walking in home in 4 weeks. MET    LT. Pt will be independent with final HEP for self-management of condition by DC.  2. Pt will improve score on LEFS to greater than 67/80 by DC.   3. Pt will report a 70% improvement in symptoms by DC in order to allow return to PLOF. MET  4. Pt will improve B hip strength to 4/5 to decrease functional mobility difficulty by DC.    Plan  Therapy options: will be seen for skilled therapy services  Treatment plan discussed with: patient        Progress per Plan of Care      Chase Mclean PT  Physical Therapist  [place] [facility] license #: [id number]

## 2024-09-17 ENCOUNTER — OFFICE VISIT (OUTPATIENT)
Dept: CARDIOLOGY | Facility: CLINIC | Age: 79
End: 2024-09-17
Payer: MEDICARE

## 2024-09-17 VITALS
OXYGEN SATURATION: 97 % | DIASTOLIC BLOOD PRESSURE: 90 MMHG | SYSTOLIC BLOOD PRESSURE: 164 MMHG | WEIGHT: 196.6 LBS | HEIGHT: 64 IN | BODY MASS INDEX: 33.57 KG/M2 | HEART RATE: 84 BPM

## 2024-09-17 DIAGNOSIS — I70.0 AORTIC CALCIFICATION: ICD-10-CM

## 2024-09-17 DIAGNOSIS — I48.21 PERMANENT ATRIAL FIBRILLATION: Primary | ICD-10-CM

## 2024-09-17 DIAGNOSIS — E78.5 HYPERLIPIDEMIA, UNSPECIFIED HYPERLIPIDEMIA TYPE: ICD-10-CM

## 2024-09-17 DIAGNOSIS — I10 ESSENTIAL HYPERTENSION: Chronic | ICD-10-CM

## 2024-09-17 PROCEDURE — 1160F RVW MEDS BY RX/DR IN RCRD: CPT | Performed by: INTERNAL MEDICINE

## 2024-09-17 PROCEDURE — 1159F MED LIST DOCD IN RCRD: CPT | Performed by: INTERNAL MEDICINE

## 2024-09-17 PROCEDURE — 99214 OFFICE O/P EST MOD 30 MIN: CPT | Performed by: INTERNAL MEDICINE

## 2024-09-17 PROCEDURE — 3080F DIAST BP >= 90 MM HG: CPT | Performed by: INTERNAL MEDICINE

## 2024-09-17 PROCEDURE — 3077F SYST BP >= 140 MM HG: CPT | Performed by: INTERNAL MEDICINE

## 2024-09-26 ENCOUNTER — TREATMENT (OUTPATIENT)
Dept: PHYSICAL THERAPY | Facility: CLINIC | Age: 79
End: 2024-09-26
Payer: MEDICARE

## 2024-09-26 DIAGNOSIS — M25.672 DECREASED RANGE OF MOTION OF LEFT ANKLE: ICD-10-CM

## 2024-09-26 DIAGNOSIS — R29.898 WEAKNESS OF BOTH LOWER EXTREMITIES: ICD-10-CM

## 2024-09-26 DIAGNOSIS — M79.672 LEFT FOOT PAIN: Primary | ICD-10-CM

## 2024-09-30 ENCOUNTER — TELEPHONE (OUTPATIENT)
Dept: INTERNAL MEDICINE | Facility: CLINIC | Age: 79
End: 2024-09-30
Payer: MEDICARE

## 2024-09-30 RX ORDER — APIXABAN 5 MG/1
5 TABLET, FILM COATED ORAL EVERY 12 HOURS SCHEDULED
Qty: 180 TABLET | Refills: 1 | Status: SHIPPED | OUTPATIENT
Start: 2024-09-30

## 2024-09-30 NOTE — TELEPHONE ENCOUNTER
Caller: Marylu Foreman    Relationship: Self    Best call back number: [phone]     What medication are you requesting:      What are your current symptoms:      How long have you been experiencing symptoms:      Have you had these symptoms before:    [] Yes  [] No    Have you been treated for these symptoms before:   [] Yes  [] No    If a prescription is needed, what is your preferred pharmacy and phone number: [id number] [place] [id number] AT [facility] - [phone]  - [phone]      Additional notes: PATIENT CALLED SHE HAD THE COVID, FLU VACCINE ON FRIDAY 9/27/24 AND NOW HAS YEAST INFECTION.  WANTS TO KNOW WHAT SHE NEEDS TO DO OR CAN DR RUBIN CALL SOMETHING INTO THE PHARMACY.  THE LEFT ARM FROM THE COVID VACCINE IS RED, SWOLLEN AND SORE, PAINFUL WITH HARD KNOT.

## 2024-10-01 ENCOUNTER — CLINICAL SUPPORT (OUTPATIENT)
Dept: INTERNAL MEDICINE | Facility: CLINIC | Age: 79
End: 2024-10-01
Payer: MEDICARE

## 2024-10-01 DIAGNOSIS — R30.0 DYSURIA: Primary | ICD-10-CM

## 2024-10-01 LAB
BILIRUB BLD-MCNC: NEGATIVE MG/DL
CLARITY, POC: CLEAR
COLOR UR: YELLOW
EXPIRATION DATE: NORMAL
GLUCOSE UR STRIP-MCNC: NEGATIVE MG/DL
KETONES UR QL: NEGATIVE
LEUKOCYTE EST, POC: NEGATIVE
Lab: NORMAL
NITRITE UR-MCNC: NEGATIVE MG/ML
PH UR: 5.5 [PH] (ref 5–8)
PROT UR STRIP-MCNC: NEGATIVE MG/DL
RBC # UR STRIP: NEGATIVE /UL
SP GR UR: 1.01 (ref 1–1.03)
UROBILINOGEN UR QL: NORMAL

## 2024-10-01 PROCEDURE — 81003 URINALYSIS AUTO W/O SCOPE: CPT | Performed by: INTERNAL MEDICINE

## 2024-10-01 RX ORDER — FLUCONAZOLE 150 MG/1
150 TABLET ORAL ONCE
Qty: 1 TABLET | Refills: 0 | Status: SHIPPED | OUTPATIENT
Start: 2024-10-01 | End: 2024-10-01

## 2024-10-05 LAB
BACTERIA UR CULT: ABNORMAL
BACTERIA UR CULT: ABNORMAL
OTHER ANTIBIOTIC SUSC ISLT: ABNORMAL

## 2024-10-07 ENCOUNTER — TELEPHONE (OUTPATIENT)
Dept: INTERNAL MEDICINE | Facility: CLINIC | Age: 79
End: 2024-10-07
Payer: MEDICARE

## 2024-10-07 RX ORDER — NITROFURANTOIN 25; 75 MG/1; MG/1
100 CAPSULE ORAL 2 TIMES DAILY
Qty: 14 CAPSULE | Refills: 0 | Status: SHIPPED | OUTPATIENT
Start: 2024-10-07

## 2024-10-07 NOTE — TELEPHONE ENCOUNTER
Caller: Marylu Foreman    Relationship: Self    Best call back number: [phone]     What is the best time to reach you: ANYTIME    Who are you requesting to speak with (clinical staff, provider,  specific staff member): CLINICAL    What was the call regarding: PATIENT CALLING STATING THAT SHE SEEN THE TEST RESULTS ON [facility] SHE WOULD LIKE TO KNOW IF A MEDICATION COULD BE CALLED INTO [facility]    Is it okay if the provider responds through [facility]: NO

## 2024-10-11 ENCOUNTER — OFFICE VISIT (OUTPATIENT)
Dept: INTERNAL MEDICINE | Facility: CLINIC | Age: 79
End: 2024-10-11
Payer: MEDICARE

## 2024-10-11 VITALS
TEMPERATURE: 98.3 F | HEIGHT: 64 IN | HEART RATE: 86 BPM | DIASTOLIC BLOOD PRESSURE: 74 MMHG | OXYGEN SATURATION: 97 % | BODY MASS INDEX: 32.81 KG/M2 | SYSTOLIC BLOOD PRESSURE: 126 MMHG | WEIGHT: 192.2 LBS

## 2024-10-11 DIAGNOSIS — B37.9 YEAST INFECTION: Primary | ICD-10-CM

## 2024-10-11 PROCEDURE — 1160F RVW MEDS BY RX/DR IN RCRD: CPT | Performed by: INTERNAL MEDICINE

## 2024-10-11 PROCEDURE — 3074F SYST BP LT 130 MM HG: CPT | Performed by: INTERNAL MEDICINE

## 2024-10-11 PROCEDURE — 1159F MED LIST DOCD IN RCRD: CPT | Performed by: INTERNAL MEDICINE

## 2024-10-11 PROCEDURE — 99213 OFFICE O/P EST LOW 20 MIN: CPT | Performed by: INTERNAL MEDICINE

## 2024-10-11 PROCEDURE — 3078F DIAST BP <80 MM HG: CPT | Performed by: INTERNAL MEDICINE

## 2024-10-11 PROCEDURE — 1125F AMNT PAIN NOTED PAIN PRSNT: CPT | Performed by: INTERNAL MEDICINE

## 2024-10-11 RX ORDER — FLUCONAZOLE 150 MG/1
TABLET ORAL
Qty: 2 TABLET | Refills: 0 | Status: SHIPPED | OUTPATIENT
Start: 2024-10-11

## 2024-10-11 NOTE — PROGRESS NOTES
Subjective   Marylu Foreman is a 79 y.o. female.   Vaginal itching and burning since abx for uti  Vaginal Itching    Depression  Her past medical history is significant for depression.      She was ecently treated with an abx for a UTI  she started having vaginal irritation on Saturday and it has been getting owrse  she has tried OTC monostat with no relief    The following portions of the patient's history were reviewed and updated as appropriate: allergies, current medications, past family history, past medical history, past social history, past surgical history, and problem list.    Review of Systems    Objective   Physical Exam  Vitals reviewed.   Constitutional:       Appearance: She is well-developed.   HENT:      Head: Normocephalic and atraumatic.      Right Ear: External ear normal.      Left Ear: External ear normal.   Eyes:      Conjunctiva/sclera: Conjunctivae normal.      Pupils: Pupils are equal, round, and reactive to light.   Neck:      Thyroid: No thyromegaly.      Trachea: No tracheal deviation.   Cardiovascular:      Rate and Rhythm: Normal rate and regular rhythm.      Heart sounds: Normal heart sounds.   Pulmonary:      Effort: Pulmonary effort is normal.      Breath sounds: Normal breath sounds.   Abdominal:      General: Bowel sounds are normal. There is no distension.      Palpations: Abdomen is soft.      Tenderness: There is no abdominal tenderness.   Musculoskeletal:         General: No deformity. Normal range of motion.      Cervical back: Normal range of motion.   Skin:     General: Skin is warm and dry.   Neurological:      Mental Status: She is alert and oriented to person, place, and time.   Psychiatric:         Behavior: Behavior normal.         Thought Content: Thought content normal.         Judgment: Judgment normal.         Vitals:    10/11/24 1100   BP: 126/74   Pulse: 86   Temp: 98.3 °F (36.8 °C)   SpO2: 97%     Body mass index is 32.97 kg/m².         Assessment & Plan    Diagnoses and all orders for this visit:    1. Yeast infection (Primary)    Other orders  -     fluconazole (Diflucan) 150 MG tablet; Take one repeat day 4 if needed  Dispense: 2 tablet; Refill: 0        1.  Yeast infection: She gets this prior to the every time she is on antibiotic.  I am going to give her to the Diflucan 1 to take now and 1 to take in 4 days if symptoms have not resolved.  In the future she will need a prescription for Diflucan whenever she gets a antibiotic.  The topical causes her a great deal of irritation and burning

## 2024-10-15 NOTE — TELEPHONE ENCOUNTER
Caller: Marylu Foreman    Relationship: Self    Best call back number: [phone]     Requested Prescriptions:   Requested Prescriptions     Pending Prescriptions Disp Refills    levothyroxine (SYNTHROID, LEVOTHROID) 50 MCG tablet       Sig: Take 1 tablet by mouth Daily.        Pharmacy where request should be sent: [id number] - [place] [id number] [facility] AT [facility] - [phone]  - [phone]     Last office visit with prescribing clinician: 10/11/2024   Last telemedicine visit with prescribing clinician: Visit date not found   Next office visit with prescribing clinician: 12/9/2024     Additional details provided by patient: PATIENT IS OUT OF MEDICATION.    Does the patient have less than a 3 day supply:  [] Yes  [] No    Would you like a call back once the refill request has been completed: [x] Yes [] No    If the office needs to give you a call back, can they leave a voicemail: [x] Yes [] No    Candie Lemus Rep   10/15/24 11:01 EDT

## 2024-10-16 RX ORDER — LEVOTHYROXINE SODIUM 50 UG/1
50 TABLET ORAL DAILY
Qty: 90 TABLET | Refills: 3 | Status: SHIPPED | OUTPATIENT
Start: 2024-10-16

## 2024-10-17 ENCOUNTER — OFFICE VISIT (OUTPATIENT)
Dept: NEUROLOGY | Facility: CLINIC | Age: 79
End: 2024-10-17
Payer: MEDICARE

## 2024-10-17 VITALS — DIASTOLIC BLOOD PRESSURE: 82 MMHG | SYSTOLIC BLOOD PRESSURE: 138 MMHG | OXYGEN SATURATION: 97 % | HEART RATE: 102 BPM

## 2024-10-17 DIAGNOSIS — G57.92 NEUROPATHY OF LEFT LOWER EXTREMITY: ICD-10-CM

## 2024-10-17 DIAGNOSIS — M21.372 LEFT FOOT DROP: Primary | Chronic | ICD-10-CM

## 2024-10-17 DIAGNOSIS — M47.16 SPONDYLOSIS WITH MYELOPATHY, LUMBAR REGION: ICD-10-CM

## 2024-10-17 RX ORDER — GABAPENTIN 100 MG/1
100 CAPSULE ORAL 3 TIMES DAILY
Qty: 90 CAPSULE | Refills: 0 | Status: SHIPPED | OUTPATIENT
Start: 2024-10-17

## 2024-10-17 RX ORDER — BUPROPION HYDROCHLORIDE 100 MG/1
100 TABLET, EXTENDED RELEASE ORAL 2 TIMES DAILY
COMMUNITY
Start: 2024-10-14

## 2024-10-17 NOTE — PROGRESS NOTES
DOS: 10/17/2024  NAME: Marylu Foreman   : 1945  PCP: Arleen Dillon MD    Chief Complaint   Patient presents with    Peripheral Neuropathy     SUBJECTIVE    Neurological Problem:  Mrs. Foreman is a 79 year old female with a past medical history of HTN, HLD, A-fib (Eliquis), heart murmur, history of DVT, hypothyroid, CKD, history of left breast cancer, anxiety/depression, lumbar spondylosis with myelopathy status post surgery, left lower extremity neuropathy and left foot drop, GURDEEP. Patient and problem are new to examiner. History is provided by patient and review of records that are summarized below.  She is unaccompanied.    HPI:  I have had the pleasure of seeing your patient today. As you know, Marylu Foreman is being seen at the request of and referred to us by Dr. West White.     Mrs. Foreman presents today for the evaluation and management of left foot drop.  She reports that approximately 2 years ago she had a back surgery.  Per review of chart she had L4-L5, L5-S1 laminectomy on 2022 with Dr. Sy. Since then she has had a left foot drop.  She says it feels as if needles are sticking in her left foot.  She just feels the symptoms in her left foot, it does not spread up her leg and she does not feel it on the right side, she does not have symptoms in her hands or arms.  She denies any weakness except in her left foot.  She denies any worsening of her symptoms over the past 2 years.  She does have a history of hypothyroid, is on levothyroxine.  She does not have a history of diabetes or chemotherapy.  She is prescribed gabapentin 100 mg and takes it twice daily.  She has also been prescribed an AFO that she wears on her left foot when she is outside of the home.  She has completed physical therapy recently, says she is not doing the exercises they taught her at home.  She says since completing therapy the dorsiflexion of her left foot has improved a little.  She is able to she will use a straight cane  when she is outside of the home and if she is on long distance walks she will use a walker.  She denies any falls in the past year.  She denies any focal or unilateral symptoms, no weakness or numbness to one side of the body, no visual or speech deficit, no facial droop. Most recent spine imaging CT lumbar with completed 7/22/2022 (prior to laminectomy) that showed advanced degenerative disc changes, moderate degree of right L3-L4, moderate to severe right L4-L5 and severe left L5-S1 foraminal stenosis and grade 1 degenerative anterior spondylolisthesis of L5 on S1.    Review of Systems   Musculoskeletal:  Positive for gait problem.   Neurological:  Positive for tremors and numbness. Negative for dizziness, seizures, syncope, facial asymmetry, speech difficulty, weakness, light-headedness and headaches.   Psychiatric/Behavioral: Negative.          The following portions of the patient's history were reviewed and updated as appropriate: allergies, current medications, past family history, past medical history, past social history, past surgical history and problem list.    Current Medications:   Current Outpatient Medications:     buPROPion SR (WELLBUTRIN SR) 100 MG 12 hr tablet, Take 1 tablet by mouth 2 (Two) Times a Day., Disp: , Rfl:     gabapentin (NEURONTIN) 100 MG capsule, Take 1 capsule by mouth 3 (Three) Times a Day., Disp: 90 capsule, Rfl: 0    anastrozole (Arimidex) 1 MG tablet, Take 1 tablet by mouth Daily., Disp: 30 tablet, Rfl: 5    ascorbic acid (VITAMIN C) 1000 MG tablet, Take 1 tablet by mouth Daily., Disp: , Rfl:     Cholecalciferol (VITAMIN D PO), Take 1 tablet by mouth Daily., Disp: , Rfl:     clobetasol propionate (TEMOVATE) 0.05 % cream, Apply 1 Application topically to the appropriate area as directed 2 (Two) Times a Day., Disp: 45 g, Rfl: 1    Coenzyme Q10 200 MG capsule, Take 200 mg by mouth Daily., Disp: , Rfl:     Cyanocobalamin (VITAMIN B 12 PO), Take 1 tablet/day by mouth Daily. HOLD  PRIOR TO SURG, Disp: , Rfl:     dilTIAZem CD (CARDIZEM CD) 180 MG 24 hr capsule, Take 1 capsule by mouth 2 (Two) Times a Day., Disp: 180 capsule, Rfl: 3    DULoxetine (CYMBALTA) 60 MG capsule, Take 1 capsule by mouth 2 (Two) Times a Day., Disp: , Rfl:     Eliquis 5 MG tablet tablet, Take 1 tablet by mouth Every 12 (Twelve) Hours., Disp: 180 tablet, Rfl: 1    fluconazole (Diflucan) 150 MG tablet, Take one repeat day 4 if needed, Disp: 2 tablet, Rfl: 0    folic acid (FOLVITE) 400 MCG tablet, Take 1 tablet by mouth Daily., Disp: , Rfl:     ketoconazole (NIZORAL) 2 % shampoo, Apply  topically to the appropriate area as directed 2 (Two) Times a Week., Disp: 100 mL, Rfl: 1    levothyroxine (SYNTHROID, LEVOTHROID) 50 MCG tablet, Take 1 tablet by mouth Daily., Disp: 90 tablet, Rfl: 3    Multiple Vitamin (MULTI-VITAMIN PO), Take 1 tablet by mouth Daily. HOLD PRIOR TO SURG, Disp: , Rfl:     Probiotic Product (PROBIOTIC PO), Take 1 tablet by mouth Daily. Lactobacillus rhamnosus GG, Disp: , Rfl:     sodium bicarbonate 650 MG tablet, Take 1 tablet by mouth Daily., Disp: , Rfl:     vitamin E 400 UNIT capsule, Take 1 capsule by mouth Daily., Disp: , Rfl:   **I did not stop or change the above medications.  Patient's medication list was updated to reflect medications they have reported as currently taking, including medication changes made by other providers.    Objective   Vital Signs:  /82   Pulse 102   SpO2 97%   There is no height or weight on file to calculate BMI.    Physical Exam   Physical Exam:  GENERAL: NAD, alert  HEENT: Normocephalic, atraumatic   Resp: Even and unlabored  Extremities: No edema  Skin: Warm and dry  Psychiatric: Normal mood and affect    Neurological:   MS: AOx3, recent/remote memory intact, normal attention/concentration, language intact, no neglect.   CN: visual acuity grossly normal, PERRL, EOMI, no nystagmus, no facial droop, no dysarthria  Motor: Normal tone and bulk, left foot drop. No  "abnormal movements noted. Postural tremor of bilateral upper extremities noted.  Hip flexors 3/5 bilat   Knee extensors 4/5 RLE, 3/5 LLE  Hamstring strength 4/5 RLE, 3/5 LLE  Dorsiflexors 4/5 RLE, 2/5 LLE  Plantar flexors 4/5 RLE, 3/5 LLE  Sensory:   Crude touch: Intact to lower extremities   Light touch: Decreased  to left foot and leg  Cold Temperature: Intact to lower extremities  Vibration: Absent left ankle, great toes bilaterally  Pinprick: Decreased left along peroneal nerve   Gait and station: Slow cautious gait, left foot AFO in place, using straight cane.     Reflexes   Right patellar: 0  Left patellar: 0  Right achilles: 0  Left achilles: 0   Plantars down-going  Monte's negative     Result Review :  The following data was reviewed by: LAURA Burns on 10/17/2024:  Laboratory Results:         Lab Results   Component Value Date    WBC 7.7 09/04/2024    HGB 12.9 09/04/2024    HCT 41.3 09/04/2024    MCV 98 (H) 09/04/2024     09/04/2024     Lab Results   Component Value Date    GLUCOSE 95 09/04/2024    BUN 31 (H) 09/04/2024    CREATININE 1.18 (H) 09/04/2024    EGFRIFNONA 35 (L) 02/14/2022    EGFRIFAFRI  11/15/2021      Comment:      <15 Indicative of kidney failure.    BCR 26 09/04/2024    K 5.2 09/04/2024    CO2 21 09/04/2024    CALCIUM 8.9 09/04/2024    PROTENTOTREF 6.9 09/04/2024    ALBUMIN 4.3 09/04/2024    LABIL2 1.8 10/17/2023    AST 20 09/04/2024    ALT 15 09/04/2024     Lab Results   Component Value Date    HGBA1C 6.5 (H) 09/04/2024     Lab Results   Component Value Date    CHOL 222 (H) 04/26/2024    CHOL 190 10/07/2022     Lab Results   Component Value Date    HDL 74 09/04/2024    HDL 76 (H) 04/26/2024    HDL 61 10/17/2023     Lab Results   Component Value Date     (H) 09/04/2024     (H) 04/26/2024     (H) 10/17/2023     Lab Results   Component Value Date    TRIG 77 09/04/2024    TRIG 98 04/26/2024    TRIG 116 10/17/2023     No results found for: \"RPR\"  Lab " Results   Component Value Date    TSH 1.860 09/04/2024     Lab Results   Component Value Date    RJLRGOHO77 1,162 (H) 12/20/2021       Data reviewed : Radiologic studies   and Consultant notes           Assessment and Plan   Diagnoses and all orders for this visit:    1. Left foot drop (Primary)  -     EMG & Nerve Conduction Test; Future  -     Post-Op Shoe    2. Spondylosis with myelopathy, lumbar region  -     EMG & Nerve Conduction Test; Future    3. Neuropathy of left lower extremity  -     gabapentin (NEURONTIN) 100 MG capsule; Take 1 capsule by mouth 3 (Three) Times a Day.  Dispense: 90 capsule; Refill: 0      We will check EMG of both lower extremities due to left foot drop and sensation change in both feet.  Continue gabapentin 100 mg, encouraged to take 3 times daily as previously ordered.  She requested a new postop shoe today, order has been placed.    We will see Marylu back in 4 months, sooner if symptoms warrant.     LAURA Burns  [facility]   10/17/24       I spent 45 minutes caring for Marylu on this date of service. This time includes time spent by me in the following activities:preparing for the visit, reviewing tests, obtaining and/or reviewing a separately obtained history, performing a medically appropriate examination and/or evaluation , counseling and educating the patient/family/caregiver, ordering medications, tests, or procedures, referring and communicating with other health care professionals , documenting information in the medical record, independently interpreting results and communicating that information with the patient/family/caregiver, and care coordination  Follow Up   Return in about 4 months (around 2/17/2025).  Patient was given instructions and counseling regarding her condition or for health maintenance advice. Please see specific information pulled into the AVS if appropriate.       Dictated using Dragon Dictation
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
## 2024-10-21 ENCOUNTER — OFFICE VISIT (OUTPATIENT)
Dept: SURGERY | Facility: CLINIC | Age: 79
End: 2024-10-21
Payer: MEDICARE

## 2024-10-21 VITALS
BODY MASS INDEX: 33.29 KG/M2 | HEIGHT: 64 IN | HEART RATE: 93 BPM | WEIGHT: 195 LBS | SYSTOLIC BLOOD PRESSURE: 140 MMHG | DIASTOLIC BLOOD PRESSURE: 80 MMHG | OXYGEN SATURATION: 97 %

## 2024-10-21 DIAGNOSIS — Z85.3 HISTORY OF BREAST CANCER: Primary | ICD-10-CM

## 2024-10-21 PROCEDURE — 3077F SYST BP >= 140 MM HG: CPT | Performed by: STUDENT IN AN ORGANIZED HEALTH CARE EDUCATION/TRAINING PROGRAM

## 2024-10-21 PROCEDURE — 99212 OFFICE O/P EST SF 10 MIN: CPT | Performed by: STUDENT IN AN ORGANIZED HEALTH CARE EDUCATION/TRAINING PROGRAM

## 2024-10-21 PROCEDURE — 1160F RVW MEDS BY RX/DR IN RCRD: CPT | Performed by: STUDENT IN AN ORGANIZED HEALTH CARE EDUCATION/TRAINING PROGRAM

## 2024-10-21 PROCEDURE — 3079F DIAST BP 80-89 MM HG: CPT | Performed by: STUDENT IN AN ORGANIZED HEALTH CARE EDUCATION/TRAINING PROGRAM

## 2024-10-21 PROCEDURE — G2211 COMPLEX E/M VISIT ADD ON: HCPCS | Performed by: STUDENT IN AN ORGANIZED HEALTH CARE EDUCATION/TRAINING PROGRAM

## 2024-10-21 PROCEDURE — 1159F MED LIST DOCD IN RCRD: CPT | Performed by: STUDENT IN AN ORGANIZED HEALTH CARE EDUCATION/TRAINING PROGRAM

## 2024-10-28 ENCOUNTER — HOSPITAL ENCOUNTER (OUTPATIENT)
Dept: OCCUPATIONAL THERAPY | Facility: HOSPITAL | Age: 79
Setting detail: THERAPIES SERIES
Discharge: HOME | End: 2024-10-28
Payer: MEDICARE

## 2024-10-28 DIAGNOSIS — C50.911 MALIGNANT NEOPLASM OF RIGHT BREAST IN FEMALE, ESTROGEN RECEPTOR POSITIVE, UNSPECIFIED SITE OF BREAST: Primary | ICD-10-CM

## 2024-10-28 DIAGNOSIS — Z91.89 AT RISK FOR LYMPHEDEMA: ICD-10-CM

## 2024-10-28 DIAGNOSIS — Z17.0 MALIGNANT NEOPLASM OF RIGHT BREAST IN FEMALE, ESTROGEN RECEPTOR POSITIVE, UNSPECIFIED SITE OF BREAST: Primary | ICD-10-CM

## 2024-10-28 PROCEDURE — 93702 BIS XTRACELL FLUID ANALYSIS: CPT

## 2024-10-28 PROCEDURE — 97535 SELF CARE MNGMENT TRAINING: CPT
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
## 2024-10-29 ENCOUNTER — TELEPHONE (OUTPATIENT)
Dept: ONCOLOGY | Facility: CLINIC | Age: 79
End: 2024-10-29
Payer: MEDICARE

## 2024-10-29 NOTE — TELEPHONE ENCOUNTER
Caller: Marylu Foreman    Relationship: Self    Best call back number: [phone]    What is the best time to reach you: BEFORE 2PM, PT HAS AN APPT    Who are you requesting to speak with (clinical staff, provider,  specific staff member): CLINICAL      What was the call regarding: PT WOULD LIKE A CB TO DISCUSS SOME BAD SIDE EFFECTS SHE IS HAVING FROM anastrozole (Arimidex) 1 MG tablet

## 2024-10-29 NOTE — TELEPHONE ENCOUNTER
Returned call to pt. Pt states she has had frequent UTI's since starting arimidex. Per Dr Dillon's note 2 weeks ago she did treat her with diflucan for a yeast infection. But pt tells me that she was just sent another medication for a UTI today. Reviewed with Dr White and he would like pt evaluated by NP in our office for this. Pt v/u and message sent to scheduling to arrange for appt

## 2024-10-31 ENCOUNTER — OFFICE VISIT (OUTPATIENT)
Dept: ONCOLOGY | Facility: CLINIC | Age: 79
End: 2024-10-31
Payer: MEDICARE

## 2024-10-31 ENCOUNTER — LAB (OUTPATIENT)
Dept: LAB | Facility: HOSPITAL | Age: 79
End: 2024-10-31
Payer: MEDICARE

## 2024-10-31 VITALS
WEIGHT: 192.2 LBS | HEART RATE: 112 BPM | HEIGHT: 64 IN | OXYGEN SATURATION: 93 % | TEMPERATURE: 98.1 F | DIASTOLIC BLOOD PRESSURE: 98 MMHG | BODY MASS INDEX: 32.81 KG/M2 | SYSTOLIC BLOOD PRESSURE: 156 MMHG

## 2024-10-31 DIAGNOSIS — F32.89 OTHER DEPRESSION: ICD-10-CM

## 2024-10-31 DIAGNOSIS — Z17.0 MALIGNANT NEOPLASM OF OVERLAPPING SITES OF LEFT BREAST IN FEMALE, ESTROGEN RECEPTOR POSITIVE: Primary | ICD-10-CM

## 2024-10-31 DIAGNOSIS — C50.812 MALIGNANT NEOPLASM OF OVERLAPPING SITES OF LEFT BREAST IN FEMALE, ESTROGEN RECEPTOR POSITIVE: Primary | ICD-10-CM

## 2024-10-31 DIAGNOSIS — Z79.811 USE OF AROMATASE INHIBITORS: ICD-10-CM

## 2024-10-31 LAB
ALBUMIN SERPL-MCNC: 4.1 G/DL (ref 3.5–5.2)
ALBUMIN/GLOB SERPL: 1.5 G/DL
ALP SERPL-CCNC: 72 U/L (ref 39–117)
ALT SERPL W P-5'-P-CCNC: 16 U/L (ref 1–33)
ANION GAP SERPL CALCULATED.3IONS-SCNC: 17.8 MMOL/L (ref 5–15)
AST SERPL-CCNC: 21 U/L (ref 1–32)
BASOPHILS # BLD AUTO: 0.13 10*3/MM3 (ref 0–0.2)
BASOPHILS NFR BLD AUTO: 1.3 % (ref 0–1.5)
BILIRUB SERPL-MCNC: 0.7 MG/DL (ref 0–1.2)
BUN SERPL-MCNC: 28 MG/DL (ref 8–23)
BUN/CREAT SERPL: 19.6 (ref 7–25)
CALCIUM SPEC-SCNC: 9.6 MG/DL (ref 8.6–10.5)
CHLORIDE SERPL-SCNC: 102 MMOL/L (ref 98–107)
CO2 SERPL-SCNC: 23.2 MMOL/L (ref 22–29)
CREAT SERPL-MCNC: 1.43 MG/DL (ref 0.57–1)
DEPRECATED RDW RBC AUTO: 51.2 FL (ref 37–54)
EGFRCR SERPLBLD CKD-EPI 2021: 37.9 ML/MIN/1.73
EOSINOPHIL # BLD AUTO: 0.34 10*3/MM3 (ref 0–0.4)
EOSINOPHIL NFR BLD AUTO: 3.3 % (ref 0.3–6.2)
ERYTHROCYTE [DISTWIDTH] IN BLOOD BY AUTOMATED COUNT: 14.6 % (ref 12.3–15.4)
GLOBULIN UR ELPH-MCNC: 2.7 GM/DL
GLUCOSE SERPL-MCNC: 117 MG/DL (ref 65–99)
HCT VFR BLD AUTO: 40.5 % (ref 34–46.6)
HGB BLD-MCNC: 12.8 G/DL (ref 12–15.9)
IMM GRANULOCYTES # BLD AUTO: 0.02 10*3/MM3 (ref 0–0.05)
IMM GRANULOCYTES NFR BLD AUTO: 0.2 % (ref 0–0.5)
LYMPHOCYTES # BLD AUTO: 3.67 10*3/MM3 (ref 0.7–3.1)
LYMPHOCYTES NFR BLD AUTO: 35.8 % (ref 19.6–45.3)
MCH RBC QN AUTO: 30.7 PG (ref 26.6–33)
MCHC RBC AUTO-ENTMCNC: 31.6 G/DL (ref 31.5–35.7)
MCV RBC AUTO: 97.1 FL (ref 79–97)
MONOCYTES # BLD AUTO: 0.93 10*3/MM3 (ref 0.1–0.9)
MONOCYTES NFR BLD AUTO: 9.1 % (ref 5–12)
NEUTROPHILS NFR BLD AUTO: 5.16 10*3/MM3 (ref 1.7–7)
NEUTROPHILS NFR BLD AUTO: 50.3 % (ref 42.7–76)
NRBC BLD AUTO-RTO: 0 /100 WBC (ref 0–0.2)
PLATELET # BLD AUTO: 335 10*3/MM3 (ref 140–450)
PMV BLD AUTO: 9.8 FL (ref 6–12)
POTASSIUM SERPL-SCNC: 4.6 MMOL/L (ref 3.5–5.2)
PROT SERPL-MCNC: 6.8 G/DL (ref 6–8.5)
RBC # BLD AUTO: 4.17 10*6/MM3 (ref 3.77–5.28)
SODIUM SERPL-SCNC: 143 MMOL/L (ref 136–145)
WBC NRBC COR # BLD AUTO: 10.25 10*3/MM3 (ref 3.4–10.8)

## 2024-10-31 PROCEDURE — 85025 COMPLETE CBC W/AUTO DIFF WBC: CPT

## 2024-10-31 PROCEDURE — 36415 COLL VENOUS BLD VENIPUNCTURE: CPT

## 2024-10-31 PROCEDURE — 80053 COMPREHEN METABOLIC PANEL: CPT

## 2024-10-31 NOTE — PROGRESS NOTES
"Chief Complaint  Stage I ([id number]) left breast cancer in 2007, pulmonary emboli in 2009, atrial fibrillation, history of GI bleed, history of iron deficiency status post prior gastric bypass in 2001.  Stage I ([id number]) right breast cancer status post right mastectomy 5/28/2024 (ER positive, [place] negative, HER2/jorge negative)    Subjective        History of Present Illness  Patient is seen today as a triage visit requesting to be seen regarding concerns she believes are related to Arimidex.  Patient states since beginning Arimidex in July she has had more frequent UTIs roughly 3-4 since that time.  She also feels that her depression is much worse.  Prior to Arimidex she states she maybe got an occasional, perhaps once a year, UTI.      Objective   Vital Signs:   /98   Pulse 112   Temp 98.1 °F (36.7 °C) (Oral)   Ht 162.6 cm (64.02\")   Wt 87.2 kg (192 lb 3.2 oz)   SpO2 93%   BMI 32.97 kg/m²     Physical Exam  Constitutional:       Appearance: She is well-developed.   Eyes:      Conjunctiva/sclera: Conjunctivae normal.   Neck:      Thyroid: No thyromegaly.   Cardiovascular:      Rate and Rhythm: Normal rate. Rhythm irregular.      Heart sounds: Murmur heard.      No friction rub. No gallop.   Pulmonary:      Effort: No respiratory distress.      Breath sounds: Normal breath sounds. No wheezing.      Comments: Chest wall was not examined today  Abdominal:      General: Bowel sounds are normal. There is no distension.      Palpations: Abdomen is soft.      Tenderness: There is no abdominal tenderness.   Musculoskeletal:      Comments: Soft brace in place left foot   Lymphadenopathy:      Head:      Right side of head: No submandibular adenopathy.      Cervical: No cervical adenopathy.      Upper Body:      Right upper body: No supraclavicular adenopathy.      Left upper body: No supraclavicular adenopathy.   Skin:     General: Skin is warm and dry.      Findings: No rash.   Neurological:      Mental Status: She " is alert and oriented to person, place, and time.      Cranial Nerves: No cranial nerve deficit.      Motor: No abnormal muscle tone.      Deep Tendon Reflexes: Reflexes normal.      Comments: Patient with continued left foot drop   Psychiatric:         Behavior: Behavior normal.            Result Review :   Results from last 7 days   Lab Units 10/31/24  1236   WBC 10*3/mm3 10.25   NEUTROS ABS 10*3/mm3 5.16   HEMOGLOBIN g/dL 12.8   HEMATOCRIT % 40.5   PLATELETS 10*3/mm3 335     Results from last 7 days   Lab Units 10/31/24  1236   SODIUM mmol/L 143   POTASSIUM mmol/L 4.6   CHLORIDE mmol/L 102   CO2 mmol/L 23.2   BUN mg/dL 28*   CREATININE mg/dL 1.43*   CALCIUM mg/dL 9.6   ALBUMIN g/dL 4.1   BILIRUBIN mg/dL 0.7   ALK PHOS U/L 72   ALT (SGPT) U/L 16   AST (SGOT) U/L 21   GLUCOSE mg/dL 117*              Assessment and Plan    *Stage I ([id number]) right breast cancer (ER positive, [place] negative, HER2/jorge negative)  Mammogram and right breast ultrasound on 4/10/2024 with a suspicious 0.6 cm abnormality in the right breast at the 9 o'clock position.    Biopsy on 4/18/2024 of the 9:00 lesion in the right breast.  Pathology showed a well-differentiated (grade 1) invasive lobular carcinoma, no lymphovascular invasion, evidence of ALH and ADH.  ER positive (%), GA negative (2%), HER2/jorge negative (1+ IHC), Ki-67 2%.    Breast MRI on 4/26/2024 confirmed the abnormality in the 9 o'clock position with postbiopsy cavity/abnormality measuring 1.7 cm.  There was a 1.1 cm equivocal right axillary lymph node.    Right mastectomy with sentinel node on 5/28/2024 performed by Dr. Diaz.  Pathology showed no residual cancer, did show ALH/ADH, 2 negative lymph nodes in the specimen and 4 additional negative sentinel lymph nodes.  [employer] 70 gene germline panel test 5/14/2024 with VUS in MLH1 and POLE  DEXA scan 5/22/2024 with evidence of osteoporosis (see below)  Patient not a candidate for adjuvant tamoxifen due to thrombophilia (see  below).  Decision to pursue adjuvant aromatase chemotherapy again with Arimidex (previously well-tolerated) along with treatment for osteoporosis with Prolia.  Patient initiated adjuvant Arimidex 1 mg daily on 7/10/2024 with intent to treat x 5 years  Patient initiated concurrent Prolia on 7/10/2024 for treatment of osteoporosis while receiving aromatase therapy  8/2024 Patient returns today in follow-up continuing on adjuvant anastrozole 1 mg daily (initiated 7/10/2024 with intent to treat x 5 years).  She is tolerating this well without any side effects.  The patient has recovered relatively well from her surgery, drain remains out.  She has had some minor reaccumulation of the seroma that has been drained intermittently by plastic surgery.  She has no clinical evidence of recurrent disease.   10/31/2024 patient seen today as a triage visit, concerned that she is having more UTIs and worsening depression since she began Arimidex in July.  She has had roughly 4 UTIs since the summer.  She also feels that her depression was better controlled on current medications until Arimidex began.  It is not clear if the symptoms are indeed related to Arimidex though we understand that Arimidex is altering her hormone levels and could be contributing to genitourinary issues including UTIs along with reported dryness vaginally.  Per discussion with Dr. White we will have the patient hold Arimidex currently and I will call and check on her in a few weeks to determine next steps.  She is otherwise scheduled back to see him 11/27/2024.     *Stage I ([id number]) left breast cancer:  Biopsy 5/8/2007 with in situ and invasive ductal carcinoma, grade 2,/GA positive, HER-2/jorge negative  Left mastectomy 6/18/0742 foci carcinoma, 4 mm, grade 1 in situ and invasive ductal carcinoma and 2 mm grade 2 in situ and invasive ductal carcinoma, negative margins, negative sentinel lymph nodes x3 with 5 additional negative lymph nodes.  Arimidex  initiated 7/2/2007  Arimidex interrupted patient developed bilateral pulmonary emboli in May 2009, resume shortly thereafter.  Completed 5 years treatment in 2012.    *Pulmonary emboli 5/2/2009 and bilateral calf DVT 8/11/2022:  Family history of DVT in the patient's mother occurring at age 70 postoperatively  Patient developed bilateral lower lobe pulmonary emboli 5/2/2009 following right VATS on 4/30/2009  Felt to be a provoked thrombosis related to surgery  Hypercoagulable evaluation with negative factor V Leiden, negative prothrombin gene mutation, normal homocystine, protein C antigen 92, free to protein S 75, anticardiolipin antibody panel negative, lupus anticoagulant negative, Antithrombin %  Continuing on chronic anticoagulation primarily for atrial fibrillation at this point as prior thrombosis was provoked.  Transitioned from Coumadin to Eliquis 5 mg twice daily in May 2020  At time of hospitalization for IJEOMA/CKD 11/15-11/18/2021, Eliquis dose was decreased to 2.5 mg twice daily.  Patient required lumbar laminectomy 8/9/2022, Eliquis was held perioperatively.  Subsequently admitted 8/10 - 8/12/2022 with Doppler 8/11/2022 that showed bilateral acute calf DVT.  This occurred postoperatively and while off anticoagulation.  Recommended to resume Eliquis at 5 mg twice daily dose.  Patient will require long-term anticoagulation with Eliquis 5 mg twice daily for both thrombophilia and atrial fibrillation.  Patient returns today in follow-up continuing on Eliquis 5 mg twice daily.  She has not experienced any bleeding complications    *Iron deficiency anemia:  Prior gastric bypass in 2001  Intolerant of oral iron.  Patient has required IV iron on multiple occasions: Feraheme 8/13 and 8/20/2018; Injectafer 3/9 and 3/16/2020  Labs on 11/15/2021 showed decline in hemoglobin to 10.8 however this was in the setting of UTI and IJEOMA/CKD3.  Her iron panel was normal with iron 114, iron saturation 34%, TIBC 337 with  ferritin elevated at 292.  B12 was normal at 883.  We continue to follow these values given her history of prior gastric bypass and iron deficiency requiring intermittent IV iron (intolerant of oral iron).   On 12/20/2021, hemoglobin further declined to 9.5.  Repeat labs with iron 47, ferritin 262, iron saturation 14%, TIBC 328, B12 level 1162.  Additional labs performed with negative serum protein electrophoresis and immunoelectrophoresis with normal quantitative immunoglobulins.  Free light chains elevated with kappa 73, lambda 36 with ratio 2.04, consistent with patient's degree of renal dysfunction.  Haptoglobin normal 186, LDH normal 171, CRP borderline elevated 0.62, erythropoietin level 20.1.  Anemia felt to be related to CKD3.  Patient felt to be a potential candidate for Procrit if hemoglobin remains below 10.  Patient experienced improvement in hemoglobin into the 11-12 range  Labs on 6/19/2023 with hemoglobin 11.7, iron 50, ferritin 41.6, iron saturation 12%, TIBC 404 indicating recurrent iron deficiency likely related to malabsorption from prior gastric bypass  Patient received IV iron with Venofer 300 mg on 6/26, 6/28, 7/3, 7/7/2023  Labs on 1/8/2024 with hemoglobin normal at 13.6, patient is iron replete with iron 95, ferritin 223, iron saturation 23%, TIBC 405.  Patient was iron replete, no need for further IV iron.    Patient was seen by Dr. Bess in GI on 1/31/2024, did not recommend any immediate endoscopic evaluation, agreement that iron deficiency likely related to malabsorption from prior gastric bypass.  Plans for upper and lower endoscopy in November 2028  Labs today with hemoglobin stable at 12.8.  Plans are to recheck iron panel/ferritin in 2 months at return visit.    *GI bleed in July 2018:  Acute lower GI bleed with supratherapeutic INR Coumadin.  Angiogram performed with coil embolization of artery to sigmoid colon  No clinical evidence of further GI blood loss    *Atrial  fibrillation:  Requires ongoing anticoagulation for this indication  As above, transitioned from Coumadin to Eliquis 5 mg twice daily in May 2020  As above, during hospitalization 11/15-11/18/2021 for IJEOMA/CKD3, Eliquis dose was decreased to 2.5 mg twice daily  See above discussion, patient developed bilateral calf DVT while briefly off anticoagulation following lumbar laminectomy on 8/11/2022.  Patient is continuing on full dose Eliquis 5 mg twice daily.      *Status post right VATS 4/30/2020 for pulmonary nodule with finding of necrotizing granulomatous inflammation    *Severe depression/anxiety:  Patient has had chronic issues with this, is followed by psychiatry, Dr. Librado Monique.  She also underwent previous counseling which she found helpful.  The patient had ongoing difficulty with control of her depression.  She has been on multiple antidepressants  In June/July 2021 patient underwent transcranial magnetic stimulation (TMS) with significant improvement in depressive symptoms and resolution of headaches.  Symptoms subsequently gradually recurred, patient reports there has been difficulty with insurance regarding maintenance treatments.  Patient reports significant difficulty with depression recently.  She is continuing on Cymbalta 60 mg twice daily managed by her psychiatrist Dr. Monique.  She will be seeing him back in follow-up in approximately 2 weeks.  She does note however that she is quite excited that her son and his family will be moving to [place].  10/31/2024 patient is noting worsening of her depression which she feels has been the case since being on Arimidex.  Unclear if this is actually contributing however we will hold Arimidex as outlined above.    *IJEOMA/CKD3  Patient with CKD3 with baseline creatinine in the 1.3-1.8 range  At time of routine follow-up visit 11/15/2021, creatinine was 3.19 and BUN of 59.  This was compared to prior value 6/2/2021 with BUN 41, creatinine 1.53.  Patient  was hospitalized 11/15-11/18/2021  Patient is continuing follow-up with Dr. Cisneros in nephrology  Creatinine on 11/24/2021 remained elevated at 2.43 however on 12/20/2021 declined to 1.7, near her prior baseline.  Additional labs on 12/20/2021 with negative serum protein electrophoresis and immunoelectrophoresis with normal quantitative immunoglobulins.  Free light chains elevated with kappa 73, lambda 36 with ratio 2.04, consistent with patient's degree of renal dysfunction.  Creatinine today slightly more elevated at 1.43 though still within her range of normal.      *Hypothyroidism   Patient continues on levothyroxine 50 mcg daily    *Severe left-sided sciatica with left foot drop  Plain film of the lumbar spine on 5/10/2022 was negative.    MRI lumbar spine at [facility] on 5/14/2022 showed no evidence of metastatic disease however did show evidence of degenerative change with disc disease and neuroforaminal compromise.     She developed a left foot drop and was referred to neurosurgery, eventually underwent lumbar laminectomy on 8/9/2022  Patient has continued with chronic difficulty regarding left foot drop.    The patient with significant neuropathic type pain in the distal left lower extremity.  Initiated gabapentin 100 mg 3 times daily.  Patient returns today in follow-up, has been continuing on physical therapy with some improvement in her left foot drop.  She did follow-up with pain management and gabapentin dose has been titrated up to 300 mg 3 times daily and will defer gabapentin to pain management.  Patient is scheduled to see neurology on 10/8/2024.    *Left foot deformities  Patient with multiple issues regarding her left foot including hammertoes, hallux valgus  Patient did require left foot surgery on 7/11/2023  Patient was experiencing significant swelling in her foot.  She was seen by orthopedics Dr. Reed on 7/26/2024.  There was no evidence of fracture.  Patient has been referred to  physical therapy, will be initiating this later this week.  There was some initial concern regarding possible gout, no response to colchicine, appears that swelling and pain were related to acute on chronic issues  10/31/2024 patient was seen by neurology and continues in a boot.  She is being scheduled for EMG in the coming weeks.    *Osteoporosis  DEXA scan on 5/22/2024 with T-score L-spine 0.0, left hip -3.0, right hip -2.7  25-hydroxy vitamin D level was normal at 45.6 on 6/11/2024  Patient continuing on calcium and vitamin D supplementation.  Patient initiated adjuvant aromatase inhibitor therapy with Arimidex 1 mg daily x 5 years on 7/10/2024  Patient initiated treatment for osteoporosis concurrently with every 6-month Prolia on 7/10/2024  Next dose of Prolia due on 1/8/2025    *Chronic headaches  Patient notes chronic history of headaches that were felt to be related to TMJ.  She notes that headaches have increased somewhat recently      Plan:  Hold Arimidex.  I will call her in 2 weeks to assess if her symptoms have in any way improved in terms of her depression and to assess if she has had any further UTIs or genitourinary issues.  Per discussion with Dr. White today we could potentially consider Aromasin though it may very well cause the same side effects.  She is otherwise scheduled back to see Dr. White 11/27/2024 in follow-up.  Patient has been evaluated by neurology neurology for her foot drop and is in the process of scheduled to undergo EMG studies in the next few weeks.  Patient continues on Prolia every 6 months for osteoporosis (initiated 7/10/2024, due next on 1/8/2025)  Continue calcium and vitamin D supplementation  Patient is continuing on gabapentin 300 mg 3 times daily, currently prescribed by pain management  Patient continuing with physical therapy  Patient will otherwise continue to follow with Dr. Monique. Psychiatry.    This patient is on high risk drug therapy requiring intensive  monitoring for toxicity.        I spent 45 minutes caring for Marylu on this date of service. This time includes time spent by me in the following activities: preparing for the visit, reviewing tests, performing a medically appropriate examination and/or evaluation, counseling and educating the patient/family/caregiver, referring and communicating with other health care professionals, documenting information in the medical record, care coordination, obtaining a separately obtained history, and reviewing a separately obtained history

## 2024-11-01 ENCOUNTER — PATIENT OUTREACH (OUTPATIENT)
Dept: OTHER | Facility: HOSPITAL | Age: 79
End: 2024-11-01
Payer: MEDICARE

## 2024-11-01 DIAGNOSIS — C50.812 MALIGNANT NEOPLASM OF OVERLAPPING SITES OF LEFT BREAST IN FEMALE, ESTROGEN RECEPTOR POSITIVE: Primary | ICD-10-CM

## 2024-11-01 DIAGNOSIS — Z17.0 MALIGNANT NEOPLASM OF OVERLAPPING SITES OF LEFT BREAST IN FEMALE, ESTROGEN RECEPTOR POSITIVE: Primary | ICD-10-CM

## 2024-11-01 NOTE — PROGRESS NOTES
Message received from the lymphedema clinic that Ms. Foreman had unmet needs. Called Ms. Foreman to see how she was doing. She stated she is struggling with severe depression and needs a new psychiatrist. Urgent referral to Dr. Baca made to establish care. Asked if she had any thoughts of self harm and she stated she did not. She also stated she is depressed about finances and we discussed our financial navigator, financial aid application and social work team. Referral made to all and will mail out the financial aid application. She stated her son moved back to [place] and she is very thankful he is here. He is her biggest support. We discussed if she has any thoughts of self harm to go to the ER and she had verbal understanding. Encouraged her to think of something fun she can do with family this weekend and she stated she would do that. She was thankful for the call and will reach out if any questions or needs arise.

## 2024-11-07 ENCOUNTER — PATIENT OUTREACH (OUTPATIENT)
Dept: OTHER | Facility: HOSPITAL | Age: 79
End: 2024-11-07
Payer: MEDICARE

## 2024-11-07 NOTE — PROGRESS NOTES
Called Ms. Foreman to see how she was doing, talk about upcoming appointments and discuss survivorship again. LVM with my contact info asking for her to call back at her convenience.

## 2024-11-14 ENCOUNTER — DOCUMENTATION (OUTPATIENT)
Dept: ONCOLOGY | Facility: CLINIC | Age: 79
End: 2024-11-14
Payer: MEDICARE

## 2024-11-14 ENCOUNTER — OFFICE VISIT (OUTPATIENT)
Dept: INTERNAL MEDICINE | Facility: CLINIC | Age: 79
End: 2024-11-14
Payer: MEDICARE

## 2024-11-14 VITALS
HEART RATE: 88 BPM | OXYGEN SATURATION: 97 % | TEMPERATURE: 98.3 F | SYSTOLIC BLOOD PRESSURE: 126 MMHG | HEIGHT: 64 IN | DIASTOLIC BLOOD PRESSURE: 74 MMHG | BODY MASS INDEX: 33.34 KG/M2 | WEIGHT: 195.3 LBS

## 2024-11-14 DIAGNOSIS — R30.0 DYSURIA: Primary | ICD-10-CM

## 2024-11-14 DIAGNOSIS — F32.A DEPRESSION, UNSPECIFIED DEPRESSION TYPE: ICD-10-CM

## 2024-11-14 RX ORDER — MUPIROCIN CALCIUM 20 MG/G
1 CREAM TOPICAL 3 TIMES DAILY
Qty: 30 G | Refills: 0 | Status: SHIPPED | OUTPATIENT
Start: 2024-11-14 | End: 2024-11-15

## 2024-11-14 RX ORDER — DULOXETIN HYDROCHLORIDE 30 MG/1
30 CAPSULE, DELAYED RELEASE ORAL DAILY
Qty: 30 CAPSULE | Refills: 0 | Status: SHIPPED | OUTPATIENT
Start: 2024-11-14

## 2024-11-14 RX ORDER — BUPROPION HYDROCHLORIDE 300 MG/1
300 TABLET ORAL DAILY
Qty: 30 TABLET | Refills: 3 | Status: SHIPPED | OUTPATIENT
Start: 2024-11-14

## 2024-11-14 NOTE — PROGRESS NOTES
Subjective   Marylu Foreman is a 79 y.o. female.   She has been having recurrent uti sx with neg cx    Anxiety   Patient reports no confusion or dizziness.      She is concerned that she is having recurrent UTI with dysuira and ferquency  no fevers no chills  She has been on antodepresatn and they do not seem to be working psych tried to start her on a new med avelity and insurance will no cover this medicine   she says she is feelign depressed no SI  she did better with welbitrin 300 and walnt to try this with zoloft  she says the cymbalts is not help  LEsion on the back has been there eor 2 week   has been some wet  and moisture with this no odpor  no fever  julián redness  no warm      The following portions of the patient's history were reviewed and updated as appropriate: allergies, current medications, past family history, past medical history, past social history, past surgical history, and problem list.  See above    Review of Systems   Neurological:  Negative for dizziness.   Psychiatric/Behavioral:  Negative for confusion.        Objective   Physical Exam  Vitals reviewed.   Constitutional:       Appearance: She is well-developed.   HENT:      Head: Normocephalic and atraumatic.      Right Ear: External ear normal.      Left Ear: External ear normal.   Eyes:      Conjunctiva/sclera: Conjunctivae normal.      Pupils: Pupils are equal, round, and reactive to light.   Neck:      Thyroid: No thyromegaly.      Trachea: No tracheal deviation.   Cardiovascular:      Rate and Rhythm: Normal rate and regular rhythm.      Heart sounds: Normal heart sounds.   Pulmonary:      Effort: Pulmonary effort is normal.      Breath sounds: Normal breath sounds.   Abdominal:      General: Bowel sounds are normal. There is no distension.      Palpations: Abdomen is soft.      Tenderness: There is no abdominal tenderness.   Musculoskeletal:         General: No deformity. Normal range of motion.      Cervical back: Normal range of  motion.   Skin:     General: Skin is warm and dry.      Comments: 3x2 cm ulcerated area on back  no surrounding erythema   Neurological:      Mental Status: She is alert and oriented to person, place, and time.   Psychiatric:         Behavior: Behavior normal.         Thought Content: Thought content normal.         Judgment: Judgment normal.         Vitals:    11/14/24 1132   BP: 126/74   Pulse: 88   Temp: 98.3 °F (36.8 °C)   SpO2: 97%     Body mass index is 33.5 kg/m².         Assessment & Plan   Diagnoses and all orders for this visit:    1. Dysuria (Primary)    2. Depression, unspecified depression type    Other orders  -     buPROPion XL (Wellbutrin XL) 300 MG 24 hr tablet; Take 1 tablet by mouth Daily.  Dispense: 30 tablet; Refill: 3  -     DULoxetine (CYMBALTA) 30 MG capsule; Take 1 capsule by mouth Daily.  Dispense: 30 capsule; Refill: 0  -     sertraline (Zoloft) 50 MG tablet; Take 1 tablet by mouth Daily.  Dispense: 30 tablet; Refill: 2     Depression-we are going to change her meds  wean off the cymbalta and add zoloft  we will increase the welbutrin xl to 300mg a day  she is seeing a theraoist  this has been a life long issue and I also rec some reg exercise  Dysuria-  I suspect IC  we will try to change her diet she has a lot of artificial sweeteners with tea and may try prelief  cx today have been neg  Lesion on back -looke like ulcer  she says her jose rub there she will need to trauy to avoid having any clothing rub there and us bactrobban bid  if does not improve se derm           Answers submitted by the patient for this visit:  Primary Reason for Visit (Submitted on 11/13/2024)  What is the primary reason for your visit?: Depression

## 2024-11-14 NOTE — PATIENT INSTRUCTIONS
Welbutrin 300mg daily  Cymbalta to 30mg daily x2 weeks then every other day for 1 week then stop  Sertraline 50mg  start after weaning of the Cymbalta

## 2024-11-14 NOTE — PROGRESS NOTES
Called patient to follow-up on most recent office visit.  She states that she held the Arimidex for 1 week but then decided to go back on it.  She just saw her PCP, Dr. Arleen Dillon today, with adjustments being made to her antidepressants in the hopes of improving her mood.  Patient thankfully has not had any further UTIs or urinary symptoms.  She will therefore continue on the Arimidex and follow-up with Dr. White in 2 weeks as already scheduled.

## 2024-11-15 RX ORDER — MUPIROCIN 20 MG/G
1 OINTMENT TOPICAL 3 TIMES DAILY
Qty: 30 G | Refills: 1 | Status: SHIPPED | OUTPATIENT
Start: 2024-11-15

## 2024-11-18 RX ORDER — DILTIAZEM HYDROCHLORIDE 180 MG/1
180 CAPSULE, COATED, EXTENDED RELEASE ORAL 2 TIMES DAILY
Qty: 180 CAPSULE | Refills: 3 | Status: SHIPPED | OUTPATIENT
Start: 2024-11-18

## 2024-11-20 ENCOUNTER — TELEPHONE (OUTPATIENT)
Dept: OTHER | Facility: HOSPITAL | Age: 79
End: 2024-11-20
Payer: MEDICARE

## 2024-11-20 NOTE — TELEPHONE ENCOUNTER
"Oncology Social Work   Chief Complaint: OSW referral for support    Subjective  Patient ID: Marylu Foreman is a 79 y.o. female with breast cancer and battling depression.     Social History  Marital Status:   Children: Yes- one son.   Support Community: Children  Highest Level of Education: college graduate  Career: Retired teacher   Tobacco Use: The patient denies current or previous tobacco use.  Alcohol Use: does not drink  Marijuana/ Other drug Use: denied.    Medical History  Psychiatric History: Outpatient    Family History  Family Psychiatric History: Family history is significant for depression  Family Cancer History: None reported    Plan of Care    OSW reached out to patient related to OSW referral. Patient is under the care of Dr. White for breast cancer. She had her breast cancer surgery at the end of May. Patient is also followed by Lubna KULKARNI for nurse navigation. Patient lives alone and is . She has one son that recently moved back to [place]. Patient is independent with her ADLs and drives herself to appointments.   Patient reported her biggest concern is her depression.She reports \" my depression is really bad right now.\" Patient reports struggling with depression mostly of her life and has a family history of depression. Patient was seeing Dr. Monique psychiatrist for years but was privately paying for appointments. Patient has an appointment with Lilia Berry LCSW on 12/9. Her PCP is managing her medications for depression and recently made some changes. Patient has an appointment with Sera SANCHEZ but is going to cancel the appointment since her PCP made med changes. Patient did not voice any SI or any hx of SI. Patient has no inpatient psych stays. She has always managed her depression outpatient. Patient has history of grief- she lost twins and her twin sister. Patient was a  for over 40 years and patient reported really missing teaching. OSW suggested " checking out online teaching opportunities for fulfillment. OSW will send patient information on grief groups and [employer] club for support.     Protective factors: her son and her animals. She is a big animal lover.   Patient reported no weapons in her home.   OSW and patient did some safety planing- reporting to the ER or calling the suicide hotline, 988, in crisis.     Patient voiced no financial distress. She is paying for a costly dental procedure but she has managed that.       Interventions:  Emotional Needs: emotional suppport/coping strategies; support groups; suicide assessment/prevention; outpatient mental health       Crys GEORGES

## 2024-11-26 NOTE — PROGRESS NOTES
"Chief Complaint  Stage I ([id number]) left breast cancer in 2007, pulmonary emboli in 2009, atrial fibrillation, history of GI bleed, history of iron deficiency status post prior gastric bypass in 2001.  Stage I ([id number]) right breast cancer status post right mastectomy 5/28/2024 (ER positive, FL negative, HER2/jorge negative)    Subjective        History of Present Illness    Patient returns today in follow-up continuing on adjuvant Arimidex 1 mg daily (initiated 7/10/2024).  She is also continuing on Prolia as treatment for osteoporosis while on aromatase inhibitor therapy (initiated 7/10/2024).  The patient has a number of issues that have developed recently.  She was seen in our office on 10/31/2024 with report of increasing frequency of urinary tract infections and increased depression since being on Arimidex.  She had held the Arimidex for a week but then elected to resume the medication.  She has been in follow-up with her PCP Dr. Dillon regarding her depression and has been weaned off of Cymbalta, Zoloft added at 50 mg daily and Wellbutrin XL increased to 300 mg daily on 11/14/2024.  She is scheduled to see psychiatry in early December.  She reports that her dysuria has decreased in frequency although does persist intermittently.  She denies any recent fevers.  She continues to deal with her left foot drop that has been an ongoing issue.  She has been scheduled for an EMG but is unsure whether she wishes to proceed with this as she does not feel it will change the overall situation.  She does continue follow-up with neurology.  She did have a crown break on an anterior tooth and is in the process of having this fixed, is quite frustrated by the process.      Objective   Vital Signs:   /78   Pulse 83   Temp 97.6 °F (36.4 °C) (Oral)   Resp 16   Ht 162.6 cm (64.02\")   Wt 86.8 kg (191 lb 4.8 oz)   SpO2 98%   BMI 32.82 kg/m²     Physical Exam  Constitutional:       Appearance: She is well-developed. "   Eyes:      Conjunctiva/sclera: Conjunctivae normal.   Neck:      Thyroid: No thyromegaly.   Cardiovascular:      Rate and Rhythm: Normal rate. Rhythm irregular.      Heart sounds: Murmur heard.      No friction rub. No gallop.   Pulmonary:      Effort: No respiratory distress.      Breath sounds: Normal breath sounds. No wheezing.      Comments: Chest wall was not examined today  Abdominal:      General: Bowel sounds are normal. There is no distension.      Palpations: Abdomen is soft.      Tenderness: There is no abdominal tenderness.   Musculoskeletal:      Comments: Soft brace in place left foot   Lymphadenopathy:      Head:      Right side of head: No submandibular adenopathy.      Cervical: No cervical adenopathy.      Upper Body:      Right upper body: No supraclavicular adenopathy.      Left upper body: No supraclavicular adenopathy.   Skin:     General: Skin is warm and dry.      Findings: No rash.   Neurological:      Mental Status: She is alert and oriented to person, place, and time.      Cranial Nerves: No cranial nerve deficit.      Motor: No abnormal muscle tone.      Deep Tendon Reflexes: Reflexes normal.      Comments: Patient with continued left foot drop   Psychiatric:         Behavior: Behavior normal.        Patient was examined today, unchanged from above    Result Review : Reviewed CBC, CMP, iron panel, ferritin from today.  Reviewed recent records from PCP.       Assessment and Plan    *Stage I ([id number]) right breast cancer (ER positive, ND negative, HER2/jorge negative)  Mammogram and right breast ultrasound on 4/10/2024 with a suspicious 0.6 cm abnormality in the right breast at the 9 o'clock position.    Biopsy on 4/18/2024 of the 9:00 lesion in the right breast.  Pathology showed a well-differentiated (grade 1) invasive lobular carcinoma, no lymphovascular invasion, evidence of ALH and ADH.  ER positive (%), ND negative (2%), HER2/jorge negative (1+ IHC), Ki-67 2%.    Breast MRI on  4/26/2024 confirmed the abnormality in the 9 o'clock position with postbiopsy cavity/abnormality measuring 1.7 cm.  There was a 1.1 cm equivocal right axillary lymph node.    Right mastectomy with sentinel node on 5/28/2024 performed by Dr. Diaz.  Pathology showed no residual cancer, did show ALH/ADH, 2 negative lymph nodes in the specimen and 4 additional negative sentinel lymph nodes.  [employer] 70 gene germline panel test 5/14/2024 with VUS in MLH1 and POLE  DEXA scan 5/22/2024 with evidence of osteoporosis (see below)  Patient not a candidate for adjuvant tamoxifen due to thrombophilia (see below).  Decision to pursue adjuvant aromatase inhibitor therapy again with Arimidex (previously well-tolerated) along with treatment for osteoporosis with Prolia.  Patient initiated adjuvant Arimidex 1 mg daily on 7/10/2024 with intent to treat x 5 years  Patient initiated concurrent Prolia on 7/10/2024 for treatment of osteoporosis while receiving aromatase therapy  Patient returns today in follow-up continuing on adjuvant anastrozole 1 mg daily (initiated 7/10/2024 with intent to treat x 5 years).  The patient did hold treatment briefly in late October for 1 week as she was concerned that the medication was contributing to her worsening depression and frequent urinary tract infections.  She has remained on treatment continuously since she resumed and is doing reasonably well at this time.  The only issue that would be associated with Arimidex and recurrent UTIs is that of vaginal atrophy.  Per patient is being addressed (see below).  Patient has no clinical evidence of recurrent disease.     *Stage I ([id number]) left breast cancer:  Biopsy 5/8/2007 with in situ and invasive ductal carcinoma, grade 2,/FL positive, HER-2/jorge negative  Left mastectomy 6/18/0742 foci carcinoma, 4 mm, grade 1 in situ and invasive ductal carcinoma and 2 mm grade 2 in situ and invasive ductal carcinoma, negative margins, negative sentinel lymph  nodes x3 with 5 additional negative lymph nodes.  Arimidex initiated 7/2/2007  Arimidex interrupted patient developed bilateral pulmonary emboli in May 2009, resume shortly thereafter.  Completed 5 years treatment in 2012.    *Pulmonary emboli 5/2/2009 and bilateral calf DVT 8/11/2022:  Family history of DVT in the patient's mother occurring at age 70 postoperatively  Patient developed bilateral lower lobe pulmonary emboli 5/2/2009 following right VATS on 4/30/2009  Felt to be a provoked thrombosis related to surgery  Hypercoagulable evaluation with negative factor V Leiden, negative prothrombin gene mutation, normal homocystine, protein C antigen 92, free to protein S 75, anticardiolipin antibody panel negative, lupus anticoagulant negative, Antithrombin %  Continuing on chronic anticoagulation primarily for atrial fibrillation at this point as prior thrombosis was provoked.  Transitioned from Coumadin to Eliquis 5 mg twice daily in May 2020  At time of hospitalization for IJEOMA/CKD 11/15-11/18/2021, Eliquis dose was decreased to 2.5 mg twice daily.  Patient required lumbar laminectomy 8/9/2022, Eliquis was held perioperatively.  Subsequently admitted 8/10 - 8/12/2022 with Doppler 8/11/2022 that showed bilateral acute calf DVT.  This occurred postoperatively and while off anticoagulation.  Recommended to resume Eliquis at 5 mg twice daily dose.  Patient will require long-term anticoagulation with Eliquis 5 mg twice daily for both thrombophilia and atrial fibrillation.  Patient returns today in follow-up continuing on Eliquis 5 mg twice daily.  She has not experienced any bleeding complications    *Iron deficiency anemia:  Prior gastric bypass in 2001  Intolerant of oral iron.  Patient has required IV iron on multiple occasions: Feraheme 8/13 and 8/20/2018; Injectafer 3/9 and 3/16/2020  Labs on 11/15/2021 showed decline in hemoglobin to 10.8 however this was in the setting of UTI and IJEOMA/CKD3.  Her iron panel  was normal with iron 114, iron saturation 34%, TIBC 337 with ferritin elevated at 292.  B12 was normal at 883.  We continue to follow these values given her history of prior gastric bypass and iron deficiency requiring intermittent IV iron (intolerant of oral iron).   On 12/20/2021, hemoglobin further declined to 9.5.  Repeat labs with iron 47, ferritin 262, iron saturation 14%, TIBC 328, B12 level 1162.  Additional labs performed with negative serum protein electrophoresis and immunoelectrophoresis with normal quantitative immunoglobulins.  Free light chains elevated with kappa 73, lambda 36 with ratio 2.04, consistent with patient's degree of renal dysfunction.  Haptoglobin normal 186, LDH normal 171, CRP borderline elevated 0.62, erythropoietin level 20.1.  Anemia felt to be related to CKD3.  Patient felt to be a potential candidate for Procrit if hemoglobin remains below 10.  Patient experienced improvement in hemoglobin into the 11-12 range  Labs on 6/19/2023 with hemoglobin 11.7, iron 50, ferritin 41.6, iron saturation 12%, TIBC 404 indicating recurrent iron deficiency likely related to malabsorption from prior gastric bypass  Patient received IV iron with Venofer 300 mg on 6/26, 6/28, 7/3, 7/7/2023  Labs on 1/8/2024 with hemoglobin normal at 13.6, patient is iron replete with iron 95, ferritin 223, iron saturation 23%, TIBC 405.  Patient was iron replete, no need for further IV iron.    Patient was seen by Dr. Bess in GI on 1/31/2024, did not recommend any immediate endoscopic evaluation, agreement that iron deficiency likely related to malabsorption from prior gastric bypass.  Plans for upper and lower endoscopy in November 2028  Labs today with hemoglobin 13.4.  Iron studies confirm iron replete status with iron 128, ferritin 195, iron saturation 28%, TIBC 450.  Recheck iron studies in 3 months at return visit    *GI bleed in July 2018:  Acute lower GI bleed with supratherapeutic INR Coumadin.  Angiogram  performed with coil embolization of artery to sigmoid colon  No clinical evidence of further GI blood loss    *Atrial fibrillation:  Requires ongoing anticoagulation for this indication  As above, transitioned from Coumadin to Eliquis 5 mg twice daily in May 2020  As above, during hospitalization 11/15-11/18/2021 for IJEOMA/CKD3, Eliquis dose was decreased to 2.5 mg twice daily  See above discussion, patient developed bilateral calf DVT while briefly off anticoagulation following lumbar laminectomy on 8/11/2022.  Patient is continuing on full dose Eliquis 5 mg twice daily.      *Status post right VATS 4/30/2020 for pulmonary nodule with finding of necrotizing granulomatous inflammation    *Severe depression/anxiety:  Patient has had chronic issues with this, is followed by psychiatry, Dr. Librado Monique.  She also underwent previous counseling which she found helpful.  The patient had ongoing difficulty with control of her depression.  She has been on multiple antidepressants  In June/July 2021 patient underwent transcranial magnetic stimulation (TMS) with significant improvement in depressive symptoms and resolution of headaches.  Symptoms subsequently gradually recurred, patient reports there has been difficulty with insurance regarding maintenance treatments.  Patient has experienced ongoing difficulty with depression.  She was seen by her primary care physician on 11/14/2024 and was tapered off of Cymbalta.  Zoloft was added 50 mg daily.  Wellbutrin dose increased to Wellbutrin  mg daily.  Patient is scheduled to see psychiatry in early December.    *IJEOMA/CKD3  Patient with CKD3 with baseline creatinine in the 1.3-1.8 range  At time of routine follow-up visit 11/15/2021, creatinine was 3.19 and BUN of 59.  This was compared to prior value 6/2/2021 with BUN 41, creatinine 1.53.  Patient was hospitalized 11/15-11/18/2021  Patient is continuing follow-up with Dr. Cisneros in nephrology  Creatinine on  11/24/2021 remained elevated at 2.43 however on 12/20/2021 declined to 1.7, near her prior baseline.  Additional labs on 12/20/2021 with negative serum protein electrophoresis and immunoelectrophoresis with normal quantitative immunoglobulins.  Free light chains elevated with kappa 73, lambda 36 with ratio 2.04, consistent with patient's degree of renal dysfunction.  Creatinine today increased above baseline at 1.81.  We will forward this to her nephrologist Dr. Cisneros.    *Hypothyroidism   Patient continues on levothyroxine 50 mcg daily    *Severe left-sided sciatica with left foot drop  Plain film of the lumbar spine on 5/10/2022 was negative.    MRI lumbar [facility] on 5/14/2022 showed no evidence of metastatic disease however did show evidence of degenerative change with disc disease and neuroforaminal compromise.     She developed a left foot drop and was referred to neurosurgery, eventually underwent lumbar laminectomy on 8/9/2022  Patient has continued with chronic difficulty regarding left foot drop.    The patient with significant neuropathic type pain in the distal left lower extremity.  Initiated gabapentin 100 mg 3 times daily.  Patient continues with chronic left foot drop.  She was going to undergo EMG but is questioning whether she should do this as it will likely not change her overall course.    *Left foot deformities  Patient with multiple issues regarding her left foot including hammertoes, hallux valgus  Patient did require left foot surgery on 7/11/2023  Patient was experiencing significant swelling in her foot.  She was seen by orthopedics Dr. Reed on 7/26/2024.  There was no evidence of fracture.  Patient underwent physical therapy.      *Osteoporosis  DEXA scan on 5/22/2024 with T-score L-spine 0.0, left hip -3.0, right hip -2.7  25-hydroxy vitamin D level was normal at 45.6 on 6/11/2024  Patient continuing on calcium and vitamin D supplementation.  Patient initiated  adjuvant aromatase inhibitor therapy with Arimidex 1 mg daily x 5 years on 7/10/2024  Patient initiated treatment for osteoporosis concurrently with every 6-month Prolia on 7/10/2024  Next dose of Prolia due on 1/8/2024    *Chronic headaches  Patient notes chronic history of headaches that were felt to be related to TMJ.      *Recurrent UTIs  Patient reports ongoing chronic dysuria intermittently.  Today WBC has been elevated as well at 11.37.  We will check urinalysis and urine culture.      Plan:  Continue adjuvant Arimidex 1 mg daily with plan to treat x 5 years adjuvantly (initiated 7/10/2024)  Patient continues on Prolia every 6 months for osteoporosis (initiated 7/10/2024, due next on 1/8/2025)  Continue calcium and vitamin D supplementation  Patient currently continuing on Zoloft 50 mg daily and Wellbutrin  mg daily per Dr. Dillon  Patient is scheduled currently to be seen by [facility] in early December  We will check urinalysis and urine culture today with ongoing dysuria and mild leukocytosis today  NP visit on 1/8/2024 with CBC, CMP, magnesium, phosphorus and patient will be due for Prolia  MD visit in 3 months with CBC, CMP, stat iron panel/ferritin    I did spend 45 minutes total time caring for the patient today, time spent in review of records, face-to-face consultation, coordination of care, placement of orders, completion of documentation.

## 2024-11-27 ENCOUNTER — OFFICE VISIT (OUTPATIENT)
Dept: ONCOLOGY | Facility: CLINIC | Age: 79
End: 2024-11-27
Payer: MEDICARE

## 2024-11-27 ENCOUNTER — LAB (OUTPATIENT)
Dept: LAB | Facility: HOSPITAL | Age: 79
End: 2024-11-27
Payer: MEDICARE

## 2024-11-27 VITALS
DIASTOLIC BLOOD PRESSURE: 78 MMHG | WEIGHT: 191.3 LBS | RESPIRATION RATE: 16 BRPM | BODY MASS INDEX: 32.66 KG/M2 | SYSTOLIC BLOOD PRESSURE: 130 MMHG | HEIGHT: 64 IN | HEART RATE: 83 BPM | OXYGEN SATURATION: 98 % | TEMPERATURE: 97.6 F

## 2024-11-27 DIAGNOSIS — C50.812 MALIGNANT NEOPLASM OF OVERLAPPING SITES OF LEFT BREAST IN FEMALE, ESTROGEN RECEPTOR POSITIVE: Primary | ICD-10-CM

## 2024-11-27 DIAGNOSIS — C50.812 MALIGNANT NEOPLASM OF OVERLAPPING SITES OF LEFT BREAST IN FEMALE, ESTROGEN RECEPTOR POSITIVE: ICD-10-CM

## 2024-11-27 DIAGNOSIS — M81.0 AGE-RELATED OSTEOPOROSIS WITHOUT CURRENT PATHOLOGICAL FRACTURE: ICD-10-CM

## 2024-11-27 DIAGNOSIS — R30.0 DYSURIA: ICD-10-CM

## 2024-11-27 DIAGNOSIS — D50.8 OTHER IRON DEFICIENCY ANEMIA: ICD-10-CM

## 2024-11-27 DIAGNOSIS — D50.9 IRON DEFICIENCY ANEMIA, UNSPECIFIED IRON DEFICIENCY ANEMIA TYPE: ICD-10-CM

## 2024-11-27 DIAGNOSIS — Z17.0 MALIGNANT NEOPLASM OF OVERLAPPING SITES OF LEFT BREAST IN FEMALE, ESTROGEN RECEPTOR POSITIVE: Primary | ICD-10-CM

## 2024-11-27 DIAGNOSIS — Z17.0 MALIGNANT NEOPLASM OF OVERLAPPING SITES OF LEFT BREAST IN FEMALE, ESTROGEN RECEPTOR POSITIVE: ICD-10-CM

## 2024-11-27 LAB
ALBUMIN SERPL-MCNC: 4.6 G/DL (ref 3.5–5.2)
ALBUMIN/GLOB SERPL: 1.3 G/DL
ALP SERPL-CCNC: 81 U/L (ref 39–117)
ALT SERPL W P-5'-P-CCNC: 18 U/L (ref 1–33)
ANION GAP SERPL CALCULATED.3IONS-SCNC: 16 MMOL/L (ref 5–15)
AST SERPL-CCNC: 30 U/L (ref 1–32)
BASOPHILS # BLD AUTO: 0.14 10*3/MM3 (ref 0–0.2)
BASOPHILS NFR BLD AUTO: 1.2 % (ref 0–1.5)
BILIRUB SERPL-MCNC: 0.6 MG/DL (ref 0–1.2)
BILIRUB UR QL STRIP: NEGATIVE
BUN SERPL-MCNC: 42 MG/DL (ref 8–23)
BUN/CREAT SERPL: 23.2 (ref 7–25)
CALCIUM SPEC-SCNC: 9.6 MG/DL (ref 8.6–10.5)
CHLORIDE SERPL-SCNC: 101 MMOL/L (ref 98–107)
CLARITY UR: CLEAR
CO2 SERPL-SCNC: 22 MMOL/L (ref 22–29)
COLOR UR: YELLOW
CREAT SERPL-MCNC: 1.81 MG/DL (ref 0.57–1)
DEPRECATED RDW RBC AUTO: 51.6 FL (ref 37–54)
EGFRCR SERPLBLD CKD-EPI 2021: 28.5 ML/MIN/1.73
EOSINOPHIL # BLD AUTO: 0.45 10*3/MM3 (ref 0–0.4)
EOSINOPHIL NFR BLD AUTO: 4 % (ref 0.3–6.2)
ERYTHROCYTE [DISTWIDTH] IN BLOOD BY AUTOMATED COUNT: 14.3 % (ref 12.3–15.4)
FERRITIN SERPL-MCNC: 195 NG/ML (ref 13–150)
GLOBULIN UR ELPH-MCNC: 3.6 GM/DL
GLUCOSE SERPL-MCNC: 115 MG/DL (ref 65–99)
GLUCOSE UR STRIP-MCNC: NEGATIVE MG/DL
HCT VFR BLD AUTO: 42.3 % (ref 34–46.6)
HGB BLD-MCNC: 13.4 G/DL (ref 12–15.9)
HGB UR QL STRIP.AUTO: NEGATIVE
IMM GRANULOCYTES # BLD AUTO: 0.03 10*3/MM3 (ref 0–0.05)
IMM GRANULOCYTES NFR BLD AUTO: 0.3 % (ref 0–0.5)
IRON 24H UR-MRATE: 128 MCG/DL (ref 37–145)
IRON SATN MFR SERPL: 28 % (ref 20–50)
KETONES UR QL STRIP: NEGATIVE
LEUKOCYTE ESTERASE UR QL STRIP.AUTO: NEGATIVE
LYMPHOCYTES # BLD AUTO: 4.13 10*3/MM3 (ref 0.7–3.1)
LYMPHOCYTES NFR BLD AUTO: 36.3 % (ref 19.6–45.3)
MCH RBC QN AUTO: 30.9 PG (ref 26.6–33)
MCHC RBC AUTO-ENTMCNC: 31.7 G/DL (ref 31.5–35.7)
MCV RBC AUTO: 97.5 FL (ref 79–97)
MONOCYTES # BLD AUTO: 0.86 10*3/MM3 (ref 0.1–0.9)
MONOCYTES NFR BLD AUTO: 7.6 % (ref 5–12)
NEUTROPHILS NFR BLD AUTO: 5.76 10*3/MM3 (ref 1.7–7)
NEUTROPHILS NFR BLD AUTO: 50.6 % (ref 42.7–76)
NITRITE UR QL STRIP: NEGATIVE
NRBC BLD AUTO-RTO: 0 /100 WBC (ref 0–0.2)
PH UR STRIP.AUTO: 6 [PH] (ref 4.5–8)
PLATELET # BLD AUTO: 376 10*3/MM3 (ref 140–450)
PMV BLD AUTO: 10.7 FL (ref 6–12)
POTASSIUM SERPL-SCNC: 4.3 MMOL/L (ref 3.5–5.2)
PROT SERPL-MCNC: 8.2 G/DL (ref 6–8.5)
PROT UR QL STRIP: NEGATIVE
RBC # BLD AUTO: 4.34 10*6/MM3 (ref 3.77–5.28)
SODIUM SERPL-SCNC: 139 MMOL/L (ref 136–145)
SP GR UR STRIP: 1.01 (ref 1–1.03)
TIBC SERPL-MCNC: 450 MCG/DL (ref 298–536)
TRANSFERRIN SERPL-MCNC: 302 MG/DL (ref 200–360)
UROBILINOGEN UR QL STRIP: NORMAL
WBC NRBC COR # BLD AUTO: 11.37 10*3/MM3 (ref 3.4–10.8)

## 2024-11-27 PROCEDURE — 36415 COLL VENOUS BLD VENIPUNCTURE: CPT

## 2024-11-27 PROCEDURE — 87086 URINE CULTURE/COLONY COUNT: CPT | Performed by: INTERNAL MEDICINE

## 2024-11-27 PROCEDURE — 82728 ASSAY OF FERRITIN: CPT

## 2024-11-27 PROCEDURE — 84466 ASSAY OF TRANSFERRIN: CPT

## 2024-11-27 PROCEDURE — 80053 COMPREHEN METABOLIC PANEL: CPT

## 2024-11-27 PROCEDURE — 81003 URINALYSIS AUTO W/O SCOPE: CPT | Performed by: INTERNAL MEDICINE

## 2024-11-27 PROCEDURE — 85025 COMPLETE CBC W/AUTO DIFF WBC: CPT

## 2024-11-27 PROCEDURE — 83540 ASSAY OF IRON: CPT

## 2024-11-27 RX ORDER — ANASTROZOLE 1 MG/1
1 TABLET ORAL DAILY
Qty: 30 TABLET | Refills: 11 | Status: SHIPPED | OUTPATIENT
Start: 2024-11-27

## 2024-11-27 NOTE — LETTER
November 27, 2024     Arleen Dillon MD  [address]  [address]  [facility] [address]    Patient: Marylu Foreman   YOB: 1945   Date of Visit: 11/27/2024     Dear Arleen Dillon MD:       Thank you for referring Marylu Foreman to me for evaluation. Below are the relevant portions of my assessment and plan of care.    If you have questions, please do not hesitate to call me. I look forward to following Marylu along with you.         Sincerely,        West White MD        CC: No Recipients    West White Jr., MD  11/27/24 2030  Sign when Signing Visit  Chief Complaint  Stage I ([id number]) left breast cancer in 2007, pulmonary emboli in 2009, atrial fibrillation, history of GI bleed, history of iron deficiency status post prior gastric bypass in 2001.  Stage I ([id number]) right breast cancer status post right mastectomy 5/28/2024 (ER positive, [place] negative, HER2/jorge negative)    Subjective       History of Present Illness    Patient returns today in follow-up continuing on adjuvant Arimidex 1 mg daily (initiated 7/10/2024).  She is also continuing on Prolia as treatment for osteoporosis while on aromatase inhibitor therapy (initiated 7/10/2024).  The patient has a number of issues that have developed recently.  She was seen in our office on 10/31/2024 with report of increasing frequency of urinary tract infections and increased depression since being on Arimidex.  She had held the Arimidex for a week but then elected to resume the medication.  She has been in follow-up with her PCP Dr. Dillon regarding her depression and has been weaned off of Cymbalta, Zoloft added at 50 mg daily and Wellbutrin XL increased to 300 mg daily on 11/14/2024.  She is scheduled to see psychiatry in early December.  She reports that her dysuria has decreased in frequency although does persist intermittently.  She denies any recent fevers.  She continues to deal with her left foot drop that has been an ongoing issue.  She has been  "scheduled for an EMG but is unsure whether she wishes to proceed with this as she does not feel it will change the overall situation.  She does continue follow-up with neurology.  She did have a crown break on an anterior tooth and is in the process of having this fixed, is quite frustrated by the process.      Objective  Vital Signs:   /78   Pulse 83   Temp 97.6 °F (36.4 °C) (Oral)   Resp 16   Ht 162.6 cm (64.02\")   Wt 86.8 kg (191 lb 4.8 oz)   SpO2 98%   BMI 32.82 kg/m²     Physical Exam  Constitutional:       Appearance: She is well-developed.   Eyes:      Conjunctiva/sclera: Conjunctivae normal.   Neck:      Thyroid: No thyromegaly.   Cardiovascular:      Rate and Rhythm: Normal rate. Rhythm irregular.      Heart sounds: Murmur heard.      No friction rub. No gallop.   Pulmonary:      Effort: No respiratory distress.      Breath sounds: Normal breath sounds. No wheezing.      Comments: Chest wall was not examined today  Abdominal:      General: Bowel sounds are normal. There is no distension.      Palpations: Abdomen is soft.      Tenderness: There is no abdominal tenderness.   Musculoskeletal:      Comments: Soft brace in place left foot   Lymphadenopathy:      Head:      Right side of head: No submandibular adenopathy.      Cervical: No cervical adenopathy.      Upper Body:      Right upper body: No supraclavicular adenopathy.      Left upper body: No supraclavicular adenopathy.   Skin:     General: Skin is warm and dry.      Findings: No rash.   Neurological:      Mental Status: She is alert and oriented to person, place, and time.      Cranial Nerves: No cranial nerve deficit.      Motor: No abnormal muscle tone.      Deep Tendon Reflexes: Reflexes normal.      Comments: Patient with continued left foot drop   Psychiatric:         Behavior: Behavior normal.        Patient was examined today, unchanged from above    Result Review: Reviewed CBC, CMP, iron panel, ferritin from today.  Reviewed " recent records from PCP.       Assessment and Plan    *Stage I ([id number]) right breast cancer (ER positive, [place] negative, HER2/jorge negative)  Mammogram and right breast ultrasound on 4/10/2024 with a suspicious 0.6 cm abnormality in the right breast at the 9 o'clock position.    Biopsy on 4/18/2024 of the 9:00 lesion in the right breast.  Pathology showed a well-differentiated (grade 1) invasive lobular carcinoma, no lymphovascular invasion, evidence of ALH and ADH.  ER positive (%), [place] negative (2%), HER2/jorge negative (1+ IHC), Ki-67 2%.    Breast MRI on 4/26/2024 confirmed the abnormality in the 9 o'clock position with postbiopsy cavity/abnormality measuring 1.7 cm.  There was a 1.1 cm equivocal right axillary lymph node.    Right mastectomy with sentinel node on 5/28/2024 performed by Dr. Diaz.  Pathology showed no residual cancer, did show ALH/ADH, 2 negative lymph nodes in the specimen and 4 additional negative sentinel lymph nodes.  [employer] 70 gene germline panel test 5/14/2024 with VUS in MLH1 and POLE  DEXA scan 5/22/2024 with evidence of osteoporosis (see below)  Patient not a candidate for adjuvant tamoxifen due to thrombophilia (see below).  Decision to pursue adjuvant aromatase inhibitor therapy again with Arimidex (previously well-tolerated) along with treatment for osteoporosis with Prolia.  Patient initiated adjuvant Arimidex 1 mg daily on 7/10/2024 with intent to treat x 5 years  Patient initiated concurrent Prolia on 7/10/2024 for treatment of osteoporosis while receiving aromatase therapy  Patient returns today in follow-up continuing on adjuvant anastrozole 1 mg daily (initiated 7/10/2024 with intent to treat x 5 years).  The patient did hold treatment briefly in late October for 1 week as she was concerned that the medication was contributing to her worsening depression and frequent urinary tract infections.  She has remained on treatment continuously since she resumed and is doing  reasonably well at this time.  The only issue that would be associated with Arimidex and recurrent UTIs is that of vaginal atrophy.  Per patient is being addressed (see below).  Patient has no clinical evidence of recurrent disease.     *Stage I ([id number]) left breast cancer:  Biopsy 5/8/2007 with in situ and invasive ductal carcinoma, grade 2,/[place] positive, HER-2/jorge negative  Left mastectomy 6/18/0742 foci carcinoma, 4 mm, grade 1 in situ and invasive ductal carcinoma and 2 mm grade 2 in situ and invasive ductal carcinoma, negative margins, negative sentinel lymph nodes x3 with 5 additional negative lymph nodes.  Arimidex initiated 7/2/2007  Arimidex interrupted patient developed bilateral pulmonary emboli in May 2009, resume shortly thereafter.  Completed 5 years treatment in 2012.    *Pulmonary emboli 5/2/2009 and bilateral calf DVT 8/11/2022:  Family history of DVT in the patient's mother occurring at age 70 postoperatively  Patient developed bilateral lower lobe pulmonary emboli 5/2/2009 following right VATS on 4/30/2009  Felt to be a provoked thrombosis related to surgery  Hypercoagulable evaluation with negative factor V Leiden, negative prothrombin gene mutation, normal homocystine, protein C antigen 92, free to protein S 75, anticardiolipin antibody panel negative, lupus anticoagulant negative, Antithrombin %  Continuing on chronic anticoagulation primarily for atrial fibrillation at this point as prior thrombosis was provoked.  Transitioned from Coumadin to Eliquis 5 mg twice daily in May 2020  At time of hospitalization for IJEOMA/CKD 11/15-11/18/2021, Eliquis dose was decreased to 2.5 mg twice daily.  Patient required lumbar laminectomy 8/9/2022, Eliquis was held perioperatively.  Subsequently admitted 8/10 - 8/12/2022 with Doppler 8/11/2022 that showed bilateral acute calf DVT.  This occurred postoperatively and while off anticoagulation.  Recommended to resume Eliquis at 5 mg twice daily  dose.  Patient will require long-term anticoagulation with Eliquis 5 mg twice daily for both thrombophilia and atrial fibrillation.  Patient returns today in follow-up continuing on Eliquis 5 mg twice daily.  She has not experienced any bleeding complications    *Iron deficiency anemia:  Prior gastric bypass in 2001  Intolerant of oral iron.  Patient has required IV iron on multiple occasions: Feraheme 8/13 and 8/20/2018; Injectafer 3/9 and 3/16/2020  Labs on 11/15/2021 showed decline in hemoglobin to 10.8 however this was in the setting of UTI and IJEOMA/CKD3.  Her iron panel was normal with iron 114, iron saturation 34%, TIBC 337 with ferritin elevated at 292.  B12 was normal at 883.  We continue to follow these values given her history of prior gastric bypass and iron deficiency requiring intermittent IV iron (intolerant of oral iron).   On 12/20/2021, hemoglobin further declined to 9.5.  Repeat labs with iron 47, ferritin 262, iron saturation 14%, TIBC 328, B12 level 1162.  Additional labs performed with negative serum protein electrophoresis and immunoelectrophoresis with normal quantitative immunoglobulins.  Free light chains elevated with kappa 73, lambda 36 with ratio 2.04, consistent with patient's degree of renal dysfunction.  Haptoglobin normal 186, LDH normal 171, CRP borderline elevated 0.62, erythropoietin level 20.1.  Anemia felt to be related to CKD3.  Patient felt to be a potential candidate for Procrit if hemoglobin remains below 10.  Patient experienced improvement in hemoglobin into the 11-12 range  Labs on 6/19/2023 with hemoglobin 11.7, iron 50, ferritin 41.6, iron saturation 12%, TIBC 404 indicating recurrent iron deficiency likely related to malabsorption from prior gastric bypass  Patient received IV iron with Venofer 300 mg on 6/26, 6/28, 7/3, 7/7/2023  Labs on 1/8/2024 with hemoglobin normal at 13.6, patient is iron replete with iron 95, ferritin 223, iron saturation 23%, TIBC 405.  Patient  was iron replete, no need for further IV iron.    Patient was seen by Dr. Bess in GI on 1/31/2024, did not recommend any immediate endoscopic evaluation, agreement that iron deficiency likely related to malabsorption from prior gastric bypass.  Plans for upper and lower endoscopy in November 2028  Labs today with hemoglobin 13.4.  Iron studies confirm iron replete status with iron 128, ferritin 195, iron saturation 28%, TIBC 450.  Recheck iron studies in 3 months at return visit    *GI bleed in July 2018:  Acute lower GI bleed with supratherapeutic INR Coumadin.  Angiogram performed with coil embolization of artery to sigmoid colon  No clinical evidence of further GI blood loss    *Atrial fibrillation:  Requires ongoing anticoagulation for this indication  As above, transitioned from Coumadin to Eliquis 5 mg twice daily in May 2020  As above, during hospitalization 11/15-11/18/2021 for IJEOMA/CKD3, Eliquis dose was decreased to 2.5 mg twice daily  See above discussion, patient developed bilateral calf DVT while briefly off anticoagulation following lumbar laminectomy on 8/11/2022.  Patient is continuing on full dose Eliquis 5 mg twice daily.      *Status post right VATS 4/30/2020 for pulmonary nodule with finding of necrotizing granulomatous inflammation    *Severe depression/anxiety:  Patient has had chronic issues with this, is followed by psychiatry, Dr. Librado Monique.  She also underwent previous counseling which she found helpful.  The patient had ongoing difficulty with control of her depression.  She has been on multiple antidepressants  In June/July 2021 patient underwent transcranial magnetic stimulation (TMS) with significant improvement in depressive symptoms and resolution of headaches.  Symptoms subsequently gradually recurred, patient reports there has been difficulty with insurance regarding maintenance treatments.  Patient has experienced ongoing difficulty with depression.  She was seen by her  primary care physician on 11/14/2024 and was tapered off of Cymbalta.  Zoloft was added 50 mg daily.  Wellbutrin dose increased to Wellbutrin  mg daily.  Patient is scheduled to see psychiatry in early December.    *IJEOMA/CKD3  Patient with CKD3 with baseline creatinine in the 1.3-1.8 range  At time of routine follow-up visit 11/15/2021, creatinine was 3.19 and BUN of 59.  This was compared to prior value 6/2/2021 with BUN 41, creatinine 1.53.  Patient was hospitalized 11/15-11/18/2021  Patient is continuing follow-up with Dr. Cisneros in nephrology  Creatinine on 11/24/2021 remained elevated at 2.43 however on 12/20/2021 declined to 1.7, near her prior baseline.  Additional labs on 12/20/2021 with negative serum protein electrophoresis and immunoelectrophoresis with normal quantitative immunoglobulins.  Free light chains elevated with kappa 73, lambda 36 with ratio 2.04, consistent with patient's degree of renal dysfunction.  Creatinine today increased above baseline at 1.81.  We will forward this to her nephrologist Dr. Cisneros.    *Hypothyroidism   Patient continues on levothyroxine 50 mcg daily    *Severe left-sided sciatica with left foot drop  Plain film of the lumbar spine on 5/10/2022 was negative.    MRI lumbar spine at [facility] on 5/14/2022 showed no evidence of metastatic disease however did show evidence of degenerative change with disc disease and neuroforaminal compromise.     She developed a left foot drop and was referred to neurosurgery, eventually underwent lumbar laminectomy on 8/9/2022  Patient has continued with chronic difficulty regarding left foot drop.    The patient with significant neuropathic type pain in the distal left lower extremity.  Initiated gabapentin 100 mg 3 times daily.  Patient continues with chronic left foot drop.  She was going to undergo EMG but is questioning whether she should do this as it will likely not change her overall course.    *Left foot  deformities  Patient with multiple issues regarding her left foot including hammertoes, hallux valgus  Patient did require left foot surgery on 7/11/2023  Patient was experiencing significant swelling in her foot.  She was seen by orthopedics Dr. Reed on 7/26/2024.  There was no evidence of fracture.  Patient underwent physical therapy.      *Osteoporosis  DEXA scan on 5/22/2024 with T-score L-spine 0.0, left hip -3.0, right hip -2.7  25-hydroxy vitamin D level was normal at 45.6 on 6/11/2024  Patient continuing on calcium and vitamin D supplementation.  Patient initiated adjuvant aromatase inhibitor therapy with Arimidex 1 mg daily x 5 years on 7/10/2024  Patient initiated treatment for osteoporosis concurrently with every 6-month Prolia on 7/10/2024  Next dose of Prolia due on 1/8/2024    *Chronic headaches  Patient notes chronic history of headaches that were felt to be related to TMJ.      *Recurrent UTIs  Patient reports ongoing chronic dysuria intermittently.  Today WBC has been elevated as well at 11.37.  We will check urinalysis and urine culture.      Plan:  Continue adjuvant Arimidex 1 mg daily with plan to treat x 5 years adjuvantly (initiated 7/10/2024)  Patient continues on Prolia every 6 months for osteoporosis (initiated 7/10/2024, due next on 1/8/2025)  Continue calcium and vitamin D supplementation  Patient currently continuing on Zoloft 50 mg daily and Wellbutrin  mg daily per Dr. Dillon  Patient is scheduled currently to be seen by [facility] in early December  We will check urinalysis and urine culture today with ongoing dysuria and mild leukocytosis today  NP visit on 1/8/2024 with CBC, CMP, magnesium, phosphorus and patient will be due for Prolia  MD visit in 3 months with CBC, CMP, stat iron panel/ferritin    I did spend 45 minutes total time caring for the patient today, time spent in review of records, face-to-face consultation, coordination of care, placement of orders,  completion of documentation.

## 2024-11-29 LAB — BACTERIA SPEC AEROBE CULT: NO GROWTH

## 2024-12-09 ENCOUNTER — OFFICE VISIT (OUTPATIENT)
Dept: INTERNAL MEDICINE | Facility: CLINIC | Age: 79
End: 2024-12-09
Payer: MEDICARE

## 2024-12-09 VITALS
HEIGHT: 64 IN | HEART RATE: 79 BPM | OXYGEN SATURATION: 98 % | WEIGHT: 194.6 LBS | BODY MASS INDEX: 33.22 KG/M2 | SYSTOLIC BLOOD PRESSURE: 132 MMHG | TEMPERATURE: 98 F | DIASTOLIC BLOOD PRESSURE: 80 MMHG

## 2024-12-09 DIAGNOSIS — F33.2 SEVERE EPISODE OF RECURRENT MAJOR DEPRESSIVE DISORDER, WITHOUT PSYCHOTIC FEATURES: Primary | ICD-10-CM

## 2024-12-09 DIAGNOSIS — R73.09 ELEVATED GLUCOSE: ICD-10-CM

## 2024-12-09 PROCEDURE — 1126F AMNT PAIN NOTED NONE PRSNT: CPT | Performed by: INTERNAL MEDICINE

## 2024-12-09 PROCEDURE — 3079F DIAST BP 80-89 MM HG: CPT | Performed by: INTERNAL MEDICINE

## 2024-12-09 PROCEDURE — 99214 OFFICE O/P EST MOD 30 MIN: CPT | Performed by: INTERNAL MEDICINE

## 2024-12-09 PROCEDURE — 3075F SYST BP GE 130 - 139MM HG: CPT | Performed by: INTERNAL MEDICINE

## 2024-12-09 RX ORDER — VIT C/B6/B5/MAGNESIUM/HERB 173 50-5-6-5MG
1 CAPSULE ORAL DAILY
COMMUNITY

## 2024-12-09 RX ORDER — IRBESARTAN 300 MG/1
TABLET ORAL
Qty: 90 TABLET | Refills: 3 | OUTPATIENT
Start: 2024-12-09

## 2024-12-09 NOTE — PROGRESS NOTES
Subjective   Marylu Foreman is a 79 y.o. female.   She has been on wellbutrin xl and prozac for 1 month and it is not working  Depression  Her past medical history is significant for depression.      She ius freq tearful  she is seeing a psych  a=but they are not helpful.  She was also recently put on Acelity and that did not help at all.  She just feels like she does not have the energy to get up and do anything even decorate the [place] tree though she has not fatigued.  She just does not enjoy doing things.  She finds it very frustrating because her granddaughter is just moved here from out of state and she feels like she should be happier to see them    The following portions of the patient's history were reviewed and updated as appropriate: allergies, current medications, past family history, past medical history, past social history, past surgical history, and problem list.  Patient has longstanding depression has had it her whole life and she says every member of her family has depression  Review of Systems    Objective   Physical Exam  Vitals reviewed.   Constitutional:       Appearance: She is well-developed.   HENT:      Head: Normocephalic and atraumatic.      Right Ear: External ear normal.      Left Ear: External ear normal.   Eyes:      Conjunctiva/sclera: Conjunctivae normal.      Pupils: Pupils are equal, round, and reactive to light.   Neck:      Thyroid: No thyromegaly.      Trachea: No tracheal deviation.   Cardiovascular:      Rate and Rhythm: Normal rate and regular rhythm.      Heart sounds: Normal heart sounds.   Pulmonary:      Effort: Pulmonary effort is normal.      Breath sounds: Normal breath sounds.   Abdominal:      General: Bowel sounds are normal. There is no distension.      Palpations: Abdomen is soft.      Tenderness: There is no abdominal tenderness.   Musculoskeletal:         General: No deformity. Normal range of motion.      Cervical back: Normal range of motion.   Skin:      General: Skin is warm and dry.   Neurological:      Mental Status: She is alert and oriented to person, place, and time.   Psychiatric:         Behavior: Behavior normal.         Thought Content: Thought content normal.         Judgment: Judgment normal.         Vitals:    12/09/24 1118   BP: 132/80   Pulse: 79   Temp: 98 °F (36.7 °C)   SpO2: 98%     Body mass index is 33.38 kg/m².         Assessment & Plan   Diagnoses and all orders for this visit:    1. Severe episode of recurrent major depressive disorder, without psychotic features (Primary)  -     Ambulatory Referral to Psychiatry  -     Comprehensive Metabolic Panel  -     Hemoglobin A1c    2. Elevated glucose  -     Comprehensive Metabolic Panel  -     Hemoglobin A1c    Other orders  -     Cariprazine HCl (Vraylar) 1.5 MG capsule capsule; Take 1 capsule by mouth Daily.  Dispense: 28 capsule; Refill: 0    Depression: Patient has been on several different SSRIs SNRIs Trintellix and Wellbutrin and ability.  She really did not get a good feel for the last psychiatrist she saw so I am referring her to a new psychiatrist.  At this point I am really not sure what to try we can try something like Vraylar but we are going to have to recheck her creatinine today first I have given her samples but she is not to start until after I get the creatinine level back tomorrow.  And we are working on getting her into see a psychiatrist.  She does not have any suicidal ideation she has a very long history of depression and multiple family members have the same issue

## 2024-12-10 LAB
ALBUMIN SERPL-MCNC: 4.4 G/DL (ref 3.8–4.8)
ALP SERPL-CCNC: 83 IU/L (ref 44–121)
ALT SERPL-CCNC: 16 IU/L (ref 0–32)
AST SERPL-CCNC: 20 IU/L (ref 0–40)
BILIRUB SERPL-MCNC: 0.5 MG/DL (ref 0–1.2)
BUN SERPL-MCNC: 26 MG/DL (ref 8–27)
BUN/CREAT SERPL: 19 (ref 12–28)
CALCIUM SERPL-MCNC: 9.6 MG/DL (ref 8.7–10.3)
CHLORIDE SERPL-SCNC: 108 MMOL/L (ref 96–106)
CO2 SERPL-SCNC: 20 MMOL/L (ref 20–29)
CREAT SERPL-MCNC: 1.39 MG/DL (ref 0.57–1)
EGFRCR SERPLBLD CKD-EPI 2021: 39 ML/MIN/1.73
GLOBULIN SER CALC-MCNC: 2.4 G/DL (ref 1.5–4.5)
GLUCOSE SERPL-MCNC: 99 MG/DL (ref 70–99)
HBA1C MFR BLD: 6.8 % (ref 4.8–5.6)
POTASSIUM SERPL-SCNC: 4.5 MMOL/L (ref 3.5–5.2)
PROT SERPL-MCNC: 6.8 G/DL (ref 6–8.5)
SODIUM SERPL-SCNC: 144 MMOL/L (ref 134–144)

## 2024-12-12 RX ORDER — SEMAGLUTIDE 0.68 MG/ML
0.25 INJECTION, SOLUTION SUBCUTANEOUS WEEKLY
Qty: 3 ML | Refills: 0 | Status: SHIPPED | OUTPATIENT
Start: 2024-12-12

## 2025-01-07 ENCOUNTER — TELEPHONE (OUTPATIENT)
Dept: ONCOLOGY | Facility: CLINIC | Age: 80
End: 2025-01-07

## 2025-01-07 NOTE — TELEPHONE ENCOUNTER
Caller: Marylu Foreman    Relationship to patient: Self    Best call back number: [phone]    Chief complaint: SCHEDULING     Type of visit: LAB, FOLLOW UP 1, INJECTION    Requested date: 1- OR NEXT AVLIABLE     If rescheduling, when is the original appointment: 1- 8-2025

## 2025-01-15 ENCOUNTER — LAB (OUTPATIENT)
Dept: LAB | Facility: HOSPITAL | Age: 80
End: 2025-01-15
Payer: MEDICARE

## 2025-01-15 ENCOUNTER — INFUSION (OUTPATIENT)
Dept: ONCOLOGY | Facility: HOSPITAL | Age: 80
End: 2025-01-15
Payer: MEDICARE

## 2025-01-15 ENCOUNTER — OFFICE VISIT (OUTPATIENT)
Dept: ONCOLOGY | Facility: CLINIC | Age: 80
End: 2025-01-15
Payer: MEDICARE

## 2025-01-15 VITALS
HEART RATE: 87 BPM | TEMPERATURE: 98.5 F | SYSTOLIC BLOOD PRESSURE: 138 MMHG | OXYGEN SATURATION: 93 % | DIASTOLIC BLOOD PRESSURE: 81 MMHG | WEIGHT: 195.5 LBS | BODY MASS INDEX: 33.54 KG/M2

## 2025-01-15 DIAGNOSIS — M81.0 AGE-RELATED OSTEOPOROSIS WITHOUT CURRENT PATHOLOGICAL FRACTURE: ICD-10-CM

## 2025-01-15 DIAGNOSIS — C50.812 MALIGNANT NEOPLASM OF OVERLAPPING SITES OF LEFT BREAST IN FEMALE, ESTROGEN RECEPTOR POSITIVE: ICD-10-CM

## 2025-01-15 DIAGNOSIS — M81.0 AGE-RELATED OSTEOPOROSIS WITHOUT CURRENT PATHOLOGICAL FRACTURE: Primary | ICD-10-CM

## 2025-01-15 DIAGNOSIS — C50.812 MALIGNANT NEOPLASM OF OVERLAPPING SITES OF LEFT BREAST IN FEMALE, ESTROGEN RECEPTOR POSITIVE: Primary | ICD-10-CM

## 2025-01-15 DIAGNOSIS — Z17.0 MALIGNANT NEOPLASM OF OVERLAPPING SITES OF LEFT BREAST IN FEMALE, ESTROGEN RECEPTOR POSITIVE: Primary | ICD-10-CM

## 2025-01-15 DIAGNOSIS — Z79.899 HIGH RISK MEDICATION USE: ICD-10-CM

## 2025-01-15 DIAGNOSIS — M85.89 OSTEOPENIA OF MULTIPLE SITES: ICD-10-CM

## 2025-01-15 DIAGNOSIS — Z17.0 MALIGNANT NEOPLASM OF OVERLAPPING SITES OF LEFT BREAST IN FEMALE, ESTROGEN RECEPTOR POSITIVE: ICD-10-CM

## 2025-01-15 LAB
ALBUMIN SERPL-MCNC: 4.3 G/DL (ref 3.5–5.2)
ALBUMIN/GLOB SERPL: 1.4 G/DL
ALP SERPL-CCNC: 76 U/L (ref 39–117)
ALT SERPL W P-5'-P-CCNC: 16 U/L (ref 1–33)
ANION GAP SERPL CALCULATED.3IONS-SCNC: 15.8 MMOL/L (ref 5–15)
AST SERPL-CCNC: 22 U/L (ref 1–32)
BASOPHILS # BLD AUTO: 0.12 10*3/MM3 (ref 0–0.2)
BASOPHILS NFR BLD AUTO: 1.3 % (ref 0–1.5)
BILIRUB SERPL-MCNC: 0.6 MG/DL (ref 0–1.2)
BUN SERPL-MCNC: 30 MG/DL (ref 8–23)
BUN/CREAT SERPL: 24.6 (ref 7–25)
CALCIUM SPEC-SCNC: 10.5 MG/DL (ref 8.6–10.5)
CHLORIDE SERPL-SCNC: 104 MMOL/L (ref 98–107)
CO2 SERPL-SCNC: 23.2 MMOL/L (ref 22–29)
CREAT SERPL-MCNC: 1.22 MG/DL (ref 0.57–1)
DEPRECATED RDW RBC AUTO: 49.6 FL (ref 37–54)
EGFRCR SERPLBLD CKD-EPI 2021: 45.8 ML/MIN/1.73
EOSINOPHIL # BLD AUTO: 0.18 10*3/MM3 (ref 0–0.4)
EOSINOPHIL NFR BLD AUTO: 1.9 % (ref 0.3–6.2)
ERYTHROCYTE [DISTWIDTH] IN BLOOD BY AUTOMATED COUNT: 13.9 % (ref 12.3–15.4)
GLOBULIN UR ELPH-MCNC: 3 GM/DL
GLUCOSE SERPL-MCNC: 146 MG/DL (ref 65–99)
HCT VFR BLD AUTO: 40.7 % (ref 34–46.6)
HGB BLD-MCNC: 13 G/DL (ref 12–15.9)
IMM GRANULOCYTES # BLD AUTO: 0.02 10*3/MM3 (ref 0–0.05)
IMM GRANULOCYTES NFR BLD AUTO: 0.2 % (ref 0–0.5)
LYMPHOCYTES # BLD AUTO: 2.05 10*3/MM3 (ref 0.7–3.1)
LYMPHOCYTES NFR BLD AUTO: 21.4 % (ref 19.6–45.3)
MAGNESIUM SERPL-MCNC: 1.8 MG/DL (ref 1.6–2.4)
MCH RBC QN AUTO: 31.3 PG (ref 26.6–33)
MCHC RBC AUTO-ENTMCNC: 31.9 G/DL (ref 31.5–35.7)
MCV RBC AUTO: 97.8 FL (ref 79–97)
MONOCYTES # BLD AUTO: 0.6 10*3/MM3 (ref 0.1–0.9)
MONOCYTES NFR BLD AUTO: 6.3 % (ref 5–12)
NEUTROPHILS NFR BLD AUTO: 6.62 10*3/MM3 (ref 1.7–7)
NEUTROPHILS NFR BLD AUTO: 68.9 % (ref 42.7–76)
NRBC BLD AUTO-RTO: 0 /100 WBC (ref 0–0.2)
PHOSPHATE SERPL-MCNC: 4.1 MG/DL (ref 2.5–4.5)
PLATELET # BLD AUTO: 353 10*3/MM3 (ref 140–450)
PMV BLD AUTO: 10.7 FL (ref 6–12)
POTASSIUM SERPL-SCNC: 4.2 MMOL/L (ref 3.5–5.2)
PROT SERPL-MCNC: 7.3 G/DL (ref 6–8.5)
RBC # BLD AUTO: 4.16 10*6/MM3 (ref 3.77–5.28)
SODIUM SERPL-SCNC: 143 MMOL/L (ref 136–145)
WBC NRBC COR # BLD AUTO: 9.59 10*3/MM3 (ref 3.4–10.8)

## 2025-01-15 PROCEDURE — 80053 COMPREHEN METABOLIC PANEL: CPT

## 2025-01-15 PROCEDURE — 84100 ASSAY OF PHOSPHORUS: CPT

## 2025-01-15 PROCEDURE — 83735 ASSAY OF MAGNESIUM: CPT

## 2025-01-15 PROCEDURE — 36415 COLL VENOUS BLD VENIPUNCTURE: CPT

## 2025-01-15 PROCEDURE — 96372 THER/PROPH/DIAG INJ SC/IM: CPT

## 2025-01-15 PROCEDURE — 25010000002 DENOSUMAB 60 MG/ML SOLUTION PREFILLED SYRINGE: Performed by: NURSE PRACTITIONER

## 2025-01-15 PROCEDURE — 85025 COMPLETE CBC W/AUTO DIFF WBC: CPT

## 2025-01-15 RX ORDER — DULOXETIN HYDROCHLORIDE 60 MG/1
60 CAPSULE, DELAYED RELEASE ORAL 2 TIMES DAILY
COMMUNITY

## 2025-01-15 RX ADMIN — DENOSUMAB 60 MG: 60 INJECTION SUBCUTANEOUS at 12:57

## 2025-01-15 NOTE — PROGRESS NOTES
Chief Complaint  Stage I ([id number]) left breast cancer in 2007, pulmonary emboli in 2009, atrial fibrillation, history of GI bleed, history of iron deficiency status post prior gastric bypass in 2001.  Stage I ([id number]) right breast cancer status post right mastectomy 5/28/2024 (ER positive, TN negative, HER2/jorge negative)    Subjective        History of Present Illness  Patient is seen back today in follow-up due for every 6-month Prolia which she is receiving for osteoporosis while on aromatase inhibitor therapy (initiated 7/10/2024).  She is 1 week delayed in receiving this due to inclement weather.  When we last saw her she was struggling more with her depression with recent changes in her antidepressants per her PCP Dr. Arleen Dillon.  At that time she had been weaned off Cymbalta and switched over to Zoloft plus Wellbutrin.  However the patient states this was not effective, blaming this specifically on Wellbutrin being generic rather than brand-name.  She switched herself back to Cymbalta 60 mg twice a day.  All that to say she sees Dr. Dillon in follow-up tomorrow and plans to discuss further with her.  Patient understands that she needs a new psychiatrist to be properly managing this.    From our standpoint she denies any new pain.  Tolerating Arimidex well overall.  Denies other concerns at this time.      Objective   Vital Signs:   /81   Pulse 87   Temp 98.5 °F (36.9 °C) (Oral)   Wt 88.7 kg (195 lb 8 oz)   SpO2 93%   BMI 33.54 kg/m²     Physical Exam  Constitutional:       Appearance: She is well-developed.   Eyes:      Conjunctiva/sclera: Conjunctivae normal.   Neck:      Thyroid: No thyromegaly.   Cardiovascular:      Rate and Rhythm: Normal rate. Rhythm irregular.      Heart sounds: Murmur heard.      No friction rub. No gallop.   Pulmonary:      Effort: No respiratory distress.      Breath sounds: Normal breath sounds. No wheezing.      Comments: Chest wall was not examined today  Abdominal:       General: Bowel sounds are normal. There is no distension.      Palpations: Abdomen is soft.      Tenderness: There is no abdominal tenderness.   Lymphadenopathy:      Head:      Right side of head: No submandibular adenopathy.      Cervical: No cervical adenopathy.      Upper Body:      Right upper body: No supraclavicular adenopathy.      Left upper body: No supraclavicular adenopathy.   Skin:     General: Skin is warm and dry.      Findings: No rash.   Neurological:      Mental Status: She is alert and oriented to person, place, and time.      Cranial Nerves: No cranial nerve deficit.      Motor: No abnormal muscle tone.      Deep Tendon Reflexes: Reflexes normal.      Comments: Patient with continued left foot drop   Psychiatric:         Behavior: Behavior normal.          Result Review :   Results from last 7 days   Lab Units 01/15/25  1209   WBC 10*3/mm3 9.59   NEUTROS ABS 10*3/mm3 6.62   HEMOGLOBIN g/dL 13.0   HEMATOCRIT % 40.7   PLATELETS 10*3/mm3 353     Results from last 7 days   Lab Units 01/15/25  1209   SODIUM mmol/L 143   POTASSIUM mmol/L 4.2   CHLORIDE mmol/L 104   CO2 mmol/L 23.2   BUN mg/dL 30*   CREATININE mg/dL 1.22*   CALCIUM mg/dL 10.5   ALBUMIN g/dL 4.3   BILIRUBIN mg/dL 0.6   ALK PHOS U/L 76   ALT (SGPT) U/L 16   AST (SGOT) U/L 22   GLUCOSE mg/dL 146*   MAGNESIUM mg/dL 1.8                Assessment and Plan    *Stage I ([id number]) right breast cancer (ER positive, KS negative, HER2/jorge negative)  Mammogram and right breast ultrasound on 4/10/2024 with a suspicious 0.6 cm abnormality in the right breast at the 9 o'clock position.    Biopsy on 4/18/2024 of the 9:00 lesion in the right breast.  Pathology showed a well-differentiated (grade 1) invasive lobular carcinoma, no lymphovascular invasion, evidence of ALH and ADH.  ER positive (%), KS negative (2%), HER2/jorge negative (1+ IHC), Ki-67 2%.    Breast MRI on 4/26/2024 confirmed the abnormality in the 9 o'clock position with postbiopsy  cavity/abnormality measuring 1.7 cm.  There was a 1.1 cm equivocal right axillary lymph node.    Right mastectomy with sentinel node on 5/28/2024 performed by Dr. Diaz.  Pathology showed no residual cancer, did show ALH/ADH, 2 negative lymph nodes in the specimen and 4 additional negative sentinel lymph nodes.  [employer] 70 gene germline panel test 5/14/2024 with VUS in MLH1 and POLE  DEXA scan 5/22/2024 with evidence of osteoporosis (see below)  Patient not a candidate for adjuvant tamoxifen due to thrombophilia (see below).  Decision to pursue adjuvant aromatase inhibitor therapy again with Arimidex (previously well-tolerated) along with treatment for osteoporosis with Prolia.  Patient initiated adjuvant Arimidex 1 mg daily on 7/10/2024 with intent to treat x 5 years  Patient initiated concurrent Prolia on 7/10/2024 for treatment of osteoporosis while receiving aromatase therapy  Patient returns today in follow-up continuing on adjuvant anastrozole 1 mg daily (initiated 7/10/2024 with intent to treat x 5 years).  The patient did hold treatment briefly in late October for 1 week as she was concerned that the medication was contributing to her worsening depression and frequent urinary tract infections.  She has remained on treatment continuously since she resumed and is doing reasonably well at this time. No clinical evidence of recurrence.     *Stage I ([id number]) left breast cancer:  Biopsy 5/8/2007 with in situ and invasive ductal carcinoma, grade 2,/PA positive, HER-2/jorge negative  Left mastectomy 6/18/0742 foci carcinoma, 4 mm, grade 1 in situ and invasive ductal carcinoma and 2 mm grade 2 in situ and invasive ductal carcinoma, negative margins, negative sentinel lymph nodes x3 with 5 additional negative lymph nodes.  Arimidex initiated 7/2/2007  Arimidex interrupted patient developed bilateral pulmonary emboli in May 2009, resume shortly thereafter.  Completed 5 years treatment in 2012.    *Pulmonary emboli  5/2/2009 and bilateral calf DVT 8/11/2022:  Family history of DVT in the patient's mother occurring at age 70 postoperatively  Patient developed bilateral lower lobe pulmonary emboli 5/2/2009 following right VATS on 4/30/2009  Felt to be a provoked thrombosis related to surgery  Hypercoagulable evaluation with negative factor V Leiden, negative prothrombin gene mutation, normal homocystine, protein C antigen 92, free to protein S 75, anticardiolipin antibody panel negative, lupus anticoagulant negative, Antithrombin %  Continuing on chronic anticoagulation primarily for atrial fibrillation at this point as prior thrombosis was provoked.  Transitioned from Coumadin to Eliquis 5 mg twice daily in May 2020  At time of hospitalization for IJEOMA/CKD 11/15-11/18/2021, Eliquis dose was decreased to 2.5 mg twice daily.  Patient required lumbar laminectomy 8/9/2022, Eliquis was held perioperatively.  Subsequently admitted 8/10 - 8/12/2022 with Doppler 8/11/2022 that showed bilateral acute calf DVT.  This occurred postoperatively and while off anticoagulation.  Recommended to resume Eliquis at 5 mg twice daily dose.  Patient will require long-term anticoagulation with Eliquis 5 mg twice daily for both thrombophilia and atrial fibrillation.  Patient returns today in follow-up continuing on Eliquis 5 mg twice daily.  She has not experienced any bleeding complications    *Iron deficiency anemia:  Prior gastric bypass in 2001  Intolerant of oral iron.  Patient has required IV iron on multiple occasions: Feraheme 8/13 and 8/20/2018; Injectafer 3/9 and 3/16/2020  Labs on 11/15/2021 showed decline in hemoglobin to 10.8 however this was in the setting of UTI and IJEOMA/CKD3.  Her iron panel was normal with iron 114, iron saturation 34%, TIBC 337 with ferritin elevated at 292.  B12 was normal at 883.  We continue to follow these values given her history of prior gastric bypass and iron deficiency requiring intermittent IV iron  (intolerant of oral iron).   On 12/20/2021, hemoglobin further declined to 9.5.  Repeat labs with iron 47, ferritin 262, iron saturation 14%, TIBC 328, B12 level 1162.  Additional labs performed with negative serum protein electrophoresis and immunoelectrophoresis with normal quantitative immunoglobulins.  Free light chains elevated with kappa 73, lambda 36 with ratio 2.04, consistent with patient's degree of renal dysfunction.  Haptoglobin normal 186, LDH normal 171, CRP borderline elevated 0.62, erythropoietin level 20.1.  Anemia felt to be related to CKD3.  Patient felt to be a potential candidate for Procrit if hemoglobin remains below 10.  Patient experienced improvement in hemoglobin into the 11-12 range  Labs on 6/19/2023 with hemoglobin 11.7, iron 50, ferritin 41.6, iron saturation 12%, TIBC 404 indicating recurrent iron deficiency likely related to malabsorption from prior gastric bypass  Patient received IV iron with Venofer 300 mg on 6/26, 6/28, 7/3, 7/7/2023  Labs on 1/8/2024 with hemoglobin normal at 13.6, patient is iron replete with iron 95, ferritin 223, iron saturation 23%, TIBC 405.  Patient was iron replete, no need for further IV iron.    Patient was seen by Dr. Bess in GI on 1/31/2024, did not recommend any immediate endoscopic evaluation, agreement that iron deficiency likely related to malabsorption from prior gastric bypass.  Plans for upper and lower endoscopy in November 2028 11/27/2024 hemoglobin 13.4.  Iron studies confirm iron replete status with iron 128, ferritin 195, iron saturation 28%, TIBC 450.  Recheck iron studies in 3 months at return visit  1/15/2025 Hgb 13.0    *GI bleed in July 2018:  Acute lower GI bleed with supratherapeutic INR Coumadin.  Angiogram performed with coil embolization of artery to sigmoid colon  No clinical evidence of further GI blood loss    *Atrial fibrillation:  Requires ongoing anticoagulation for this indication  As above, transitioned from Coumadin to  Eliquis 5 mg twice daily in May 2020  As above, during hospitalization 11/15-11/18/2021 for IJEOMA/CKD3, Eliquis dose was decreased to 2.5 mg twice daily  See above discussion, patient developed bilateral calf DVT while briefly off anticoagulation following lumbar laminectomy on 8/11/2022.  Patient is continuing on full dose Eliquis 5 mg twice daily.      *Status post right VATS 4/30/2020 for pulmonary nodule with finding of necrotizing granulomatous inflammation    *Severe depression/anxiety:  Patient has had chronic issues with this, is followed by psychiatry, Dr. Librado Monique.  She also underwent previous counseling which she found helpful.  The patient had ongoing difficulty with control of her depression.  She has been on multiple antidepressants  In June/July 2021 patient underwent transcranial magnetic stimulation (TMS) with significant improvement in depressive symptoms and resolution of headaches.  Symptoms subsequently gradually recurred, patient reports there has been difficulty with insurance regarding maintenance treatments.  11/14/2024 Patient experiencing ongoing difficulty with depression, seen by Dr. Arleen Dillon, PCP with adjustments to her antidepressants made.  Specifically Cymbalta tapered and switched over to Zoloft 50 mg daily plus Wellbutrin  mg daily.    1/15/2025 per review today patient states that she switched herself back to Cymbalta 60 mg twice daily, feeling that the Zoloft/Wellbutrin combo was not effective.  She feels her depression is still poorly controlled however and plans to discuss with Dr. Dillon in follow-up tomorrow.    *IJEOMA/CKD3  Patient with CKD3 with baseline creatinine in the 1.3-1.8 range  At time of routine follow-up visit 11/15/2021, creatinine was 3.19 and BUN of 59.  This was compared to prior value 6/2/2021 with BUN 41, creatinine 1.53.  Patient was hospitalized 11/15-11/18/2021  Patient is continuing follow-up with Dr. Cisneros in nephrology  Creatinine on  11/24/2021 remained elevated at 2.43 however on 12/20/2021 declined to 1.7, near her prior baseline.  Additional labs on 12/20/2021 with negative serum protein electrophoresis and immunoelectrophoresis with normal quantitative immunoglobulins.  Free light chains elevated with kappa 73, lambda 36 with ratio 2.04, consistent with patient's degree of renal dysfunction.  11/27/2024 Creatinine today increased above baseline at 1.81.  We will forward this to her nephrologist Dr. Cisneros.  1/15/2025 creatinine 1.22    *Hypothyroidism   Patient continues on levothyroxine 50 mcg daily    *Severe left-sided sciatica with left foot drop  Plain film of the lumbar spine on 5/10/2022 was negative.    MRI lumbar spine at [facility] on 5/14/2022 showed no evidence of metastatic disease however did show evidence of degenerative change with disc disease and neuroforaminal compromise.     She developed a left foot drop and was referred to neurosurgery, eventually underwent lumbar laminectomy on 8/9/2022  Patient has continued with chronic difficulty regarding left foot drop.    The patient with significant neuropathic type pain in the distal left lower extremity.  Initiated gabapentin 100 mg 3 times daily.  Patient continues with chronic left foot drop.  She was going to undergo EMG but is questioning whether she should do this as it will likely not change her overall course.    *Left foot deformities  Patient with multiple issues regarding her left foot including hammertoes, hallux valgus  Patient did require left foot surgery on 7/11/2023  Patient was experiencing significant swelling in her foot.  She was seen by orthopedics Dr. Reed on 7/26/2024.  There was no evidence of fracture.  Patient underwent physical therapy.      *Osteoporosis  DEXA scan on 5/22/2024 with T-score L-spine 0.0, left hip -3.0, right hip -2.7  25-hydroxy vitamin D level was normal at 45.6 on 6/11/2024  Patient continuing on calcium and vitamin D  supplementation.  Patient initiated adjuvant aromatase inhibitor therapy with Arimidex 1 mg daily x 5 years on 7/10/2024  Patient initiated treatment for osteoporosis concurrently with every 6-month Prolia on 7/10/2024  Proceed with Prolia today 1/15/2025    *Chronic Headaches  Patient notes chronic history of headaches that were felt to be related to TMJ.      *Recurrent UTIs  Patient reports ongoing chronic dysuria intermittently.  Repeat urine culture done, no growth.  1/15/2025 no concerns regarding this today      Plan:  Continue adjuvant Arimidex 1 mg daily with plan to treat x 5 years adjuvantly (initiated 7/10/2024)  Proceed with Prolia today, continuing every 6 months for osteoporosis  Continue calcium and vitamin D supplementation  Patient currently continuing Cymbalta 60 mg BID (self switched back to this after most recent changes made by Dr. Dillon including transitioning to Wellbutrin and Zoloft).  She will follow-up with PCP tomorrow.  She does not currently have a psychiatrist, choosing to leave her previous doctor.  She plans to discuss this further with Dr. Dillon tomorrow.  MD visit in 6 weeks with CBC, CMP, stat iron panel/ferritin.    This patient is on high risk drug therapy requiring intensive monitoring for toxicity.

## 2025-01-23 ENCOUNTER — OFFICE VISIT (OUTPATIENT)
Dept: INTERNAL MEDICINE | Facility: CLINIC | Age: 80
End: 2025-01-23
Payer: MEDICARE

## 2025-01-23 VITALS
WEIGHT: 200.3 LBS | DIASTOLIC BLOOD PRESSURE: 74 MMHG | SYSTOLIC BLOOD PRESSURE: 126 MMHG | BODY MASS INDEX: 34.19 KG/M2 | HEIGHT: 64 IN | TEMPERATURE: 97.9 F | HEART RATE: 73 BPM | OXYGEN SATURATION: 97 %

## 2025-01-23 DIAGNOSIS — S90.412A INFECTED ABRASION OF GREAT TOE OF LEFT FOOT, INITIAL ENCOUNTER: ICD-10-CM

## 2025-01-23 DIAGNOSIS — L08.9 INFECTED ABRASION OF GREAT TOE OF LEFT FOOT, INITIAL ENCOUNTER: ICD-10-CM

## 2025-01-23 DIAGNOSIS — F32.A DEPRESSION, UNSPECIFIED DEPRESSION TYPE: Primary | ICD-10-CM

## 2025-01-23 RX ORDER — IRBESARTAN 300 MG/1
300 TABLET ORAL DAILY
COMMUNITY
Start: 2025-01-21

## 2025-01-23 RX ORDER — BUPROPION HCL 150 MG
150 TABLET, EXTENDED RELEASE 24 HR ORAL DAILY
Qty: 30 TABLET | Refills: 2 | Status: SHIPPED | OUTPATIENT
Start: 2025-01-23

## 2025-01-23 RX ORDER — DULOXETIN HYDROCHLORIDE 30 MG/1
30 CAPSULE, DELAYED RELEASE ORAL DAILY
Qty: 30 CAPSULE | Refills: 2 | Status: SHIPPED | OUTPATIENT
Start: 2025-01-23

## 2025-01-23 RX ORDER — DULOXETIN HYDROCHLORIDE 30 MG/1
30 CAPSULE, DELAYED RELEASE ORAL DAILY
Qty: 30 CAPSULE | Refills: 2 | Status: SHIPPED | OUTPATIENT
Start: 2025-01-23 | End: 2025-01-23 | Stop reason: SDUPTHER

## 2025-01-23 RX ORDER — BUPROPION HCL 150 MG
150 TABLET, EXTENDED RELEASE 24 HR ORAL DAILY
Qty: 30 TABLET | Refills: 2 | Status: SHIPPED | OUTPATIENT
Start: 2025-01-23 | End: 2025-01-23 | Stop reason: SDUPTHER

## 2025-01-23 RX ORDER — MUPIROCIN 20 MG/G
1 OINTMENT TOPICAL 2 TIMES DAILY
Qty: 22 G | Refills: 0 | Status: SHIPPED | OUTPATIENT
Start: 2025-01-23

## 2025-01-23 NOTE — PROGRESS NOTES
Subjective   Marylu Foreman is a 79 y.o. female.   She has long standing depression  she is scheduling with psych    Depression  Her past medical history is significant for depression.   Diabetes     She most recently has tried vraylar and welbutrin  she has been on zoloft and cymbalta    The following portions of the patient's history were reviewed and updated as appropriate: allergies, current medications, past family history, past medical history, past social history, past surgical history, and problem list.    Review of Systems    Objective   Physical Exam  Vitals reviewed.   Constitutional:       Appearance: She is well-developed.   HENT:      Head: Normocephalic and atraumatic.      Right Ear: External ear normal.      Left Ear: External ear normal.   Eyes:      Conjunctiva/sclera: Conjunctivae normal.      Pupils: Pupils are equal, round, and reactive to light.   Neck:      Thyroid: No thyromegaly.      Trachea: No tracheal deviation.   Cardiovascular:      Rate and Rhythm: Normal rate and regular rhythm.      Heart sounds: Normal heart sounds.   Pulmonary:      Effort: Pulmonary effort is normal.      Breath sounds: Normal breath sounds.   Abdominal:      General: Bowel sounds are normal. There is no distension.      Palpations: Abdomen is soft.      Tenderness: There is no abdominal tenderness.   Musculoskeletal:         General: No deformity. Normal range of motion.      Cervical back: Normal range of motion.   Skin:     General: Skin is warm and dry.   Neurological:      Mental Status: She is alert and oriented to person, place, and time.   Psychiatric:         Behavior: Behavior normal.         Thought Content: Thought content normal.         Judgment: Judgment normal.       Vitals:    01/23/25 1529   BP: 126/74   Pulse: 73   Temp: 97.9 °F (36.6 °C)   SpO2: 97%     Body mass index is 34.36 kg/m².         Assessment & Plan   There are no diagnoses linked to this encounter.     Depression-  she did well in  the ;ast with name brand welbutrin xl  she does not think the generic worked  we will try to get named brand and then hope to wean the cymbalta and try zoloft--  she has tried lexapro trintellix and vraypar and prozac all over the last few years

## 2025-01-27 ENCOUNTER — HOSPITAL ENCOUNTER (OUTPATIENT)
Dept: OCCUPATIONAL THERAPY | Facility: HOSPITAL | Age: 80
Discharge: HOME | End: 2025-01-27
Admitting: STUDENT IN AN ORGANIZED HEALTH CARE EDUCATION/TRAINING PROGRAM
Payer: MEDICARE

## 2025-01-27 DIAGNOSIS — Z91.89 AT RISK FOR LYMPHEDEMA: ICD-10-CM

## 2025-01-27 DIAGNOSIS — C50.911 MALIGNANT NEOPLASM OF RIGHT BREAST IN FEMALE, ESTROGEN RECEPTOR POSITIVE, UNSPECIFIED SITE OF BREAST: Primary | ICD-10-CM

## 2025-01-27 DIAGNOSIS — Z17.0 MALIGNANT NEOPLASM OF RIGHT BREAST IN FEMALE, ESTROGEN RECEPTOR POSITIVE, UNSPECIFIED SITE OF BREAST: Primary | ICD-10-CM

## 2025-01-27 PROCEDURE — 93702 BIS XTRACELL FLUID ANALYSIS: CPT

## 2025-01-27 PROCEDURE — 97535 SELF CARE MNGMENT TRAINING: CPT
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
## 2025-01-28 ENCOUNTER — PATIENT MESSAGE (OUTPATIENT)
Dept: INTERNAL MEDICINE | Facility: CLINIC | Age: 80
End: 2025-01-28
Payer: MEDICARE

## 2025-02-08 DIAGNOSIS — L08.9 INFECTED ABRASION OF GREAT TOE OF LEFT FOOT, INITIAL ENCOUNTER: ICD-10-CM

## 2025-02-08 DIAGNOSIS — S90.412A INFECTED ABRASION OF GREAT TOE OF LEFT FOOT, INITIAL ENCOUNTER: ICD-10-CM

## 2025-02-10 RX ORDER — MUPIROCIN 20 MG/G
OINTMENT TOPICAL
Qty: 30 G | Refills: 3 | Status: SHIPPED | OUTPATIENT
Start: 2025-02-10

## 2025-02-18 ENCOUNTER — OFFICE VISIT (OUTPATIENT)
Age: 80
End: 2025-02-18
Payer: MEDICARE

## 2025-02-18 VITALS
SYSTOLIC BLOOD PRESSURE: 132 MMHG | BODY MASS INDEX: 33.46 KG/M2 | OXYGEN SATURATION: 94 % | DIASTOLIC BLOOD PRESSURE: 84 MMHG | WEIGHT: 196 LBS | HEIGHT: 64 IN | HEART RATE: 109 BPM

## 2025-02-18 DIAGNOSIS — F33.1 MODERATE EPISODE OF RECURRENT MAJOR DEPRESSIVE DISORDER: Primary | ICD-10-CM

## 2025-02-18 DIAGNOSIS — F41.1 GENERALIZED ANXIETY DISORDER: ICD-10-CM
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
## 2025-02-25 NOTE — PROGRESS NOTES
"Chief Complaint  Stage I ([id number]) left breast cancer in 2007, pulmonary emboli in 2009, atrial fibrillation, history of GI bleed, history of iron deficiency status post prior gastric bypass in 2001.  Stage I ([id number]) right breast cancer status post right mastectomy 5/28/2024 (ER positive, WA negative, HER2/jorge negative)    Subjective        History of Present Illness    Patient returns today in follow-up continuing on adjuvant Arimidex 1 mg daily (initiated 7/10/2024).  She is also continuing on Prolia as treatment for osteoporosis while on aromatase inhibitor therapy (last administered on 1/15/2025).  The patient also has history of iron deficiency anemia related to malabsorption from prior gastric bypass and has required IV iron.  She continues on anticoagulation with Eliquis 5 mg twice daily due to atrial fibrillation as well as history of pulmonary emboli and DVT.  The patient today notes that her primary complaint is that of ongoing severe depression.  She feels fatigued all the time and wants to sleep most of the day.  This has been a chronic issue for her.  She was seen by psychiatry on 2/18/2025 and was started on Auvelity.  She reports some dental issues recently as well, had a fracture of a crown on her front tooth and had an extraction with placement of an implant post a number of months ago.  We were not aware of this when she received her last dose of Prolia on 1/15/2025 however fortunately has not experienced any issues consistent with osteonecrosis of the jaw.  She does have some current symptoms of dysuria, is unclear whether she has a urinary tract infection.  She is tolerating anastrozole without any side effects.      Objective   Vital Signs:   /73   Pulse 98   Temp 98.3 °F (36.8 °C) (Oral)   Ht 162.6 cm (64.02\")   Wt 88.5 kg (195 lb 3.2 oz)   SpO2 95%   BMI 33.49 kg/m²     Physical Exam  Constitutional:       Appearance: She is well-developed.   Eyes:      Conjunctiva/sclera: " Conjunctivae normal.   Neck:      Thyroid: No thyromegaly.   Cardiovascular:      Rate and Rhythm: Normal rate. Rhythm irregular.      Heart sounds: Murmur heard.      No friction rub. No gallop.   Pulmonary:      Effort: No respiratory distress.      Breath sounds: Normal breath sounds. No wheezing.      Comments: Chest wall was not examined today  Abdominal:      General: Bowel sounds are normal. There is no distension.      Palpations: Abdomen is soft.      Tenderness: There is no abdominal tenderness.   Lymphadenopathy:      Head:      Right side of head: No submandibular adenopathy.      Cervical: No cervical adenopathy.      Upper Body:      Right upper body: No supraclavicular adenopathy.      Left upper body: No supraclavicular adenopathy.   Skin:     General: Skin is warm and dry.      Findings: No rash.   Neurological:      Mental Status: She is alert and oriented to person, place, and time.      Cranial Nerves: No cranial nerve deficit.      Motor: No abnormal muscle tone.      Deep Tendon Reflexes: Reflexes normal.      Comments: Patient with continued left foot drop   Psychiatric:         Behavior: Behavior normal.            Result Review : Reviewed CBC, CMP, iron panel, ferritin from today.  Reviewed psychiatry records.       Assessment and Plan    *Stage I ([id number]) right breast cancer (ER positive, WA negative, HER2/jorge negative)  Mammogram and right breast ultrasound on 4/10/2024 with a suspicious 0.6 cm abnormality in the right breast at the 9 o'clock position.    Biopsy on 4/18/2024 of the 9:00 lesion in the right breast.  Pathology showed a well-differentiated (grade 1) invasive lobular carcinoma, no lymphovascular invasion, evidence of ALH and ADH.  ER positive (%), WA negative (2%), HER2/jorge negative (1+ IHC), Ki-67 2%.    Breast MRI on 4/26/2024 confirmed the abnormality in the 9 o'clock position with postbiopsy cavity/abnormality measuring 1.7 cm.  There was a 1.1 cm equivocal right  axillary lymph node.    Right mastectomy with sentinel node on 5/28/2024 performed by Dr. Diaz.  Pathology showed no residual cancer, did show ALH/ADH, 2 negative lymph nodes in the specimen and 4 additional negative sentinel lymph nodes.  [employer] 70 gene germline panel test 5/14/2024 with VUS in MLH1 and POLE  DEXA scan 5/22/2024 with evidence of osteoporosis (see below)  Patient not a candidate for adjuvant tamoxifen due to thrombophilia (see below).  Decision to pursue adjuvant aromatase inhibitor therapy again with Arimidex (previously well-tolerated) along with treatment for osteoporosis with Prolia.  Patient initiated adjuvant Arimidex 1 mg daily on 7/10/2024 with intent to treat x 5 years  Patient initiated concurrent Prolia on 7/10/2024 for treatment of osteoporosis while receiving aromatase therapy  Patient returns today in follow-up continuing on adjuvant anastrozole 1 mg daily (initiated 7/10/2024 with intent to treat x 5 years).  The patient is not experiencing any significant side effects from anastrozole at this time.  She has no clinical evidence of recurrent disease.  We did not pursue chest wall exam today.  She will return here in 3 months for further follow-up.     *Stage I ([id number]) left breast cancer:  Biopsy 5/8/2007 with in situ and invasive ductal carcinoma, grade 2,/FL positive, HER-2/jorge negative  Left mastectomy 6/18/0742 foci carcinoma, 4 mm, grade 1 in situ and invasive ductal carcinoma and 2 mm grade 2 in situ and invasive ductal carcinoma, negative margins, negative sentinel lymph nodes x3 with 5 additional negative lymph nodes.  Arimidex initiated 7/2/2007  Arimidex interrupted patient developed bilateral pulmonary emboli in May 2009, resume shortly thereafter.  Completed 5 years treatment in 2012.    *Pulmonary emboli 5/2/2009 and bilateral calf DVT 8/11/2022:  Family history of DVT in the patient's mother occurring at age 70 postoperatively  Patient developed bilateral lower lobe  pulmonary emboli 5/2/2009 following right VATS on 4/30/2009  Felt to be a provoked thrombosis related to surgery  Hypercoagulable evaluation with negative factor V Leiden, negative prothrombin gene mutation, normal homocystine, protein C antigen 92, free to protein S 75, anticardiolipin antibody panel negative, lupus anticoagulant negative, Antithrombin %  Continuing on chronic anticoagulation primarily for atrial fibrillation at this point as prior thrombosis was provoked.  Transitioned from Coumadin to Eliquis 5 mg twice daily in May 2020  At time of hospitalization for IJEOMA/CKD 11/15-11/18/2021, Eliquis dose was decreased to 2.5 mg twice daily.  Patient required lumbar laminectomy 8/9/2022, Eliquis was held perioperatively.  Subsequently admitted 8/10 - 8/12/2022 with Doppler 8/11/2022 that showed bilateral acute calf DVT.  This occurred postoperatively and while off anticoagulation.  Recommended to resume Eliquis at 5 mg twice daily dose.  Patient will require long-term anticoagulation with Eliquis 5 mg twice daily for both thrombophilia and atrial fibrillation.  Patient returns today in follow-up continuing on Eliquis 5 mg twice daily.  She has not experienced any bleeding complications    *Iron deficiency anemia:  Prior gastric bypass in 2001  Intolerant of oral iron.  Patient has required IV iron on multiple occasions: Feraheme 8/13 and 8/20/2018; Injectafer 3/9 and 3/16/2020  Labs on 11/15/2021 showed decline in hemoglobin to 10.8 however this was in the setting of UTI and IJEOMA/CKD3.  Her iron panel was normal with iron 114, iron saturation 34%, TIBC 337 with ferritin elevated at 292.  B12 was normal at 883.  We continue to follow these values given her history of prior gastric bypass and iron deficiency requiring intermittent IV iron (intolerant of oral iron).   On 12/20/2021, hemoglobin further declined to 9.5.  Repeat labs with iron 47, ferritin 262, iron saturation 14%, TIBC 328, B12 level 1162.   Additional labs performed with negative serum protein electrophoresis and immunoelectrophoresis with normal quantitative immunoglobulins.  Free light chains elevated with kappa 73, lambda 36 with ratio 2.04, consistent with patient's degree of renal dysfunction.  Haptoglobin normal 186, LDH normal 171, CRP borderline elevated 0.62, erythropoietin level 20.1.  Anemia felt to be related to CKD3.  Patient felt to be a potential candidate for Procrit if hemoglobin remains below 10.  Patient experienced improvement in hemoglobin into the 11-12 range  Labs on 6/19/2023 with hemoglobin 11.7, iron 50, ferritin 41.6, iron saturation 12%, TIBC 404 indicating recurrent iron deficiency likely related to malabsorption from prior gastric bypass  Patient received IV iron with Venofer 300 mg on 6/26, 6/28, 7/3, 7/7/2023  Labs on 1/8/2024 with hemoglobin normal at 13.6, patient is iron replete with iron 95, ferritin 223, iron saturation 23%, TIBC 405.  Patient was iron replete, no need for further IV iron.    Patient was seen by Dr. Bess in GI on 1/31/2024, did not recommend any immediate endoscopic evaluation, agreement that iron deficiency likely related to malabsorption from prior gastric bypass.  Plans for upper and lower endoscopy in November 2028  Labs today with hemoglobin that remains normal at 13.6.  Iron studies have declined with iron 46, ferritin 148, iron saturation 12%, TIBC 380.  We will not pursue IV iron at this time however it is likely that she will require IV iron in the near future.  We will recheck labs at her return visit in 3 months.    *GI bleed in July 2018:  Acute lower GI bleed with supratherapeutic INR Coumadin.  Angiogram performed with coil embolization of artery to sigmoid colon  No clinical evidence of further GI blood loss    *Atrial fibrillation:  Requires ongoing anticoagulation for this indication  As above, transitioned from Coumadin to Eliquis 5 mg twice daily in May 2020  As above, during  hospitalization 11/15-11/18/2021 for IJEOMA/CKD3, Eliquis dose was decreased to 2.5 mg twice daily  See above discussion, patient developed bilateral calf DVT while briefly off anticoagulation following lumbar laminectomy on 8/11/2022.  Patient is continuing on full dose Eliquis 5 mg twice daily.      *Status post right VATS 4/30/2020 for pulmonary nodule with finding of necrotizing granulomatous inflammation    *Severe depression/anxiety:  Patient has had chronic issues with this, is followed by psychiatry, Dr. Librado Monique.  She also underwent previous counseling which she found helpful.  The patient had ongoing difficulty with control of her depression.  She has been on multiple antidepressants  In June/July 2021 patient underwent transcranial magnetic stimulation (TMS) with significant improvement in depressive symptoms and resolution of headaches.  Symptoms subsequently gradually recurred, patient reports there has been difficulty with insurance regarding maintenance treatments.  Patient continues with severe depression.  She was seen by psychiatry on 2/18/2025 and was started on Auvelity.  She has follow-up scheduled in March with psychiatry.    *IJEOMA/CKD3  Patient with CKD3 with baseline creatinine in the 1.3-1.8 range  At time of routine follow-up visit 11/15/2021, creatinine was 3.19 and BUN of 59.  This was compared to prior value 6/2/2021 with BUN 41, creatinine 1.53.  Patient was hospitalized 11/15-11/18/2021  Patient is continuing follow-up with Dr. Cisneros in nephrology  Creatinine on 11/24/2021 remained elevated at 2.43 however on 12/20/2021 declined to 1.7, near her prior baseline.  Additional labs on 12/20/2021 with negative serum protein electrophoresis and immunoelectrophoresis with normal quantitative immunoglobulins.  Free light chains elevated with kappa 73, lambda 36 with ratio 2.04, consistent with patient's degree of renal dysfunction.  Creatinine today 1.6.  She continues follow-up with  nephrology    *Hypothyroidism   Patient continues on levothyroxine 50 mcg daily    *Severe left-sided sciatica with left foot drop  Plain film of the lumbar spine on 5/10/2022 was negative.    MRI lumbar spine at [facility] on 5/14/2022 showed no evidence of metastatic disease however did show evidence of degenerative change with disc disease and neuroforaminal compromise.     She developed a left foot drop and was referred to neurosurgery, eventually underwent lumbar laminectomy on 8/9/2022  Patient has continued with chronic difficulty regarding left foot drop.    The patient with significant neuropathic type pain in the distal left lower extremity.  Initiated gabapentin 100 mg 3 times daily.  Patient continues with chronic left foot drop.      *Left foot deformities  Patient with multiple issues regarding her left foot including hammertoes, hallux valgus  Patient did require left foot surgery on 7/11/2023  Patient was experiencing significant swelling in her foot.  She was seen by orthopedics Dr. Reed on 7/26/2024.  There was no evidence of fracture.  Patient underwent physical therapy.      *Osteoporosis  DEXA scan on 5/22/2024 with T-score L-spine 0.0, left hip -3.0, right hip -2.7  25-hydroxy vitamin D level was normal at 45.6 on 6/11/2024  Patient continuing on calcium and vitamin D supplementation.  Patient initiated adjuvant aromatase inhibitor therapy with Arimidex 1 mg daily x 5 years on 7/10/2024  Patient initiated treatment for osteoporosis concurrently with every 6-month Prolia on 7/10/2024  Patient received last dose of Prolia on 1/15/2025.  As above, she has had dental issues recently with a fractured crown on anterior tooth that required extraction and placement of a post for an implant.  We discussed the need to use caution and proceeding with further dental interventions in relation to her ongoing Prolia use.  We will consider whether her next dose needs to be delayed pending her  dental situation.    *Chronic headaches  Patient notes chronic history of headaches that were felt to be related to TMJ.      *Recurrent UTIs  Patient with leukocytosis today at 13.94, is having dysuria we will check urinalysis/culture and notify her if antibiotics are warranted      Plan:  Continue adjuvant Arimidex 1 mg daily with plan to treat x 5 years adjuvantly (initiated 7/10/2024)  Patient continues on Prolia every 6 months for osteoporosis (last dose received on 1/15/2025).  Consideration of potential need to delay next treatment related to dental issues  Continue calcium and vitamin D supplementation  Patient continues follow-up with psychiatry regarding severe depression  Urinalysis and urine culture pending from today  MD visit in 3 months with CBC, CMP, stat iron panel/ferritin.    I did spend 40 minutes total time caring for the patient today, time spent in review of records, face-to-face consultation, coordination of care, placement of orders, completion of documentation.

## 2025-02-26 ENCOUNTER — LAB (OUTPATIENT)
Dept: LAB | Facility: HOSPITAL | Age: 80
End: 2025-02-26
Payer: MEDICARE

## 2025-02-26 ENCOUNTER — OFFICE VISIT (OUTPATIENT)
Dept: ONCOLOGY | Facility: CLINIC | Age: 80
End: 2025-02-26
Payer: MEDICARE

## 2025-02-26 VITALS
SYSTOLIC BLOOD PRESSURE: 108 MMHG | HEIGHT: 64 IN | WEIGHT: 195.2 LBS | OXYGEN SATURATION: 95 % | DIASTOLIC BLOOD PRESSURE: 73 MMHG | BODY MASS INDEX: 33.32 KG/M2 | HEART RATE: 98 BPM | TEMPERATURE: 98.3 F

## 2025-02-26 DIAGNOSIS — Z17.0 MALIGNANT NEOPLASM OF OVERLAPPING SITES OF LEFT BREAST IN FEMALE, ESTROGEN RECEPTOR POSITIVE: Primary | ICD-10-CM

## 2025-02-26 DIAGNOSIS — R30.0 DYSURIA: ICD-10-CM

## 2025-02-26 DIAGNOSIS — D50.9 IRON DEFICIENCY ANEMIA, UNSPECIFIED IRON DEFICIENCY ANEMIA TYPE: ICD-10-CM

## 2025-02-26 DIAGNOSIS — Z17.0 MALIGNANT NEOPLASM OF OVERLAPPING SITES OF LEFT BREAST IN FEMALE, ESTROGEN RECEPTOR POSITIVE: ICD-10-CM

## 2025-02-26 DIAGNOSIS — C50.812 MALIGNANT NEOPLASM OF OVERLAPPING SITES OF LEFT BREAST IN FEMALE, ESTROGEN RECEPTOR POSITIVE: ICD-10-CM

## 2025-02-26 DIAGNOSIS — C50.812 MALIGNANT NEOPLASM OF OVERLAPPING SITES OF LEFT BREAST IN FEMALE, ESTROGEN RECEPTOR POSITIVE: Primary | ICD-10-CM

## 2025-02-26 LAB
ALBUMIN SERPL-MCNC: 4.1 G/DL (ref 3.5–5.2)
ALBUMIN/GLOB SERPL: 1.3 G/DL
ALP SERPL-CCNC: 86 U/L (ref 39–117)
ALT SERPL W P-5'-P-CCNC: 12 U/L (ref 1–33)
ANION GAP SERPL CALCULATED.3IONS-SCNC: 17.6 MMOL/L (ref 5–15)
AST SERPL-CCNC: 18 U/L (ref 1–32)
BACTERIA UR QL AUTO: ABNORMAL /HPF
BASOPHILS # BLD AUTO: 0.1 10*3/MM3 (ref 0–0.2)
BASOPHILS NFR BLD AUTO: 0.7 % (ref 0–1.5)
BILIRUB SERPL-MCNC: 0.7 MG/DL (ref 0–1.2)
BILIRUB UR QL STRIP: ABNORMAL
BUN SERPL-MCNC: 28 MG/DL (ref 8–23)
BUN/CREAT SERPL: 17.5 (ref 7–25)
CALCIUM SPEC-SCNC: 9.4 MG/DL (ref 8.6–10.5)
CHLORIDE SERPL-SCNC: 104 MMOL/L (ref 98–107)
CLARITY UR: ABNORMAL
CO2 SERPL-SCNC: 20.4 MMOL/L (ref 22–29)
COLOR UR: ABNORMAL
CREAT SERPL-MCNC: 1.6 MG/DL (ref 0.57–1)
DEPRECATED RDW RBC AUTO: 48.4 FL (ref 37–54)
EGFRCR SERPLBLD CKD-EPI 2021: 33.1 ML/MIN/1.73
EOSINOPHIL # BLD AUTO: 0.15 10*3/MM3 (ref 0–0.4)
EOSINOPHIL NFR BLD AUTO: 1.1 % (ref 0.3–6.2)
ERYTHROCYTE [DISTWIDTH] IN BLOOD BY AUTOMATED COUNT: 13.8 % (ref 12.3–15.4)
FERRITIN SERPL-MCNC: 148 NG/ML (ref 13–150)
GLOBULIN UR ELPH-MCNC: 3.1 GM/DL
GLUCOSE SERPL-MCNC: 145 MG/DL (ref 65–99)
GLUCOSE UR STRIP-MCNC: NEGATIVE MG/DL
HCT VFR BLD AUTO: 42.1 % (ref 34–46.6)
HGB BLD-MCNC: 13.6 G/DL (ref 12–15.9)
HGB UR QL STRIP.AUTO: NEGATIVE
HYALINE CASTS UR QL AUTO: ABNORMAL /LPF
IMM GRANULOCYTES # BLD AUTO: 0.04 10*3/MM3 (ref 0–0.05)
IMM GRANULOCYTES NFR BLD AUTO: 0.3 % (ref 0–0.5)
IRON 24H UR-MRATE: 46 MCG/DL (ref 37–145)
IRON SATN MFR SERPL: 12 % (ref 20–50)
KETONES UR QL STRIP: ABNORMAL
LEUKOCYTE ESTERASE UR QL STRIP.AUTO: ABNORMAL
LYMPHOCYTES # BLD AUTO: 3.06 10*3/MM3 (ref 0.7–3.1)
LYMPHOCYTES NFR BLD AUTO: 22 % (ref 19.6–45.3)
MCH RBC QN AUTO: 30.8 PG (ref 26.6–33)
MCHC RBC AUTO-ENTMCNC: 32.3 G/DL (ref 31.5–35.7)
MCV RBC AUTO: 95.2 FL (ref 79–97)
MONOCYTES # BLD AUTO: 1.18 10*3/MM3 (ref 0.1–0.9)
MONOCYTES NFR BLD AUTO: 8.5 % (ref 5–12)
MUCOUS THREADS URNS QL MICRO: ABNORMAL /HPF
NEUTROPHILS NFR BLD AUTO: 67.4 % (ref 42.7–76)
NEUTROPHILS NFR BLD AUTO: 9.41 10*3/MM3 (ref 1.7–7)
NITRITE UR QL STRIP: NEGATIVE
NRBC BLD AUTO-RTO: 0 /100 WBC (ref 0–0.2)
PH UR STRIP.AUTO: 5.5 [PH] (ref 4.5–8)
PLATELET # BLD AUTO: 349 10*3/MM3 (ref 140–450)
PMV BLD AUTO: 11 FL (ref 6–12)
POTASSIUM SERPL-SCNC: 4.1 MMOL/L (ref 3.5–5.2)
PROT SERPL-MCNC: 7.2 G/DL (ref 6–8.5)
PROT UR QL STRIP: ABNORMAL
RBC # BLD AUTO: 4.42 10*6/MM3 (ref 3.77–5.28)
RBC # UR STRIP: ABNORMAL /HPF
REF LAB TEST METHOD: ABNORMAL
SODIUM SERPL-SCNC: 142 MMOL/L (ref 136–145)
SP GR UR STRIP: 1.02 (ref 1–1.03)
SQUAMOUS #/AREA URNS HPF: ABNORMAL /HPF
TIBC SERPL-MCNC: 380 MCG/DL (ref 298–536)
TRANSFERRIN SERPL-MCNC: 255 MG/DL (ref 200–360)
UROBILINOGEN UR QL STRIP: ABNORMAL
WBC # UR STRIP: ABNORMAL /HPF
WBC NRBC COR # BLD AUTO: 13.94 10*3/MM3 (ref 3.4–10.8)

## 2025-02-26 PROCEDURE — 85025 COMPLETE CBC W/AUTO DIFF WBC: CPT

## 2025-02-26 PROCEDURE — 82728 ASSAY OF FERRITIN: CPT

## 2025-02-26 PROCEDURE — 36415 COLL VENOUS BLD VENIPUNCTURE: CPT

## 2025-02-26 PROCEDURE — 84466 ASSAY OF TRANSFERRIN: CPT

## 2025-02-26 PROCEDURE — 81001 URINALYSIS AUTO W/SCOPE: CPT | Performed by: INTERNAL MEDICINE

## 2025-02-26 PROCEDURE — 87086 URINE CULTURE/COLONY COUNT: CPT | Performed by: INTERNAL MEDICINE

## 2025-02-26 PROCEDURE — 80053 COMPREHEN METABOLIC PANEL: CPT

## 2025-02-26 PROCEDURE — 83540 ASSAY OF IRON: CPT

## 2025-02-27 ENCOUNTER — HOSPITAL ENCOUNTER (OUTPATIENT)
Dept: OCCUPATIONAL THERAPY | Facility: HOSPITAL | Age: 80
Discharge: HOME | End: 2025-02-27
Payer: MEDICARE

## 2025-02-27 DIAGNOSIS — Z17.0 MALIGNANT NEOPLASM OF RIGHT BREAST IN FEMALE, ESTROGEN RECEPTOR POSITIVE, UNSPECIFIED SITE OF BREAST: Primary | ICD-10-CM

## 2025-02-27 DIAGNOSIS — Z91.89 AT RISK FOR LYMPHEDEMA: ICD-10-CM

## 2025-02-27 DIAGNOSIS — C50.911 MALIGNANT NEOPLASM OF RIGHT BREAST IN FEMALE, ESTROGEN RECEPTOR POSITIVE, UNSPECIFIED SITE OF BREAST: Primary | ICD-10-CM

## 2025-02-27 PROCEDURE — 97535 SELF CARE MNGMENT TRAINING: CPT

## 2025-02-27 PROCEDURE — 93702 BIS XTRACELL FLUID ANALYSIS: CPT
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
## 2025-02-28 LAB — BACTERIA SPEC AEROBE CULT: NO GROWTH

## 2025-03-19 ENCOUNTER — OFFICE VISIT (OUTPATIENT)
Age: 80
End: 2025-03-19
Payer: MEDICARE

## 2025-03-19 VITALS
SYSTOLIC BLOOD PRESSURE: 136 MMHG | WEIGHT: 190 LBS | BODY MASS INDEX: 32.44 KG/M2 | HEART RATE: 120 BPM | HEIGHT: 64 IN | DIASTOLIC BLOOD PRESSURE: 82 MMHG | OXYGEN SATURATION: 92 %

## 2025-03-19 DIAGNOSIS — F34.1 PERSISTENT DEPRESSIVE DISORDER: Primary | ICD-10-CM

## 2025-03-19 DIAGNOSIS — F41.1 GENERALIZED ANXIETY DISORDER: ICD-10-CM

## 2025-03-19 NOTE — PROGRESS NOTES
"     Office  Follow Up Visit      Patient Name: Marylu Foreman  : 1945   MRN: [id number]     Referring Provider: Arleen Dillon MD    Chief Complaint:  \"management of persistent depressive disorder and generalized anxiety disorder\"      ICD-10-CM ICD-9-CM   1. Persistent depressive disorder  F34.1 300.4   2. Generalized anxiety disorder  F41.1 300.02        History of Present Illness:   Marylu Foreman is a 79 y.o. female presenting for a routine follow up appointment for psychiatric medication management.        She reports experiencing severe depression, which she describes as the most intense episode she has ever encountered. Despite her efforts to maintain an active lifestyle, including socializing with friends and engaging in outdoor activities, she continues to struggle with her depressive symptoms. She reports no recent life changes that could have precipitated her current depressive episode. She is currently on Cymbalta. She has been on Auvelity for the past 2 weeks but reports minimal improvement in her symptoms and recalls a previous trial of Auvelity, which was ineffective. She also mentions a past prescription of Wellbutrin and Zoloft by her primary care physician, which she believes was ineffective due to the use of generic rather than brand-name Wellbutrin. She has a family history of depression, with her son, identical twin sister, and mother all having experienced similar issues.    MEDICATIONS  Current: Auvelity, Cymbalta  Past: Abilify, Zoloft    Subjective      Review of Systems:   Ears, Nose, Mouth, & Throat: Denies difficulty hearing, smelling, sinus problems, sore throat, or dentition.  Heart/Vascular: Denies rapid heart rate, chest pain, swelling of feet or legs   Respiratory: Denies shortness of breath, cough, or wheeze  GI/: Denies nausea, vomiting, constipation, diarrhea, abdominal pain, painful urination, frequent urination  MSK: Denies swelling or joint deformities  Skin: Denies " lesions or rash  Neurologic: Denies headaches, dizziness, or tremor   Endocrine: Denies heat or cold intolerance   Hematologic: Denies easy bruising or bleeding  Psychiatric: Endorses persistent depression; denies insomnia, irritability, anxiety, recurrent bad thoughts, mood swings, hallucinations, compulsions    PHQ-9 Depression Screening  Little interest or pleasure in doing things? More than half the days   Feeling down, depressed, or hopeless? Nearly every day   PHQ-2 Total Score 5   Trouble falling or staying asleep, or sleeping too much? More than half the days   Feeling tired or having little energy? Nearly every day   Poor appetite or overeating? Several days   Feeling bad about yourself - or that you are a failure or have let yourself or your family down? Several days   Trouble concentrating on things, such as reading the newspaper or watching television? More than half the days   Moving or speaking so slowly that other people could have noticed? Or the opposite - being so fidgety or restless that you have been moving around a lot more than usual? More than half the days     Thoughts that you would be better off dead, or of hurting yourself in some way? Not at all   PHQ-9 Total Score 16   If you checked off any problems, how difficult have these problems made it for you to do your work, take care of things at home, or get along with other people? Somewhat difficult         GUDELIA-7      Over the last two weeks, how often have you been bothered by the following problems?  Feeling nervous, anxious or on edge: Nearly every day  Not being able to stop or control worrying: Nearly every day  Worrying too much about different things: Nearly every day  Trouble Relaxing: Nearly every day  Being so restless that it is hard to sit still: More than half the days  Becoming easily annoyed or irritable: Several days  Feeling afraid as if something awful might happen: More than half the days  GUDELIA 7 Total Score: 17  If you  checked any problems, how difficult have these problems made it for you to do your work, take care of things at home, or get along with other people: Somewhat difficult    Patient History:   The following portions of the patient's history were reviewed and updated as appropriate: allergies, current medications, past family history, past medical history, past social history, past surgical history and problem list.     Social History     Socioeconomic History    Marital status:     Number of children: 1    Years of education: College   Tobacco Use    Smoking status: Never     Passive exposure: Never    Smokeless tobacco: Never    Tobacco comments:     None - Father was a very heavy smoker and  from lung cancer.   Vaping Use    Vaping status: Never Used   Substance and Sexual Activity    Alcohol use: No     Comment: Caffeine use- 2 cups tea daily, 1 coffee     Drug use: Never    Sexual activity: Not Currently     Birth control/protection: Post-menopausal       Family History   Problem Relation Age of Onset    Rheumatic fever Mother     Depression Mother     Hypertension Mother     Macular degeneration Mother     Cholelithiasis Mother     Arthritis Mother     Hyperlipidemia Mother     Rheumatologic disease Mother         Rheumatic Fever    Hearing loss Mother     Heart disease Mother         rheumatic fever    Clotting disorder Mother     Lung cancer Father 72    Diabetes Father     Heart attack Father     Cancer Father         Lung    Heart disease Father         Heart attack    Depression Sister         Killed- car wreck    Asthma Sister     Hypertension Sister     Early death Sister         Car Accident    Hyperlipidemia Sister     Depression Brother     Hypertension Brother     Prostate cancer Brother     Cancer Brother         prostate, Lymphoma    COPD Brother         Bronchitis    Hyperlipidemia Brother     Depression Son             Anxiety disorder Son     Diabetes Son     Breast cancer Neg Hx      Ovarian cancer Neg Hx     Colon cancer Neg Hx     Malig Hyperthermia Neg Hx        Medications:     Current Outpatient Medications:     anastrozole (ARIMIDEX) 1 MG tablet, TAKE 1 TABLET BY MOUTH DAILY, Disp: 30 tablet, Rfl: 11    ascorbic acid (VITAMIN C) 1000 MG tablet, Take 1 tablet by mouth Daily., Disp: , Rfl:     Cariprazine HCl (Vraylar) 1.5 MG capsule capsule, Take 1 capsule by mouth Daily for 28 days., Disp: 28 capsule, Rfl: 0    Cholecalciferol (VITAMIN D PO), Take 1 tablet by mouth Daily., Disp: , Rfl:     Coenzyme Q10 200 MG capsule, Take 200 mg by mouth Daily., Disp: , Rfl:     Cyanocobalamin (VITAMIN B 12 PO), Take 1 tablet/day by mouth Daily. HOLD PRIOR TO SURG, Disp: , Rfl:     Dextromethorphan-buPROPion ER (AUVELITY)  MG tablet controlled-release, Take 1 tablet by mouth Every Morning for 30 days., Disp: 30 tablet, Rfl: 0    dilTIAZem CD (CARDIZEM CD) 180 MG 24 hr capsule, Take 1 capsule by mouth 2 (Two) Times a Day., Disp: 180 capsule, Rfl: 3    DULoxetine (Cymbalta) 30 MG capsule, Take 1 capsule by mouth Daily., Disp: 30 capsule, Rfl: 2    Eliquis 5 MG tablet tablet, Take 1 tablet by mouth Every 12 (Twelve) Hours., Disp: 180 tablet, Rfl: 1    folic acid (FOLVITE) 400 MCG tablet, Take 1 tablet by mouth Daily., Disp: , Rfl:     irbesartan (AVAPRO) 300 MG tablet, Take 1 tablet by mouth Daily., Disp: , Rfl:     ketoconazole (NIZORAL) 2 % shampoo, Apply  topically to the appropriate area as directed 2 (Two) Times a Week., Disp: 100 mL, Rfl: 1    levothyroxine (SYNTHROID, LEVOTHROID) 50 MCG tablet, Take 1 tablet by mouth Daily., Disp: 90 tablet, Rfl: 3    Multiple Vitamin (MULTI-VITAMIN PO), Take 1 tablet by mouth Daily. HOLD PRIOR TO SURG, Disp: , Rfl:     mupirocin (BACTROBAN) 2 % ointment, APPLY TOPICALLY TO THE AFFECTED AREA THREE TIMES DAILY AS DIRECTED, Disp: 30 g, Rfl: 3    Probiotic Product (PROBIOTIC PO), Take 1 tablet by mouth Daily. Lactobacillus rhamnosus GG, Disp: , Rfl:     sodium  "bicarbonate 650 MG tablet, Take 1 tablet by mouth Daily., Disp: , Rfl:     Turmeric 500 MG capsule, Take 1 capsule by mouth Daily., Disp: , Rfl:     vitamin E 400 UNIT capsule, Take 1 capsule by mouth Daily., Disp: , Rfl:     Objective     Physical Exam:  Vital Signs:   Vitals:    03/19/25 1135   BP: 136/82   Pulse: 120   SpO2: 92%   Weight: 86.2 kg (190 lb)   Height: 162.6 cm (64.02\")     Body mass index is 32.6 kg/m².     Mental Status Exam:   The patient was alert and oriented to time, place, person, and situation. Neatly groomed and dressed. Good eye contact. Cooperative and answers questions willingly. No evidence of gait imbalance or shuffling. Normal speech rate, rhythm, and tone. Reports depressed mood. Affect congruent with mood. Denies SI/HI/AVH. Organized thought process. No evidence of delusional thinking. Fair insight, fair judgement. Impulse control intact. Memory intact with average to above average intellectual functioning.     Allergies   Allergen Reactions    Bactrim [Sulfamethoxazole-Trimethoprim] Diarrhea    Amlodipine Besylate-Valsartan Nausea And Vomiting    Cefdinir Diarrhea     ..Beta lactam allergy details  Antibiotic reaction: rash  Age at reaction: unknown  Dose to reaction time: unknown  Reason for antibiotic: other  Epinephrine required for reaction?: no  Tolerated antibiotics: other       Corticosteroids Unknown - Low Severity     Severe depression caused from steroid injections in hands    Lisinopril Nausea And Vomiting    Nsaids Unknown - Low Severity     R/T KIDNEY DISEASE    Other Unknown - Low Severity     Steroids sever depression        No LMP recorded (lmp unknown). Patient is postmenopausal.     Current Medications:   Current Outpatient Medications   Medication Sig Dispense Refill    anastrozole (ARIMIDEX) 1 MG tablet TAKE 1 TABLET BY MOUTH DAILY 30 tablet 11    ascorbic acid (VITAMIN C) 1000 MG tablet Take 1 tablet by mouth Daily.      Cariprazine HCl (Vraylar) 1.5 MG capsule " capsule Take 1 capsule by mouth Daily for 28 days. 28 capsule 0    Cholecalciferol (VITAMIN D PO) Take 1 tablet by mouth Daily.      Coenzyme Q10 200 MG capsule Take 200 mg by mouth Daily.      Cyanocobalamin (VITAMIN B 12 PO) Take 1 tablet/day by mouth Daily. HOLD PRIOR TO SURG      Dextromethorphan-buPROPion ER (AUVELITY)  MG tablet controlled-release Take 1 tablet by mouth Every Morning for 30 days. 30 tablet 0    dilTIAZem CD (CARDIZEM CD) 180 MG 24 hr capsule Take 1 capsule by mouth 2 (Two) Times a Day. 180 capsule 3    DULoxetine (Cymbalta) 30 MG capsule Take 1 capsule by mouth Daily. 30 capsule 2    Eliquis 5 MG tablet tablet Take 1 tablet by mouth Every 12 (Twelve) Hours. 180 tablet 1    folic acid (FOLVITE) 400 MCG tablet Take 1 tablet by mouth Daily.      irbesartan (AVAPRO) 300 MG tablet Take 1 tablet by mouth Daily.      ketoconazole (NIZORAL) 2 % shampoo Apply  topically to the appropriate area as directed 2 (Two) Times a Week. 100 mL 1    levothyroxine (SYNTHROID, LEVOTHROID) 50 MCG tablet Take 1 tablet by mouth Daily. 90 tablet 3    Multiple Vitamin (MULTI-VITAMIN PO) Take 1 tablet by mouth Daily. HOLD PRIOR TO SURG      mupirocin (BACTROBAN) 2 % ointment APPLY TOPICALLY TO THE AFFECTED AREA THREE TIMES DAILY AS DIRECTED 30 g 3    Probiotic Product (PROBIOTIC PO) Take 1 tablet by mouth Daily. Lactobacillus rhamnosus GG      sodium bicarbonate 650 MG tablet Take 1 tablet by mouth Daily.      Turmeric 500 MG capsule Take 1 capsule by mouth Daily.      vitamin E 400 UNIT capsule Take 1 capsule by mouth Daily.       No current facility-administered medications for this visit.       @RESULASTCBCDIFFPANEL,TSH,LABLIPI,MDBZAESS49,CPRFEJQZ10,MG,FOLATE,PROLACTIN,CRPRESULT,CMP,[id number])@    Lab Results   Component Value Date    GLUCOSE 145 (H) 02/26/2025    BUN 28 (H) 02/26/2025    CREATININE 1.60 (H) 02/26/2025    EGFR 33.1 (L) 02/26/2025    BCR 17.5 02/26/2025    K 4.1 02/26/2025    CO2 20.4 (L)  02/26/2025    CALCIUM 9.4 02/26/2025    ALBUMIN 4.1 02/26/2025    BILITOT 0.7 02/26/2025    AST 18 02/26/2025    ALT 12 02/26/2025       Lab Results   Component Value Date    WBC 13.94 (H) 02/26/2025    HGB 13.6 02/26/2025    HCT 42.1 02/26/2025    MCV 95.2 02/26/2025     02/26/2025       Lab Results   Component Value Date    CHOL 222 (H) 04/26/2024    CHLPL 214 (H) 09/04/2024    TRIG 77 09/04/2024    HDL 74 09/04/2024     (H) 09/04/2024                  Assessment / Plan      Impression/Treatment Plan:       1. Depression and anxiety.  She reports that her depression is resistant to her current medication regimen, which includes Auvelity and Cymbalta. She has previously tried Abilify without significant improvement. The potential benefits and drawbacks of Spravato were discussed, but she declined due to the logistical challenges of treatment. Vraylar was suggested as an alternative, given its metabolism in the liver rather than filtrated through the kidneys. The potential side effects of Vraylar, including dizziness, orthostatic hypotension, involuntary muscle movements, and neuroleptic malignant syndrome, were thoroughly discussed. The plan is to discontinue Auvelity, continue duloxetine 30 mg daily, and initiate Vraylar 1.5 mg, to be taken nightly. A 28-day supply of Vraylar samples was provided. If she experiences any side effects with Vraylar, she is to contact the office or go to the nearest ED in case of an emergency.    Follow-up  The patient will follow up in 1 month.       Diagnoses and all orders for this visit:    1. Persistent depressive disorder (Primary)  -     Cariprazine HCl (Vraylar) 1.5 MG capsule capsule; Take 1 capsule by mouth Daily for 28 days.  Dispense: 28 capsule; Refill: 0    2. Generalized anxiety disorder        MEDS ORDERED DURING VISIT:  New Medications Ordered This Visit   Medications    Cariprazine HCl (Vraylar) 1.5 MG capsule capsule     Sig: Take 1 capsule by mouth  Daily for 28 days.     Dispense:  28 capsule     Refill:  0     Lot #[id number]       Follow up with Elissa Steinberg, DNP, APRN, PMHNP-BC in this office in one month.          PATIENT EDUCATION:  Discussed medication options and treatment plan of prescribed medication(s) as well as the risks, benefits, and potential side effects. Patient is agreeable to call the office with any worsening of symptoms or onset of side effects. Patient is agreeable to call 911 or go to the nearest ER should he/she begin having SI/HI.    SAFETY PLAN:  Patient was given ample time for questions and fully participated in treatment planning.  Patient was encouraged to call the clinic with any questions or concerns.  Patient was informed of access to emergency care. If patient were to develop any significant symptomatology, suicidal ideation, homicidal ideation, any concerns, or feel unsafe at any time they are to call the clinic and if unable to get immediate assistance should immediately call 911 or go to the nearest emergency room.  The patient is advised to remove or secure (lock away) all lethal weapons (including guns) and sharps (including razors, scissors, knives, etc.).  All medications (including any prescribed and any over the counter medications) should be stored in a safe and secured location that is not obtainable by children/adolescents.  Patient was given an opportunity and encouraged to ask questions about their medication, illness, and treatment. Patient contracted verbally for the following: If you are experiencing an emotional crisis or have thoughts of harming yourself or others, please go to your nearest local emergency room or call 911. Will continue to re-assess medication response and side effects frequently to establish efficacy and ensure safety. Risks, any black box warnings, side effects, off label usage, and benefits of medication and treatment discussed with patient, along with potential adverse side effects  of current and/or newly prescribed medication, alternative treatment options, and OTC medications.  Patient verbalized understanding of potential risks, any off label use of medication, any black box warnings, and any side effects in their own words. The patient verbalized understanding and agreed to comply with the safety plan discussed in their own words.  Patient given the number to the office. Number also available to the 24- hour suicide hotline.     Short Term Goals: Continue building rapport with the patient. Patient will be compliant with medication, and patient will have no significant medication related side effects.  Patient will be engaged in psychotherapy as indicated.  Patient will report subjective improvement of symptoms.     Long term goals: To stabilize mood and treat/improve subjective symptoms, the patient will stay out of the hospital, the patient will be at an optimal level of functioning, and the patient will take all medications as prescribed.     Curtis reviewed and is appropriate.    Patient or patient representative verbalized consent for the use of Ambient Listening during the visit with  LAURA Brito for chart documentation. 3/19/2025  12:41 EDT       Copied text in this note has been reviewed and is accurate as of 3/19/2025.    Part of this note may be an electronic transcription/translation of spoken language to printed text using the Dragon Dictation System.    Elissa Steinberg DNP, LAURA, PMHNP-BC

## 2025-03-26 RX ORDER — ANASTROZOLE 1 MG/1
1 TABLET ORAL DAILY
Qty: 30 TABLET | Refills: 11 | Status: SHIPPED | OUTPATIENT
Start: 2025-03-26

## 2025-03-26 NOTE — TELEPHONE ENCOUNTER
Caller: Marylu Foreman    Relationship: Self    Best call back number: [phone]    Requested Prescriptions:   Requested Prescriptions     Pending Prescriptions Disp Refills    anastrozole (ARIMIDEX) 1 MG tablet 30 tablet 11     Sig: Take 1 tablet by mouth Daily.      Pharmacy where request should be sent: [id number] [place], [place] - [facility] AT [facility] - [phone]  - [phone]     Last office visit with prescribing clinician: 2/26/2025   Last telemedicine visit with prescribing clinician: Visit date not found   Next office visit with prescribing clinician: 6/6/2025     Does the patient have less than a 3 day supply:  [x] Yes  [] No    Would you like a call back once the refill request has been completed: [] Yes [x] No    If the office needs to give you a call back, can they leave a voicemail: [] Yes [x] No

## 2025-04-07 ENCOUNTER — HOSPITAL ENCOUNTER (OUTPATIENT)
Dept: OCCUPATIONAL THERAPY | Facility: HOSPITAL | Age: 80
Discharge: HOME | End: 2025-04-07
Admitting: STUDENT IN AN ORGANIZED HEALTH CARE EDUCATION/TRAINING PROGRAM
Payer: MEDICARE

## 2025-04-07 DIAGNOSIS — Z17.0 MALIGNANT NEOPLASM OF RIGHT BREAST IN FEMALE, ESTROGEN RECEPTOR POSITIVE, UNSPECIFIED SITE OF BREAST: Primary | ICD-10-CM

## 2025-04-07 DIAGNOSIS — I97.2 POST-MASTECTOMY LYMPHEDEMA SYNDROME: ICD-10-CM

## 2025-04-07 DIAGNOSIS — C50.911 MALIGNANT NEOPLASM OF RIGHT BREAST IN FEMALE, ESTROGEN RECEPTOR POSITIVE, UNSPECIFIED SITE OF BREAST: Primary | ICD-10-CM

## 2025-04-07 PROCEDURE — 93702 BIS XTRACELL FLUID ANALYSIS: CPT

## 2025-04-07 PROCEDURE — 97535 SELF CARE MNGMENT TRAINING: CPT

## 2025-04-07 RX ORDER — SEMAGLUTIDE 0.68 MG/ML
0.25 INJECTION, SOLUTION SUBCUTANEOUS WEEKLY
Qty: 3 ML | Refills: 0 | OUTPATIENT
Start: 2025-04-07
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
## 2025-04-11 DIAGNOSIS — Z90.13 ABSENCE OF BREAST, ACQUIRED, BILATERAL: ICD-10-CM

## 2025-04-11 DIAGNOSIS — Z17.0 MALIGNANT NEOPLASM OF OVERLAPPING SITES OF LEFT BREAST IN FEMALE, ESTROGEN RECEPTOR POSITIVE: Primary | ICD-10-CM

## 2025-04-11 DIAGNOSIS — I89.0 LYMPHEDEMA: ICD-10-CM

## 2025-04-11 DIAGNOSIS — C50.812 MALIGNANT NEOPLASM OF OVERLAPPING SITES OF LEFT BREAST IN FEMALE, ESTROGEN RECEPTOR POSITIVE: Primary | ICD-10-CM

## 2025-04-11 RX ORDER — SEMAGLUTIDE 0.68 MG/ML
0.25 INJECTION, SOLUTION SUBCUTANEOUS WEEKLY
Qty: 3 ML | Refills: 1 | Status: SHIPPED | OUTPATIENT
Start: 2025-04-11

## 2025-04-16 ENCOUNTER — OFFICE VISIT (OUTPATIENT)
Age: 80
End: 2025-04-16
Payer: MEDICARE

## 2025-04-16 VITALS
DIASTOLIC BLOOD PRESSURE: 75 MMHG | WEIGHT: 195 LBS | SYSTOLIC BLOOD PRESSURE: 110 MMHG | HEART RATE: 128 BPM | OXYGEN SATURATION: 97 % | BODY MASS INDEX: 33.29 KG/M2 | HEIGHT: 64 IN

## 2025-04-16 DIAGNOSIS — F41.1 GENERALIZED ANXIETY DISORDER: ICD-10-CM

## 2025-04-16 DIAGNOSIS — F34.1 PERSISTENT DEPRESSIVE DISORDER: Primary | ICD-10-CM

## 2025-04-16 RX ORDER — DULOXETIN HYDROCHLORIDE 30 MG/1
30 CAPSULE, DELAYED RELEASE ORAL DAILY
Qty: 30 CAPSULE | Refills: 2 | Status: SHIPPED | OUTPATIENT
Start: 2025-04-16

## 2025-04-16 RX ORDER — BUPROPION HCL 150 MG
TABLET, EXTENDED RELEASE 24 HR ORAL
Qty: 30 TABLET | Refills: 2 | Status: SHIPPED | OUTPATIENT
Start: 2025-04-16

## 2025-04-16 NOTE — PROGRESS NOTES
"     Office  Follow Up Visit      Patient Name: Marylu Foreman  : 1945   MRN: [id number]     Referring Provider: Arleen Dillon MD    Chief Complaint:  \"management of persistent depressive disorder and generalized anxiety disorder\"    ICD-10-CM ICD-9-CM   1. Persistent depressive disorder  F34.1 300.4   2. Generalized anxiety disorder  F41.1 300.02        History of Present Illness:   Marylu Foreman is a 79 y.o. female presenting for a routine follow up appointment for psychiatric medication management.        The patient is a 79-year-old female who presents for evaluation of depression and memory issues.    The chief complaint is worsening depression and memory problems. She has been experiencing worsening depression, which she attributes to her resistance to various antidepressants. She does not endorse any suicidal ideation. Her sleep pattern is irregular, often going to bed at 3:00 AM and waking up at 7:00 AM, with occasional daytime naps. She typically gets 5 hours of sleep at night. She has been experiencing memory issues over the past month, which have been distressing for her. She reports difficulty concentrating and remembering things, which she believes has exacerbated her depression. She has not consulted a neurologist for these symptoms. She also mentions that her hearing impairment may be contributing to her memory issues.    Psychiatric History: The patient has a history of Prozac use, which was her first antidepressant, and has also tried Lexapro, which she found beneficial. She has previously tried Vraylar for 3 weeks without any noticeable improvement. She recalls a previous trial of Wellbutrin and either Zoloft or Prozac for a month, but discontinued it due to a change in medication by her family doctor. She is currently on Cymbalta, which provides some relief from pain.    Pertinent Negatives: No suicidal ideation reported.    Results: Based on the MoCA memory test conducted during the " encounter, the patient scored 23 out of 30, indicating mild cognitive impairment.    MEDICATIONS  Current: Cymbalta  Past: Vraylar, Wellbutrin, Zoloft, Prozac, Lexapro, aripiprazole     Subjective      Review of Systems:   Ears, Nose, Mouth, & Throat: Denies difficulty hearing, smelling, sinus problems, sore throat, or dentition.  Heart/Vascular: Denies rapid heart rate, chest pain, swelling of feet or legs   Respiratory: Denies shortness of breath, cough, or wheeze  GI/: Denies nausea, vomiting, constipation, diarrhea, abdominal pain, painful urination, frequent urination  MSK: Denies swelling or joint deformities  Skin: Denies lesions or rash  Neurologic: Denies headaches, dizziness, or tremor   Endocrine: Denies heat or cold intolerance   Hematologic: Denies easy bruising or bleeding  Psychiatric: Endorses insomnia, irritability, depression, anxiety; denies recurrent bad thoughts, mood swings, hallucinations, compulsions    PHQ-9 Depression Screening  Little interest or pleasure in doing things? Nearly every day   Feeling down, depressed, or hopeless? Nearly every day   PHQ-2 Total Score 6   Trouble falling or staying asleep, or sleeping too much? Nearly every day   Feeling tired or having little energy? Nearly every day   Poor appetite or overeating? More than half the days   Feeling bad about yourself - or that you are a failure or have let yourself or your family down? More than half the days   Trouble concentrating on things, such as reading the newspaper or watching television? Nearly every day   Moving or speaking so slowly that other people could have noticed? Or the opposite - being so fidgety or restless that you have been moving around a lot more than usual? More than half the days     Thoughts that you would be better off dead, or of hurting yourself in some way? Not at all   PHQ-9 Total Score 21   If you checked off any problems, how difficult have these problems made it for you to do your work,  take care of things at home, or get along with other people? Very difficult         GUDELIA-7      Over the last two weeks, how often have you been bothered by the following problems?  Feeling nervous, anxious or on edge: Nearly every day  Not being able to stop or control worrying: Nearly every day  Worrying too much about different things: Nearly every day  Trouble Relaxing: Nearly every day  Being so restless that it is hard to sit still: Nearly every day  Becoming easily annoyed or irritable: Nearly every day  Feeling afraid as if something awful might happen: Several days  GUDELIA 7 Total Score: 19  If you checked any problems, how difficult have these problems made it for you to do your work, take care of things at home, or get along with other people: Very difficult    Patient History:   The following portions of the patient's history were reviewed and updated as appropriate: allergies, current medications, past family history, past medical history, past social history, past surgical history and problem list.     Social History     Socioeconomic History    Marital status:     Number of children: 1    Years of education: College   Tobacco Use    Smoking status: Never     Passive exposure: Never    Smokeless tobacco: Never    Tobacco comments:     None - Father was a very heavy smoker and  from lung cancer.   Vaping Use    Vaping status: Never Used   Substance and Sexual Activity    Alcohol use: No     Comment: Caffeine use- 2 cups tea daily, 1 coffee     Drug use: Never    Sexual activity: Not Currently     Birth control/protection: Post-menopausal       Family History   Problem Relation Age of Onset    Rheumatic fever Mother     Depression Mother     Hypertension Mother     Macular degeneration Mother     Cholelithiasis Mother     Arthritis Mother     Hyperlipidemia Mother     Rheumatologic disease Mother         Rheumatic Fever    Hearing loss Mother     Heart disease Mother         rheumatic fever     Clotting disorder Mother     Lung cancer Father 72    Diabetes Father     Heart attack Father     Cancer Father         Lung    Heart disease Father         Heart attack    Depression Sister         Killed- car wreck    Asthma Sister     Hypertension Sister     Early death Sister         Car Accident    Hyperlipidemia Sister     Depression Brother     Hypertension Brother     Prostate cancer Brother     Cancer Brother         prostate, Lymphoma    COPD Brother         Bronchitis    Hyperlipidemia Brother     Depression Son         2023    Anxiety disorder Son     Diabetes Son     Breast cancer Neg Hx     Ovarian cancer Neg Hx     Colon cancer Neg Hx     Malig Hyperthermia Neg Hx        Medications:     Current Outpatient Medications:     DULoxetine (Cymbalta) 30 MG capsule, Take 1 capsule by mouth Daily., Disp: 30 capsule, Rfl: 2    anastrozole (ARIMIDEX) 1 MG tablet, Take 1 tablet by mouth Daily., Disp: 30 tablet, Rfl: 11    ascorbic acid (VITAMIN C) 1000 MG tablet, Take 1 tablet by mouth Daily., Disp: , Rfl:     Cariprazine HCl (Vraylar) 1.5 MG capsule capsule, Take 1 capsule by mouth Daily for 28 days., Disp: 28 capsule, Rfl: 0    Cholecalciferol (VITAMIN D PO), Take 1 tablet by mouth Daily., Disp: , Rfl:     Coenzyme Q10 200 MG capsule, Take 200 mg by mouth Daily., Disp: , Rfl:     Cyanocobalamin (VITAMIN B 12 PO), Take 1 tablet/day by mouth Daily. HOLD PRIOR TO SURG, Disp: , Rfl:     dilTIAZem CD (CARDIZEM CD) 180 MG 24 hr capsule, Take 1 capsule by mouth 2 (Two) Times a Day., Disp: 180 capsule, Rfl: 3    Eliquis 5 MG tablet tablet, Take 1 tablet by mouth Every 12 (Twelve) Hours., Disp: 180 tablet, Rfl: 1    folic acid (FOLVITE) 400 MCG tablet, Take 1 tablet by mouth Daily., Disp: , Rfl:     irbesartan (AVAPRO) 300 MG tablet, Take 1 tablet by mouth Daily., Disp: , Rfl:     ketoconazole (NIZORAL) 2 % shampoo, Apply  topically to the appropriate area as directed 2 (Two) Times a Week., Disp: 100 mL, Rfl: 1     "levothyroxine (SYNTHROID, LEVOTHROID) 50 MCG tablet, Take 1 tablet by mouth Daily., Disp: 90 tablet, Rfl: 3    Multiple Vitamin (MULTI-VITAMIN PO), Take 1 tablet by mouth Daily. HOLD PRIOR TO SURG, Disp: , Rfl:     mupirocin (BACTROBAN) 2 % ointment, APPLY TOPICALLY TO THE AFFECTED AREA THREE TIMES DAILY AS DIRECTED, Disp: 30 g, Rfl: 3    Probiotic Product (PROBIOTIC PO), Take 1 tablet by mouth Daily. Lactobacillus rhamnosus GG, Disp: , Rfl:     Semaglutide,0.25 or 0.5MG/DOS, (Ozempic, 0.25 or 0.5 MG/DOSE,) 2 MG/3ML solution pen-injector, Inject 0.25 mg under the skin into the appropriate area as directed 1 (One) Time Per Week., Disp: 3 mL, Rfl: 1    sodium bicarbonate 650 MG tablet, Take 1 tablet by mouth Daily., Disp: , Rfl:     Turmeric 500 MG capsule, Take 1 capsule by mouth Daily., Disp: , Rfl:     vitamin E 400 UNIT capsule, Take 1 capsule by mouth Daily., Disp: , Rfl:     Wellbutrin  MG 24 hr tablet, Take one tablet by mouth daily., Disp: 30 tablet, Rfl: 2    Objective     Physical Exam:  Vital Signs:   Vitals:    04/16/25 1112   BP: 110/75   Pulse: (!) 128   SpO2: 97%   Weight: 88.5 kg (195 lb)   Height: 162.6 cm (64.02\")     Body mass index is 33.45 kg/m².     Mental Status Exam:   The patient was alert and oriented to time, place, person, and situation. Neatly groomed and dressed. Good eye contact. Cooperative and answers questions willingly. No evidence of gait imbalance or shuffling. Normal speech rate, rhythm, and tone. Reports depressed mood. Affect congruent with mood. Denies SI/HI/AVH. Organized thought process. No evidence of delusional thinking. Fair insight, fair judgement. Impulse control intact. Memory intact with average to above average intellectual functioning.     Allergies   Allergen Reactions    Bactrim [Sulfamethoxazole-Trimethoprim] Diarrhea    Amlodipine Besylate-Valsartan Nausea And Vomiting    Cefdinir Diarrhea     ..Beta lactam allergy details  Antibiotic reaction: rash  Age at " reaction: unknown  Dose to reaction time: unknown  Reason for antibiotic: other  Epinephrine required for reaction?: no  Tolerated antibiotics: other       Corticosteroids Unknown - Low Severity     Severe depression caused from steroid injections in hands    Lisinopril Nausea And Vomiting    Nsaids Unknown - Low Severity     R/T KIDNEY DISEASE    Other Unknown - Low Severity     Steroids sever depression        No LMP recorded (lmp unknown). Patient is postmenopausal.     Current Medications:   Current Outpatient Medications   Medication Sig Dispense Refill    DULoxetine (Cymbalta) 30 MG capsule Take 1 capsule by mouth Daily. 30 capsule 2    anastrozole (ARIMIDEX) 1 MG tablet Take 1 tablet by mouth Daily. 30 tablet 11    ascorbic acid (VITAMIN C) 1000 MG tablet Take 1 tablet by mouth Daily.      Cariprazine HCl (Vraylar) 1.5 MG capsule capsule Take 1 capsule by mouth Daily for 28 days. 28 capsule 0    Cholecalciferol (VITAMIN D PO) Take 1 tablet by mouth Daily.      Coenzyme Q10 200 MG capsule Take 200 mg by mouth Daily.      Cyanocobalamin (VITAMIN B 12 PO) Take 1 tablet/day by mouth Daily. HOLD PRIOR TO SURG      dilTIAZem CD (CARDIZEM CD) 180 MG 24 hr capsule Take 1 capsule by mouth 2 (Two) Times a Day. 180 capsule 3    Eliquis 5 MG tablet tablet Take 1 tablet by mouth Every 12 (Twelve) Hours. 180 tablet 1    folic acid (FOLVITE) 400 MCG tablet Take 1 tablet by mouth Daily.      irbesartan (AVAPRO) 300 MG tablet Take 1 tablet by mouth Daily.      ketoconazole (NIZORAL) 2 % shampoo Apply  topically to the appropriate area as directed 2 (Two) Times a Week. 100 mL 1    levothyroxine (SYNTHROID, LEVOTHROID) 50 MCG tablet Take 1 tablet by mouth Daily. 90 tablet 3    Multiple Vitamin (MULTI-VITAMIN PO) Take 1 tablet by mouth Daily. HOLD PRIOR TO SURG      mupirocin (BACTROBAN) 2 % ointment APPLY TOPICALLY TO THE AFFECTED AREA THREE TIMES DAILY AS DIRECTED 30 g 3    Probiotic Product (PROBIOTIC PO) Take 1 tablet by  mouth Daily. Lactobacillus rhamnosus GG      Semaglutide,0.25 or 0.5MG/DOS, (Ozempic, 0.25 or 0.5 MG/DOSE,) 2 MG/3ML solution pen-injector Inject 0.25 mg under the skin into the appropriate area as directed 1 (One) Time Per Week. 3 mL 1    sodium bicarbonate 650 MG tablet Take 1 tablet by mouth Daily.      Turmeric 500 MG capsule Take 1 capsule by mouth Daily.      vitamin E 400 UNIT capsule Take 1 capsule by mouth Daily.      Wellbutrin  MG 24 hr tablet Take one tablet by mouth daily. 30 tablet 2     No current facility-administered medications for this visit.       @RESULASTCBCDIFFPANEL,TSH,LABLIPI,[id number],VSRBGWHD21,MG,FOLATE,PROLACTIN,CRPRESULT,CMP,[id number])@    Lab Results   Component Value Date    GLUCOSE 145 (H) 02/26/2025    BUN 28 (H) 02/26/2025    CREATININE 1.60 (H) 02/26/2025    EGFR 33.1 (L) 02/26/2025    BCR 17.5 02/26/2025    K 4.1 02/26/2025    CO2 20.4 (L) 02/26/2025    CALCIUM 9.4 02/26/2025    ALBUMIN 4.1 02/26/2025    BILITOT 0.7 02/26/2025    AST 18 02/26/2025    ALT 12 02/26/2025       Lab Results   Component Value Date    WBC 13.94 (H) 02/26/2025    HGB 13.6 02/26/2025    HCT 42.1 02/26/2025    MCV 95.2 02/26/2025     02/26/2025       Lab Results   Component Value Date    CHOL 222 (H) 04/26/2024    CHLPL 214 (H) 09/04/2024    TRIG 77 09/04/2024    HDL 74 09/04/2024     (H) 09/04/2024                  Assessment / Plan      Impression/Treatment Plan:       Assessment:  The patient presents with worsening depression and mild memory issues, which may be contributing to her overall mental health decline. Despite trials of multiple antidepressants, her symptoms have not improved. The MoCA test results indicate mild cognitive impairment, which could be secondary to her unmanaged persistent depression. The patient's sleep patterns, concentration difficulties, and short term medication trials further complicate her condition. She remains motivated to find an effective  treatment and is open to trying new medications. Discussed lamotrigine as an option, which would be off-label for unipolar depression. She reports success with brand Wellbutrin in the past and would like to try this again before trying lamotrigine. Discussed possibility that Medicare will not cover brand vs generic. Patient will let this provider know if she cannot get brand Wellbutrin with plan to initiate lamotrigine 25 mg daily. Discussed benefits and risks in regard to lamotrigine, including the risk of SJS. Lamotrigine education and risk for SJS reiterated including importance of daily adherence and instruction to call office with missed dosage greater than 2 days, with potential need for dose reduction and retitration or transition to alternative therapy. Discussed importance of assessing skin for signs of rash and discontinuing/ calling office should rash appear.  Pt v/u of this and agrees to call office with questions or concerns.      Plan:  - Continue Cymbalta 30 mg daily and discontinue Vraylar.  - Start Wellbutrin  mg, to be taken in the morning. If insurance does not cover the brand name, the patient will inform the clinician and lamotrigine 25 mg daily will be initiated.  - Obtain a cheek swab for GeneSight testing.  - Discuss memory concerns and hearing concerns with primary care physician during the upcoming appointment.    Follow-up:  - Schedule a follow-up appointment in 2 weeks to assess the effectiveness of the new medication regimen and review GeneSight test results.  - Goals for the next session include evaluating the patient's response to Wellbutrin, addressing any side effects, and further exploring memory issues.    Notes & Risk Factors:  - No current risk factors for harm to self or others reported.  - Protective factors include the patient's willingness to try new treatments and her supportive relationship with her friend.    Diagnoses and all orders for this visit:    1. Persistent  depressive disorder (Primary)    2. Generalized anxiety disorder    Other orders  -     Wellbutrin  MG 24 hr tablet; Take one tablet by mouth daily.  Dispense: 30 tablet; Refill: 2  -     DULoxetine (Cymbalta) 30 MG capsule; Take 1 capsule by mouth Daily.  Dispense: 30 capsule; Refill: 2        MEDS ORDERED DURING VISIT:  New Medications Ordered This Visit   Medications    Wellbutrin  MG 24 hr tablet     Sig: Take one tablet by mouth daily.     Dispense:  30 tablet     Refill:  2     Patient has tried generic and brand name Wellbutrin in past. Generic not effective in managing symptoms of depression. Brand has managed symptoms well in the past. She has tried sertraline, fluoxetine, escitalopram, Vraylar, aripiprazole, and auvelity with minimal effect.    DULoxetine (Cymbalta) 30 MG capsule     Sig: Take 1 capsule by mouth Daily.     Dispense:  30 capsule     Refill:  2     PATIENT EDUCATION:  Discussed medication options and treatment plan of prescribed medication(s) as well as the risks, benefits, and potential side effects. Patient is agreeable to call the office with any worsening of symptoms or onset of side effects. Patient is agreeable to call 911 or go to the nearest ER should he/she begin having SI/HI.    SAFETY PLAN:  Patient was given ample time for questions and fully participated in treatment planning.  Patient was encouraged to call the clinic with any questions or concerns.  Patient was informed of access to emergency care. If patient were to develop any significant symptomatology, suicidal ideation, homicidal ideation, any concerns, or feel unsafe at any time they are to call the clinic and if unable to get immediate assistance should immediately call 911 or go to the nearest emergency room.  The patient is advised to remove or secure (lock away) all lethal weapons (including guns) and sharps (including razors, scissors, knives, etc.).  All medications (including any prescribed and any  over the counter medications) should be stored in a safe and secured location that is not obtainable by children/adolescents.  Patient was given an opportunity and encouraged to ask questions about their medication, illness, and treatment. Patient contracted verbally for the following: If you are experiencing an emotional crisis or have thoughts of harming yourself or others, please go to your nearest local emergency room or call 911. Will continue to re-assess medication response and side effects frequently to establish efficacy and ensure safety. Risks, any black box warnings, side effects, off label usage, and benefits of medication and treatment discussed with patient, along with potential adverse side effects of current and/or newly prescribed medication, alternative treatment options, and OTC medications.  Patient verbalized understanding of potential risks, any off label use of medication, any black box warnings, and any side effects in their own words. The patient verbalized understanding and agreed to comply with the safety plan discussed in their own words.  Patient given the number to the office. Number also available to the 24- hour suicide hotline.     Short Term Goals: Continue building rapport with the patient. Patient will be compliant with medication, and patient will have no significant medication related side effects.  Patient will be engaged in psychotherapy as indicated.  Patient will report subjective improvement of symptoms.     Long term goals: To stabilize mood and treat/improve subjective symptoms, the patient will stay out of the hospital, the patient will be at an optimal level of functioning, and the patient will take all medications as prescribed.     Curtis reviewed and is appropriate.    Patient or patient representative verbalized consent for the use of Ambient Listening during the visit with  LAURA Brito for chart documentation. 4/16/2025  13:28 EDT       Copied text  in this note has been reviewed and is accurate as of 4/16/2025.    Part of this note may be an electronic transcription/translation of spoken language to printed text using the Dragon Dictation System.    Elissa Steinberg, JAMES, APRN, PMHNP-BC

## 2025-04-21 ENCOUNTER — TELEPHONE (OUTPATIENT)
Age: 80
End: 2025-04-21
Payer: MEDICARE

## 2025-04-21 RX ORDER — IRBESARTAN 300 MG/1
300 TABLET ORAL NIGHTLY
Qty: 90 TABLET | OUTPATIENT
Start: 2025-04-21

## 2025-04-22 ENCOUNTER — TELEPHONE (OUTPATIENT)
Age: 80
End: 2025-04-22
Payer: MEDICARE

## 2025-04-22 NOTE — TELEPHONE ENCOUNTER
Called pt and left vm to get her appt switched to telehealth on Mychart or switched over to Dr. Baca

## 2025-04-23 DIAGNOSIS — F41.1 GENERALIZED ANXIETY DISORDER: ICD-10-CM

## 2025-04-23 DIAGNOSIS — F34.1 PERSISTENT DEPRESSIVE DISORDER: Primary | ICD-10-CM

## 2025-04-23 RX ORDER — DULOXETIN HYDROCHLORIDE 30 MG/1
CAPSULE, DELAYED RELEASE ORAL
Qty: 30 CAPSULE | Refills: 2 | Status: SHIPPED | OUTPATIENT
Start: 2025-04-23

## 2025-04-23 RX ORDER — DULOXETIN HYDROCHLORIDE 60 MG/1
CAPSULE, DELAYED RELEASE ORAL
Qty: 30 CAPSULE | Refills: 2 | Status: SHIPPED | OUTPATIENT
Start: 2025-04-23

## 2025-04-23 NOTE — TELEPHONE ENCOUNTER
Can you clarify which pharmacy is asking about the dosage? Thank you.  
[facility]   
Lvm for patient to call back and reschedule a virtual visit with Elissa or an in person visit with Dr. Baca.   
Pharmacy has sent over an clarification for RX duloxetine for the quality of 30mg or 180mg if you can send over the correct dosage to the pharmacy.    Thanks .  
Prescription updated and sent to [facility].  
No

## 2025-04-28 DIAGNOSIS — I10 ESSENTIAL HYPERTENSION: ICD-10-CM

## 2025-04-28 DIAGNOSIS — E03.9 ACQUIRED HYPOTHYROIDISM: ICD-10-CM

## 2025-04-28 DIAGNOSIS — Z00.00 MEDICARE ANNUAL WELLNESS VISIT, SUBSEQUENT: Primary | ICD-10-CM

## 2025-04-28 DIAGNOSIS — R73.09 ELEVATED GLUCOSE: ICD-10-CM

## 2025-04-28 DIAGNOSIS — E78.5 HYPERLIPIDEMIA, UNSPECIFIED HYPERLIPIDEMIA TYPE: ICD-10-CM

## 2025-05-01 LAB
ALBUMIN SERPL-MCNC: 4.5 G/DL (ref 3.8–4.8)
ALP SERPL-CCNC: 85 IU/L (ref 44–121)
ALT SERPL-CCNC: 16 IU/L (ref 0–32)
AST SERPL-CCNC: 23 IU/L (ref 0–40)
BASOPHILS # BLD AUTO: 0.2 X10E3/UL (ref 0–0.2)
BASOPHILS NFR BLD AUTO: 2 %
BILIRUB SERPL-MCNC: 0.5 MG/DL (ref 0–1.2)
BUN SERPL-MCNC: 28 MG/DL (ref 8–27)
BUN/CREAT SERPL: 20 (ref 12–28)
CALCIUM SERPL-MCNC: 9.2 MG/DL (ref 8.7–10.3)
CHLORIDE SERPL-SCNC: 103 MMOL/L (ref 96–106)
CHOLEST SERPL-MCNC: 225 MG/DL (ref 100–199)
CO2 SERPL-SCNC: 19 MMOL/L (ref 20–29)
CREAT SERPL-MCNC: 1.42 MG/DL (ref 0.57–1)
EGFRCR SERPLBLD CKD-EPI 2021: 38 ML/MIN/1.73
EOSINOPHIL # BLD AUTO: 0.5 X10E3/UL (ref 0–0.4)
EOSINOPHIL NFR BLD AUTO: 5 %
ERYTHROCYTE [DISTWIDTH] IN BLOOD BY AUTOMATED COUNT: 13.1 % (ref 11.7–15.4)
GLOBULIN SER CALC-MCNC: 2.6 G/DL (ref 1.5–4.5)
GLUCOSE SERPL-MCNC: 116 MG/DL (ref 70–99)
HBA1C MFR BLD: 6.7 % (ref 4.8–5.6)
HCT VFR BLD AUTO: 40.3 % (ref 34–46.6)
HDLC SERPL-MCNC: 68 MG/DL
HGB BLD-MCNC: 13.2 G/DL (ref 11.1–15.9)
IMM GRANULOCYTES # BLD AUTO: 0 X10E3/UL (ref 0–0.1)
IMM GRANULOCYTES NFR BLD AUTO: 0 %
LDLC SERPL CALC-MCNC: 140 MG/DL (ref 0–99)
LDLC/HDLC SERPL: 2.1 RATIO (ref 0–3.2)
LYMPHOCYTES # BLD AUTO: 3.7 X10E3/UL (ref 0.7–3.1)
LYMPHOCYTES NFR BLD AUTO: 33 %
MCH RBC QN AUTO: 30.7 PG (ref 26.6–33)
MCHC RBC AUTO-ENTMCNC: 32.8 G/DL (ref 31.5–35.7)
MCV RBC AUTO: 94 FL (ref 79–97)
MONOCYTES # BLD AUTO: 1 X10E3/UL (ref 0.1–0.9)
MONOCYTES NFR BLD AUTO: 9 %
NEUTROPHILS # BLD AUTO: 5.7 X10E3/UL (ref 1.4–7)
NEUTROPHILS NFR BLD AUTO: 51 %
PLATELET # BLD AUTO: 350 X10E3/UL (ref 150–450)
POTASSIUM SERPL-SCNC: 3.7 MMOL/L (ref 3.5–5.2)
PROT SERPL-MCNC: 7.1 G/DL (ref 6–8.5)
RBC # BLD AUTO: 4.3 X10E6/UL (ref 3.77–5.28)
SODIUM SERPL-SCNC: 141 MMOL/L (ref 134–144)
TRIGL SERPL-MCNC: 96 MG/DL (ref 0–149)
TSH SERPL DL<=0.005 MIU/L-ACNC: 2.37 UIU/ML (ref 0.45–4.5)
VLDLC SERPL CALC-MCNC: 17 MG/DL (ref 5–40)
WBC # BLD AUTO: 11 X10E3/UL (ref 3.4–10.8)

## 2025-05-06 ENCOUNTER — OFFICE VISIT (OUTPATIENT)
Dept: INTERNAL MEDICINE | Facility: CLINIC | Age: 80
End: 2025-05-06
Payer: MEDICARE

## 2025-05-06 VITALS
HEART RATE: 91 BPM | SYSTOLIC BLOOD PRESSURE: 132 MMHG | BODY MASS INDEX: 36.19 KG/M2 | OXYGEN SATURATION: 96 % | WEIGHT: 212 LBS | DIASTOLIC BLOOD PRESSURE: 76 MMHG | HEIGHT: 64 IN | RESPIRATION RATE: 17 BRPM | TEMPERATURE: 98 F

## 2025-05-06 DIAGNOSIS — Z00.00 MEDICARE ANNUAL WELLNESS VISIT, SUBSEQUENT: Primary | ICD-10-CM

## 2025-05-06 DIAGNOSIS — E03.9 ACQUIRED HYPOTHYROIDISM: ICD-10-CM

## 2025-05-06 DIAGNOSIS — F41.1 GENERALIZED ANXIETY DISORDER: ICD-10-CM

## 2025-05-06 DIAGNOSIS — I10 ESSENTIAL HYPERTENSION: Chronic | ICD-10-CM

## 2025-05-06 DIAGNOSIS — N18.30 STAGE 3 CHRONIC KIDNEY DISEASE, UNSPECIFIED WHETHER STAGE 3A OR 3B CKD: ICD-10-CM

## 2025-05-06 DIAGNOSIS — F32.A DEPRESSION, UNSPECIFIED DEPRESSION TYPE: ICD-10-CM

## 2025-05-06 DIAGNOSIS — E78.5 HYPERLIPIDEMIA, UNSPECIFIED HYPERLIPIDEMIA TYPE: ICD-10-CM

## 2025-05-06 RX ORDER — BUPROPION HCL 150 MG
150 TABLET, EXTENDED RELEASE 24 HR ORAL EVERY MORNING
Qty: 30 TABLET | Refills: 2 | Status: SHIPPED | OUTPATIENT
Start: 2025-05-06

## 2025-05-06 RX ORDER — BUPROPION HCL 150 MG
TABLET, EXTENDED RELEASE 24 HR ORAL
Qty: 30 TABLET | Refills: 2 | Status: SHIPPED | OUTPATIENT
Start: 2025-05-06 | End: 2025-05-06 | Stop reason: SDUPTHER

## 2025-05-06 NOTE — PATIENT INSTRUCTIONS
Add Welbutrin xl 150mg a day  Referred to psychiatry  Increase ozempic to .5mg   Move for 10-15 min after each meal      Medicare Wellness  Personal Prevention Plan of Service     Date of Office Visit:    Encounter Provider:  Arleen Dillon MD  Place of Service:  [facility]  Patient Name: Marylu Foreman  :  1945    As part of the Medicare Wellness portion of your visit today, we are providing you with this personalized preventive plan of services (PPPS). This plan is based upon recommendations of the [place] Preventive Services Task Force (USPSTF) and the Advisory Committee on Immunization Practices (ACIP).    This lists the preventive care services that should be considered, and provides dates of when you are due. Items listed as completed are up-to-date and do not require any further intervention.    Health Maintenance   Topic Date Due    ZOSTER VACCINE (3 of 3) 2020    COVID-19 Vaccine ( season) 2025    INFLUENZA VACCINE  2025    LIPID PANEL  2026    ANNUAL WELLNESS VISIT  2026    DXA SCAN  2026    TDAP/TD VACCINES (5 - Td or Tdap) 2031    HEPATITIS C SCREENING  Completed    RSV Vaccine - Adults  Completed    Pneumococcal Vaccine 50+  Completed    MAMMOGRAM  Discontinued    COLONOSCOPY  Discontinued       Orders Placed This Encounter   Procedures    Ambulatory Referral to Psychiatry     Referral Priority:   Routine     Referral Type:   Behavorial Health/Psych     Referral Reason:   Specialty Services Required     Requested Specialty:   Psychiatry     Number of Visits Requested:   1       Return in about 3 months (around 2025), or labs same day  not fasting.

## 2025-05-06 NOTE — PROGRESS NOTES
Subjective   Marylu Foreman is a 79 y.o. female.   Fu with [place]  she has been having some worsening depression    History of Present Illness   She feels like she should be happy but she is still struggling  Pt has been taking BP meds as prescribed without any problems.  No HA  No episodes of orthostasis  Pt has been doing well with thyroid meds.  Taking as perscribed without any complications    The following portions of the patient's history were reviewed and updated as appropriate: allergies, current medications, past family history, past medical history, past social history, past surgical history, and problem list.  She has not been exercising   very limited PA    Review of Systems    Objective   Physical Exam  Vitals reviewed.   Constitutional:       Appearance: She is well-developed.   HENT:      Head: Normocephalic and atraumatic.      Right Ear: External ear normal.      Left Ear: External ear normal.   Eyes:      Conjunctiva/sclera: Conjunctivae normal.      Pupils: Pupils are equal, round, and reactive to light.   Neck:      Thyroid: No thyromegaly.      Trachea: No tracheal deviation.   Cardiovascular:      Rate and Rhythm: Normal rate and regular rhythm.      Heart sounds: Normal heart sounds.   Pulmonary:      Effort: Pulmonary effort is normal.      Breath sounds: Normal breath sounds.   Abdominal:      General: Bowel sounds are normal. There is no distension.      Palpations: Abdomen is soft.      Tenderness: There is no abdominal tenderness.   Musculoskeletal:         General: No deformity. Normal range of motion.      Cervical back: Normal range of motion.   Skin:     General: Skin is warm and dry.   Neurological:      Mental Status: She is alert and oriented to person, place, and time.   Psychiatric:         Behavior: Behavior normal.         Thought Content: Thought content normal.         Judgment: Judgment normal.         Vitals:    05/06/25 1519   BP: 132/76   Pulse: 91   Resp: 17   Temp: 98 °F  (36.7 °C)   SpO2: 96%     Body mass index is 36.37 kg/m².         Assessment & Plan   Diagnoses and all orders for this visit:    1. Medicare annual wellness visit, subsequent (Primary)    2. Depression, unspecified depression type  Comments:  . She has tried sertraline, fluoxetine, escitalopram, Vraylar, aripiprazole, and auvelity with mini effect  Assessment & Plan:             Orders:  -     Ambulatory Referral to Psychiatry    3. Essential hypertension  Assessment & Plan:               4. Hyperlipidemia, unspecified hyperlipidemia type  Assessment & Plan:                5. Acquired hypothyroidism  Assessment & Plan:             6. Generalized anxiety disorder  Assessment & Plan:               7. Stage 3 chronic kidney disease, unspecified whether stage 3a or 3b CKD  Assessment & Plan:               Other orders  -     Discontinue: Wellbutrin  MG 24 hr tablet; Take one tablet by mouth daily.  Dispense: 30 tablet; Refill: 2  -     Wellbutrin  MG 24 hr tablet; Take 1 tablet by mouth Every Morning. Take one tablet by mouth daily.  Dispense: 30 tablet; Refill: 2     Depression-  cont cymbalta    the welbutrin xl did not help  will refer to psych she does not have any suicidal ideations.  She has been on multiple medications for this in the past which are listed on her problem list.  I did discuss with her the importance of getting outside and getting fresh air and going for a walk and staying active with her family which she says she is doing better with  HTN-  ok with irbesartan  HPL-  elevated not wanting meds  will wokr on diet and exercsie  DM- slightly elevated  needs to increase the ozempic  she has been taking .25 will increase to .5mg and see if that help and get more protein

## 2025-05-06 NOTE — PROGRESS NOTES
Subjective   The ABCs of the Annual Wellness Visit  Medicare Wellness Visit      Marylu Foreman is a 79 y.o. patient who presents for a Medicare Wellness Visit.    The following portions of the patient's history were reviewed and   updated as appropriate: allergies, current medications, past family history, past medical history, past social history, past surgical history, and problem list.    Compared to one year ago, the patient's physical   health is the same.  Compared to one year ago, the patient's mental   health is the same.    Recent Hospitalizations:  She was not admitted to the hospital during the last year.     Current Medical Providers:  Patient Care Team:  Arleen Dillon MD as PCP - General (Internal Medicine)  Joanna Quiñonez MD as Consulting Physician (Obstetrics and Gynecology)  West White Jr., MD as Consulting Physician (Hematology and Oncology)  Librado Monique MD (Psychiatry)  Cole Ramos III, MD as Consulting Physician (Cardiology)  Renée Cisneros MD as Consulting Physician (Nephrology)  Ángel Drew DO as Consulting Physician (Infectious Diseases)  Jigna Fajardo MD as Referring Physician (General Practice)  Rosey Diaz MD as Surgeon (General Surgery)    Outpatient Medications Prior to Visit   Medication Sig Dispense Refill    anastrozole (ARIMIDEX) 1 MG tablet Take 1 tablet by mouth Daily. 30 tablet 11    ascorbic acid (VITAMIN C) 1000 MG tablet Take 1 tablet by mouth Daily.      Cholecalciferol (VITAMIN D PO) Take 1 tablet by mouth Daily.      Coenzyme Q10 200 MG capsule Take 200 mg by mouth Daily.      Cyanocobalamin (VITAMIN B 12 PO) Take 1 tablet/day by mouth Daily. HOLD PRIOR TO SURG      dilTIAZem CD (CARDIZEM CD) 180 MG 24 hr capsule Take 1 capsule by mouth 2 (Two) Times a Day. 180 capsule 3    DULoxetine (CYMBALTA) 30 MG capsule Take one 30 mg capsule by mouth with one 60 mg capsule for total of 90 mg daily. 30 capsule 2     DULoxetine (CYMBALTA) 60 MG capsule Take one 60 mg capsule by mouth with one 30 mg capsule for total of 90 mg daily. 30 capsule 2    Eliquis 5 MG tablet tablet Take 1 tablet by mouth Every 12 (Twelve) Hours. 180 tablet 1    folic acid (FOLVITE) 400 MCG tablet Take 1 tablet by mouth Daily.      irbesartan (AVAPRO) 300 MG tablet Take 1 tablet by mouth Daily.      ketoconazole (NIZORAL) 2 % shampoo Apply  topically to the appropriate area as directed 2 (Two) Times a Week. 100 mL 1    levothyroxine (SYNTHROID, LEVOTHROID) 50 MCG tablet Take 1 tablet by mouth Daily. 90 tablet 3    Multiple Vitamin (MULTI-VITAMIN PO) Take 1 tablet by mouth Daily. HOLD PRIOR TO SURG      mupirocin (BACTROBAN) 2 % ointment APPLY TOPICALLY TO THE AFFECTED AREA THREE TIMES DAILY AS DIRECTED 30 g 3    Probiotic Product (PROBIOTIC PO) Take 1 tablet by mouth Daily. Lactobacillus rhamnosus GG      Semaglutide,0.25 or 0.5MG/DOS, (Ozempic, 0.25 or 0.5 MG/DOSE,) 2 MG/3ML solution pen-injector Inject 0.25 mg under the skin into the appropriate area as directed 1 (One) Time Per Week. 3 mL 1    sodium bicarbonate 650 MG tablet Take 1 tablet by mouth Daily.      Turmeric 500 MG capsule Take 1 capsule by mouth Daily.      vitamin E 400 UNIT capsule Take 1 capsule by mouth Daily.      Wellbutrin  MG 24 hr tablet Take one tablet by mouth daily. 30 tablet 2     No facility-administered medications prior to visit.     No opioid medication identified on active medication list. I have reviewed chart for other potential  high risk medication/s and harmful drug interactions in the elderly.      Aspirin is not on active medication list.  Aspirin use is not indicated based on review of current medical condition/s. Risk of harm outweighs potential benefits.  .    Patient Active Problem List   Diagnosis    Essential hypertension    Depression    Malignant neoplasm of overlapping sites of left breast in female, estrogen receptor positive    Class 1 obesity due  "to excess calories without serious comorbidity with body mass index (BMI) of 34.0 to 34.9 in adult    Hyperlipidemia    Hypothyroidism    Iron deficiency anemia    Postsurgical nonabsorption    Permanent atrial fibrillation    GURDEEP (obstructive sleep apnea)    Chronic fatigue    Heart murmur    Aortic calcification    CKD (chronic kidney disease) stage 3, GFR 30-59 ml/min    Headache    Arthritis of first metatarsophalangeal (MTP) joint of left foot    Spondylosis with myelopathy, lumbar region    Hammer toe of left foot    Left foot drop    Acute embolism and thrombosis of unspecified deep veins of unspecified lower extremity    Estrogen receptor positive status (ER+)    Gastro-esophageal reflux disease without esophagitis    Unspecified systolic (congestive) heart failure    Generalized anxiety disorder    Chronic kidney disease, stage 3 unspecified    Neuropathy    Hallux valgus of left foot    Metatarsalgia of left foot    Acquired hallux valgus of left foot    Adverse effect of iron    Postoperative hypoxia    Age-related osteoporosis without current pathological fracture    Malignant neoplasm of female breast    Dysuria    Complex regional pain syndrome i of left lower limb     Advance Care Planning Advance Directive is not on file.  ACP discussion was held with the patient during this visit. Patient has an advance directive (not in EMR), copy requested.            Objective   Vitals:    05/06/25 1519   BP: 132/76   BP Location: Left arm   Patient Position: Sitting   Cuff Size: Adult   Pulse: 91   Resp: 17   Temp: 98 °F (36.7 °C)   TempSrc: Oral   SpO2: 96%   Weight: 96.2 kg (212 lb)   Height: 162.6 cm (64.02\")   PainSc: 3    PainLoc: Foot       Estimated body mass index is 36.37 kg/m² as calculated from the following:    Height as of this encounter: 162.6 cm (64.02\").    Weight as of this encounter: 96.2 kg (212 lb).                Does the patient have evidence of cognitive impairment? No  Lab Results "   Component Value Date    CHLPL 225 (H) 2025    TRIG 96 2025    HDL 68 2025     (H) 2025    VLDL 17 2025    HGBA1C 6.7 (H) 2025                                                                                               Health  Risk Assessment    Smoking Status:  Social History     Tobacco Use   Smoking Status Never    Passive exposure: Never   Smokeless Tobacco Never   Tobacco Comments    None - Father was a very heavy smoker and  from lung cancer.     Alcohol Consumption:  Social History     Substance and Sexual Activity   Alcohol Use No    Comment: Caffeine use- 2 cups tea daily, 1 coffee        Fall Risk Screen  STEADI Fall Risk Assessment was completed, and patient is at LOW risk for falls.Assessment completed on:2025    Depression Screening   Little interest or pleasure in doing things? Nearly every day   Feeling down, depressed, or hopeless? Nearly every day   PHQ-2 Total Score 6   Trouble falling or staying asleep, or sleeping too much? Nearly every day   Feeling tired or having little energy? Nearly every day   Poor appetite or overeating? More than half the days   Feeling bad about yourself - or that you are a failure or have let yourself or your family down? Several days   Trouble concentrating on things, such as reading the newspaper or watching television? Nearly every day   Moving or speaking so slowly that other people could have noticed? Or the opposite - being so fidgety or restless that you have been moving around a lot more than usual? More than half the days     Thoughts that you would be better off dead, or of hurting yourself in some way? Not at all   PHQ-9 Total Score 20   If you checked off any problems, how difficult have these problems made it for you to do your work, take care of things at home, or get along with other people? Somewhat difficult        Health Habits and Functional and Cognitive Screenin/29/2025    10:59 AM    Functional & Cognitive Status   Do you have difficulty preparing food and eating? No   Do you have difficulty bathing yourself, getting dressed or grooming yourself? No   Do you have difficulty using the toilet? No   Do you have difficulty moving around from place to place? No   Do you have trouble with steps or getting out of a bed or a chair? No   Current Diet Limited Junk Food   Dental Exam Up to date   Eye Exam Not up to date   Exercise (times per week) Other   Current Exercises Include No Regular Exercise   Do you need help using the phone?  No   Are you deaf or do you have serious difficulty hearing?  Yes   Do you need help to go to places out of walking distance? No   Do you need help shopping? No   Do you need help preparing meals?  No   Do you need help with housework?  No   Do you need help with laundry? No   Do you need help taking your medications? No   Do you need help managing money? No   Do you ever drive or ride in a car without wearing a seat belt? No   Have you felt unusual stress, anger or loneliness in the last month? Yes   Who do you live with? Alone   If you need help, do you have trouble finding someone available to you? No   Have you been bothered in the last four weeks by sexual problems? No   Do you have difficulty concentrating, remembering or making decisions? Yes           Age-appropriate Screening Schedule:  Refer to the list below for future screening recommendations based on patient's age, sex and/or medical conditions. Orders for these recommended tests are listed in the plan section. The patient has been provided with a written plan.    Health Maintenance List  Health Maintenance   Topic Date Due    ZOSTER VACCINE (3 of 3) 12/25/2020    COVID-19 Vaccine (8 - 2024-25 season) 03/27/2025    ANNUAL WELLNESS VISIT  05/06/2025    INFLUENZA VACCINE  07/01/2025    LIPID PANEL  04/30/2026    DXA SCAN  05/22/2026    TDAP/TD VACCINES (5 - Td or Tdap) 02/22/2031    HEPATITIS C SCREENING   Completed    RSV Vaccine - Adults  Completed    Pneumococcal Vaccine 50+  Completed    MAMMOGRAM  Discontinued    COLONOSCOPY  Discontinued                                                                                                                                                CMS Preventative Services Quick Reference  Risk Factors Identified During Encounter  Depression/Dysphoria: Referral to Mental Health specialist    The above risks/problems have been discussed with the patient.  Pertinent information has been shared with the patient in the After Visit Summary.  An After Visit Summary and PPPS were made available to the patient.    Follow Up:   Next Medicare Wellness visit to be scheduled in 1 year.     Assessment & Plan  Depression, unspecified depression type           Essential hypertension           Hyperlipidemia, unspecified hyperlipidemia type            Acquired hypothyroidism         Generalized anxiety disorder           Stage 3 chronic kidney disease, unspecified whether stage 3a or 3b CKD           Medicare annual wellness visit, subsequent              Follow Up:   No follow-ups on file.

## 2025-05-15 DIAGNOSIS — C50.812 MALIGNANT NEOPLASM OF OVERLAPPING SITES OF LEFT BREAST IN FEMALE, ESTROGEN RECEPTOR POSITIVE: ICD-10-CM

## 2025-05-15 DIAGNOSIS — C50.911 MALIGNANT NEOPLASM OF RIGHT BREAST IN FEMALE, ESTROGEN RECEPTOR POSITIVE, UNSPECIFIED SITE OF BREAST: ICD-10-CM

## 2025-05-15 DIAGNOSIS — Z17.0 MALIGNANT NEOPLASM OF RIGHT BREAST IN FEMALE, ESTROGEN RECEPTOR POSITIVE, UNSPECIFIED SITE OF BREAST: ICD-10-CM

## 2025-05-15 DIAGNOSIS — Z90.13 ABSENCE OF BREAST, ACQUIRED, BILATERAL: Primary | ICD-10-CM

## 2025-05-15 DIAGNOSIS — Z17.0 MALIGNANT NEOPLASM OF OVERLAPPING SITES OF LEFT BREAST IN FEMALE, ESTROGEN RECEPTOR POSITIVE: ICD-10-CM

## 2025-05-15 DIAGNOSIS — I89.0 LYMPHEDEMA: ICD-10-CM

## 2025-05-21 DIAGNOSIS — J18.9 PNEUMONIA OF RIGHT LUNG DUE TO INFECTIOUS ORGANISM, UNSPECIFIED PART OF LUNG: ICD-10-CM

## 2025-05-21 RX ORDER — LEVOFLOXACIN 500 MG/1
500 TABLET, FILM COATED ORAL DAILY
Qty: 7 TABLET | Refills: 0 | OUTPATIENT
Start: 2025-05-21
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
## 2025-05-29 NOTE — PROGRESS NOTES
Chief Complaint  Stage I ([id number]) left breast cancer in 2007, pulmonary emboli in 2009, atrial fibrillation, history of GI bleed, history of iron deficiency status post prior gastric bypass in 2001.  Stage I ([id number]) right breast cancer status post right mastectomy 5/28/2024 (ER positive, CT negative, HER2/jorge negative)    Subjective        History of Present Illness    Patient returns today in follow-up continuing on adjuvant Arimidex 1 mg daily (initiated 7/10/2024).  She is also continuing on Prolia as treatment for osteoporosis while on aromatase inhibitor therapy (last administered on 1/15/2025).  The patient also has history of iron deficiency anemia related to malabsorption from prior gastric bypass and has required IV iron.  She continues on anticoagulation with Eliquis 5 mg twice daily due to atrial fibrillation as well as history of pulmonary emboli and DVT.  We were not aware of her dental issues that occurred around the time she received her last Prolia injection with a fractured front tooth that required extraction and postplacement that was performed around 2 months ago.  She is due to have her implant placed fairly soon.  She is tolerating Arimidex well with no hot flashes, no significant musculoskeletal pain.  She has number of chronic issues the most severe of which is depression.  She has struggled with this for many years.  She has been under the care of psychiatry recently and has been through multiple medications with no effect.  She had been on duloxetine 30 mg daily however stopped this at some point in the past few months.  She was on Auvelity, subsequently changed in March to Vraylar and then in April to Wellbutrin  mg daily.  By the notes, a [employer] test was performed however result is not visible in the system and she does not currently have any follow-up scheduled with psychiatry.  She reports that she is scheduled to see a new therapist next week.  She is quite frustrated by  "all of this and does not know what to do next to treat her depression.  She denies any suicidal ideation today.  She reports that her depression is affecting her memory as well and this is disconcerting to her.  She continues with difficulty regarding left foot drop, wears a brace.  She is regaining some sensation in her left foot with some pain associated with this.  She recently had an upper respiratory viral infection in late May, tested negative for influenza and COVID-19, was treated symptomatically with resolution of symptoms.      Objective   Vital Signs:   /99 Comment: manual reading, pt states she hasnt taken her bp meds today  Pulse 112   Temp 98 °F (36.7 °C) (Oral)   Ht 162.6 cm (64.02\")   Wt 90.4 kg (199 lb 4.8 oz)   SpO2 95%   BMI 34.19 kg/m²     Physical Exam  Constitutional:       Appearance: She is well-developed.   Eyes:      Conjunctiva/sclera: Conjunctivae normal.   Neck:      Thyroid: No thyromegaly.   Cardiovascular:      Rate and Rhythm: Normal rate. Rhythm irregular.      Heart sounds: Murmur heard.      No friction rub. No gallop.   Pulmonary:      Effort: No respiratory distress.      Breath sounds: Normal breath sounds. No wheezing.      Comments: Status post bilateral mastectomies, chest wall without abnormalities today  Abdominal:      General: Bowel sounds are normal. There is no distension.      Palpations: Abdomen is soft.      Tenderness: There is no abdominal tenderness.   Lymphadenopathy:      Head:      Right side of head: No submandibular adenopathy.      Cervical: No cervical adenopathy.      Upper Body:      Right upper body: No supraclavicular adenopathy.      Left upper body: No supraclavicular adenopathy.   Skin:     General: Skin is warm and dry.      Findings: No rash.   Neurological:      Mental Status: She is alert and oriented to person, place, and time.      Cranial Nerves: No cranial nerve deficit.      Motor: No abnormal muscle tone.      Deep Tendon " Reflexes: Reflexes normal.      Comments: Patient with continued left foot drop   Psychiatric:         Behavior: Behavior normal.            Result Review : Reviewed CBC, CMP, iron panel, ferritin from today.  Reviewed psychiatry and PCP records.       Assessment and Plan    *Stage I ([id number]) right breast cancer (ER positive, OK negative, HER2/jorge negative)  Mammogram and right breast ultrasound on 4/10/2024 with a suspicious 0.6 cm abnormality in the right breast at the 9 o'clock position.    Biopsy on 4/18/2024 of the 9:00 lesion in the right breast.  Pathology showed a well-differentiated (grade 1) invasive lobular carcinoma, no lymphovascular invasion, evidence of ALH and ADH.  ER positive (%), OK negative (2%), HER2/jorge negative (1+ IHC), Ki-67 2%.    Breast MRI on 4/26/2024 confirmed the abnormality in the 9 o'clock position with postbiopsy cavity/abnormality measuring 1.7 cm.  There was a 1.1 cm equivocal right axillary lymph node.    Right mastectomy with sentinel node on 5/28/2024 performed by Dr. Diaz.  Pathology showed no residual cancer, did show ALH/ADH, 2 negative lymph nodes in the specimen and 4 additional negative sentinel lymph nodes.  [employer] 70 gene germline panel test 5/14/2024 with VUS in MLH1 and POLE  DEXA scan 5/22/2024 with evidence of osteoporosis (see below)  Patient not a candidate for adjuvant tamoxifen due to thrombophilia (see below).  Decision to pursue adjuvant aromatase inhibitor therapy again with Arimidex (previously well-tolerated) along with treatment for osteoporosis with Prolia.  Patient initiated adjuvant Arimidex 1 mg daily on 7/10/2024 with intent to treat x 5 years  Patient initiated concurrent Prolia on 7/10/2024 for treatment of osteoporosis while receiving aromatase therapy  Patient returns today in follow-up continuing on adjuvant anastrozole 1 mg daily (initiated 7/10/2024 with intent to treat x 5 years).  Patient continues with no significant side effects  from Arimidex.  Chest wall exam was negative today.  She has no clinical evidence of recurrent disease.  She will return in 3 months for follow-up.     *Stage I ([id number]) left breast cancer:  Biopsy 5/8/2007 with in situ and invasive ductal carcinoma, grade 2,/IL positive, HER-2/jorge negative  Left mastectomy 6/18/0742 foci carcinoma, 4 mm, grade 1 in situ and invasive ductal carcinoma and 2 mm grade 2 in situ and invasive ductal carcinoma, negative margins, negative sentinel lymph nodes x3 with 5 additional negative lymph nodes.  Arimidex initiated 7/2/2007  Arimidex interrupted patient developed bilateral pulmonary emboli in May 2009, resume shortly thereafter.  Completed 5 years treatment in 2012.    *Pulmonary emboli 5/2/2009 and bilateral calf DVT 8/11/2022:  Family history of DVT in the patient's mother occurring at age 70 postoperatively  Patient developed bilateral lower lobe pulmonary emboli 5/2/2009 following right VATS on 4/30/2009  Felt to be a provoked thrombosis related to surgery  Hypercoagulable evaluation with negative factor V Leiden, negative prothrombin gene mutation, normal homocystine, protein C antigen 92, free to protein S 75, anticardiolipin antibody panel negative, lupus anticoagulant negative, Antithrombin %  Continuing on chronic anticoagulation primarily for atrial fibrillation at this point as prior thrombosis was provoked.  Transitioned from Coumadin to Eliquis 5 mg twice daily in May 2020  At time of hospitalization for IJEOMA/CKD 11/15-11/18/2021, Eliquis dose was decreased to 2.5 mg twice daily.  Patient required lumbar laminectomy 8/9/2022, Eliquis was held perioperatively.  Subsequently admitted 8/10 - 8/12/2022 with Doppler 8/11/2022 that showed bilateral acute calf DVT.  This occurred postoperatively and while off anticoagulation.  Recommended to resume Eliquis at 5 mg twice daily dose.  Patient will require long-term anticoagulation with Eliquis 5 mg twice daily for both  thrombophilia and atrial fibrillation.  Patient returns today in follow-up continuing on Eliquis 5 mg twice daily.  She has not experienced any bleeding complications    *Iron deficiency anemia:  Prior gastric bypass in 2001  Intolerant of oral iron.  Patient has required IV iron on multiple occasions: Feraheme 8/13 and 8/20/2018; Injectafer 3/9 and 3/16/2020  Labs on 11/15/2021 showed decline in hemoglobin to 10.8 however this was in the setting of UTI and IJEOMA/CKD3.  Her iron panel was normal with iron 114, iron saturation 34%, TIBC 337 with ferritin elevated at 292.  B12 was normal at 883.  We continue to follow these values given her history of prior gastric bypass and iron deficiency requiring intermittent IV iron (intolerant of oral iron).   On 12/20/2021, hemoglobin further declined to 9.5.  Repeat labs with iron 47, ferritin 262, iron saturation 14%, TIBC 328, B12 level 1162.  Additional labs performed with negative serum protein electrophoresis and immunoelectrophoresis with normal quantitative immunoglobulins.  Free light chains elevated with kappa 73, lambda 36 with ratio 2.04, consistent with patient's degree of renal dysfunction.  Haptoglobin normal 186, LDH normal 171, CRP borderline elevated 0.62, erythropoietin level 20.1.  Anemia felt to be related to CKD3.  Patient felt to be a potential candidate for Procrit if hemoglobin remains below 10.  Patient experienced improvement in hemoglobin into the 11-12 range  Labs on 6/19/2023 with hemoglobin 11.7, iron 50, ferritin 41.6, iron saturation 12%, TIBC 404 indicating recurrent iron deficiency likely related to malabsorption from prior gastric bypass  Patient received IV iron with Venofer 300 mg on 6/26, 6/28, 7/3, 7/7/2023  Labs on 1/8/2024 with hemoglobin normal at 13.6, patient is iron replete with iron 95, ferritin 223, iron saturation 23%, TIBC 405.  Patient was iron replete, no need for further IV iron.    Patient was seen by Dr. Bess in GI on  1/31/2024, did not recommend any immediate endoscopic evaluation, agreement that iron deficiency likely related to malabsorption from prior gastric bypass.  Plans for upper and lower endoscopy in November 2028  Patient returns today with labs that show hemoglobin 12.5 and iron studies that show iron replete status with iron 94, ferritin 145, iron saturation 25%, TIBC 381.  No need for further IV iron at this time.  Recheck in 3 months at return visit.    *GI bleed in July 2018:  Acute lower GI bleed with supratherapeutic INR Coumadin.  Angiogram performed with coil embolization of artery to sigmoid colon  No clinical evidence of further GI blood loss    *Atrial fibrillation:  Requires ongoing anticoagulation for this indication  As above, transitioned from Coumadin to Eliquis 5 mg twice daily in May 2020  As above, during hospitalization 11/15-11/18/2021 for IJEOMA/CKD3, Eliquis dose was decreased to 2.5 mg twice daily  See above discussion, patient developed bilateral calf DVT while briefly off anticoagulation following lumbar laminectomy on 8/11/2022.  Patient is continuing on full dose Eliquis 5 mg twice daily.      *Status post right VATS 4/30/2020 for pulmonary nodule with finding of necrotizing granulomatous inflammation    *Severe depression/anxiety:  Patient has had chronic issues with this, is followed by psychiatry, Dr. Librado Monique.  She also underwent previous counseling which she found helpful.  The patient had ongoing difficulty with control of her depression.  She has been on multiple antidepressants  In June/July 2021 patient underwent transcranial magnetic stimulation (TMS) with significant improvement in depressive symptoms and resolution of headaches.  Symptoms subsequently gradually recurred, patient reports there has been difficulty with insurance regarding maintenance treatments.  Patient continues with severe difficulty regarding depression.  She has been followed by psychiatry with  multiple changes in her medication since her last visit here.  She was receiving Auvelity which was not effective.  She subsequently changed to Vraylar in March 2025 which was again ineffective and most recently changed to Wellbutrin  mg daily.  She notes that she has stopped taking duloxetine.  Symptoms are very poorly controlled that is the patient's main issue currently.  She discusses struggling with her depression.  She denies any suicidal ideation.  She will be seeing a new therapist next week.  She is frustrated that she is not able to find a medication to control her symptoms.  Per psychiatry notes, a [employer] test was sent in April but I cannot see a result.  We will have the patient see supportive oncology here to see if there are any additional suggestions in regards to further resources for treatment.  Support provided    *IJEOMA/CKD3  Patient with CKD3 with baseline creatinine in the 1.3-1.8 range  At time of routine follow-up visit 11/15/2021, creatinine was 3.19 and BUN of 59.  This was compared to prior value 6/2/2021 with BUN 41, creatinine 1.53.  Patient was hospitalized 11/15-11/18/2021  Patient is continuing follow-up with Dr. Cisneros in nephrology  Creatinine on 11/24/2021 remained elevated at 2.43 however on 12/20/2021 declined to 1.7, near her prior baseline.  Additional labs on 12/20/2021 with negative serum protein electrophoresis and immunoelectrophoresis with normal quantitative immunoglobulins.  Free light chains elevated with kappa 73, lambda 36 with ratio 2.04, consistent with patient's degree of renal dysfunction.  Creatinine today 1.23.  She continues follow-up with nephrology    *Hypothyroidism   Patient continues on levothyroxine 50 mcg daily    *Severe left-sided sciatica with left foot drop  Plain film of the lumbar spine on 5/10/2022 was negative.    MRI lumbar spine at [facility] on 5/14/2022 showed no evidence of metastatic disease however did show evidence of  degenerative change with disc disease and neuroforaminal compromise.     She developed a left foot drop and was referred to neurosurgery, eventually underwent lumbar laminectomy on 8/9/2022  Patient has continued with chronic difficulty regarding left foot drop.    The patient with significant neuropathic type pain in the distal left lower extremity.  Initiated gabapentin 100 mg 3 times daily.  Patient continues with chronic left foot drop.  She has a brace in place    *Left foot deformities  Patient with multiple issues regarding her left foot including hammertoes, hallux valgus  Patient did require left foot surgery on 7/11/2023  Patient was experiencing significant swelling in her foot.  She was seen by orthopedics Dr. Reed on 7/26/2024.  There was no evidence of fracture.  Patient underwent physical therapy.      *Osteoporosis  DEXA scan on 5/22/2024 with T-score L-spine 0.0, left hip -3.0, right hip -2.7  25-hydroxy vitamin D level was normal at 45.6 on 6/11/2024  Patient continuing on calcium and vitamin D supplementation.  Patient initiated adjuvant aromatase inhibitor therapy with Arimidex 1 mg daily x 5 years on 7/10/2024  Patient initiated treatment for osteoporosis concurrently with every 6-month Prolia on 7/10/2024  Patient received last dose of Prolia on 1/15/2025.  Patient had dental issues earlier this year with a fractured crown on anterior tooth that required extraction and placement of a post for an implant.  She notes that an implant will be placed soon.  For now we will continue holding Prolia until we are sure that her dental issues have resolved    *Chronic headaches  Patient notes chronic history of headaches that were felt to be related to TMJ.          Plan:  Continue adjuvant Arimidex 1 mg daily with plan to treat x 5 years adjuvantly (initiated 7/10/2024)  Patient continues on Prolia every 6 months for osteoporosis (last dose received on 1/15/2025).  Plan to hold next dose of Prolia  with ongoing dental issues regarding dental implant  Continue calcium and vitamin D supplementation  Referral to supportive oncology to assist with management of depression and potential access to additional resources  MD visit in 3 months with CBC, CMP, stat iron panel/ferritin.    I did spend 40 minutes total time caring for the patient today, time spent in review of records, face-to-face consultation, coordination of care, placement of orders, completion of documentation.

## 2025-05-30 ENCOUNTER — OFFICE VISIT (OUTPATIENT)
Dept: SURGERY | Facility: CLINIC | Age: 80
End: 2025-05-30
Payer: MEDICARE

## 2025-05-30 VITALS
DIASTOLIC BLOOD PRESSURE: 100 MMHG | SYSTOLIC BLOOD PRESSURE: 150 MMHG | WEIGHT: 199.4 LBS | HEIGHT: 64 IN | HEART RATE: 106 BPM | OXYGEN SATURATION: 95 % | BODY MASS INDEX: 34.04 KG/M2

## 2025-05-30 DIAGNOSIS — Z85.3 HISTORY OF BREAST CANCER: Primary | ICD-10-CM

## 2025-05-30 DIAGNOSIS — R45.89 DEPRESSED MOOD: ICD-10-CM

## 2025-05-30 DIAGNOSIS — Z12.39 ENCOUNTER FOR BREAST CANCER SCREENING USING NON-MAMMOGRAM MODALITY: ICD-10-CM

## 2025-05-30 RX ORDER — DILTIAZEM HYDROCHLORIDE 180 MG/1
180 CAPSULE, COATED, EXTENDED RELEASE ORAL 2 TIMES DAILY
Qty: 180 CAPSULE | Refills: 3 | OUTPATIENT
Start: 2025-05-30

## 2025-05-30 NOTE — TELEPHONE ENCOUNTER
Caller: Marylu Foreman    Relationship: Self    Best call back number: [phone]     Requested Prescriptions:   Requested Prescriptions     Pending Prescriptions Disp Refills    dilTIAZem CD (CARDIZEM CD) 180 MG 24 hr capsule 180 capsule 3     Sig: Take 1 capsule by mouth 2 (Two) Times a Day.        Pharmacy where request should be sent: [facility] [id number] [facility] [address] [facility] [phone] [facility] [phone]      Last office visit with prescribing clinician: 5/6/2025   Last telemedicine visit with prescribing clinician: Visit date not found   Next office visit with prescribing clinician: 8/7/2025     Additional details provided by patient: PATIENT IS OUT OF THIS MEDICATION AND IS ASKING THAT IT BE SENT TO A DIFFERENT PHARMACY AS [facility] IS CLOSED ON THE WEEKENDS.     Does the patient have less than a 3 day supply:  [x] Yes  [] No    Candie Samson Rep   05/30/25 12:33 EDT

## 2025-06-06 ENCOUNTER — LAB (OUTPATIENT)
Dept: LAB | Facility: HOSPITAL | Age: 80
End: 2025-06-06
Payer: MEDICARE

## 2025-06-06 ENCOUNTER — TELEPHONE (OUTPATIENT)
Dept: RADIATION ONCOLOGY | Facility: HOSPITAL | Age: 80
End: 2025-06-06
Payer: MEDICARE

## 2025-06-06 ENCOUNTER — OFFICE VISIT (OUTPATIENT)
Dept: ONCOLOGY | Facility: CLINIC | Age: 80
End: 2025-06-06
Payer: MEDICARE

## 2025-06-06 VITALS
OXYGEN SATURATION: 95 % | HEIGHT: 64 IN | SYSTOLIC BLOOD PRESSURE: 154 MMHG | TEMPERATURE: 98 F | DIASTOLIC BLOOD PRESSURE: 99 MMHG | HEART RATE: 112 BPM | BODY MASS INDEX: 34.02 KG/M2 | WEIGHT: 199.3 LBS

## 2025-06-06 DIAGNOSIS — Z17.0 MALIGNANT NEOPLASM OF OVERLAPPING SITES OF LEFT BREAST IN FEMALE, ESTROGEN RECEPTOR POSITIVE: ICD-10-CM

## 2025-06-06 DIAGNOSIS — C50.812 MALIGNANT NEOPLASM OF OVERLAPPING SITES OF LEFT BREAST IN FEMALE, ESTROGEN RECEPTOR POSITIVE: ICD-10-CM

## 2025-06-06 DIAGNOSIS — C50.812 MALIGNANT NEOPLASM OF OVERLAPPING SITES OF LEFT BREAST IN FEMALE, ESTROGEN RECEPTOR POSITIVE: Primary | ICD-10-CM

## 2025-06-06 DIAGNOSIS — R30.0 DYSURIA: ICD-10-CM

## 2025-06-06 DIAGNOSIS — Z17.0 MALIGNANT NEOPLASM OF OVERLAPPING SITES OF LEFT BREAST IN FEMALE, ESTROGEN RECEPTOR POSITIVE: Primary | ICD-10-CM

## 2025-06-06 DIAGNOSIS — D50.9 IRON DEFICIENCY ANEMIA, UNSPECIFIED IRON DEFICIENCY ANEMIA TYPE: ICD-10-CM

## 2025-06-06 DIAGNOSIS — F33.2 SEVERE EPISODE OF RECURRENT MAJOR DEPRESSIVE DISORDER, WITHOUT PSYCHOTIC FEATURES: ICD-10-CM

## 2025-06-06 LAB
ALBUMIN SERPL-MCNC: 4.3 G/DL (ref 3.5–5.2)
ALBUMIN/GLOB SERPL: 1.5 G/DL
ALP SERPL-CCNC: 88 U/L (ref 39–117)
ALT SERPL W P-5'-P-CCNC: 21 U/L (ref 1–33)
ANION GAP SERPL CALCULATED.3IONS-SCNC: 15 MMOL/L (ref 5–15)
AST SERPL-CCNC: 25 U/L (ref 1–32)
BASOPHILS # BLD AUTO: 0.13 10*3/MM3 (ref 0–0.2)
BASOPHILS NFR BLD AUTO: 1.2 % (ref 0–1.5)
BILIRUB SERPL-MCNC: 1 MG/DL (ref 0–1.2)
BUN SERPL-MCNC: 16 MG/DL (ref 8–23)
BUN/CREAT SERPL: 13 (ref 7–25)
CALCIUM SPEC-SCNC: 8.9 MG/DL (ref 8.6–10.5)
CHLORIDE SERPL-SCNC: 104 MMOL/L (ref 98–107)
CO2 SERPL-SCNC: 24 MMOL/L (ref 22–29)
CREAT SERPL-MCNC: 1.23 MG/DL (ref 0.57–1)
DEPRECATED RDW RBC AUTO: 52.9 FL (ref 37–54)
EGFRCR SERPLBLD CKD-EPI 2021: 45.1 ML/MIN/1.73
EOSINOPHIL # BLD AUTO: 0.38 10*3/MM3 (ref 0–0.4)
EOSINOPHIL NFR BLD AUTO: 3.4 % (ref 0.3–6.2)
ERYTHROCYTE [DISTWIDTH] IN BLOOD BY AUTOMATED COUNT: 14.9 % (ref 12.3–15.4)
FERRITIN SERPL-MCNC: 145 NG/ML (ref 13–150)
GLOBULIN UR ELPH-MCNC: 2.8 GM/DL
GLUCOSE SERPL-MCNC: 130 MG/DL (ref 65–99)
HCT VFR BLD AUTO: 38.4 % (ref 34–46.6)
HGB BLD-MCNC: 12.5 G/DL (ref 12–15.9)
IMM GRANULOCYTES # BLD AUTO: 0.02 10*3/MM3 (ref 0–0.05)
IMM GRANULOCYTES NFR BLD AUTO: 0.2 % (ref 0–0.5)
IRON 24H UR-MRATE: 94 MCG/DL (ref 37–145)
IRON SATN MFR SERPL: 25 % (ref 20–50)
LYMPHOCYTES # BLD AUTO: 2.71 10*3/MM3 (ref 0.7–3.1)
LYMPHOCYTES NFR BLD AUTO: 24.3 % (ref 19.6–45.3)
MCH RBC QN AUTO: 31.4 PG (ref 26.6–33)
MCHC RBC AUTO-ENTMCNC: 32.6 G/DL (ref 31.5–35.7)
MCV RBC AUTO: 96.5 FL (ref 79–97)
MONOCYTES # BLD AUTO: 0.99 10*3/MM3 (ref 0.1–0.9)
MONOCYTES NFR BLD AUTO: 8.9 % (ref 5–12)
NEUTROPHILS NFR BLD AUTO: 6.93 10*3/MM3 (ref 1.7–7)
NEUTROPHILS NFR BLD AUTO: 62 % (ref 42.7–76)
NRBC BLD AUTO-RTO: 0 /100 WBC (ref 0–0.2)
PLATELET # BLD AUTO: 386 10*3/MM3 (ref 140–450)
PMV BLD AUTO: 10.9 FL (ref 6–12)
POTASSIUM SERPL-SCNC: 3.6 MMOL/L (ref 3.5–5.2)
PROT SERPL-MCNC: 7.1 G/DL (ref 6–8.5)
RBC # BLD AUTO: 3.98 10*6/MM3 (ref 3.77–5.28)
SODIUM SERPL-SCNC: 143 MMOL/L (ref 136–145)
TIBC SERPL-MCNC: 381 MCG/DL (ref 298–536)
TRANSFERRIN SERPL-MCNC: 256 MG/DL (ref 200–360)
WBC NRBC COR # BLD AUTO: 11.16 10*3/MM3 (ref 3.4–10.8)

## 2025-06-06 PROCEDURE — 82728 ASSAY OF FERRITIN: CPT

## 2025-06-06 PROCEDURE — 85025 COMPLETE CBC W/AUTO DIFF WBC: CPT

## 2025-06-06 PROCEDURE — 80053 COMPREHEN METABOLIC PANEL: CPT

## 2025-06-06 PROCEDURE — 36415 COLL VENOUS BLD VENIPUNCTURE: CPT

## 2025-06-06 PROCEDURE — 83540 ASSAY OF IRON: CPT

## 2025-06-06 PROCEDURE — 84466 ASSAY OF TRANSFERRIN: CPT

## 2025-06-06 NOTE — LETTER
June 7, 2025     Arleen Dillon MD  [address]  [address]  [facility] [address]    Patient: Marylu Foreman   YOB: 1945   Date of Visit: 6/6/2025     Dear Arleen Dillon MD:       Thank you for referring Marylu Foreman to me for evaluation. Below are the relevant portions of my assessment and plan of care.    If you have questions, please do not hesitate to call me. I look forward to following Marylu along with you.         Sincerely,        West White MD        CC: No Recipients    West White Jr., MD  06/07/25 0031  Sign when Signing Visit  Chief Complaint  Stage I ([id number]) left breast cancer in 2007, pulmonary emboli in 2009, atrial fibrillation, history of GI bleed, history of iron deficiency status post prior gastric bypass in 2001.  Stage I ([id number]) right breast cancer status post right mastectomy 5/28/2024 (ER positive, KS negative, HER2/jorge negative)    Subjective       History of Present Illness    Patient returns today in follow-up continuing on adjuvant Arimidex 1 mg daily (initiated 7/10/2024).  She is also continuing on Prolia as treatment for osteoporosis while on aromatase inhibitor therapy (last administered on 1/15/2025).  The patient also has history of iron deficiency anemia related to malabsorption from prior gastric bypass and has required IV iron.  She continues on anticoagulation with Eliquis 5 mg twice daily due to atrial fibrillation as well as history of pulmonary emboli and DVT.  We were not aware of her dental issues that occurred around the time she received her last Prolia injection with a fractured front tooth that required extraction and postplacement that was performed around 2 months ago.  She is due to have her implant placed fairly soon.  She is tolerating Arimidex well with no hot flashes, no significant musculoskeletal pain.  She has number of chronic issues the most severe of which is depression.  She has struggled with this for many years.  She has been  "under the care of psychiatry recently and has been through multiple medications with no effect.  She had been on duloxetine 30 mg daily however stopped this at some point in the past few months.  She was on Auvelity, subsequently changed in March to Vraylar and then in April to Wellbutrin  mg daily.  By the notes, a [employer] test was performed however result is not visible in the system and she does not currently have any follow-up scheduled with psychiatry.  She reports that she is scheduled to see a new therapist next week.  She is quite frustrated by all of this and does not know what to do next to treat her depression.  She denies any suicidal ideation today.  She reports that her depression is affecting her memory as well and this is disconcerting to her.  She continues with difficulty regarding left foot drop, wears a brace.  She is regaining some sensation in her left foot with some pain associated with this.  She recently had an upper respiratory viral infection in late May, tested negative for influenza and COVID-19, was treated symptomatically with resolution of symptoms.      Objective  Vital Signs:   /99 Comment: manual reading, pt states she hasnt taken her bp meds today  Pulse 112   Temp 98 °F (36.7 °C) (Oral)   Ht 162.6 cm (64.02\")   Wt 90.4 kg (199 lb 4.8 oz)   SpO2 95%   BMI 34.19 kg/m²     Physical Exam  Constitutional:       Appearance: She is well-developed.   Eyes:      Conjunctiva/sclera: Conjunctivae normal.   Neck:      Thyroid: No thyromegaly.   Cardiovascular:      Rate and Rhythm: Normal rate. Rhythm irregular.      Heart sounds: Murmur heard.      No friction rub. No gallop.   Pulmonary:      Effort: No respiratory distress.      Breath sounds: Normal breath sounds. No wheezing.      Comments: Status post bilateral mastectomies, chest wall without abnormalities today  Abdominal:      General: Bowel sounds are normal. There is no distension.      Palpations: Abdomen is " soft.      Tenderness: There is no abdominal tenderness.   Lymphadenopathy:      Head:      Right side of head: No submandibular adenopathy.      Cervical: No cervical adenopathy.      Upper Body:      Right upper body: No supraclavicular adenopathy.      Left upper body: No supraclavicular adenopathy.   Skin:     General: Skin is warm and dry.      Findings: No rash.   Neurological:      Mental Status: She is alert and oriented to person, place, and time.      Cranial Nerves: No cranial nerve deficit.      Motor: No abnormal muscle tone.      Deep Tendon Reflexes: Reflexes normal.      Comments: Patient with continued left foot drop   Psychiatric:         Behavior: Behavior normal.            Result Review: Reviewed CBC, CMP, iron panel, ferritin from today.  Reviewed psychiatry and PCP records.       Assessment and Plan    *Stage I ([id number]) right breast cancer (ER positive, CT negative, HER2/jorge negative)  Mammogram and right breast ultrasound on 4/10/2024 with a suspicious 0.6 cm abnormality in the right breast at the 9 o'clock position.    Biopsy on 4/18/2024 of the 9:00 lesion in the right breast.  Pathology showed a well-differentiated (grade 1) invasive lobular carcinoma, no lymphovascular invasion, evidence of ALH and ADH.  ER positive (%), CT negative (2%), HER2/jorge negative (1+ IHC), Ki-67 2%.    Breast MRI on 4/26/2024 confirmed the abnormality in the 9 o'clock position with postbiopsy cavity/abnormality measuring 1.7 cm.  There was a 1.1 cm equivocal right axillary lymph node.    Right mastectomy with sentinel node on 5/28/2024 performed by Dr. Diaz.  Pathology showed no residual cancer, did show ALH/ADH, 2 negative lymph nodes in the specimen and 4 additional negative sentinel lymph nodes.  [employer] 70 gene germline panel test 5/14/2024 with VUS in MLH1 and POLE  DEXA scan 5/22/2024 with evidence of osteoporosis (see below)  Patient not a candidate for adjuvant tamoxifen due to thrombophilia  (see below).  Decision to pursue adjuvant aromatase inhibitor therapy again with Arimidex (previously well-tolerated) along with treatment for osteoporosis with Prolia.  Patient initiated adjuvant Arimidex 1 mg daily on 7/10/2024 with intent to treat x 5 years  Patient initiated concurrent Prolia on 7/10/2024 for treatment of osteoporosis while receiving aromatase therapy  Patient returns today in follow-up continuing on adjuvant anastrozole 1 mg daily (initiated 7/10/2024 with intent to treat x 5 years).  Patient continues with no significant side effects from Arimidex.  Chest wall exam was negative today.  She has no clinical evidence of recurrent disease.  She will return in 3 months for follow-up.     *Stage I ([id number]) left breast cancer:  Biopsy 5/8/2007 with in situ and invasive ductal carcinoma, grade 2,/RI positive, HER-2/jorge negative  Left mastectomy 6/18/0742 foci carcinoma, 4 mm, grade 1 in situ and invasive ductal carcinoma and 2 mm grade 2 in situ and invasive ductal carcinoma, negative margins, negative sentinel lymph nodes x3 with 5 additional negative lymph nodes.  Arimidex initiated 7/2/2007  Arimidex interrupted patient developed bilateral pulmonary emboli in May 2009, resume shortly thereafter.  Completed 5 years treatment in 2012.    *Pulmonary emboli 5/2/2009 and bilateral calf DVT 8/11/2022:  Family history of DVT in the patient's mother occurring at age 70 postoperatively  Patient developed bilateral lower lobe pulmonary emboli 5/2/2009 following right VATS on 4/30/2009  Felt to be a provoked thrombosis related to surgery  Hypercoagulable evaluation with negative factor V Leiden, negative prothrombin gene mutation, normal homocystine, protein C antigen 92, free to protein S 75, anticardiolipin antibody panel negative, lupus anticoagulant negative, Antithrombin %  Continuing on chronic anticoagulation primarily for atrial fibrillation at this point as prior thrombosis was  provoked.  Transitioned from Coumadin to Eliquis 5 mg twice daily in May 2020  At time of hospitalization for IJEOMA/CKD 11/15-11/18/2021, Eliquis dose was decreased to 2.5 mg twice daily.  Patient required lumbar laminectomy 8/9/2022, Eliquis was held perioperatively.  Subsequently admitted 8/10 - 8/12/2022 with Doppler 8/11/2022 that showed bilateral acute calf DVT.  This occurred postoperatively and while off anticoagulation.  Recommended to resume Eliquis at 5 mg twice daily dose.  Patient will require long-term anticoagulation with Eliquis 5 mg twice daily for both thrombophilia and atrial fibrillation.  Patient returns today in follow-up continuing on Eliquis 5 mg twice daily.  She has not experienced any bleeding complications    *Iron deficiency anemia:  Prior gastric bypass in 2001  Intolerant of oral iron.  Patient has required IV iron on multiple occasions: Feraheme 8/13 and 8/20/2018; Injectafer 3/9 and 3/16/2020  Labs on 11/15/2021 showed decline in hemoglobin to 10.8 however this was in the setting of UTI and IJEOMA/CKD3.  Her iron panel was normal with iron 114, iron saturation 34%, TIBC 337 with ferritin elevated at 292.  B12 was normal at 883.  We continue to follow these values given her history of prior gastric bypass and iron deficiency requiring intermittent IV iron (intolerant of oral iron).   On 12/20/2021, hemoglobin further declined to 9.5.  Repeat labs with iron 47, ferritin 262, iron saturation 14%, TIBC 328, B12 level 1162.  Additional labs performed with negative serum protein electrophoresis and immunoelectrophoresis with normal quantitative immunoglobulins.  Free light chains elevated with kappa 73, lambda 36 with ratio 2.04, consistent with patient's degree of renal dysfunction.  Haptoglobin normal 186, LDH normal 171, CRP borderline elevated 0.62, erythropoietin level 20.1.  Anemia felt to be related to CKD3.  Patient felt to be a potential candidate for Procrit if hemoglobin remains below  10.  Patient experienced improvement in hemoglobin into the 11-12 range  Labs on 6/19/2023 with hemoglobin 11.7, iron 50, ferritin 41.6, iron saturation 12%, TIBC 404 indicating recurrent iron deficiency likely related to malabsorption from prior gastric bypass  Patient received IV iron with Venofer 300 mg on 6/26, 6/28, 7/3, 7/7/2023  Labs on 1/8/2024 with hemoglobin normal at 13.6, patient is iron replete with iron 95, ferritin 223, iron saturation 23%, TIBC 405.  Patient was iron replete, no need for further IV iron.    Patient was seen by Dr. Bess in GI on 1/31/2024, did not recommend any immediate endoscopic evaluation, agreement that iron deficiency likely related to malabsorption from prior gastric bypass.  Plans for upper and lower endoscopy in November 2028  Patient returns today with labs that show hemoglobin 12.5 and iron studies that show iron replete status with iron 94, ferritin 145, iron saturation 25%, TIBC 381.  No need for further IV iron at this time.  Recheck in 3 months at return visit.    *GI bleed in July 2018:  Acute lower GI bleed with supratherapeutic INR Coumadin.  Angiogram performed with coil embolization of artery to sigmoid colon  No clinical evidence of further GI blood loss    *Atrial fibrillation:  Requires ongoing anticoagulation for this indication  As above, transitioned from Coumadin to Eliquis 5 mg twice daily in May 2020  As above, during hospitalization 11/15-11/18/2021 for IJEOMA/CKD3, Eliquis dose was decreased to 2.5 mg twice daily  See above discussion, patient developed bilateral calf DVT while briefly off anticoagulation following lumbar laminectomy on 8/11/2022.  Patient is continuing on full dose Eliquis 5 mg twice daily.      *Status post right VATS 4/30/2020 for pulmonary nodule with finding of necrotizing granulomatous inflammation    *Severe depression/anxiety:  Patient has had chronic issues with this, is followed by psychiatry, Dr. Librado Monique.  She also  underwent previous counseling which she found helpful.  The patient had ongoing difficulty with control of her depression.  She has been on multiple antidepressants  In June/July 2021 patient underwent transcranial magnetic stimulation (TMS) with significant improvement in depressive symptoms and resolution of headaches.  Symptoms subsequently gradually recurred, patient reports there has been difficulty with insurance regarding maintenance treatments.  Patient continues with severe difficulty regarding depression.  She has been followed by psychiatry with multiple changes in her medication since her last visit here.  She was receiving Auvelity which was not effective.  She subsequently changed to Vraylar in March 2025 which was again ineffective and most recently changed to Wellbutrin  mg daily.  She notes that she has stopped taking duloxetine.  Symptoms are very poorly controlled that is the patient's main issue currently.  She discusses struggling with her depression.  She denies any suicidal ideation.  She will be seeing a new therapist next week.  She is frustrated that she is not able to find a medication to control her symptoms.  Per psychiatry notes, a [employer] test was sent in April but I cannot see a result.  We will have the patient see supportive oncology here to see if there are any additional suggestions in regards to further resources for treatment.  Support provided    *IJEOMA/CKD3  Patient with CKD3 with baseline creatinine in the 1.3-1.8 range  At time of routine follow-up visit 11/15/2021, creatinine was 3.19 and BUN of 59.  This was compared to prior value 6/2/2021 with BUN 41, creatinine 1.53.  Patient was hospitalized 11/15-11/18/2021  Patient is continuing follow-up with Dr. Cisneros in nephrology  Creatinine on 11/24/2021 remained elevated at 2.43 however on 12/20/2021 declined to 1.7, near her prior baseline.  Additional labs on 12/20/2021 with negative serum protein electrophoresis and  immunoelectrophoresis with normal quantitative immunoglobulins.  Free light chains elevated with kappa 73, lambda 36 with ratio 2.04, consistent with patient's degree of renal dysfunction.  Creatinine today 1.23.  She continues follow-up with nephrology    *Hypothyroidism   Patient continues on levothyroxine 50 mcg daily    *Severe left-sided sciatica with left foot drop  Plain film of the lumbar spine on 5/10/2022 was negative.    MRI lumbar [facility] at [facility] on 5/14/2022 showed no evidence of metastatic disease however did show evidence of degenerative change with disc disease and neuroforaminal compromise.     She developed a left foot drop and was referred to neurosurgery, eventually underwent lumbar laminectomy on 8/9/2022  Patient has continued with chronic difficulty regarding left foot drop.    The patient with significant neuropathic type pain in the distal left lower extremity.  Initiated gabapentin 100 mg 3 times daily.  Patient continues with chronic left foot drop.  She has a brace in place    *Left foot deformities  Patient with multiple issues regarding her left foot including hammertoes, hallux valgus  Patient did require left foot surgery on 7/11/2023  Patient was experiencing significant swelling in her foot.  She was seen by orthopedics Dr. Reed on 7/26/2024.  There was no evidence of fracture.  Patient underwent physical therapy.      *Osteoporosis  DEXA scan on 5/22/2024 with T-score L-spine 0.0, left hip -3.0, right hip -2.7  25-hydroxy vitamin D level was normal at 45.6 on 6/11/2024  Patient continuing on calcium and vitamin D supplementation.  Patient initiated adjuvant aromatase inhibitor therapy with Arimidex 1 mg daily x 5 years on 7/10/2024  Patient initiated treatment for osteoporosis concurrently with every 6-month Prolia on 7/10/2024  Patient received last dose of Prolia on 1/15/2025.  Patient had dental issues earlier this year with a fractured crown on anterior tooth  that required extraction and placement of a post for an implant.  She notes that an implant will be placed soon.  For now we will continue holding Prolia until we are sure that her dental issues have resolved    *Chronic headaches  Patient notes chronic history of headaches that were felt to be related to TMJ.          Plan:  Continue adjuvant Arimidex 1 mg daily with plan to treat x 5 years adjuvantly (initiated 7/10/2024)  Patient continues on Prolia every 6 months for osteoporosis (last dose received on 1/15/2025).  Plan to hold next dose of Prolia with ongoing dental issues regarding dental implant  Continue calcium and vitamin D supplementation  Referral to supportive oncology to assist with management of depression and potential access to additional resources  MD visit in 3 months with CBC, CMP, stat iron panel/ferritin.    I did spend 40 minutes total time caring for the patient today, time spent in review of records, face-to-face consultation, coordination of care, placement of orders, completion of documentation.

## 2025-06-06 NOTE — TELEPHONE ENCOUNTER
Phoned pt to discuss scheduling an appt with Kandace ANGELO for her to return my call at my direct number of [phone].

## 2025-06-09 RX ORDER — DILTIAZEM HYDROCHLORIDE 180 MG/1
180 CAPSULE, COATED, EXTENDED RELEASE ORAL 2 TIMES DAILY
Qty: 180 CAPSULE | Refills: 3 | Status: ON HOLD | OUTPATIENT
Start: 2025-06-09

## 2025-06-12 ENCOUNTER — APPOINTMENT (OUTPATIENT)
Dept: GENERAL RADIOLOGY | Facility: HOSPITAL | Age: 80
DRG: 064 | End: 2025-06-12
Payer: MEDICARE

## 2025-06-12 ENCOUNTER — APPOINTMENT (OUTPATIENT)
Dept: NEUROLOGY | Facility: HOSPITAL | Age: 80
DRG: 064 | End: 2025-06-12
Payer: MEDICARE

## 2025-06-12 ENCOUNTER — HOSPITAL ENCOUNTER (INPATIENT)
Facility: HOSPITAL | Age: 80
LOS: 6 days | Discharge: NURSING, REHAB OR LONG-TERM CARE | DRG: 064 | End: 2025-06-19
Attending: EMERGENCY MEDICINE
Payer: MEDICARE

## 2025-06-12 ENCOUNTER — APPOINTMENT (OUTPATIENT)
Dept: CT IMAGING | Facility: HOSPITAL | Age: 80
DRG: 064 | End: 2025-06-12
Payer: MEDICARE

## 2025-06-12 DIAGNOSIS — R41.82 ALTERED MENTAL STATUS, UNSPECIFIED ALTERED MENTAL STATUS TYPE: Primary | ICD-10-CM

## 2025-06-12 DIAGNOSIS — T79.6XXA TRAUMATIC RHABDOMYOLYSIS, INITIAL ENCOUNTER: ICD-10-CM

## 2025-06-12 DIAGNOSIS — R29.6 UNWITNESSED FALL: ICD-10-CM

## 2025-06-12 DIAGNOSIS — S30.0XXA CONTUSION OF BUTTOCK, INITIAL ENCOUNTER: ICD-10-CM

## 2025-06-12 DIAGNOSIS — I50.9 ACUTE CONGESTIVE HEART FAILURE, UNSPECIFIED HEART FAILURE TYPE: ICD-10-CM

## 2025-06-12 LAB
ABO GROUP BLD: NORMAL
ALBUMIN SERPL-MCNC: 3.8 G/DL (ref 3.5–5.2)
ALBUMIN/GLOB SERPL: 1.3 G/DL
ALP SERPL-CCNC: 94 U/L (ref 39–117)
ALT SERPL W P-5'-P-CCNC: 18 U/L (ref 1–33)
AMPHET+METHAMPHET UR QL: NEGATIVE
AMPHETAMINES UR QL: NEGATIVE
ANION GAP SERPL CALCULATED.3IONS-SCNC: 17.1 MMOL/L (ref 5–15)
ANION GAP SERPL CALCULATED.3IONS-SCNC: 19 MMOL/L (ref 5–15)
APTT PPP: 26.8 SECONDS (ref 22.7–35.4)
ARTERIAL PATENCY WRIST A: POSITIVE
AST SERPL-CCNC: 40 U/L (ref 1–32)
ATMOSPHERIC PRESS: 751.4 MMHG
B PARAPERT DNA SPEC QL NAA+PROBE: NOT DETECTED
B PERT DNA SPEC QL NAA+PROBE: NOT DETECTED
BACTERIA UR QL AUTO: NORMAL /HPF
BARBITURATES UR QL SCN: NEGATIVE
BASE EXCESS BLDA CALC-SCNC: -3.8 MMOL/L (ref 0–2)
BASOPHILS # BLD AUTO: 0.09 10*3/MM3 (ref 0–0.2)
BASOPHILS NFR BLD AUTO: 0.7 % (ref 0–1.5)
BDY SITE: ABNORMAL
BENZODIAZ UR QL SCN: NEGATIVE
BILIRUB SERPL-MCNC: 1 MG/DL (ref 0–1.2)
BILIRUB UR QL STRIP: NEGATIVE
BLD GP AB SCN SERPL QL: NEGATIVE
BUN SERPL-MCNC: 12 MG/DL (ref 8–23)
BUN SERPL-MCNC: 12 MG/DL (ref 8–23)
BUN/CREAT SERPL: 11.8 (ref 7–25)
BUN/CREAT SERPL: 9.9 (ref 7–25)
BUPRENORPHINE SERPL-MCNC: NEGATIVE NG/ML
C PNEUM DNA NPH QL NAA+NON-PROBE: NOT DETECTED
CALCIUM SPEC-SCNC: 7.8 MG/DL (ref 8.6–10.5)
CALCIUM SPEC-SCNC: 8.1 MG/DL (ref 8.6–10.5)
CANNABINOIDS SERPL QL: NEGATIVE
CHLORIDE SERPL-SCNC: 104 MMOL/L (ref 98–107)
CHLORIDE SERPL-SCNC: 99 MMOL/L (ref 98–107)
CK SERPL-CCNC: 484 U/L (ref 20–180)
CK SERPL-CCNC: 613 U/L (ref 20–180)
CLARITY UR: CLEAR
CO2 SERPL-SCNC: 19.9 MMOL/L (ref 22–29)
CO2 SERPL-SCNC: 20 MMOL/L (ref 22–29)
COCAINE UR QL: NEGATIVE
COLOR UR: YELLOW
CREAT SERPL-MCNC: 1.02 MG/DL (ref 0.57–1)
CREAT SERPL-MCNC: 1.21 MG/DL (ref 0.57–1)
D DIMER PPP FEU-MCNC: 1.91 MCGFEU/ML (ref 0–0.78)
D-LACTATE SERPL-SCNC: 1.7 MMOL/L (ref 0.5–2)
DEPRECATED RDW RBC AUTO: 45.4 FL (ref 37–54)
DEPRECATED RDW RBC AUTO: 48.5 FL (ref 37–54)
DEVICE COMMENT: ABNORMAL
EGFRCR SERPLBLD CKD-EPI 2021: 46 ML/MIN/1.73
EGFRCR SERPLBLD CKD-EPI 2021: 56.4 ML/MIN/1.73
EOSINOPHIL # BLD AUTO: 0.07 10*3/MM3 (ref 0–0.4)
EOSINOPHIL NFR BLD AUTO: 0.5 % (ref 0.3–6.2)
ERYTHROCYTE [DISTWIDTH] IN BLOOD BY AUTOMATED COUNT: 12.9 % (ref 12.3–15.4)
ERYTHROCYTE [DISTWIDTH] IN BLOOD BY AUTOMATED COUNT: 13.3 % (ref 12.3–15.4)
ETHANOL BLD-MCNC: <10 MG/DL (ref 0–10)
ETHANOL UR QL: <0.01 %
FENTANYL UR-MCNC: NEGATIVE NG/ML
FLUAV SUBTYP SPEC NAA+PROBE: NOT DETECTED
FLUBV RNA ISLT QL NAA+PROBE: NOT DETECTED
GEN 5 1HR TROPONIN T REFLEX: 29 NG/L
GLOBULIN UR ELPH-MCNC: 3 GM/DL
GLUCOSE BLDC GLUCOMTR-MCNC: 110 MG/DL (ref 70–130)
GLUCOSE SERPL-MCNC: 104 MG/DL (ref 65–99)
GLUCOSE SERPL-MCNC: 128 MG/DL (ref 65–99)
GLUCOSE UR STRIP-MCNC: NEGATIVE MG/DL
HADV DNA SPEC NAA+PROBE: NOT DETECTED
HCO3 BLDA-SCNC: 20 MMOL/L (ref 22–28)
HCOV 229E RNA SPEC QL NAA+PROBE: NOT DETECTED
HCOV HKU1 RNA SPEC QL NAA+PROBE: NOT DETECTED
HCOV NL63 RNA SPEC QL NAA+PROBE: NOT DETECTED
HCOV OC43 RNA SPEC QL NAA+PROBE: NOT DETECTED
HCT VFR BLD AUTO: 36.9 % (ref 34–46.6)
HCT VFR BLD AUTO: 37.3 % (ref 34–46.6)
HEMODILUTION: NO
HGB BLD-MCNC: 11.6 G/DL (ref 12–15.9)
HGB BLD-MCNC: 12.2 G/DL (ref 12–15.9)
HGB UR QL STRIP.AUTO: NEGATIVE
HMPV RNA NPH QL NAA+NON-PROBE: NOT DETECTED
HPIV1 RNA ISLT QL NAA+PROBE: NOT DETECTED
HPIV2 RNA SPEC QL NAA+PROBE: NOT DETECTED
HPIV3 RNA NPH QL NAA+PROBE: NOT DETECTED
HPIV4 P GENE NPH QL NAA+PROBE: NOT DETECTED
HYALINE CASTS UR QL AUTO: NORMAL /LPF
IMM GRANULOCYTES # BLD AUTO: 0.05 10*3/MM3 (ref 0–0.05)
IMM GRANULOCYTES NFR BLD AUTO: 0.4 % (ref 0–0.5)
INHALED O2 CONCENTRATION: 21 %
INR PPP: 1.1 (ref 0.9–1.1)
KETONES UR QL STRIP: ABNORMAL
LEUKOCYTE ESTERASE UR QL STRIP.AUTO: NEGATIVE
LYMPHOCYTES # BLD AUTO: 1.85 10*3/MM3 (ref 0.7–3.1)
LYMPHOCYTES NFR BLD AUTO: 13.5 % (ref 19.6–45.3)
M PNEUMO IGG SER IA-ACNC: NOT DETECTED
MAGNESIUM SERPL-MCNC: 1.9 MG/DL (ref 1.6–2.4)
MCH RBC QN AUTO: 31.4 PG (ref 26.6–33)
MCH RBC QN AUTO: 31.7 PG (ref 26.6–33)
MCHC RBC AUTO-ENTMCNC: 31.4 G/DL (ref 31.5–35.7)
MCHC RBC AUTO-ENTMCNC: 32.7 G/DL (ref 31.5–35.7)
MCV RBC AUTO: 96.9 FL (ref 79–97)
MCV RBC AUTO: 99.7 FL (ref 79–97)
METHADONE UR QL SCN: NEGATIVE
MODALITY: ABNORMAL
MONOCYTES # BLD AUTO: 1.41 10*3/MM3 (ref 0.1–0.9)
MONOCYTES NFR BLD AUTO: 10.3 % (ref 5–12)
NEUTROPHILS NFR BLD AUTO: 10.21 10*3/MM3 (ref 1.7–7)
NEUTROPHILS NFR BLD AUTO: 74.6 % (ref 42.7–76)
NITRITE UR QL STRIP: NEGATIVE
NRBC BLD AUTO-RTO: 0 /100 WBC (ref 0–0.2)
NT-PROBNP SERPL-MCNC: 3188 PG/ML (ref 0–1800)
O2 A-A PPRESDIFF RESPIRATORY: 0.5 MMHG
OPIATES UR QL: NEGATIVE
OXYCODONE UR QL SCN: NEGATIVE
PCO2 BLDA: 31.4 MM HG (ref 35–45)
PCP UR QL SCN: NEGATIVE
PH BLDA: 7.41 PH UNITS (ref 7.35–7.45)
PH UR STRIP.AUTO: 6 [PH] (ref 5–8)
PLATELET # BLD AUTO: 375 10*3/MM3 (ref 140–450)
PLATELET # BLD AUTO: 411 10*3/MM3 (ref 140–450)
PMV BLD AUTO: 10.9 FL (ref 6–12)
PMV BLD AUTO: 11.1 FL (ref 6–12)
PO2 BLD: 285 MM[HG] (ref 0–500)
PO2 BLDA: 59.8 MM HG (ref 80–100)
POTASSIUM SERPL-SCNC: 3.6 MMOL/L (ref 3.5–5.2)
POTASSIUM SERPL-SCNC: 3.7 MMOL/L (ref 3.5–5.2)
PROCALCITONIN SERPL-MCNC: 0.11 NG/ML (ref 0–0.25)
PROT SERPL-MCNC: 6.8 G/DL (ref 6–8.5)
PROT UR QL STRIP: ABNORMAL
PROTHROMBIN TIME: 14.2 SECONDS (ref 11.7–14.2)
QT INTERVAL: 376 MS
QTC INTERVAL: 483 MS
RBC # BLD AUTO: 3.7 10*6/MM3 (ref 3.77–5.28)
RBC # BLD AUTO: 3.85 10*6/MM3 (ref 3.77–5.28)
RBC # UR STRIP: NORMAL /HPF
REF LAB TEST METHOD: NORMAL
RH BLD: POSITIVE
RHINOVIRUS RNA SPEC NAA+PROBE: NOT DETECTED
RSV RNA NPH QL NAA+NON-PROBE: NOT DETECTED
SAO2 % BLDCOA: 91.2 % (ref 92–98.5)
SARS-COV-2 RNA NPH QL NAA+NON-PROBE: NOT DETECTED
SODIUM SERPL-SCNC: 138 MMOL/L (ref 136–145)
SODIUM SERPL-SCNC: 141 MMOL/L (ref 136–145)
SP GR UR STRIP: 1.02 (ref 1–1.03)
SQUAMOUS #/AREA URNS HPF: NORMAL /HPF
T&S EXPIRATION DATE: NORMAL
TOTAL RATE: 18 BREATHS/MINUTE
TRICYCLICS UR QL SCN: NEGATIVE
TROPONIN T % DELTA: -3
TROPONIN T NUMERIC DELTA: -1 NG/L
TROPONIN T SERPL HS-MCNC: 30 NG/L
TROPONIN T SERPL HS-MCNC: 33 NG/L
TSH SERPL DL<=0.05 MIU/L-ACNC: 1.29 UIU/ML (ref 0.27–4.2)
UROBILINOGEN UR QL STRIP: ABNORMAL
WBC # UR STRIP: NORMAL /HPF
WBC NRBC COR # BLD AUTO: 13.44 10*3/MM3 (ref 3.4–10.8)
WBC NRBC COR # BLD AUTO: 13.68 10*3/MM3 (ref 3.4–10.8)

## 2025-06-12 PROCEDURE — 36600 WITHDRAWAL OF ARTERIAL BLOOD: CPT | Performed by: STUDENT IN AN ORGANIZED HEALTH CARE EDUCATION/TRAINING PROGRAM

## 2025-06-12 PROCEDURE — 93010 ELECTROCARDIOGRAM REPORT: CPT | Performed by: STUDENT IN AN ORGANIZED HEALTH CARE EDUCATION/TRAINING PROGRAM

## 2025-06-12 PROCEDURE — 85730 THROMBOPLASTIN TIME PARTIAL: CPT | Performed by: PHYSICIAN ASSISTANT

## 2025-06-12 PROCEDURE — G0378 HOSPITAL OBSERVATION PER HR: HCPCS

## 2025-06-12 PROCEDURE — 82550 ASSAY OF CK (CPK): CPT | Performed by: NURSE PRACTITIONER

## 2025-06-12 PROCEDURE — P9612 CATHETERIZE FOR URINE SPEC: HCPCS

## 2025-06-12 PROCEDURE — 71045 X-RAY EXAM CHEST 1 VIEW: CPT

## 2025-06-12 PROCEDURE — 85025 COMPLETE CBC W/AUTO DIFF WBC: CPT | Performed by: PHYSICIAN ASSISTANT

## 2025-06-12 PROCEDURE — 25810000003 SODIUM CHLORIDE 0.9 % SOLUTION: Performed by: NURSE PRACTITIONER

## 2025-06-12 PROCEDURE — 95813 EEG EXTND MNTR 61-119 MIN: CPT

## 2025-06-12 PROCEDURE — 85379 FIBRIN DEGRADATION QUANT: CPT | Performed by: STUDENT IN AN ORGANIZED HEALTH CARE EDUCATION/TRAINING PROGRAM

## 2025-06-12 PROCEDURE — 72125 CT NECK SPINE W/O DYE: CPT

## 2025-06-12 PROCEDURE — 85027 COMPLETE CBC AUTOMATED: CPT | Performed by: NURSE PRACTITIONER

## 2025-06-12 PROCEDURE — 82948 REAGENT STRIP/BLOOD GLUCOSE: CPT

## 2025-06-12 PROCEDURE — 84484 ASSAY OF TROPONIN QUANT: CPT | Performed by: PHYSICIAN ASSISTANT

## 2025-06-12 PROCEDURE — 83880 ASSAY OF NATRIURETIC PEPTIDE: CPT | Performed by: PHYSICIAN ASSISTANT

## 2025-06-12 PROCEDURE — 87040 BLOOD CULTURE FOR BACTERIA: CPT | Performed by: PHYSICIAN ASSISTANT

## 2025-06-12 PROCEDURE — 84145 PROCALCITONIN (PCT): CPT | Performed by: PHYSICIAN ASSISTANT

## 2025-06-12 PROCEDURE — 80053 COMPREHEN METABOLIC PANEL: CPT | Performed by: PHYSICIAN ASSISTANT

## 2025-06-12 PROCEDURE — 99214 OFFICE O/P EST MOD 30 MIN: CPT

## 2025-06-12 PROCEDURE — 0202U NFCT DS 22 TRGT SARS-COV-2: CPT | Performed by: PHYSICIAN ASSISTANT

## 2025-06-12 PROCEDURE — 93005 ELECTROCARDIOGRAM TRACING: CPT | Performed by: PHYSICIAN ASSISTANT

## 2025-06-12 PROCEDURE — 95813 EEG EXTND MNTR 61-119 MIN: CPT | Performed by: STUDENT IN AN ORGANIZED HEALTH CARE EDUCATION/TRAINING PROGRAM

## 2025-06-12 PROCEDURE — 82077 ASSAY SPEC XCP UR&BREATH IA: CPT | Performed by: PHYSICIAN ASSISTANT

## 2025-06-12 PROCEDURE — 70450 CT HEAD/BRAIN W/O DYE: CPT

## 2025-06-12 PROCEDURE — 25510000001 IOPAMIDOL 61 % SOLUTION: Performed by: EMERGENCY MEDICINE

## 2025-06-12 PROCEDURE — 86850 RBC ANTIBODY SCREEN: CPT | Performed by: PHYSICIAN ASSISTANT

## 2025-06-12 PROCEDURE — 71260 CT THORAX DX C+: CPT

## 2025-06-12 PROCEDURE — 81001 URINALYSIS AUTO W/SCOPE: CPT | Performed by: PHYSICIAN ASSISTANT

## 2025-06-12 PROCEDURE — 99291 CRITICAL CARE FIRST HOUR: CPT

## 2025-06-12 PROCEDURE — 84443 ASSAY THYROID STIM HORMONE: CPT | Performed by: STUDENT IN AN ORGANIZED HEALTH CARE EDUCATION/TRAINING PROGRAM

## 2025-06-12 PROCEDURE — 83735 ASSAY OF MAGNESIUM: CPT | Performed by: PHYSICIAN ASSISTANT

## 2025-06-12 PROCEDURE — 85610 PROTHROMBIN TIME: CPT | Performed by: PHYSICIAN ASSISTANT

## 2025-06-12 PROCEDURE — 36415 COLL VENOUS BLD VENIPUNCTURE: CPT

## 2025-06-12 PROCEDURE — 86901 BLOOD TYPING SEROLOGIC RH(D): CPT | Performed by: PHYSICIAN ASSISTANT

## 2025-06-12 PROCEDURE — 82803 BLOOD GASES ANY COMBINATION: CPT | Performed by: STUDENT IN AN ORGANIZED HEALTH CARE EDUCATION/TRAINING PROGRAM

## 2025-06-12 PROCEDURE — 84484 ASSAY OF TROPONIN QUANT: CPT | Performed by: NURSE PRACTITIONER

## 2025-06-12 PROCEDURE — 82550 ASSAY OF CK (CPK): CPT | Performed by: PHYSICIAN ASSISTANT

## 2025-06-12 PROCEDURE — 74177 CT ABD & PELVIS W/CONTRAST: CPT

## 2025-06-12 PROCEDURE — 73630 X-RAY EXAM OF FOOT: CPT

## 2025-06-12 PROCEDURE — 86900 BLOOD TYPING SEROLOGIC ABO: CPT | Performed by: PHYSICIAN ASSISTANT

## 2025-06-12 PROCEDURE — 80307 DRUG TEST PRSMV CHEM ANLYZR: CPT | Performed by: PHYSICIAN ASSISTANT

## 2025-06-12 PROCEDURE — 83605 ASSAY OF LACTIC ACID: CPT | Performed by: PHYSICIAN ASSISTANT

## 2025-06-12 RX ORDER — SODIUM CHLORIDE 0.9 % (FLUSH) 0.9 %
10 SYRINGE (ML) INJECTION AS NEEDED
Status: DISCONTINUED | OUTPATIENT
Start: 2025-06-12 | End: 2025-06-20 | Stop reason: HOSPADM

## 2025-06-12 RX ORDER — LOSARTAN POTASSIUM 50 MG/1
100 TABLET ORAL
Status: DISCONTINUED | OUTPATIENT
Start: 2025-06-12 | End: 2025-06-20 | Stop reason: HOSPADM

## 2025-06-12 RX ORDER — ACETAMINOPHEN 325 MG/1
650 TABLET ORAL EVERY 4 HOURS PRN
Status: DISCONTINUED | OUTPATIENT
Start: 2025-06-12 | End: 2025-06-20 | Stop reason: HOSPADM

## 2025-06-12 RX ORDER — AMOXICILLIN 250 MG
2 CAPSULE ORAL 2 TIMES DAILY PRN
Status: DISCONTINUED | OUTPATIENT
Start: 2025-06-12 | End: 2025-06-20 | Stop reason: HOSPADM

## 2025-06-12 RX ORDER — ACETAMINOPHEN 160 MG/5ML
650 SOLUTION ORAL EVERY 4 HOURS PRN
Status: DISCONTINUED | OUTPATIENT
Start: 2025-06-12 | End: 2025-06-20 | Stop reason: HOSPADM

## 2025-06-12 RX ORDER — SODIUM BICARBONATE 650 MG/1
650 TABLET ORAL DAILY
Status: DISCONTINUED | OUTPATIENT
Start: 2025-06-12 | End: 2025-06-20 | Stop reason: HOSPADM

## 2025-06-12 RX ORDER — SODIUM CHLORIDE 0.9 % (FLUSH) 0.9 %
10 SYRINGE (ML) INJECTION EVERY 12 HOURS SCHEDULED
Status: DISCONTINUED | OUTPATIENT
Start: 2025-06-12 | End: 2025-06-20 | Stop reason: HOSPADM

## 2025-06-12 RX ORDER — NITROGLYCERIN 0.4 MG/1
0.4 TABLET SUBLINGUAL
Status: DISCONTINUED | OUTPATIENT
Start: 2025-06-12 | End: 2025-06-20 | Stop reason: HOSPADM

## 2025-06-12 RX ORDER — SODIUM CHLORIDE 9 MG/ML
100 INJECTION, SOLUTION INTRAVENOUS CONTINUOUS
Status: ACTIVE | OUTPATIENT
Start: 2025-06-12 | End: 2025-06-15

## 2025-06-12 RX ORDER — ALBUTEROL SULFATE 0.83 MG/ML
2.5 SOLUTION RESPIRATORY (INHALATION) EVERY 6 HOURS PRN
Status: DISCONTINUED | OUTPATIENT
Start: 2025-06-12 | End: 2025-06-20 | Stop reason: HOSPADM

## 2025-06-12 RX ORDER — POLYETHYLENE GLYCOL 3350 17 G/17G
17 POWDER, FOR SOLUTION ORAL DAILY PRN
Status: DISCONTINUED | OUTPATIENT
Start: 2025-06-12 | End: 2025-06-20 | Stop reason: HOSPADM

## 2025-06-12 RX ORDER — DILTIAZEM HYDROCHLORIDE 180 MG/1
180 CAPSULE, COATED, EXTENDED RELEASE ORAL 2 TIMES DAILY
Status: DISCONTINUED | OUTPATIENT
Start: 2025-06-12 | End: 2025-06-20 | Stop reason: HOSPADM

## 2025-06-12 RX ORDER — LEVOTHYROXINE SODIUM 50 UG/1
50 TABLET ORAL
Status: DISCONTINUED | OUTPATIENT
Start: 2025-06-13 | End: 2025-06-20 | Stop reason: HOSPADM

## 2025-06-12 RX ORDER — BISACODYL 10 MG
10 SUPPOSITORY, RECTAL RECTAL DAILY PRN
Status: DISCONTINUED | OUTPATIENT
Start: 2025-06-12 | End: 2025-06-20 | Stop reason: HOSPADM

## 2025-06-12 RX ORDER — SODIUM CHLORIDE 9 MG/ML
40 INJECTION, SOLUTION INTRAVENOUS AS NEEDED
Status: DISCONTINUED | OUTPATIENT
Start: 2025-06-12 | End: 2025-06-20 | Stop reason: HOSPADM

## 2025-06-12 RX ORDER — ANASTROZOLE 1 MG/1
1 TABLET ORAL DAILY
Status: DISCONTINUED | OUTPATIENT
Start: 2025-06-12 | End: 2025-06-20 | Stop reason: HOSPADM

## 2025-06-12 RX ORDER — CALCIUM CARBONATE 500 MG/1
2 TABLET, CHEWABLE ORAL 2 TIMES DAILY PRN
Status: DISCONTINUED | OUTPATIENT
Start: 2025-06-12 | End: 2025-06-20 | Stop reason: HOSPADM

## 2025-06-12 RX ORDER — ACETAMINOPHEN 650 MG/1
650 SUPPOSITORY RECTAL EVERY 4 HOURS PRN
Status: DISCONTINUED | OUTPATIENT
Start: 2025-06-12 | End: 2025-06-20 | Stop reason: HOSPADM

## 2025-06-12 RX ORDER — BISACODYL 5 MG/1
5 TABLET, DELAYED RELEASE ORAL DAILY PRN
Status: DISCONTINUED | OUTPATIENT
Start: 2025-06-12 | End: 2025-06-20 | Stop reason: HOSPADM

## 2025-06-12 RX ORDER — IOPAMIDOL 612 MG/ML
100 INJECTION, SOLUTION INTRAVASCULAR
Status: COMPLETED | OUTPATIENT
Start: 2025-06-12 | End: 2025-06-12

## 2025-06-12 RX ORDER — FOLIC ACID 0.4 MG
400 TABLET ORAL DAILY
Status: DISCONTINUED | OUTPATIENT
Start: 2025-06-12 | End: 2025-06-20 | Stop reason: HOSPADM

## 2025-06-12 RX ORDER — ALBUTEROL SULFATE 90 UG/1
2 INHALANT RESPIRATORY (INHALATION) EVERY 4 HOURS PRN
COMMUNITY

## 2025-06-12 RX ADMIN — Medication 10 ML: at 08:13

## 2025-06-12 RX ADMIN — SODIUM BICARBONATE 650 MG: 650 TABLET ORAL at 20:38

## 2025-06-12 RX ADMIN — SODIUM CHLORIDE 100 ML/HR: 9 INJECTION, SOLUTION INTRAVENOUS at 11:04

## 2025-06-12 RX ADMIN — SODIUM CHLORIDE 500 ML: 9 INJECTION, SOLUTION INTRAVENOUS at 03:39

## 2025-06-12 RX ADMIN — DILTIAZEM HYDROCHLORIDE 180 MG: 180 CAPSULE, EXTENDED RELEASE ORAL at 20:39

## 2025-06-12 RX ADMIN — ANASTROZOLE 1 MG: 1 TABLET, FILM COATED ORAL at 20:39

## 2025-06-12 RX ADMIN — LOSARTAN POTASSIUM 100 MG: 50 TABLET, FILM COATED ORAL at 20:39

## 2025-06-12 RX ADMIN — APIXABAN 5 MG: 5 TABLET, FILM COATED ORAL at 20:38

## 2025-06-12 RX ADMIN — IOPAMIDOL 85 ML: 612 INJECTION, SOLUTION INTRAVENOUS at 02:16

## 2025-06-12 RX ADMIN — Medication 400 MCG: at 20:38

## 2025-06-12 RX ADMIN — Medication 10 ML: at 20:39

## 2025-06-12 NOTE — H&P
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  (Abnormal) Collected: 06/12/25 0612    Specimen: Blood from Arm, Left Updated: 06/12/25 0647     Glucose 104 mg/dL      BUN 12.0 mg/dL      Creatinine 1.02 mg/dL      Sodium 141 mmol/L      Potassium 3.6 mmol/L      Chloride 104 mmol/L      CO2 19.9 mmol/L      Calcium 7.8 mg/dL      BUN/Creatinine Ratio 11.8     Anion Gap 17.1 mmol/L      eGFR 56.4 mL/min/1.73     Narrative:      GFR Categories in Chronic Kidney Disease (CKD)              GFR Category          GFR (mL/min/1.73)    Interpretation  G1                    90 or greater        Normal or high (1)  G2                    60-89                Mild decrease (1)  G3a                   45-59                Mild to moderate decrease  G3b                   30-44                Moderate to severe decrease  G4                    15-29                Severe decrease  G5                    14 or less           Kidney failure    (1)In the absence of evidence of kidney disease, neither GFR category G1 or G2 fulfill the criteria for CKD.    eGFR calculation 2021 CKD-EPI creatinine equation, which does not include race as a factor    CBC (No Diff) [[id number]]  (Abnormal) Collected: 06/12/25 0612    Specimen: Blood from Arm, Left Updated: 06/12/25 0620     WBC 13.44 10*3/mm3      RBC  3.70 10*6/mm3      Hemoglobin 11.6 g/dL      Hematocrit 36.9 %      MCV 99.7 fL      MCH 31.4 pg      MCHC 31.4 g/dL      RDW 13.3 %      RDW-SD 48.5 fl      MPV 11.1 fL      Platelets 375 10*3/mm3     CK [[id number]]  (Abnormal) Collected: 06/12/25 0612    Specimen: Blood from Arm, Left Updated: 06/12/25 0643     Creatine Kinase 484 U/L     High Sensitivity Troponin T [[id number]]  (Abnormal) Collected: 06/12/25 0612    Specimen: Blood from Arm, Left Updated: 06/12/25 0643     HS Troponin T 33 ng/L     Narrative:      High Sensitive Troponin T Reference Range:  <14.0 ng/L- Negative Female for AMI  <22.0 ng/L- Negative Male for AMI  >=14 - Abnormal Female indicating possible myocardial injury.  >=22 - Abnormal Male indicating possible myocardial injury.   Clinicians would have to utilize clinical acumen, EKG, Troponin, and serial changes to determine if it is an Acute Myocardial Infarction or myocardial injury due to an underlying chronic condition.                 No results found. However, due to the size of the patient record, not all encounters were searched. Please check Results Review for a complete set of results.    Imaging Results (Last 24 Hours)       Procedure Component Value Units Date/Time    CT Chest With Contrast Diagnostic [[id number]] Collected: 06/12/25 0254     Updated: 06/12/25 0308    Narrative:      CT CHEST WITH CONTRAST; CT OF THE ABDOMEN AND PELVIS WITH CONTRAST     HISTORY: Fall     COMPARISON: None available.     TECHNIQUE: Axial CT imaging was obtained from the thoracic inlet through  the symphysis pubis. IV contrast was administered.     FINDINGS:  CHEST: There are old right-sided rib fractures. No acute fractures are  seen. The patient has scattered subpleural nodules within the lungs  bilaterally. The single largest is noted within the right lower lobe,  and measures 6 mm. The thyroid gland and trachea are within normal  limits. There is a small hiatal hernia. No acute infiltrates are  seen.  The heart is enlarged. There are coronary artery calcifications.  Thoracic aorta is normal in caliber. No dissection is seen. There is  enlargement of the main pulmonary artery, which can be seen in setting  of pulmonary arterial hypertension. Mediastinal lymph nodes do not  appear pathologically enlarged.     ABDOMEN AND PELVIS: No fractures are identified. There is lumbar  scoliosis, with convexity to the left. No suspicious hepatic lesions are  seen. There are postsurgical changes noted at the GE junction. Duodenum  appears normal. Gallbladder is absent. The spleen is within normal  limits. Pancreas is atrophic. There is bilateral adrenal hyperplasia.  The kidneys enhance symmetrically. No hydronephrosis is seen. Punctate  nonobstructing stones are noted within the kidneys bilaterally. Uterus  appears unremarkable. There is no bowel obstruction. Urinary bladder is  contracted. There is colonic diverticulosis. Patient appears to be  status post prior sigmoid colon resection. Appendix is normal. Abdominal  aorta is normal in caliber. There is no dissection.       Impression:      1. No acute traumatic injury identified.     2. Multiple indeterminate pulmonary nodules. The largest one is solid  and measures 6 mm. For multiple nodules, with the most suspicious nodule  being solid and measuring 6-8 mm, recommend CT at 3-6 months. Then, for  a low-risk patient, consider CT at 18-24 months. For a high-risk  patient, if the nodule is stable at 3-6 months, recommend CT at 18-24  months. Follow-up intervals may vary according to size and risk.     Radiation dose reduction techniques were utilized, including automated  exposure control and exposure modulation based on body size.        This report was finalized on 6/12/2025 3:05 AM by Dr. Samantha Pro M.D on Workstation: [id number]       CT Abdomen Pelvis With Contrast [[id number]] Collected: 06/12/25 0254     Updated: 06/12/25 0308    Narrative:      CT CHEST  WITH CONTRAST; CT OF THE ABDOMEN AND PELVIS WITH CONTRAST     HISTORY: Fall     COMPARISON: None available.     TECHNIQUE: Axial CT imaging was obtained from the thoracic inlet through  the symphysis pubis. IV contrast was administered.     FINDINGS:  CHEST: There are old right-sided rib fractures. No acute fractures are  seen. The patient has scattered subpleural nodules within the lungs  bilaterally. The single largest is noted within the right lower lobe,  and measures 6 mm. The thyroid gland and trachea are within normal  limits. There is a small hiatal hernia. No acute infiltrates are seen.  The heart is enlarged. There are coronary artery calcifications.  Thoracic aorta is normal in caliber. No dissection is seen. There is  enlargement of the main pulmonary artery, which can be seen in setting  of pulmonary arterial hypertension. Mediastinal lymph nodes do not  appear pathologically enlarged.     ABDOMEN AND PELVIS: No fractures are identified. There is lumbar  scoliosis, with convexity to the left. No suspicious hepatic lesions are  seen. There are postsurgical changes noted at the GE junction. Duodenum  appears normal. Gallbladder is absent. The spleen is within normal  limits. Pancreas is atrophic. There is bilateral adrenal hyperplasia.  The kidneys enhance symmetrically. No hydronephrosis is seen. Punctate  nonobstructing stones are noted within the kidneys bilaterally. Uterus  appears unremarkable. There is no bowel obstruction. Urinary bladder is  contracted. There is colonic diverticulosis. Patient appears to be  status post prior sigmoid colon resection. Appendix is normal. Abdominal  aorta is normal in caliber. There is no dissection.       Impression:      1. No acute traumatic injury identified.     2. Multiple indeterminate pulmonary nodules. The largest one is solid  and measures 6 mm. For multiple nodules, with the most suspicious nodule  being solid and measuring 6-8 mm, recommend CT at 3-6  months. Then, for  a low-risk patient, consider CT at 18-24 months. For a high-risk  patient, if the nodule is stable at 3-6 months, recommend CT at 18-24  months. Follow-up intervals may vary according to size and risk.     Radiation dose reduction techniques were utilized, including automated  exposure control and exposure modulation based on body size.        This report was finalized on 6/12/2025 3:05 AM by Dr. Samantha Pro M.D on Workstation: [id number]       CT Cervical Spine Without Contrast [[id number]] Collected: 06/12/25 0251     Updated: 06/12/25 0257    Narrative:      CT OF THE CERVICAL SPINE     HISTORY: Unwitnessed fall     COMPARISON: None available.     TECHNIQUE: Axial CT imaging was obtained through the cervical spine.  Coronal and sagittal reformatted images were obtained.     FINDINGS:  No acute fracture or subluxation of the cervical spine is seen. There  are asymmetric degenerative changes of the right temporomandibular  joint. There is significant intervertebral disc space narrowing noted at  multiple levels. There is mild anterolisthesis of C2 on C3. There is  further anterolisthesis of C5 on C6. There is no prevertebral soft  tissue swelling. Canal stenosis is most significant at C4-C5. Neural  foraminal narrowing is most significant at C4-C5 and C3-C4 on the right.       Impression:      No acute fracture or subluxation identified.     Radiation dose reduction techniques were utilized, including automated  exposure control and exposure modulation based on body size.        This report was finalized on 6/12/2025 2:53 AM by Dr. Samantha Pro M.D on Workstation: [id number]       CT Head Without Contrast [[id number]] Collected: 06/12/25 0248     Updated: 06/12/25 0254    Narrative:      CT OF THE HEAD WITHOUT CONTRAST     HISTORY: Fall     COMPARISON: None available.     TECHNIQUE: Axial CT imaging was performed through the brain. No IV  contrast was administered.      FINDINGS:  No acute intracranial hemorrhage is seen. There is atrophy. There is  periventricular and deep white matter microangiopathic change. There is  no midline shift or mass effect. No calvarial fracture is seen. Minimal  mucosal thickening is noted within the ethmoid and right maxillary  sinuses. There is a mucous retention cyst within the right maxillary  sinus. Old lacunar infarct is noted within the left thalamus.       Impression:      No acute intracranial findings.     Radiation dose reduction techniques were utilized, including automated  exposure control and exposure modulation based on body size.        This report was finalized on 6/12/2025 2:50 AM by Dr. Samantha Pro M.D on Workstation: [id number]       XR Chest 1 View [[id number]] Collected: 06/12/25 0116     Updated: 06/12/25 0121    Narrative:      SINGLE VIEW OF THE CHEST     HISTORY: Altered mental status     COMPARISON: May 30, 2024     FINDINGS:  There is cardiomegaly. There is vascular congestion. No pneumothorax is  seen. Patient is status post right mastectomy. No pleural effusion is  identified. No definite acute infiltrates are seen.       Impression:      Cardiomegaly with suspected vascular congestion.     This report was finalized on 6/12/2025 1:18 AM by Dr. Samantha Pro M.D on Workstation: [id number]       XR Foot 3+ View Right [[id number]] Collected: 06/12/25 0115     Updated: 06/12/25 0119    Narrative:      3 VIEWS RIGHT FOOT     HISTORY: Right foot contusion     COMPARISON: None available.     FINDINGS:  Exam is degraded by the patient's osteopenia. No acute fracture or  subluxation of the right foot is seen. There is calcaneal spurring.       Impression:      No definite acute fracture or subluxation of the right foot is seen.     This report was finalized on 6/12/2025 1:16 AM by Dr. Samantha Pro M.D on Workstation: [id number]               Results for orders placed during the hospital encounter of  07/26/22    Adult Transthoracic Echo Complete W/ Cont if Necessary Per Protocol    Interpretation Summary  · Left ventricular wall thickness is consistent with mild concentric hypertrophy.  · Estimated left ventricular EF = 59% Left ventricular systolic function is normal.  · Left ventricular diastolic function was indeterminate.  · There is calcification of the aortic valve.  · Estimated right ventricular systolic pressure from tricuspid regurgitation is normal (<35 mmHg).      Telemetry Scan   Final Result      ECG 12 Lead Altered Mental Status   Preliminary Result   HEART RATE=96  bpm   RR Gfnivjzw=874  ms   AR Interval=  ms   P Horizontal Axis=  deg   P Front Axis=  deg   QRSD Aokcwpoj=783  ms   QT Fpejxscw=757  ms   HLsS=392  ms   QRS Axis=6  deg   T Wave Axis=9  deg   - ABNORMAL ECG -   Atrial fibrillation   Low voltage, precordial leads   Borderline T abnormalities, inferior leads   When compared with ECG of 16-May-2024 10:14:45,   Nonspecific significant change   Date and Time of Study:2025-06-12 00:58:36      Telemetry Scan   Final Result                 Assessment/Plan     Active Hospital Problems    Diagnosis  POA    **Altered mental status [R41.82]  Yes    CKD (chronic kidney disease) stage 3, GFR 30-59 ml/min [N18.30]  Yes    GURDEEP (obstructive sleep apnea) [G47.33]  Yes    Permanent atrial fibrillation [I48.21]  Yes    Hypothyroidism [E03.9]  Yes    Malignant neoplasm of overlapping sites of left breast in female, estrogen receptor positive [C50.812, Z17.0]  Not Applicable      Resolved Hospital Problems   No resolved problems to display.     Altered mental status  Seizure vs syncope   Unwitnessed fall  Check orthostatics when able  Obtain TTE, MRI brain with and without, 1 hour EEG  Will ask neurology to evaluate as well  Blood cultures have been obtained- UA and CT chest unrevealing for infection    Troponin elevation  Flat. TTE as above. EKG with atrial fibrillation, no significant ST-T wave  changes    CKD 3b  Scr slightly better than previous baseline    Lung nodules  Surveillance recommended    Acute metabolic acidosis  Resume home sodium bicarbonate  Possible due to starvation ketosis with ketonuria evident in UA    Atrial fibrillation  HX DVT 2022  Eliquis, diltiazem  -unclear compliance with AC- check D-dimer    Hypothyroidism  Check TSH. Resume home levothyroxine    I discussed the patient's findings and my recommendations with patient and family.    VTE Prophylaxis - Eliquis  Code Status - Full code.       Jin Claudio MD  [facility]  06/12/25  14:08 EDT
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
## 2025-06-12 NOTE — ED PROVIDER NOTES
MD ATTESTATION NOTE    SHARED VISIT: This visit was performed by BOTH a physician and an APC. The substantive portion of the medical decision making was performed by this attesting physician who made or approved the management plan and takes responsibility for patient management. All studies documented in the APC note (if performed) were independently interpreted by me.    The RAJAT and I have discussed this patient's history, physical exam, MDM, and treatment plan.  I have reviewed the documentation and personally had a face to face interaction with the patient. The attached note describes my personal findings.      Marylu Foreman is a 80 y.o. female who presents to the ED c/o acute found down.  Altered mental status.  Patient lives on her own last seen by son yesterday and he is able to call her this evening and she did not respond he did a wellness check and found her on the ground with urinary incontinence..  Patient last spoken to more than 24 hours before discovered.  Patient is lethargic and oriented x 2.  Patient is currently anticoagulated.    On exam:  GENERAL: not distressed  HENT: nares patent  EYES: no scleral icterus  CV: regular rhythm, regular rate  RESPIRATORY: normal effort  ABDOMEN: soft  MUSCULOSKELETAL: no deformity  NEURO: Lethargic, moves all extremities, follows commands  SKIN: warm, dry    Labs  Recent Results (from the past 24 hours)   POC Glucose Once    Collection Time: 06/12/25 12:44 AM    Specimen: Blood   Result Value Ref Range    Glucose 110 70 - 130 mg/dL   ECG 12 Lead Altered Mental Status    Collection Time: 06/12/25 12:58 AM   Result Value Ref Range    QT Interval 376 ms    QTC Interval 483 ms   Respiratory Panel PCR w/COVID-19(SARS-CoV-2) JIMENEZ/THOMAS/JUANY/PAD/COR/MAGGIE In-House, NP Swab in [facility], 2 HR TAT - Swab, Nasopharynx    Collection Time: 06/12/25  1:02 AM    Specimen: Nasopharynx; Swab   Result Value Ref Range    ADENOVIRUS, PCR Not Detected Not Detected    Coronavirus 229E Not  Detected Not Detected    Coronavirus HKU1 Not Detected Not Detected    Coronavirus NL63 Not Detected Not Detected    Coronavirus OC43 Not Detected Not Detected    COVID19 Not Detected Not Detected - Ref. Range    Human Metapneumovirus Not Detected Not Detected    Human Rhinovirus/Enterovirus Not Detected Not Detected    Influenza A PCR Not Detected Not Detected    Influenza B PCR Not Detected Not Detected    Parainfluenza Virus 1 Not Detected Not Detected    Parainfluenza Virus 2 Not Detected Not Detected    Parainfluenza Virus 3 Not Detected Not Detected    Parainfluenza Virus 4 Not Detected Not Detected    RSV, PCR Not Detected Not Detected    Bordetella pertussis pcr Not Detected Not Detected    Bordetella parapertussis PCR Not Detected Not Detected    Chlamydophila pneumoniae PCR Not Detected Not Detected    Mycoplasma pneumo by PCR Not Detected Not Detected   Comprehensive Metabolic Panel    Collection Time: 06/12/25  1:10 AM    Specimen: Arm, Left; Blood   Result Value Ref Range    Glucose 128 (H) 65 - 99 mg/dL    BUN 12.0 8.0 - 23.0 mg/dL    Creatinine 1.21 (H) 0.57 - 1.00 mg/dL    Sodium 138 136 - 145 mmol/L    Potassium 3.7 3.5 - 5.2 mmol/L    Chloride 99 98 - 107 mmol/L    CO2 20.0 (L) 22.0 - 29.0 mmol/L    Calcium 8.1 (L) 8.6 - 10.5 mg/dL    Total Protein 6.8 6.0 - 8.5 g/dL    Albumin 3.8 3.5 - 5.2 g/dL    ALT (SGPT) 18 1 - 33 U/L    AST (SGOT) 40 (H) 1 - 32 U/L    Alkaline Phosphatase 94 39 - 117 U/L    Total Bilirubin 1.0 0.0 - 1.2 mg/dL    Globulin 3.0 gm/dL    A/G Ratio 1.3 g/dL    BUN/Creatinine Ratio 9.9 7.0 - 25.0    Anion Gap 19.0 (H) 5.0 - 15.0 mmol/L    eGFR 46.0 (L) >60.0 mL/min/1.73   Protime-INR    Collection Time: 06/12/25  1:10 AM    Specimen: Arm, Left; Blood   Result Value Ref Range    Protime 14.2 11.7 - 14.2 Seconds    INR 1.10 0.90 - 1.10   aPTT    Collection Time: 06/12/25  1:10 AM    Specimen: Arm, Left; Blood   Result Value Ref Range    PTT 26.8 22.7 - 35.4 seconds   Urinalysis With  Culture If Indicated - Straight Cath    Collection Time: 06/12/25  1:10 AM    Specimen: Straight Cath; Urine   Result Value Ref Range    Color, UA Yellow Yellow, Straw    Appearance, UA Clear Clear    pH, UA 6.0 5.0 - 8.0    Specific Gravity, UA 1.016 1.005 - 1.030    Glucose, UA Negative Negative    Ketones, UA 15 mg/dL (1+) (A) Negative    Bilirubin, UA Negative Negative    Blood, UA Negative Negative    Protein,  mg/dL (2+) (A) Negative    Leuk Esterase, UA Negative Negative    Nitrite, UA Negative Negative    Urobilinogen, UA 1.0 E.U./dL 0.2 - 1.0 E.U./dL   BNP    Collection Time: 06/12/25  1:10 AM    Specimen: Arm, Left; Blood   Result Value Ref Range    proBNP 3,188.0 (H) 0.0 - 1,800.0 pg/mL   High Sensitivity Troponin T    Collection Time: 06/12/25  1:10 AM    Specimen: Arm, Left; Blood   Result Value Ref Range    HS Troponin T 30 (H) <14 ng/L   Procalcitonin    Collection Time: 06/12/25  1:10 AM    Specimen: Arm, Left; Blood   Result Value Ref Range    Procalcitonin 0.11 0.00 - 0.25 ng/mL   Lactic Acid, Plasma    Collection Time: 06/12/25  1:10 AM    Specimen: Arm, Left; Blood   Result Value Ref Range    Lactate 1.7 0.5 - 2.0 mmol/L   Type & Screen    Collection Time: 06/12/25  1:10 AM    Specimen: Arm, Left; Blood   Result Value Ref Range    ABO Type A     RH type Positive     Antibody Screen Negative     T&S Expiration Date 6/15/2025 11:59:59 PM    Ethanol    Collection Time: 06/12/25  1:10 AM    Specimen: Arm, Left; Blood   Result Value Ref Range    Ethanol <10 0 - 10 mg/dL    Ethanol % <0.010 %   CK    Collection Time: 06/12/25  1:10 AM    Specimen: Arm, Left; Blood   Result Value Ref Range    Creatine Kinase 613 (H) 20 - 180 U/L   Magnesium    Collection Time: 06/12/25  1:10 AM    Specimen: Arm, Left; Blood   Result Value Ref Range    Magnesium 1.9 1.6 - 2.4 mg/dL   CBC Auto Differential    Collection Time: 06/12/25  1:10 AM    Specimen: Arm, Left; Blood   Result Value Ref Range    WBC 13.68 (H)  3.40 - 10.80 10*3/mm3    RBC 3.85 3.77 - 5.28 10*6/mm3    Hemoglobin 12.2 12.0 - 15.9 g/dL    Hematocrit 37.3 34.0 - 46.6 %    MCV 96.9 79.0 - 97.0 fL    MCH 31.7 26.6 - 33.0 pg    MCHC 32.7 31.5 - 35.7 g/dL    RDW 12.9 12.3 - 15.4 %    RDW-SD 45.4 37.0 - 54.0 fl    MPV 10.9 6.0 - 12.0 fL    Platelets 411 140 - 450 10*3/mm3    Neutrophil % 74.6 42.7 - 76.0 %    Lymphocyte % 13.5 (L) 19.6 - 45.3 %    Monocyte % 10.3 5.0 - 12.0 %    Eosinophil % 0.5 0.3 - 6.2 %    Basophil % 0.7 0.0 - 1.5 %    Immature Grans % 0.4 0.0 - 0.5 %    Neutrophils, Absolute 10.21 (H) 1.70 - 7.00 10*3/mm3    Lymphocytes, Absolute 1.85 0.70 - 3.10 10*3/mm3    Monocytes, Absolute 1.41 (H) 0.10 - 0.90 10*3/mm3    Eosinophils, Absolute 0.07 0.00 - 0.40 10*3/mm3    Basophils, Absolute 0.09 0.00 - 0.20 10*3/mm3    Immature Grans, Absolute 0.05 0.00 - 0.05 10*3/mm3    nRBC 0.0 0.0 - 0.2 /100 WBC   Urinalysis, Microscopic Only - Straight Cath    Collection Time: 06/12/25  1:10 AM    Specimen: Straight Cath; Urine   Result Value Ref Range    RBC, UA 0-2 None Seen, 0-2 /HPF    WBC, UA 0-2 None Seen, 0-2 /HPF    Bacteria, UA None Seen None Seen /HPF    Squamous Epithelial Cells, UA 0-2 None Seen, 0-2 /HPF    Hyaline Casts, UA 0-2 None Seen /LPF    Methodology Automated Microscopy    Urine Drug Screen - Straight Cath    Collection Time: 06/12/25  1:11 AM    Specimen: Straight Cath; Urine   Result Value Ref Range    THC, Screen, Urine Negative Negative    Phencyclidine (PCP), Urine Negative Negative    Cocaine Screen, Urine Negative Negative    Methamphetamine, Ur Negative Negative    Opiate Screen Negative Negative    Amphetamine Screen, Urine Negative Negative    Benzodiazepine Screen, Urine Negative Negative    Tricyclic Antidepressants Screen Negative Negative    Methadone Screen, Urine Negative Negative    Barbiturates Screen, Urine Negative Negative    Oxycodone Screen, Urine Negative Negative    Buprenorphine, Screen, Urine Negative Negative    Fentanyl, Urine - Straight Cath    Collection Time: 06/12/25  1:11 AM    Specimen: Straight Cath; Urine   Result Value Ref Range    Fentanyl, Urine Negative Negative   High Sensitivity Troponin T 1Hr    Collection Time: 06/12/25  2:29 AM    Specimen: Blood   Result Value Ref Range    HS Troponin T 29 (H) <14 ng/L    Troponin T Numeric Delta -1 ng/L    Troponin T % Delta -3 Abnormal if >/= 20%   CBC (No Diff)    Collection Time: 06/12/25  6:12 AM    Specimen: Arm, Left; Blood   Result Value Ref Range    WBC 13.44 (H) 3.40 - 10.80 10*3/mm3    RBC 3.70 (L) 3.77 - 5.28 10*6/mm3    Hemoglobin 11.6 (L) 12.0 - 15.9 g/dL    Hematocrit 36.9 34.0 - 46.6 %    MCV 99.7 (H) 79.0 - 97.0 fL    MCH 31.4 26.6 - 33.0 pg    MCHC 31.4 (L) 31.5 - 35.7 g/dL    RDW 13.3 12.3 - 15.4 %    RDW-SD 48.5 37.0 - 54.0 fl    MPV 11.1 6.0 - 12.0 fL    Platelets 375 140 - 450 10*3/mm3       Radiology  CT Chest With Contrast Diagnostic  CT Chest With Contrast Diagnostic, CT Abdomen Pelvis With Contrast  Result Date: 6/12/2025  CT CHEST WITH CONTRAST; CT OF THE ABDOMEN AND PELVIS WITH CONTRAST  HISTORY: Fall  COMPARISON: None available.  TECHNIQUE: Axial CT imaging was obtained from the thoracic inlet through the symphysis pubis. IV contrast was administered.  FINDINGS: CHEST: There are old right-sided rib fractures. No acute fractures are seen. The patient has scattered subpleural nodules within the lungs bilaterally. The single largest is noted within the right lower lobe, and measures 6 mm. The thyroid gland and trachea are within normal limits. There is a small hiatal hernia. No acute infiltrates are seen. The heart is enlarged. There are coronary artery calcifications. Thoracic aorta is normal in caliber. No dissection is seen. There is enlargement of the main pulmonary artery, which can be seen in setting of pulmonary arterial hypertension. Mediastinal lymph nodes do not appear pathologically enlarged.  ABDOMEN AND PELVIS: No fractures are  identified. There is lumbar scoliosis, with convexity to the left. No suspicious hepatic lesions are seen. There are postsurgical changes noted at the GE junction. Duodenum appears normal. Gallbladder is absent. The spleen is within normal limits. Pancreas is atrophic. There is bilateral adrenal hyperplasia. The kidneys enhance symmetrically. No hydronephrosis is seen. Punctate nonobstructing stones are noted within the kidneys bilaterally. Uterus appears unremarkable. There is no bowel obstruction. Urinary bladder is contracted. There is colonic diverticulosis. Patient appears to be status post prior sigmoid colon resection. Appendix is normal. Abdominal aorta is normal in caliber. There is no dissection.      1. No acute traumatic injury identified.  2. Multiple indeterminate pulmonary nodules. The largest one is solid and measures 6 mm. For multiple nodules, with the most suspicious nodule being solid and measuring 6-8 mm, recommend CT at 3-6 months. Then, for a low-risk patient, consider CT at 18-24 months. For a high-risk patient, if the nodule is stable at 3-6 months, recommend CT at 18-24 months. Follow-up intervals may vary according to size and risk.  Radiation dose reduction techniques were utilized, including automated exposure control and exposure modulation based on body size.   This report was finalized on 6/12/2025 3:05 AM by Dr. Samantha Pro M.D on Workstation: [id number]      CT Cervical Spine Without Contrast  Result Date: 6/12/2025  CT OF THE CERVICAL SPINE  HISTORY: Unwitnessed fall  COMPARISON: None available.  TECHNIQUE: Axial CT imaging was obtained through the cervical spine. Coronal and sagittal reformatted images were obtained.  FINDINGS: No acute fracture or subluxation of the cervical spine is seen. There are asymmetric degenerative changes of the right temporomandibular joint. There is significant intervertebral disc space narrowing noted at multiple levels. There is mild  anterolisthesis of C2 on C3. There is further anterolisthesis of C5 on C6. There is no prevertebral soft tissue swelling. Canal stenosis is most significant at C4-C5. Neural foraminal narrowing is most significant at C4-C5 and C3-C4 on the right.      No acute fracture or subluxation identified.  Radiation dose reduction techniques were utilized, including automated exposure control and exposure modulation based on body size.   This report was finalized on 6/12/2025 2:53 AM by Dr. Samantha Pro M.D on Workstation: [id number]      CT Head Without Contrast  Result Date: 6/12/2025  CT OF THE HEAD WITHOUT CONTRAST  HISTORY: Fall  COMPARISON: None available.  TECHNIQUE: Axial CT imaging was performed through the brain. No IV contrast was administered.  FINDINGS: No acute intracranial hemorrhage is seen. There is atrophy. There is periventricular and deep white matter microangiopathic change. There is no midline shift or mass effect. No calvarial fracture is seen. Minimal mucosal thickening is noted within the ethmoid and right maxillary sinuses. There is a mucous retention cyst within the right maxillary sinus. Old lacunar infarct is noted within the left thalamus.      No acute intracranial findings.  Radiation dose reduction techniques were utilized, including automated exposure control and exposure modulation based on body size.   This report was finalized on 6/12/2025 2:50 AM by Dr. Samantha Pro M.D on Workstation: [id number]      XR Chest 1 View  Result Date: 6/12/2025  SINGLE VIEW OF THE CHEST  HISTORY: Altered mental status  COMPARISON: May 30, 2024  FINDINGS: There is cardiomegaly. There is vascular congestion. No pneumothorax is seen. Patient is status post right mastectomy. No pleural effusion is identified. No definite acute infiltrates are seen.      Cardiomegaly with suspected vascular congestion.  This report was finalized on 6/12/2025 1:18 AM by Dr. Samantha Pro M.D on Workstation:  [id number]      XR Foot 3+ View Right  Result Date: 6/12/2025  3 VIEWS RIGHT FOOT  HISTORY: Right foot contusion  COMPARISON: None available.  FINDINGS: Exam is degraded by the patient's osteopenia. No acute fracture or subluxation of the right foot is seen. There is calcaneal spurring.      No definite acute fracture or subluxation of the right foot is seen.  This report was finalized on 6/12/2025 1:16 AM by Dr. Samantha Pro M.D on Workstation: [id number]        Medications given in the ED:  Medications   sodium chloride 0.9 % flush 10 mL (has no administration in time range)   sodium chloride 0.9 % flush 10 mL (has no administration in time range)   sodium chloride 0.9 % flush 10 mL (has no administration in time range)   sodium chloride 0.9 % infusion 40 mL (has no administration in time range)   nitroglycerin (NITROSTAT) SL tablet 0.4 mg (has no administration in time range)   acetaminophen (TYLENOL) tablet 650 mg (has no administration in time range)     Or   acetaminophen (TYLENOL) 160 MG/5ML oral solution 650 mg (has no administration in time range)     Or   acetaminophen (TYLENOL) suppository 650 mg (has no administration in time range)   sennosides-docusate (PERICOLACE) 8.6-50 MG per tablet 2 tablet (has no administration in time range)     And   polyethylene glycol (MIRALAX) packet 17 g (has no administration in time range)     And   bisacodyl (DULCOLAX) EC tablet 5 mg (has no administration in time range)     And   bisacodyl (DULCOLAX) suppository 10 mg (has no administration in time range)   calcium carbonate (TUMS) chewable tablet 500 mg (200 mg elemental) (has no administration in time range)   iopamidol (ISOVUE-300) 61 % injection 100 mL (85 mL Intravenous Given by Other 6/12/25 0216)   sodium chloride 0.9 % bolus 500 mL (500 mL Intravenous New Bag 6/12/25 0871)       Orders placed during this visit:  Orders Placed This Encounter   Procedures    Respiratory Panel PCR w/COVID-19(SARS-CoV-2)  JIMENEZ/THOMAS/JUANY/PAD/COR/MAGGIE In-House, NP Swab in UTM/VTM, 2 HR TAT - Swab, Nasopharynx    Blood Culture - Blood,    Blood Culture - Blood,    XR Chest 1 View    CT Head Without Contrast    CT Cervical Spine Without Contrast    CT Chest With Contrast Diagnostic    CT Abdomen Pelvis With Contrast    XR Foot 3+ View Right    Comprehensive Metabolic Panel    Protime-INR    aPTT    Urinalysis With Culture If Indicated - Urine, Catheter    BNP    High Sensitivity Troponin T    Procalcitonin    Lactic Acid, Plasma    Ethanol    Urine Drug Screen - Urine, Clean Catch    CK    Magnesium    CBC Auto Differential    Fentanyl, Urine - Urine, Clean Catch    Urinalysis, Microscopic Only - Urine, Clean Catch    High Sensitivity Troponin T 1Hr    Basic Metabolic Panel    CBC (No Diff)    CK    High Sensitivity Troponin T    Diet: Cardiac; Healthy Heart (2-3 Na+); Fluid Consistency: Thin (IDDSI 0)    Vital Signs    Intake & Output    Weigh Patient    Oral Care    Place Sequential Compression Device    Maintain Sequential Compression Device    Maintain IV Access    Telemetry - Place Orders & Notify Provider of Results When Patient Experiences Acute Chest Pain, Dysrhythmia or Respiratory Distress    May Be Off Telemetry for Tests    Neuro Checks    Daily Weights    Strict Intake & Output    Code Status and Medical Interventions: CPR (Attempt to Resuscitate); Full Support    [facility] (on-call MD unless specified) Details    POC Glucose Once    ECG 12 Lead Altered Mental Status    Telemetry Scan    Type & Screen    Insert Peripheral IV    Insert 2nd peripheral IV    Insert Peripheral IV    Initiate Observation Status    CBC & Differential       Medical Decision Making:  ED Course as of 06/12/25 0636   Thu Jun 12, 2025   0128 WBC(!): 13.68 [MP]   0128 Hemoglobin: 12.2 [MP]   0327 Spoke with Renée with [facility].  Discussed patient presentation and ED findings.  She agrees to admit patient to a telemetry observation bed to the service of Dr. Levine.  [MP]   0517 CT scan of head independently interpreted by me as no hemorrhage [MP]      ED Course User Index  [MP] Marleni Leblanc PA-C       Clinical Scores:                                 Critical care provider statement:    Critical care time (minutes): 31.   Critical care time was exclusive of:  Separately billable procedures and treating other patients   Critical care was necessary to treat or prevent imminent or life-threatening deterioration of the following conditions:  CNS Failure   Critical care was time spent personally by me on the following activities:  Development of treatment plan with patient or surrogate, discussions with consultants, evaluation of patient's response to treatment, examination of patient, obtaining history from patient or surrogate, ordering and performing treatments and interventions, ordering and review of laboratory studies, ordering and review of radiographic studies, pulse oximetry, re-evaluation of patient's condition and review of old charts.    Differential diagnosis:  My differential diagnosis for altered mental status includes but is not limited to:  Hypoglycemia, hyperglycemia, DKA, overdose, ethanol intoxication, thiamine deficiency, niacin deficiency, hypothymia, hyperviscosity, Fritz’s disease, hyponatremia, hypernatremia, liver failure, kidney failure, hyper or hypothyroid, no insufficiency, hypoxia, hypercarbia, carbon monoxide poisoning, postanoxic encephalopathy, ischemic stroke, intracranial bleed, subarachnoid hemorrhage, brain tumor, closed head injury, epidural hematoma, epidural hematoma, seizure activity, postictal state, syncopal episode, disseminated encephalomyelitis, central pontine myelinolysis, post cardiac arrest, bacterial meningitis, viral meningitis, fungal meningitis, encephalitis, brain abscess, subdural empyema, hysteria, catatonic state, malingering, hypertensive encephalopathy, vasculitis, TTP, DIC     Diagnosis  Final diagnoses:   Altered  mental status, unspecified altered mental status type   Unwitnessed fall   Contusion of buttock, initial encounter   Traumatic rhabdomyolysis, initial encounter   Acute congestive heart failure, unspecified heart failure type          Alexis Padron MD  06/12/25 0672

## 2025-06-12 NOTE — ED NOTES
Pt returned from CT, soiled per XR tech. Pt cleaned and fresh brief applied. Blankets provided for comfort. Son at bedside updated.

## 2025-06-12 NOTE — ED PROVIDER NOTES
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 body size.   This report was finalized on 6/12/2025 2:50 AM by Dr. Samantha Pro M.D on Workstation: [id number]      XR Chest 1 View  Result Date: 6/12/2025  SINGLE VIEW OF THE CHEST  HISTORY: Altered mental status  COMPARISON: May 30, 2024  FINDINGS: There is cardiomegaly. There is vascular congestion. No pneumothorax is seen. Patient is status post right mastectomy. No pleural effusion is identified. No definite acute infiltrates are seen.      Cardiomegaly with suspected vascular congestion.  This report was finalized on 6/12/2025 1:18 AM by Dr. Samantha Pro M.D on Workstation: [id number]      XR Foot 3+ View Right  Result Date: 6/12/2025  3 VIEWS RIGHT FOOT  HISTORY: Right foot contusion  COMPARISON: None available.  FINDINGS: Exam is degraded by the patient's osteopenia. No acute fracture or subluxation of the right foot is seen. There is calcaneal spurring.      No definite acute fracture or subluxation of the right foot is seen.  This report was finalized on 6/12/2025 1:16 AM by Dr. Samantha Pro M.D on Workstation: [id number]          MEDICATIONS GIVEN IN ER  Medications   sodium chloride 0.9 % flush 10 mL (has no administration in time range)         ORDERS PLACED DURING THIS VISIT:  Orders Placed This Encounter   Procedures    Respiratory Panel PCR  w/COVID-19(SARS-CoV-2) JIMENEZ/THOMAS/JUANY/PAD/COR/MAGGIE In-House, NP Swab in UTM/VTM, 2 HR TAT - Swab, Nasopharynx    Blood Culture - Blood,    Blood Culture - Blood,    XR Chest 1 View    CT Head Without Contrast    CT Cervical Spine Without Contrast    CT Chest With Contrast Diagnostic    CT Abdomen Pelvis With Contrast    XR Foot 3+ View Right    Comprehensive Metabolic Panel    Protime-INR    aPTT    Urinalysis With Culture If Indicated - Urine, Catheter    BNP    High Sensitivity Troponin T    Procalcitonin    Lactic Acid, Plasma    Ethanol    Urine Drug Screen - Urine, Clean Catch    CK    Magnesium    CBC Auto Differential    POC Glucose Once    ECG 12 Lead Altered Mental Status    Type & Screen    Insert Peripheral IV    Insert 2nd peripheral IV    CBC & Differential         OUTPATIENT MEDICATION MANAGEMENT:  Current Facility-Administered Medications Ordered in Epic   Medication Dose Route Frequency Provider Last Rate Last Admin    sodium chloride 0.9 % flush 10 mL  10 mL Intravenous PRN Marleni Leblanc PA-C         Current Outpatient Medications Ordered in Epic   Medication Sig Dispense Refill    anastrozole (ARIMIDEX) 1 MG tablet Take 1 tablet by mouth Daily. 30 tablet 11    ascorbic acid (VITAMIN C) 1000 MG tablet Take 1 tablet by mouth Daily.      Cholecalciferol (VITAMIN D PO) Take 1 tablet by mouth Daily.      Coenzyme Q10 200 MG capsule Take 200 mg by mouth Daily.      Cyanocobalamin (VITAMIN B 12 PO) Take 1 tablet/day by mouth Daily. HOLD PRIOR TO SURG      dilTIAZem CD (CARDIZEM CD) 180 MG 24 hr capsule TAKE 1 CAPSULE BY MOUTH TWICE DAILY 180 capsule 3    Eliquis 5 MG tablet tablet Take 1 tablet by mouth Every 12 (Twelve) Hours. 180 tablet 1    folic acid (FOLVITE) 400 MCG tablet Take 1 tablet by mouth Daily.      irbesartan (AVAPRO) 300 MG tablet Take 1 tablet by mouth Daily.      ketoconazole (NIZORAL) 2 % shampoo Apply  topically to the appropriate area as directed 2 (Two) Times a Week. 100 mL 1     levothyroxine (SYNTHROID, LEVOTHROID) 50 MCG tablet Take 1 tablet by mouth Daily. 90 tablet 3    Multiple Vitamin (MULTI-VITAMIN PO) Take 1 tablet by mouth Daily. HOLD PRIOR TO SURG      mupirocin (BACTROBAN) 2 % ointment APPLY TOPICALLY TO THE AFFECTED AREA THREE TIMES DAILY AS DIRECTED 30 g 3    Probiotic Product (PROBIOTIC PO) Take 1 tablet by mouth Daily. Lactobacillus rhamnosus GG      sodium bicarbonate 650 MG tablet Take 1 tablet by mouth Daily.      Turmeric 500 MG capsule Take 1 capsule by mouth Daily.      vitamin E 400 UNIT capsule Take 1 capsule by mouth Daily.      Wellbutrin  MG 24 hr tablet Take 1 tablet by mouth Every Morning. Take one tablet by mouth daily. 30 tablet 2         45 minutes of critical care provided. This time excludes other billable procedures. Time does include preparation of documents, medical consultations, review of old records, and direct bedside care. Patient is at high risk for life-threatening deterioration due to altered mental status requiring close cardiopulmonary monitoring, frequent neurologic reassessment, CT imaging, labs, admission to the hospital.         PROGRESS, DATA ANALYSIS, CONSULTS, AND MEDICAL DECISION MAKING  All labs have been independently interpreted by me.  All radiology studies have been reviewed by me. All EKG's have been independently viewed and interpreted by me.  Discussion below represents my analysis of pertinent findings related to patient's condition, differential diagnosis, treatment plan and final disposition.    Differential diagnosis includes but is not limited to intercranial bleed, rhabdomyolysis, urinary tract infection.        ED Course as of 06/12/25 0517   Thu Jun 12, 2025   0128 WBC(!): 13.68 [MP]   0128 Hemoglobin: 12.2 [MP]   0327 Spoke with Renée with [facility].  Discussed patient presentation and ED findings.  She agrees to admit patient to a telemetry observation bed to the service of Dr. Levine. [MP]   0517 CT scan of head  independently interpreted by me as no hemorrhage [MP]      ED Course User Index  [MP] Marleni Leblanc PA-C             AS OF 00:54 EDT VITALS:    BP - 146/96  HR - 104  TEMP - 98.3 °F (36.8 °C) (Rectal)  O2 SATS - 94%    COMPLEXITY OF CARE  The patient requires admission.      DIAGNOSIS  Final diagnoses:   Altered mental status, unspecified altered mental status type   Unwitnessed fall   Contusion of buttock, initial encounter   Traumatic rhabdomyolysis, initial encounter   Acute congestive heart failure, unspecified heart failure type         DISPOSITION  ED Disposition       ED Disposition   Decision to Admit    Condition   --    Comment   Level of Care: Telemetry [5]   Diagnosis: Altered mental status [780.97.ICD-9-CM]   Admitting Physician: CHA ANN [[id number]]   Attending Physician: CHA ANN [[id number]]   Is patient appropriate for Inpatient Observation Unit?: No [0]                  Please note that portions of this document were completed with a voice recognition program.    Note Disclaimer: At [facility], we believe that sharing information builds trust and better relationships. You are receiving this note because you recently visited [facility]. It is possible you will see health information before a provider has talked with you about it. This kind of information can be easy to misunderstand. To help you fully understand what it means for your health, we urge you to discuss this note with your provider.     Marleni Leblanc PA-C  06/12/25 0518

## 2025-06-12 NOTE — ED NOTES
Nursing report ED to floor  Marylu Foreman  80 y.o.  female    HPI :  HPI  Stated Reason for Visit: AMS found down unknown how long  History Obtained From: EMS    Chief Complaint  Chief Complaint   Patient presents with    Altered Mental Status       Admitting doctor:   Jin Claudio MD    Admitting diagnosis:   The primary encounter diagnosis was Altered mental status, unspecified altered mental status type. Diagnoses of Unwitnessed fall, Contusion of buttock, initial encounter, Traumatic rhabdomyolysis, initial encounter, and Acute congestive heart failure, unspecified heart failure type were also pertinent to this visit.    Code status:   Current Code Status       Date Active Code Status Order ID Comments User Context       6/12/2025 0334 CPR (Attempt to Resuscitate) [id number]  Renée Mittal APRN ED        Question Answer    Code Status (Patient has no pulse and is not breathing) CPR (Attempt to Resuscitate)    Medical Interventions (Patient has pulse or is breathing) Full Support                    Allergies:   Bactrim [sulfamethoxazole-trimethoprim], Amlodipine besylate-valsartan, Cefdinir, Corticosteroids, Lisinopril, Nsaids, and Other    Isolation:   No active isolations    Intake and Output  No intake or output data in the 24 hours ending 06/12/25 1154    Weight:       06/12/25  0045   Weight: 89.8 kg (197 lb 14.6 oz)       Most recent vitals:   Vitals:    06/12/25 0600 06/12/25 0703 06/12/25 0954 06/12/25 1125   BP: 138/76 134/74 135/69 106/92   BP Location: Right arm Right arm Right arm    Patient Position: Sitting Lying Lying    Pulse: 104 101 107 108   Resp: 16 16 16 15   Temp:       TempSrc:       SpO2: 97% 94% 95%    Weight:       Height:           Active LDAs/IV Access:   Lines, Drains & Airways       Active LDAs       Name Placement date Placement time Site Days    Peripheral IV 06/12/25 0042 20 G Anterior;Left;Proximal Forearm 06/12/25 0042  Forearm  less than 1    Closed/Suction Drain  Inferior;Right Breast 19 Fr. 05/23/24  1313  Breast  384    Open Drain Right;Anterior Chest 06/27/24  1050  Chest  350                    Labs (abnormal labs have a star):   Labs Reviewed   COMPREHENSIVE METABOLIC PANEL - Abnormal; Notable for the following components:       Result Value    Glucose 128 (*)     Creatinine 1.21 (*)     CO2 20.0 (*)     Calcium 8.1 (*)     AST (SGOT) 40 (*)     Anion Gap 19.0 (*)     eGFR 46.0 (*)     All other components within normal limits    Narrative:     GFR Categories in Chronic Kidney Disease (CKD)              GFR Category          GFR (mL/min/1.73)    Interpretation  G1                    90 or greater        Normal or high (1)  G2                    60-89                Mild decrease (1)  G3a                   45-59                Mild to moderate decrease  G3b                   30-44                Moderate to severe decrease  G4                    15-29                Severe decrease  G5                    14 or less           Kidney failure    (1)In the absence of evidence of kidney disease, neither GFR category G1 or G2 fulfill the criteria for CKD.    eGFR calculation 2021 CKD-EPI creatinine equation, which does not include race as a factor   URINALYSIS W/ CULTURE IF INDICATED - Abnormal; Notable for the following components:    Ketones, UA 15 mg/dL (1+) (*)     Protein,  mg/dL (2+) (*)     All other components within normal limits    Narrative:     In absence of clinical symptoms, the presence of pyuria, bacteria, and/or nitrites on the urinalysis result does not correlate with infection.   BNP (IN-HOUSE) - Abnormal; Notable for the following components:    proBNP 3,188.0 (*)     All other components within normal limits    Narrative:     This assay is used as an aid in the diagnosis of individuals suspected of having heart failure. It can be used as an aid in the diagnosis of acute decompensated heart failure (ADHF) in patients presenting with signs and symptoms  of ADHF to the emergency department (ED). In addition, NT-proBNP of <300 pg/mL indicates ADHF is not likely.    Age Range Result Interpretation  NT-proBNP Concentration (pg/mL:      <50             Positive            >450                   Gray                 300-450                    Negative             <300    50-75           Positive            >900                  Gray                300-900                  Negative            <300      >75             Positive            >1800                  Gray                300-1800                  Negative            <300   TROPONIN - Abnormal; Notable for the following components:    HS Troponin T 30 (*)     All other components within normal limits    Narrative:     High Sensitive Troponin T Reference Range:  <14.0 ng/L- Negative Female for AMI  <22.0 ng/L- Negative Male for AMI  >=14 - Abnormal Female indicating possible myocardial injury.  >=22 - Abnormal Male indicating possible myocardial injury.   Clinicians would have to utilize clinical acumen, EKG, Troponin, and serial changes to determine if it is an Acute Myocardial Infarction or myocardial injury due to an underlying chronic condition.        CK - Abnormal; Notable for the following components:    Creatine Kinase 613 (*)     All other components within normal limits   CBC WITH AUTO DIFFERENTIAL - Abnormal; Notable for the following components:    WBC 13.68 (*)     Lymphocyte % 13.5 (*)     Neutrophils, Absolute 10.21 (*)     Monocytes, Absolute 1.41 (*)     All other components within normal limits   HIGH SENSITIVITIY TROPONIN T 1HR - Abnormal; Notable for the following components:    HS Troponin T 29 (*)     All other components within normal limits    Narrative:     High Sensitive Troponin T Reference Range:  <14.0 ng/L- Negative Female for AMI  <22.0 ng/L- Negative Male for AMI  >=14 - Abnormal Female indicating possible myocardial injury.  >=22 - Abnormal Male indicating possible myocardial  injury.   Clinicians would have to utilize clinical acumen, EKG, Troponin, and serial changes to determine if it is an Acute Myocardial Infarction or myocardial injury due to an underlying chronic condition.        BASIC METABOLIC PANEL - Abnormal; Notable for the following components:    Glucose 104 (*)     Creatinine 1.02 (*)     CO2 19.9 (*)     Calcium 7.8 (*)     Anion Gap 17.1 (*)     eGFR 56.4 (*)     All other components within normal limits    Narrative:     GFR Categories in Chronic Kidney Disease (CKD)              GFR Category          GFR (mL/min/1.73)    Interpretation  G1                    90 or greater        Normal or high (1)  G2                    60-89                Mild decrease (1)  G3a                   45-59                Mild to moderate decrease  G3b                   30-44                Moderate to severe decrease  G4                    15-29                Severe decrease  G5                    14 or less           Kidney failure    (1)In the absence of evidence of kidney disease, neither GFR category G1 or G2 fulfill the criteria for CKD.    eGFR calculation 2021 CKD-EPI creatinine equation, which does not include race as a factor   CBC (NO DIFF) - Abnormal; Notable for the following components:    WBC 13.44 (*)     RBC 3.70 (*)     Hemoglobin 11.6 (*)     MCV 99.7 (*)     MCHC 31.4 (*)     All other components within normal limits   CK - Abnormal; Notable for the following components:    Creatine Kinase 484 (*)     All other components within normal limits   TROPONIN - Abnormal; Notable for the following components:    HS Troponin T 33 (*)     All other components within normal limits    Narrative:     High Sensitive Troponin T Reference Range:  <14.0 ng/L- Negative Female for AMI  <22.0 ng/L- Negative Male for AMI  >=14 - Abnormal Female indicating possible myocardial injury.  >=22 - Abnormal Male indicating possible myocardial injury.   Clinicians would have to utilize clinical  "acumen, EKG, Troponin, and serial changes to determine if it is an Acute Myocardial Infarction or myocardial injury due to an underlying chronic condition.        RESPIRATORY PANEL PCR W/ COVID-19 (SARS-COV-2), NP SWAB IN UTM/VTP, 2 HR TAT - Normal    Narrative:     In the setting of a positive respiratory panel with a viral infection PLUS a negative procalcitonin without other underlying concern for bacterial infection, consider observing off antibiotics or discontinuation of antibiotics and continue supportive care. If the respiratory panel is positive for atypical bacterial infection (Bordetella pertussis, Chlamydophila pneumoniae, or Mycoplasma pneumoniae), consider antibiotic de-escalation to target atypical bacterial infection.   PROTIME-INR - Normal   APTT - Normal   PROCALCITONIN - Normal    Narrative:     As a Marker for Sepsis (Non-Neonates):    1. <0.5 ng/mL represents a low risk of severe sepsis and/or septic shock.  2. >2 ng/mL represents a high risk of severe sepsis and/or septic shock.    As a Marker for Lower Respiratory Tract Infections that require antibiotic therapy:    PCT on Admission    Antibiotic Therapy       6-12 Hrs later    >0.5                Strongly Recommended  >0.25 - <0.5        Recommended   0.1 - 0.25          Discouraged              Remeasure/reassess PCT  <0.1                Strongly Discouraged     Remeasure/reassess PCT    As 28 day mortality risk marker: \"Change in Procalcitonin Result\" (>80% or <=80%) if Day 0 (or Day 1) and Day 4 values are available. Refer to http://[email or web address]-pct-calculator.com    Change in PCT <=80%  A decrease of PCT levels below or equal to 80% defines a positive change in PCT test result representing a higher risk for 28-day all-cause mortality of patients diagnosed with severe sepsis for septic shock.    Change in PCT >80%  A decrease of PCT levels of more than 80% defines a negative change in PCT result representing a lower risk for 28-day all-cause " mortality of patients diagnosed with severe sepsis or septic shock.      LACTIC ACID, PLASMA - Normal   URINE DRUG SCREEN - Normal    Narrative:     Cutoff For Drugs Screened:    Amphetamines               500 ng/ml  Barbiturates               200 ng/ml  Benzodiazepines            150 ng/ml  Cocaine                    150 ng/ml  Methadone                  200 ng/ml  Opiates                    100 ng/ml  Phencyclidine               25 ng/ml  THC                         50 ng/ml  Methamphetamine            500 ng/ml  Tricyclic Antidepressants  300 ng/ml  Oxycodone                  100 ng/ml  Buprenorphine               10 ng/ml    The normal value for all drugs tested is negative. This report includes unconfirmed screening results, with the cutoff values listed, to be used for medical treatment purposes only.  Unconfirmed results must not be used for non-medical purposes such as employment or legal testing.  Clinical consideration should be applied to any drug of abuse test, particularly when unconfirmed results are used.     MAGNESIUM - Normal   FENTANYL, URINE - Normal    Narrative:     Negative Threshold:      Fentanyl 5 ng/mL     The normal value for the drug tested is negative. This report includes final unconfirmed screening results to be used for medical treatment purposes only. Unconfirmed results must not be used for non-medical purposes such as employment or legal testing. Clinical consideration should be applied to any drug of abuse test, particularly when unconfirmed results are used.          POCT GLUCOSE FINGERSTICK - Normal   BLOOD CULTURE   BLOOD CULTURE   ETHANOL   URINALYSIS, MICROSCOPIC ONLY   TYPE AND SCREEN   CBC AND DIFFERENTIAL    Narrative:     The following orders were created for panel order CBC & Differential.  Procedure                               Abnormality         Status                     ---------                               -----------         ------                     CBC Auto  Differential[[id number]]        Abnormal            Final result                 Please view results for these tests on the individual orders.       EKG:   ECG 12 Lead Altered Mental Status   Preliminary Result   HEART RATE=96  bpm   RR Czxcgshy=691  ms   VA Interval=  ms   P Horizontal Axis=  deg   P Front Axis=  deg   QRSD Tcxdsqrw=666  ms   QT Krnilvst=582  ms   RWnN=371  ms   QRS Axis=6  deg   T Wave Axis=9  deg   - ABNORMAL ECG -   Atrial fibrillation   Low voltage, precordial leads   Borderline T abnormalities, inferior leads   When compared with ECG of 16-May-2024 10:14:45,   Nonspecific significant change   Date and Time of Study:2025-06-12 00:58:36      Telemetry Scan   Final Result          Meds given in ED:   Medications   sodium chloride 0.9 % flush 10 mL (has no administration in time range)   sodium chloride 0.9 % flush 10 mL (10 mL Intravenous Given 6/12/25 0813)   sodium chloride 0.9 % flush 10 mL (has no administration in time range)   sodium chloride 0.9 % infusion 40 mL (has no administration in time range)   nitroglycerin (NITROSTAT) SL tablet 0.4 mg (has no administration in time range)   acetaminophen (TYLENOL) tablet 650 mg (has no administration in time range)     Or   acetaminophen (TYLENOL) 160 MG/5ML oral solution 650 mg (has no administration in time range)     Or   acetaminophen (TYLENOL) suppository 650 mg (has no administration in time range)   sennosides-docusate (PERICOLACE) 8.6-50 MG per tablet 2 tablet (has no administration in time range)     And   polyethylene glycol (MIRALAX) packet 17 g (has no administration in time range)     And   bisacodyl (DULCOLAX) EC tablet 5 mg (has no administration in time range)     And   bisacodyl (DULCOLAX) suppository 10 mg (has no administration in time range)   calcium carbonate (TUMS) chewable tablet 500 mg (200 mg elemental) (has no administration in time range)   sodium chloride 0.9 % infusion (100 mL/hr Intravenous New Bag 6/12/25 1104)    iopamidol (ISOVUE-300) 61 % injection 100 mL (85 mL Intravenous Given by Other 6/12/25 0216)   sodium chloride 0.9 % bolus 500 mL (0 mL Intravenous Stopped 6/12/25 0732)       Imaging results:  CT Chest With Contrast Diagnostic  Result Date: 6/12/2025  1. No acute traumatic injury identified.  2. Multiple indeterminate pulmonary nodules. The largest one is solid and measures 6 mm. For multiple nodules, with the most suspicious nodule being solid and measuring 6-8 mm, recommend CT at 3-6 months. Then, for a low-risk patient, consider CT at 18-24 months. For a high-risk patient, if the nodule is stable at 3-6 months, recommend CT at 18-24 months. Follow-up intervals may vary according to size and risk.  Radiation dose reduction techniques were utilized, including automated exposure control and exposure modulation based on body size.   This report was finalized on 6/12/2025 3:05 AM by Dr. Samantha Pro M.D on Workstation: [id number]      CT Abdomen Pelvis With Contrast  Result Date: 6/12/2025  1. No acute traumatic injury identified.  2. Multiple indeterminate pulmonary nodules. The largest one is solid and measures 6 mm. For multiple nodules, with the most suspicious nodule being solid and measuring 6-8 mm, recommend CT at 3-6 months. Then, for a low-risk patient, consider CT at 18-24 months. For a high-risk patient, if the nodule is stable at 3-6 months, recommend CT at 18-24 months. Follow-up intervals may vary according to size and risk.  Radiation dose reduction techniques were utilized, including automated exposure control and exposure modulation based on body size.   This report was finalized on 6/12/2025 3:05 AM by Dr. Samantha Pro M.D on Workstation: [id number]      CT Cervical Spine Without Contrast  Result Date: 6/12/2025  No acute fracture or subluxation identified.  Radiation dose reduction techniques were utilized, including automated exposure control and exposure modulation based on body  size.   This report was finalized on 2025 2:53 AM by Dr. Samantha Pro M.D on Workstation: [id number]      CT Head Without Contrast  Result Date: 2025  No acute intracranial findings.  Radiation dose reduction techniques were utilized, including automated exposure control and exposure modulation based on body size.   This report was finalized on 2025 2:50 AM by Dr. Samantha Pro M.D on Workstation: [id number]      XR Chest 1 View  Result Date: 2025  Cardiomegaly with suspected vascular congestion.  This report was finalized on 2025 1:18 AM by Dr. Samantha Pro M.D on Workstation: [id number]      XR Foot 3+ View Right  Result Date: 2025  No definite acute fracture or subluxation of the right foot is seen.  This report was finalized on 2025 1:16 AM by Dr. Samantha Pro M.D on Workstation: [id number]        Ambulatory status:   - has not ambulated in ED    Social issues:   Social History     Socioeconomic History    Marital status:     Number of children: 1    Years of education: College   Tobacco Use    Smoking status: Never     Passive exposure: Never    Smokeless tobacco: Never    Tobacco comments:     None - Father was a very heavy smoker and  from lung cancer.   Vaping Use    Vaping status: Never Used   Substance and Sexual Activity    Alcohol use: No     Comment: Caffeine use- 2 cups tea daily, 1 coffee     Drug use: Never    Sexual activity: Not Currently     Birth control/protection: Post-menopausal       Peripheral Neurovascular  Peripheral Neurovascular (Adult)  Peripheral Neurovascular WDL: WDL, pulse assessment  Pulse Assessment: radial    Neuro Cognitive  Neuro Cognitive (Adult)  Cognitive/Neuro/Behavioral WDL: .WDL except, all  Level of Consciousness: Responds to Voice  Arousal Level: arouses to voice  Orientation: disoriented to, place, time, situation  Speech: clear  Mood/Behavior: calm, cooperative  Additional Documentation:  Headache Assessment (Group)  Headache Assessment  Headache Location: generalized  Associated Signs/Symptoms: fatigue    Learning  Learning Assessment  Learning Readiness and Ability: cognitive limitation noted  Education Provided  Person Taught: patient, family member/friend  Teaching Method: verbal instruction  Teaching Focus: symptom/problem overview  Education Outcome Evaluation: verbalizes understanding    Respiratory  Respiratory  Airway WDL: WDL  Respiratory WDL  Respiratory WDL: WDL, rhythm/pattern  Rhythm/Pattern, Respiratory: depth regular, pattern regular, unlabored, no shortness of breath reported    Abdominal Pain       Pain Assessments  Pain (Adult)  (0-10) Pain Rating: Rest: 0  (0-10) Pain Rating: Activity: 0    NIH Stroke Scale       Latoya Alvarado RN  06/12/25 11:54 EDT

## 2025-06-13 ENCOUNTER — APPOINTMENT (OUTPATIENT)
Dept: CT IMAGING | Facility: HOSPITAL | Age: 80
DRG: 064 | End: 2025-06-13
Payer: MEDICARE

## 2025-06-13 ENCOUNTER — APPOINTMENT (OUTPATIENT)
Dept: MRI IMAGING | Facility: HOSPITAL | Age: 80
DRG: 064 | End: 2025-06-13
Payer: MEDICARE

## 2025-06-13 ENCOUNTER — APPOINTMENT (OUTPATIENT)
Dept: CARDIOLOGY | Facility: HOSPITAL | Age: 80
DRG: 064 | End: 2025-06-13
Payer: MEDICARE

## 2025-06-13 LAB
ANION GAP SERPL CALCULATED.3IONS-SCNC: 19 MMOL/L (ref 5–15)
AORTIC ARCH: 2.3 CM
AORTIC DIMENSIONLESS INDEX: 0.48 (DI)
ASCENDING AORTA: 3.4 CM
AV MEAN PRESS GRAD SYS DOP V1V2: 9 MMHG
AV VMAX SYS DOP: 201 CM/SEC
BH CV ECHO MEAS - ACS: 1.77 CM
BH CV ECHO MEAS - AO MAX PG: 16.2 MMHG
BH CV ECHO MEAS - AO ROOT DIAM: 2.5 CM
BH CV ECHO MEAS - AO V2 VTI: 33 CM
BH CV ECHO MEAS - AVA(I,D): 1.54 CM2
BH CV ECHO MEAS - EDV(CUBED): 84 ML
BH CV ECHO MEAS - EDV(MOD-SP2): 77 ML
BH CV ECHO MEAS - EDV(MOD-SP4): 87 ML
BH CV ECHO MEAS - EF(MOD-SP2): 61 %
BH CV ECHO MEAS - EF(MOD-SP4): 59.8 %
BH CV ECHO MEAS - ESV(CUBED): 34.4 ML
BH CV ECHO MEAS - ESV(MOD-SP2): 30 ML
BH CV ECHO MEAS - ESV(MOD-SP4): 35 ML
BH CV ECHO MEAS - FS: 25.7 %
BH CV ECHO MEAS - IVS/LVPW: 1.08 CM
BH CV ECHO MEAS - IVSD: 1.1 CM
BH CV ECHO MEAS - LAT PEAK E' VEL: 7.6 CM/SEC
BH CV ECHO MEAS - LV DIASTOLIC VOL/BSA (35-75): 44.7 CM2
BH CV ECHO MEAS - LV MASS(C)D: 159.5 GRAMS
BH CV ECHO MEAS - LV MAX PG: 3.1 MMHG
BH CV ECHO MEAS - LV MEAN PG: 1.89 MMHG
BH CV ECHO MEAS - LV SYSTOLIC VOL/BSA (12-30): 18 CM2
BH CV ECHO MEAS - LV V1 MAX: 87.5 CM/SEC
BH CV ECHO MEAS - LV V1 VTI: 15.9 CM
BH CV ECHO MEAS - LVIDD: 4.4 CM
BH CV ECHO MEAS - LVIDS: 3.3 CM
BH CV ECHO MEAS - LVOT AREA: 3.2 CM2
BH CV ECHO MEAS - LVOT DIAM: 2.02 CM
BH CV ECHO MEAS - LVPWD: 1.02 CM
BH CV ECHO MEAS - MED PEAK E' VEL: 5.3 CM/SEC
BH CV ECHO MEAS - MV DEC SLOPE: 1038 CM/SEC2
BH CV ECHO MEAS - MV DEC TIME: 0.14 SEC
BH CV ECHO MEAS - MV E MAX VEL: 105 CM/SEC
BH CV ECHO MEAS - MV MAX PG: 5.6 MMHG
BH CV ECHO MEAS - MV MEAN PG: 2.6 MMHG
BH CV ECHO MEAS - MV P1/2T: 33.5 MSEC
BH CV ECHO MEAS - MV V2 VTI: 22.9 CM
BH CV ECHO MEAS - MVA(P1/2T): 6.6 CM2
BH CV ECHO MEAS - MVA(VTI): 2.22 CM2
BH CV ECHO MEAS - PA ACC TIME: 0.03 SEC
BH CV ECHO MEAS - PA V2 MAX: 127.7 CM/SEC
BH CV ECHO MEAS - PULM DIAS VEL: 56.3 CM/SEC
BH CV ECHO MEAS - PULM S/D: 0.44
BH CV ECHO MEAS - PULM SYS VEL: 24.7 CM/SEC
BH CV ECHO MEAS - QP/QS: 0.81
BH CV ECHO MEAS - RAP SYSTOLE: 3 MMHG
BH CV ECHO MEAS - RV MAX PG: 2.09 MMHG
BH CV ECHO MEAS - RV V1 MAX: 72.2 CM/SEC
BH CV ECHO MEAS - RV V1 VTI: 12.5 CM
BH CV ECHO MEAS - RVOT DIAM: 2.04 CM
BH CV ECHO MEAS - SUP REN AO DIAM: 1.8 CM
BH CV ECHO MEAS - SV(LVOT): 50.8 ML
BH CV ECHO MEAS - SV(MOD-SP2): 47 ML
BH CV ECHO MEAS - SV(MOD-SP4): 52 ML
BH CV ECHO MEAS - SV(RVOT): 41.2 ML
BH CV ECHO MEAS - SVI(LVOT): 26.1 ML/M2
BH CV ECHO MEAS - SVI(MOD-SP2): 24.1 ML/M2
BH CV ECHO MEAS - SVI(MOD-SP4): 26.7 ML/M2
BH CV ECHO MEAS - TAPSE (>1.6): 1.84 CM
BH CV ECHO MEASUREMENTS AVERAGE E/E' RATIO: 16.28
BH CV XLRA - RV BASE: 3.3 CM
BH CV XLRA - RV LENGTH: 6.4 CM
BH CV XLRA - RV MID: 2.7 CM
BH CV XLRA - TDI S': 11.8 CM/SEC
BUN SERPL-MCNC: 16 MG/DL (ref 8–23)
BUN/CREAT SERPL: 13.4 (ref 7–25)
CALCIUM SPEC-SCNC: 7.8 MG/DL (ref 8.6–10.5)
CHLORIDE SERPL-SCNC: 104 MMOL/L (ref 98–107)
CO2 SERPL-SCNC: 20 MMOL/L (ref 22–29)
CREAT SERPL-MCNC: 1.19 MG/DL (ref 0.57–1)
DEPRECATED RDW RBC AUTO: 46.1 FL (ref 37–54)
EGFRCR SERPLBLD CKD-EPI 2021: 46.9 ML/MIN/1.73
ERYTHROCYTE [DISTWIDTH] IN BLOOD BY AUTOMATED COUNT: 12.9 % (ref 12.3–15.4)
GLUCOSE SERPL-MCNC: 94 MG/DL (ref 65–99)
HCT VFR BLD AUTO: 35.5 % (ref 34–46.6)
HGB BLD-MCNC: 11.2 G/DL (ref 12–15.9)
LEFT ATRIUM VOLUME INDEX: 26.6 ML/M2
LV EF BIPLANE MOD: 59.7 %
MCH RBC QN AUTO: 30.7 PG (ref 26.6–33)
MCHC RBC AUTO-ENTMCNC: 31.5 G/DL (ref 31.5–35.7)
MCV RBC AUTO: 97.3 FL (ref 79–97)
PLATELET # BLD AUTO: 380 10*3/MM3 (ref 140–450)
PMV BLD AUTO: 10.8 FL (ref 6–12)
POTASSIUM SERPL-SCNC: 3.5 MMOL/L (ref 3.5–5.2)
POTASSIUM SERPL-SCNC: 4.6 MMOL/L (ref 3.5–5.2)
RBC # BLD AUTO: 3.65 10*6/MM3 (ref 3.77–5.28)
SINUS: 2.8 CM
SODIUM SERPL-SCNC: 143 MMOL/L (ref 136–145)
STJ: 2.6 CM
WBC NRBC COR # BLD AUTO: 11.74 10*3/MM3 (ref 3.4–10.8)

## 2025-06-13 PROCEDURE — A9577 INJ MULTIHANCE: HCPCS | Performed by: STUDENT IN AN ORGANIZED HEALTH CARE EDUCATION/TRAINING PROGRAM

## 2025-06-13 PROCEDURE — 93306 TTE W/DOPPLER COMPLETE: CPT

## 2025-06-13 PROCEDURE — 85027 COMPLETE CBC AUTOMATED: CPT | Performed by: STUDENT IN AN ORGANIZED HEALTH CARE EDUCATION/TRAINING PROGRAM

## 2025-06-13 PROCEDURE — 70496 CT ANGIOGRAPHY HEAD: CPT

## 2025-06-13 PROCEDURE — 25510000002 GADOBENATE DIMEGLUMINE 529 MG/ML SOLUTION: Performed by: STUDENT IN AN ORGANIZED HEALTH CARE EDUCATION/TRAINING PROGRAM

## 2025-06-13 PROCEDURE — 80048 BASIC METABOLIC PNL TOTAL CA: CPT | Performed by: STUDENT IN AN ORGANIZED HEALTH CARE EDUCATION/TRAINING PROGRAM

## 2025-06-13 PROCEDURE — 99232 SBSQ HOSP IP/OBS MODERATE 35: CPT

## 2025-06-13 PROCEDURE — 84132 ASSAY OF SERUM POTASSIUM: CPT | Performed by: STUDENT IN AN ORGANIZED HEALTH CARE EDUCATION/TRAINING PROGRAM

## 2025-06-13 PROCEDURE — 70553 MRI BRAIN STEM W/O & W/DYE: CPT

## 2025-06-13 PROCEDURE — 25510000001 PERFLUTREN 6.52 MG/ML SUSPENSION 2 ML VIAL: Performed by: STUDENT IN AN ORGANIZED HEALTH CARE EDUCATION/TRAINING PROGRAM

## 2025-06-13 PROCEDURE — 70498 CT ANGIOGRAPHY NECK: CPT

## 2025-06-13 PROCEDURE — 76014 MR SFTY IMPLT&/FB ASMT STF 1: CPT

## 2025-06-13 PROCEDURE — 93306 TTE W/DOPPLER COMPLETE: CPT | Performed by: INTERNAL MEDICINE

## 2025-06-13 RX ORDER — POTASSIUM CHLORIDE 1500 MG/1
40 TABLET, EXTENDED RELEASE ORAL EVERY 4 HOURS
Status: COMPLETED | OUTPATIENT
Start: 2025-06-13 | End: 2025-06-13

## 2025-06-13 RX ORDER — ATORVASTATIN CALCIUM 20 MG/1
40 TABLET, FILM COATED ORAL NIGHTLY
Status: DISCONTINUED | OUTPATIENT
Start: 2025-06-13 | End: 2025-06-20 | Stop reason: HOSPADM

## 2025-06-13 RX ADMIN — Medication 10 ML: at 09:48

## 2025-06-13 RX ADMIN — ANASTROZOLE 1 MG: 1 TABLET, FILM COATED ORAL at 09:47

## 2025-06-13 RX ADMIN — Medication 400 MCG: at 09:48

## 2025-06-13 RX ADMIN — APIXABAN 5 MG: 5 TABLET, FILM COATED ORAL at 09:48

## 2025-06-13 RX ADMIN — LOSARTAN POTASSIUM 100 MG: 50 TABLET, FILM COATED ORAL at 09:48

## 2025-06-13 RX ADMIN — DILTIAZEM HYDROCHLORIDE 180 MG: 180 CAPSULE, EXTENDED RELEASE ORAL at 09:48

## 2025-06-13 RX ADMIN — APIXABAN 5 MG: 5 TABLET, FILM COATED ORAL at 20:14

## 2025-06-13 RX ADMIN — Medication 10 ML: at 20:18

## 2025-06-13 RX ADMIN — GADOBENATE DIMEGLUMINE 18 ML: 529 INJECTION, SOLUTION INTRAVENOUS at 04:01

## 2025-06-13 RX ADMIN — ATORVASTATIN CALCIUM 40 MG: 20 TABLET, FILM COATED ORAL at 20:15

## 2025-06-13 RX ADMIN — DILTIAZEM HYDROCHLORIDE 180 MG: 180 CAPSULE, EXTENDED RELEASE ORAL at 20:14

## 2025-06-13 RX ADMIN — POTASSIUM CHLORIDE 40 MEQ: 1500 TABLET, EXTENDED RELEASE ORAL at 13:03

## 2025-06-13 RX ADMIN — POTASSIUM CHLORIDE 40 MEQ: 1500 TABLET, EXTENDED RELEASE ORAL at 09:48

## 2025-06-13 RX ADMIN — SODIUM BICARBONATE 650 MG: 650 TABLET ORAL at 09:47

## 2025-06-13 RX ADMIN — PERFLUTREN 3 ML: 6.52 INJECTION, SUSPENSION INTRAVENOUS at 12:35

## 2025-06-13 NOTE — NURSING NOTE
Rn notified by [facility] of results of MRI and asked to call Neuro to notify them. Neuro notified and RN given no new orders at this time.

## 2025-06-13 NOTE — NURSING NOTE
Bedside swallow was done at 0930 today. Pt was able to drink water with and without a straw sitting up continuously without any interruption or coughing.

## 2025-06-13 NOTE — PLAN OF CARE
Problem: Confusion Acute  Goal: Optimal Cognitive Function  Outcome: Progressing     Problem: Heart Failure  Goal: Optimal Cardiac Output and Blood Flow  Outcome: Progressing  Goal: Stable Heart Rate and Rhythm  Outcome: Progressing  Goal: Fluid and Electrolyte Balance  Outcome: Progressing  Goal: Optimal Functional Ability  Outcome: Progressing  Goal: Effective Oxygenation and Ventilation  Outcome: Progressing  Intervention: Optimize Oxygenation and Ventilation  Recent Flowsheet Documentation  Taken 6/13/2025 0207 by Azul Freeman RN  Head of Bed (HOB) Positioning: HOB elevated  Taken 6/13/2025 0000 by Azul Freeman RN  Head of Bed (HOB) Positioning: HOB elevated  Taken 6/12/2025 2200 by Azul Freeman RN  Head of Bed (HOB) Positioning: HOB elevated     Problem: Adult Inpatient Plan of Care  Goal: Plan of Care Review  Outcome: Progressing  Flowsheets (Taken 6/13/2025 0336)  Progress: no change  Outcome Evaluation: pt oriented to self and place, VSS, on RA, MRI done on night shift, pt resting comfortably overnight  Plan of Care Reviewed With:   patient   family  Goal: Patient-Specific Goal (Individualized)  Outcome: Progressing  Goal: Absence of Hospital-Acquired Illness or Injury  Outcome: Progressing  Intervention: Identify and Manage Fall Risk  Recent Flowsheet Documentation  Taken 6/13/2025 0207 by Azul Freeman RN  Safety Promotion/Fall Prevention:   nonskid shoes/slippers when out of bed   safety round/check completed  Taken 6/13/2025 0000 by Azul Freeman RN  Safety Promotion/Fall Prevention: safety round/check completed  Taken 6/12/2025 2200 by Azul Freeman RN  Safety Promotion/Fall Prevention:   nonskid shoes/slippers when out of bed   safety round/check completed  Taken 6/12/2025 2002 by Azul Freeman RN  Safety Promotion/Fall Prevention:   safety round/check completed   room organization consistent   nonskid shoes/slippers when out of bed   fall prevention program maintained   clutter free  environment maintained  Intervention: Prevent and Manage VTE (Venous Thromboembolism) Risk  Recent Flowsheet Documentation  Taken 6/12/2025 2002 by Azul Freeman RN  VTE Prevention/Management: (eliquis) other (see comments)  Intervention: Prevent Infection  Recent Flowsheet Documentation  Taken 6/12/2025 2002 by Azul Freeman RN  Infection Prevention:   single patient room provided   rest/sleep promoted   personal protective equipment utilized   hand hygiene promoted   environmental surveillance performed  Goal: Optimal Comfort and Wellbeing  Outcome: Progressing  Intervention: Provide Person-Centered Care  Recent Flowsheet Documentation  Taken 6/12/2025 2002 by Azul Freeman RN  Trust Relationship/Rapport:   care explained   choices provided   emotional support provided   empathic listening provided   questions answered   questions encouraged   reassurance provided   thoughts/feelings acknowledged  Goal: Readiness for Transition of Care  Outcome: Progressing     Problem: Skin Injury Risk Increased  Goal: Skin Health and Integrity  Outcome: Progressing  Intervention: Optimize Skin Protection  Recent Flowsheet Documentation  Taken 6/13/2025 0207 by Azul Freeman RN  Head of Bed (HOB) Positioning: HOB elevated  Taken 6/13/2025 0000 by Azul Freeman RN  Head of Bed (HOB) Positioning: HOB elevated  Taken 6/12/2025 2200 by Azul Freeman RN  Head of Bed (HOB) Positioning: HOB elevated     Problem: Fall Injury Risk  Goal: Absence of Fall and Fall-Related Injury  Outcome: Progressing  Intervention: Identify and Manage Contributors  Recent Flowsheet Documentation  Taken 6/12/2025 2002 by Azul Freeman RN  Medication Review/Management: medications reviewed  Intervention: Promote Injury-Free Environment  Recent Flowsheet Documentation  Taken 6/13/2025 0207 by Azul Freeman RN  Safety Promotion/Fall Prevention:   nonskid shoes/slippers when out of bed   safety round/check completed  Taken 6/13/2025 0000 by Lydia  SHAD Liang  Safety Promotion/Fall Prevention: safety round/check completed  Taken 6/12/2025 2200 by Azul Freeman RN  Safety Promotion/Fall Prevention:   nonskid shoes/slippers when out of bed   safety round/check completed  Taken 6/12/2025 2002 by Azul Freeman RN  Safety Promotion/Fall Prevention:   safety round/check completed   room organization consistent   nonskid shoes/slippers when out of bed   fall prevention program maintained   clutter free environment maintained     Problem: Comorbidity Management  Goal: Maintenance of Asthma Control  Outcome: Progressing  Intervention: Maintain Asthma Symptom Control  Recent Flowsheet Documentation  Taken 6/12/2025 2002 by Azul Freeman RN  Medication Review/Management: medications reviewed  Goal: Maintenance of Behavioral Health Symptom Control  Outcome: Progressing  Intervention: Maintain Behavioral Health Symptom Control  Recent Flowsheet Documentation  Taken 6/12/2025 2002 by Azul Freeman RN  Medication Review/Management: medications reviewed  Goal: Maintenance of COPD Symptom Control  Outcome: Progressing  Intervention: Maintain COPD (Chronic Obstructive Pulmonary Disease) Symptom Control  Recent Flowsheet Documentation  Taken 6/12/2025 2002 by Azul Freeman RN  Medication Review/Management: medications reviewed  Goal: Blood Glucose Level Within Target Range  Outcome: Progressing  Intervention: Monitor and Manage Glycemia  Recent Flowsheet Documentation  Taken 6/12/2025 2002 by Azul Freeman RN  Medication Review/Management: medications reviewed  Goal: Maintenance of Heart Failure Symptom Control  Outcome: Progressing  Intervention: Maintain Heart Failure Management  Recent Flowsheet Documentation  Taken 6/12/2025 2002 by Azul Freeman RN  Medication Review/Management: medications reviewed  Goal: Blood Pressure in Desired Range  Outcome: Progressing  Intervention: Maintain Blood Pressure Management  Recent Flowsheet Documentation  Taken 6/12/2025 2002 by  Azul Freeman, RN  Medication Review/Management: medications reviewed  Goal: Maintenance of Osteoarthritis Symptom Control  Outcome: Progressing  Intervention: Maintain Osteoarthritis Symptom Control  Recent Flowsheet Documentation  Taken 6/12/2025 2002 by Azul Freeman, RN  Medication Review/Management: medications reviewed   Goal Outcome Evaluation:  Plan of Care Reviewed With: patient, family        Progress: no change  Outcome Evaluation: pt oriented to self and place, VSS, on RA, MRI done on night shift, pt resting comfortably overnight

## 2025-06-13 NOTE — CASE MANAGEMENT/SOCIAL WORK
Discharge Planning Assessment  [facility]     Patient Name: Marylu Foreman  MRN: [id number]  Today's Date: 6/13/2025    Admit Date: 6/12/2025    Plan: TBD   Discharge Needs Assessment       Row Name 06/13/25 0959       Living Environment    People in Home alone    Current Living Arrangements home    Potentially Unsafe Housing Conditions none    Primary Care Provided by self    Family Caregiver if Needed child(mason), adult    Family Caregiver Names Gee Sandhu    Quality of Family Relationships involved;helpful    Able to Return to Prior Arrangements yes    Living Arrangement Comments unsure, may not be able to live alone       Resource/Environmental Concerns    Resource/Environmental Concerns none    Transportation Concerns none       Transition Planning    Patient/Family Anticipates Transition to home with family;home with help/services;inpatient rehabilitation facility    Patient/Family Anticipated Services at Transition skilled nursing;home health care    Transportation Anticipated family or friend will provide       Discharge Needs Assessment    Readmission Within the Last 30 Days no previous admission in last 30 days    Equipment Currently Used at Home cane, straight;walker, rolling    Concerns to be Addressed adjustment to diagnosis/illness    Anticipated Changes Related to Illness inability to care for self    Equipment Needed After Discharge none                   Discharge Plan       Row Name 06/13/25 0959       Plan    Plan TBD    Patient/Family in Agreement with Plan yes    Plan Comments Spoke with patient gee Sandhu, pt confused, introduced self and explained CCP role and verified the face sheet and pharmacy information.  Pt lives alone in 1 level home with 2 CRYSTAL and basement laundry, she is normally IADL's, uses cane, rwx and has brace for her foot drop. She has used [facility] in the past and been to [facility] and [facility] for STR.  Son states he has noticed some cognitive decline, but she was able to care  for herself and he provided transportation. Discussed he may have need for emergency guardianship to manage her finances, provided information via email for this, therapy evals pending, pt will likely have needs, unsure of what at this time.  CCP will follow- Josselyn BOWERS                    Expected Discharge Date and Time       Expected Discharge Date Expected Discharge Time    Jun 14, 2025            Demographic Summary       Row Name 06/13/25 0959       General Information    Admission Type observation                   Functional Status       Row Name 06/13/25 0959       Functional Status    Usual Activity Tolerance excellent       Assessment of Health Literacy    Health Literacy Low       Functional Status, IADL    Medications independent    Meal Preparation independent    Housekeeping independent    Laundry assistive person    Shopping assistive person       Mental Status    General Appearance WDL WDL       Mental Status Summary    Recent Changes in Mental Status/Cognitive Functioning unable to assess                   Psychosocial    No documentation.                  Abuse/Neglect    No documentation.                  Legal       Row Name 06/13/25 0959       Financial/Legal    Who Manages Finances if Patient Unable son                   Substance Abuse    No documentation.                  Patient Forms    No documentation.                     Josselyn Markham RN
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
## 2025-06-13 NOTE — PROGRESS NOTES
"DOS: 2025  NAME: Marylu Foreman   : 1945  PCP: Arleen Dillon MD  Chief Complaint   Patient presents with    Altered Mental Status     Stroke    Subjective: Patient resting in bed with son at bedside.  Son thinks that patient is a little bit more alert today.  Her mentation is still fluctuating.  He has no other concerns or complaints.  No acute events overnight.    Interval History:  History taken from: patient    Objective:  Vital signs: /63 (BP Location: Right arm, Patient Position: Lying)   Pulse 94   Temp 98.2 °F (36.8 °C) (Axillary)   Resp 18   Ht 162.6 cm (64.02\")   Wt 90 kg (198 lb 6.6 oz)   LMP  (LMP Unknown)   SpO2 95%   BMI 34.04 kg/m²       Physical Exam:  GENERAL: NAD  MS: AO to self and place only. Normal fluency, repetition, able to follow simple commands but struggles with complex. No neglect.  CN: II-XII normal, except right homonymous hemianopsia   Motor: Normal strength and tone throughout.  Sensory: Intact to light touch throughout  Coordination: Normal finger to nose bilaterally     Results Review:     I reviewed the patient's new clinical results.    Current Medications:  Scheduled Medications:anastrozole, 1 mg, Oral, Daily  apixaban, 5 mg, Oral, Q12H  dilTIAZem CD, 180 mg, Oral, BID  folic acid, 400 mcg, Oral, Daily  levothyroxine, 50 mcg, Oral, Q AM  losartan, 100 mg, Oral, Q24H  potassium chloride ER, 40 mEq, Oral, Q4H  sodium bicarbonate, 650 mg, Oral, Daily  sodium chloride, 10 mL, Intravenous, Q12H      Infusions: sodium chloride, 100 mL/hr, Last Rate: 100 mL/hr (25 1104)      PRN Medications:    acetaminophen **OR** acetaminophen **OR** acetaminophen    albuterol    senna-docusate sodium **AND** polyethylene glycol **AND** bisacodyl **AND** bisacodyl    calcium carbonate    Calcium Replacement - Follow Nurse / BPA Driven Protocol    Magnesium Cardiology Dose Replacement - Follow Nurse / BPA Driven Protocol    nitroglycerin    Phosphorus Replacement - Follow " "Nurse / BPA Driven Protocol    Potassium Replacement - Follow Nurse / BPA Driven Protocol    [COMPLETED] Insert Peripheral IV **AND** sodium chloride    sodium chloride    sodium chloride    Medications Reviewed:     Laboratory results:  Lab Results   Component Value Date    GLUCOSE 94 06/13/2025    CALCIUM 7.8 (L) 06/13/2025     06/13/2025    K 3.5 06/13/2025    CO2 20.0 (L) 06/13/2025     06/13/2025    BUN 16.0 06/13/2025    CREATININE 1.19 (H) 06/13/2025    EGFRIFAFRI  11/15/2021      Comment:      <15 Indicative of kidney failure.    EGFRIFNONA 35 (L) 02/14/2022    BCR 13.4 06/13/2025    ANIONGAP 19.0 (H) 06/13/2025     Lab Results   Component Value Date    WBC 11.74 (H) 06/13/2025    HGB 11.2 (L) 06/13/2025    HCT 35.5 06/13/2025    MCV 97.3 (H) 06/13/2025     06/13/2025          Lab Results   Component Value Date    INR 1.10 06/12/2025    INR 1.13 (H) 08/11/2022    INR 2.20 05/18/2020    PROTIME 14.2 06/12/2025    PROTIME 14.4 (H) 08/11/2022    PROTIME 26.0 (H) 05/18/2020     Lab Results   Component Value Date    TSH 1.290 06/12/2025     No components found for: \"LDLCALC\"  Lab Results   Component Value Date    HGBA1C 6.7 (H) 04/30/2025     No components found for: \"B12\"    Review and interpretation of imaging:  Hemoglobin A1c 4/30/2025: 6.7  LDL 4/30/2025: 140    MRI brain without:  FINDINGS:  Multiplanar images of the head were obtained without and with  gadolinium. The patient has acute infarcts noted within the left corona  radiata, and extending into the left thalamus and left basal ganglia.  Patient also has some acute infarction within the medial left temporal  lobe, as well as within the left occipital lobe. There is atrophy. There  is periventricular and deep white matter microangiopathic change. There  is no midline shift. Old infarct is noted within the left cerebellar  hemisphere. The intracranial flow voids appear intact. There is some  susceptibility artifact within the area of " infarction within the left  corona radiata, which may reflect a small area of hemorrhagic  transformation. There is some further susceptibility artifact noted  within the medial left temporal lobe, which may reflect some additional  mild hemorrhagic transformation. Small amount of susceptibility artifact  associated with the left cerebellar infarct is likely related to chronic  hemosiderin deposition. Postcontrast imaging does not demonstrate any  abnormal enhancement. There is some mucosal thickening within the  ethmoid and maxillary sinuses. There is a small amount of fluid within  the right mastoid air cells. There is no evidence of venous occlusion.  IMPRESSION:  1. Areas of acute infarction noted within the left corona radiata/left  thalamus and left basal ganglia, as well as the medial left temporal  lobe and left occipital lobe. There is a small amount of susceptibility  artifact which is noted within the left corona radiata and medial left  temporal lobe, which may reflect some hemorrhagic transformation.    Impression:Patient is a 80-year-old female whose clinical presentation is concerning for possible stroke with decreased blink to threat in the right visual fields.  There is also significant concern for progression and likely diagnosed dementia.  There has been some concern for some progressive memory impairment for some time per neighbors and son.  However, no one is really monitored her closely to ensure that she is taking care of herself properly.  Unfortunately, this event is likely complication of above.  However, cannot entirely rule out seizure with elevated CK and anion gap so we will place patient on seizure precautions.  Regardless she should not be driving with concern for underlying dementia anyways.  Discussed concerns with son that she requires closer monitoring.  Will get case management on board for safe discharge plan.     EEG demonstrates no acute seizure activity.  CT head demonstrates  no acute hemorrhage or hypodensity.  There is atrophy and small vessel disease appreciated. MRI brain with and without contrast demonstrates multiple areas of acute infarct in the left corona radiata/left thalamus and left BG. There is also stroke in left temporal and occipital lobe with HT in left corona radiata and medial left temporal lobe. Strokes embolic appearing and most likely secondary to missed doses of eliquis with concern for underlying dementia. With petechial hemorrhage, okay to continue eliquis. Should start on high intensity statin. This explains her being found down at home and right homonymous hemianopia on examination.     Diagnoses:  Altered mental status  Unwitnessed fall  Traumatic rhabdomyolysis  Acute congestive heart failure  Abnormal CT chest, multiple pulmonary nodules noted recommending follow-up CT in 3 to 6 months will defer to primary care team.    Plan:  CTA head and neck without contrast   TTE  -Continue Eliquis 5 mg BID  -high intensity statin  -Normalize BP  -Perform bedside swallow test  -Telemetry to monitor for arrhythmia  -VTE prophylaxis  -Neuro checks, if change in exam call neuro oncall  -PT/OT when appropriate   Discussed that son should get a pillbox to help monitor to make sure his mother is taking her medications and check on her to make sure she is paying her bills.  Seizure Precautions discussed with patient and son at bedside and they expressed understanding: Patient is not to drive for at least 3 months until cleared by a physician, operate heavy machinery, take tub baths, swim unattended, climb heights, or perform any other activities that can bring harm to himself/herself or others during an episode of altered awareness.   Eventually needs cognitive testing by neurology and neuropsychological testing outpatient.  Continue to follow, call neurologist on-call for acute neurological change.  Case management consult    I have discussed the above with the patient and  family.  Juju Apodaca PA-C  06/13/25  11:58 EDT        Altered mental status    Malignant neoplasm of overlapping sites of left breast in female, estrogen receptor positive    Hypothyroidism    Permanent atrial fibrillation    GURDEEP (obstructive sleep apnea)    CKD (chronic kidney disease) stage 3, GFR 30-59 ml/min

## 2025-06-13 NOTE — PROGRESS NOTES
Name: Marylu Foreman ADMIT: 2025   : 1945  PCP: Arleen Dillon MD    MRN: [id number] LOS: 0 days   AGE/SEX: 80 y.o. female  ROOM: [facility]     Subjective   Much more alert and oriented today. Her son is also at bedside.     Objective   Objective   Vital Signs  Temp:  [97.9 °F (36.6 °C)-99.5 °F (37.5 °C)] 98.2 °F (36.8 °C)  Heart Rate:  [] 94  Resp:  [16-18] 18  BP: ()/(63-85) 136/63  SpO2:  [91 %-96 %] 95 %  on   ;   Device (Oxygen Therapy): room air  Body mass index is 34.04 kg/m².  Physical Exam  Constitutional:       General: She is not in acute distress.  HENT:      Head: Normocephalic and atraumatic.   Cardiovascular:      Rate and Rhythm: Normal rate and regular rhythm.   Pulmonary:      Effort: Pulmonary effort is normal. No respiratory distress.   Abdominal:      General: There is no distension.      Palpations: Abdomen is soft.      Tenderness: There is no abdominal tenderness.   Neurological:      Mental Status: She is alert and oriented to person, place, and time.      Motor: Weakness present.         Results Review     I reviewed the patient's new clinical results.  Results from last 7 days   Lab Units 25  0600 25  0625  0110 25  1238   WBC 10*3/mm3 11.74* 13.44* 13.68* 11.16*   HEMOGLOBIN g/dL 11.2* 11.6* 12.2 12.5   PLATELETS 10*3/mm3 380 375 411 386     Results from last 7 days   Lab Units 25  0600 25  0612 25  0110 25  1238   SODIUM mmol/L 143 141 138 143   POTASSIUM mmol/L 3.5 3.6 3.7 3.6   CHLORIDE mmol/L 104 104 99 104   CO2 mmol/L 20.0* 19.9* 20.0* 24.0   BUN mg/dL 16.0 12.0 12.0 16.0   CREATININE mg/dL 1.19* 1.02* 1.21* 1.23*   GLUCOSE mg/dL 94 104* 128* 130*   Estimated Creatinine Clearance: 42.3 mL/min (A) (by C-G formula based on SCr of 1.19 mg/dL (H)).  Results from last 7 days   Lab Units 25  0110 25  1238   ALBUMIN g/dL 3.8 4.3   BILIRUBIN mg/dL 1.0 1.0   ALK PHOS U/L 94 88   AST (SGOT) U/L 40* 25   ALT  (SGPT) U/L 18 21     Results from last 7 days   Lab Units 06/13/25  0600 06/12/25  0612 06/12/25  0110 06/06/25  1238   CALCIUM mg/dL 7.8* 7.8* 8.1* 8.9   ALBUMIN g/dL  --   --  3.8 4.3   MAGNESIUM mg/dL  --   --  1.9  --      Results from last 7 days   Lab Units 06/12/25  0110   PROCALCITONIN ng/mL 0.11   LACTATE mmol/L 1.7     COVID19   Date Value Ref Range Status   06/12/2025 Not Detected Not Detected - Ref. Range Final   08/10/2022 Not Detected Not Detected - Ref. Range Final     Glucose   Date/Time Value Ref Range Status   06/12/2025 0044 110 70 - 130 mg/dL Final     Results for orders placed or performed during the hospital encounter of 06/12/25   Blood Culture - Blood, Hand, Right    Specimen: Hand, Right; Blood   Result Value Ref Range    Blood Culture No growth at 24 hours          MRI Brain With & Without Contrast  Narrative: BRAIN MRI WITH AND WITHOUT CONTRAST     HISTORY: altered mental status; R41.82-Altered mental status,  unspecified; R29.6-Repeated falls; S30.0XXA-Contusion of lower back and  pelvis, initial encounter; T79.6XXA-Traumatic ischemia of muscle,  initial encounter; I50.9-Heart failure, unspecified     COMPARISON: June 12, 2025.     FINDINGS:  Multiplanar images of the head were obtained without and with  gadolinium. The patient has acute infarcts noted within the left corona  radiata, and extending into the left thalamus and left basal ganglia.  Patient also has some acute infarction within the medial left temporal  lobe, as well as within the left occipital lobe. There is atrophy. There  is periventricular and deep white matter microangiopathic change. There  is no midline shift. Old infarct is noted within the left cerebellar  hemisphere. The intracranial flow voids appear intact. There is some  susceptibility artifact within the area of infarction within the left  corona radiata, which may reflect a small area of hemorrhagic  transformation. There is some further susceptibility  artifact noted  within the medial left temporal lobe, which may reflect some additional  mild hemorrhagic transformation. Small amount of susceptibility artifact  associated with the left cerebellar infarct is likely related to chronic  hemosiderin deposition. Postcontrast imaging does not demonstrate any  abnormal enhancement. There is some mucosal thickening within the  ethmoid and maxillary sinuses. There is a small amount of fluid within  the right mastoid air cells. There is no evidence of venous occlusion.     Impression: 1. Areas of acute infarction noted within the left corona radiata/left  thalamus and left basal ganglia, as well as the medial left temporal  lobe and left occipital lobe. There is a small amount of susceptibility  artifact which is noted within the left corona radiata and medial left  temporal lobe, which may reflect some hemorrhagic transformation.        This report was finalized on 6/13/2025 4:52 AM by Dr. Samantha Pro M.D on Workstation: [id number]       Scheduled Medications  anastrozole, 1 mg, Oral, Daily  apixaban, 5 mg, Oral, Q12H  atorvastatin, 40 mg, Oral, Nightly  dilTIAZem CD, 180 mg, Oral, BID  folic acid, 400 mcg, Oral, Daily  levothyroxine, 50 mcg, Oral, Q AM  losartan, 100 mg, Oral, Q24H  potassium chloride ER, 40 mEq, Oral, Q4H  sodium bicarbonate, 650 mg, Oral, Daily  sodium chloride, 10 mL, Intravenous, Q12H    Infusions  sodium chloride, 100 mL/hr, Last Rate: 100 mL/hr (06/12/25 1104)    Diet  Diet: Cardiac; Healthy Heart (2-3 Na+); Fluid Consistency: Thin (IDDSI 0)       Assessment/Plan     Active Hospital Problems    Diagnosis  POA    **Altered mental status [R41.82]  Yes    CKD (chronic kidney disease) stage 3, GFR 30-59 ml/min [N18.30]  Yes    GURDEEP (obstructive sleep apnea) [G47.33]  Yes    Permanent atrial fibrillation [I48.21]  Yes    Hypothyroidism [E03.9]  Yes    Malignant neoplasm of overlapping sites of left breast in female, estrogen receptor positive  [C50.812, Z17.0]  Not Applicable      Resolved Hospital Problems   No resolved problems to display.       80 y.o. female admitted with Altered mental status.    CVA  Multiple embolic strokes were noted on MRI of the brain.  Neurology has been consulted.  CT angiogram of the head and neck have been ordered.  Echocardiogram will also be completed.  Continue Eliquis normalized blood pressure  Underwent telemetry    Troponin elevation  Flat. TTE as above. EKG with atrial fibrillation, no significant ST-T wave changes     CKD 3b  Scr slightly better than previous baseline     Lung nodules  Surveillance recommended     Acute metabolic acidosis  Resume home sodium bicarbonate  Possible due to starvation ketosis with ketonuria evident in UA     Atrial fibrillation  HX DVT 2022  Eliquis, diltiazem  - unclear compliance with AC- check D-dimer; elevated  - resume eliquis; at this time I am going to hold off on ordering any more contrasted studies in the setting of stage III CKD. She will undergo CTA of head and neck     Hypothyroidism  Check TSH- normal      Eliquis (home med) for DVT prophylaxis.  Full code.  Discussed with patient and family.  Anticipate discharge to be determined.       Jin Claudio MD  [facility]  06/13/25  12:16 EDT

## 2025-06-14 PROCEDURE — 99232 SBSQ HOSP IP/OBS MODERATE 35: CPT | Performed by: PSYCHIATRY & NEUROLOGY

## 2025-06-14 PROCEDURE — 25810000003 SODIUM CHLORIDE 0.9 % SOLUTION: Performed by: STUDENT IN AN ORGANIZED HEALTH CARE EDUCATION/TRAINING PROGRAM

## 2025-06-14 PROCEDURE — 25010000002 KETOROLAC TROMETHAMINE PER 15 MG: Performed by: STUDENT IN AN ORGANIZED HEALTH CARE EDUCATION/TRAINING PROGRAM

## 2025-06-14 PROCEDURE — 25510000001 IOPAMIDOL PER 1 ML: Performed by: STUDENT IN AN ORGANIZED HEALTH CARE EDUCATION/TRAINING PROGRAM

## 2025-06-14 RX ORDER — KETOROLAC TROMETHAMINE 15 MG/ML
15 INJECTION, SOLUTION INTRAMUSCULAR; INTRAVENOUS EVERY 6 HOURS PRN
Status: DISPENSED | OUTPATIENT
Start: 2025-06-14 | End: 2025-06-19

## 2025-06-14 RX ORDER — BUPROPION HYDROCHLORIDE 150 MG/1
150 TABLET ORAL DAILY
Status: DISCONTINUED | OUTPATIENT
Start: 2025-06-15 | End: 2025-06-20 | Stop reason: HOSPADM

## 2025-06-14 RX ORDER — ACETAMINOPHEN 500 MG
1000 TABLET ORAL ONCE
Status: COMPLETED | OUTPATIENT
Start: 2025-06-14 | End: 2025-06-14

## 2025-06-14 RX ORDER — IOPAMIDOL 755 MG/ML
100 INJECTION, SOLUTION INTRAVASCULAR
Status: COMPLETED | OUTPATIENT
Start: 2025-06-14 | End: 2025-06-14

## 2025-06-14 RX ADMIN — APIXABAN 5 MG: 5 TABLET, FILM COATED ORAL at 20:21

## 2025-06-14 RX ADMIN — Medication 400 MCG: at 09:57

## 2025-06-14 RX ADMIN — IOPAMIDOL 95 ML: 755 INJECTION, SOLUTION INTRAVENOUS at 00:00

## 2025-06-14 RX ADMIN — LEVOTHYROXINE SODIUM 50 MCG: 50 TABLET ORAL at 04:01

## 2025-06-14 RX ADMIN — ACETAMINOPHEN 650 MG: 325 TABLET ORAL at 04:01

## 2025-06-14 RX ADMIN — DILTIAZEM HYDROCHLORIDE 180 MG: 180 CAPSULE, EXTENDED RELEASE ORAL at 20:21

## 2025-06-14 RX ADMIN — ANASTROZOLE 1 MG: 1 TABLET, FILM COATED ORAL at 09:57

## 2025-06-14 RX ADMIN — Medication 10 ML: at 09:57

## 2025-06-14 RX ADMIN — KETOROLAC TROMETHAMINE 15 MG: 15 INJECTION INTRAMUSCULAR at 20:25

## 2025-06-14 RX ADMIN — SODIUM BICARBONATE 650 MG: 650 TABLET ORAL at 09:57

## 2025-06-14 RX ADMIN — ACETAMINOPHEN 1000 MG: 500 TABLET, FILM COATED ORAL at 10:44

## 2025-06-14 RX ADMIN — DILTIAZEM HYDROCHLORIDE 180 MG: 180 CAPSULE, EXTENDED RELEASE ORAL at 10:44

## 2025-06-14 RX ADMIN — Medication 10 ML: at 20:22

## 2025-06-14 RX ADMIN — SODIUM CHLORIDE 100 ML/HR: 9 INJECTION, SOLUTION INTRAVENOUS at 10:38

## 2025-06-14 RX ADMIN — APIXABAN 5 MG: 5 TABLET, FILM COATED ORAL at 09:57

## 2025-06-14 RX ADMIN — KETOROLAC TROMETHAMINE 15 MG: 15 INJECTION INTRAMUSCULAR at 14:20

## 2025-06-14 RX ADMIN — LOSARTAN POTASSIUM 100 MG: 50 TABLET, FILM COATED ORAL at 09:57

## 2025-06-14 RX ADMIN — ATORVASTATIN CALCIUM 40 MG: 20 TABLET, FILM COATED ORAL at 20:21

## 2025-06-14 NOTE — SIGNIFICANT NOTE
06/14/25 1440   OTHER   Discipline physical therapist   Rehab Time/Intention   Session Not Performed patient/family declined evaluation  (Pt politely declined PT today as she is experiencing severe neck pain and waiting for stronger pain medicine. Nsg aware. Will f/u tomorrow.)   Therapy Assessment/Plan (PT)   Criteria for Skilled Interventions Met (PT) yes   Recommendation   PT - Next Appointment 06/15/25

## 2025-06-14 NOTE — PROGRESS NOTES
"DOS: 2025  NAME: Marylu Foreman   : 1945  PCP: Arleen Dillon MD  Chief Complaint   Patient presents with    Altered Mental Status       Chief complaint: Stroke, dementia  Subjective: Patient new to me today.  This is a 80-year-old female with a history of A-fib and progressive memory loss noted at home.  She presented with altered mental status after being found down at home.  She was found to have a left anterior choroidal artery stroke.  Since admission her symptoms have continued to improve but her son persistent memory trouble.    Objective:  Vital signs: /73 (BP Location: Right arm, Patient Position: Lying)   Pulse 92   Temp 97.9 °F (36.6 °C) (Oral)   Resp 16   Ht 162.6 cm (64\")   Wt 89.8 kg (198 lb)   LMP  (LMP Unknown)   SpO2 97%   BMI 33.99 kg/m²    Gen: NAD, vitals reviewed  MS: oriented x2, recent/remote memory impaired, normal attention/concentration, mild word finding difficulty at times, no neglect.  CN: Partial right quadrantanopsia, PERRL, EOMI, no facial droop, no dysarthria  Motor: 5/5 throughout upper and lower extremities, normal tone  Sensory: intact to cold temperature throughout with diminished vibratory sensation distally in the lower extremities.    Laboratory results:  Lab Results   Component Value Date    GLUCOSE 94 2025    CALCIUM 7.8 (L) 2025     2025    K 4.6 2025    CO2 20.0 (L) 2025     2025    BUN 16.0 2025    CREATININE 1.19 (H) 2025    EGFRIFAFRI  11/15/2021      Comment:      <15 Indicative of kidney failure.    EGFRIFNONA 35 (L) 2022    BCR 13.4 2025    ANIONGAP 19.0 (H) 2025     Lab Results   Component Value Date    WBC 11.74 (H) 2025    HGB 11.2 (L) 2025    HCT 35.5 2025    MCV 97.3 (H) 2025     2025     Lab Results   Component Value Date     (H) 2025     (H) 2024     (H) 2024            Review of labs: " CBC, BMP reviewed    Review and interpretation of imaging: I personally reviewed her MRI brain done since admission which shows a left anterior choroidal artery territory stroke as well as a small stroke in the left occipital lobe.  She has moderate generalized atrophy which is much more prominent in the bilateral parietal lobes suggestive of Alzheimer's disease.  Radiology reports reviewed.  CTA shows no flow-limiting stenosis in the extracranial or intracranial circulation.    Diagnoses:  Stroke, left anterior choroidal artery, embolic  Stroke, left posterior cerebral artery, embolic  Paroxysmal atrial fibrillation  Alzheimer's dementia, moderate severity, without behavioral disturbance  Chronic bilateral hearing loss    Comment: With regard to her stroke this is secondary to A-fib and the reason she was likely found down.  I suspect she may not have been taking her Eliquis consistently due to memory difficulty.  Her deficits on my exam seem mostly related to her visual fields.  The prognosis of that is indeterminate but more likely she will improve.  I do think with her baseline cognitive difficulty and hearing loss her new stroke will lead to a subacute and possibly permanent worsening of her dementia.    The most likely etiology of her memory loss is Alzheimer's disease based on history, exam, and neuroimaging. We can assess this further in the outpatient setting but given her stroke I would recommend placement in SNF on discharge; I do not think the patient can return to independent living.    Plan:  1. Continue Eliquis 5 bid  2. High dose statin  3. No driving, not able to live independently from a neurology standpoint  4. PT/OT    Management discussed with patient's son. Patient will need outpatient neurology follow up for the above issues including further cognitive testing. Will arrange.    No further inpatient neurologic workup needed. Will see prn.

## 2025-06-14 NOTE — PROGRESS NOTES
Name: Marylu Foreman ADMIT: 2025   : 1945  PCP: Arleen Dillon MD    MRN: [id number] LOS: 1 days   AGE/SEX: 80 y.o. female  ROOM: [facility]     Subjective   Continues to show improvement in mentation    Objective   Objective   Vital Signs  Temp:  [97.3 °F (36.3 °C)-98.8 °F (37.1 °C)] 97.9 °F (36.6 °C)  Heart Rate:  [77-99] 92  Resp:  [16-20] 16  BP: (136-151)/(66-98) 141/73  SpO2:  [94 %-98 %] 97 %  on   ;   Device (Oxygen Therapy): room air  Body mass index is 33.99 kg/m².  Physical Exam  Constitutional:       General: She is not in acute distress.  HENT:      Head: Normocephalic and atraumatic.   Cardiovascular:      Rate and Rhythm: Normal rate and regular rhythm.   Pulmonary:      Effort: Pulmonary effort is normal. No respiratory distress.   Abdominal:      General: There is no distension.      Palpations: Abdomen is soft.      Tenderness: There is no abdominal tenderness.   Neurological:      Mental Status: She is alert and oriented to person, place, and time.      Motor: Weakness present.     Exam reviewed and updated on 25    Results Review     I reviewed the patient's new clinical results.  Results from last 7 days   Lab Units 25  0600 25  0612 25  0110   WBC 10*3/mm3 11.74* 13.44* 13.68*   HEMOGLOBIN g/dL 11.2* 11.6* 12.2   PLATELETS 10*3/mm3 380 375 411     Results from last 7 days   Lab Units 25  1900 25  0600 25  0612 25  0110   SODIUM mmol/L  --  143 141 138   POTASSIUM mmol/L 4.6 3.5 3.6 3.7   CHLORIDE mmol/L  --  104 104 99   CO2 mmol/L  --  20.0* 19.9* 20.0*   BUN mg/dL  --  16.0 12.0 12.0   CREATININE mg/dL  --  1.19* 1.02* 1.21*   GLUCOSE mg/dL  --  94 104* 128*   Estimated Creatinine Clearance: 42.3 mL/min (A) (by C-G formula based on SCr of 1.19 mg/dL (H)).  Results from last 7 days   Lab Units 25  0110   ALBUMIN g/dL 3.8   BILIRUBIN mg/dL 1.0   ALK PHOS U/L 94   AST (SGOT) U/L 40*   ALT (SGPT) U/L 18     Results from last 7 days    Lab Units 06/13/25  0600 06/12/25  0612 06/12/25  0110   CALCIUM mg/dL 7.8* 7.8* 8.1*   ALBUMIN g/dL  --   --  3.8   MAGNESIUM mg/dL  --   --  1.9     Results from last 7 days   Lab Units 06/12/25  0110   PROCALCITONIN ng/mL 0.11   LACTATE mmol/L 1.7     COVID19   Date Value Ref Range Status   06/12/2025 Not Detected Not Detected - Ref. Range Final   08/10/2022 Not Detected Not Detected - Ref. Range Final     Glucose   Date/Time Value Ref Range Status   06/12/2025 0044 110 70 - 130 mg/dL Final     Results for orders placed or performed during the hospital encounter of 06/12/25   Blood Culture - Blood, Hand, Right    Specimen: Hand, Right; Blood   Result Value Ref Range    Blood Culture No growth at 2 days          CT Angiogram Carotids, CT Angiogram Head  Narrative: HEAD CT WITHOUT CONTRAST, HEAD & NECK CTA WITH CONTRAST     INDICATION:  Acute neurological deficit.     TECHNIQUE:  Axial images were acquired from the skull base to vertex without  contrast, including multiplanar reformats, per standard departmental  protocol , followed by CT angiogram of the head and neck with IV  contrast. 3-D postprocessing was performed and reviewed.  Evaluation for  a significant carotid arterial stenosis is based on the NASCET criteria.   Radiation dose reduction techniques were utilized, including automated  exposure control, and exposure modulation based on body size.     COMPARISON:  Head CT 6/12/2025     FINDINGS:     Head CT: There is no evidence of intracranial hemorrhage or mass. There  is mild volume loss, but there is no hydrocephalus or extra-axial fluid  collection. Chronic left basal ganglier lacunar lesions unchanged.     CTA neck: There is a normal aortic arch branching pattern without  proximal great vessel stenosis. Both vertebral arteries are patent  throughout the neck, and the right vertebral artery supplies the basilar  artery, while the left appears to terminate in the PICA.  Both common  carotid arteries  are normally patent. There is plaque at both cervical  carotid bifurcations, with 0% stenosis in both internal carotid  arteries.     CTA head:  There is symmetric distal intracranial runoff in the  anterior, middle, and posterior cerebral artery territories.  There is  no evidence of intracranial aneurysm, or of high-grade intracranial  flow-limiting stenosis.  There is no evidence of branch vessel  occlusion, or region or zone of hypoperfusion.  The dural venous sinuses  appear normal.     Extravascular structures: There is no intracranial mass or abnormal  enhancement.   The extracranial and cervical soft tissue structures show  no acute abnormality.  The visualized lung apices show no acute  abnormality.  There are cervical spinal degenerative changes, but there  is no acute bony abnormality.     Impression:    Head CT demonstrates chronic changes as noted above, but there is no  acute intracranial abnormality.      There is plaque at both cervical carotid bifurcations, but 0% stenosis  in both internal carotid arteries. Both vertebral arteries are patent  throughout the neck.     Normal head CT angiogram, no acute intracranial vascular abnormality.     This report was finalized on 6/14/2025 1:28 AM by Dr. Alexis Matamoros M.D on Workstation: [id number]       Scheduled Medications  anastrozole, 1 mg, Oral, Daily  apixaban, 5 mg, Oral, Q12H  atorvastatin, 40 mg, Oral, Nightly  dilTIAZem CD, 180 mg, Oral, BID  folic acid, 400 mcg, Oral, Daily  levothyroxine, 50 mcg, Oral, Q AM  losartan, 100 mg, Oral, Q24H  sodium bicarbonate, 650 mg, Oral, Daily  sodium chloride, 10 mL, Intravenous, Q12H    Infusions  sodium chloride, 100 mL/hr, Last Rate: 100 mL/hr (06/14/25 [facility])    Diet  Diet: Cardiac; Healthy Heart (2-3 Na+); Fluid Consistency: Thin (IDDSI 0)       Assessment/Plan     Active Hospital Problems    Diagnosis  POA    **Altered mental status [R41.82]  Yes    CKD (chronic kidney disease) stage 3, GFR 30-59  ml/min [N18.30]  Yes    GURDEEP (obstructive sleep apnea) [G47.33]  Yes    Permanent atrial fibrillation [I48.21]  Yes    Hypothyroidism [E03.9]  Yes    Malignant neoplasm of overlapping sites of left breast in female, estrogen receptor positive [C50.812, Z17.0]  Not Applicable      Resolved Hospital Problems   No resolved problems to display.       80 y.o. female admitted with Altered mental status.    CVA  Multiple embolic strokes were noted on MRI of the brain.  Neurology has been consulted.  CT angiogram of the head and neck have been ordered.  Echocardiogram will also be completed.  Continue Eliquis   normalize blood pressure  PT/OT/SLP    Troponin elevation  Flat. TTE with EF 61-65%, indeterminant diastolic function.   EKG with atrial fibrillation, no significant ST-T wave changes    Elevated D-dimer  She has a history of PE and DVT. Will continue eilquis.     CKD 3b  Scr slightly better than previous baseline     Lung nodules  Surveillance recommended     Acute metabolic acidosis  Resume home sodium bicarbonate  Possible due to starvation ketosis with ketonuria evident in UA     Atrial fibrillation  HX DVT 2022  Eliquis, diltiazem  - unclear compliance with AC- check D-dimer; elevated  - resume eliquis; at this time I am going to hold off on ordering any more contrasted studies in the setting of stage III CKD. She will undergo CTA of head and neck     Hypothyroidism  Check TSH- normal      Eliquis (home med) for DVT prophylaxis.  Full code.  Discussed with patient and family.  Anticipate discharge to be determined.       Jin Claudio MD  [facility]  06/14/25  11:50 EDT

## 2025-06-14 NOTE — SIGNIFICANT NOTE
06/14/25 1453   OTHER   Discipline occupational therapist   Rehab Time/Intention   Session Not Performed other (see comments)  (Initiated eval but unable to tolerate at this time 2/2 severe neck pain. RN aware; will cont to follow)   Recommendation   OT - Next Appointment 06/15/25

## 2025-06-14 NOTE — PLAN OF CARE
AAOX2. RA. A-fib rate controlled. NIH-3. Continues IV fluids. Toradol ordered for neck pain. Effective. Needs to work with PT/OT tomorrow.       Goal Outcome Evaluation:  Plan of Care Reviewed With: patient, family        Progress: improving       Problem: Adult Inpatient Plan of Care  Goal: Plan of Care Review  Outcome: Progressing  Flowsheets (Taken 6/14/2025 1841)  Progress: improving  Plan of Care Reviewed With:   patient   family

## 2025-06-15 LAB
ANION GAP SERPL CALCULATED.3IONS-SCNC: 11 MMOL/L (ref 5–15)
BUN SERPL-MCNC: 13 MG/DL (ref 8–23)
BUN/CREAT SERPL: 13.5 (ref 7–25)
CALCIUM SPEC-SCNC: 7.9 MG/DL (ref 8.6–10.5)
CHLORIDE SERPL-SCNC: 107 MMOL/L (ref 98–107)
CO2 SERPL-SCNC: 24 MMOL/L (ref 22–29)
CREAT SERPL-MCNC: 0.96 MG/DL (ref 0.57–1)
DEPRECATED RDW RBC AUTO: 44.8 FL (ref 37–54)
EGFRCR SERPLBLD CKD-EPI 2021: 60.7 ML/MIN/1.73
ERYTHROCYTE [DISTWIDTH] IN BLOOD BY AUTOMATED COUNT: 12.9 % (ref 12.3–15.4)
GLUCOSE SERPL-MCNC: 113 MG/DL (ref 65–99)
HCT VFR BLD AUTO: 34.1 % (ref 34–46.6)
HGB BLD-MCNC: 11.2 G/DL (ref 12–15.9)
MCH RBC QN AUTO: 31.4 PG (ref 26.6–33)
MCHC RBC AUTO-ENTMCNC: 32.8 G/DL (ref 31.5–35.7)
MCV RBC AUTO: 95.5 FL (ref 79–97)
PLATELET # BLD AUTO: 367 10*3/MM3 (ref 140–450)
PMV BLD AUTO: 10.9 FL (ref 6–12)
POTASSIUM SERPL-SCNC: 3.7 MMOL/L (ref 3.5–5.2)
RBC # BLD AUTO: 3.57 10*6/MM3 (ref 3.77–5.28)
SODIUM SERPL-SCNC: 142 MMOL/L (ref 136–145)
WBC NRBC COR # BLD AUTO: 10.58 10*3/MM3 (ref 3.4–10.8)

## 2025-06-15 PROCEDURE — 97166 OT EVAL MOD COMPLEX 45 MIN: CPT

## 2025-06-15 PROCEDURE — 80048 BASIC METABOLIC PNL TOTAL CA: CPT | Performed by: STUDENT IN AN ORGANIZED HEALTH CARE EDUCATION/TRAINING PROGRAM

## 2025-06-15 PROCEDURE — 85027 COMPLETE CBC AUTOMATED: CPT | Performed by: STUDENT IN AN ORGANIZED HEALTH CARE EDUCATION/TRAINING PROGRAM

## 2025-06-15 PROCEDURE — 97530 THERAPEUTIC ACTIVITIES: CPT

## 2025-06-15 PROCEDURE — 25010000002 KETOROLAC TROMETHAMINE PER 15 MG: Performed by: STUDENT IN AN ORGANIZED HEALTH CARE EDUCATION/TRAINING PROGRAM

## 2025-06-15 PROCEDURE — 25810000003 SODIUM CHLORIDE 0.9 % SOLUTION: Performed by: STUDENT IN AN ORGANIZED HEALTH CARE EDUCATION/TRAINING PROGRAM

## 2025-06-15 PROCEDURE — 97162 PT EVAL MOD COMPLEX 30 MIN: CPT

## 2025-06-15 PROCEDURE — 97535 SELF CARE MNGMENT TRAINING: CPT

## 2025-06-15 RX ADMIN — SODIUM BICARBONATE 650 MG: 650 TABLET ORAL at 08:35

## 2025-06-15 RX ADMIN — LEVOTHYROXINE SODIUM 50 MCG: 50 TABLET ORAL at 03:56

## 2025-06-15 RX ADMIN — APIXABAN 5 MG: 5 TABLET, FILM COATED ORAL at 20:39

## 2025-06-15 RX ADMIN — ATORVASTATIN CALCIUM 40 MG: 20 TABLET, FILM COATED ORAL at 20:39

## 2025-06-15 RX ADMIN — Medication 400 MCG: at 08:35

## 2025-06-15 RX ADMIN — ANASTROZOLE 1 MG: 1 TABLET, FILM COATED ORAL at 08:35

## 2025-06-15 RX ADMIN — KETOROLAC TROMETHAMINE 15 MG: 15 INJECTION INTRAMUSCULAR at 10:49

## 2025-06-15 RX ADMIN — Medication 10 ML: at 08:35

## 2025-06-15 RX ADMIN — DILTIAZEM HYDROCHLORIDE 180 MG: 180 CAPSULE, EXTENDED RELEASE ORAL at 20:39

## 2025-06-15 RX ADMIN — APIXABAN 5 MG: 5 TABLET, FILM COATED ORAL at 08:35

## 2025-06-15 RX ADMIN — ACETAMINOPHEN 650 MG: 325 TABLET ORAL at 08:35

## 2025-06-15 RX ADMIN — DILTIAZEM HYDROCHLORIDE 180 MG: 180 CAPSULE, EXTENDED RELEASE ORAL at 08:35

## 2025-06-15 RX ADMIN — SODIUM CHLORIDE 100 ML/HR: 9 INJECTION, SOLUTION INTRAVENOUS at 08:33

## 2025-06-15 RX ADMIN — LOSARTAN POTASSIUM 100 MG: 50 TABLET, FILM COATED ORAL at 08:35

## 2025-06-15 RX ADMIN — KETOROLAC TROMETHAMINE 15 MG: 15 INJECTION INTRAMUSCULAR at 03:54

## 2025-06-15 RX ADMIN — KETOROLAC TROMETHAMINE 15 MG: 15 INJECTION INTRAMUSCULAR at 18:28

## 2025-06-15 RX ADMIN — ACETAMINOPHEN 650 MG: 325 TABLET ORAL at 00:15

## 2025-06-15 RX ADMIN — BUPROPION HYDROCHLORIDE 150 MG: 150 TABLET, EXTENDED RELEASE ORAL at 08:35
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
## 2025-06-15 NOTE — THERAPY EVALUATION
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 Occupational Therapist                  Therapy Charges for Today       Code Description Service Date Service Provider Modifiers Qty    48455618646 HC OT SELF CARE/MGMT/TRAIN EA 15 MIN 6/15/2025 Nick Toussaint OT GO 1    04043600195 HC OT EVAL MOD COMPLEXITY 3 6/15/2025 Nick Toussaint OT GO 1                 Nick Toussaint OT  6/15/2025

## 2025-06-15 NOTE — PLAN OF CARE
Goal Outcome Evaluation:  Plan of Care Reviewed With: patient           Outcome Evaluation: Pt is a 80yof admitted 2/2 L corona radiata, thalamus, basal ganglia, temporal/occipital acute infarct. Pt found down at home possible >24hours and with traumatic rhabdomyolysis. PMH includes a-fib, hypertension, depression, breast cancer, hyperlipidemia, hypothyroidism, anemia, atrial fibrillation, GURDEEP, CKD, PE, GERD, and dementia. Pt with AFO for L foot drop. Per pt she lives alone and is IND with I/ADLs. Pt with possible decline in memory for the past 6m. Pt with impaired R sided proprioception, strength, and vision impairments. Pt with impaired insight and unable to assess her deficits. Pt was able to complete sit<>stand and take steps to chair with HHA and mod A but overall unsteady. Pt was able to complete 4steps forward/back with RW and Min A. Pt demonstrating impaired endurance, balance deficits, weakness, impaired functional mobility, cognitive/safety deficits, and impaired ADL IND, indicating the need for OT. Pt at this time is not safe to return home and would benefit from d/c to [facility].    Anticipated Discharge Disposition (OT): sub acute care setting, skilled nursing facility

## 2025-06-15 NOTE — PLAN OF CARE
Goal Outcome Evaluation:  Plan of Care Reviewed With: patient        Progress: improving  Outcome Evaluation: Marylu Foreman is a 80 y.o. female with a medical history of hypertension, depression, breast cancer, hyperlipidemia, hypothyroidism, anemia, atrial fibrillation, GURDEEP, CKD, PE, GERD who presents to the ED c/o acute altered mental status.  Patient lives at home alone with 2 CRYSTAL and basement laundry. She is normally IADLs, ambulatory, conversive, able to drive independently. Prior to hospital stay, utilized SPC or RWx AD at baseline. Pt UIC upon entry, required SBA for STS transfers, and CGA for ambulation with RWx for 10 ft. Pt able to complete 10 alt standing march in place with CGA. Pt presents today with below baseline strength, dec endurance, and decreased functional mobility. Pt will benefit from skilled PT to address the previous deficits and improve overall safety with functional mobility. Anticipate pt will D/C to IRF pending progress.    Anticipated Discharge Disposition (PT): inpatient rehabilitation facility

## 2025-06-15 NOTE — PLAN OF CARE
AAOX1-2. RA. A-fib rate controlled. Given Tylenol and Toradol for neck pain. Worked with PT/OT today and got up in the chair. Encouraged to eat and drink more since IV fluids have been discontinued. Will Need rehab placement.     Goal Outcome Evaluation:  Plan of Care Reviewed With: patient, family        Progress: improving          Problem: Adult Inpatient Plan of Care  Goal: Plan of Care Review  Outcome: Progressing  Flowsheets (Taken 6/15/2025 1815)  Progress: improving  Plan of Care Reviewed With:   patient   family

## 2025-06-15 NOTE — PROGRESS NOTES
Name: Marylu Foreman ADMIT: 2025   : 1945  PCP: Arleen Dillon MD    MRN: [id number] LOS: 2 days   AGE/SEX: 80 y.o. female  ROOM: [facility]     Subjective   Tearful this morning.     Objective   Objective   Vital Signs  Temp:  [98.1 °F (36.7 °C)-98.8 °F (37.1 °C)] 98.1 °F (36.7 °C)  Heart Rate:  [63-82] 79  Resp:  [16] 16  BP: (133-151)/(65-93) 133/68  SpO2:  [95 %-100 %] 100 %  on   ;      Body mass index is 33.99 kg/m².  Physical Exam  Constitutional:       General: She is not in acute distress.  HENT:      Head: Normocephalic and atraumatic.   Cardiovascular:      Rate and Rhythm: Normal rate and regular rhythm.   Pulmonary:      Effort: Pulmonary effort is normal. No respiratory distress.   Abdominal:      General: There is no distension.      Palpations: Abdomen is soft.      Tenderness: There is no abdominal tenderness.   Neurological:      Mental Status: She is alert and oriented to person, place, and time.      Motor: Weakness present.     Exam reviewed and updated on 6/15/25    Results Review     I reviewed the patient's new clinical results.  Results from last 7 days   Lab Units 06/15/25  0508 25  0600 25  0612 25  0110   WBC 10*3/mm3 10.58 11.74* 13.44* 13.68*   HEMOGLOBIN g/dL 11.2* 11.2* 11.6* 12.2   PLATELETS 10*3/mm3 367 380 375 411     Results from last 7 days   Lab Units 06/15/25  0508 25  1900 25  0600 25  0612 25  0110   SODIUM mmol/L 142  --  143 141 138   POTASSIUM mmol/L 3.7 4.6 3.5 3.6 3.7   CHLORIDE mmol/L 107  --  104 104 99   CO2 mmol/L 24.0  --  20.0* 19.9* 20.0*   BUN mg/dL 13.0  --  16.0 12.0 12.0   CREATININE mg/dL 0.96  --  1.19* 1.02* 1.21*   GLUCOSE mg/dL 113*  --  94 104* 128*   Estimated Creatinine Clearance: 52.4 mL/min (by C-G formula based on SCr of 0.96 mg/dL).  Results from last 7 days   Lab Units 25  0110   ALBUMIN g/dL 3.8   BILIRUBIN mg/dL 1.0   ALK PHOS U/L 94   AST (SGOT) U/L 40*   ALT (SGPT) U/L 18     Results  "from last 7 days   Lab Units 06/15/25  0508 06/13/25  0600 06/12/25  0612 06/12/25  0110   CALCIUM mg/dL 7.9* 7.8* 7.8* 8.1*   ALBUMIN g/dL  --   --   --  3.8   MAGNESIUM mg/dL  --   --   --  1.9     Results from last 7 days   Lab Units 06/12/25  0110   PROCALCITONIN ng/mL 0.11   LACTATE mmol/L 1.7     COVID19   Date Value Ref Range Status   06/12/2025 Not Detected Not Detected - Ref. Range Final   08/10/2022 Not Detected Not Detected - Ref. Range Final     No results found for: \"HGBA1C\", \"POCGLU\"    Results for orders placed or performed during the hospital encounter of 06/12/25   Blood Culture - Blood, Hand, Right    Specimen: Hand, Right; Blood   Result Value Ref Range    Blood Culture No growth at 3 days          CT Angiogram Carotids, CT Angiogram Head  Narrative: HEAD CT WITHOUT CONTRAST, HEAD & NECK CTA WITH CONTRAST     INDICATION:  Acute neurological deficit.     TECHNIQUE:  Axial images were acquired from the skull base to vertex without  contrast, including multiplanar reformats, per standard departmental  protocol , followed by CT angiogram of the head and neck with IV  contrast. 3-D postprocessing was performed and reviewed.  Evaluation for  a significant carotid arterial stenosis is based on the NASCET criteria.   Radiation dose reduction techniques were utilized, including automated  exposure control, and exposure modulation based on body size.     COMPARISON:  Head CT 6/12/2025     FINDINGS:     Head CT: There is no evidence of intracranial hemorrhage or mass. There  is mild volume loss, but there is no hydrocephalus or extra-axial fluid  collection. Chronic left basal ganglier lacunar lesions unchanged.     CTA neck: There is a normal aortic arch branching pattern without  proximal great vessel stenosis. Both vertebral arteries are patent  throughout the neck, and the right vertebral artery supplies the basilar  artery, while the left appears to terminate in the PICA.  Both common  carotid arteries " are normally patent. There is plaque at both cervical  carotid bifurcations, with 0% stenosis in both internal carotid  arteries.     CTA head:  There is symmetric distal intracranial runoff in the  anterior, middle, and posterior cerebral artery territories.  There is  no evidence of intracranial aneurysm, or of high-grade intracranial  flow-limiting stenosis.  There is no evidence of branch vessel  occlusion, or region or zone of hypoperfusion.  The dural venous sinuses  appear normal.     Extravascular structures: There is no intracranial mass or abnormal  enhancement.   The extracranial and cervical soft tissue structures show  no acute abnormality.  The visualized lung apices show no acute  abnormality.  There are cervical spinal degenerative changes, but there  is no acute bony abnormality.     Impression:    Head CT demonstrates chronic changes as noted above, but there is no  acute intracranial abnormality.      There is plaque at both cervical carotid bifurcations, but 0% stenosis  in both internal carotid arteries. Both vertebral arteries are patent  throughout the neck.     Normal head CT angiogram, no acute intracranial vascular abnormality.     This report was finalized on 6/14/2025 1:28 AM by Dr. Alexis Matamoros M.D on Workstation: [id number]       Scheduled Medications  anastrozole, 1 mg, Oral, Daily  apixaban, 5 mg, Oral, Q12H  atorvastatin, 40 mg, Oral, Nightly  buPROPion XL, 150 mg, Oral, Daily  dilTIAZem CD, 180 mg, Oral, BID  folic acid, 400 mcg, Oral, Daily  levothyroxine, 50 mcg, Oral, Q AM  losartan, 100 mg, Oral, Q24H  sodium bicarbonate, 650 mg, Oral, Daily  sodium chloride, 10 mL, Intravenous, Q12H    Infusions     Diet  Diet: Cardiac; Healthy Heart (2-3 Na+); Fluid Consistency: Thin (IDDSI 0)       Assessment/Plan     Active Hospital Problems    Diagnosis  POA    **Altered mental status [R41.82]  Yes    CKD (chronic kidney disease) stage 3, GFR 30-59 ml/min [N18.30]  Yes    GURDEEP  (obstructive sleep apnea) [G47.33]  Yes    Permanent atrial fibrillation [I48.21]  Yes    Hypothyroidism [E03.9]  Yes    Malignant neoplasm of overlapping sites of left breast in female, estrogen receptor positive [C50.812, Z17.0]  Not Applicable      Resolved Hospital Problems   No resolved problems to display.       80 y.o. female admitted with Altered mental status.    CVA  Multiple embolic strokes were noted on MRI of the brain.  Neurology has been consulted.  CT angiogram of the head and neck have been ordered.  Echocardiogram will also be completed.  Continue Eliquis  Normalize blood pressure  PT/OT/SLP    Troponin elevation  Flat. TTE with EF 61-65%, indeterminant diastolic function.   EKG with atrial fibrillation, no significant ST-T wave changes    Elevated D-dimer  She has a history of PE and DVT. Will continue eilquis.     CKD 3b  Scr slightly better than previous baseline     Lung nodules  Surveillance recommended     Acute metabolic acidosis  Resume home sodium bicarbonate  Possible due to starvation ketosis with ketonuria evident in UA  Resolved      Atrial fibrillation  HX DVT 2022  Eliquis, diltiazem  - unclear compliance with AC- check D-dimer; elevated  - resume eliquis; at this time I am going to hold off on ordering any more contrasted studies in the setting of stage III CKD. She will undergo CTA of head and neck     Hypothyroidism  Check TSH- normal      Eliquis (home med) for DVT prophylaxis.  Full code.  Discussed with patient and family.  Anticipate discharge to be determined.       Jin Claudio MD  [facility]  06/15/25  13:09 EDT

## 2025-06-16 LAB
ANION GAP SERPL CALCULATED.3IONS-SCNC: 8.7 MMOL/L (ref 5–15)
BUN SERPL-MCNC: 14 MG/DL (ref 8–23)
BUN/CREAT SERPL: 13.2 (ref 7–25)
CALCIUM SPEC-SCNC: 8.1 MG/DL (ref 8.6–10.5)
CHLORIDE SERPL-SCNC: 109 MMOL/L (ref 98–107)
CO2 SERPL-SCNC: 23.3 MMOL/L (ref 22–29)
CREAT SERPL-MCNC: 1.06 MG/DL (ref 0.57–1)
DEPRECATED RDW RBC AUTO: 45.7 FL (ref 37–54)
EGFRCR SERPLBLD CKD-EPI 2021: 53.9 ML/MIN/1.73
ERYTHROCYTE [DISTWIDTH] IN BLOOD BY AUTOMATED COUNT: 13.1 % (ref 12.3–15.4)
GLUCOSE SERPL-MCNC: 103 MG/DL (ref 65–99)
HCT VFR BLD AUTO: 32.1 % (ref 34–46.6)
HGB BLD-MCNC: 10.5 G/DL (ref 12–15.9)
MCH RBC QN AUTO: 31.2 PG (ref 26.6–33)
MCHC RBC AUTO-ENTMCNC: 32.7 G/DL (ref 31.5–35.7)
MCV RBC AUTO: 95.3 FL (ref 79–97)
PLATELET # BLD AUTO: 370 10*3/MM3 (ref 140–450)
PMV BLD AUTO: 11.1 FL (ref 6–12)
POTASSIUM SERPL-SCNC: 3.6 MMOL/L (ref 3.5–5.2)
POTASSIUM SERPL-SCNC: 5.4 MMOL/L (ref 3.5–5.2)
RBC # BLD AUTO: 3.37 10*6/MM3 (ref 3.77–5.28)
SODIUM SERPL-SCNC: 141 MMOL/L (ref 136–145)
WBC NRBC COR # BLD AUTO: 8.07 10*3/MM3 (ref 3.4–10.8)

## 2025-06-16 PROCEDURE — 80048 BASIC METABOLIC PNL TOTAL CA: CPT | Performed by: STUDENT IN AN ORGANIZED HEALTH CARE EDUCATION/TRAINING PROGRAM

## 2025-06-16 PROCEDURE — 85027 COMPLETE CBC AUTOMATED: CPT | Performed by: STUDENT IN AN ORGANIZED HEALTH CARE EDUCATION/TRAINING PROGRAM

## 2025-06-16 PROCEDURE — 84132 ASSAY OF SERUM POTASSIUM: CPT | Performed by: STUDENT IN AN ORGANIZED HEALTH CARE EDUCATION/TRAINING PROGRAM

## 2025-06-16 RX ORDER — POTASSIUM CHLORIDE 1500 MG/1
40 TABLET, EXTENDED RELEASE ORAL EVERY 4 HOURS
Status: COMPLETED | OUTPATIENT
Start: 2025-06-16 | End: 2025-06-16

## 2025-06-16 RX ADMIN — APIXABAN 5 MG: 5 TABLET, FILM COATED ORAL at 20:25

## 2025-06-16 RX ADMIN — BUPROPION HYDROCHLORIDE 150 MG: 150 TABLET, EXTENDED RELEASE ORAL at 10:01

## 2025-06-16 RX ADMIN — Medication 10 ML: at 20:25

## 2025-06-16 RX ADMIN — ATORVASTATIN CALCIUM 40 MG: 20 TABLET, FILM COATED ORAL at 20:25

## 2025-06-16 RX ADMIN — DILTIAZEM HYDROCHLORIDE 180 MG: 180 CAPSULE, EXTENDED RELEASE ORAL at 10:01

## 2025-06-16 RX ADMIN — DILTIAZEM HYDROCHLORIDE 180 MG: 180 CAPSULE, EXTENDED RELEASE ORAL at 21:06

## 2025-06-16 RX ADMIN — SODIUM BICARBONATE 650 MG: 650 TABLET ORAL at 10:01

## 2025-06-16 RX ADMIN — ANASTROZOLE 1 MG: 1 TABLET, FILM COATED ORAL at 10:01

## 2025-06-16 RX ADMIN — LOSARTAN POTASSIUM 100 MG: 50 TABLET, FILM COATED ORAL at 10:00

## 2025-06-16 RX ADMIN — Medication 10 ML: at 10:01

## 2025-06-16 RX ADMIN — Medication 400 MCG: at 10:00

## 2025-06-16 RX ADMIN — POTASSIUM CHLORIDE 40 MEQ: 1500 TABLET, EXTENDED RELEASE ORAL at 10:13

## 2025-06-16 RX ADMIN — LEVOTHYROXINE SODIUM 50 MCG: 50 TABLET ORAL at 05:51

## 2025-06-16 RX ADMIN — POTASSIUM CHLORIDE 40 MEQ: 1500 TABLET, EXTENDED RELEASE ORAL at 14:21

## 2025-06-16 RX ADMIN — APIXABAN 5 MG: 5 TABLET, FILM COATED ORAL at 10:01

## 2025-06-16 NOTE — PROGRESS NOTES
Name: Marylu Foreman ADMIT: 2025   : 1945  PCP: Arleen Dillon MD    MRN: [id number] LOS: 3 days   AGE/SEX: 80 y.o. female  ROOM: [facility]     Subjective   No new complaints today. Son at bedside. Not eating or drinking much.     Objective   Objective   Vital Signs  Temp:  [97.9 °F (36.6 °C)-99 °F (37.2 °C)] 98.1 °F (36.7 °C)  Heart Rate:  [] 81  Resp:  [16] 16  BP: (133-172)/(65-85) 145/82  SpO2:  [94 %-100 %] 98 %  on   ;   Device (Oxygen Therapy): room air  Body mass index is 33.99 kg/m².  Physical Exam  Constitutional:       General: She is not in acute distress.  HENT:      Head: Normocephalic and atraumatic.   Cardiovascular:      Rate and Rhythm: Normal rate and regular rhythm.   Pulmonary:      Effort: Pulmonary effort is normal. No respiratory distress.   Abdominal:      General: There is no distension.      Palpations: Abdomen is soft.      Tenderness: There is no abdominal tenderness.   Neurological:      Mental Status: She is alert and oriented to person, place, and time.      Motor: Weakness present.     Exam reviewed and updated on 25    Results Review     I reviewed the patient's new clinical results.  Results from last 7 days   Lab Units 25  0550 06/15/25  0508 25  0600 25  0612   WBC 10*3/mm3 8.07 10.58 11.74* 13.44*   HEMOGLOBIN g/dL 10.5* 11.2* 11.2* 11.6*   PLATELETS 10*3/mm3 370 367 380 375     Results from last 7 days   Lab Units 25  0550 06/15/25  0508 25  1900 25  0600 25  0612   SODIUM mmol/L 141 142  --  143 141   POTASSIUM mmol/L 3.6 3.7 4.6 3.5 3.6   CHLORIDE mmol/L 109* 107  --  104 104   CO2 mmol/L 23.3 24.0  --  20.0* 19.9*   BUN mg/dL 14.0 13.0  --  16.0 12.0   CREATININE mg/dL 1.06* 0.96  --  1.19* 1.02*   GLUCOSE mg/dL 103* 113*  --  94 104*   Estimated Creatinine Clearance: 47.4 mL/min (A) (by C-G formula based on SCr of 1.06 mg/dL (H)).  Results from last 7 days   Lab Units 25  0110   ALBUMIN g/dL 3.8  "  BILIRUBIN mg/dL 1.0   ALK PHOS U/L 94   AST (SGOT) U/L 40*   ALT (SGPT) U/L 18     Results from last 7 days   Lab Units 06/16/25  0550 06/15/25  0508 06/13/25  0600 06/12/25  0612 06/12/25  0110   CALCIUM mg/dL 8.1* 7.9* 7.8* 7.8* 8.1*   ALBUMIN g/dL  --   --   --   --  3.8   MAGNESIUM mg/dL  --   --   --   --  1.9     Results from last 7 days   Lab Units 06/12/25  0110   PROCALCITONIN ng/mL 0.11   LACTATE mmol/L 1.7     COVID19   Date Value Ref Range Status   06/12/2025 Not Detected Not Detected - Ref. Range Final   08/10/2022 Not Detected Not Detected - Ref. Range Final     No results found for: \"HGBA1C\", \"POCGLU\"    Results for orders placed or performed during the hospital encounter of 06/12/25   Blood Culture - Blood, Hand, Right    Specimen: Hand, Right; Blood   Result Value Ref Range    Blood Culture No growth at 4 days          CT Angiogram Carotids, CT Angiogram Head  Narrative: HEAD CT WITHOUT CONTRAST, HEAD & NECK CTA WITH CONTRAST     INDICATION:  Acute neurological deficit.     TECHNIQUE:  Axial images were acquired from the skull base to vertex without  contrast, including multiplanar reformats, per standard departmental  protocol , followed by CT angiogram of the head and neck with IV  contrast. 3-D postprocessing was performed and reviewed.  Evaluation for  a significant carotid arterial stenosis is based on the NASCET criteria.   Radiation dose reduction techniques were utilized, including automated  exposure control, and exposure modulation based on body size.     COMPARISON:  Head CT 6/12/2025     FINDINGS:     Head CT: There is no evidence of intracranial hemorrhage or mass. There  is mild volume loss, but there is no hydrocephalus or extra-axial fluid  collection. Chronic left basal ganglier lacunar lesions unchanged.     CTA neck: There is a normal aortic arch branching pattern without  proximal great vessel stenosis. Both vertebral arteries are patent  throughout the neck, and the right " vertebral artery supplies the basilar  artery, while the left appears to terminate in the PICA.  Both common  carotid arteries are normally patent. There is plaque at both cervical  carotid bifurcations, with 0% stenosis in both internal carotid  arteries.     CTA head:  There is symmetric distal intracranial runoff in the  anterior, middle, and posterior cerebral artery territories.  There is  no evidence of intracranial aneurysm, or of high-grade intracranial  flow-limiting stenosis.  There is no evidence of branch vessel  occlusion, or region or zone of hypoperfusion.  The dural venous sinuses  appear normal.     Extravascular structures: There is no intracranial mass or abnormal  enhancement.   The extracranial and cervical soft tissue structures show  no acute abnormality.  The visualized lung apices show no acute  abnormality.  There are cervical spinal degenerative changes, but there  is no acute bony abnormality.     Impression:    Head CT demonstrates chronic changes as noted above, but there is no  acute intracranial abnormality.      There is plaque at both cervical carotid bifurcations, but 0% stenosis  in both internal carotid arteries. Both vertebral arteries are patent  throughout the neck.     Normal head CT angiogram, no acute intracranial vascular abnormality.     This report was finalized on 6/14/2025 1:28 AM by Dr. Alexis Matamoros M.D on Workstation: [id number]       Scheduled Medications  anastrozole, 1 mg, Oral, Daily  apixaban, 5 mg, Oral, Q12H  atorvastatin, 40 mg, Oral, Nightly  buPROPion XL, 150 mg, Oral, Daily  dilTIAZem CD, 180 mg, Oral, BID  folic acid, 400 mcg, Oral, Daily  levothyroxine, 50 mcg, Oral, Q AM  losartan, 100 mg, Oral, Q24H  potassium chloride ER, 40 mEq, Oral, Q4H  sodium bicarbonate, 650 mg, Oral, Daily  sodium chloride, 10 mL, Intravenous, Q12H    Infusions     Diet  Diet: Cardiac; Healthy Heart (2-3 Na+); Fluid Consistency: Thin (IDDSI 0)       Assessment/Plan      Active Hospital Problems    Diagnosis  POA    **Altered mental status [R41.82]  Yes    CKD (chronic kidney disease) stage 3, GFR 30-59 ml/min [N18.30]  Yes    GURDEEP (obstructive sleep apnea) [G47.33]  Yes    Permanent atrial fibrillation [I48.21]  Yes    Hypothyroidism [E03.9]  Yes    Malignant neoplasm of overlapping sites of left breast in female, estrogen receptor positive [C50.812, Z17.0]  Not Applicable      Resolved Hospital Problems   No resolved problems to display.       80 y.o. female admitted with Altered mental status.    CVA  Multiple embolic strokes were noted on MRI of the brain.  Neurology has been consulted.  CT angiogram of the head and neck have been ordered.  Echocardiogram will also be completed- normal EF. Indeterminate diastolic function   Continue Eliquis  Normalize blood pressure  PT/OT/SLP    Troponin elevation  Flat. TTE with EF 61-65%, indeterminant diastolic function.   EKG with atrial fibrillation, no significant ST-T wave changes    Elevated D-dimer  She has a history of PE and DVT. Will continue eilquis.     CKD 3b  Scr slightly better than previous baseline     Lung nodules  Surveillance recommended     Acute metabolic acidosis  Resume home sodium bicarbonate  Possible due to starvation ketosis with ketonuria evident in UA  Resolved      Atrial fibrillation  HX DVT 2022  Eliquis, diltiazem  - unclear compliance with AC- check D-dimer; elevated  - resume eliquis; at this time I am going to hold off on ordering any more contrasted studies in the setting of stage III CKD. She will undergo CTA of head and neck     Hypothyroidism  Check TSH- normal      Eliquis (home med) for DVT prophylaxis.  Full code.  Discussed with patient and family.  Anticipate discharge to [facility], when arrangements have been made       Jin Claudio MD  [facility]  06/16/25  12:02 EDT

## 2025-06-16 NOTE — PLAN OF CARE
Problem: Confusion Acute  Goal: Optimal Cognitive Function  Outcome: Progressing     Problem: Heart Failure  Goal: Optimal Cardiac Output and Blood Flow  Outcome: Progressing  Goal: Stable Heart Rate and Rhythm  Outcome: Progressing  Goal: Fluid and Electrolyte Balance  Outcome: Progressing  Goal: Optimal Functional Ability  Outcome: Progressing  Goal: Effective Oxygenation and Ventilation  Outcome: Progressing  Intervention: Promote Airway Secretion Clearance  Recent Flowsheet Documentation  Taken 6/15/2025 2000 by Azul Freeman RN  Cough And Deep Breathing: done independently per patient  Intervention: Optimize Oxygenation and Ventilation  Recent Flowsheet Documentation  Taken 6/16/2025 0245 by Azul Freeman RN  Head of Bed (HOB) Positioning: HOB elevated  Taken 6/16/2025 0000 by Azul Freeman RN  Head of Bed (HOB) Positioning: HOB elevated  Taken 6/15/2025 2200 by Azul Freeman RN  Head of Bed (HOB) Positioning: HOB elevated     Problem: Adult Inpatient Plan of Care  Goal: Plan of Care Review  Outcome: Progressing  Flowsheets (Taken 6/16/2025 0312)  Progress: improving  Outcome Evaluation: VSS, AOx2, afib, pt tearful, pt sleeping through night  Plan of Care Reviewed With: patient  Goal: Patient-Specific Goal (Individualized)  Outcome: Progressing  Goal: Absence of Hospital-Acquired Illness or Injury  Outcome: Progressing  Intervention: Identify and Manage Fall Risk  Recent Flowsheet Documentation  Taken 6/16/2025 0245 by Azul Freeman RN  Safety Promotion/Fall Prevention:   nonskid shoes/slippers when out of bed   safety round/check completed  Taken 6/16/2025 0000 by Azul Freeman RN  Safety Promotion/Fall Prevention:   nonskid shoes/slippers when out of bed   safety round/check completed  Taken 6/15/2025 2200 by Azul Freeman RN  Safety Promotion/Fall Prevention:   nonskid shoes/slippers when out of bed   safety round/check completed  Taken 6/15/2025 2000 by Azlu Freeman RN  Safety Promotion/Fall  Prevention: fall prevention program maintained  Intervention: Prevent and Manage VTE (Venous Thromboembolism) Risk  Recent Flowsheet Documentation  Taken 6/15/2025 2000 by Azul Freeman RN  VTE Prevention/Management:   bilateral   SCDs (sequential compression devices) on  Intervention: Prevent Infection  Recent Flowsheet Documentation  Taken 6/15/2025 2000 by Azul Freeman RN  Infection Prevention:   single patient room provided   rest/sleep promoted   personal protective equipment utilized   hand hygiene promoted   environmental surveillance performed  Goal: Optimal Comfort and Wellbeing  Outcome: Progressing  Intervention: Provide Person-Centered Care  Recent Flowsheet Documentation  Taken 6/15/2025 2000 by Azul Freeman RN  Trust Relationship/Rapport:   care explained   choices provided   emotional support provided   empathic listening provided   questions answered   questions encouraged   reassurance provided   thoughts/feelings acknowledged  Goal: Readiness for Transition of Care  Outcome: Progressing     Problem: Skin Injury Risk Increased  Goal: Skin Health and Integrity  Outcome: Progressing  Intervention: Optimize Skin Protection  Recent Flowsheet Documentation  Taken 6/16/2025 0245 by Azul Freeman RN  Head of Bed (HOB) Positioning: HOB elevated  Taken 6/16/2025 0000 by Azul Freeman RN  Head of Bed (HOB) Positioning: HOB elevated  Taken 6/15/2025 2200 by Azul Freeman RN  Head of Bed (HOB) Positioning: HOB elevated     Problem: Fall Injury Risk  Goal: Absence of Fall and Fall-Related Injury  Outcome: Progressing  Intervention: Identify and Manage Contributors  Recent Flowsheet Documentation  Taken 6/15/2025 2000 by Azul Freeman RN  Medication Review/Management: medications reviewed  Intervention: Promote Injury-Free Environment  Recent Flowsheet Documentation  Taken 6/16/2025 0245 by Azul Freeman RN  Safety Promotion/Fall Prevention:   nonskid shoes/slippers when out of bed   safety round/check  completed  Taken 6/16/2025 0000 by Azul Freeman RN  Safety Promotion/Fall Prevention:   nonskid shoes/slippers when out of bed   safety round/check completed  Taken 6/15/2025 2200 by Azul Freeman RN  Safety Promotion/Fall Prevention:   nonskid shoes/slippers when out of bed   safety round/check completed  Taken 6/15/2025 2000 by Azul Freeman RN  Safety Promotion/Fall Prevention: fall prevention program maintained     Problem: Comorbidity Management  Goal: Maintenance of Asthma Control  Outcome: Progressing  Intervention: Maintain Asthma Symptom Control  Recent Flowsheet Documentation  Taken 6/15/2025 2000 by Azul Freeman RN  Medication Review/Management: medications reviewed  Goal: Maintenance of Behavioral Health Symptom Control  Outcome: Progressing  Intervention: Maintain Behavioral Health Symptom Control  Recent Flowsheet Documentation  Taken 6/15/2025 2000 by Azul Freeman RN  Medication Review/Management: medications reviewed  Goal: Maintenance of COPD Symptom Control  Outcome: Progressing  Intervention: Maintain COPD (Chronic Obstructive Pulmonary Disease) Symptom Control  Recent Flowsheet Documentation  Taken 6/15/2025 2000 by Azul Freeman RN  Medication Review/Management: medications reviewed  Goal: Blood Glucose Level Within Target Range  Outcome: Progressing  Intervention: Monitor and Manage Glycemia  Recent Flowsheet Documentation  Taken 6/15/2025 2000 by Azul Freeman RN  Medication Review/Management: medications reviewed  Goal: Maintenance of Heart Failure Symptom Control  Outcome: Progressing  Intervention: Maintain Heart Failure Management  Recent Flowsheet Documentation  Taken 6/15/2025 2000 by Azul Freeman RN  Medication Review/Management: medications reviewed  Goal: Blood Pressure in Desired Range  Outcome: Progressing  Intervention: Maintain Blood Pressure Management  Recent Flowsheet Documentation  Taken 6/15/2025 2000 by Azul Freeman RN  Medication Review/Management: medications  reviewed  Goal: Maintenance of Osteoarthritis Symptom Control  Outcome: Progressing  Intervention: Maintain Osteoarthritis Symptom Control  Recent Flowsheet Documentation  Taken 6/15/2025 2000 by Azul Freeman, RN  Medication Review/Management: medications reviewed   Goal Outcome Evaluation:  Plan of Care Reviewed With: patient        Progress: improving  Outcome Evaluation: VSS, AOx2, afib, pt tearful, pt sleeping through night

## 2025-06-16 NOTE — CASE MANAGEMENT/SOCIAL WORK
Continued Stay Note  [facility]     Patient Name: Marylu Foreman  MRN: [id number]  Today's Date: 6/16/2025    Admit Date: 6/12/2025    Plan: Referrals placed to [facility] and [facility] for SNF. Will need pre-cert.   Discharge Plan       Row Name 06/16/25 1706       Plan    Plan Referrals placed to [facility] and [facility] for SNF. Will need pre-cert.    Patient/Family in Agreement with Plan yes    Plan Comments Spoke with patient and her son Edson at bedside regarding discharge plans/needs. Discussed difference between ARF and SNF. Agreeable with referrals placed to [facility] and [facility] for SNF. Will need pre-cert. Await call back regarding acceptance/bed availability. Received call from patient's son Edson. Says he would like additional information regarding LTC placemrnt. CCP to meet with him tomorrow afternoon to provide resources. Continue to follow....PRC                   Discharge Codes    No documentation.                 Expected Discharge Date and Time       Expected Discharge Date Expected Discharge Time    Jun 17, 2025               Christelle Menon RN

## 2025-06-16 NOTE — DISCHARGE PLACEMENT REQUEST
"Marylu Foreman (80 y.o. Female)       Date of Birth   1945    Social Security Number       Address   [address] [place] [address] [address] [place] [address]    Home Phone   [phone]    MRN   [id number]       [employer]    Marital Status                               Admission Date   6/12/2025    Admission Type   Emergency    Admitting Provider   Jin Claudio MD    Attending Provider   Jin Claudio MD    Department, Room/Bed   [address], P595/1       Discharge Date       Discharge Disposition       Discharge Destination                                 Attending Provider: Jin Claudio MD    Allergies: Bactrim [Sulfamethoxazole-trimethoprim], Amlodipine Besylate-valsartan, Cefdinir, Corticosteroids, Lisinopril, Nsaids, Other    Isolation: None   Infection: None   Code Status: CPR    Ht: 162.6 cm (64\")   Wt: 89.8 kg (198 lb)    Admission Cmt: None   Principal Problem: Altered mental status [R41.82]                   Active Insurance as of 6/12/2025       Primary Coverage       Payor Plan Insurance Group Employer/Plan Group    [employer] MEDICARE REPLACEMENT [employer] Medicare Advantage [employer] PPO [id number]       Payor Plan Address Payor Plan Phone Number Payor Plan Fax Number Effective Dates    [address] [address]   1/1/2023 - None Entered    [facility] [id number]         Subscriber Name Subscriber Birth Date Member ID       MARYLU FOREMAN 1945 [id number]                     Emergency Contacts        (Rel.) Home Phone Work Phone Mobile Phone    KellenEdson Valdemar (Son) [phone] -- [phone]    AIDEN MOLINA (Friend) -- -- [phone]                "

## 2025-06-17 LAB
ANION GAP SERPL CALCULATED.3IONS-SCNC: 11.6 MMOL/L (ref 5–15)
BACTERIA SPEC AEROBE CULT: NORMAL
BACTERIA SPEC AEROBE CULT: NORMAL
BUN SERPL-MCNC: 12 MG/DL (ref 8–23)
BUN/CREAT SERPL: 11.9 (ref 7–25)
CALCIUM SPEC-SCNC: 8.8 MG/DL (ref 8.6–10.5)
CHLORIDE SERPL-SCNC: 107 MMOL/L (ref 98–107)
CO2 SERPL-SCNC: 21.4 MMOL/L (ref 22–29)
CREAT SERPL-MCNC: 1.01 MG/DL (ref 0.57–1)
DEPRECATED RDW RBC AUTO: 45.3 FL (ref 37–54)
EGFRCR SERPLBLD CKD-EPI 2021: 57.1 ML/MIN/1.73
ERYTHROCYTE [DISTWIDTH] IN BLOOD BY AUTOMATED COUNT: 12.7 % (ref 12.3–15.4)
GLUCOSE SERPL-MCNC: 114 MG/DL (ref 65–99)
HCT VFR BLD AUTO: 37.3 % (ref 34–46.6)
HGB BLD-MCNC: 12 G/DL (ref 12–15.9)
MCH RBC QN AUTO: 31.3 PG (ref 26.6–33)
MCHC RBC AUTO-ENTMCNC: 32.2 G/DL (ref 31.5–35.7)
MCV RBC AUTO: 97.1 FL (ref 79–97)
PLATELET # BLD AUTO: 414 10*3/MM3 (ref 140–450)
PMV BLD AUTO: 11.3 FL (ref 6–12)
POTASSIUM SERPL-SCNC: 4.6 MMOL/L (ref 3.5–5.2)
RBC # BLD AUTO: 3.84 10*6/MM3 (ref 3.77–5.28)
SODIUM SERPL-SCNC: 140 MMOL/L (ref 136–145)
WBC NRBC COR # BLD AUTO: 8.14 10*3/MM3 (ref 3.4–10.8)

## 2025-06-17 PROCEDURE — 97530 THERAPEUTIC ACTIVITIES: CPT

## 2025-06-17 PROCEDURE — 80048 BASIC METABOLIC PNL TOTAL CA: CPT | Performed by: STUDENT IN AN ORGANIZED HEALTH CARE EDUCATION/TRAINING PROGRAM

## 2025-06-17 PROCEDURE — 85027 COMPLETE CBC AUTOMATED: CPT | Performed by: STUDENT IN AN ORGANIZED HEALTH CARE EDUCATION/TRAINING PROGRAM

## 2025-06-17 PROCEDURE — 97535 SELF CARE MNGMENT TRAINING: CPT

## 2025-06-17 RX ADMIN — LOSARTAN POTASSIUM 100 MG: 50 TABLET, FILM COATED ORAL at 11:02

## 2025-06-17 RX ADMIN — APIXABAN 5 MG: 5 TABLET, FILM COATED ORAL at 11:02

## 2025-06-17 RX ADMIN — LEVOTHYROXINE SODIUM 50 MCG: 50 TABLET ORAL at 05:44

## 2025-06-17 RX ADMIN — Medication 10 ML: at 20:35

## 2025-06-17 RX ADMIN — Medication 10 ML: at 11:03

## 2025-06-17 RX ADMIN — Medication 400 MCG: at 11:02

## 2025-06-17 RX ADMIN — DILTIAZEM HYDROCHLORIDE 180 MG: 180 CAPSULE, EXTENDED RELEASE ORAL at 17:25

## 2025-06-17 RX ADMIN — ATORVASTATIN CALCIUM 40 MG: 20 TABLET, FILM COATED ORAL at 20:35

## 2025-06-17 RX ADMIN — BUPROPION HYDROCHLORIDE 150 MG: 150 TABLET, EXTENDED RELEASE ORAL at 11:02

## 2025-06-17 RX ADMIN — SODIUM BICARBONATE 650 MG: 650 TABLET ORAL at 11:02

## 2025-06-17 RX ADMIN — ANASTROZOLE 1 MG: 1 TABLET, FILM COATED ORAL at 11:02

## 2025-06-17 RX ADMIN — APIXABAN 5 MG: 5 TABLET, FILM COATED ORAL at 20:35

## 2025-06-17 NOTE — PLAN OF CARE
Goal Outcome Evaluation:           Progress: improving  Outcome Evaluation: Pt alert and oriented x2-4; wax and wanes. CVA (+), no orders for NIH noted. SA on tele. Eliquis ordered. Adequate urine output. Very tearful today. Precert pending.
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
## 2025-06-17 NOTE — CASE MANAGEMENT/SOCIAL WORK
Continued Stay Note  [facility]     Patient Name: Marylu Foreman  MRN: [id number]  Today's Date: 6/17/2025    Admit Date: 6/12/2025    Plan: [facility] accepted pending pre-cert (submitted 6/17/25).   Discharge Plan       Row Name 06/17/25 1126       Plan    Plan [facility] accepted pending pre-cert (submitted 6/17/25).    Patient/Family in Agreement with Plan yes    Plan Comments Received message from Willi/[facility] stating able to accept pending pre-cert. Message sent to MD who says to start pre-cert. Packet: office  Pharmacy: updated  Pre-cert: submitted 6/17/25  Transport: need to arrange.....[facility]                   Discharge Codes    No documentation.                 Expected Discharge Date and Time       Expected Discharge Date Expected Discharge Time    Jun 20, 2025               Christelle Menon RN

## 2025-06-17 NOTE — PLAN OF CARE
The pt participated in OT/PT this afternoon. She completed bed mobility with Min A. Max A was provided for LBD. Min A x2 required to stand utilizing a walker and Mod A x2 to mobilize across her hospital room and over to her bedside chair. The pt demonstrated R side inattention while mobilizing - cues to visually scan to her R side and keep her R hand positioned on the walker. While sitting in her bedside chair, the pt was provided a hair brush. She engaged in hair care using her RUE, however, she dropped her brush several times without noticing. IRF is recommended.

## 2025-06-17 NOTE — PROGRESS NOTES
Name: Marylu Foreman ADMIT: 2025   : 1945  PCP: Arleen Dillon MD    MRN: [id number] LOS: 4 days   AGE/SEX: 80 y.o. female  [facility]     Subjective   Subjective   Patient seen and examined at bedside, no overnight events, she does not appear to be in distress.  She is hemodynamically stable on room air.  Pre-CERT pending to SNF.       Objective   Objective   Vital Signs  Temp:  [98 °F (36.7 °C)-99.3 °F (37.4 °C)] 98 °F (36.7 °C)  Heart Rate:  [75-84] 76  Resp:  [16-20] 16  BP: (114-162)/(60-88) 144/88  SpO2:  [94 %-98 %] 98 %  on   ;   Device (Oxygen Therapy): room air  Body mass index is 35.5 kg/m².  Physical Exam  Constitutional:       General: She is not in acute distress.     Comments: AAO x 2, she is with dementia   HENT:      Head: Normocephalic and atraumatic.      Nose: Nose normal. No congestion.      Mouth/Throat:      Pharynx: Oropharynx is clear. No oropharyngeal exudate.   Eyes:      General: No scleral icterus.  Cardiovascular:      Rate and Rhythm: Normal rate and regular rhythm.      Heart sounds: No murmur heard.     No friction rub. No gallop.   Pulmonary:      Effort: No respiratory distress.      Breath sounds: No wheezing, rhonchi or rales.   Abdominal:      General: There is no distension.      Tenderness: There is no abdominal tenderness. There is no guarding.   Musculoskeletal:         General: No swelling or deformity.      Cervical back: Normal range of motion. No rigidity.      Right lower leg: No edema.      Left lower leg: No edema.   Skin:     Coloration: Skin is not jaundiced.      Findings: No bruising or lesion.   Neurological:      Motor: Weakness present.       Results Review     I reviewed the patient's new clinical results.  Results from last 7 days   Lab Units 25  0456 25  0550 06/15/25  0508 25  0600   WBC 10*3/mm3 8.14 8.07 10.58 11.74*   HEMOGLOBIN g/dL 12.0 10.5* 11.2* 11.2*   PLATELETS 10*3/mm3 414 370 367 380     Results from last 7 days  "  Lab Units 06/17/25  0456 06/16/25  1905 06/16/25  0550 06/15/25  0508 06/13/25  1900 06/13/25  0600   SODIUM mmol/L 140  --  141 142  --  143   POTASSIUM mmol/L 4.6 5.4* 3.6 3.7   < > 3.5   CHLORIDE mmol/L 107  --  109* 107  --  104   CO2 mmol/L 21.4*  --  23.3 24.0  --  20.0*   BUN mg/dL 12.0  --  14.0 13.0  --  16.0   CREATININE mg/dL 1.01*  --  1.06* 0.96  --  1.19*   GLUCOSE mg/dL 114*  --  103* 113*  --  94   EGFR mL/min/1.73 57.1*  --  53.9* 60.7  --  46.9*    < > = values in this interval not displayed.     Results from last 7 days   Lab Units 06/12/25  0110   ALBUMIN g/dL 3.8   BILIRUBIN mg/dL 1.0   ALK PHOS U/L 94   AST (SGOT) U/L 40*   ALT (SGPT) U/L 18     Results from last 7 days   Lab Units 06/17/25  0456 06/16/25  0550 06/15/25  0508 06/13/25  0600 06/12/25  0612 06/12/25  0110   CALCIUM mg/dL 8.8 8.1* 7.9* 7.8*   < > 8.1*   ALBUMIN g/dL  --   --   --   --   --  3.8   MAGNESIUM mg/dL  --   --   --   --   --  1.9    < > = values in this interval not displayed.     Results from last 7 days   Lab Units 06/12/25  0110   PROCALCITONIN ng/mL 0.11   LACTATE mmol/L 1.7     No results found for: \"HGBA1C\", \"POCGLU\"    No radiology results for the last day    I have personally reviewed all medications:  Scheduled Medications  anastrozole, 1 mg, Oral, Daily  apixaban, 5 mg, Oral, Q12H  atorvastatin, 40 mg, Oral, Nightly  buPROPion XL, 150 mg, Oral, Daily  dilTIAZem CD, 180 mg, Oral, BID  folic acid, 400 mcg, Oral, Daily  levothyroxine, 50 mcg, Oral, Q AM  losartan, 100 mg, Oral, Q24H  sodium bicarbonate, 650 mg, Oral, Daily  sodium chloride, 10 mL, Intravenous, Q12H    Infusions   Diet  Diet: Cardiac; Healthy Heart (2-3 Na+); Fluid Consistency: Thin (IDDSI 0)    I have personally reviewed:  [x]  Laboratory   [x]  Microbiology   [x]  Radiology   [x]  EKG/Telemetry  [x]  Cardiology/Vascular   []  Pathology    []  Records       Assessment/Plan     Active Hospital Problems    Diagnosis  POA    **Altered mental " status [R41.82]  Yes    CKD (chronic kidney disease) stage 3, GFR 30-59 ml/min [N18.30]  Yes    GURDEEP (obstructive sleep apnea) [G47.33]  Yes    Permanent atrial fibrillation [I48.21]  Yes    Hypothyroidism [E03.9]  Yes    Malignant neoplasm of overlapping sites of left breast in female, estrogen receptor positive [C50.812, Z17.0]  Not Applicable      Resolved Hospital Problems   No resolved problems to display.       80 y.o. female admitted with Altered mental status.    CVA  -Multiple embolic strokes were noted on MRI of the brain.  Neurology has been consulted.  -CTA head/neck shows plaque at both cervical carotid bifurcations with 0% stenosis in both internal carotid arteries, patent vertebral arteries  -Echocardiogram will also be completed- normal EF. Indeterminate diastolic function   -Continue Eliquis 5 mg p.o. twice daily  -Statin  -Normalize blood pressure  -PT/OT/SLP  -Neurology saw and evaluated patient.  Suspects that she was not taking her Eliquis in the setting of her dementia, neurology has signed off  -Pre-CERT pending to SNF     Troponin elevation  -Flat. TTE with EF 61-65%, indeterminant diastolic function.   -EKG with atrial fibrillation, no significant ST-T wave changes  -Denies chest pain     Elevated D-dimer  -She has a history of PE and DVT. Will continue eilquis.  -Hemodynamically stable and on room air     CKD 3b  Creatinine stable, monitor     Lung nodules  Surveillance recommended     Acute metabolic acidosis  Resume home sodium bicarbonate  Possible due to starvation ketosis with ketonuria evident in UA  Resolved      Atrial fibrillation  HX DVT 2022  -Eliquis, diltiazem  - unclear compliance with AC- check D-dimer; elevated  - resume eliquis; at this time I am going to hold off on ordering any more contrasted studies in the setting of stage III CKD. She will undergo CTA of head and neck     Hypothyroidism  -Check TSH- normal      Eliquis (home med) for DVT prophylaxis.  Full  code.  Discussed with patient and nursing staff.  Anticipate discharge to SNU facility in 1-2 days.  Expected Discharge Date: 6/20/2025; Expected Discharge Time:       DO [facility]  06/17/25  14:41 EDT

## 2025-06-17 NOTE — PLAN OF CARE
Goal Outcome Evaluation:    Plan of Care Reviewed With: patient  Progress: improving

## 2025-06-18 LAB
ANION GAP SERPL CALCULATED.3IONS-SCNC: 12 MMOL/L (ref 5–15)
BUN SERPL-MCNC: 12 MG/DL (ref 8–23)
BUN/CREAT SERPL: 11.4 (ref 7–25)
CALCIUM SPEC-SCNC: 8.7 MG/DL (ref 8.6–10.5)
CHLORIDE SERPL-SCNC: 105 MMOL/L (ref 98–107)
CO2 SERPL-SCNC: 22 MMOL/L (ref 22–29)
CREAT SERPL-MCNC: 1.05 MG/DL (ref 0.57–1)
DEPRECATED RDW RBC AUTO: 45.1 FL (ref 37–54)
EGFRCR SERPLBLD CKD-EPI 2021: 54.5 ML/MIN/1.73
ERYTHROCYTE [DISTWIDTH] IN BLOOD BY AUTOMATED COUNT: 12.7 % (ref 12.3–15.4)
GLUCOSE SERPL-MCNC: 113 MG/DL (ref 65–99)
HCT VFR BLD AUTO: 38.1 % (ref 34–46.6)
HGB BLD-MCNC: 12.2 G/DL (ref 12–15.9)
MCH RBC QN AUTO: 30.9 PG (ref 26.6–33)
MCHC RBC AUTO-ENTMCNC: 32 G/DL (ref 31.5–35.7)
MCV RBC AUTO: 96.5 FL (ref 79–97)
PLATELET # BLD AUTO: 415 10*3/MM3 (ref 140–450)
PMV BLD AUTO: 10.5 FL (ref 6–12)
POTASSIUM SERPL-SCNC: 4.5 MMOL/L (ref 3.5–5.2)
RBC # BLD AUTO: 3.95 10*6/MM3 (ref 3.77–5.28)
SODIUM SERPL-SCNC: 139 MMOL/L (ref 136–145)
WBC NRBC COR # BLD AUTO: 9.47 10*3/MM3 (ref 3.4–10.8)

## 2025-06-18 PROCEDURE — 80048 BASIC METABOLIC PNL TOTAL CA: CPT | Performed by: STUDENT IN AN ORGANIZED HEALTH CARE EDUCATION/TRAINING PROGRAM

## 2025-06-18 PROCEDURE — 85027 COMPLETE CBC AUTOMATED: CPT | Performed by: STUDENT IN AN ORGANIZED HEALTH CARE EDUCATION/TRAINING PROGRAM

## 2025-06-18 RX ADMIN — ANASTROZOLE 1 MG: 1 TABLET, FILM COATED ORAL at 09:20

## 2025-06-18 RX ADMIN — BUPROPION HYDROCHLORIDE 150 MG: 150 TABLET, EXTENDED RELEASE ORAL at 09:20

## 2025-06-18 RX ADMIN — SODIUM BICARBONATE 650 MG: 650 TABLET ORAL at 09:20

## 2025-06-18 RX ADMIN — APIXABAN 5 MG: 5 TABLET, FILM COATED ORAL at 09:20

## 2025-06-18 RX ADMIN — APIXABAN 5 MG: 5 TABLET, FILM COATED ORAL at 20:20

## 2025-06-18 RX ADMIN — DILTIAZEM HYDROCHLORIDE 180 MG: 180 CAPSULE, EXTENDED RELEASE ORAL at 17:55

## 2025-06-18 RX ADMIN — Medication 10 ML: at 09:25

## 2025-06-18 RX ADMIN — LOSARTAN POTASSIUM 100 MG: 50 TABLET, FILM COATED ORAL at 09:20

## 2025-06-18 RX ADMIN — Medication 5 MG: at 02:05

## 2025-06-18 RX ADMIN — DILTIAZEM HYDROCHLORIDE 180 MG: 180 CAPSULE, EXTENDED RELEASE ORAL at 06:11

## 2025-06-18 RX ADMIN — Medication 10 ML: at 20:22

## 2025-06-18 RX ADMIN — ATORVASTATIN CALCIUM 40 MG: 20 TABLET, FILM COATED ORAL at 20:20

## 2025-06-18 RX ADMIN — LEVOTHYROXINE SODIUM 50 MCG: 50 TABLET ORAL at 06:11

## 2025-06-18 RX ADMIN — Medication 5 MG: at 20:20

## 2025-06-18 RX ADMIN — Medication 400 MCG: at 09:20
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
## 2025-06-18 NOTE — PROGRESS NOTES
Name: Marylu Foreman ADMIT: 2025   : 1945  PCP: Arleen Dillon MD    MRN: [id number] LOS: 5 days   AGE/SEX: 80 y.o. female  ROOM: On license of [facility]     Subjective   Subjective   Patient seen and examined at bedside, no overnight events, she does not appear to be in distress.  She is hemodynamically stable on room air.  Pre-CERT pending to SNF.    2025  No overnight events, patient resting without complaint.  Nursing reports she was restless overnight but now improved.  Pre-CERT pending to SNF       Objective   Objective   Vital Signs  Temp:  [98 °F (36.7 °C)-98.8 °F (37.1 °C)] 98.6 °F (37 °C)  Heart Rate:  [77-84] 84  Resp:  [16-18] 18  BP: (113-159)/(70-94) 151/70  SpO2:  [97 %-98 %] 97 %  on   ;   Device (Oxygen Therapy): room air  Body mass index is 33.49 kg/m².  Physical Exam  Constitutional:       General: She is not in acute distress.     Comments: AAO x 2-3, she is with dementia   HENT:      Head: Normocephalic and atraumatic.      Nose: Nose normal. No congestion.      Mouth/Throat:      Pharynx: Oropharynx is clear. No oropharyngeal exudate.   Eyes:      General: No scleral icterus.  Cardiovascular:      Rate and Rhythm: Normal rate and regular rhythm.      Heart sounds: No murmur heard.     No friction rub. No gallop.   Pulmonary:      Effort: No respiratory distress.      Breath sounds: No wheezing, rhonchi or rales.   Abdominal:      General: There is no distension.      Tenderness: There is no abdominal tenderness. There is no guarding.   Musculoskeletal:         General: No swelling or deformity.      Cervical back: Normal range of motion. No rigidity.      Right lower leg: No edema.      Left lower leg: No edema.   Skin:     Coloration: Skin is not jaundiced.      Findings: No bruising or lesion.   Neurological:      Motor: Weakness present.       Results Review     I reviewed the patient's new clinical results.  Results from last 7 days   Lab Units 25  0504 25  0458  "06/16/25  0550 06/15/25  0508   WBC 10*3/mm3 9.47 8.14 8.07 10.58   HEMOGLOBIN g/dL 12.2 12.0 10.5* 11.2*   PLATELETS 10*3/mm3 415 414 370 367     Results from last 7 days   Lab Units 06/18/25  0504 06/17/25  0456 06/16/25  1905 06/16/25  0550 06/15/25  0508   SODIUM mmol/L 139 140  --  141 142   POTASSIUM mmol/L 4.5 4.6 5.4* 3.6 3.7   CHLORIDE mmol/L 105 107  --  109* 107   CO2 mmol/L 22.0 21.4*  --  23.3 24.0   BUN mg/dL 12.0 12.0  --  14.0 13.0   CREATININE mg/dL 1.05* 1.01*  --  1.06* 0.96   GLUCOSE mg/dL 113* 114*  --  103* 113*   EGFR mL/min/1.73 54.5* 57.1*  --  53.9* 60.7     Results from last 7 days   Lab Units 06/12/25  0110   ALBUMIN g/dL 3.8   BILIRUBIN mg/dL 1.0   ALK PHOS U/L 94   AST (SGOT) U/L 40*   ALT (SGPT) U/L 18     Results from last 7 days   Lab Units 06/18/25  0504 06/17/25  0456 06/16/25  0550 06/15/25  0508 06/12/25  0612 06/12/25  0110   CALCIUM mg/dL 8.7 8.8 8.1* 7.9*   < > 8.1*   ALBUMIN g/dL  --   --   --   --   --  3.8   MAGNESIUM mg/dL  --   --   --   --   --  1.9    < > = values in this interval not displayed.     Results from last 7 days   Lab Units 06/12/25  0110   PROCALCITONIN ng/mL 0.11   LACTATE mmol/L 1.7     No results found for: \"HGBA1C\", \"POCGLU\"    No radiology results for the last day    I have personally reviewed all medications:  Scheduled Medications  anastrozole, 1 mg, Oral, Daily  apixaban, 5 mg, Oral, Q12H  atorvastatin, 40 mg, Oral, Nightly  buPROPion XL, 150 mg, Oral, Daily  dilTIAZem CD, 180 mg, Oral, BID  folic acid, 400 mcg, Oral, Daily  levothyroxine, 50 mcg, Oral, Q AM  losartan, 100 mg, Oral, Q24H  sodium bicarbonate, 650 mg, Oral, Daily  sodium chloride, 10 mL, Intravenous, Q12H    Infusions   Diet  Diet: Cardiac; Healthy Heart (2-3 Na+); Fluid Consistency: Thin (IDDSI 0)    I have personally reviewed:  [x]  Laboratory   [x]  Microbiology   [x]  Radiology   [x]  EKG/Telemetry  [x]  Cardiology/Vascular   []  Pathology    []  Records       Assessment/Plan "     Active Hospital Problems    Diagnosis  POA    **Altered mental status [R41.82]  Yes    CKD (chronic kidney disease) stage 3, GFR 30-59 ml/min [N18.30]  Yes    GURDEEP (obstructive sleep apnea) [G47.33]  Yes    Permanent atrial fibrillation [I48.21]  Yes    Hypothyroidism [E03.9]  Yes    Malignant neoplasm of overlapping sites of left breast in female, estrogen receptor positive [C50.812, Z17.0]  Not Applicable      Resolved Hospital Problems   No resolved problems to display.       80 y.o. female admitted with Altered mental status.    CVA  -Multiple embolic strokes were noted on MRI of the brain.  Neurology has been consulted.  -CTA head/neck shows plaque at both cervical carotid bifurcations with 0% stenosis in both internal carotid arteries, patent vertebral arteries  -Echocardiogram will also be completed- normal EF. Indeterminate diastolic function   -Continue Eliquis 5 mg p.o. twice daily  -Statin  -Normalize blood pressure  -PT/OT/SLP  -Neurology saw and evaluated patient.  Suspects that she was not taking her Eliquis in the setting of her dementia, neurology has signed off  -Pre-CERT pending to SNF     Troponin elevation  -Flat. TTE with EF 61-65%, indeterminant diastolic function.   -EKG with atrial fibrillation, no significant ST-T wave changes  -Denies chest pain     Elevated D-dimer  -She has a history of PE and DVT. Will continue eilquis.  -Hemodynamically stable and on room air     CKD 3b  Creatinine stable, monitor     Lung nodules  Surveillance recommended     Acute metabolic acidosis  Resume home sodium bicarbonate  Possible due to starvation ketosis with ketonuria evident in UA  Resolved      Atrial fibrillation  HX DVT 2022  -Eliquis, diltiazem  - unclear compliance with AC- check D-dimer; elevated  - resume eliquis; at this time I am going to hold off on ordering any more contrasted studies in the setting of stage III CKD. She will undergo CTA of head and neck     Hypothyroidism  -Check TSH-  normal      Eliquis (home med) for DVT prophylaxis.  Full code.  Discussed with patient and nursing staff.  Anticipate discharge to SNU facility in 1-2 days.  Expected Discharge Date: 6/20/2025; Expected Discharge Time:       Karishma Klein DO  [facility]  06/18/25  08:56 EDT

## 2025-06-18 NOTE — CASE MANAGEMENT/SOCIAL WORK
Continued Stay Note  [facility]     Patient Name: Marylu Foreman  MRN: [id number]  Today's Date: 6/18/2025    Admit Date: 6/12/2025    Plan: [facility] accepted and pre-cert approved 6/18/25.   Discharge Plan       Row Name 06/18/25 1749       Plan    Plan [facility] accepted and pre-cert approved 6/18/25.    Patient/Family in Agreement with Plan yes    Plan Comments Spoke with [facility] stating pre-cert approved and bed available tomorrow. Packet: office  Pharmacy: updated  Pre-cert: approved 6/18/25  Transport: need to arrange.....[facility]                   Discharge Codes    No documentation.                 Expected Discharge Date and Time       Expected Discharge Date Expected Discharge Time    Jun 19, 2025               Christelle Menon RN

## 2025-06-18 NOTE — PLAN OF CARE
Goal Outcome Evaluation:  A/Ox4; no neuro changes but can wax/wane. No complaints of pain. PT reports feeling anxious throughout shift; verbalized boredom & misses her pets. Difficulty sleeping x2 nights in a row. Melatonin given; effective for reducing anxiety & PT slept afterwards. I/Os monitored. Awaiting precert. No other changes/issues noted/reported. Bed alarm set, call light & personal items w/in reach.    Plan of Care Reviewed With: patient     Progress: improving

## 2025-06-18 NOTE — PLAN OF CARE
Goal Outcome Evaluation:  Plan of Care Reviewed With: patient, child        Progress: improving  Outcome Evaluation: Pt agreed toPT session, pt devorah bed mob with assist of 1, STS w/min/mod 2, assist of 1-2 for amb due to ataxic pattern, cues for safety throughout, devorah amb ~15ft w/rwx and assist to safely guide rwx in room, pt will need placement at DC, SNU/Acute RHB at DC, some incr confusion even prior to admit per son , unclear if pt needs mem care or if this is a new issue since CVA    Anticipated Discharge Disposition (PT): inpatient rehabilitation facility, skilled nursing facility

## 2025-06-19 VITALS
TEMPERATURE: 98.4 F | HEART RATE: 82 BPM | WEIGHT: 194.2 LBS | RESPIRATION RATE: 18 BRPM | OXYGEN SATURATION: 95 % | HEIGHT: 64 IN | DIASTOLIC BLOOD PRESSURE: 65 MMHG | BODY MASS INDEX: 33.16 KG/M2 | SYSTOLIC BLOOD PRESSURE: 124 MMHG

## 2025-06-19 PROBLEM — G30.1 MODERATE LATE ONSET ALZHEIMER'S DEMENTIA WITHOUT BEHAVIORAL DISTURBANCE, PSYCHOTIC DISTURBANCE, MOOD DISTURBANCE, OR ANXIETY: Status: ACTIVE | Noted: 2025-06-19

## 2025-06-19 PROBLEM — F02.B0 MODERATE LATE ONSET ALZHEIMER'S DEMENTIA WITHOUT BEHAVIORAL DISTURBANCE, PSYCHOTIC DISTURBANCE, MOOD DISTURBANCE, OR ANXIETY: Status: ACTIVE | Noted: 2025-06-19

## 2025-06-19 LAB
ANION GAP SERPL CALCULATED.3IONS-SCNC: 14.1 MMOL/L (ref 5–15)
BUN SERPL-MCNC: 17 MG/DL (ref 8–23)
BUN/CREAT SERPL: 13.7 (ref 7–25)
CALCIUM SPEC-SCNC: 9 MG/DL (ref 8.6–10.5)
CHLORIDE SERPL-SCNC: 106 MMOL/L (ref 98–107)
CO2 SERPL-SCNC: 21.9 MMOL/L (ref 22–29)
CREAT SERPL-MCNC: 1.24 MG/DL (ref 0.57–1)
DEPRECATED RDW RBC AUTO: 43.8 FL (ref 37–54)
EGFRCR SERPLBLD CKD-EPI 2021: 44.6 ML/MIN/1.73
ERYTHROCYTE [DISTWIDTH] IN BLOOD BY AUTOMATED COUNT: 12.7 % (ref 12.3–15.4)
GLUCOSE SERPL-MCNC: 113 MG/DL (ref 65–99)
HCT VFR BLD AUTO: 40.7 % (ref 34–46.6)
HGB BLD-MCNC: 12.9 G/DL (ref 12–15.9)
MCH RBC QN AUTO: 30.4 PG (ref 26.6–33)
MCHC RBC AUTO-ENTMCNC: 31.7 G/DL (ref 31.5–35.7)
MCV RBC AUTO: 95.8 FL (ref 79–97)
PLATELET # BLD AUTO: 455 10*3/MM3 (ref 140–450)
PMV BLD AUTO: 11.3 FL (ref 6–12)
POTASSIUM SERPL-SCNC: 4.1 MMOL/L (ref 3.5–5.2)
RBC # BLD AUTO: 4.25 10*6/MM3 (ref 3.77–5.28)
SODIUM SERPL-SCNC: 142 MMOL/L (ref 136–145)
WBC NRBC COR # BLD AUTO: 10.72 10*3/MM3 (ref 3.4–10.8)

## 2025-06-19 PROCEDURE — 80048 BASIC METABOLIC PNL TOTAL CA: CPT | Performed by: STUDENT IN AN ORGANIZED HEALTH CARE EDUCATION/TRAINING PROGRAM

## 2025-06-19 PROCEDURE — 85027 COMPLETE CBC AUTOMATED: CPT | Performed by: STUDENT IN AN ORGANIZED HEALTH CARE EDUCATION/TRAINING PROGRAM

## 2025-06-19 PROCEDURE — 25810000003 SODIUM CHLORIDE 0.9 % SOLUTION: Performed by: STUDENT IN AN ORGANIZED HEALTH CARE EDUCATION/TRAINING PROGRAM

## 2025-06-19 RX ORDER — ATORVASTATIN CALCIUM 40 MG/1
40 TABLET, FILM COATED ORAL NIGHTLY
Start: 2025-06-19

## 2025-06-19 RX ORDER — IRBESARTAN 300 MG/1
150 TABLET ORAL DAILY
Qty: 15 TABLET | Refills: 0 | Status: SHIPPED | OUTPATIENT
Start: 2025-06-19

## 2025-06-19 RX ADMIN — ATORVASTATIN CALCIUM 40 MG: 20 TABLET, FILM COATED ORAL at 20:32

## 2025-06-19 RX ADMIN — LEVOTHYROXINE SODIUM 50 MCG: 50 TABLET ORAL at 05:35

## 2025-06-19 RX ADMIN — SODIUM BICARBONATE 650 MG: 650 TABLET ORAL at 09:24

## 2025-06-19 RX ADMIN — Medication 400 MCG: at 09:24

## 2025-06-19 RX ADMIN — APIXABAN 5 MG: 5 TABLET, FILM COATED ORAL at 20:32

## 2025-06-19 RX ADMIN — LOSARTAN POTASSIUM 100 MG: 50 TABLET, FILM COATED ORAL at 09:24

## 2025-06-19 RX ADMIN — BUPROPION HYDROCHLORIDE 150 MG: 150 TABLET, EXTENDED RELEASE ORAL at 09:23

## 2025-06-19 RX ADMIN — SODIUM CHLORIDE 500 ML: 9 INJECTION, SOLUTION INTRAVENOUS at 13:53

## 2025-06-19 RX ADMIN — Medication 10 ML: at 20:32

## 2025-06-19 RX ADMIN — ANASTROZOLE 1 MG: 1 TABLET, FILM COATED ORAL at 09:24

## 2025-06-19 RX ADMIN — Medication 10 ML: at 09:25

## 2025-06-19 RX ADMIN — DILTIAZEM HYDROCHLORIDE 180 MG: 180 CAPSULE, EXTENDED RELEASE ORAL at 05:35

## 2025-06-19 RX ADMIN — APIXABAN 5 MG: 5 TABLET, FILM COATED ORAL at 09:23

## 2025-06-19 NOTE — DISCHARGE INSTRUCTIONS
#Discharge plan    -Follow-up with PCP in 3 to 5 days    -Follow-up with neurology as outpatient within 3 months    -Continue Eliquis 5 mg p.o. twice daily    - Take Lipitor 40 mg p.o. nightly    -No driving, not able to live independently from a neurology standpoint

## 2025-06-19 NOTE — DISCHARGE SUMMARY
Patient Name: Marylu Foreman  : 1945  MRN: [id number]    Date of Admission: 2025  Date of Discharge:  2025  Primary Care Physician: Arleen Dillon MD      Chief Complaint:   Altered Mental Status      Discharge Diagnoses     Active Hospital Problems    Diagnosis  POA    **Altered mental status [R41.82]  Yes    Moderate late onset Alzheimer's dementia without behavioral disturbance, psychotic disturbance, mood disturbance, or anxiety [G30.1, F02.B0]  Yes    CKD (chronic kidney disease) stage 3, GFR 30-59 ml/min [N18.30]  Yes    GURDEEP (obstructive sleep apnea) [G47.33]  Yes    Permanent atrial fibrillation [I48.21]  Yes    Hypothyroidism [E03.9]  Yes    Malignant neoplasm of overlapping sites of left breast in female, estrogen receptor positive [C50.812, Z17.0]  Not Applicable      Resolved Hospital Problems   No resolved problems to display.        Hospital Course     Marylu is a 80F with past medical history of hypertension, hyperlipidemia, hypothyroid, GERD, depression, anxiety, history of DVT and PE, CAD, CKD 3, history of breast cancer, HFpEF, Alzheimer's disease, A-fib who presented to the hospital after her son was not able to get a hold of her and she was found down at home soiled in urine for possibly over 24 hours.  She presented with altered mental status.  CT head was without acute mass, shift, hemorrhage.  CT chest without traumatic injury though multiple pulmonary nodules noted.  CT cervical spine without acute fracture or subluxation.  CT A/P without acute abdominal pelvic process.  She was admitted with neurology consult.  MRI brain was indicative of embolic stroke.  CTA head/neck was without gross abnormality.  Neurology consulted.  EEG did show some slowing consistent with mild encephalopathy but no evidence of seizure.  Neurology suspects that in the setting of her Alzheimer's that she was not taking her Eliquis due to worsening memory and SNF was recommended.  PT recommends SNF.   Patient appears to have improvement in her condition and was without distress.  She was without distress and hemodynamically stable.  She was discharged to SNF on 6/19/2025 with patient and son in agreement with plan below.     #Discharge plan    -Follow-up with PCP in 3 to 5 days    -Follow-up with neurology as outpatient within 3 months    -Continue Eliquis 5 mg p.o. twice daily    - Take Lipitor 40 mg p.o. nightly    - Reduce home Avapro 250 mg p.o. daily    -No driving, not able to live independently from a neurology standpoint    Day of Discharge     Subjective:  Patient without distress and appears to be at her baseline mental status.  SNF arranged patient to be discharged SNF in agreement plan as outlined.    Addendum: Patient had a low blood pressure reading that improved with fluid bolus and appears to be due to losartan.  Blood pressure was stable on repeat and improved with fluid bolus.  Patient hemodynamically stable without distress.  Will reduce home Avapro dose at discharge.    Physical Exam:  Temp:  [97.7 °F (36.5 °C)-99 °F (37.2 °C)] 98.6 °F (37 °C)  Heart Rate:  [77-98] 85  Resp:  [18] 18  BP: ()/(70-94) 144/83  Body mass index is 33.33 kg/m².  Physical Exam  Constitutional:       General: She is not in acute distress.     Comments: AAO x 2-3, she is with dementia   HENT:      Head: Normocephalic and atraumatic.      Nose: Nose normal. No congestion.      Mouth/Throat:      Pharynx: Oropharynx is clear. No oropharyngeal exudate.   Eyes:      General: No scleral icterus.  Cardiovascular:      Rate and Rhythm: Normal rate and regular rhythm.      Heart sounds: No murmur heard.     No friction rub. No gallop.   Pulmonary:      Effort: No respiratory distress.      Breath sounds: No wheezing, rhonchi or rales.   Abdominal:      General: There is no distension.      Tenderness: There is no abdominal tenderness. There is no guarding.   Musculoskeletal:         General: No swelling or deformity.       Cervical back: Normal range of motion. No rigidity.      Right lower leg: No edema.      Left lower leg: No edema.   Skin:     Coloration: Skin is not jaundiced.      Findings: No bruising or lesion.   Neurological:      Motor: Weakness present.   Consultants     Consult Orders (all) (From admission, onward)       Start     Ordered    06/12/25 2230  Inpatient Case Management  Consult  Once        Provider:  (Not yet assigned)    06/12/25 2229    06/12/25 1415  Inpatient Neurology Consult General  Once        Specialty:  Neurology  Provider:  Linda Sellers MD    06/12/25 1415    06/12/25 1415  Inpatient Case Management  Consult  Once        Provider:  (Not yet assigned)    06/12/25 1415    06/12/25 0316  [facility] (on-call MD unless specified) Details  Once        Specialty:  Hospitalist  Provider:  (Not yet assigned)    06/12/25 0315                  Procedures     * Surgery not found *    Imaging Results (All)       Procedure Component Value Units Date/Time    CT Angiogram Carotids [[id number]] Collected: 06/14/25 0117     Updated: 06/14/25 0131    Narrative:      HEAD CT WITHOUT CONTRAST, HEAD & NECK CTA WITH CONTRAST     INDICATION:  Acute neurological deficit.     TECHNIQUE:  Axial images were acquired from the skull base to vertex without  contrast, including multiplanar reformats, per standard departmental  protocol , followed by CT angiogram of the head and neck with IV  contrast. 3-D postprocessing was performed and reviewed.  Evaluation for  a significant carotid arterial stenosis is based on the NASCET criteria.   Radiation dose reduction techniques were utilized, including automated  exposure control, and exposure modulation based on body size.     COMPARISON:  Head CT 6/12/2025     FINDINGS:     Head CT: There is no evidence of intracranial hemorrhage or mass. There  is mild volume loss, but there is no hydrocephalus or extra-axial fluid  collection. Chronic left basal  ganglier lacunar lesions unchanged.     CTA neck: There is a normal aortic arch branching pattern without  proximal great vessel stenosis. Both vertebral arteries are patent  throughout the neck, and the right vertebral artery supplies the basilar  artery, while the left appears to terminate in the PICA.  Both common  carotid arteries are normally patent. There is plaque at both cervical  carotid bifurcations, with 0% stenosis in both internal carotid  arteries.     CTA head:  There is symmetric distal intracranial runoff in the  anterior, middle, and posterior cerebral artery territories.  There is  no evidence of intracranial aneurysm, or of high-grade intracranial  flow-limiting stenosis.  There is no evidence of branch vessel  occlusion, or region or zone of hypoperfusion.  The dural venous sinuses  appear normal.     Extravascular structures: There is no intracranial mass or abnormal  enhancement.   The extracranial and cervical soft tissue structures show  no acute abnormality.  The visualized lung apices show no acute  abnormality.  There are cervical spinal degenerative changes, but there  is no acute bony abnormality.       Impression:         Head CT demonstrates chronic changes as noted above, but there is no  acute intracranial abnormality.      There is plaque at both cervical carotid bifurcations, but 0% stenosis  in both internal carotid arteries. Both vertebral arteries are patent  throughout the neck.     Normal head CT angiogram, no acute intracranial vascular abnormality.     This report was finalized on 6/14/2025 1:28 AM by Dr. Alexis Matamoros M.D on Workstation: [id number]       CT Angiogram Head [[id number]] Collected: 06/14/25 0117     Updated: 06/14/25 0131    Narrative:      HEAD CT WITHOUT CONTRAST, HEAD & NECK CTA WITH CONTRAST     INDICATION:  Acute neurological deficit.     TECHNIQUE:  Axial images were acquired from the skull base to vertex without  contrast, including multiplanar  reformats, per standard departmental  protocol , followed by CT angiogram of the head and neck with IV  contrast. 3-D postprocessing was performed and reviewed.  Evaluation for  a significant carotid arterial stenosis is based on the NASCET criteria.   Radiation dose reduction techniques were utilized, including automated  exposure control, and exposure modulation based on body size.     COMPARISON:  Head CT 6/12/2025     FINDINGS:     Head CT: There is no evidence of intracranial hemorrhage or mass. There  is mild volume loss, but there is no hydrocephalus or extra-axial fluid  collection. Chronic left basal ganglier lacunar lesions unchanged.     CTA neck: There is a normal aortic arch branching pattern without  proximal great vessel stenosis. Both vertebral arteries are patent  throughout the neck, and the right vertebral artery supplies the basilar  artery, while the left appears to terminate in the PICA.  Both common  carotid arteries are normally patent. There is plaque at both cervical  carotid bifurcations, with 0% stenosis in both internal carotid  arteries.     CTA head:  There is symmetric distal intracranial runoff in the  anterior, middle, and posterior cerebral artery territories.  There is  no evidence of intracranial aneurysm, or of high-grade intracranial  flow-limiting stenosis.  There is no evidence of branch vessel  occlusion, or region or zone of hypoperfusion.  The dural venous sinuses  appear normal.     Extravascular structures: There is no intracranial mass or abnormal  enhancement.   The extracranial and cervical soft tissue structures show  no acute abnormality.  The visualized lung apices show no acute  abnormality.  There are cervical spinal degenerative changes, but there  is no acute bony abnormality.       Impression:         Head CT demonstrates chronic changes as noted above, but there is no  acute intracranial abnormality.      There is plaque at both cervical carotid bifurcations,  but 0% stenosis  in both internal carotid arteries. Both vertebral arteries are patent  throughout the neck.     Normal head CT angiogram, no acute intracranial vascular abnormality.     This report was finalized on 6/14/2025 1:28 AM by Dr. Alexis Matamoros M.D on Workstation: [id number]       MRI Brain With & Without Contrast [[id number]] Collected: 06/13/25 0441     Updated: 06/13/25 0455    Narrative:      BRAIN MRI WITH AND WITHOUT CONTRAST     HISTORY: altered mental status; R41.82-Altered mental status,  unspecified; R29.6-Repeated falls; S30.0XXA-Contusion of lower back and  pelvis, initial encounter; T79.6XXA-Traumatic ischemia of muscle,  initial encounter; I50.9-Heart failure, unspecified     COMPARISON: June 12, 2025.     FINDINGS:  Multiplanar images of the head were obtained without and with  gadolinium. The patient has acute infarcts noted within the left corona  radiata, and extending into the left thalamus and left basal ganglia.  Patient also has some acute infarction within the medial left temporal  lobe, as well as within the left occipital lobe. There is atrophy. There  is periventricular and deep white matter microangiopathic change. There  is no midline shift. Old infarct is noted within the left cerebellar  hemisphere. The intracranial flow voids appear intact. There is some  susceptibility artifact within the area of infarction within the left  corona radiata, which may reflect a small area of hemorrhagic  transformation. There is some further susceptibility artifact noted  within the medial left temporal lobe, which may reflect some additional  mild hemorrhagic transformation. Small amount of susceptibility artifact  associated with the left cerebellar infarct is likely related to chronic  hemosiderin deposition. Postcontrast imaging does not demonstrate any  abnormal enhancement. There is some mucosal thickening within the  ethmoid and maxillary sinuses. There is a small amount of fluid  within  the right mastoid air cells. There is no evidence of venous occlusion.       Impression:      1. Areas of acute infarction noted within the left corona radiata/left  thalamus and left basal ganglia, as well as the medial left temporal  lobe and left occipital lobe. There is a small amount of susceptibility  artifact which is noted within the left corona radiata and medial left  temporal lobe, which may reflect some hemorrhagic transformation.        This report was finalized on 6/13/2025 4:52 AM by Dr. Samantha Pro M.D on Workstation: [id number]       CT Chest With Contrast Diagnostic [[id number]] Collected: 06/12/25 0254     Updated: 06/12/25 0308    Narrative:      CT CHEST WITH CONTRAST; CT OF THE ABDOMEN AND PELVIS WITH CONTRAST     HISTORY: Fall     COMPARISON: None available.     TECHNIQUE: Axial CT imaging was obtained from the thoracic inlet through  the symphysis pubis. IV contrast was administered.     FINDINGS:  CHEST: There are old right-sided rib fractures. No acute fractures are  seen. The patient has scattered subpleural nodules within the lungs  bilaterally. The single largest is noted within the right lower lobe,  and measures 6 mm. The thyroid gland and trachea are within normal  limits. There is a small hiatal hernia. No acute infiltrates are seen.  The heart is enlarged. There are coronary artery calcifications.  Thoracic aorta is normal in caliber. No dissection is seen. There is  enlargement of the main pulmonary artery, which can be seen in setting  of pulmonary arterial hypertension. Mediastinal lymph nodes do not  appear pathologically enlarged.     ABDOMEN AND PELVIS: No fractures are identified. There is lumbar  scoliosis, with convexity to the left. No suspicious hepatic lesions are  seen. There are postsurgical changes noted at the GE junction. Duodenum  appears normal. Gallbladder is absent. The spleen is within normal  limits. Pancreas is atrophic. There is bilateral  adrenal hyperplasia.  The kidneys enhance symmetrically. No hydronephrosis is seen. Punctate  nonobstructing stones are noted within the kidneys bilaterally. Uterus  appears unremarkable. There is no bowel obstruction. Urinary bladder is  contracted. There is colonic diverticulosis. Patient appears to be  status post prior sigmoid colon resection. Appendix is normal. Abdominal  aorta is normal in caliber. There is no dissection.       Impression:      1. No acute traumatic injury identified.     2. Multiple indeterminate pulmonary nodules. The largest one is solid  and measures 6 mm. For multiple nodules, with the most suspicious nodule  being solid and measuring 6-8 mm, recommend CT at 3-6 months. Then, for  a low-risk patient, consider CT at 18-24 months. For a high-risk  patient, if the nodule is stable at 3-6 months, recommend CT at 18-24  months. Follow-up intervals may vary according to size and risk.     Radiation dose reduction techniques were utilized, including automated  exposure control and exposure modulation based on body size.        This report was finalized on 6/12/2025 3:05 AM by Dr. Samantha Pro M.D on Workstation: [id number]       CT Abdomen Pelvis With Contrast [[id number]] Collected: 06/12/25 0254     Updated: 06/12/25 0308    Narrative:      CT CHEST WITH CONTRAST; CT OF THE ABDOMEN AND PELVIS WITH CONTRAST     HISTORY: Fall     COMPARISON: None available.     TECHNIQUE: Axial CT imaging was obtained from the thoracic inlet through  the symphysis pubis. IV contrast was administered.     FINDINGS:  CHEST: There are old right-sided rib fractures. No acute fractures are  seen. The patient has scattered subpleural nodules within the lungs  bilaterally. The single largest is noted within the right lower lobe,  and measures 6 mm. The thyroid gland and trachea are within normal  limits. There is a small hiatal hernia. No acute infiltrates are seen.  The heart is enlarged. There are coronary  artery calcifications.  Thoracic aorta is normal in caliber. No dissection is seen. There is  enlargement of the main pulmonary artery, which can be seen in setting  of pulmonary arterial hypertension. Mediastinal lymph nodes do not  appear pathologically enlarged.     ABDOMEN AND PELVIS: No fractures are identified. There is lumbar  scoliosis, with convexity to the left. No suspicious hepatic lesions are  seen. There are postsurgical changes noted at the GE junction. Duodenum  appears normal. Gallbladder is absent. The spleen is within normal  limits. Pancreas is atrophic. There is bilateral adrenal hyperplasia.  The kidneys enhance symmetrically. No hydronephrosis is seen. Punctate  nonobstructing stones are noted within the kidneys bilaterally. Uterus  appears unremarkable. There is no bowel obstruction. Urinary bladder is  contracted. There is colonic diverticulosis. Patient appears to be  status post prior sigmoid colon resection. Appendix is normal. Abdominal  aorta is normal in caliber. There is no dissection.       Impression:      1. No acute traumatic injury identified.     2. Multiple indeterminate pulmonary nodules. The largest one is solid  and measures 6 mm. For multiple nodules, with the most suspicious nodule  being solid and measuring 6-8 mm, recommend CT at 3-6 months. Then, for  a low-risk patient, consider CT at 18-24 months. For a high-risk  patient, if the nodule is stable at 3-6 months, recommend CT at 18-24  months. Follow-up intervals may vary according to size and risk.     Radiation dose reduction techniques were utilized, including automated  exposure control and exposure modulation based on body size.        This report was finalized on 6/12/2025 3:05 AM by Dr. Samantha Pro M.D on Workstation: [id number]       CT Cervical Spine Without Contrast [[id number]] Collected: 06/12/25 0251     Updated: 06/12/25 0257    Narrative:      CT OF THE CERVICAL SPINE     HISTORY: Unwitnessed  fall     COMPARISON: None available.     TECHNIQUE: Axial CT imaging was obtained through the cervical spine.  Coronal and sagittal reformatted images were obtained.     FINDINGS:  No acute fracture or subluxation of the cervical spine is seen. There  are asymmetric degenerative changes of the right temporomandibular  joint. There is significant intervertebral disc space narrowing noted at  multiple levels. There is mild anterolisthesis of C2 on C3. There is  further anterolisthesis of C5 on C6. There is no prevertebral soft  tissue swelling. Canal stenosis is most significant at C4-C5. Neural  foraminal narrowing is most significant at C4-C5 and C3-C4 on the right.       Impression:      No acute fracture or subluxation identified.     Radiation dose reduction techniques were utilized, including automated  exposure control and exposure modulation based on body size.        This report was finalized on 6/12/2025 2:53 AM by Dr. Samantha Por M.D on Workstation: [id number]       CT Head Without Contrast [[id number]] Collected: 06/12/25 0248     Updated: 06/12/25 0254    Narrative:      CT OF THE HEAD WITHOUT CONTRAST     HISTORY: Fall     COMPARISON: None available.     TECHNIQUE: Axial CT imaging was performed through the brain. No IV  contrast was administered.     FINDINGS:  No acute intracranial hemorrhage is seen. There is atrophy. There is  periventricular and deep white matter microangiopathic change. There is  no midline shift or mass effect. No calvarial fracture is seen. Minimal  mucosal thickening is noted within the ethmoid and right maxillary  sinuses. There is a mucous retention cyst within the right maxillary  sinus. Old lacunar infarct is noted within the left thalamus.       Impression:      No acute intracranial findings.     Radiation dose reduction techniques were utilized, including automated  exposure control and exposure modulation based on body size.        This report was finalized on  6/12/2025 2:50 AM by Dr. Samantha Pro M.D on Workstation: [id number]       XR Chest 1 View [[id number]] Collected: 06/12/25 0116     Updated: 06/12/25 0121    Narrative:      SINGLE VIEW OF THE CHEST     HISTORY: Altered mental status     COMPARISON: May 30, 2024     FINDINGS:  There is cardiomegaly. There is vascular congestion. No pneumothorax is  seen. Patient is status post right mastectomy. No pleural effusion is  identified. No definite acute infiltrates are seen.       Impression:      Cardiomegaly with suspected vascular congestion.     This report was finalized on 6/12/2025 1:18 AM by Dr. Samantha Pro M.D on Workstation: [id number]       XR Foot 3+ View Right [[id number]] Collected: 06/12/25 0115     Updated: 06/12/25 0119    Narrative:      3 VIEWS RIGHT FOOT     HISTORY: Right foot contusion     COMPARISON: None available.     FINDINGS:  Exam is degraded by the patient's osteopenia. No acute fracture or  subluxation of the right foot is seen. There is calcaneal spurring.       Impression:      No definite acute fracture or subluxation of the right foot is seen.     This report was finalized on 6/12/2025 1:16 AM by Dr. Samantha Pro M.D on Workstation: [id number]             Results for orders placed in visit on 11/07/22    Vascular Screening (Carotid) CAR    Interpretation Summary    Carotid Artery Disease and Stroke Screening: The right carotid artery has no significant carotid stenosis (less than 50%). The left carotid artery has no significant carotid stenosis (less than 50%).    Results for orders placed during the hospital encounter of 06/12/25    Adult Transthoracic Echo Complete W/ Cont if Necessary Per Protocol    Interpretation Summary    Left ventricular systolic function is normal. Left ventricular ejection fraction appears to be 61 - 65%.    Left ventricular diastolic function was indeterminate.    Normal right ventricular cavity size and systolic function noted.    The  "left atrial cavity is mild to moderately dilated.    The aortic valve leaflets are moderately to heavily calcified (aortic sclerosis)    No hemodynamically significant aortic valve stenosis is present. Aortic valve maximum pressure gradient is 16.2 mmHg. Aortic valve mean pressure gradient is 9.0 mmHg.    Mild mitral valve regurgitation is present.    Insufficient TR velocity profile to estimate the right ventricular systolic pressure.    There is no evidence of pericardial effusion.    Pertinent Labs     Results from last 7 days   Lab Units 06/19/25  0458 06/18/25  0504 06/17/25  0456 06/16/25  0550   WBC 10*3/mm3 10.72 9.47 8.14 8.07   HEMOGLOBIN g/dL 12.9 12.2 12.0 10.5*   PLATELETS 10*3/mm3 455* 415 414 370     Results from last 7 days   Lab Units 06/19/25  0458 06/18/25  0504 06/17/25  0456 06/16/25  1905 06/16/25  0550   SODIUM mmol/L 142 139 140  --  141   POTASSIUM mmol/L 4.1 4.5 4.6 5.4* 3.6   CHLORIDE mmol/L 106 105 107  --  109*   CO2 mmol/L 21.9* 22.0 21.4*  --  23.3   BUN mg/dL 17.0 12.0 12.0  --  14.0   CREATININE mg/dL 1.24* 1.05* 1.01*  --  1.06*   GLUCOSE mg/dL 113* 113* 114*  --  103*   EGFR mL/min/1.73 44.6* 54.5* 57.1*  --  53.9*       Results from last 7 days   Lab Units 06/19/25  0458 06/18/25  0504 06/17/25  0456 06/16/25  0550   CALCIUM mg/dL 9.0 8.7 8.8 8.1*       Results from last 7 days   Lab Units 06/12/25  1444   D DIMER QUANT MCGFEU/mL 1.91*           Invalid input(s): \"LDLCALC\"          Test Results Pending at Discharge     Pending Results       None              Discharge Details        Discharge Medications        PAUSE taking these medications        Instructions Start Date   Semaglutide(0.25 or 0.5MG/DOS) 2 MG/1.5ML solution pen-injector  Wait to take this until your doctor or other care provider tells you to start again.  Commonly known as: OZEMPIC   0.25 mg, Weekly             New Medications        Instructions Start Date   atorvastatin 40 MG tablet  Commonly known as: LIPITOR   " 40 mg, Oral, Nightly             Changes to Medications        Instructions Start Date   mupirocin 2 % ointment  Commonly known as: BACTROBAN  What changed: See the new instructions.   APPLY TOPICALLY TO THE AFFECTED AREA THREE TIMES DAILY AS DIRECTED             Continue These Medications        Instructions Start Date   albuterol sulfate  (90 Base) MCG/ACT inhaler  Commonly known as: PROVENTIL HFA;VENTOLIN HFA;PROAIR HFA   2 puffs, Every 4 Hours PRN      anastrozole 1 MG tablet  Commonly known as: ARIMIDEX   1 mg, Oral, Daily      ascorbic acid 1000 MG tablet  Commonly known as: VITAMIN C   1,000 mg, Daily      Coenzyme Q10 200 MG capsule   200 mg, Daily      dilTIAZem  MG 24 hr capsule  Commonly known as: CARDIZEM CD   180 mg, Oral, 2 Times Daily      Eliquis 5 MG tablet tablet  Generic drug: apixaban   5 mg, Oral, Every 12 Hours Scheduled      folic acid 400 MCG tablet  Commonly known as: FOLVITE   400 mcg, Daily      irbesartan 300 MG tablet  Commonly known as: AVAPRO   300 mg, Daily      ketoconazole 2 % shampoo  Commonly known as: NIZORAL   Topical, 2 Times Weekly      levothyroxine 50 MCG tablet  Commonly known as: SYNTHROID, LEVOTHROID   50 mcg, Oral, Daily      multivitamin tablet tablet  Commonly known as: THERAGRAN   1 tablet, Daily      PROBIOTIC PO   1 tablet, Daily      sodium bicarbonate 650 MG tablet   650 mg, Daily      Turmeric 500 MG capsule   1 capsule, Daily      VITAMIN B 12 PO   1 tablet/day, Daily      VITAMIN D PO   1 tablet, Daily      vitamin E 400 UNIT capsule   400 Units, Daily      Wellbutrin  MG 24 hr tablet  Generic drug: buPROPion XL   150 mg, Oral, Every Morning, Take one tablet by mouth daily.               Allergies   Allergen Reactions    Bactrim [Sulfamethoxazole-Trimethoprim] Diarrhea    Amlodipine Besylate-Valsartan Nausea And Vomiting    Cefdinir Diarrhea     ..Beta lactam allergy details  Antibiotic reaction: rash  Age at reaction: unknown  Dose to  reaction time: unknown  Reason for antibiotic: other  Epinephrine required for reaction?: no  Tolerated antibiotics: other       Corticosteroids Unknown - Low Severity     Severe depression caused from steroid injections in hands    Lisinopril Nausea And Vomiting    Nsaids Unknown - Low Severity     R/T KIDNEY DISEASE    Other Unknown - Low Severity     Steroids sever depression       Discharge Disposition:  Skilled Nursing Facility (DC - External)      Discharge Diet:  Diet Order   Procedures    Diet: Cardiac; Healthy Heart (2-3 Na+); Fluid Consistency: Thin (IDDSI 0)       Discharge Activity:       CODE STATUS:    Code Status and Medical Interventions: CPR (Attempt to Resuscitate); Full Support   Ordered at: 06/12/25 0334     Code Status (Patient has no pulse and is not breathing):    CPR (Attempt to Resuscitate)     Medical Interventions (Patient has pulse or is breathing):    Full Support       Future Appointments   Date Time Provider Department Center   8/7/2025  1:15 PM Arleen Dillon MD [facility] IM [id number] [facility]   9/12/2025  3:50 PM LAB CHAIR 3 [facility] [place]   9/12/2025  4:20 PM West White Jr., MD [facility] [place]   9/17/2025  2:30 PM Maribell Esparza APRN [facility] CD [id number] [facility]   5/29/2026  1:15 PM Nydia Roca PA-C [facility]      Follow-up Information       Navin Puga MD Follow up in 3 month(s).    Specialty: Neurology  Contact information:  [address]  [facility] [id number] [phone]               Arleen Dillon MD .    Specialty: Internal Medicine  Contact information:  [address]  [address]  [facility] [id number] [phone]                             Time Spent on Discharge: 35 minutes      DO [facility]  06/19/25  07:27 EDT

## 2025-06-19 NOTE — PLAN OF CARE
Goal Outcome Evaluation:         VSS, on room air, incontinent on female external catheter, A&O*2, confused, forgetful, weight shifts in the bed independently, updated son Valdemar per phone call. Safety and falls precautions are in place. Positive for CVA but no NIH's ordered.Possible discharge today.

## 2025-06-19 NOTE — PLAN OF CARE
Goal Outcome Evaluation:   Pt rec'd bolus fluids this shift x1 r/t low BP. Pt to be discharging tonight, awaiting transportation. Call light within reach. Currently sitting in chair with alarms on.

## 2025-06-19 NOTE — CASE MANAGEMENT/SOCIAL WORK
Continued Stay Note  [facility]     Patient Name: Marylu Foreman  MRN: [id number]  Today's Date: 6/19/2025    Admit Date: 6/12/2025    Plan: [facility] accepted and pre-cert approved 6/18/25.   Discharge Plan       Row Name 06/19/25 1738       Plan    Plan [facility] accepted and pre-cert approved 6/18/25.    Patient/Family in Agreement with Plan yes    Plan Comments [facility] accepted and pre-cert approved. Packet: yoko  Pharmacy: updated  Pre-cert: approved 6/18/25  Transport: [facility] @ 2130.....[facility]                   Discharge Codes    No documentation.                 Expected Discharge Date and Time       Expected Discharge Date Expected Discharge Time    Jun 19, 2025               Christelle Menon RN

## 2025-06-20 ENCOUNTER — OUTSIDE FACILITY SERVICE (OUTPATIENT)
Age: 80
End: 2025-06-20
Payer: MEDICARE

## 2025-06-20 NOTE — CASE MANAGEMENT/SOCIAL WORK
"Have a great year! Please call with any concerns. Please always wear your seatbelt everyday! If you are not signed up on [address], please ask us how to sign up for it! In a world where you can be anything, please be kind. Body mass index is 26.52 kg/mÂ². Understanding Body Mass Index (BMI)  Body mass index (BMI) is a method of screening for a weight category using the ratio of your height to your weight. The BMI is a measure of overweight that is corrected for height. Knowing your BMI is a way to tell if you are at a healthy weight. The higher your BMI, the greater your risk for weight-related health problems. What BMI means  BMI below 18.5: Underweight  BMI 18.5 to 24.9: Healthy weight or ""ideal body weight\""   BMI 25 to 29.9: Overweight  BMI 30 and over: Obese  BMI 40 and over: Severe obesity     A Healthy Lifestyle: Care Instructions  Your Care Instructions    A healthy lifestyle can help you feel good, stay at a healthy weight, and have plenty of energy for both work and play. A healthy lifestyle is something you can share with your whole family. A healthy lifestyle also can lower your risk for serious health problems, such as high blood pressure, heart disease, and diabetes. You can follow a few steps listed below to improve your health and the health of your family. Follow-up care is a key part of your treatment and safety. Be sure to make and go to all appointments, and call your doctor if you are having problems. It's also a good idea to know your test results and keep a list of the medicines you take. How can you care for yourself at home? Do not eat too much sugar, fat, or fast foods. You can still have dessert and treats now and then. The goal is moderation. Start small to improve your eating habits. Pay attention to portion sizes, drink less juice and soda pop, and eat more fruits and vegetables. Eat a healthy amount of food.  A 3-ounce serving of meat, for example, is about the " Case Management Discharge Note      Final Note: [facility]         Selected Continued Care - Discharged on 6/19/2025 Admission date: 6/12/2025 - Discharge disposition: Skilled Nursing Facility (DC - External)      Destination Coordination complete.      Service Provider Services Address Phone Fax Patient Preferred    [facility] [address] [address] [phone] --              Durable Medical Equipment    No services have been selected for the patient.                Dialysis/Infusion    No services have been selected for the patient.                Home Medical Care    No services have been selected for the patient.                Therapy    No services have been selected for the patient.                Community Resources    No services have been selected for the patient.                Community & DME    No services have been selected for the patient.                    Transportation Services  Ambulance: Other ([facility])  W/C Van: [facility]    Final Discharge Disposition Code: 62 - inpatient rehab facility   size of a deck of cards. Fill the rest of your plate with vegetables and whole grains. Limit the amount of soda and sports drinks you have every day. Drink more water when you are thirsty. Eat at least 5 servings of fruits and vegetables every day. It may seem like a lot, but it is not hard to reach this goal. A serving or helping is 1 piece of fruit, 1 cup of vegetables, or 2 cups of leafy, raw vegetables. Have an apple or some carrot sticks as an afternoon snack instead of a candy bar. Try to have fruits and/or vegetables at every meal.  Make exercise part of your daily routine. You may want to start with simple activities, such as walking, bicycling, or slow swimming. Try to be active 30 to 60 minutes every day. You do not need to do all 30 to 60 minutes all at once. For example, you can exercise 3 times a day for 10 or 20 minutes. Moderate exercise is safe for most people, but it is always a good idea to talk to your doctor before starting an exercise program.  Keep moving. [employer] the lawn, work in the garden, or [employer]. Take the stairs instead of the elevator at work. If you smoke, quit. People who smoke have an increased risk for heart attack, stroke, cancer, and other lung illnesses. Quitting is hard, but there are ways to boost your chance of quitting tobacco for good. Use nicotine gum, patches, or lozenges. Ask your doctor about stop-smoking programs and medicines. Keep trying. In addition to reducing your risk of diseases in the future, you will notice some benefits soon after you stop using tobacco. If you have shortness of breath or asthma symptoms, they will likely get better within a few weeks after you quit. Limit how much alcohol you drink. Moderate amounts of alcohol (up to 2 drinks a day for men, 1 drink a day for women) are okay. But drinking too much can lead to liver problems, high blood pressure, and other health problems.   Family health  If you have a family, there are many things you can do together to improve your health. Eat meals together as a family as often as possible. Eat healthy foods. This includes fruits, vegetables, lean meats and dairy, and whole grains. Include your family in your fitness plan. Most people think of activities such as jogging or tennis as the way to fitness, but there are many ways you and your family can be more active. Anything that makes you breathe hard and gets your heart pumping is exercise. Here are some tips:  Walk to do errands or to take your child to school or the bus. Go for a family bike ride after dinner instead of watching TV. Care instructions adapted under license by your healthcare professional. This care instruction is for use with your licensed healthcare professional. If you have questions about a medical condition or this instruction, always ask your healthcare professional. [address] any warranty or liability for your use of this information. Â© 2817-4773 [employer] [place]. 2016 [address]. All rights reserved. This information is not intended as a substitute for professional medical care. Always follow your healthcare professional's instructions.

## 2025-06-20 NOTE — PLAN OF CARE
Goal Outcome Evaluation:      Patient resting comfortably in bed. Report called to accepting facility by day shift RN. Night time meds given. Son updated via phone on plan to transport patient tonight at 2200. Patient and son agreeable to plan. Discharge education provided.   Problem: Confusion Acute  Goal: Optimal Cognitive Function  Outcome: Adequate for Care Transition     Problem: Heart Failure  Goal: Optimal Cardiac Output and Blood Flow  Outcome: Adequate for Care Transition  Goal: Stable Heart Rate and Rhythm  Outcome: Adequate for Care Transition  Goal: Fluid and Electrolyte Balance  Outcome: Adequate for Care Transition  Goal: Optimal Functional Ability  Outcome: Adequate for Care Transition  Intervention: Optimize Functional Ability  Recent Flowsheet Documentation  Taken 6/19/2025 2030 by Lynn Bates RN  Activity Management: back to bed  Goal: Effective Oxygenation and Ventilation  Outcome: Adequate for Care Transition  Intervention: Promote Airway Secretion Clearance  Recent Flowsheet Documentation  Taken 6/19/2025 2030 by Lynn Bates RN  Activity Management: back to bed  Intervention: Optimize Oxygenation and Ventilation  Recent Flowsheet Documentation  Taken 6/19/2025 2030 by Lynn Bates RN  Head of Bed (HOB) Positioning: HOB at 15 degrees     Problem: Adult Inpatient Plan of Care  Goal: Plan of Care Review  Outcome: Adequate for Care Transition  Goal: Patient-Specific Goal (Individualized)  Outcome: Adequate for Care Transition  Goal: Absence of Hospital-Acquired Illness or Injury  Outcome: Adequate for Care Transition  Intervention: Identify and Manage Fall Risk  Recent Flowsheet Documentation  Taken 6/19/2025 2030 by Lynn Bates RN  Safety Promotion/Fall Prevention:   clutter free environment maintained   safety round/check completed  Intervention: Prevent Skin Injury  Recent Flowsheet Documentation  Taken 6/19/2025 2030 by Lynn Bates RN  Body Position:   left   side-lying  Intervention:  Prevent Infection  Recent Flowsheet Documentation  Taken 6/19/2025 2030 by Lynn Bates RN  Infection Prevention:   rest/sleep promoted   single patient room provided  Goal: Optimal Comfort and Wellbeing  Outcome: Adequate for Care Transition  Intervention: Provide Person-Centered Care  Recent Flowsheet Documentation  Taken 6/19/2025 2030 by Lynn Bates RN  Trust Relationship/Rapport:   care explained   questions answered   thoughts/feelings acknowledged  Goal: Readiness for Transition of Care  Outcome: Adequate for Care Transition     Problem: Skin Injury Risk Increased  Goal: Skin Health and Integrity  Outcome: Adequate for Care Transition  Intervention: Optimize Skin Protection  Recent Flowsheet Documentation  Taken 6/19/2025 2030 by Lynn Bates RN  Activity Management: back to bed  Head of Bed (HOB) Positioning: HOB at 15 degrees     Problem: Fall Injury Risk  Goal: Absence of Fall and Fall-Related Injury  Intervention: Identify and Manage Contributors  Recent Flowsheet Documentation  Taken 6/19/2025 2030 by Lynn Bates RN  Medication Review/Management: medications reviewed  Intervention: Promote Injury-Free Environment  Recent Flowsheet Documentation  Taken 6/19/2025 2030 by Lynn Bates RN  Safety Promotion/Fall Prevention:   clutter free environment maintained   safety round/check completed     Problem: Fall Injury Risk  Goal: Absence of Fall and Fall-Related Injury  Intervention: Identify and Manage Contributors  Recent Flowsheet Documentation  Taken 6/19/2025 2030 by Lynn Bates RN  Medication Review/Management: medications reviewed  Intervention: Promote Injury-Free Environment  Recent Flowsheet Documentation  Taken 6/19/2025 2030 by Lynn Bates RN  Safety Promotion/Fall Prevention:   clutter free environment maintained   safety round/check completed     Problem: Fall Injury Risk  Goal: Absence of Fall and Fall-Related Injury  Outcome: Adequate for Care Transition  Intervention: Identify and  Manage Contributors  Recent Flowsheet Documentation  Taken 6/19/2025 2030 by Lynn Bates RN  Medication Review/Management: medications reviewed  Intervention: Promote Injury-Free Environment  Recent Flowsheet Documentation  Taken 6/19/2025 2030 by Lynn Bates RN  Safety Promotion/Fall Prevention:   clutter free environment maintained   safety round/check completed     Problem: Comorbidity Management  Goal: Maintenance of Asthma Control  Outcome: Adequate for Care Transition  Intervention: Maintain Asthma Symptom Control  Recent Flowsheet Documentation  Taken 6/19/2025 2030 by Lynn Bates RN  Medication Review/Management: medications reviewed  Goal: Maintenance of Behavioral Health Symptom Control  Outcome: Adequate for Care Transition  Intervention: Maintain Behavioral Health Symptom Control  Recent Flowsheet Documentation  Taken 6/19/2025 2030 by Lynn Bates RN  Medication Review/Management: medications reviewed  Goal: Maintenance of COPD Symptom Control  Outcome: Adequate for Care Transition  Intervention: Maintain COPD (Chronic Obstructive Pulmonary Disease) Symptom Control  Recent Flowsheet Documentation  Taken 6/19/2025 2030 by Lynn Bates RN  Medication Review/Management: medications reviewed  Goal: Blood Glucose Level Within Target Range  Outcome: Adequate for Care Transition  Intervention: Monitor and Manage Glycemia  Recent Flowsheet Documentation  Taken 6/19/2025 2030 by Lynn Bates RN  Medication Review/Management: medications reviewed  Goal: Maintenance of Heart Failure Symptom Control  Outcome: Adequate for Care Transition  Intervention: Maintain Heart Failure Management  Recent Flowsheet Documentation  Taken 6/19/2025 2030 by Lynn Bates RN  Medication Review/Management: medications reviewed  Goal: Blood Pressure in Desired Range  Outcome: Adequate for Care Transition  Intervention: Maintain Blood Pressure Management  Recent Flowsheet Documentation  Taken 6/19/2025 2030 by Lynn Bates  RN  Medication Review/Management: medications reviewed  Goal: Maintenance of Osteoarthritis Symptom Control  Outcome: Adequate for Care Transition  Intervention: Maintain Osteoarthritis Symptom Control  Recent Flowsheet Documentation  Taken 6/19/2025 2030 by Lynn Bates RN  Activity Management: back to bed  Medication Review/Management: medications reviewed

## 2025-06-30 ENCOUNTER — OUTSIDE FACILITY SERVICE (OUTPATIENT)
Age: 80
End: 2025-06-30

## 2025-06-30 ENCOUNTER — TELEPHONE (OUTPATIENT)
Dept: NEUROLOGY | Facility: OTHER | Age: 80
End: 2025-06-30

## 2025-06-30 PROCEDURE — OUTSIDEPOS PR OUTSIDE POS PLACEHOLDER: Performed by: PHYSICAL MEDICINE & REHABILITATION

## 2025-06-30 NOTE — TELEPHONE ENCOUNTER
Caller: Marylu Foreman    Relationship to patient: Self    Best call back number:   Telephone Information:   Mobile [phone]     New or established patient?  [] New  [x] Established    Date of discharge:   6-19-25 FROM HOSPITAL  7-3-35 FROM  REHAB    Facility discharged from: [facility]    Diagnosis/Symptoms: ACUTE CVA    Length of stay (If applicable): HOSP STAY 7 DAYS    Specialty Only: Did you see a [facility] provider?    [x] Yes  [] No  If so, who?   TINA BAER    Additional Details: EDIL FOR  MARIAH CALLED TO SCHEDULE HOSP FU FOR THE PT.

## 2025-07-01 ENCOUNTER — OUTSIDE FACILITY SERVICE (OUTPATIENT)
Age: 80
End: 2025-07-01
Payer: MEDICARE

## 2025-07-03 ENCOUNTER — OUTSIDE FACILITY SERVICE (OUTPATIENT)
Age: 80
End: 2025-07-03

## 2025-07-03 PROCEDURE — OUTSIDEPOS PR OUTSIDE POS PLACEHOLDER: Performed by: PHYSICAL MEDICINE & REHABILITATION

## 2025-07-07 ENCOUNTER — TELEPHONE (OUTPATIENT)
Dept: INTERNAL MEDICINE | Facility: CLINIC | Age: 80
End: 2025-07-07
Payer: MEDICARE

## 2025-07-07 NOTE — TELEPHONE ENCOUNTER
Caller: SAVANNAH CHAMBERLAIN    Relationship to patient: Home Health    Best call back number: [phone]    Patient is needing:   SAVANNAH WITH [facility] IS REQUESTING VERBAL ORDERS FOR PT ONE TIME A WEEK FOR 8 WEEKS FOR HOME HEALTH AND MEDICAL SOCIAL WORKER EVALUATION.

## 2025-07-09 ENCOUNTER — OFFICE VISIT (OUTPATIENT)
Dept: INTERNAL MEDICINE | Facility: CLINIC | Age: 80
End: 2025-07-09
Payer: MEDICARE

## 2025-07-09 VITALS
SYSTOLIC BLOOD PRESSURE: 138 MMHG | RESPIRATION RATE: 18 BRPM | DIASTOLIC BLOOD PRESSURE: 80 MMHG | TEMPERATURE: 99.1 F | HEIGHT: 64 IN | OXYGEN SATURATION: 96 % | HEART RATE: 98 BPM | BODY MASS INDEX: 33.3 KG/M2

## 2025-07-09 DIAGNOSIS — I63.10 CEREBROVASCULAR ACCIDENT (CVA) DUE TO EMBOLISM OF PRECEREBRAL ARTERY: ICD-10-CM

## 2025-07-09 DIAGNOSIS — I48.21 PERMANENT ATRIAL FIBRILLATION: Primary | ICD-10-CM

## 2025-07-09 DIAGNOSIS — I10 ESSENTIAL HYPERTENSION: Chronic | ICD-10-CM

## 2025-07-09 PROCEDURE — G2211 COMPLEX E/M VISIT ADD ON: HCPCS | Performed by: INTERNAL MEDICINE

## 2025-07-09 PROCEDURE — 3079F DIAST BP 80-89 MM HG: CPT | Performed by: INTERNAL MEDICINE

## 2025-07-09 PROCEDURE — 3075F SYST BP GE 130 - 139MM HG: CPT | Performed by: INTERNAL MEDICINE

## 2025-07-09 PROCEDURE — 1125F AMNT PAIN NOTED PAIN PRSNT: CPT | Performed by: INTERNAL MEDICINE

## 2025-07-09 PROCEDURE — 99214 OFFICE O/P EST MOD 30 MIN: CPT | Performed by: INTERNAL MEDICINE

## 2025-07-09 RX ORDER — BUPROPION HCL 150 MG
150 TABLET, EXTENDED RELEASE 24 HR ORAL EVERY MORNING
Qty: 30 TABLET | Refills: 2 | Status: SHIPPED | OUTPATIENT
Start: 2025-07-09

## 2025-07-09 NOTE — PROGRESS NOTES
Subjective   Marylu Foreman is a 80 y.o. female.   She was admitted for a stroke in June and then was in rehab for 3 weeks  History of Present Illness   SHe did not seem to have any focal weakness  She does appear to have memory issiues  she does not remember anything from the last year.  Now she seems that her short term memory is very poor.  She is now getting home speech and PT  She needs assistance with most of her activities of daily living.  Her son has been living with her but that is not a good long-term solution.  She was also diagnosed with a urinary tract infection after discharge and she is completing her course of Macrobid  She does feel like she is more depressed.  She has been taking Wellbutrin but it looks like she has been taking the bupropion SR once a day which is not an adequate dose    The following portions of the patient's history were reviewed and updated as appropriate: allergies, current medications, past family history, past medical history, past social history, past surgical history, and problem list.  Patient has been eating okay.  Son is staying with her for the time being  Review of Systems  No fevers or chills no chest pain no shortness of breath  Objective   Physical Exam  Vitals reviewed.   Constitutional:       Appearance: She is well-developed.   HENT:      Head: Normocephalic and atraumatic.      Right Ear: External ear normal.      Left Ear: External ear normal.   Eyes:      Conjunctiva/sclera: Conjunctivae normal.      Pupils: Pupils are equal, round, and reactive to light.   Neck:      Thyroid: No thyromegaly.      Trachea: No tracheal deviation.   Cardiovascular:      Rate and Rhythm: Normal rate and regular rhythm.      Heart sounds: Normal heart sounds.   Pulmonary:      Effort: Pulmonary effort is normal.      Breath sounds: Normal breath sounds.   Abdominal:      General: Bowel sounds are normal. There is no distension.      Palpations: Abdomen is soft.      Tenderness:  There is no abdominal tenderness.   Musculoskeletal:         General: No deformity. Normal range of motion.      Cervical back: Normal range of motion.   Skin:     General: Skin is warm and dry.   Neurological:      Mental Status: She is alert and oriented to person, place, and time.   Psychiatric:         Behavior: Behavior normal.         Thought Content: Thought content normal.         Judgment: Judgment normal.         Vitals:    07/09/25 1123   BP: 138/80   Pulse: 98   Resp: 18   Temp: 99.1 °F (37.3 °C)   SpO2: 96%     Body mass index is 33.3 kg/m².         Assessment & Plan   Diagnoses and all orders for this visit:    1. Permanent atrial fibrillation (Primary)    2. Essential hypertension    3. Cerebrovascular accident (CVA) due to embolism of precerebral artery    Other orders  -     Wellbutrin  MG 24 hr tablet; Take 1 tablet by mouth Every Morning. Take one tablet by mouth daily.  Dispense: 30 tablet; Refill: 2    1.  CVA: She does not appear to have any motor weaknesses that are any worse than what her baseline general weakness is.  I also advised that the  get involved with home health because she is not going to be able to live alone given her memory issues and inability to complete ADLs due to confusion and weakness  2.  Hypertension: It appears that her Avapro was discontinued while she is in the hospital she is on diltiazem we will continue this  3.  A-fib: We will continue diltiazem she seems rate controlled and she is on the Eliquis.  We need to be very cautious as she is a fall risk and she is working with PT on this  4.  UTI: She has completed her antibiotics she is no longer having any symptoms  5.  Chronic renal insufficiency: Her kidney function had significantly improved her creatinine is up a little bit but for right now we will hold off on the follow-up with the nephrologist and we will check her kidney function when we see her back  6.  Depression: This is a chronic issue  the Cymbalta did not seem to work well I do not think she is taking the appropriate dose of the Wellbutrin.  Will get her back on her Wellbutrin.  She did not tolerate the generic in the past so we will try to get her the branded and increase as needed.  We are going to see her back in a month

## 2025-07-10 ENCOUNTER — PATIENT MESSAGE (OUTPATIENT)
Dept: INTERNAL MEDICINE | Facility: CLINIC | Age: 80
End: 2025-07-10
Payer: MEDICARE

## 2025-07-10 ENCOUNTER — TELEPHONE (OUTPATIENT)
Dept: INTERNAL MEDICINE | Facility: CLINIC | Age: 80
End: 2025-07-10
Payer: MEDICARE

## 2025-07-10 NOTE — TELEPHONE ENCOUNTER
Caller: Edson Foreman    Relationship: Emergency Contact    Best call back number:     What is the best time to reach you:     Who are you requesting to speak with (clinical staff, provider,  specific staff member):     Do you know the name of the person who called:     What was the call regarding: PATIENTS LM KOROMA IS CALLING IN TO ASK FOR A CALL BACK FROM DR RUBIN OR STAFF AS THE PATIENT IS STILL NOT FEELING WELL FROM HER LAST VISIT AND SHE HAS BEEN HAVING CRYING SPELLS.  I ASKED IF HER WANTED ME TO SCHEDULE AN APPOINTMENT FOR HER AND HE DECLINED.

## 2025-07-11 RX ORDER — HYDROXYZINE HYDROCHLORIDE 10 MG/1
10 TABLET, FILM COATED ORAL 3 TIMES DAILY PRN
Qty: 90 TABLET | Refills: 0 | Status: SHIPPED | OUTPATIENT
Start: 2025-07-11

## 2025-07-17 ENCOUNTER — HOSPITAL ENCOUNTER (EMERGENCY)
Facility: HOSPITAL | Age: 80
Discharge: HOME | End: 2025-07-17
Attending: EMERGENCY MEDICINE
Payer: MEDICARE

## 2025-07-17 ENCOUNTER — APPOINTMENT (OUTPATIENT)
Dept: CT IMAGING | Facility: HOSPITAL | Age: 80
End: 2025-07-17
Payer: MEDICARE

## 2025-07-17 VITALS
OXYGEN SATURATION: 99 % | WEIGHT: 194 LBS | HEIGHT: 64 IN | TEMPERATURE: 97.6 F | DIASTOLIC BLOOD PRESSURE: 74 MMHG | SYSTOLIC BLOOD PRESSURE: 154 MMHG | BODY MASS INDEX: 33.12 KG/M2 | HEART RATE: 78 BPM | RESPIRATION RATE: 18 BRPM

## 2025-07-17 DIAGNOSIS — N39.0 ACUTE UTI: Primary | ICD-10-CM

## 2025-07-17 DIAGNOSIS — R25.1 SHAKING: ICD-10-CM

## 2025-07-17 LAB
ALBUMIN SERPL-MCNC: 4.2 G/DL (ref 3.5–5.2)
ALBUMIN/GLOB SERPL: 1.3 G/DL
ALP SERPL-CCNC: 93 U/L (ref 39–117)
ALT SERPL W P-5'-P-CCNC: 24 U/L (ref 1–33)
ANION GAP SERPL CALCULATED.3IONS-SCNC: 15 MMOL/L (ref 5–15)
APTT PPP: 30.8 SECONDS (ref 22.7–35.4)
AST SERPL-CCNC: 33 U/L (ref 1–32)
BACTERIA UR QL AUTO: ABNORMAL /HPF
BASOPHILS # BLD AUTO: 0.11 10*3/MM3 (ref 0–0.2)
BASOPHILS NFR BLD AUTO: 1 % (ref 0–1.5)
BILIRUB SERPL-MCNC: 0.4 MG/DL (ref 0–1.2)
BILIRUB UR QL STRIP: NEGATIVE
BUN SERPL-MCNC: 13 MG/DL (ref 8–23)
BUN/CREAT SERPL: 11.3 (ref 7–25)
CALCIUM SPEC-SCNC: 9.3 MG/DL (ref 8.6–10.5)
CHLORIDE SERPL-SCNC: 105 MMOL/L (ref 98–107)
CLARITY UR: CLEAR
CO2 SERPL-SCNC: 21 MMOL/L (ref 22–29)
COLOR UR: ABNORMAL
CREAT SERPL-MCNC: 1.15 MG/DL (ref 0.57–1)
DEPRECATED RDW RBC AUTO: 45.7 FL (ref 37–54)
EGFRCR SERPLBLD CKD-EPI 2021: 48.9 ML/MIN/1.73
EOSINOPHIL # BLD AUTO: 0.43 10*3/MM3 (ref 0–0.4)
EOSINOPHIL NFR BLD AUTO: 3.8 % (ref 0.3–6.2)
ERYTHROCYTE [DISTWIDTH] IN BLOOD BY AUTOMATED COUNT: 13 % (ref 12.3–15.4)
GLOBULIN UR ELPH-MCNC: 3.2 GM/DL
GLUCOSE BLDC GLUCOMTR-MCNC: 117 MG/DL (ref 70–130)
GLUCOSE SERPL-MCNC: 128 MG/DL (ref 65–99)
GLUCOSE UR STRIP-MCNC: NEGATIVE MG/DL
HCT VFR BLD AUTO: 40 % (ref 34–46.6)
HGB BLD-MCNC: 13 G/DL (ref 12–15.9)
HGB UR QL STRIP.AUTO: NEGATIVE
HYALINE CASTS UR QL AUTO: ABNORMAL /LPF
IMM GRANULOCYTES # BLD AUTO: 0.03 10*3/MM3 (ref 0–0.05)
IMM GRANULOCYTES NFR BLD AUTO: 0.3 % (ref 0–0.5)
INR PPP: 1.43 (ref 0.9–1.1)
KETONES UR QL STRIP: NEGATIVE
LEUKOCYTE ESTERASE UR QL STRIP.AUTO: ABNORMAL
LYMPHOCYTES # BLD AUTO: 4.38 10*3/MM3 (ref 0.7–3.1)
LYMPHOCYTES NFR BLD AUTO: 38.6 % (ref 19.6–45.3)
MAGNESIUM SERPL-MCNC: 1.8 MG/DL (ref 1.6–2.4)
MCH RBC QN AUTO: 31.3 PG (ref 26.6–33)
MCHC RBC AUTO-ENTMCNC: 32.5 G/DL (ref 31.5–35.7)
MCV RBC AUTO: 96.4 FL (ref 79–97)
MONOCYTES # BLD AUTO: 0.85 10*3/MM3 (ref 0.1–0.9)
MONOCYTES NFR BLD AUTO: 7.5 % (ref 5–12)
NEUTROPHILS NFR BLD AUTO: 48.8 % (ref 42.7–76)
NEUTROPHILS NFR BLD AUTO: 5.54 10*3/MM3 (ref 1.7–7)
NITRITE UR QL STRIP: POSITIVE
NRBC BLD AUTO-RTO: 0 /100 WBC (ref 0–0.2)
PH UR STRIP.AUTO: <=5 [PH] (ref 5–8)
PHOSPHATE SERPL-MCNC: 3.3 MG/DL (ref 2.5–4.5)
PLATELET # BLD AUTO: 336 10*3/MM3 (ref 140–450)
PMV BLD AUTO: 11.1 FL (ref 6–12)
POTASSIUM SERPL-SCNC: 4.4 MMOL/L (ref 3.5–5.2)
PROT SERPL-MCNC: 7.4 G/DL (ref 6–8.5)
PROT UR QL STRIP: ABNORMAL
PROTHROMBIN TIME: 17.4 SECONDS (ref 11.7–14.2)
QT INTERVAL: 378 MS
QTC INTERVAL: 577 MS
RBC # BLD AUTO: 4.15 10*6/MM3 (ref 3.77–5.28)
RBC # UR STRIP: ABNORMAL /HPF
REF LAB TEST METHOD: ABNORMAL
SODIUM SERPL-SCNC: 141 MMOL/L (ref 136–145)
SP GR UR STRIP: 1.02 (ref 1–1.03)
SQUAMOUS #/AREA URNS HPF: ABNORMAL /HPF
TSH SERPL DL<=0.05 MIU/L-ACNC: 1.6 UIU/ML (ref 0.27–4.2)
UROBILINOGEN UR QL STRIP: ABNORMAL
WBC # UR STRIP: ABNORMAL /HPF
WBC NRBC COR # BLD AUTO: 11.34 10*3/MM3 (ref 3.4–10.8)

## 2025-07-17 PROCEDURE — 84100 ASSAY OF PHOSPHORUS: CPT | Performed by: EMERGENCY MEDICINE

## 2025-07-17 PROCEDURE — 93005 ELECTROCARDIOGRAM TRACING: CPT | Performed by: EMERGENCY MEDICINE

## 2025-07-17 PROCEDURE — 85730 THROMBOPLASTIN TIME PARTIAL: CPT | Performed by: EMERGENCY MEDICINE

## 2025-07-17 PROCEDURE — 83735 ASSAY OF MAGNESIUM: CPT | Performed by: EMERGENCY MEDICINE

## 2025-07-17 PROCEDURE — 85610 PROTHROMBIN TIME: CPT | Performed by: EMERGENCY MEDICINE

## 2025-07-17 PROCEDURE — 84443 ASSAY THYROID STIM HORMONE: CPT | Performed by: EMERGENCY MEDICINE

## 2025-07-17 PROCEDURE — 81001 URINALYSIS AUTO W/SCOPE: CPT | Performed by: EMERGENCY MEDICINE

## 2025-07-17 PROCEDURE — 85025 COMPLETE CBC W/AUTO DIFF WBC: CPT | Performed by: EMERGENCY MEDICINE

## 2025-07-17 PROCEDURE — 99284 EMERGENCY DEPT VISIT MOD MDM: CPT

## 2025-07-17 PROCEDURE — 80053 COMPREHEN METABOLIC PANEL: CPT | Performed by: EMERGENCY MEDICINE

## 2025-07-17 PROCEDURE — 70450 CT HEAD/BRAIN W/O DYE: CPT

## 2025-07-17 PROCEDURE — 93010 ELECTROCARDIOGRAM REPORT: CPT | Performed by: INTERNAL MEDICINE

## 2025-07-17 PROCEDURE — 82948 REAGENT STRIP/BLOOD GLUCOSE: CPT

## 2025-07-17 RX ORDER — NITROFURANTOIN 25; 75 MG/1; MG/1
100 CAPSULE ORAL 2 TIMES DAILY
Qty: 10 CAPSULE | Refills: 0 | Status: SHIPPED | OUTPATIENT
Start: 2025-07-17 | End: 2025-07-22

## 2025-07-17 RX ORDER — NITROFURANTOIN 25; 75 MG/1; MG/1
100 CAPSULE ORAL ONCE
Status: COMPLETED | OUTPATIENT
Start: 2025-07-17 | End: 2025-07-17

## 2025-07-17 RX ADMIN — NITROFURANTOIN MONOHYDRATE/MACROCRYSTALS 100 MG: 25; 75 CAPSULE ORAL at 06:49

## 2025-07-17 NOTE — ED NOTES
Confirmed with Edson, that patient complained of numbness beginning sometime between [id number] but not sure when LKN was at this time.

## 2025-07-17 NOTE — ED PROVIDER NOTES
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      Coenzyme Q10 200 MG capsule Take 200 mg by mouth Daily.      Cyanocobalamin (VITAMIN B 12 PO) Take 1 tablet/day by mouth Daily.      dilTIAZem CD (CARDIZEM CD) 180 MG 24 hr capsule TAKE 1 CAPSULE BY MOUTH TWICE DAILY 180 capsule 3    Eliquis 5 MG tablet tablet Take 1 tablet by mouth Every 12 (Twelve) Hours. 180 tablet 1    folic acid (FOLVITE) 400 MCG tablet Take 1 tablet by mouth Daily.      hydrOXYzine (ATARAX) 10 MG tablet Take 1 tablet by mouth 3 (Three) Times a Day As Needed for Itching. 90 tablet 0    levothyroxine (SYNTHROID, LEVOTHROID) 50 MCG tablet Take 1 tablet by mouth Daily. 90 tablet 3    Multiple Vitamin (MULTI-VITAMIN PO) Take 1 tablet by mouth Daily.      mupirocin (BACTROBAN) 2 % ointment APPLY TOPICALLY TO THE AFFECTED AREA THREE TIMES DAILY AS DIRECTED (Patient not taking: Reported on 7/9/2025) 30 g 3    nitrofurantoin, macrocrystal-monohydrate, (MACROBID) 100 MG capsule Take 1 capsule by mouth 2 (Two) Times a Day for 5 days. 10 capsule 0    Probiotic Product (PROBIOTIC PO) Take 1 tablet by mouth Daily.      sodium bicarbonate 650 MG tablet Take 1 tablet by mouth Daily.      vitamin E 400 UNIT capsule Take 1 capsule by mouth Daily.      Wellbutrin  MG 24 hr tablet Take 1 tablet by mouth Every Morning. Take one tablet by mouth daily.  30 tablet 2       PROCEDURES  Procedures    PROGRESS, DATA ANALYSIS, CONSULTS, AND MEDICAL DECISION MAKING  All labs have been independently interpreted by me.  All radiology studies have been reviewed by me. All EKG's have been independently viewed and interpreted by me.  Discussion below represents my analysis of pertinent findings related to patient's condition, differential diagnosis, treatment plan and final disposition.    Differential diagnosis includes but is not limited to anxiety, depression, electrolyte derangement, UTI, intracranial hemorrhage.    Clinical Scores:            Total (NIH Stroke Scale): 0      ED Course as of 07/17/25 2359   Thu Jul 17, 2025   0423 EKG interpreted by myself  Time: 0411  Rate: 90  Rhythm: NSR  Diffuse artifact present limiting interpretation  No ST elevation or depression  Normal intervals [AB]   0454 Glucose(!): 128 [AB]   0454 Creatinine(!): 1.15  baseline [AB]   0454 INR(!): 1.43 [AB]   0454 WBC(!): 11.34 [AB]   0454 Hemoglobin: 13.0 [AB]   0545 TSH Baseline: 1.600 [AB]   0546 CT Head Without Contrast  My independent interpretation of the imaging study is no ICH [AB]   0555 Nitrite, UA(!): Positive [AB]   0652 Updated patient on results.  Awaiting CT read but anticipate it being unremarkable.  Patient found to have nitrite positive UTI.  Workup is otherwise unremarkable.  Benign neurologic exam.  Given dose of Macrobid here, will discharge with same.  Patient agreeable plan for discharge. [AB]      ED Course User Index  [AB] Foster Leblanc MD             AS OF 23:59 EDT VITALS:    BP - 154/74  HR - 78  TEMP - 97.6 °F (36.4 °C) (Oral)  O2 SATS - 99%    COMPLEXITY OF CARE  Admission was considered but after careful review of the patient's presentation, physical examination, diagnostic results, and response to treatment the patient may be safely discharged with outpatient follow-up.      DIAGNOSIS  Final diagnoses:   Acute UTI   Shaking         DISPOSITION  ED Disposition        ED Disposition   Discharge    Condition   Stable    Comment   --                Please note that portions of this document were completed with a voice recognition program.    Note Disclaimer: At [facility], we believe that sharing information builds trust and better relationships. You are receiving this note because you recently visited [facility]. It is possible you will see health information before a provider has talked with you about it. This kind of information can be easy to misunderstand. To help you fully understand what it means for your health, we urge you to discuss this note with your provider.         Foster Leblanc MD  07/17/25 1989

## 2025-07-18 ENCOUNTER — TELEPHONE (OUTPATIENT)
Dept: ONCOLOGY | Facility: CLINIC | Age: 80
End: 2025-07-18
Payer: MEDICARE

## 2025-07-18 NOTE — TELEPHONE ENCOUNTER
Left vm with patient letting her to know her 10/8 appt has been rescheduled to 10/14 as Dr. White will be out of the office.

## 2025-07-22 ENCOUNTER — TELEPHONE (OUTPATIENT)
Dept: INTERNAL MEDICINE | Facility: CLINIC | Age: 80
End: 2025-07-22
Payer: MEDICARE

## 2025-07-22 NOTE — TELEPHONE ENCOUNTER
Ainsley with [facility] called for verbal orders. Patient is constipated a lot. Need order on what to give on whether it be an enema something else. Also need a psych eval order.    Phone: [phone]

## 2025-07-28 ENCOUNTER — OFFICE VISIT (OUTPATIENT)
Dept: NEUROLOGY | Facility: CLINIC | Age: 80
End: 2025-07-28
Payer: MEDICARE

## 2025-07-28 VITALS
WEIGHT: 194 LBS | OXYGEN SATURATION: 96 % | BODY MASS INDEX: 33.28 KG/M2 | DIASTOLIC BLOOD PRESSURE: 76 MMHG | SYSTOLIC BLOOD PRESSURE: 124 MMHG | HEART RATE: 87 BPM

## 2025-07-28 DIAGNOSIS — I48.0 PAROXYSMAL ATRIAL FIBRILLATION: ICD-10-CM

## 2025-07-28 DIAGNOSIS — F02.80 ALZHEIMER'S DISEASE: Primary | ICD-10-CM

## 2025-07-28 DIAGNOSIS — R41.89 PSEUDODEMENTIA: ICD-10-CM

## 2025-07-28 DIAGNOSIS — M21.372 LEFT FOOT DROP: ICD-10-CM

## 2025-07-28 DIAGNOSIS — Z85.3 HISTORY OF BREAST CANCER: ICD-10-CM

## 2025-07-28 DIAGNOSIS — Z86.73 HISTORY OF STROKE: ICD-10-CM

## 2025-07-28 DIAGNOSIS — G30.9 ALZHEIMER'S DISEASE: Primary | ICD-10-CM

## 2025-07-28 PROCEDURE — 99213 OFFICE O/P EST LOW 20 MIN: CPT | Performed by: STUDENT IN AN ORGANIZED HEALTH CARE EDUCATION/TRAINING PROGRAM

## 2025-07-28 PROCEDURE — 3074F SYST BP LT 130 MM HG: CPT | Performed by: STUDENT IN AN ORGANIZED HEALTH CARE EDUCATION/TRAINING PROGRAM

## 2025-07-28 PROCEDURE — 3078F DIAST BP <80 MM HG: CPT | Performed by: STUDENT IN AN ORGANIZED HEALTH CARE EDUCATION/TRAINING PROGRAM

## 2025-07-28 NOTE — PROGRESS NOTES
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    5. History of breast cancer    6. Left foot drop          For history of TIA/stroke  Continue antiplatelet/AC as prescribed.  HLD: Goal LDL <70, should continue surveillance labs and management with PCP  HTN: goal of asymptomatic normotension. If elevated pt should reach out to PCP office, and if remains elevated at home go to urgent care and/or emergency room  DM: Continue monitoring of and control of blood sugars per PCP and/or endocrinology  Secondary stroke prevention: Ideal targets for stroke prevention would be Blood pressure < 130/80; LDL < 70; HbA1c < 6.5 and smoking cessation if applicable.  Call 911 for stroke symptoms      MDM   Reviewed: Previous charts, nursing notes and vitals   Reviewed: Previous labs and CT CTA/MRI scan    Interpretation: Labs and CT/CTA/MRI scan   Total time providing care is :30-74 minutes. This excluded time spent performing separately reportable procedures and services  Consults :Neurology/Stroke    Please note that portions of this note were completed with a voice recognition program.     Pascual Churchill MD  Neuro Hospitalist /Vascular Neurology.                       Dictated utilizing Dragon dictation.

## 2025-07-29 LAB
FOLATE SERPL-MCNC: >20 NG/ML
VIT B12 SERPL-MCNC: 758 PG/ML (ref 232–1245)

## 2025-08-10 ENCOUNTER — HOSPITAL ENCOUNTER (EMERGENCY)
Facility: HOSPITAL | Age: 80
Discharge: PSYCHIATRIC OR SUBSTANCE USE FACILITY | End: 2025-08-10
Attending: EMERGENCY MEDICINE | Admitting: EMERGENCY MEDICINE
Payer: MEDICARE

## 2025-08-10 ENCOUNTER — APPOINTMENT (OUTPATIENT)
Dept: CT IMAGING | Facility: HOSPITAL | Age: 80
End: 2025-08-10
Payer: MEDICARE

## 2025-08-10 VITALS
HEART RATE: 86 BPM | SYSTOLIC BLOOD PRESSURE: 160 MMHG | DIASTOLIC BLOOD PRESSURE: 92 MMHG | RESPIRATION RATE: 18 BRPM | TEMPERATURE: 98 F | OXYGEN SATURATION: 100 %

## 2025-08-10 DIAGNOSIS — R30.0 DYSURIA: ICD-10-CM

## 2025-08-10 DIAGNOSIS — F32.A DEPRESSION, UNSPECIFIED DEPRESSION TYPE: Primary | ICD-10-CM

## 2025-08-10 LAB
ALBUMIN SERPL-MCNC: 3.7 G/DL (ref 3.5–5.2)
ALBUMIN/GLOB SERPL: 1.4 G/DL
ALP SERPL-CCNC: 95 U/L (ref 39–117)
ALT SERPL W P-5'-P-CCNC: 12 U/L (ref 1–33)
AMPHET+METHAMPHET UR QL: NEGATIVE
AMPHETAMINES UR QL: NEGATIVE
ANION GAP SERPL CALCULATED.3IONS-SCNC: 13.5 MMOL/L (ref 5–15)
APAP SERPL-MCNC: <5 MCG/ML (ref 0–30)
AST SERPL-CCNC: 15 U/L (ref 1–32)
BACTERIA UR QL AUTO: ABNORMAL /HPF
BARBITURATES UR QL SCN: NEGATIVE
BASOPHILS # BLD AUTO: 0.06 10*3/MM3 (ref 0–0.2)
BASOPHILS NFR BLD AUTO: 0.7 % (ref 0–1.5)
BENZODIAZ UR QL SCN: NEGATIVE
BILIRUB SERPL-MCNC: 0.5 MG/DL (ref 0–1.2)
BILIRUB UR QL STRIP: NEGATIVE
BUN SERPL-MCNC: 23 MG/DL (ref 8–23)
BUN/CREAT SERPL: 15.1 (ref 7–25)
BUPRENORPHINE SERPL-MCNC: NEGATIVE NG/ML
CALCIUM SPEC-SCNC: 9.1 MG/DL (ref 8.6–10.5)
CANNABINOIDS SERPL QL: NEGATIVE
CHLORIDE SERPL-SCNC: 106 MMOL/L (ref 98–107)
CLARITY UR: CLEAR
CO2 SERPL-SCNC: 24.5 MMOL/L (ref 22–29)
COCAINE UR QL: NEGATIVE
COLOR UR: ABNORMAL
CREAT SERPL-MCNC: 1.52 MG/DL (ref 0.57–1)
DEPRECATED RDW RBC AUTO: 46.9 FL (ref 37–54)
EGFRCR SERPLBLD CKD-EPI 2021: 35 ML/MIN/1.73
EOSINOPHIL # BLD AUTO: 0.16 10*3/MM3 (ref 0–0.4)
EOSINOPHIL NFR BLD AUTO: 1.8 % (ref 0.3–6.2)
ERYTHROCYTE [DISTWIDTH] IN BLOOD BY AUTOMATED COUNT: 13.1 % (ref 12.3–15.4)
ETHANOL BLD-MCNC: <10 MG/DL (ref 0–10)
ETHANOL UR QL: <0.01 %
FENTANYL UR-MCNC: NEGATIVE NG/ML
GLOBULIN UR ELPH-MCNC: 2.6 GM/DL
GLUCOSE SERPL-MCNC: 98 MG/DL (ref 65–99)
GLUCOSE UR STRIP-MCNC: NEGATIVE MG/DL
HCT VFR BLD AUTO: 37 % (ref 34–46.6)
HGB BLD-MCNC: 12.1 G/DL (ref 12–15.9)
HGB UR QL STRIP.AUTO: NEGATIVE
HYALINE CASTS UR QL AUTO: ABNORMAL /LPF
IMM GRANULOCYTES # BLD AUTO: 0.02 10*3/MM3 (ref 0–0.05)
IMM GRANULOCYTES NFR BLD AUTO: 0.2 % (ref 0–0.5)
KETONES UR QL STRIP: ABNORMAL
LEUKOCYTE ESTERASE UR QL STRIP.AUTO: ABNORMAL
LYMPHOCYTES # BLD AUTO: 2.29 10*3/MM3 (ref 0.7–3.1)
LYMPHOCYTES NFR BLD AUTO: 26 % (ref 19.6–45.3)
MCH RBC QN AUTO: 31.8 PG (ref 26.6–33)
MCHC RBC AUTO-ENTMCNC: 32.7 G/DL (ref 31.5–35.7)
MCV RBC AUTO: 97.1 FL (ref 79–97)
METHADONE UR QL SCN: NEGATIVE
MONOCYTES # BLD AUTO: 0.82 10*3/MM3 (ref 0.1–0.9)
MONOCYTES NFR BLD AUTO: 9.3 % (ref 5–12)
NEUTROPHILS NFR BLD AUTO: 5.45 10*3/MM3 (ref 1.7–7)
NEUTROPHILS NFR BLD AUTO: 62 % (ref 42.7–76)
NITRITE UR QL STRIP: NEGATIVE
NRBC BLD AUTO-RTO: 0 /100 WBC (ref 0–0.2)
OPIATES UR QL: NEGATIVE
OXYCODONE UR QL SCN: NEGATIVE
PCP UR QL SCN: NEGATIVE
PH UR STRIP.AUTO: 6.5 [PH] (ref 5–8)
PLATELET # BLD AUTO: 308 10*3/MM3 (ref 140–450)
PMV BLD AUTO: 11.7 FL (ref 6–12)
POTASSIUM SERPL-SCNC: 3.9 MMOL/L (ref 3.5–5.2)
PROT SERPL-MCNC: 6.3 G/DL (ref 6–8.5)
PROT UR QL STRIP: ABNORMAL
QT INTERVAL: 401 MS
QTC INTERVAL: 452 MS
RBC # BLD AUTO: 3.81 10*6/MM3 (ref 3.77–5.28)
RBC # UR STRIP: ABNORMAL /HPF
REF LAB TEST METHOD: ABNORMAL
SALICYLATES SERPL-MCNC: <0.3 MG/DL
SODIUM SERPL-SCNC: 144 MMOL/L (ref 136–145)
SP GR UR STRIP: 1.02 (ref 1–1.03)
SQUAMOUS #/AREA URNS HPF: ABNORMAL /HPF
TRICYCLICS UR QL SCN: NEGATIVE
UROBILINOGEN UR QL STRIP: ABNORMAL
WBC # UR STRIP: ABNORMAL /HPF
WBC NRBC COR # BLD AUTO: 8.8 10*3/MM3 (ref 3.4–10.8)

## 2025-08-10 PROCEDURE — 80179 DRUG ASSAY SALICYLATE: CPT | Performed by: EMERGENCY MEDICINE

## 2025-08-10 PROCEDURE — 74176 CT ABD & PELVIS W/O CONTRAST: CPT

## 2025-08-10 PROCEDURE — 85025 COMPLETE CBC W/AUTO DIFF WBC: CPT | Performed by: EMERGENCY MEDICINE

## 2025-08-10 PROCEDURE — 25810000003 SODIUM CHLORIDE 0.9 % SOLUTION: Performed by: EMERGENCY MEDICINE

## 2025-08-10 PROCEDURE — 93005 ELECTROCARDIOGRAM TRACING: CPT | Performed by: EMERGENCY MEDICINE

## 2025-08-10 PROCEDURE — 80143 DRUG ASSAY ACETAMINOPHEN: CPT | Performed by: EMERGENCY MEDICINE

## 2025-08-10 PROCEDURE — 81001 URINALYSIS AUTO W/SCOPE: CPT | Performed by: EMERGENCY MEDICINE

## 2025-08-10 PROCEDURE — 90791 PSYCH DIAGNOSTIC EVALUATION: CPT

## 2025-08-10 PROCEDURE — 99285 EMERGENCY DEPT VISIT HI MDM: CPT

## 2025-08-10 PROCEDURE — 80307 DRUG TEST PRSMV CHEM ANLYZR: CPT | Performed by: EMERGENCY MEDICINE

## 2025-08-10 PROCEDURE — 93010 ELECTROCARDIOGRAM REPORT: CPT | Performed by: INTERNAL MEDICINE

## 2025-08-10 PROCEDURE — 80053 COMPREHEN METABOLIC PANEL: CPT | Performed by: EMERGENCY MEDICINE

## 2025-08-10 PROCEDURE — 82077 ASSAY SPEC XCP UR&BREATH IA: CPT | Performed by: EMERGENCY MEDICINE

## 2025-08-10 RX ORDER — PHENAZOPYRIDINE HYDROCHLORIDE 100 MG/1
200 TABLET, FILM COATED ORAL ONCE
Status: COMPLETED | OUTPATIENT
Start: 2025-08-10 | End: 2025-08-10

## 2025-08-10 RX ORDER — SODIUM CHLORIDE 0.9 % (FLUSH) 0.9 %
10 SYRINGE (ML) INJECTION AS NEEDED
Status: DISCONTINUED | OUTPATIENT
Start: 2025-08-10 | End: 2025-08-11 | Stop reason: HOSPADM

## 2025-08-10 RX ADMIN — SODIUM CHLORIDE 500 ML: 9 INJECTION, SOLUTION INTRAVENOUS at 13:57

## 2025-08-10 RX ADMIN — PHENAZOPYRIDINE 200 MG: 100 TABLET ORAL at 17:53

## (undated) DEVICE — APPL CHLORAPREP HI/LITE 26ML ORNG

## (undated) DEVICE — ADHS LIQ MASTISOL 2/3ML

## (undated) DEVICE — SUT VIC 3/0 SH 27IN J416H

## (undated) DEVICE — Device

## (undated) DEVICE — ANTIBACTERIAL UNDYED BRAIDED (POLYGLACTIN 910), SYNTHETIC ABSORBABLE SUTURE: Brand: COATED VICRYL

## (undated) DEVICE — BANDAGE,GAUZE,BULKEE II,4.5"X4.1YD,STRL: Brand: MEDLINE

## (undated) DEVICE — SUT SILK 2/0 SH 30IN K833H

## (undated) DEVICE — GLV SURG BIOGEL LTX PF 6 1/2

## (undated) DEVICE — PK UNIV COMPL 40

## (undated) DEVICE — 3M™ STERI-STRIP™ ANTIMICROBIAL SKIN CLOSURES 1/2 INCH X 4 INCHES 50/CARTON 4 CARTONS/CASE A1847: Brand: 3M™ STERI-STRIP™

## (undated) DEVICE — SUT MNCRYL PLS ANTIB UD 4/0 PS2 18IN

## (undated) DEVICE — TOOL MR8-15MH30 MR8 15CM MATCH 3MM: Brand: MIDAS REX MR8

## (undated) DEVICE — ADHS SKIN SURG TISS VISC PREMIERPRO EXOFIN HI/VISC FAST/DRY

## (undated) DEVICE — SOL NACL 0.9PCT 100ML SGL

## (undated) DEVICE — TOWEL,OR,DSP,ST,BLUE,STD,4/PK,20PK/CS: Brand: MEDLINE

## (undated) DEVICE — GLV SURG BIOGEL LTX PF 7

## (undated) DEVICE — GLV SURG SENSICARE W/ALOE PF LF 7.5 STRL

## (undated) DEVICE — DRP MICROSCOPE 4 BINOCULAR CV 54X150IN

## (undated) DEVICE — STCKNT IMPERV 9X36IN STRL

## (undated) DEVICE — STPLR SKIN VISISTAT WD 35CT

## (undated) DEVICE — JACKSON-PRATT 100CC BULB RESERVOIR: Brand: CARDINAL HEALTH

## (undated) DEVICE — SMOKE EVACUATION TUBING WITH 7/8 IN TO 1/4 IN REDUCER: Brand: BUFFALO FILTER

## (undated) DEVICE — TRAP FLD MINIVAC MEGADYNE 100ML

## (undated) DEVICE — NDL HYPO PRECISIONGLIDE REG 25G 1 1/2

## (undated) DEVICE — PK NEURO SPINE 40

## (undated) DEVICE — INTENDED FOR TISSUE SEPARATION, AND OTHER PROCEDURES THAT REQUIRE A SHARP SURGICAL BLADE TO PUNCTURE OR CUT.: Brand: BARD-PARKER ® STAINLESS STEEL BLADES

## (undated) DEVICE — SPNG LAP 18X18IN LF STRL PK/5

## (undated) DEVICE — SOL ISO/ALC RUB 70PCT 4OZ

## (undated) DEVICE — SPONGE,NEURO,.75"X.75",XR,STRL,LF,10/PK: Brand: MEDLINE

## (undated) DEVICE — ELECTRD BLD EZ CLN MOD 2.5IN

## (undated) DEVICE — SYR CONTRL PRESS/LO FIX/M/LL W/THMB/RNG 10ML

## (undated) DEVICE — UNDYED BRAIDED (POLYGLACTIN 910), SYNTHETIC ABSORBABLE SUTURE: Brand: COATED VICRYL

## (undated) DEVICE — DRSNG PAD ABD 8X10IN STRL

## (undated) DEVICE — SPNG GZ WOVN 4X4IN 12PLY 10/BX STRL

## (undated) DEVICE — LEGGINGS, PAIR, 31X48, STERILE: Brand: MEDLINE

## (undated) DEVICE — PENCL ES MEGADINE EZ/CLEAN BUTN W/HOLSTR 10FT

## (undated) DEVICE — PATIENT RETURN ELECTRODE, SINGLE-USE, CONTACT QUALITY MONITORING, ADULT, WITH 9FT CORD, FOR PATIENTS WEIGING OVER 33LBS. (15KG): Brand: MEGADYNE

## (undated) DEVICE — TOOL MR8-15BA50T MR8 15CM BAL SYMTRI 5MM: Brand: MIDAS REX MR8

## (undated) DEVICE — SUT VIC 3/0 TIES 18IN J110T

## (undated) DEVICE — CONN TBG Y 5 IN 1 LF STRL

## (undated) DEVICE — PENCL SMOKE/EVAC MEGADYNE TELESCP 10FT

## (undated) DEVICE — SUT VIC 3/0 SH CR8 18IN J864D

## (undated) DEVICE — NEEDLE, QUINCKE, 20GX3.5": Brand: MEDLINE